# Patient Record
Sex: FEMALE | Race: WHITE | NOT HISPANIC OR LATINO | Employment: OTHER | URBAN - METROPOLITAN AREA
[De-identification: names, ages, dates, MRNs, and addresses within clinical notes are randomized per-mention and may not be internally consistent; named-entity substitution may affect disease eponyms.]

---

## 2017-01-06 ENCOUNTER — TELEPHONE (OUTPATIENT)
Dept: DERMATOLOGY | Facility: CLINIC | Age: 72
End: 2017-01-06

## 2017-01-06 NOTE — TELEPHONE ENCOUNTER
Prior Authorization Retail Medication Request  Medication/Dose: Fluocinolone Acetonide (DERMA-SMOOTHE/FS SCALP) 0.01 % OIL  Diagnosis and ICD code: Lichen planus [L43.9  New/Renewal/Insurance Change PA: Renewal  Previously Tried and Failed Therapies:   The patient was last seen 9/30/16 when we injected IL Kenalog to scalp lesions and continued her Dermasmooth FS oil treatment 1x weekly and Plaquenil 200mg BID.  Insurance ID (if provided): [id number]   Insurance Phone (if provided): [phone]   Any additional info from fax request:     If you received a fax notification from an outside Pharmacy:  [facility]  [facility] #:[phone]  Pharmacy Fax:[phone]

## 2017-01-16 ENCOUNTER — TELEPHONE (OUTPATIENT)
Dept: DERMATOLOGY | Facility: CLINIC | Age: 72
End: 2017-01-16

## 2017-01-16 NOTE — TELEPHONE ENCOUNTER
Patient called stating she has called twice now and has not heard back regarding 2 month follow up appointment. Informed patient that she is on the recall list. Patient requested that message be sent to Dr Soto's nurse Karen. Patient leaves the 2nd week in February for 6 weeks and is requesting to be seen before then.

## 2017-01-18 ENCOUNTER — OFFICE VISIT (OUTPATIENT)
Dept: OBGYN | Facility: CLINIC | Age: 72
End: 2017-01-18
Attending: OBSTETRICS & GYNECOLOGY
Payer: MEDICARE

## 2017-01-18 VITALS
SYSTOLIC BLOOD PRESSURE: 115 MMHG | DIASTOLIC BLOOD PRESSURE: 65 MMHG | WEIGHT: 136.4 LBS | HEART RATE: 68 BPM | BODY MASS INDEX: 21.92 KG/M2 | HEIGHT: 66 IN

## 2017-01-18 DIAGNOSIS — B37.31 CANDIDIASIS OF VULVA AND VAGINA: Primary | ICD-10-CM

## 2017-01-18 LAB
ALBUMIN UR-MCNC: NEGATIVE MG/DL
APPEARANCE UR: CLEAR
BILIRUB UR QL STRIP: NEGATIVE
COLOR UR AUTO: NORMAL
GLUCOSE UR STRIP-MCNC: NEGATIVE MG/DL
HGB UR QL STRIP: NEGATIVE
KETONES UR STRIP-MCNC: NEGATIVE MG/DL
LEUKOCYTE ESTERASE UR QL STRIP: NEGATIVE
NITRATE UR QL: NEGATIVE
PH UR STRIP: 5.5 PH (ref 5–7)
RBC #/AREA URNS AUTO: 0 /HPF (ref 0–2)
SP GR UR STRIP: 1 (ref 1–1.03)
URN SPEC COLLECT METH UR: NORMAL
UROBILINOGEN UR STRIP-MCNC: NORMAL MG/DL (ref 0–2)
WBC #/AREA URNS AUTO: <1 /HPF (ref 0–2)

## 2017-01-18 PROCEDURE — 87102 FUNGUS ISOLATION CULTURE: CPT | Performed by: OBSTETRICS & GYNECOLOGY

## 2017-01-18 PROCEDURE — 81001 URINALYSIS AUTO W/SCOPE: CPT | Performed by: OBSTETRICS & GYNECOLOGY

## 2017-01-18 PROCEDURE — 99212 OFFICE O/P EST SF 10 MIN: CPT | Mod: ZF

## 2017-01-18 NOTE — Clinical Note
"1/18/2017       RE: Ester Barba  [address]  [facility] [address]     Dear Colleague,    Thank you for referring your patient, Ester Barba, to the [facility] at [facility]. Please see a copy of my visit note below.    S: doing well but still bothered by discharge intermittently  /65 mmHg  Pulse 68  Ht 1.676 m (5' 6\")  Wt 61.871 kg (136 lb 6.4 oz)  BMI 22.03 kg/m2  EG atrophy  Vagina atrophic, min discharge  Wet prep: negative, culture sent  A: atrophy  Chronic and persistent yeast  P: yeast culture  Continue current regimen      Janeth Mayfield            "

## 2017-01-23 LAB
MICRO REPORT STATUS: NORMAL
SPECIMEN SOURCE: NORMAL
YEAST SPEC QL CULT: NORMAL

## 2017-01-26 NOTE — PROGRESS NOTES
"S: doing well but still bothered by discharge intermittently  /65 mmHg  Pulse 68  Ht 1.676 m (5' 6\")  Wt 61.871 kg (136 lb 6.4 oz)  BMI 22.03 kg/m2  EG atrophy  Vagina atrophic, min discharge  Wet prep: negative, culture sent  A: atrophy  Chronic and persistent yeast  P: yeast culture  Continue current regimen  Janeth Mayfield    "

## 2017-01-27 ENCOUNTER — OFFICE VISIT (OUTPATIENT)
Dept: DERMATOLOGY | Facility: CLINIC | Age: 72
End: 2017-01-27

## 2017-01-27 ENCOUNTER — TELEPHONE (OUTPATIENT)
Dept: OBGYN | Facility: CLINIC | Age: 72
End: 2017-01-27

## 2017-01-27 VITALS — DIASTOLIC BLOOD PRESSURE: 68 MMHG | SYSTOLIC BLOOD PRESSURE: 121 MMHG | HEART RATE: 67 BPM

## 2017-01-27 DIAGNOSIS — L66.10 LICHEN PLANOPILARIS: Primary | ICD-10-CM

## 2017-01-27 DIAGNOSIS — N90.5 VULVAR ATROPHY: Primary | ICD-10-CM

## 2017-01-27 RX ORDER — FLUOCINOLONE ACETONIDE 0.11 MG/ML
OIL TOPICAL
Qty: 118 ML | Refills: 2 | Status: SHIPPED | OUTPATIENT
Start: 2017-01-27 | End: 2018-05-01

## 2017-01-27 RX ORDER — HYDROXYCHLOROQUINE SULFATE 200 MG/1
TABLET, FILM COATED ORAL
Qty: 60 TABLET | Refills: 2 | Status: SHIPPED | OUTPATIENT
Start: 2017-01-27 | End: 2017-05-22

## 2017-01-27 ASSESSMENT — PAIN SCALES - GENERAL: PAINLEVEL: NO PAIN (0)

## 2017-01-27 NOTE — Clinical Note
1/27/2017       RE: Ester Barba  [address]  [facility] [address]     Dear Colleague,    Thank you for referring your patient, Ester Barba, to the [facility] DERMATOLOGY at [facility]. Please see a copy of my visit note below.    No notes on file    Again, thank you for allowing me to participate in the care of your patient.      Sincerely,    Barbara Soto MD

## 2017-01-27 NOTE — MR AVS SNAPSHOT
After Visit Summary   1/27/2017    Ester Barba    MRN: [id number]           Patient Information     Date Of Birth          1945        Visit Information        Provider Department      1/27/2017 11:15 AM Barbara Soto MD [facility]        Today's Diagnoses     Lichen planopilaris    -  1       Follow-ups after your visit        Your next 10 appointments already scheduled     Aug 21, 2017 11:00 AM CDT   (Arrive by 10:45 AM)   RETURN [facility] with Barbara Soto MD   [facility] ([facility])    [address] [place] [address] [phone]              Who to contact     Please call your clinic at [phone] to:    Ask questions about your health    Make or cancel appointments    Discuss your medicines    Learn about your test results    Speak to your doctor   If you have compliments or concerns about an experience at your clinic, or if you wish to file a complaint, please contact [facility] Patient Relations at [phone] or email us at [email or web address]         Additional Information About Your Visit        MyChart Information     [employer] gives you secure access to your electronic health record. If you see a primary care provider, you can also send messages to your care team and make appointments. If you have questions, please call your primary care clinic.  If you do not have a primary care provider, please call [phone] and they will assist you.      [employer] is an electronic gateway that provides easy, online access to your medical records. With [email or web address], you can request a clinic appointment, read your test results, renew a prescription or communicate with your care team.     To access your existing account, please contact your [facility] Clinic or call [phone] for assistance.        Care EveryWhere ID     This is your  Care EveryWhere ID. This could be used by other organizations to access your [facility] medical records  [id number]        Your Vitals Were     Pulse                   67            Blood Pressure from Last 3 Encounters:   06/28/17 117/69   06/06/17 124/66   03/24/17 133/73    Weight from Last 3 Encounters:   06/28/17 59.1 kg (130 lb 6.4 oz)   01/18/17 61.9 kg (136 lb 6.4 oz)   03/14/16 58.1 kg (128 lb)              Today, you had the following     No orders found for display         Today's Medication Changes          These changes are accurate as of: 1/27/17 11:59 PM.  If you have any questions, ask your nurse or doctor.               These medicines have changed or have updated prescriptions.        Dose/Directions    NEW MED   This may have changed:  additional instructions   Used for:  Vulvar atrophy   Changed by:  NurseJuancarlos        Biest 1.5mg/gram cream(pg free)  Insert 1 gram vaginally twice weekly as directed   Quantity:  40 g   Refills:  6            Where to get your medicines      These medications were sent to [facility] [address] [place] [address] AT [address] [address]     Phone:  [phone]     DERMA-SMOOTHE/FS SCALP 0.01 % Oil    hydroxychloroquine 200 MG tablet                Primary Care Provider Office Phone # Fax #    Fernando Weathers [phone]       [facility] [address]  [facility] [address]        Equal Access to Services     FIDENCIO MCDANIEL AH: Hadii aad ku hadasho Soomaali, waaxda luqadaha, qaybta kaalmada adeegyada, samuel stevens. So [facility] [place] [phone].    ATENCIÓN: Si habla español, tiene a pelayo disposición servicios gratuitos de asistencia lingüística. Llame al [phone].    We comply with applicable federal civil rights laws and [place] laws. We do not discriminate on the basis of race, color, national origin, age, disability sex, sexual orientation or gender  identity.            Thank you!     Thank you for choosing [facility]  for your care. Our goal is always to provide you with excellent care. Hearing back from our patients is one way we can continue to improve our services. Please take a few minutes to complete the written survey that you may receive in the mail after your visit with us. Thank you!             Your Updated Medication List - Protect others around you: Learn how to safely use, store and throw away your medicines at www.disposemymeds.org.          This list is accurate as of: 1/27/17 11:59 PM.  Always use your most recent med list.                   Brand Name Dispense Instructions for use Diagnosis    BENADRYL 25 MG capsule   Generic drug:  diphenhydrAMINE      Take 25 mg by mouth every 6 hours as needed.        calcium 1500 MG Tabs      Take  by mouth.        CLARITIN PO      Take  by mouth.        DERMA-SMOOTHE/FS SCALP 0.01 % Oil     118 mL    Apply once a week to scalp for lichen planopilaris    Lichen planopilaris       desonide 0.05 % cream    DESOWEN    60 g    Mix 50/50 with ketoconazole and apply twice daily to irritated area for 3 weeks    Intertrigo       fluconazole 200 MG tablet    DIFLUCAN    45 tablet    Take 1 tablet (200 mg) by mouth daily    Yeast infection of the vagina       hydroxychloroquine 200 MG tablet    PLAQUENIL    60 tablet    TAKE ONE TABLET BY MOUTH TWO TIMES A DAY        ibuprofen 800 MG tablet    ADVIL/MOTRIN     Take 800 mg by mouth every 8 hours as needed.        NEW MED     40 g    Biest 1.5mg/gram cream(pg free)  Insert 1 gram vaginally twice weekly as directed    Vulvar atrophy       vitamin D 1000 UNITS capsule      Take  by mouth. Total 2200 units daily.

## 2017-01-27 NOTE — LETTER
"1/27/2017       RE: Ester Barba  [address] [address]     Dear Colleague,    Thank you for referring your patient, Ester Barba, to the [facility] DERMATOLOGY at [facility]. Please see a copy of my visit note below.    [facility] Dermatology Note      Dermatology Problem List:  1.***    CC:   Chief Complaint   Patient presents with     Derm Problem     Patient comes to clinic today for LPP. States \"it's worse.\"         Encounter Date: Jan 27, 2017    History of Present Illness:  Ms. Ester Barba is a 71 year old female who {hpi:875172}    Fewer crusts   Had sinus infection - treated with doxy for 10 days   - much better    Past Medical History:   Patient Active Problem List   Diagnosis     Porokeratosis     Squamous cell carcinoma (H)     Neoplasm of uncertain behavior     Lichen planopilaris     Dermatitis     Cicatricial alopecia     Keratosis, inflamed seborrheic     History of skin cancer     Vaginitis and vulvovaginitis     Basal cell carcinoma of vertex scalp s/p mohs 6-5-15     Medication management     Actinic keratosis     Medication monitoring encounter     Past Medical History   Diagnosis Date     Squamous cell carcinoma (H)      Basal cell carcinoma      Past Surgical History   Procedure Laterality Date     Biopsy of skin lesion       No history of surgery  2/17/14     derm     Mohs micrographic procedure       Small bowel resection  11/2015     diverticulitis       Social History:  The patient {works:484914}. The patient {denies/admits to:537955} use of tanning beds.    Family History:  {Familyhxderm:545679}    Medications:  Current Outpatient Prescriptions   Medication Sig Dispense Refill     Fluocinolone Acetonide (DERMA-SMOOTHE/FS SCALP) 0.01 % OIL Apply once a week to scalp for lichen planopilaris 118 mL 2     hydroxychloroquine (PLAQUENIL) 200 MG tablet TAKE ONE TABLET BY MOUTH TWO TIMES A DAY 60 tablet 2     fluconazole " (DIFLUCAN) 200 MG tablet Take 1 tablet (200 mg) by mouth daily 45 tablet 4     ketoconazole (NIZORAL) 2 % cream Mix 50/50 with desonide and apply twice daily to irritated area for 3 weeks 30 g 1     desonide (DESOWEN) 0.05 % cream Mix 50/50 with ketoconazole and apply twice daily to irritated area for 3 weeks 60 g 1     NEW MED Biest 1.5mg/gram cream(pg free)  Insert 1 gram vaginally every week as directed 40 g 6     diphenhydrAMINE (BENADRYL) 25 MG capsule Take 25 mg by mouth every 6 hours as needed.       Calcium 1500 MG TABS Take  by mouth.       Loratadine (CLARITIN PO) Take  by mouth.       ibuprofen (ADVIL,MOTRIN) 800 MG tablet Take 800 mg by mouth every 8 hours as needed.       Cholecalciferol (VITAMIN D) 1000 UNIT capsule Take  by mouth. Total 2200 units daily.       Allergies   Allergen Reactions     Dust Mites Other (See Comments)     Sneezing, coughing. asthma     Levaquin [Levofloxacin Hemihydrate] Other (See Comments)     Muscle weakness     Mold Other (See Comments)     Sneezing, asthma, weezing     Pollen Extract/Tree Extract Other (See Comments)     Sneezing, weezing     Sulfa Drugs Hives     Penicillins Rash         Review of Systems:  -{ROS:884388}  -Constitutional: The patient denies fatigue, fevers, chills, unintended weight loss, and night sweats.  -HEENT: Patient denies nonhealing oral sores.  -Skin: As above in HPI. No additional skin concerns.    Physical exam:  Vitals: /68 mmHg  Pulse 67  GEN: {RFGeneralAppearance:649000}   SKIN: {Skin Exam:437731}  -{Skin Exam Derm:853357}  -{Skin Exam Derm:161471}  -{Skin Exam Derm:131635}  -No other lesions of concern on areas examined.     Impression/Plan:  1. {Diagnosesderm:616519}    {rfplan:479001}    ***    2. {Diagnosesderm:797360}    {rfplan:024556}    ***    3. {Diagnosesderm:555715}    {rfplan:406210}    ***    4. {Diagnosesderm:445268}    {rfplan:659677}    ***    CC  *** on close of this encounter.  Follow-up {[facility]206047}  {follow up in days/weeks/months:342350}.       Staff Involved:  Staff Only        Pictures taken of patient today to be placed in chart for future reference.      Again, thank you for allowing me to participate in the care of your patient.      Sincerely,    Barbara Soto MD

## 2017-01-27 NOTE — LETTER
"1/27/2017      RE: Ester Barba  [address] [place] [address]       [facility] Dermatology Note      Dermatology Problem List:  1.***    CC:   Chief Complaint   Patient presents with     Derm Problem     Patient comes to clinic today for LPP. States \"it's worse.\"         Encounter Date: Jan 27, 2017    History of Present Illness:  Ms. Ester Barba is a 71 year old female who {hpi:812003}    Fewer crusts   Had sinus infection - treated with doxy for 10 days   - much better    Past Medical History:   Patient Active Problem List   Diagnosis     Porokeratosis     Squamous cell carcinoma (H)     Neoplasm of uncertain behavior     Lichen planopilaris     Dermatitis     Cicatricial alopecia     Keratosis, inflamed seborrheic     History of skin cancer     Vaginitis and vulvovaginitis     Basal cell carcinoma of vertex scalp s/p mohs 6-5-15     Medication management     Actinic keratosis     Medication monitoring encounter     Past Medical History   Diagnosis Date     Squamous cell carcinoma (H)      Basal cell carcinoma      Past Surgical History   Procedure Laterality Date     Biopsy of skin lesion       No history of surgery  2/17/14     derm     Mohs micrographic procedure       Small bowel resection  11/2015     diverticulitis       Social History:  The patient {works:112866}. The patient {denies/admits to:254582} use of tanning beds.    Family History:  {Familyhxderm:690927}    Medications:  Current Outpatient Prescriptions   Medication Sig Dispense Refill     Fluocinolone Acetonide (DERMA-SMOOTHE/FS SCALP) 0.01 % OIL Apply once a week to scalp for lichen planopilaris 118 mL 2     hydroxychloroquine (PLAQUENIL) 200 MG tablet TAKE ONE TABLET BY MOUTH TWO TIMES A DAY 60 tablet 2     fluconazole (DIFLUCAN) 200 MG tablet Take 1 tablet (200 mg) by mouth daily 45 tablet 4     ketoconazole (NIZORAL) 2 % cream Mix 50/50 with desonide and apply twice daily to irritated area for 3 weeks 30 g 1     " desonide (DESOWEN) 0.05 % cream Mix 50/50 with ketoconazole and apply twice daily to irritated area for 3 weeks 60 g 1     NEW MED Biest 1.5mg/gram cream(pg free)  Insert 1 gram vaginally every week as directed 40 g 6     diphenhydrAMINE (BENADRYL) 25 MG capsule Take 25 mg by mouth every 6 hours as needed.       Calcium 1500 MG TABS Take  by mouth.       Loratadine (CLARITIN PO) Take  by mouth.       ibuprofen (ADVIL,MOTRIN) 800 MG tablet Take 800 mg by mouth every 8 hours as needed.       Cholecalciferol (VITAMIN D) 1000 UNIT capsule Take  by mouth. Total 2200 units daily.       Allergies   Allergen Reactions     Dust Mites Other (See Comments)     Sneezing, coughing. asthma     Levaquin [Levofloxacin Hemihydrate] Other (See Comments)     Muscle weakness     Mold Other (See Comments)     Sneezing, asthma, weezing     Pollen Extract/Tree Extract Other (See Comments)     Sneezing, weezing     Sulfa Drugs Hives     Penicillins Rash         Review of Systems:  -{ROS:309919}  -Constitutional: The patient denies fatigue, fevers, chills, unintended weight loss, and night sweats.  -HEENT: Patient denies nonhealing oral sores.  -Skin: As above in HPI. No additional skin concerns.    Physical exam:  Vitals: /68 mmHg  Pulse 67  GEN: {RFGeneralAppearance:477248}   SKIN: {Skin Exam:681247}  -{Skin Exam Derm:351179}  -{Skin Exam Derm:847767}  -{Skin Exam Derm:571908}  -No other lesions of concern on areas examined.     Impression/Plan:  1. {Diagnosesderm:641201}    {rfplan:815573}    ***    2. {Diagnosesderm:830322}    {rfplan:172582}    ***    3. {Diagnosesderm:247377}    {rfplan:114244}    ***    4. {Diagnosesderm:676973}    {rfplan:326887}    ***    CC  *** on close of this encounter.  Follow-up {rfmultiple:976196} {follow up in days/weeks/months:812144}.       Staff Involved:  Staff Only        Pictures taken of patient today to be placed in chart for future reference.      Barbara Soto MD

## 2017-01-27 NOTE — LETTER
"1/27/2017      RE: Ester Barba  [address] [place] [place] [address]       [facility] Dermatology Note      Dermatology Problem List:  1.***    CC:   Chief Complaint   Patient presents with     Derm Problem     Patient comes to clinic today for LPP. States \"it's worse.\"         Encounter Date: Jan 27, 2017    History of Present Illness:  Ms. Ester Barba is a 71 year old female who {hpi:494640}    Fewer crusts   Had sinus infection - treated with doxy for 10 days   - much better    Past Medical History:   Patient Active Problem List   Diagnosis     Porokeratosis     Squamous cell carcinoma (H)     Neoplasm of uncertain behavior     Lichen planopilaris     Dermatitis     Cicatricial alopecia     Keratosis, inflamed seborrheic     History of skin cancer     Vaginitis and vulvovaginitis     Basal cell carcinoma of vertex scalp s/p mohs 6-5-15     Medication management     Actinic keratosis     Medication monitoring encounter     Past Medical History   Diagnosis Date     Squamous cell carcinoma (H)      Basal cell carcinoma      Past Surgical History   Procedure Laterality Date     Biopsy of skin lesion       No history of surgery  2/17/14     derm     Mohs micrographic procedure       Small bowel resection  11/2015     diverticulitis       Social History:  The patient {works:489644}. The patient {denies/admits to:313713} use of tanning beds.    Family History:  {Familyhxderm:579583}    Medications:  Current Outpatient Prescriptions   Medication Sig Dispense Refill     Fluocinolone Acetonide (DERMA-SMOOTHE/FS SCALP) 0.01 % OIL Apply once a week to scalp for lichen planopilaris 118 mL 2     hydroxychloroquine (PLAQUENIL) 200 MG tablet TAKE ONE TABLET BY MOUTH TWO TIMES A DAY 60 tablet 2     fluconazole (DIFLUCAN) 200 MG tablet Take 1 tablet (200 mg) by mouth daily 45 tablet 4     ketoconazole (NIZORAL) 2 % cream Mix 50/50 with desonide and apply twice daily to irritated area for 3 weeks 30 g 1     " desonide (DESOWEN) 0.05 % cream Mix 50/50 with ketoconazole and apply twice daily to irritated area for 3 weeks 60 g 1     NEW MED Biest 1.5mg/gram cream(pg free)  Insert 1 gram vaginally every week as directed 40 g 6     diphenhydrAMINE (BENADRYL) 25 MG capsule Take 25 mg by mouth every 6 hours as needed.       Calcium 1500 MG TABS Take  by mouth.       Loratadine (CLARITIN PO) Take  by mouth.       ibuprofen (ADVIL,MOTRIN) 800 MG tablet Take 800 mg by mouth every 8 hours as needed.       Cholecalciferol (VITAMIN D) 1000 UNIT capsule Take  by mouth. Total 2200 units daily.       Allergies   Allergen Reactions     Dust Mites Other (See Comments)     Sneezing, coughing. asthma     Levaquin [Levofloxacin Hemihydrate] Other (See Comments)     Muscle weakness     Mold Other (See Comments)     Sneezing, asthma, weezing     Pollen Extract/Tree Extract Other (See Comments)     Sneezing, weezing     Sulfa Drugs Hives     Penicillins Rash         Review of Systems:  -{ROS:076553}  -Constitutional: The patient denies fatigue, fevers, chills, unintended weight loss, and night sweats.  -HEENT: Patient denies nonhealing oral sores.  -Skin: As above in HPI. No additional skin concerns.    Physical exam:  Vitals: /68 mmHg  Pulse 67  GEN: {RFGeneralAppearance:151959}   SKIN: {Skin Exam:327598}  -{Skin Exam Derm:008200}  -{Skin Exam Derm:397350}  -{Skin Exam Derm:630469}  -No other lesions of concern on areas examined.     Impression/Plan:  1. {Diagnosesderm:198606}    {rfplan:070164}    ***    2. {Diagnosesderm:772840}    {rfplan:758291}    ***    3. {Diagnosesderm:212079}    {rfplan:645346}    ***    4. {Diagnosesderm:004422}    {rfplan:792377}    ***    CC  *** on close of this encounter.  Follow-up {rfmultiple:526188} {follow up in days/weeks/months:631464}.       Staff Involved:  Staff Only        Pictures taken of patient today to be placed in chart for future reference.      Barbara Soto MD

## 2017-01-27 NOTE — LETTER
"1/27/2017       RE: Ester Barba  [address] [place] [facility] [address]     Dear Colleague,    Thank you for referring your patient, Ester Barba, to the [facility] DERMATOLOGY at [facility]. Please see a copy of my visit note below.    [facility] Dermatology Note      Dermatology Problem List:  1.***    CC:   Chief Complaint   Patient presents with     Derm Problem     Patient comes to clinic today for LPP. States \"it's worse.\"         Encounter Date: Jan 27, 2017    History of Present Illness:  Ms. Ester Barba is a 71 year old female who {hpi:074425}    Fewer crusts   Had sinus infection - treated with doxy for 10 days   - much better    Past Medical History:   Patient Active Problem List   Diagnosis     Porokeratosis     Squamous cell carcinoma (H)     Neoplasm of uncertain behavior     Lichen planopilaris     Dermatitis     Cicatricial alopecia     Keratosis, inflamed seborrheic     History of skin cancer     Vaginitis and vulvovaginitis     Basal cell carcinoma of vertex scalp s/p mohs 6-5-15     Medication management     Actinic keratosis     Medication monitoring encounter     Past Medical History   Diagnosis Date     Squamous cell carcinoma (H)      Basal cell carcinoma      Past Surgical History   Procedure Laterality Date     Biopsy of skin lesion       No history of surgery  2/17/14     derm     Mohs micrographic procedure       Small bowel resection  11/2015     diverticulitis       Social History:  The patient {works:591739}. The patient {denies/admits to:317897} use of tanning beds.    Family History:  {Familyhxderm:025191}    Medications:  Current Outpatient Prescriptions   Medication Sig Dispense Refill     Fluocinolone Acetonide (DERMA-SMOOTHE/FS SCALP) 0.01 % OIL Apply once a week to scalp for lichen planopilaris 118 mL 2     hydroxychloroquine (PLAQUENIL) 200 MG tablet TAKE ONE TABLET BY MOUTH TWO TIMES A DAY 60 tablet 2     fluconazole " (DIFLUCAN) 200 MG tablet Take 1 tablet (200 mg) by mouth daily 45 tablet 4     ketoconazole (NIZORAL) 2 % cream Mix 50/50 with desonide and apply twice daily to irritated area for 3 weeks 30 g 1     desonide (DESOWEN) 0.05 % cream Mix 50/50 with ketoconazole and apply twice daily to irritated area for 3 weeks 60 g 1     NEW MED Biest 1.5mg/gram cream(pg free)  Insert 1 gram vaginally every week as directed 40 g 6     diphenhydrAMINE (BENADRYL) 25 MG capsule Take 25 mg by mouth every 6 hours as needed.       Calcium 1500 MG TABS Take  by mouth.       Loratadine (CLARITIN PO) Take  by mouth.       ibuprofen (ADVIL,MOTRIN) 800 MG tablet Take 800 mg by mouth every 8 hours as needed.       Cholecalciferol (VITAMIN D) 1000 UNIT capsule Take  by mouth. Total 2200 units daily.       Allergies   Allergen Reactions     Dust Mites Other (See Comments)     Sneezing, coughing. asthma     Levaquin [Levofloxacin Hemihydrate] Other (See Comments)     Muscle weakness     Mold Other (See Comments)     Sneezing, asthma, weezing     Pollen Extract/Tree Extract Other (See Comments)     Sneezing, weezing     Sulfa Drugs Hives     Penicillins Rash         Review of Systems:  -{ROS:832649}  -Constitutional: The patient denies fatigue, fevers, chills, unintended weight loss, and night sweats.  -HEENT: Patient denies nonhealing oral sores.  -Skin: As above in HPI. No additional skin concerns.    Physical exam:  Vitals: /68 mmHg  Pulse 67  GEN: {RFGeneralAppearance:235880}   SKIN: {Skin Exam:279315}  -{Skin Exam Derm:794642}  -{Skin Exam Derm:835204}  -{Skin Exam Derm:635462}  -No other lesions of concern on areas examined.     Impression/Plan:  1. {Diagnosesderm:892295}    {rfplan:453671}    ***    2. {Diagnosesderm:730803}    {rfplan:758569}    ***    3. {Diagnosesderm:565378}    {rfplan:013663}    ***    4. {Diagnosesderm:917603}    {rfplan:221479}    ***    CC  *** on close of this encounter.  Follow-up {[facility]}  {follow up in days/weeks/months:769623}.       Staff Involved:  Staff Only        Pictures taken of patient today to be placed in chart for future reference.      Again, thank you for allowing me to participate in the care of your patient.      Sincerely,    Barbara Soto MD

## 2017-01-27 NOTE — NURSING NOTE
"Dermatology Rooming Note    Ester Barba's goals for this visit include:   Chief Complaint   Patient presents with     Derm Problem     Patient comes to clinic today for LPP. States \"it's worse.\"     Karen Oglesby, [facility]    "

## 2017-02-10 NOTE — TELEPHONE ENCOUNTER
Prior Authorization Not Needed per Insurance    Medication: FLUOCINOLONE ACETONIDE SCALP OIL  Insurance Company: [employer] - Phone [phone] Fax [phone]  Expected CoPay: 85.57    Pharmacy Filling the Rx: [facility] [address] [place] [address] - [address] AT [address]  Pharmacy Notified: Yes  Patient Notified: NoComment:  tried to call pt - unable to leave  -

## 2017-02-10 NOTE — TELEPHONE ENCOUNTER
[facility] Prior Authorization Team   Phone: [phone]  Fax: [phone]    PA Initiation    Medication: FLUOCINOLONE ACETONIDE SCALP OIL  Insurance Company: [employer] - Phone [phone] Fax [phone]  Pharmacy Filling the Rx: [facility] [address] [place] [address] - [address] [place] [address] AT [address]  Filling Pharmacy Phone: [phone]  Filling Pharmacy Fax:    Start Date: 2/10/2017

## 2017-03-06 ENCOUNTER — TELEPHONE (OUTPATIENT)
Dept: DERMATOLOGY | Facility: CLINIC | Age: 72
End: 2017-03-06

## 2017-03-06 NOTE — TELEPHONE ENCOUNTER
PA for dermasmoothe oil approved until 12/31/17. Plaquenil denied. Approval and denial sent to scanning.

## 2017-03-24 ENCOUNTER — OFFICE VISIT (OUTPATIENT)
Dept: DERMATOLOGY | Facility: CLINIC | Age: 72
End: 2017-03-24

## 2017-03-24 VITALS — SYSTOLIC BLOOD PRESSURE: 133 MMHG | HEART RATE: 68 BPM | DIASTOLIC BLOOD PRESSURE: 73 MMHG

## 2017-03-24 DIAGNOSIS — Z79.899 MEDICATION MANAGEMENT: ICD-10-CM

## 2017-03-24 DIAGNOSIS — L30.9 DERMATITIS: ICD-10-CM

## 2017-03-24 DIAGNOSIS — Z79.899 MEDICATION MANAGEMENT: Primary | ICD-10-CM

## 2017-03-24 DIAGNOSIS — L66.10 LICHEN PLANOPILARIS: ICD-10-CM

## 2017-03-24 DIAGNOSIS — L82.0 KERATOSIS, INFLAMED SEBORRHEIC: ICD-10-CM

## 2017-03-24 LAB
ALBUMIN SERPL-MCNC: 3.9 G/DL (ref 3.4–5)
ALP SERPL-CCNC: 81 U/L (ref 40–150)
ALT SERPL W P-5'-P-CCNC: 22 U/L (ref 0–50)
ANION GAP SERPL CALCULATED.3IONS-SCNC: 10 MMOL/L (ref 3–14)
AST SERPL W P-5'-P-CCNC: 20 U/L (ref 0–45)
BASOPHILS # BLD AUTO: 0 10E9/L (ref 0–0.2)
BASOPHILS NFR BLD AUTO: 0.7 %
BILIRUB SERPL-MCNC: 0.6 MG/DL (ref 0.2–1.3)
BUN SERPL-MCNC: 16 MG/DL (ref 7–30)
CALCIUM SERPL-MCNC: 9.3 MG/DL (ref 8.5–10.1)
CHLORIDE SERPL-SCNC: 106 MMOL/L (ref 94–109)
CO2 SERPL-SCNC: 27 MMOL/L (ref 20–32)
CREAT SERPL-MCNC: 0.61 MG/DL (ref 0.52–1.04)
DIFFERENTIAL METHOD BLD: NORMAL
EOSINOPHIL # BLD AUTO: 0.1 10E9/L (ref 0–0.7)
EOSINOPHIL NFR BLD AUTO: 2.9 %
ERYTHROCYTE [DISTWIDTH] IN BLOOD BY AUTOMATED COUNT: 13.2 % (ref 10–15)
GFR SERPL CREATININE-BSD FRML MDRD: NORMAL ML/MIN/1.7M2
GLUCOSE SERPL-MCNC: 96 MG/DL (ref 70–99)
HCT VFR BLD AUTO: 39.1 % (ref 35–47)
HGB BLD-MCNC: 13.1 G/DL (ref 11.7–15.7)
IMM GRANULOCYTES # BLD: 0 10E9/L (ref 0–0.4)
IMM GRANULOCYTES NFR BLD: 0.2 %
LYMPHOCYTES # BLD AUTO: 1.8 10E9/L (ref 0.8–5.3)
LYMPHOCYTES NFR BLD AUTO: 44.1 %
MCH RBC QN AUTO: 32.4 PG (ref 26.5–33)
MCHC RBC AUTO-ENTMCNC: 33.5 G/DL (ref 31.5–36.5)
MCV RBC AUTO: 97 FL (ref 78–100)
MONOCYTES # BLD AUTO: 0.5 10E9/L (ref 0–1.3)
MONOCYTES NFR BLD AUTO: 11 %
NEUTROPHILS # BLD AUTO: 1.7 10E9/L (ref 1.6–8.3)
NEUTROPHILS NFR BLD AUTO: 41.1 %
NRBC # BLD AUTO: 0 10*3/UL
NRBC BLD AUTO-RTO: 0 /100
PLATELET # BLD AUTO: 154 10E9/L (ref 150–450)
POTASSIUM SERPL-SCNC: 3.9 MMOL/L (ref 3.4–5.3)
PROT SERPL-MCNC: 7 G/DL (ref 6.8–8.8)
RBC # BLD AUTO: 4.04 10E12/L (ref 3.8–5.2)
SODIUM SERPL-SCNC: 143 MMOL/L (ref 133–144)
WBC # BLD AUTO: 4.2 10E9/L (ref 4–11)

## 2017-03-24 ASSESSMENT — PAIN SCALES - GENERAL: PAINLEVEL: NO PAIN (0)

## 2017-03-24 NOTE — MR AVS SNAPSHOT
After Visit Summary   3/24/2017    Ester Barba    MRN: [id number]           Patient Information     Date Of Birth          1945        Visit Information        Provider Department      3/24/2017 10:45 AM Barbara Soto MD [facility] Dermatology        Today's Diagnoses     Medication management    -  1    Keratosis, inflamed seborrheic        Lichen planopilaris        Dermatitis           Follow-ups after your visit        Your next 10 appointments already scheduled     May 03, 2017 10:00 AM CDT   Return Visit with Janeth Mayfield MD   [facility] ([facility])    [facility]  [address]  [address]  [facility] [id number] [phone]              Who to contact     Please call your clinic at [phone] to:    Ask questions about your health    Make or cancel appointments    Discuss your medicines    Learn about your test results    Speak to your doctor   If you have compliments or concerns about an experience at your clinic, or if you wish to file a complaint, please contact [facility] Patient Relations at [phone] or email us at [email or web address]         Additional Information About Your Visit        MyChart Information     [id number] gives you secure access to your electronic health record. If you see a primary care provider, you can also send messages to your care team and make appointments. If you have questions, please call your primary care clinic.  If you do not have a primary care provider, please call [phone] and they will assist you.      [employer] is an electronic gateway that provides easy, online access to your medical records. With [employer], you can request a clinic appointment, read your test results, renew a prescription or communicate with your care team.     To access your existing account, please contact your [facility] or  call [phone] for assistance.        Care EveryWhere ID     This is your Care EveryWhere ID. This could be used by other organizations to access your [facility] medical records  [id number]        Your Vitals Were     Pulse Breastfeeding?                68 No           Blood Pressure from Last 3 Encounters:   03/24/17 133/73   01/27/17 121/68   01/18/17 115/65    Weight from Last 3 Encounters:   01/18/17 61.9 kg (136 lb 6.4 oz)   03/14/16 58.1 kg (128 lb)   02/08/16 59.9 kg (132 lb)              We Performed the Following     INJECTION INTO SKIN LESIONS >7          Today's Medication Changes          These changes are accurate as of: 3/24/17 11:59 PM.  If you have any questions, ask your nurse or doctor.               Start taking these medicines.        Dose/Directions    triamcinolone acetonide 10 MG/ML injection   Commonly known as:  KENALOG   Used for:  Lichen planopilaris   Started by:  Barbara Soto MD        See med note   Quantity:  5 mL   Refills:  0            Where to get your medicines      Some of these will need a paper prescription and others can be bought over the counter.  Ask your nurse if you have questions.     You don't need a prescription for these medications     triamcinolone acetonide 10 MG/ML injection                Primary Care Provider Office Phone # Fax #    Fernando Weathers [phone]       [place] [address]        Thank you!     Thank you for choosing [facility] DERMATOLOGY  for your care. Our goal is always to provide you with excellent care. Hearing back from our patients is one way we can continue to improve our services. Please take a few minutes to complete the written survey that you may receive in the mail after your visit with us. Thank you!             Your Updated Medication List - Protect others around you: Learn how to safely use, store and throw away your medicines at www.disposemymeds.org.          This  list is accurate as of: 3/24/17 11:59 PM.  Always use your most recent med list.                   Brand Name Dispense Instructions for use    BENADRYL 25 MG capsule   Generic drug:  diphenhydrAMINE      Take 25 mg by mouth every 6 hours as needed.       calcium 1500 MG Tabs      Take  by mouth.       CLARITIN PO      Take  by mouth.       DERMA-SMOOTHE/FS SCALP 0.01 % Oil     118 mL    Apply once a week to scalp for lichen planopilaris       desonide 0.05 % cream    DESOWEN    60 g    Mix 50/50 with ketoconazole and apply twice daily to irritated area for 3 weeks       fluconazole 200 MG tablet    DIFLUCAN    45 tablet    Take 1 tablet (200 mg) by mouth daily       hydroxychloroquine 200 MG tablet    PLAQUENIL    60 tablet    TAKE ONE TABLET BY MOUTH TWO TIMES A DAY       ibuprofen 800 MG tablet    ADVIL/MOTRIN     Take 800 mg by mouth every 8 hours as needed.       ketoconazole 2 % cream    NIZORAL    30 g    Mix 50/50 with desonide and apply twice daily to irritated area for 3 weeks       NEW MED     40 g    Biest 1.5mg/gram cream(pg free)  Insert 1 gram vaginally twice weekly as directed       triamcinolone acetonide 10 MG/ML injection    KENALOG    5 mL    See med note       vitamin D 1000 UNITS capsule      Take  by mouth. Total 2200 units daily.

## 2017-03-24 NOTE — LETTER
3/24/2017       RE: Ester Barba  [address] [place] [address]     Dear Colleague,    Thank you for referring your patient, Ester Barba, to the [facility] DERMATOLOGY at [facility]. Please see a copy of my visit note below.            Pictures taken of patient today to be placed in chart for future reference.      [facility] Dermatology Note    Dermatology Problem List:  1. Lichen Planopilaris and Lichen planus     -LPPAI score: 1.0 (1 for pruritus, perifollicular erythema and perifollicular scale)  -current treatment: 1 cc ILK 10 mg/cc injected into scalp today, plaquenil 200 mg BID, DHS zinc shampoo every 2-3 days, Rogaine 5% foam daily, derma-smoothe/FS 0.01% oil once per week  -plaquenil safety labs ordered: CBC with platelet diff, and CMP  -Symptoms thought to start originally after imiquimod application     2. Seborrheic keratosis: R forehead  -s/p cryotherapy (3/24/17)    3. Erythematous papule on midline upper lip along the vermilion border near site of previous BCC  -no concerning features for malignancy present on dermoscopy, will continue to clinically monitor    4. History of Actinic keratosis, anterior superior scalp  -s/p cryotherapy    5.. History of nonmelanoma skin cancer  -SCC of the scalp, s/p Mohs excision 2014  -History of 1-2 other NMSC of the scalp    Encounter Date: Mar 24, 2017    CC:  Chief Complaint   Patient presents with     Hair/Scalp Problem     Ester is here today for hairloss, states she feels things are about the same      History of Present Illness:  Ms. Ester Barba is a 71 year old female who presents as a follow-up for lichen planopilaris. The patient was last seen 1/27/17 when her LPPAI score was 0.67 and she received 1 cc ILK 10 mg/cc injected into her scalp.  She is currently treating her scalp with DHS zinc shampoo every 2-3 days, plaquenil 200 mg bid, Rogaine 5% foam daily, and derma-smoothe/FS 0.01% oil once per  "week.      Since her last visit, Ms. Barba states that her hair loss has been stable. She denies noticing increased hair loss along her vertex scalp since her last visit. She states that her scalp has recently started to become more pruritic over the last few weeks, but denies scalp pain or burning.  Ms. Barba denies hair loss elsewhere on her body such as eyebrows or eyelashes.     She has a few additional lesions she would like to have evaluated today. The first is on her upper lip at a site near a previous BCC.  She has recently started to notice a bump in the affected area, but denies any open sores, pain, or bleeding associated with the area.  She also has developed a lesion on her R upper forehead that she describes as a \"hard bump.\" She denies noticing increasing size or shape since initially noticing the lesion.  However, she does note that the area is intermittently pruritic. Ms. Barba denies changes in her medications or past medical history since her last visit.      Past Medical History:   Patient Active Problem List   Diagnosis     Porokeratosis     Squamous cell carcinoma (H)     Neoplasm of uncertain behavior     Lichen planopilaris     Dermatitis     Cicatricial alopecia     Keratosis, inflamed seborrheic     History of skin cancer     Vaginitis and vulvovaginitis     Basal cell carcinoma of vertex scalp s/p mohs 6-5-15     Medication management     Actinic keratosis     Medication monitoring encounter     Past Medical History:   Diagnosis Date     Basal cell carcinoma      Squamous cell carcinoma (H)      Past Surgical History:   Procedure Laterality Date     BIOPSY OF SKIN LESION       MOHS MICROGRAPHIC PROCEDURE       NO HISTORY OF SURGERY  2/17/14    derm     SMALL BOWEL RESECTION  11/2015    diverticulitis     Social History:  The patient is retired; 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  Patient lives in [place], [place]. [place] [facility] [place]. No history of tanning bed use.     Family History:  There is a family history of eczema in two brothers, but no known history of hair loss.     Medications:  Current Outpatient Prescriptions   Medication Sig Dispense Refill     Fluocinolone Acetonide (DERMA-SMOOTHE/FS SCALP) 0.01 % OIL Apply once a week to scalp for lichen planopilaris 118 mL 2     hydroxychloroquine (PLAQUENIL) 200 MG tablet TAKE ONE TABLET BY MOUTH TWO TIMES A DAY 60 tablet 2     NEW MED Biest 1.5mg/gram cream(pg free)  Insert 1 gram vaginally twice weekly as directed 40 g 6     fluconazole (DIFLUCAN) 200 MG tablet Take 1 tablet (200 mg) by mouth daily 45 tablet 4     ketoconazole (NIZORAL) 2 % cream Mix 50/50 with desonide and apply twice daily to irritated area for 3 weeks 30 g 1     desonide (DESOWEN) 0.05 % cream Mix 50/50 with ketoconazole and apply twice daily to irritated area for 3 weeks 60 g 1     diphenhydrAMINE (BENADRYL) 25 MG capsule Take 25 mg by mouth every 6 hours as needed.       Calcium 1500 MG TABS Take  by mouth.       Loratadine (CLARITIN PO) Take  by mouth.       ibuprofen (ADVIL,MOTRIN) 800 MG tablet Take 800 mg by mouth every 8 hours as needed.       Cholecalciferol (VITAMIN D) 1000 UNIT capsule Take  by mouth. Total 2200 units daily.       Allergies   Allergen Reactions     Dust Mites Other (See Comments)     Sneezing, coughing. asthma     Levaquin [Levofloxacin Hemihydrate] Other (See Comments)     Muscle weakness     Mold Other (See Comments)     Sneezing, asthma, weezing     Pollen Extract/Tree Extract Other (See Comments)     Sneezing, weezing     Sulfa Drugs Hives     Penicillins Rash     Review of Systems:  -Constitutional: The patient denies fatigue, fevers, chills, unintended weight loss, and night sweats.  -HEENT: Patient denies nonhealing oral sores.  -Skin: As above in HPI. No additional skin concerns.    Physical exam:  Vitals: /73  Pulse 68  Breastfeeding? No  GEN:  This is a well developed, well-nourished female in no acute distress, in a pleasant mood.    SKIN: Focused examination of the face, scalp and fingernails was performed.  -LPPAI score 1.0 (1 for pruritus, perifollicular erythema and perifollicular scale)  -central patch of scarred alopecia present on vertex scalp.    -Yellow adherent crusts present within the patch of alopecia along with tufted patches of short terminal hair fiber regrowth  -no hair loss present on eyebrows or eyelashes  -flesh-colored papule present on R forehead with verrucous appearance and keratin pearls on dermoscopy.  -erythematous papule present on philtrum of her upper lip at the vermilion border  -mild longitudinal ridging present on bilateral fingernails  -No other lesions of concern on areas examined.     Impression/Plan:  1. Lichen planopilaris: LPPAI score was 1.0 today (1 for pruritus, perifollicular erythema and perifollicular scale), which is slightly increased from her visit in November when her LPPAI score was 0.67.  She continues to have mild seborrhea and a yellow adherent crust present along scalp.      Kenalog intralesional injection procedure note (performed by faculty with medical student assistance): After verbal consent and discussion of risks including but not limited to atrophy, pain, and bruising, cleansing with isopropyl alcohol, time out was performed, 1 total cc of Kenalog 10 mg/cc was injected into approximately 10 sites on vertex scalp.  The patient tolerated the procedure well and left the Dermatology clinic in good condition.    Continue derma-smoothe/FS 0.01% oil 1-2x per week given yellow crusting present on examination    Continue to wash scalp with DHS zinc shampoo every 2-3 days    Continue Rogaine 5% foam daily    Continue plaquenil 200 mg bid.  Plaquenil safety labs ordered today (CBC with platelet diff, and CMP).    Photographs taken for future reference    2. Seborrheic keratosis: R upper forehead, which  is intermittently pruritic for Ms. Barba.     Cryotherapy procedure note (performed by faculty with medical student assistance): After verbal consent and discussion of risks and benefits including but no limited to dyspigmentation/scar, blister, infection, recurrence,R forehead was treated with 1-2mm freeze border for 2 cycles with liquid nitrogen. Post cryotherapy instructions were provided.     3. Erythematous papule present on philtrum of her upper lip at the vermilion border.  No concerning features present on dermoscopy, will clinically monitor given Ms. Barba's history of a BCC in the area.    Benign nature was discussed. No further intervention required at this time.     Will continue to clinically monitor.    Photographs taken for future reference     CC Dr. Fernando Weathers on close of this encounter.  Follow-up in 6 and in 8 weeks, earlier for new or changing lesions.     Staff Involved:  Scribed by Noemy Becerra, MS4 for Dr. Soto.        I agree with the PFSH and ROS as completed by the Medical Student. The remainder of the encounter was performed by me and scribed by the Medical Student. The scribed note accurately reflects my personal services and the medical decisions made by me. ILK and cryotherapy procedures were done together.       Barbara Soto MD  Professor and Chair  Department of Dermatology  [facility]

## 2017-03-24 NOTE — PROGRESS NOTES
[facility] Dermatology Note    Dermatology Problem List:  1. Lichen Planopilaris and Lichen planus     -LPPAI score: 1.0 (1 for pruritus, perifollicular erythema and perifollicular scale)  -current treatment: 1 cc ILK 10 mg/cc injected into scalp today, plaquenil 200 mg BID, DHS zinc shampoo every 2-3 days, Rogaine 5% foam daily, derma-smoothe/FS 0.01% oil once per week  -plaquenil safety labs ordered: CBC with platelet diff, and CMP  -Symptoms thought to start originally after imiquimod application     2. Seborrheic keratosis: R forehead  -s/p cryotherapy (3/24/17)    3. Erythematous papule on midline upper lip along the vermilion border near site of previous BCC  -no concerning features for malignancy present on dermoscopy, will continue to clinically monitor    4. History of Actinic keratosis, anterior superior scalp  -s/p cryotherapy    5.. History of nonmelanoma skin cancer  -SCC of the scalp, s/p Mohs excision 2014  -History of 1-2 other NMSC of the scalp    Encounter Date: Mar 24, 2017    CC:  Chief Complaint   Patient presents with     Hair/Scalp Problem     Ester is here today for hairloss, states she feels things are about the same      History of Present Illness:  MsAnam Barba is a 71 year old female who presents as a follow-up for lichen planopilaris. The patient was last seen 1/27/17 when her LPPAI score was 0.67 and she received 1 cc ILK 10 mg/cc injected into her scalp.  She is currently treating her scalp with DHS zinc shampoo every 2-3 days, plaquenil 200 mg bid, Rogaine 5% foam daily, and derma-smoothe/FS 0.01% oil once per week.      Since her last visit, Ms. Barba states that her hair loss has been stable. She denies noticing increased hair loss along her vertex scalp since her last visit. She states that her scalp has recently started to become more pruritic over the last few weeks, but denies scalp pain or burning.  Ms. Barba denies hair loss elsewhere on her body such as  "eyebrows or eyelashes.     She has a few additional lesions she would like to have evaluated today. The first is on her upper lip at a site near a previous BCC.  She has recently started to notice a bump in the affected area, but denies any open sores, pain, or bleeding associated with the area.  She also has developed a lesion on her R upper forehead that she describes as a \"hard bump.\" She denies noticing increasing size or shape since initially noticing the lesion.  However, she does note that the area is intermittently pruritic. Ms. Barba denies changes in her medications or past medical history since her last visit.      Past Medical History:   Patient Active Problem List   Diagnosis     Porokeratosis     Squamous cell carcinoma (H)     Neoplasm of uncertain behavior     Lichen planopilaris     Dermatitis     Cicatricial alopecia     Keratosis, inflamed seborrheic     History of skin cancer     Vaginitis and vulvovaginitis     Basal cell carcinoma of vertex scalp s/p mohs 6-5-15     Medication management     Actinic keratosis     Medication monitoring encounter     Past Medical History:   Diagnosis Date     Basal cell carcinoma      Squamous cell carcinoma (H)      Past Surgical History:   Procedure Laterality Date     BIOPSY OF SKIN LESION       MOHS MICROGRAPHIC PROCEDURE       NO HISTORY OF SURGERY  2/17/14    derm     SMALL BOWEL RESECTION  11/2015    diverticulitis     Social History:  The patient is retired; 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  Patient lives in [place], [place]. [place] [facility] [place]. No history of tanning bed use.     Family History:  There is a family history of eczema in two brothers, but no known history of hair loss.     Medications:  Current Outpatient Prescriptions   Medication Sig Dispense Refill     Fluocinolone Acetonide (DERMA-SMOOTHE/FS SCALP) 0.01 % OIL Apply once a week to scalp for " lichen planopilaris 118 mL 2     hydroxychloroquine (PLAQUENIL) 200 MG tablet TAKE ONE TABLET BY MOUTH TWO TIMES A DAY 60 tablet 2     NEW MED Biest 1.5mg/gram cream(pg free)  Insert 1 gram vaginally twice weekly as directed 40 g 6     fluconazole (DIFLUCAN) 200 MG tablet Take 1 tablet (200 mg) by mouth daily 45 tablet 4     ketoconazole (NIZORAL) 2 % cream Mix 50/50 with desonide and apply twice daily to irritated area for 3 weeks 30 g 1     desonide (DESOWEN) 0.05 % cream Mix 50/50 with ketoconazole and apply twice daily to irritated area for 3 weeks 60 g 1     diphenhydrAMINE (BENADRYL) 25 MG capsule Take 25 mg by mouth every 6 hours as needed.       Calcium 1500 MG TABS Take  by mouth.       Loratadine (CLARITIN PO) Take  by mouth.       ibuprofen (ADVIL,MOTRIN) 800 MG tablet Take 800 mg by mouth every 8 hours as needed.       Cholecalciferol (VITAMIN D) 1000 UNIT capsule Take  by mouth. Total 2200 units daily.       Allergies   Allergen Reactions     Dust Mites Other (See Comments)     Sneezing, coughing. asthma     Levaquin [Levofloxacin Hemihydrate] Other (See Comments)     Muscle weakness     Mold Other (See Comments)     Sneezing, asthma, weezing     Pollen Extract/Tree Extract Other (See Comments)     Sneezing, weezing     Sulfa Drugs Hives     Penicillins Rash     Review of Systems:  -Constitutional: The patient denies fatigue, fevers, chills, unintended weight loss, and night sweats.  -HEENT: Patient denies nonhealing oral sores.  -Skin: As above in HPI. No additional skin concerns.    Physical exam:  Vitals: /73  Pulse 68  Breastfeeding? No  GEN: This is a well developed, well-nourished female in no acute distress, in a pleasant mood.    SKIN: Focused examination of the face, scalp and fingernails was performed.  -LPPAI score 1.0 (1 for pruritus, perifollicular erythema and perifollicular scale)  -central patch of scarred alopecia present on vertex scalp.    -Yellow adherent crusts present  within the patch of alopecia along with tufted patches of short terminal hair fiber regrowth  -no hair loss present on eyebrows or eyelashes  -flesh-colored papule present on R forehead with verrucous appearance and keratin pearls on dermoscopy.  -erythematous papule present on philtrum of her upper lip at the vermilion border  -mild longitudinal ridging present on bilateral fingernails  -No other lesions of concern on areas examined.     Impression/Plan:  1. Lichen planopilaris: LPPAI score was 1.0 today (1 for pruritus, perifollicular erythema and perifollicular scale), which is slightly increased from her visit in November when her LPPAI score was 0.67.  She continues to have mild seborrhea and a yellow adherent crust present along scalp.      Kenalog intralesional injection procedure note (performed by faculty with medical student assistance): After verbal consent and discussion of risks including but not limited to atrophy, pain, and bruising, cleansing with isopropyl alcohol, time out was performed, 1 total cc of Kenalog 10 mg/cc was injected into approximately 10 sites on vertex scalp.  The patient tolerated the procedure well and left the Dermatology clinic in good condition.    Continue derma-smoothe/FS 0.01% oil 1-2x per week given yellow crusting present on examination    Continue to wash scalp with DHS zinc shampoo every 2-3 days    Continue Rogaine 5% foam daily    Continue plaquenil 200 mg bid.  Plaquenil safety labs ordered today (CBC with platelet diff, and CMP).    Photographs taken for future reference    2. Seborrheic keratosis: R upper forehead, which is intermittently pruritic for Ms. Barba.     Cryotherapy procedure note (performed by faculty with medical student assistance): After verbal consent and discussion of risks and benefits including but no limited to dyspigmentation/scar, blister, infection, recurrence,R forehead was treated with 1-2mm freeze border for 2 cycles with liquid nitrogen.  Post cryotherapy instructions were provided.     3. Erythematous papule present on philtrum of her upper lip at the vermilion border.  No concerning features present on dermoscopy, will clinically monitor given Ms. Barba's history of a BCC in the area.    Benign nature was discussed. No further intervention required at this time.     Will continue to clinically monitor.    Photographs taken for future reference     CC Dr. Fernando Weathers on close of this encounter.  Follow-up in 6 and in 8 weeks, earlier for new or changing lesions.     Staff Involved:  Scribed by Noemy Becerra, MS4 for Dr. Soto.        I agree with the PFSH and ROS as completed by the Medical Student. The remainder of the encounter was performed by me and scribed by the Medical Student. The scribed note accurately reflects my personal services and the medical decisions made by me. ILK and cryotherapy procedures were done together.       Barbara Soto MD  Professor and Chair  Department of Dermatology  [facility]

## 2017-03-24 NOTE — NURSING NOTE
Dermatology Rooming Note    Ester Barba's goals for this visit include:   Chief Complaint   Patient presents with     Hair/Scalp Problem     Ester is here today for hairloss, states she feels things are about the same            Jessica Lin LPN

## 2017-05-22 DIAGNOSIS — L66.10 LICHEN PLANO-PILARIS: ICD-10-CM

## 2017-05-23 RX ORDER — HYDROXYCHLOROQUINE SULFATE 200 MG/1
TABLET, FILM COATED ORAL
Qty: 60 TABLET | Refills: 0 | Status: SHIPPED | OUTPATIENT
Start: 2017-05-23 | End: 2017-07-11

## 2017-05-23 NOTE — TELEPHONE ENCOUNTER
Last seen:1/27/17  Next appt:6/6/17    1. Lichen planopilaris: LPPAI score was 1.0 today (1 for pruritus, perifollicular erythema and perifollicular scale), which is slightly increased from her visit in November when her LPPAI score was 0.67. She continues to have mild seborrhea and a yellow adherent crust present along scalp.     Kenalog intralesional injection procedure note (performed by faculty with medical student assistance): After verbal consent and discussion of risks including but not limited to atrophy, pain, and bruising, cleansing with isopropyl alcohol, time out was performed, 1 total cc of Kenalog 10 mg/cc was injected into approximately 10 sites on vertex scalp. The patient tolerated the procedure well and left the Dermatology clinic in good condition.    Continue derma-smoothe/FS 0.01% oil 1-2x per week given yellow crusting present on examination    Continue to wash scalp with DHS zinc shampoo every 2-3 days    Continue Rogaine 5% foam daily    Continue plaquenil 200 mg bid. Plaquenil safety labs ordered today (CBC with platelet diff, and CMP).    Photographs taken for future reference

## 2017-06-06 ENCOUNTER — OFFICE VISIT (OUTPATIENT)
Dept: DERMATOLOGY | Facility: CLINIC | Age: 72
End: 2017-06-06

## 2017-06-06 DIAGNOSIS — Z85.828 HISTORY OF SKIN CANCER: ICD-10-CM

## 2017-06-06 DIAGNOSIS — L57.0 ACTINIC KERATOSIS: ICD-10-CM

## 2017-06-06 DIAGNOSIS — Z79.899 MEDICATION MANAGEMENT: ICD-10-CM

## 2017-06-06 DIAGNOSIS — L66.10 LICHEN PLANO-PILARIS: Primary | ICD-10-CM

## 2017-06-06 ASSESSMENT — PAIN SCALES - GENERAL: PAINLEVEL: NO PAIN (0)

## 2017-06-06 NOTE — PROGRESS NOTES
"[facility] Dermatology Note    Dermatology Problem List:  1. Lichen Planopilaris and Lichen planus     -LPPAI score: 1 (1 for pruritus, pain, and perifollicular scale)  -current treatment: 1 cc ILK 10 mg/cc injected into scalp today, plaquenil 200 mg BID, DHS zinc shampoo every 2-3 days, Rogaine 5% foam daily (not using), derma-smoothe/FS 0.01% oil once per week  -plaquenil safety labs ordered: CBC with platelet diff, and CMP  - plan to start taper off of plaquenil at follow up  -Symptoms thought to start originally after imiquimod application     2.  Inflammatory papule on right nasal ala--appears to be irritated follicle vs fibrous papule vs AK  - treated with cryotherapy 6/6/17, monitor at follow up    3. AKs    4.. History of nonmelanoma skin cancer  -SCC of the scalp, s/p Mohs excision 2014  -History of 1-2 other NMSC of the scalp (BCCs)    Encounter Date: Jun 6, 2017    CC:  Chief Complaint   Patient presents with     Derm Problem     Ester comes to clinic today for LPP. States \"I think it's a little worse.\"     History of Present Illness:  MsAnam Barba is a 71 year old female who presents as a follow-up for lichen planopilaris. The patient was last seen 3/24/17 when her LPPAI score was 0.67 and she received 1 cc ILK 10 mg/cc injected into her scalp.  She is currently treating her scalp with DHS zinc shampoo every 2-3 days, plaquenil 200 mg bid and derma-smoothe/FS 0.01% oil once per week.  She is not using the rogaine    Since her last visit, Ms. Barba states that her hair loss has increased. She was visiting the 9[facility] in [place] and fell on the steps striking her head. She notes this seemed to aggravate her symptoms. She notes increased shedding of her hair in her sink and brush. She also notes pain, burning, itching around this time that has improved somewhat. Ms. Barba denies hair loss elsewhere on her body such as eyebrows or eyelashes.     She has an additional lesion she " would like to have evaluated today. She noticed a pimple type lesion on her right nasal ala. She reports if she picks at it it will be sore and will bleed. No pain or bleeding if not picked. Ms. Barba denies changes in her medications or past medical history since her last visit.      Past Medical History:   Patient Active Problem List   Diagnosis     Porokeratosis     Squamous cell carcinoma (H)     Neoplasm of uncertain behavior     Lichen planopilaris     Dermatitis     Cicatricial alopecia     Keratosis, inflamed seborrheic     History of skin cancer     Vaginitis and vulvovaginitis     Basal cell carcinoma of vertex scalp s/p mohs 6-5-15     Medication management     Actinic keratosis     Medication monitoring encounter     Past Medical History:   Diagnosis Date     Basal cell carcinoma      Squamous cell carcinoma (H)      Past Surgical History:   Procedure Laterality Date     BIOPSY OF SKIN LESION       MOHS MICROGRAPHIC PROCEDURE       NO HISTORY OF SURGERY  2/17/14    derm     SMALL BOWEL RESECTION  11/2015    diverticulitis     Social History:  The patient is retired; 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  Patient lives in [place], [place]. [place] [facility] [place]. No history of tanning bed use.     Family History:  There is a family history of eczema in two brothers, but no known history of hair loss.     Medications:  Current Outpatient Prescriptions   Medication Sig Dispense Refill     hydroxychloroquine (PLAQUENIL) 200 MG tablet TAKE 1 TABLET BY MOUTH TWICE DAILY 60 tablet 0     Fluocinolone Acetonide (DERMA-SMOOTHE/FS SCALP) 0.01 % OIL Apply once a week to scalp for lichen planopilaris 118 mL 2     NEW MED Biest 1.5mg/gram cream(pg free)  Insert 1 gram vaginally twice weekly as directed 40 g 6     fluconazole (DIFLUCAN) 200 MG tablet Take 1 tablet (200 mg) by mouth daily 45 tablet 4     ketoconazole (NIZORAL) 2 % cream  Mix 50/50 with desonide and apply twice daily to irritated area for 3 weeks 30 g 1     desonide (DESOWEN) 0.05 % cream Mix 50/50 with ketoconazole and apply twice daily to irritated area for 3 weeks 60 g 1     diphenhydrAMINE (BENADRYL) 25 MG capsule Take 25 mg by mouth every 6 hours as needed.       Calcium 1500 MG TABS Take  by mouth.       Loratadine (CLARITIN PO) Take  by mouth.       ibuprofen (ADVIL,MOTRIN) 800 MG tablet Take 800 mg by mouth every 8 hours as needed.       Cholecalciferol (VITAMIN D) 1000 UNIT capsule Take  by mouth. Total 2200 units daily.       Allergies   Allergen Reactions     Dust Mites Other (See Comments)     Sneezing, coughing. asthma     Levaquin [Levofloxacin Hemihydrate] Other (See Comments)     Muscle weakness     Mold Other (See Comments)     Sneezing, asthma, weezing     Pollen Extract/Tree Extract Other (See Comments)     Sneezing, weezing     Sulfa Drugs Hives     Penicillins Rash     Review of Systems:  -Constitutional: The patient denies fatigue, fevers, chills, unintended weight loss, and night sweats.  -HEENT: Patient denies nonhealing oral sores.  -Skin: As above in HPI. No additional skin concerns.    Physical exam:  Vitals: /66 (BP Location: Right arm, Patient Position: Chair, Cuff Size: Adult Regular)  Pulse 69  GEN: This is a well developed, well-nourished female in no acute distress, in a pleasant mood.    SKIN: Focused examination of the face, scalp and fingernails was performed.  -LPPAI score 1 (1 for pruritus, pain and perifollicular scale)  -central patch of scarred alopecia present on vertex scalp.    -Yellow adherent crusts present within the patch of alopecia along with tufted patches of short terminal hair fiber regrowth  -no hair loss present on eyebrows or eyelashes  -excoriated pink papule on right nasal ala  -mild longitudinal ridging present on bilateral fingernails  -No other lesions of concern on areas examined.     Impression/Plan:  1. Lichen  planopilaris: LPPAI score was 1 today (1 for pruritus, pain,  and perifollicular scale), which is stable from her visit in March.  She continues to have mild seborrhea and a yellow adherent crust present along scalp. It appears that her LPP is stabilizing and may be burning out. Will plan to initiate taper on plaquenil at follow up.     Kenalog intralesional injection procedure note: After verbal consent and discussion of risks including but not limited to atrophy, pain, and bruising, cleansing with isopropyl alcohol, time out was performed, 1 total cc of Kenalog 10 mg/cc was injected into approximately 10 sites on vertex scalp.  The patient tolerated the procedure well and left the Dermatology clinic in good condition.    Continue derma-smoothe/FS 0.01% oil 1-2x per week given yellow crusting present on examination    Continue to wash scalp with DHS zinc shampoo every 2-3 days    Encouraged her to use Rogaine 5% foam daily    Continue plaquenil 200 mg bid.  Plaquenil safety labs ordered today (CBC with platelet diff, and CMP).    Photographs taken for future reference      2. Erythematous papule present on R nasal ala--appears to be irritated/inflamed follicle vs possible  AK.  No concerning features present on dermoscopy or clinically.  Cryotherapy procedure note: After verbal consent and discussion of risks and benefits including but no limited to dyspigmentation/scar, blister, and pain, 1 was(were) treated with 1-2mm freeze border for 2 cycles with liquid nitrogen. Post cryotherapy instructions were provided.    Discussed that if this lesion fails to resolve, she should let us know    CC Dr. Fernando Weathers on close of this encounter.  Follow-up in 6 and in 8 weeks, earlier for new or changing lesions.     Staff Involved:  Resident (Brit Munoz), Staff Dr. Soto.        Patient was seen and examined with the dermatology resident. I agree with the history, review of systems, physical examination, assessments  and plan. I was present for the key portions of the cryotherapy and ILK procedures.    Barbara Soto MD  Professor and  Chair  Department of Dermatology  [facility]

## 2017-06-06 NOTE — LETTER
"6/6/2017       RE: Ester Barba  [address] [place] [address]     Dear Colleague,    Thank you for referring your patient, Ester Barba, to the [facility] DERMATOLOGY at [facility]. Please see a copy of my visit note below.    [facility] Dermatology Note    Dermatology Problem List:  1. Lichen Planopilaris and Lichen planus     -LPPAI score: 1 (1 for pruritus, pain, and perifollicular scale)  -current treatment: 1 cc ILK 10 mg/cc injected into scalp today, plaquenil 200 mg BID, DHS zinc shampoo every 2-3 days, Rogaine 5% foam daily (not using), derma-smoothe/FS 0.01% oil once per week  -plaquenil safety labs ordered: CBC with platelet diff, and CMP  - plan to start taper off of plaquenil at follow up  -Symptoms thought to start originally after imiquimod application     2.  Inflammatory papule on right nasal ala--appears to be irritated follicle vs fibrous papule vs AK  - treated with cryotherapy 6/6/17, monitor at follow up    3. AKs    4.. History of nonmelanoma skin cancer  -SCC of the scalp, s/p Mohs excision 2014  -History of 1-2 other NMSC of the scalp (BCCs)    Encounter Date: Jun 6, 2017    CC:  Chief Complaint   Patient presents with     Derm Problem     Ester comes to clinic today for LPP. States \"I think it's a little worse.\"     History of Present Illness:  Ms. Ester Barba is a 71 year old female who presents as a follow-up for lichen planopilaris. The patient was last seen 3/24/17 when her LPPAI score was 0.67 and she received 1 cc ILK 10 mg/cc injected into her scalp.  She is currently treating her scalp with DHS zinc shampoo every 2-3 days, plaquenil 200 mg bid and derma-smoothe/FS 0.01% oil once per week.  She is not using the rogaine    Since her last visit, Ms. Barba states that her hair loss has increased. She was visiting the 9/[facility] in [place] and fell on the steps striking her head. She notes this seemed to aggravate her " symptoms. She notes increased shedding of her hair in her sink and brush. She also notes pain, burning, itching around this time that has improved somewhat. Ms. Barba denies hair loss elsewhere on her body such as eyebrows or eyelashes.     She has an additional lesion she would like to have evaluated today. She noticed a pimple type lesion on her right nasal ala. She reports if she picks at it it will be sore and will bleed. No pain or bleeding if not picked. Ms. Barba denies changes in her medications or past medical history since her last visit.      Past Medical History:   Patient Active Problem List   Diagnosis     Porokeratosis     Squamous cell carcinoma (H)     Neoplasm of uncertain behavior     Lichen planopilaris     Dermatitis     Cicatricial alopecia     Keratosis, inflamed seborrheic     History of skin cancer     Vaginitis and vulvovaginitis     Basal cell carcinoma of vertex scalp s/p mohs 6-5-15     Medication management     Actinic keratosis     Medication monitoring encounter     Past Medical History:   Diagnosis Date     Basal cell carcinoma      Squamous cell carcinoma (H)      Past Surgical History:   Procedure Laterality Date     BIOPSY OF SKIN LESION       MOHS MICROGRAPHIC PROCEDURE       NO HISTORY OF SURGERY  2/17/14    derm     SMALL BOWEL RESECTION  11/2015    diverticulitis     Social History:  The patient is retired; 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  Patient lives in [place], [place]. [place] [facility] [place]. No history of tanning bed use.     Family History:  There is a family history of eczema in two brothers, but no known history of hair loss.     Medications:  Current Outpatient Prescriptions   Medication Sig Dispense Refill     hydroxychloroquine (PLAQUENIL) 200 MG tablet TAKE 1 TABLET BY MOUTH TWICE DAILY 60 tablet 0     Fluocinolone Acetonide (DERMA-SMOOTHE/FS SCALP) 0.01 % OIL Apply once a  week to scalp for lichen planopilaris 118 mL 2     NEW MED Biest 1.5mg/gram cream(pg free)  Insert 1 gram vaginally twice weekly as directed 40 g 6     fluconazole (DIFLUCAN) 200 MG tablet Take 1 tablet (200 mg) by mouth daily 45 tablet 4     ketoconazole (NIZORAL) 2 % cream Mix 50/50 with desonide and apply twice daily to irritated area for 3 weeks 30 g 1     desonide (DESOWEN) 0.05 % cream Mix 50/50 with ketoconazole and apply twice daily to irritated area for 3 weeks 60 g 1     diphenhydrAMINE (BENADRYL) 25 MG capsule Take 25 mg by mouth every 6 hours as needed.       Calcium 1500 MG TABS Take  by mouth.       Loratadine (CLARITIN PO) Take  by mouth.       ibuprofen (ADVIL,MOTRIN) 800 MG tablet Take 800 mg by mouth every 8 hours as needed.       Cholecalciferol (VITAMIN D) 1000 UNIT capsule Take  by mouth. Total 2200 units daily.       Allergies   Allergen Reactions     Dust Mites Other (See Comments)     Sneezing, coughing. asthma     Levaquin [Levofloxacin Hemihydrate] Other (See Comments)     Muscle weakness     Mold Other (See Comments)     Sneezing, asthma, weezing     Pollen Extract/Tree Extract Other (See Comments)     Sneezing, weezing     Sulfa Drugs Hives     Penicillins Rash     Review of Systems:  -Constitutional: The patient denies fatigue, fevers, chills, unintended weight loss, and night sweats.  -HEENT: Patient denies nonhealing oral sores.  -Skin: As above in HPI. No additional skin concerns.    Physical exam:  Vitals: /66 (BP Location: Right arm, Patient Position: Chair, Cuff Size: Adult Regular)  Pulse 69  GEN: This is a well developed, well-nourished female in no acute distress, in a pleasant mood.    SKIN: Focused examination of the face, scalp and fingernails was performed.  -LPPAI score 1 (1 for pruritus, pain and perifollicular scale)  -central patch of scarred alopecia present on vertex scalp.    -Yellow adherent crusts present within the patch of alopecia along with tufted patches  of short terminal hair fiber regrowth  -no hair loss present on eyebrows or eyelashes  -excoriated pink papule on right nasal ala  -mild longitudinal ridging present on bilateral fingernails  -No other lesions of concern on areas examined.     Impression/Plan:  1. Lichen planopilaris: LPPAI score was 1 today (1 for pruritus, pain,  and perifollicular scale), which is stable from her visit in March.  She continues to have mild seborrhea and a yellow adherent crust present along scalp. It appears that her LPP is stabilizing and may be burning out. Will plan to initiate taper on plaquenil at follow up.     Kenalog intralesional injection procedure note: After verbal consent and discussion of risks including but not limited to atrophy, pain, and bruising, cleansing with isopropyl alcohol, time out was performed, 1 total cc of Kenalog 10 mg/cc was injected into approximately 10 sites on vertex scalp.  The patient tolerated the procedure well and left the Dermatology clinic in good condition.    Continue derma-smoothe/FS 0.01% oil 1-2x per week given yellow crusting present on examination    Continue to wash scalp with DHS zinc shampoo every 2-3 days    Encouraged her to use Rogaine 5% foam daily    Continue plaquenil 200 mg bid.  Plaquenil safety labs ordered today (CBC with platelet diff, and CMP).    Photographs taken for future reference      2. Erythematous papule present on R nasal ala--appears to be irritated/inflamed follicle vs possible  AK.  No concerning features present on dermoscopy or clinically.  Cryotherapy procedure note: After verbal consent and discussion of risks and benefits including but no limited to dyspigmentation/scar, blister, and pain, 1 was(were) treated with 1-2mm freeze border for 2 cycles with liquid nitrogen. Post cryotherapy instructions were provided.    Discussed that if this lesion fails to resolve, she should let us know    CC Dr. Fernando Weathers on close of this encounter.  Follow-up  in 6 and in 8 weeks, earlier for new or changing lesions.     Staff Involved:  Resident (Brit Munoz), Staff Dr. Soto.        Patient was seen and examined with the dermatology resident. I agree with the history, review of systems, physical examination, assessments and plan. I was present for the key portions of the cryotherapy and ILK procedures.    Barbara Soto MD  Professor and  Chair  Department of Dermatology  [facility]            Pictures taken of patient today to be placed in chart for future reference.      Again, thank you for allowing me to participate in the care of your patient.      Sincerely,    Barbara Soto MD

## 2017-06-06 NOTE — MR AVS SNAPSHOT
After Visit Summary   6/6/2017    Ester Barba    MRN: [id number]           Patient Information     Date Of Birth          1945        Visit Information        Provider Department      6/6/2017 4:20 PM Barbara Soto MD [facility] D[facility]        Today's Diagnoses     Lichen plano-pilaris    -  1    Medication management        History of skin cancer        Actinic keratosis           Follow-ups after your visit        Follow-up notes from your care team     Return in about 8 weeks (around 8/1/2017).      Your next 10 appointments already scheduled     Aug 21, 2017 11:00 AM CDT   (Arrive by 10:45 AM)   RETURN HAIRLOSS with Barbara Soto MD   [facility] ([facility])    [address] [place] [address] [phone]              Who to contact     Please call your clinic at [phone] to:    Ask questions about your health    Make or cancel appointments    Discuss your medicines    Learn about your test results    Speak to your doctor   If you have compliments or concerns about an experience at your clinic, or if you wish to file a complaint, please contact [facility] Physicians Patient Relations at [phone] or email us at [email or web address]         Additional Information About Your Visit        MyChart Information     [employer] gives you secure access to your electronic health record. If you see a primary care provider, you can also send messages to your care team and make appointments. If you have questions, please call your primary care clinic.  If you do not have a primary care provider, please call [phone] and they will assist you.      [employer] is an electronic gateway that provides easy, online access to your medical records. With [employer], you can request a clinic appointment, read your test results, renew a prescription or communicate with your care team.     To  access your existing account, please contact your [facility] or call [phone] for assistance.        Care EveryWhere ID     This is your Care EveryWhere ID. This could be used by other organizations to access your [facility] medical records  [id number]        Your Vitals Were     Pulse                   69            Blood Pressure from Last 3 Encounters:   06/28/17 117/69   06/06/17 124/66   03/24/17 133/73    Weight from Last 3 Encounters:   06/28/17 59.1 kg (130 lb 6.4 oz)   01/18/17 61.9 kg (136 lb 6.4 oz)   03/14/16 58.1 kg (128 lb)              We Performed the Following     DESTRUCT PREMALIGNANT LESION, FIRST     INJECTION INTO SKIN LESIONS >7          Today's Medication Changes          These changes are accurate as of: 6/6/17 11:59 PM.  If you have any questions, ask your nurse or doctor.               Start taking these medicines.        Dose/Directions    triamcinolone acetonide 10 MG/ML injection   Commonly known as:  KENALOG   Used for:  Lichen plano-pilaris   Started by:  Barbara Soto MD        Dose:  10 mg   Inject 1 mL (10 mg) into the skin once for 1 dose   Quantity:  5 mL   Refills:  0            Where to get your medicines      Some of these will need a paper prescription and others can be bought over the counter.  Ask your nurse if you have questions.     You don't need a prescription for these medications     triamcinolone acetonide 10 MG/ML injection                Primary Care Provider Office Phone # Fax #    Fernando Weathers -[phone]       [place] [address] [place] [address]        Equal Access to Services     [facility] Center[facility] AH: Hadii alexi elmore hadrupal Someeta, waaxda luqadaha, qaybta kaalmasamuel rodriguez. So [facility] [phone].    ATENCIÓN: Si habla español, tiene a pelayo disposición servicios gratuitos de asistencia lingüística. Llame al [phone].    We comply with  applicable federal civil rights laws and [place] laws. We do not discriminate on the basis of race, color, national origin, age, disability sex, sexual orientation or gender identity.            Thank you!     Thank you for choosing [facility] DERMATOLOGY  for your care. Our goal is always to provide you with excellent care. Hearing back from our patients is one way we can continue to improve our services. Please take a few minutes to complete the written survey that you may receive in the mail after your visit with us. Thank you!             Your Updated Medication List - Protect others around you: Learn how to safely use, store and throw away your medicines at www.disposemymeds.org.          This list is accurate as of: 6/6/17 11:59 PM.  Always use your most recent med list.                   Brand Name Dispense Instructions for use Diagnosis    BENADRYL 25 MG capsule   Generic drug:  diphenhydrAMINE      Take 25 mg by mouth every 6 hours as needed.        calcium 1500 MG Tabs      Take  by mouth.        CLARITIN PO      Take  by mouth.        DERMA-SMOOTHE/FS SCALP 0.01 % Oil     118 mL    Apply once a week to scalp for lichen planopilaris    Lichen planopilaris       desonide 0.05 % cream    DESOWEN    60 g    Mix 50/50 with ketoconazole and apply twice daily to irritated area for 3 weeks    Intertrigo       fluconazole 200 MG tablet    DIFLUCAN    45 tablet    Take 1 tablet (200 mg) by mouth daily    Yeast infection of the vagina       hydroxychloroquine 200 MG tablet    PLAQUENIL    60 tablet    TAKE 1 TABLET BY MOUTH TWICE DAILY    Lichen plano-pilaris       ibuprofen 800 MG tablet    ADVIL/MOTRIN     Take 800 mg by mouth every 8 hours as needed.        ketoconazole 2 % cream    NIZORAL    30 g    Mix 50/50 with desonide and apply twice daily to irritated area for 3 weeks    Intertrigo       NEW MED     40 g    Biest 1.5mg/gram cream(pg free)  Insert 1 gram vaginally twice weekly as directed    Vulvar atrophy        triamcinolone acetonide 10 MG/ML injection    KENALOG    5 mL    Inject 1 mL (10 mg) into the skin once for 1 dose    Lichen plano-pilaris       vitamin D 1000 UNITS capsule      Take  by mouth. Total 2200 units daily.

## 2017-06-06 NOTE — NURSING NOTE
"Dermatology Rooming Note    Ester JOSE Barba's goals for this visit include:   Chief Complaint   Patient presents with     Derm Problem     Ester comes to clinic today for LPP. States \"I think it's a little worse.\"     Karen Oglesby, [facility]    "

## 2017-06-07 VITALS — SYSTOLIC BLOOD PRESSURE: 124 MMHG | HEART RATE: 69 BPM | DIASTOLIC BLOOD PRESSURE: 66 MMHG

## 2017-06-07 ASSESSMENT — PAIN SCALES - GENERAL: PAINLEVEL: NO PAIN (0)

## 2017-06-07 NOTE — NURSING NOTE
Drug Administration Record    Drug Name: triamcinolone acetonide(kenalog)  Dose: 1mL of triamcinolone 10mg/mL, 10mg dose  Route administered: intralesional  NDC #: Kenalog-10 ([address]-13)  Amount of waste(mL):4mL  Reason for waste: Single use vial

## 2017-06-28 ENCOUNTER — OFFICE VISIT (OUTPATIENT)
Dept: OBGYN | Facility: CLINIC | Age: 72
End: 2017-06-28
Attending: OBSTETRICS & GYNECOLOGY
Payer: MEDICARE

## 2017-06-28 VITALS
WEIGHT: 130.4 LBS | SYSTOLIC BLOOD PRESSURE: 117 MMHG | HEART RATE: 61 BPM | BODY MASS INDEX: 20.96 KG/M2 | HEIGHT: 66 IN | DIASTOLIC BLOOD PRESSURE: 69 MMHG

## 2017-06-28 DIAGNOSIS — R30.0 DYSURIA: ICD-10-CM

## 2017-06-28 DIAGNOSIS — N76.0 VAGINITIS AND VULVOVAGINITIS: Primary | ICD-10-CM

## 2017-06-28 LAB
ALBUMIN UR-MCNC: NEGATIVE MG/DL
APPEARANCE UR: CLEAR
BACTERIA #/AREA URNS HPF: ABNORMAL /HPF
BILIRUB UR QL STRIP: NEGATIVE
COLOR UR AUTO: YELLOW
GLUCOSE UR STRIP-MCNC: NEGATIVE MG/DL
HGB UR QL STRIP: NEGATIVE
KETONES UR STRIP-MCNC: NEGATIVE MG/DL
LEUKOCYTE ESTERASE UR QL STRIP: NEGATIVE
MUCOUS THREADS #/AREA URNS LPF: PRESENT /LPF
NITRATE UR QL: NEGATIVE
PH UR STRIP: 5.5 PH (ref 5–7)
RBC #/AREA URNS AUTO: 1 /HPF (ref 0–2)
SP GR UR STRIP: 1.01 (ref 1–1.03)
SQUAMOUS #/AREA URNS AUTO: 1 /HPF (ref 0–1)
URN SPEC COLLECT METH UR: ABNORMAL
UROBILINOGEN UR STRIP-MCNC: NORMAL MG/DL (ref 0–2)
WBC #/AREA URNS AUTO: <1 /HPF (ref 0–2)

## 2017-06-28 PROCEDURE — 99211 OFF/OP EST MAY X REQ PHY/QHP: CPT | Mod: ZF

## 2017-06-28 PROCEDURE — 81001 URINALYSIS AUTO W/SCOPE: CPT | Performed by: OBSTETRICS & GYNECOLOGY

## 2017-06-28 ASSESSMENT — PAIN SCALES - GENERAL: PAINLEVEL: NO PAIN (0)

## 2017-06-28 NOTE — MR AVS SNAPSHOT
After Visit Summary   6/28/2017    Ester Barba    MRN: [id number]           Patient Information     Date Of Birth          1945        Visit Information        Provider Department      6/28/2017 10:15 AM Janeth Mayfield MD [facility]        Today's Diagnoses     Vaginitis and vulvovaginitis    -  1    Dysuria           Follow-ups after your visit        Your next 10 appointments already scheduled     Aug 21, 2017 11:00 AM CDT   (Arrive by 10:45 AM)   RETURN HAIRLOSS with Barbara Soto MD   [facility] Dermatology ([facility])    [address] [place] [address] [phone]              Who to contact     Please call your clinic at [phone] to:    Ask questions about your health    Make or cancel appointments    Discuss your medicines    Learn about your test results    Speak to your doctor   If you have compliments or concerns about an experience at your clinic, or if you wish to file a complaint, please contact [facility] Physicians Patient Relations at [phone] or email us at [email or web address]         Additional Information About Your Visit        MyChart Information     [employer] gives you secure access to your electronic health record. If you see a primary care provider, you can also send messages to your care team and make appointments. If you have questions, please call your primary care clinic.  If you do not have a primary care provider, please call [phone] and they will assist you.      [employer] is an electronic gateway that provides easy, online access to your medical records. With [employer], you can request a clinic appointment, read your test results, renew a prescription or communicate with your care team.     To access your existing account, please contact your [facility] Clinic or call [phone] for assistance.        Care  "EveryWhere ID     This is your Care EveryWhere ID. This could be used by other organizations to access your [facility] medical records  [id number]        Your Vitals Were     Pulse Height BMI (Body Mass Index)             61 1.676 m (5' 6\") 21.05 kg/m2          Blood Pressure from Last 3 Encounters:   06/28/17 117/69   06/06/17 124/66   03/24/17 133/73    Weight from Last 3 Encounters:   06/28/17 59.1 kg (130 lb 6.4 oz)   01/18/17 61.9 kg (136 lb 6.4 oz)   03/14/16 58.1 kg (128 lb)              We Performed the Following     Routine UA with micro reflex to culture (Collected in Clinic)        Primary Care Provider Office Phone # Fax #    Fernando Weathers -[phone]       [facility] [address]  [place] [address]        Equal Access to Services     ALEXIS MCDANIEL : Hadii aad ku hadasho Sohuongali, waaxda luqadaha, qaybta kaalmada adeegyada, waxay ruthin hayedwin mcclain . So [facility] [phone].    ATENCIÓN: Si habla español, tiene a pelayo disposición servicios gratuitos de asistencia lingüística. Peter al [phone].    We comply with applicable federal civil rights laws and [place] laws. We do not discriminate on the basis of race, color, national origin, age, disability sex, sexual orientation or gender identity.            Thank you!     Thank you for choosing [facility]  for your care. Our goal is always to provide you with excellent care. Hearing back from our patients is one way we can continue to improve our services. Please take a few minutes to complete the written survey that you may receive in the mail after your visit with us. Thank you!             Your Updated Medication List - Protect others around you: Learn how to safely use, store and throw away your medicines at www.disposemymeds.org.          This list is accurate as of: 6/28/17 11:59 PM.  Always use your most recent med list.                   Brand Name Dispense Instructions for use " Diagnosis    BENADRYL 25 MG capsule   Generic drug:  diphenhydrAMINE      Take 25 mg by mouth every 6 hours as needed.        calcium 1500 MG Tabs      Take  by mouth.        CLARITIN PO      Take  by mouth.        DERMA-SMOOTHE/FS SCALP 0.01 % Oil     118 mL    Apply once a week to scalp for lichen planopilaris    Lichen planopilaris       desonide 0.05 % cream    DESOWEN    60 g    Mix 50/50 with ketoconazole and apply twice daily to irritated area for 3 weeks    Intertrigo       fluconazole 200 MG tablet    DIFLUCAN    45 tablet    Take 1 tablet (200 mg) by mouth daily    Yeast infection of the vagina       hydroxychloroquine 200 MG tablet    PLAQUENIL    60 tablet    TAKE 1 TABLET BY MOUTH TWICE DAILY    Lichen plano-pilaris       ibuprofen 800 MG tablet    ADVIL/MOTRIN     Take 800 mg by mouth every 8 hours as needed.        ketoconazole 2 % cream    NIZORAL    30 g    Mix 50/50 with desonide and apply twice daily to irritated area for 3 weeks    Intertrigo       NEW MED     40 g    Biest 1.5mg/gram cream(pg free)  Insert 1 gram vaginally twice weekly as directed    Vulvar atrophy       vitamin D 1000 UNITS capsule      Take  by mouth. Total 2200 units daily.

## 2017-06-28 NOTE — LETTER
"6/28/2017       RE: Ester Barba  [address] [place]  [facility] [address]     Dear Colleague,    Thank you for referring your patient, Ester Barba, to the [facility] at [facility]. Please see a copy of my visit note below.    S: 72 yo followed for vulvovaginitis and lichen sclerosus. States symptoms have improved on compounded topical estrogen cream. Small amount of dysuria this week.    Patient Active Problem List   Diagnosis     Porokeratosis     Squamous cell carcinoma     Neoplasm of uncertain behavior     Lichen planopilaris     Dermatitis     Cicatricial alopecia     Keratosis, inflamed seborrheic     History of skin cancer     Vaginitis and vulvovaginitis     Basal cell carcinoma of vertex scalp s/p mohs 6-5-15     Medication management     Actinic keratosis     Medication monitoring encounter     Past Medical History:   Diagnosis Date     Basal cell carcinoma      Squamous cell carcinoma      Past Surgical History:   Procedure Laterality Date     BIOPSY OF SKIN LESION       MOHS MICROGRAPHIC PROCEDURE       NO HISTORY OF SURGERY  2/17/14    derm     SMALL BOWEL RESECTION  11/2015    diverticulitis     /69 (BP Location: Right arm, Patient Position: Chair, Cuff Size: Adult Regular)  Pulse 61  Ht 1.676 m (5' 6\")  Wt 59.1 kg (130 lb 6.4 oz)  BMI 21.05 kg/m2  EG: agglutinated areas of bilateral labia minora to labia majora, no erythema, no thickened white epithelium, pale mucosa throughout    A: lichen sclerosus et atrophicus  Agglutination  P: continue current treatment plan  SHEILA Mayfield      "

## 2017-06-29 NOTE — PROGRESS NOTES
"S: 72 yo followed for vulvovaginitis and lichen sclerosus. States symptoms have improved on compounded topical estrogen cream. Small amount of dysuria this week.    Patient Active Problem List   Diagnosis     Porokeratosis     Squamous cell carcinoma     Neoplasm of uncertain behavior     Lichen planopilaris     Dermatitis     Cicatricial alopecia     Keratosis, inflamed seborrheic     History of skin cancer     Vaginitis and vulvovaginitis     Basal cell carcinoma of vertex scalp s/p mohs 6-5-15     Medication management     Actinic keratosis     Medication monitoring encounter     Past Medical History:   Diagnosis Date     Basal cell carcinoma      Squamous cell carcinoma      Past Surgical History:   Procedure Laterality Date     BIOPSY OF SKIN LESION       MOHS MICROGRAPHIC PROCEDURE       NO HISTORY OF SURGERY  2/17/14    derm     SMALL BOWEL RESECTION  11/2015    diverticulitis     /69 (BP Location: Right arm, Patient Position: Chair, Cuff Size: Adult Regular)  Pulse 61  Ht 1.676 m (5' 6\")  Wt 59.1 kg (130 lb 6.4 oz)  BMI 21.05 kg/m2  EG: agglutinated areas of bilateral labia minora to labia majora, no erythema, no thickened white epithelium, pale mucosa throughout    A: lichen sclerosus et atrophicus  Agglutination  P: continue current treatment plan  SHEILA Mayfield    "

## 2017-07-10 DIAGNOSIS — L66.10 LICHEN PLANO-PILARIS: ICD-10-CM

## 2017-07-10 DIAGNOSIS — L30.4 INTERTRIGO: ICD-10-CM

## 2017-07-10 NOTE — TELEPHONE ENCOUNTER
Last seen 6/6/17: Appt scheduled for 8/21/17  1. Lichen planopilaris: LPPAI score was 1 today (1 for pruritus, pain,  and perifollicular scale), which is stable from her visit in March.  She continues to have mild seborrhea and a yellow adherent crust present along scalp. It appears that her LPP is stabilizing and may be burning out. Will plan to initiate taper on plaquenil at follow up.     Kenalog intralesional injection procedure note: After verbal consent and discussion of risks including but not limited to atrophy, pain, and bruising, cleansing with isopropyl alcohol, time out was performed, 1 total cc of Kenalog 10 mg/cc was injected into approximately 10 sites on vertex scalp.  The patient tolerated the procedure well and left the Dermatology clinic in good condition.    Continue derma-smoothe/FS 0.01% oil 1-2x per week given yellow crusting present on examination    Continue to wash scalp with DHS zinc shampoo every 2-3 days    Encouraged her to use Rogaine 5% foam daily    Continue plaquenil 200 mg bid.  Plaquenil safety labs ordered today (CBC with platelet diff, and CMP).    Photographs taken for future reference

## 2017-07-11 RX ORDER — HYDROXYCHLOROQUINE SULFATE 200 MG/1
TABLET, FILM COATED ORAL
Qty: 60 TABLET | Refills: 3 | OUTPATIENT
Start: 2017-07-11

## 2017-07-11 NOTE — TELEPHONE ENCOUNTER
Refill for plaquenil received. Last note from Dr. Soto reviewed from 6/6/17. Plan at that point was for safety labs (CBC with platelet diff, and CMP). This was never performed. The patient will need to return for labs prior to refilling. Will ask nurse to communicate these concerns. So long as labs reassuring, we would be happy to refill with enough medication to bridge to Appt scheduled for 8/21/17.    Paolo Amaral MD  PGY3 Dermatology  [phone]

## 2017-07-13 DIAGNOSIS — L66.10 LICHEN PLANO-PILARIS: ICD-10-CM

## 2017-07-14 RX ORDER — HYDROXYCHLOROQUINE SULFATE 200 MG/1
200 TABLET, FILM COATED ORAL 2 TIMES DAILY
Qty: 60 TABLET | Refills: 0 | Status: SHIPPED | OUTPATIENT
Start: 2017-07-14 | End: 2017-08-21

## 2017-07-14 RX ORDER — HYDROXYCHLOROQUINE SULFATE 200 MG/1
TABLET, FILM COATED ORAL
Qty: 60 TABLET | Refills: 0 | OUTPATIENT
Start: 2017-07-14

## 2017-07-14 RX ORDER — KETOCONAZOLE 20 MG/G
CREAM TOPICAL
Qty: 30 G | Refills: 1 | Status: SHIPPED | OUTPATIENT
Start: 2017-07-14 | End: 2020-01-16

## 2017-07-14 NOTE — TELEPHONE ENCOUNTER
Refill for plaquenil received for a second time. Last note from Dr. Soto reviewed from 6/6/17. Plan at that point was for safety labs (CBC with platelet diff, and CMP) prior to continuation of Plaquenil 200mg BID. This was performed 6/27/17 at [facility]. [facility]. Labs reassuring and stable from previous. Refill provide with enough medication to bridge to Appt scheduled for 8/21/17.     Paolo Amaral MD  PGY3 Dermatology  [phone]

## 2017-07-14 NOTE — TELEPHONE ENCOUNTER
----- Message from Abida Wooten sent at 7/13/2017  2:13 PM CDT -----  Regarding: Med Refill  Hello!!!!    Ester just called me to ask about the status of her refill for Plaquenil. She is hoping that it will be refilled today. Are you able to call her to address her request?    Thanks!    Abida

## 2017-08-09 DIAGNOSIS — L66.10 LICHEN PLANO-PILARIS: ICD-10-CM

## 2017-08-09 RX ORDER — HYDROXYCHLOROQUINE SULFATE 200 MG/1
TABLET, FILM COATED ORAL
Qty: 60 TABLET | Refills: 0 | OUTPATIENT
Start: 2017-08-09

## 2017-08-19 DIAGNOSIS — L66.10 LICHEN PLANO-PILARIS: ICD-10-CM

## 2017-08-21 ENCOUNTER — OFFICE VISIT (OUTPATIENT)
Dept: DERMATOLOGY | Facility: CLINIC | Age: 72
End: 2017-08-21

## 2017-08-21 VITALS — DIASTOLIC BLOOD PRESSURE: 76 MMHG | HEART RATE: 63 BPM | SYSTOLIC BLOOD PRESSURE: 133 MMHG

## 2017-08-21 DIAGNOSIS — L57.0 ACTINIC KERATOSIS: ICD-10-CM

## 2017-08-21 DIAGNOSIS — L66.10 LICHEN PLANO-PILARIS: ICD-10-CM

## 2017-08-21 DIAGNOSIS — L30.9 DERMATITIS: Primary | ICD-10-CM

## 2017-08-21 RX ORDER — HYDROXYCHLOROQUINE SULFATE 200 MG/1
200 TABLET, FILM COATED ORAL 2 TIMES DAILY
Qty: 180 TABLET | Refills: 0 | Status: SHIPPED | OUTPATIENT
Start: 2017-08-21 | End: 2017-12-05

## 2017-08-21 RX ORDER — HYDROXYCHLOROQUINE SULFATE 200 MG/1
TABLET, FILM COATED ORAL
Qty: 60 TABLET | Refills: 0 | OUTPATIENT
Start: 2017-08-21

## 2017-08-21 ASSESSMENT — PAIN SCALES - GENERAL: PAINLEVEL: NO PAIN (0)

## 2017-08-21 NOTE — MR AVS SNAPSHOT
After Visit Summary   8/21/2017    Ester Barba    MRN: [id number]           Patient Information     Date Of Birth          1945        Visit Information        Provider Department      8/21/2017 11:00 AM Barbara Soto MD [facility] Dermatology        Today's Diagnoses     Dermatitis    -  1    Lichen plano-pilaris        Actinic keratosis           Follow-ups after your visit        Your next 10 appointments already scheduled     Oct 09, 2017 12:45 PM CDT   (Arrive by 12:30 PM)   RETURN HAIRLOSS with Barbara Soto MD   [facility] Dermatology ([facility])    [address] [place] [id number] [phone]              Who to contact     Please call your clinic at [phone] to:    Ask questions about your health    Make or cancel appointments    Discuss your medicines    Learn about your test results    Speak to your doctor   If you have compliments or concerns about an experience at your clinic, or if you wish to file a complaint, please contact [facility] Patient Relations at [phone] or email us at [email or web address]         Additional Information About Your Visit        MyChart Information     [employer] gives you secure access to your electronic health record. If you see a primary care provider, you can also send messages to your care team and make appointments. If you have questions, please call your primary care clinic.  If you do not have a primary care provider, please call [phone] and they will assist you.      [employer] is an electronic gateway that provides easy, online access to your medical records. With [employer], you can request a clinic appointment, read your test results, renew a prescription or communicate with your care team.     To access your existing account, please contact your [facility] or call [phone] for  assistance.        Care EveryWhere ID     This is your Care EveryWhere ID. This could be used by other organizations to access your [facility] medical records  [id number]        Your Vitals Were     Pulse                   63            Blood Pressure from Last 3 Encounters:   08/21/17 133/76   06/28/17 117/69   06/06/17 124/66    Weight from Last 3 Encounters:   06/28/17 59.1 kg (130 lb 6.4 oz)   01/18/17 61.9 kg (136 lb 6.4 oz)   03/14/16 58.1 kg (128 lb)              We Performed the Following     INJECTION INTO SKIN LESIONS >7          Today's Medication Changes          These changes are accurate as of: 8/21/17 11:59 PM.  If you have any questions, ask your nurse or doctor.               Start taking these medicines.        Dose/Directions    triamcinolone acetonide 10 MG/ML injection   Commonly known as:  KENALOG   Used for:  Lichen plano-pilaris   Started by:  Barbara Soto MD        See med note   Quantity:  5 mL   Refills:  0            Where to get your medicines      These medications were sent to [facility] [address] [place] [address] AT [address] [address]     Phone:  [phone]     hydroxychloroquine 200 MG tablet         Some of these will need a paper prescription and others can be bought over the counter.  Ask your nurse if you have questions.     You don't need a prescription for these medications     triamcinolone acetonide 10 MG/ML injection                Primary Care Provider Office Phone # Fax #    Fernando Weathers [phone]       [facility] [address]  [facility] [address]        Equal Access to Services     ALEXIS MCDANIEL AH: Hadii alexi rivas Someeta, waaxda luqadaha, qaybta kaalmada parker, samuel stevens. So [facility] [phone].    ATENCIÓN: Si habla español, tiene a pelayo disposición servicios gratuitos de asistencia lingüística. Llame al  [phone].    We comply with applicable federal civil rights laws and [place] laws. We do not discriminate on the basis of race, color, national origin, age, disability sex, sexual orientation or gender identity.            Thank you!     Thank you for choosing [facility] DERMATOLOGY  for your care. Our goal is always to provide you with excellent care. Hearing back from our patients is one way we can continue to improve our services. Please take a few minutes to complete the written survey that you may receive in the mail after your visit with us. Thank you!             Your Updated Medication List - Protect others around you: Learn how to safely use, store and throw away your medicines at www.disposemymeds.org.          This list is accurate as of: 8/21/17 11:59 PM.  Always use your most recent med list.                   Brand Name Dispense Instructions for use Diagnosis    BENADRYL 25 MG capsule   Generic drug:  diphenhydrAMINE      Take 25 mg by mouth every 6 hours as needed.        calcium 1500 MG Tabs      Take  by mouth.        CLARITIN PO      Take  by mouth.        DERMA-SMOOTHE/FS SCALP 0.01 % Oil     118 mL    Apply once a week to scalp for lichen planopilaris    Lichen planopilaris       desonide 0.05 % cream    DESOWEN    60 g    Mix 50/50 with ketoconazole and apply twice daily to irritated area for 3 weeks    Intertrigo       fluconazole 200 MG tablet    DIFLUCAN    45 tablet    Take 1 tablet (200 mg) by mouth daily    Yeast infection of the vagina       hydroxychloroquine 200 MG tablet    PLAQUENIL    180 tablet    Take 1 tablet (200 mg) by mouth 2 times daily    Lichen plano-pilaris       ibuprofen 800 MG tablet    ADVIL/MOTRIN     Take 800 mg by mouth every 8 hours as needed.        ketoconazole 2 % cream    NIZORAL    30 g    Mix 50/50 with desonide and apply twice daily to irritated area for 3 weeks    Intertrigo       NEW MED     40 g    Biest 1.5mg/gram cream(pg free)  Insert 1 gram  vaginally twice weekly as directed    Vulvar atrophy       triamcinolone acetonide 10 MG/ML injection    KENALOG    5 mL    See med note    Lichen plano-pilaris       vitamin D 1000 UNITS capsule      Take  by mouth. Total 2200 units daily.

## 2017-08-21 NOTE — NURSING NOTE
"Dermatology Rooming Note    Ester JOSE Barba's goals for this visit include:   Chief Complaint   Patient presents with     Hair Loss     Ester comes to clinic today for LPP. States \"it's the same.\"     Karen Oglesby, [facility]    "

## 2017-08-21 NOTE — LETTER
"8/21/2017       RE: Ester Barba  [address] [place] [address]     Dear Colleague,    Thank you for referring your patient, Ester Barba, to the [facility] DERMATOLOGY [facility]. Please see a copy of my visit note below.    [facility] Dermatology Note      Dermatology Problem List:  1. Lichen Planopilaris and Lichen planus     -LPPAI score: 1 (1 for pruritus, pain, and perifollicular scale)  -current treatment: 1 cc ILK 10 mg/cc injected into scalp today, plaquenil 200 mg BID, DHS zinc shampoo every 2-3 days, Rogaine 5% foam daily (not using), derma-smoothe/FS 0.01% oil once per week  -plaquenil safety labs ordered: CBC with platelet diff, and CMP  - plan to start taper off of plaquenil at follow up  -Symptoms thought to start originally after imiquimod application      2.  Inflammatory papule on right nasal ala--appears to be irritated follicle vs fibrous papule vs AK  - treated with cryotherapy 6/6/17, monitor at follow up     3. AKs     4.. History of nonmelanoma skin cancer  -SCC of the scalp, s/p Mohs excision 2014  -History of 1-2 other NMSC of the scalp (BCCs)    Encounter Date: Aug 21, 2017    CC:  Chief Complaint   Patient presents with     Hair Loss     Ester comes to clinic today for LPP. States \"it's the same.\"         History of Present Illness:  Ms. Ester Barba is a 71 year old female who presents as a follow-up for LPP. The patient was last seen 6/617 when she received 1 cc kenalog 10 IL injections and continued on DHS zinc shampoo and 200 mg BID of hydroxychlorquine. She also had a lesion on her nose treated with cryotherapy. She believes that her hair has been  stable with some burning on right side of scalp. Painful to the touch.     Past Medical History:   Patient Active Problem List   Diagnosis     Porokeratosis     Squamous cell carcinoma     Neoplasm of uncertain behavior     Lichen planopilaris     Dermatitis     Cicatricial " alopecia     Keratosis, inflamed seborrheic     History of skin cancer     Vaginitis and vulvovaginitis     Basal cell carcinoma of vertex scalp s/p mohs 6-5-15     Medication management     Actinic keratosis     Medication monitoring encounter     Past Medical History:   Diagnosis Date     Basal cell carcinoma      Squamous cell carcinoma      Past Surgical History:   Procedure Laterality Date     BIOPSY OF SKIN LESION       MOHS MICROGRAPHIC PROCEDURE       NO HISTORY OF SURGERY  2/17/14    derm     SMALL BOWEL RESECTION  11/2015    diverticulitis       Medications:  Current Outpatient Prescriptions   Medication Sig Dispense Refill     ketoconazole (NIZORAL) 2 % cream Mix 50/50 with desonide and apply twice daily to irritated area for 3 weeks 30 g 1     hydroxychloroquine (PLAQUENIL) 200 MG tablet Take 1 tablet (200 mg) by mouth 2 times daily 60 tablet 0     Fluocinolone Acetonide (DERMA-SMOOTHE/FS SCALP) 0.01 % OIL Apply once a week to scalp for lichen planopilaris 118 mL 2     NEW MED Biest 1.5mg/gram cream(pg free)  Insert 1 gram vaginally twice weekly as directed 40 g 6     fluconazole (DIFLUCAN) 200 MG tablet Take 1 tablet (200 mg) by mouth daily 45 tablet 4     desonide (DESOWEN) 0.05 % cream Mix 50/50 with ketoconazole and apply twice daily to irritated area for 3 weeks 60 g 1     diphenhydrAMINE (BENADRYL) 25 MG capsule Take 25 mg by mouth every 6 hours as needed.       Calcium 1500 MG TABS Take  by mouth.       Loratadine (CLARITIN PO) Take  by mouth.       ibuprofen (ADVIL,MOTRIN) 800 MG tablet Take 800 mg by mouth every 8 hours as needed.       Cholecalciferol (VITAMIN D) 1000 UNIT capsule Take  by mouth. Total 2200 units daily.       Allergies   Allergen Reactions     Dust Mites Other (See Comments)     Sneezing, coughing. asthma     Levaquin [Levofloxacin Hemihydrate] Other (See Comments)     Muscle weakness     Mold Other (See Comments)     Sneezing, asthma, weezing     Pollen Extract/Tree Extract  Other (See Comments)     Sneezing, weezing     Sulfa Drugs Hives     Penicillins Rash         Review of Systems:  -HEENT: Patient denies nonhealing oral sores.  -Skin: As above in HPI. No additional skin concerns.    Physical exam:  GEN: This is a well developed, well-nourished female in no acute distress, in a pleasant mood.    SKIN: Focused examination of the scalp, neck  and face was performed.  -right vertex scalp with perifollicular erythema and scaling   -Yellow crusty patches on vertex over scar tissue  -light pink macule with crust on lip  -No other lesions of concern on areas examined.     Impression/Plan:  1. Lichen planopilaris: LPPAI score of 1 today which is stable to what it has been previously. She has been on the plaquenil for 12 years and could consider taper in the future. Still has some seborrheic involvement.     Kenalog intralesional injection procedure note (performed by faculty): After verbal consent and discussion of risks including but not limited to atrophy, pain, and bruising, cleansing with isopropyl alcohol, time out was performed, 1 total cc of Kenalog 10 mg/cc was injected into 10 sites.  The patient tolerated the procedure well and left the Dermatology clinic in good condition.    Continue dermasmooth oil 0.01% once per week    Continue to wash scalp with DHS zinc shampoo 2-3 days per week    Continue plaquenil 200 mg BID    Encouraged Rogaine 5% foam daily    Photographs taken    2. Actinic keratosis    Cryotherapy procedure note (performed by faculty): After verbal consent and discussion of risks and benefits including but no limited to dyspigmentation/scar, blister, infection, recurrence,1 lesion was treated with 1-2mm freeze border for 2 cycles with liquid nitrogen. Post cryotherapy instructions were provided.     Follow up to ensure resolution    CC Dr. Fernando Johnston on close of this encounter.  Follow-up in 5 weeks, earlier for new or changing lesions.     Staff  Involved:  Scribed by Papito Kelly, MS4 for Dr. Soto.        I agree with the PFSH and ROS as completed by the Medical Student. The remainder of the encounter was performed by me and scribed by the Medical Student. The scribed note accurately reflects my personal services and the medical decisions made by me. ILK injections were primarily done by me and the cryotherapy procedure was done together.      Barbara Soto MD  Professor and Chair  Department of Dermatology  [facility]        Pictures taken of patient today to be placed in chart for future reference.      Again, thank you for allowing me to participate in the care of your patient.      Sincerely,    Barbara Soto MD

## 2017-08-21 NOTE — PROGRESS NOTES
"[facility] Dermatology Note      Dermatology Problem List:  1. Lichen Planopilaris and Lichen planus     -LPPAI score: 1 (1 for pruritus, pain, and perifollicular scale)  -current treatment: 1 cc ILK 10 mg/cc injected into scalp today, plaquenil 200 mg BID, DHS zinc shampoo every 2-3 days, Rogaine 5% foam daily (not using), derma-smoothe/FS 0.01% oil once per week  -plaquenil safety labs ordered: CBC with platelet diff, and CMP  - plan to start taper off of plaquenil at follow up  -Symptoms thought to start originally after imiquimod application      2.  Inflammatory papule on right nasal ala--appears to be irritated follicle vs fibrous papule vs AK  - treated with cryotherapy 6/6/17, monitor at follow up     3. AKs     4.. History of nonmelanoma skin cancer  -SCC of the scalp, s/p Mohs excision 2014  -History of 1-2 other NMSC of the scalp (BCCs)    Encounter Date: Aug 21, 2017    CC:  Chief Complaint   Patient presents with     Hair Loss     Ester comes to clinic today for LPP. States \"it's the same.\"         History of Present Illness:  Ms. Ester Barba is a 71 year old female who presents as a follow-up for LPP. The patient was last seen 6/617 when she received 1 cc kenalog 10 IL injections and continued on DHS zinc shampoo and 200 mg BID of hydroxychlorquine. She also had a lesion on her nose treated with cryotherapy. She believes that her hair has been  stable with some burning on right side of scalp. Painful to the touch.     Past Medical History:   Patient Active Problem List   Diagnosis     Porokeratosis     Squamous cell carcinoma     Neoplasm of uncertain behavior     Lichen planopilaris     Dermatitis     Cicatricial alopecia     Keratosis, inflamed seborrheic     History of skin cancer     Vaginitis and vulvovaginitis     Basal cell carcinoma of vertex scalp s/p mohs 6-5-15     Medication management     Actinic keratosis     Medication monitoring encounter     Past Medical History: "   Diagnosis Date     Basal cell carcinoma      Squamous cell carcinoma      Past Surgical History:   Procedure Laterality Date     BIOPSY OF SKIN LESION       MOHS MICROGRAPHIC PROCEDURE       NO HISTORY OF SURGERY  2/17/14    derm     SMALL BOWEL RESECTION  11/2015    diverticulitis       Medications:  Current Outpatient Prescriptions   Medication Sig Dispense Refill     ketoconazole (NIZORAL) 2 % cream Mix 50/50 with desonide and apply twice daily to irritated area for 3 weeks 30 g 1     hydroxychloroquine (PLAQUENIL) 200 MG tablet Take 1 tablet (200 mg) by mouth 2 times daily 60 tablet 0     Fluocinolone Acetonide (DERMA-SMOOTHE/FS SCALP) 0.01 % OIL Apply once a week to scalp for lichen planopilaris 118 mL 2     NEW MED Biest 1.5mg/gram cream(pg free)  Insert 1 gram vaginally twice weekly as directed 40 g 6     fluconazole (DIFLUCAN) 200 MG tablet Take 1 tablet (200 mg) by mouth daily 45 tablet 4     desonide (DESOWEN) 0.05 % cream Mix 50/50 with ketoconazole and apply twice daily to irritated area for 3 weeks 60 g 1     diphenhydrAMINE (BENADRYL) 25 MG capsule Take 25 mg by mouth every 6 hours as needed.       Calcium 1500 MG TABS Take  by mouth.       Loratadine (CLARITIN PO) Take  by mouth.       ibuprofen (ADVIL,MOTRIN) 800 MG tablet Take 800 mg by mouth every 8 hours as needed.       Cholecalciferol (VITAMIN D) 1000 UNIT capsule Take  by mouth. Total 2200 units daily.       Allergies   Allergen Reactions     Dust Mites Other (See Comments)     Sneezing, coughing. asthma     Levaquin [Levofloxacin Hemihydrate] Other (See Comments)     Muscle weakness     Mold Other (See Comments)     Sneezing, asthma, weezing     Pollen Extract/Tree Extract Other (See Comments)     Sneezing, weezing     Sulfa Drugs Hives     Penicillins Rash         Review of Systems:  -HEENT: Patient denies nonhealing oral sores.  -Skin: As above in HPI. No additional skin concerns.    Physical exam:  GEN: This is a well developed,  well-nourished female in no acute distress, in a pleasant mood.    SKIN: Focused examination of the scalp, neck  and face was performed.  -right vertex scalp with perifollicular erythema and scaling   -Yellow crusty patches on vertex over scar tissue  -light pink macule with crust on lip  -No other lesions of concern on areas examined.     Impression/Plan:  1. Lichen planopilaris: LPPAI score of 1 today which is stable to what it has been previously. She has been on the plaquenil for 12 years and could consider taper in the future. Still has some seborrheic involvement.     Kenalog intralesional injection procedure note (performed by faculty): After verbal consent and discussion of risks including but not limited to atrophy, pain, and bruising, cleansing with isopropyl alcohol, time out was performed, 1 total cc of Kenalog 10 mg/cc was injected into 10 sites.  The patient tolerated the procedure well and left the Dermatology clinic in good condition.    Continue dermasmooth oil 0.01% once per week    Continue to wash scalp with DHS zinc shampoo 2-3 days per week    Continue plaquenil 200 mg BID    Encouraged Rogaine 5% foam daily    Photographs taken    2. Actinic keratosis    Cryotherapy procedure note (performed by faculty): After verbal consent and discussion of risks and benefits including but no limited to dyspigmentation/scar, blister, infection, recurrence,1 lesion was treated with 1-2mm freeze border for 2 cycles with liquid nitrogen. Post cryotherapy instructions were provided.     Follow up to ensure resolution    CC Dr. Fernando Johnston on close of this encounter.  Follow-up in 5 weeks, earlier for new or changing lesions.     Staff Involved:  Scribed by Papito Kelly, MS4 for Dr. Soto.        I agree with the PFSH and ROS as completed by the Medical Student. The remainder of the encounter was performed by me and scribed by the Medical Student. The scribed note accurately reflects my personal  services and the medical decisions made by me. ILK injections were primarily done by me and the cryotherapy procedure was done together.      Barbara Soto MD  Professor and Chair  Department of Dermatology  [facility]

## 2017-10-09 ENCOUNTER — OFFICE VISIT (OUTPATIENT)
Dept: DERMATOLOGY | Facility: CLINIC | Age: 72
End: 2017-10-09

## 2017-10-09 DIAGNOSIS — L66.10 LICHEN PLANOPILARIS: ICD-10-CM

## 2017-10-09 DIAGNOSIS — L66.9 CICATRICIAL ALOPECIA: ICD-10-CM

## 2017-10-09 DIAGNOSIS — L21.9 DERMATITIS, SEBORRHEIC: ICD-10-CM

## 2017-10-09 DIAGNOSIS — L71.9 ACNE ROSACEA: Primary | ICD-10-CM

## 2017-10-09 RX ORDER — CLINDAMYCIN PHOSPHATE 10 UG/ML
LOTION TOPICAL 2 TIMES DAILY
Qty: 60 ML | Refills: 1 | Status: SHIPPED | OUTPATIENT
Start: 2017-10-09 | End: 2018-06-05

## 2017-10-09 ASSESSMENT — PAIN SCALES - GENERAL: PAINLEVEL: NO PAIN (0)

## 2017-10-09 NOTE — MR AVS SNAPSHOT
After Visit Summary   10/9/2017    Ester Barba    MRN: [id number]           Patient Information     Date Of Birth          1945        Visit Information        Provider Department      10/9/2017 12:45 PM Barbara Soto MD [facility]        Today's Diagnoses     Acne rosacea    -  1    Lichen planopilaris        Cicatricial alopecia        Dermatitis, seborrheic           Follow-ups after your visit        Who to contact     Please call your clinic at [phone] to:    Ask questions about your health    Make or cancel appointments    Discuss your medicines    Learn about your test results    Speak to your doctor   If you have compliments or concerns about an experience at your clinic, or if you wish to file a complaint, please contact [facility] Patient Relations at [phone] or email us at [email or web address]         Additional Information About Your Visit        MyChart Information     [employer] gives you secure access to your electronic health record. If you see a primary care provider, you can also send messages to your care team and make appointments. If you have questions, please call your primary care clinic.  If you do not have a primary care provider, please call [phone] and they will assist you.      Olo is an electronic gateway that provides easy, online access to your medical records. With Olo, you can request a clinic appointment, read your test results, renew a prescription or communicate with your care team.     To access your existing account, please contact your [facility] Clinic or call [phone] for assistance.        Care EveryWhere ID     This is your Care EveryWhere ID. This could be used by other organizations to access your [facility] medical records  [id number]        Your Vitals Were     Pulse                   78            Blood Pressure from Last 3 Encounters:    10/09/17 117/64   08/21/17 133/76   06/28/17 117/69    Weight from Last 3 Encounters:   06/28/17 59.1 kg (130 lb 6.4 oz)   01/18/17 61.9 kg (136 lb 6.4 oz)   03/14/16 58.1 kg (128 lb)              We Performed the Following     INJECTION INTO SKIN LESIONS >7          Today's Medication Changes          These changes are accurate as of: 10/9/17 11:59 PM.  If you have any questions, ask your nurse or doctor.               Start taking these medicines.        Dose/Directions    clindamycin 1 % lotion   Commonly known as:  CLEOCIN T   Used for:  Acne rosacea   Started by:  Barbara oSto MD        Apply topically 2 times daily   Quantity:  60 mL   Refills:  1       triamcinolone acetonide 10 MG/ML injection   Commonly known as:  KENALOG   Used for:  Lichen planopilaris   Started by:  Barbara Soto MD        See med note   Quantity:  5 mL   Refills:  0            Where to get your medicines      These medications were sent to [facility] [address] [place] [address] AT [address] [address]     Phone:  [phone]     clindamycin 1 % lotion         Some of these will need a paper prescription and others can be bought over the counter.  Ask your nurse if you have questions.     You don't need a prescription for these medications     triamcinolone acetonide 10 MG/ML injection                Primary Care Provider Office Phone # Fax #    Fernando Weathers [phone]       [facility] [address]  [facility] [address]        Equal Access to Services     ALEXIS MCDANIEL AH: Hadenoch Marcano, victoriano montes, samuel dangelo. So [facility] [phone].    ATENCIÓN: Si habla español, tiene a pelayo disposición servicios gratuitos de asistencia lingüística. Llame al [phone].    We comply with applicable federal civil rights laws and [place] laws. We  do not discriminate on the basis of race, color, national origin, age, disability, sex, sexual orientation, or gender identity.            Thank you!     Thank you for choosing [facility]  for your care. Our goal is always to provide you with excellent care. Hearing back from our patients is one way we can continue to improve our services. Please take a few minutes to complete the written survey that you may receive in the mail after your visit with us. Thank you!             Your Updated Medication List - Protect others around you: Learn how to safely use, store and throw away your medicines at www.disposemymeds.org.          This list is accurate as of: 10/9/17 11:59 PM.  Always use your most recent med list.                   Brand Name Dispense Instructions for use Diagnosis    BENADRYL 25 MG capsule   Generic drug:  diphenhydrAMINE      Take 25 mg by mouth every 6 hours as needed.        calcium 1500 MG Tabs      Take  by mouth.        CLARITIN PO      Take  by mouth.        clindamycin 1 % lotion    CLEOCIN T    60 mL    Apply topically 2 times daily    Acne rosacea       DERMA-SMOOTHE/FS SCALP 0.01 % Oil oil   Generic drug:  Fluocinolone Acetonide Scalp     118 mL    Apply once a week to scalp for lichen planopilaris    Lichen planopilaris       desonide 0.05 % cream    DESOWEN    60 g    Mix 50/50 with ketoconazole and apply twice daily to irritated area for 3 weeks    Intertrigo       fluconazole 200 MG tablet    DIFLUCAN    45 tablet    Take 1 tablet (200 mg) by mouth daily    Yeast infection of the vagina       hydroxychloroquine 200 MG tablet    PLAQUENIL    180 tablet    Take 1 tablet (200 mg) by mouth 2 times daily    Lichen plano-pilaris       ibuprofen 800 MG tablet    ADVIL/MOTRIN     Take 800 mg by mouth every 8 hours as needed.        ketoconazole 2 % cream    NIZORAL    30 g    Mix 50/50 with desonide and apply twice daily to irritated area for 3 weeks    Intertrigo       NEW MED     40  g    Biest 1.5mg/gram cream(pg free)  Insert 1 gram vaginally twice weekly as directed    Vulvar atrophy       triamcinolone acetonide 10 MG/ML injection    KENALOG    5 mL    See med note    Lichen planopilaris       vitamin D 1000 UNITS capsule      Take  by mouth. Total 2200 units daily.

## 2017-10-09 NOTE — PROGRESS NOTES
"[facility] Dermatology Note      Dermatology Problem List:  1. Lichen Planopilaris and Lichen planus     -LPPAI score: 1 (1 for pruritus, pain, and perifollicular scale)  -current treatment: 1 cc ILK 10 mg/cc injected into scalp today, plaquenil 200 mg BID, DHS zinc shampoo every 2-3 days, Rogaine 5% foam daily (not using), derma-smoothe/FS 0.01% oil once per week  -plaquenil safety labs ordered: CBC with platelet diff, and CMP  - plan to start taper off of plaquenil at follow up  -Symptoms thought to start originally after imiquimod application      2.  Inflammatory papule on right nasal ala--appears to be irritated follicle vs fibrous papule vs AK  - treated with cryotherapy 6/6/17, started on topical clindamycin on 10/9/17     3. AKs     4.. History of nonmelanoma skin cancer  -SCC of the scalp, s/p Mohs excision 2014  -History of 1-2 other NMSC of the scalp (BCCs)    Encounter Date: Oct 9, 2017    CC:  Chief Complaint   Patient presents with     Hair Loss     Ester comes to clinic today for LPP. States \"it's the same.\"         History of Present Illness:  Ms. Ester Barba is a 71 year old female who presents as a follow-up for LPP. The patient was last seen 8/21/17 when she received 1 cc kenalog 10 IL injections and continued on DHS zinc shampoo and 200 mg BID of hydroxychlorquine. She states that her scalp is unchanged from last visit.  It is painful to the touch but no burning or itching. She is also concerned about a spot on her nose that had been treated with cryotherapy in the past. She states that it never really went away. It will scab over for a week or two then the scab falls off and the process starts over. It is a little painful when the scab falls off.    Past Medical History:   Patient Active Problem List   Diagnosis     Porokeratosis     Squamous cell carcinoma     Neoplasm of uncertain behavior     Lichen planopilaris     Dermatitis     Cicatricial alopecia     Keratosis, " inflamed seborrheic     History of skin cancer     Vaginitis and vulvovaginitis     Basal cell carcinoma of vertex scalp s/p mohs 6-5-15     Medication management     Actinic keratosis     Medication monitoring encounter     Past Medical History:   Diagnosis Date     Basal cell carcinoma      Squamous cell carcinoma      Past Surgical History:   Procedure Laterality Date     BIOPSY OF SKIN LESION       MOHS MICROGRAPHIC PROCEDURE       NO HISTORY OF SURGERY  2/17/14    derm     SMALL BOWEL RESECTION  11/2015    diverticulitis       Medications:  Current Outpatient Prescriptions   Medication Sig Dispense Refill     clindamycin (CLEOCIN T) 1 % lotion Apply topically 2 times daily 60 mL 1     hydroxychloroquine (PLAQUENIL) 200 MG tablet Take 1 tablet (200 mg) by mouth 2 times daily 180 tablet 0     ketoconazole (NIZORAL) 2 % cream Mix 50/50 with desonide and apply twice daily to irritated area for 3 weeks 30 g 1     Fluocinolone Acetonide (DERMA-SMOOTHE/FS SCALP) 0.01 % OIL Apply once a week to scalp for lichen planopilaris 118 mL 2     NEW MED Biest 1.5mg/gram cream(pg free)  Insert 1 gram vaginally twice weekly as directed 40 g 6     fluconazole (DIFLUCAN) 200 MG tablet Take 1 tablet (200 mg) by mouth daily 45 tablet 4     desonide (DESOWEN) 0.05 % cream Mix 50/50 with ketoconazole and apply twice daily to irritated area for 3 weeks 60 g 1     diphenhydrAMINE (BENADRYL) 25 MG capsule Take 25 mg by mouth every 6 hours as needed.       Calcium 1500 MG TABS Take  by mouth.       Loratadine (CLARITIN PO) Take  by mouth.       ibuprofen (ADVIL,MOTRIN) 800 MG tablet Take 800 mg by mouth every 8 hours as needed.       Cholecalciferol (VITAMIN D) 1000 UNIT capsule Take  by mouth. Total 2200 units daily.       Allergies   Allergen Reactions     Dust Mites Other (See Comments)     Sneezing, coughing. asthma     Levaquin [Levofloxacin Hemihydrate] Other (See Comments)     Muscle weakness     Mold Other (See Comments)      Sneezing, asthma, weezing     Pollen Extract/Tree Extract Other (See Comments)     Sneezing, weezing     Sulfa Drugs Hives     Penicillins Rash         Review of Systems:  -HEENT: Patient denies nonhealing oral sores.  -Skin: As above in HPI. No additional skin concerns.    Physical exam:  GEN: This is a well developed, well-nourished female in no acute distress, in a pleasant mood.    SKIN: Focused examination of the scalp, neck  and face was performed.  -right vertex scalp with perifollicular erythema and scaling   -Yellow crusty patches on vertex over scar tissue  -small 1 mm red macule on right nose just above nostril  -No other lesions of concern on areas examined.     Impression/Plan:  1. Lichen planopilaris: LPPAI score of 0.67 today which is stable to what it has been previously. She has been on the plaquenil for 12 years and could consider taper in the future. Still has some seborrheic involvement.     Kenalog intralesional injection procedure note (performed by faculty): After verbal consent and discussion of risks including but not limited to atrophy, pain, and bruising,  time out was performed, 1 total cc of Kenalog 10 mg/cc was injected into 10 sites.  The patient tolerated the procedure well and left the Dermatology clinic in good condition.    Increase dermasmooth oil 0.01% to twice per week    Increase scalp/hair shampoo with DHS zinc shampoo to 4-5 days per week    Continue plaquenil 200 mg BID    Encouraged Rogaine 5% foam daily    Photographs taken    2. Erythematous papule present on R nasal ala--appears to be irritated/inflamed follicle vs possible early  AK.  No concerning features present on dermoscopy.  Has been treated with cryotherapy in the past.    Topical clindamycin 1% lotion twice daily to affected area    Photographs taken     If no resolution by the time of her next visit, will recommend shave biopsy.      CC Dr. Fernando Johnston on close of this encounter.  Follow-up in 4 weeks,  earlier for new or changing lesions.     Staff Involved:  Scribed by Hakan Chatterjee, MS4 for Dr. Soto.      I agree with the PFSH and ROS as completed by the Medical Student. The remainder of the encounter was performed by me and scribed by the Medical Student. The scribed note accurately reflects my personal services and the medical decisions made by me. ILK injections were done by me as noted above.      Barbara Soto MD  Professor and Chair  Department of Dermatology  [facility]

## 2017-10-09 NOTE — LETTER
"10/9/2017       RE: Ester Barba  [address] [place] [address]     Dear Colleague,    Thank you for referring your patient, Ester Barba, to the [facility] DERMATOLOGY at [facility]. Please see a copy of my visit note below.    [facility] Dermatology Note      Dermatology Problem List:  1. Lichen Planopilaris and Lichen planus     -LPPAI score: 1 (1 for pruritus, pain, and perifollicular scale)  -current treatment: 1 cc ILK 10 mg/cc injected into scalp today, plaquenil 200 mg BID, DHS zinc shampoo every 2-3 days, Rogaine 5% foam daily (not using), derma-smoothe/FS 0.01% oil once per week  -plaquenil safety labs ordered: CBC with platelet diff, and CMP  - plan to start taper off of plaquenil at follow up  -Symptoms thought to start originally after imiquimod application      2.  Inflammatory papule on right nasal ala--appears to be irritated follicle vs fibrous papule vs AK  - treated with cryotherapy 6/6/17, started on topical clindamycin on 10/9/17     3. AKs     4.. History of nonmelanoma skin cancer  -SCC of the scalp, s/p Mohs excision 2014  -History of 1-2 other NMSC of the scalp (BCCs)    Encounter Date: Oct 9, 2017    CC:  Chief Complaint   Patient presents with     Hair Loss     Ester comes to clinic today for LPP. States \"it's the same.\"         History of Present Illness:  Ms. Ester Barba is a 71 year old female who presents as a follow-up for LPP. The patient was last seen 8/21/17 when she received 1 cc kenalog 10 IL injections and continued on DHS zinc shampoo and 200 mg BID of hydroxychlorquine. She states that her scalp is unchanged from last visit.  It is painful to the touch but no burning or itching. She is also concerned about a spot on her nose that had been treated with cryotherapy in the past. She states that it never really went away. It will scab over for a week or two then the scab falls off and the process starts over. It is a " little painful when the scab falls off.    Past Medical History:   Patient Active Problem List   Diagnosis     Porokeratosis     Squamous cell carcinoma     Neoplasm of uncertain behavior     Lichen planopilaris     Dermatitis     Cicatricial alopecia     Keratosis, inflamed seborrheic     History of skin cancer     Vaginitis and vulvovaginitis     Basal cell carcinoma of vertex scalp s/p mohs 6-5-15     Medication management     Actinic keratosis     Medication monitoring encounter     Past Medical History:   Diagnosis Date     Basal cell carcinoma      Squamous cell carcinoma      Past Surgical History:   Procedure Laterality Date     BIOPSY OF SKIN LESION       MOHS MICROGRAPHIC PROCEDURE       NO HISTORY OF SURGERY  2/17/14    derm     SMALL BOWEL RESECTION  11/2015    diverticulitis       Medications:  Current Outpatient Prescriptions   Medication Sig Dispense Refill     clindamycin (CLEOCIN T) 1 % lotion Apply topically 2 times daily 60 mL 1     hydroxychloroquine (PLAQUENIL) 200 MG tablet Take 1 tablet (200 mg) by mouth 2 times daily 180 tablet 0     ketoconazole (NIZORAL) 2 % cream Mix 50/50 with desonide and apply twice daily to irritated area for 3 weeks 30 g 1     Fluocinolone Acetonide (DERMA-SMOOTHE/FS SCALP) 0.01 % OIL Apply once a week to scalp for lichen planopilaris 118 mL 2     NEW MED Biest 1.5mg/gram cream(pg free)  Insert 1 gram vaginally twice weekly as directed 40 g 6     fluconazole (DIFLUCAN) 200 MG tablet Take 1 tablet (200 mg) by mouth daily 45 tablet 4     desonide (DESOWEN) 0.05 % cream Mix 50/50 with ketoconazole and apply twice daily to irritated area for 3 weeks 60 g 1     diphenhydrAMINE (BENADRYL) 25 MG capsule Take 25 mg by mouth every 6 hours as needed.       Calcium 1500 MG TABS Take  by mouth.       Loratadine (CLARITIN PO) Take  by mouth.       ibuprofen (ADVIL,MOTRIN) 800 MG tablet Take 800 mg by mouth every 8 hours as needed.       Cholecalciferol (VITAMIN D) 1000 UNIT  capsule Take  by mouth. Total 2200 units daily.       Allergies   Allergen Reactions     Dust Mites Other (See Comments)     Sneezing, coughing. asthma     Levaquin [Levofloxacin Hemihydrate] Other (See Comments)     Muscle weakness     Mold Other (See Comments)     Sneezing, asthma, weezing     Pollen Extract/Tree Extract Other (See Comments)     Sneezing, weezing     Sulfa Drugs Hives     Penicillins Rash         Review of Systems:  -HEENT: Patient denies nonhealing oral sores.  -Skin: As above in HPI. No additional skin concerns.    Physical exam:  GEN: This is a well developed, well-nourished female in no acute distress, in a pleasant mood.    SKIN: Focused examination of the scalp, neck  and face was performed.  -right vertex scalp with perifollicular erythema and scaling   -Yellow crusty patches on vertex over scar tissue  -small 1 mm red macule on right nose just above nostril  -No other lesions of concern on areas examined.     Impression/Plan:  1. Lichen planopilaris: LPPAI score of 0.67 today which is stable to what it has been previously. She has been on the plaquenil for 12 years and could consider taper in the future. Still has some seborrheic involvement.     Kenalog intralesional injection procedure note (performed by faculty): After verbal consent and discussion of risks including but not limited to atrophy, pain, and bruising,  time out was performed, 1 total cc of Kenalog 10 mg/cc was injected into 10 sites.  The patient tolerated the procedure well and left the Dermatology clinic in good condition.    Increase dermasmooth oil 0.01% to twice per week    Increase scalp/hair shampoo with DHS zinc shampoo to 4-5 days per week    Continue plaquenil 200 mg BID    Encouraged Rogaine 5% foam daily    Photographs taken    2. Erythematous papule present on R nasal ala--appears to be irritated/inflamed follicle vs possible early  AK.  No concerning features present on dermoscopy.  Has been treated with  cryotherapy in the past.    Topical clindamycin 1% lotion twice daily to affected area    Photographs taken     If no resolution by the time of her next visit, will recommend shave biopsy.      CC Dr. Fernando Johnston on close of this encounter.  Follow-up in 4 weeks, earlier for new or changing lesions.     Staff Involved:  Scribed by Hakan Chatterjee, MS4 for Dr. Soto.      I agree with the PFSH and ROS as completed by the Medical Student. The remainder of the encounter was performed by me and scribed by the Medical Student. The scribed note accurately reflects my personal services and the medical decisions made by me. ILK injections were done by me as noted above.      Barbara Soto MD  Professor and Chair  Department of Dermatology  [facility]        Pictures taken of patient today to be placed in chart for future reference.          Again, thank you for allowing me to participate in the care of your patient.      Sincerely,    Barbara Soto MD

## 2017-10-09 NOTE — NURSING NOTE
"Dermatology Rooming Note    Ester JOSE Barba's goals for this visit include:   Chief Complaint   Patient presents with     Hair Loss     Ester comes to clinic today for LPP. States \"it's the same.\"     Karen Oglesyb, [facility]    "

## 2017-10-11 VITALS — SYSTOLIC BLOOD PRESSURE: 117 MMHG | HEART RATE: 78 BPM | DIASTOLIC BLOOD PRESSURE: 64 MMHG

## 2017-10-11 ASSESSMENT — PAIN SCALES - GENERAL: PAINLEVEL: NO PAIN (0)

## 2017-10-11 NOTE — NURSING NOTE
Drug Administration Record    Drug Name: triamcinolone acetonide(kenalog)  Dose: 1mL of triamcinolone 10mg/mL, 10mg dose  Route administered: intralesional  NDC #: Kenalog-10 ([address]-73)  Amount of waste(mL):4  Reason for waste: Single use vial

## 2017-10-16 LAB — MAMMOGRAM: NORMAL

## 2017-11-05 PROBLEM — L21.9 DERMATITIS, SEBORRHEIC: Status: ACTIVE | Noted: 2017-11-05

## 2017-11-06 ENCOUNTER — OFFICE VISIT (OUTPATIENT)
Dept: DERMATOLOGY | Facility: CLINIC | Age: 72
End: 2017-11-06

## 2017-11-06 VITALS — DIASTOLIC BLOOD PRESSURE: 80 MMHG | SYSTOLIC BLOOD PRESSURE: 136 MMHG | HEART RATE: 95 BPM

## 2017-11-06 DIAGNOSIS — C44.310 BCC (BASAL CELL CARCINOMA), FACE: ICD-10-CM

## 2017-11-06 DIAGNOSIS — D48.5 NEOPLASM OF UNCERTAIN BEHAVIOR OF SKIN: Primary | ICD-10-CM

## 2017-11-06 DIAGNOSIS — Z51.81 THERAPEUTIC DRUG MONITORING: ICD-10-CM

## 2017-11-06 DIAGNOSIS — L66.10 LICHEN PLANOPILARIS: ICD-10-CM

## 2017-11-06 RX ORDER — LIDOCAINE HYDROCHLORIDE AND EPINEPHRINE 10; 10 MG/ML; UG/ML
1 INJECTION, SOLUTION INFILTRATION; PERINEURAL ONCE
Qty: 1 ML | Refills: 0 | OUTPATIENT
Start: 2017-11-06 | End: 2017-11-06

## 2017-11-06 ASSESSMENT — PAIN SCALES - GENERAL
PAINLEVEL: NO PAIN (0)
PAINLEVEL: NO PAIN (0)

## 2017-11-06 NOTE — PATIENT INSTRUCTIONS

## 2017-11-06 NOTE — PROGRESS NOTES
[facility] Dermatology Note      Dermatology Problem List:  1.***    Encounter Date: Nov 6, 2017    CC:  Chief Complaint   Patient presents with     Hair Loss     LPP. Ester notes that her scalp is the same.         History of Present Illness:  Ms. Ester Barba is a 72 year old female who {hpi:494027}    Past Medical History:   Patient Active Problem List   Diagnosis     Porokeratosis     Squamous cell carcinoma     Neoplasm of uncertain behavior     Lichen planopilaris     Dermatitis     Cicatricial alopecia     Keratosis, inflamed seborrheic     History of skin cancer     Vaginitis and vulvovaginitis     Basal cell carcinoma of vertex scalp s/p mohs 6-5-15     Medication management     Actinic keratosis     Medication monitoring encounter     Dermatitis, seborrheic     Past Medical History:   Diagnosis Date     Basal cell carcinoma      Squamous cell carcinoma      Past Surgical History:   Procedure Laterality Date     BIOPSY OF SKIN LESION       MOHS MICROGRAPHIC PROCEDURE       NO HISTORY OF SURGERY  2/17/14    derm     SMALL BOWEL RESECTION  11/2015    diverticulitis       Social History:  The patient {works:177380}. The patient {denies/admits to:209343} use of tanning beds.    Family History:  {Familyhxderm:595124}    Medications:  Current Outpatient Prescriptions   Medication Sig Dispense Refill     clindamycin (CLEOCIN T) 1 % lotion Apply topically 2 times daily 60 mL 1     hydroxychloroquine (PLAQUENIL) 200 MG tablet Take 1 tablet (200 mg) by mouth 2 times daily 180 tablet 0     ketoconazole (NIZORAL) 2 % cream Mix 50/50 with desonide and apply twice daily to irritated area for 3 weeks 30 g 1     Fluocinolone Acetonide (DERMA-SMOOTHE/FS SCALP) 0.01 % OIL Apply once a week to scalp for lichen planopilaris 118 mL 2     NEW MED Biest 1.5mg/gram cream(pg free)  Insert 1 gram vaginally twice weekly as directed 40 g 6     fluconazole (DIFLUCAN) 200 MG tablet Take 1 tablet (200 mg) by mouth daily  45 tablet 4     desonide (DESOWEN) 0.05 % cream Mix 50/50 with ketoconazole and apply twice daily to irritated area for 3 weeks 60 g 1     diphenhydrAMINE (BENADRYL) 25 MG capsule Take 25 mg by mouth every 6 hours as needed.       Calcium 1500 MG TABS Take  by mouth.       Loratadine (CLARITIN PO) Take  by mouth.       ibuprofen (ADVIL,MOTRIN) 800 MG tablet Take 800 mg by mouth every 8 hours as needed.       Cholecalciferol (VITAMIN D) 1000 UNIT capsule Take  by mouth. Total 2200 units daily.       Allergies   Allergen Reactions     Dust Mites Other (See Comments)     Sneezing, coughing. asthma     Levaquin [Levofloxacin Hemihydrate] Other (See Comments)     Muscle weakness     Mold Other (See Comments)     Sneezing, asthma, weezing     Pollen Extract/Tree Extract Other (See Comments)     Sneezing, weezing     Sulfa Drugs Hives     Penicillins Rash         Review of Systems:  -{ROS:843963}  -Constitutional: The patient denies fatigue, fevers, chills, unintended weight loss, and night sweats.  -HEENT: Patient denies nonhealing oral sores.  -Skin: As above in HPI. No additional skin concerns.    Physical exam:  Vitals: /80  Pulse 95  GEN: {RFGeneralAppearance:360736}   SKIN: {Skin Exam:626087}  -***  -***  -***  -No other lesions of concern on areas examined.     Impression/Plan:  1. {Diagnosesderm:411415}    {dermmedicalstudent:381690}    ***    2. {Diagnosesderm:621628}    {dermmedicalstudent:438674}    ***    3. {Diagnosesderm:330600}    {dermmedicalstudent:279823}    ***    4. {Diagnosesderm:699258}    {dermmedicalstudent:480932}    ***    CC  *** on close of this encounter.  Follow-up {rfmultiple:243350} {follow up in days/weeks/months:455002}.     Staff Involved:  Scribed by Dale Adne, MS4 for Dr. Barbara Soto.

## 2017-11-06 NOTE — NURSING NOTE
Lidocaine   0.2mL once for one use, starting 11/6/2017 ending 11/6/2017,  2mL disp, R-0, injection  Injected by Dr. Shipman

## 2017-11-06 NOTE — PROGRESS NOTES
[facility] Dermatology Note      Dermatology Problem List:  1. Lichen Planopilaris and Lichen planus     -LPPAI score: 1.33 (1 for pruritus, pain, and perifollicular scale/erythema)  -current treatment: 1 cc ILK 10 mg/cc injected into scalp today, plaquenil 200 mg BID, DHS zinc shampoo every 2-3 days, Rogaine 5% foam daily (not using), derma-smoothe/FS 0.01% oil twice per week  -plaquenil safety labs ordered: CBC with platelet diff, and CMP  - plan to start taper off of plaquenil at follow up  -Symptoms thought to start originally after imiquimod application       2.  Inflammatory papule on right nasal ala--appeared initially to be a folliculitis vs AK  - treated with cryotherapy 6/6/17, started on topical clindamycin on 10/9/17, not resolved so will biopsy today      3. AKs      4.. History of nonmelanoma skin cancer  -SCC of the scalp, s/p Mohs excision 2014  -History of 1-2 other NMSC of the scalp (BCCs)  -NUB on R nasal ala shave biopsied 11/6      Encounter Date: Nov 6, 2017    CC:  Chief Complaint   Patient presents with     Hair Loss     LPP. Ester notes that her scalp is the same.         History of Present Illness:  Ms. Ester Barba is a 72 year old female who presents as a follow-up for lichen planopilaris. The patient was last seen 10/9 when 1cc ILK was injected and she was continued on dermasmoothe FS oil BID and plaquenil 200mg BID. She has just returned from  a 2.5 week trip to [facility], forgot to take the dermasmoothe oil with her so has not been using during the trip. She shampoos with DHS zinc shampoo every 2-3 days. She has not used rogaine. She reports her scalp is tender to the touch, which is typical for her, and also feels dry and crusty. She reports mild itch particularly of the parietal scalp, but denies burning. She is scheduled for an eye appointment in November, denies any vision changes today.    For the papule on the R nasal ala noted at the last visit, she has been  applying clindamycin lotion BID to this and feels that it is less crusty, but is still present. Has not changed. Is not itchy but was painful when it would scab and fall off before she started using the clindamycin lotion. No other concerns today.     Past Medical History:   Patient Active Problem List   Diagnosis     Porokeratosis     Squamous cell carcinoma     Neoplasm of uncertain behavior     Lichen planopilaris     Dermatitis     Cicatricial alopecia     Keratosis, inflamed seborrheic     History of skin cancer     Vaginitis and vulvovaginitis     Basal cell carcinoma of vertex scalp s/p mohs 6-5-15     Medication management     Actinic keratosis     Medication monitoring encounter     Dermatitis, seborrheic     Past Medical History:   Diagnosis Date     Basal cell carcinoma      Squamous cell carcinoma      Past Surgical History:   Procedure Laterality Date     BIOPSY OF SKIN LESION       MOHS MICROGRAPHIC PROCEDURE       NO HISTORY OF SURGERY  2/17/14    derm     SMALL BOWEL RESECTION  11/2015    diverticulitis       Family History:  Sister has a history of BCC.    Medications:  Current Outpatient Prescriptions   Medication Sig Dispense Refill     clindamycin (CLEOCIN T) 1 % lotion Apply topically 2 times daily 60 mL 1     hydroxychloroquine (PLAQUENIL) 200 MG tablet Take 1 tablet (200 mg) by mouth 2 times daily 180 tablet 0     ketoconazole (NIZORAL) 2 % cream Mix 50/50 with desonide and apply twice daily to irritated area for 3 weeks 30 g 1     Fluocinolone Acetonide (DERMA-SMOOTHE/FS SCALP) 0.01 % OIL Apply once a week to scalp for lichen planopilaris 118 mL 2     NEW MED Biest 1.5mg/gram cream(pg free)  Insert 1 gram vaginally twice weekly as directed 40 g 6     fluconazole (DIFLUCAN) 200 MG tablet Take 1 tablet (200 mg) by mouth daily 45 tablet 4     desonide (DESOWEN) 0.05 % cream Mix 50/50 with ketoconazole and apply twice daily to irritated area for 3 weeks 60 g 1     diphenhydrAMINE (BENADRYL) 25 MG  capsule Take 25 mg by mouth every 6 hours as needed.       Calcium 1500 MG TABS Take  by mouth.       Loratadine (CLARITIN PO) Take  by mouth.       ibuprofen (ADVIL,MOTRIN) 800 MG tablet Take 800 mg by mouth every 8 hours as needed.       Cholecalciferol (VITAMIN D) 1000 UNIT capsule Take  by mouth. Total 2200 units daily.       Allergies   Allergen Reactions     Dust Mites Other (See Comments)     Sneezing, coughing. asthma     Levaquin [Levofloxacin Hemihydrate] Other (See Comments)     Muscle weakness     Mold Other (See Comments)     Sneezing, asthma, weezing     Pollen Extract/Tree Extract Other (See Comments)     Sneezing, weezing     Sulfa Drugs Hives     Penicillins Rash         Review of Systems:  -As per HPI  -Constitutional: The patient denies fatigue, fevers, chills, unintended weight loss, and night sweats.  -HEENT: Patient denies nonhealing oral sores.  -Skin: As above in HPI. No additional skin concerns.    Physical exam:  Vitals: /80  Pulse 95  GEN: This is a well developed, well-nourished female in no acute distress, in a pleasant mood.    SKIN: Focused examination of the scalp and face was performed.  -LPPAI score 1.33 for reported mild itch/pain, 1 each for perifollicular scale and erythema  -Hair pull test with 3 hairs removed in 1 area, otherwise negative  -Scalp with numerous plaques of adherent yellow crust  -1.5mm red papule on R nasal ala, stable compared to photographs from last visit. Dermatoscope exam reveals central crusting and slightly rolled borders   -No other lesions of concern on areas examined.     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 Mild disease activity visible and scalp appears dry with scaly buildup. Discussed proceeding with ILK injections today and continuing current regimen, including resuming dermasmoothe FS oil twice weekly. Due for  plaquenil safety labs, she would prefer to defer to next visit which is fine.    Kenalog intralesional injection procedure note (performed by faculty): After verbal consent and discussion of risks including but not limited to atrophy, pain, and bruising,  time out was performed, 1 total cc of Kenalog 10 mg/cc was injected into 10 sites on the scalp.  The patient tolerated the procedure well and left the Dermatology clinic in good condition.    Continue dermasmoothe/FS 0.01% oil twice weekly     Continue DHS zinc shampoo 4-5 times weekly     Continue plaquenil 200mg BID     Plaquenil safety labs needed at next visit     Photographs taken for future reference     2. Neoplasm of uncertain behavior on the R nasal ala. The differential diagnosis includes BCC vs AK vs fibrous papule. S/p cryotherapy 6/6/17, improved but failed to resolve with clindamycin lotion.     Shave biopsy: After discussion of benefits and risks including but not limited to bleeding, infection, scar, incomplete removal, recurrence, and non-diagnostic biopsy, written consent and photographs were obtained. Time-out was performed. The area was cleaned with isopropyl alcohol. 0.2mL of 1% lidocaine was injected to obtain adequate anesthesia of the lesion on the R nasal ala. A shave biopsy was performed. Hemostasis was achieved with aluminium chloride. Vaseline and a sterile dressing were applied. The patient tolerated the procedure and no complications were noted. The patient was provided with verbal and written post care instructions.     Follow-up in 6 weeks, earlier for new or changing lesions.     Staff Involved:  Scribed by Thelma Reed, MS4 for Dr. Soto.      I agree with the PFSH and ROS as completed by the Medical Student. The remainder of the encounter was performed by me and scribed by the Medical Student. The scribed note accurately reflects my personal services and the medical decisions made by me. I was present for the key portion of the  biopsy procedure which was completed with the medical student and dermatology resident Dr. Shipman.       Barbara Soto MD  Professor and Chair  Department of Dermatology  [facility]

## 2017-11-06 NOTE — NURSING NOTE
Dermatology Rooming Note    Ester Barba's goals for this visit include:   Chief Complaint   Patient presents with     Hair Loss     LPP. Ester notes that her scalp is the same.     Joelle Campos, CMA

## 2017-11-06 NOTE — MR AVS SNAPSHOT
After Visit Summary   11/6/2017    Ester Barba    MRN: [id number]           Patient Information     Date Of Birth          1945        Visit Information        Provider Department      11/6/2017 2:00 PM Barbara Soto MD [facility] Dermatology        Today's Diagnoses     Neoplasm of uncertain behavior of skin    -  1    Therapeutic drug monitoring          Care Instructions    Wound Care After a Biopsy    What is a skin biopsy?  A skin biopsy allows the doctor to examine a very small piece of tissue under the microscope to determine the diagnosis and the best treatment for the skin condition. A local anesthetic (numbing medicine)  is injected with a very small needle into the skin area to be tested. A small piece of skin is taken from the area. Sometimes a suture (stitch) is used.     What are the risks of a skin biopsy?  I will experience scar, bleeding, swelling, pain, crusting and redness. I may experience incomplete removal or recurrence. Risks of this procedure are excessive bleeding, bruising, infection, nerve damage, numbness, thick (hypertrophic or keloidal) scar and non-diagnostic biopsy.    How should I care for my wound for the first 24 hours?    Keep the wound dry and covered for 24 hours    If it bleeds, hold direct pressure on the area for 15 minutes. If bleeding does not stop then go to the emergency room    Avoid strenuous exercise the first 1-2 days or as your doctor instructs you    How should I care for the wound after 24 hours?    After 24 hours, remove the bandage    You may bathe or shower as normal    If you had a scalp biopsy, you can shampoo as usual and can use shower water to clean the biopsy site daily    Clean the wound twice a day with gentle soap and water    Do not scrub, be gentle    Apply white petroleum/Vaseline after cleaning the wound with a cotton swab or a clean finger, and keep the site covered with a Bandaid /bandage. Bandages are not  necessary with a scalp biopsy    If you are unable to cover the site with a Bandaid /bandage, re-apply ointment 2-3 times a day to keep the site moist. Moisture will help with healing    Avoid strenuous activity for first 1-2 days    Avoid lakes, rivers, pools, and oceans until the stitches are removed or the site is healed    How do I clean my wound?    Wash hands thoroughly with soap or use hand  before all wound care    Clean the wound with gentle soap and water    Apply white petroleum/Vaseline  to wound after it is clean    Replace the Bandaid /bandage to keep the wound covered for the first few days or as instructed by your doctor    If you had a scalp biopsy, warm shower water to the area on a daily basis should suffice    What should I use to clean my wound?     Cotton-tipped applicators (Qtips )    White petroleum jelly (Vaseline ). Use a clean new container and use Q-tips to apply.    Bandaids   as needed    Gentle soap     How should I care for my wound long term?    Do not get your wound dirty    Keep up with wound care for one week or until the area is healed.    A small scab will form and fall off by itself when the area is completely healed. The area will be red and will become pink in color as it heals. Sun protection is very important for how your scar will turn out. Sunscreen with an SPF 30 or greater is recommended once the area is healed.    If you have stitches, stitches need to be removed in 14 days. You may return to our clinic for this or you may have it done locally at your doctor s office.    You should have some soreness but it should be mild and slowly go away over several days. Talk to your doctor about using tylenol for pain,    When should I call my doctor?  If you have increased:     Pain or swelling    Pus or drainage (clear or slightly yellow drainage is ok)    Temperature over 100F    Spreading redness or warmth around wound    When will I hear about my results?  The  biopsy results can take 2-3 weeks to come back. The clinic will call you with the results, send you a [employer] message, or have you schedule a follow-up clinic or phone time to discuss the results. Contact our clinics if you do not hear from us in 3 weeks.     Who should I call with questions?    [facility]: [phone]     [facility]: [phone]    For urgent needs outside of business hours call the [facility] at [phone] and ask for the dermatology resident on call            Follow-ups after your visit        Your next 10 appointments already scheduled     Dec 15, 2017  9:45 AM CST   (Arrive by 9:30 AM)   RETURN HAIRLOSS with Barbara Soto MD   [facility] Dermatology ([facility] [place])    [address] [place] [id number] [phone]              Who to contact     Please call your clinic at [phone] to:    Ask questions about your health    Make or cancel appointments    Discuss your medicines    Learn about your test results    Speak to your doctor   If you have compliments or concerns about an experience at your clinic, or if you wish to file a complaint, please contact [facility] Patient Relations at [phone] or email us at [email or web address]         Additional Information About Your Visit        MyChart Information     uShip gives you secure access to your electronic health record. If you see a primary care provider, you can also send messages to your care team and make appointments. If you have questions, please call your primary care clinic.  If you do not have a primary care provider, please call [phone] and they will assist you.      uShip is an electronic gateway that provides easy, online access to your medical records. With uShip, you can request a clinic appointment, read your test results, renew a  prescription or communicate with your care team.     To access your existing account, please contact your [facility] or call [phone] for assistance.        Care EveryWhere ID     This is your Care EveryWhere ID. This could be used by other organizations to access your [facility] medical records  [id number]        Your Vitals Were     Pulse                   95            Blood Pressure from Last 3 Encounters:   11/06/17 136/80   10/09/17 117/64   08/21/17 133/76    Weight from Last 3 Encounters:   06/28/17 59.1 kg (130 lb 6.4 oz)   01/18/17 61.9 kg (136 lb 6.4 oz)   03/14/16 58.1 kg (128 lb)              Today, you had the following     No orders found for display       Primary Care Provider Office Phone # Fax #    Fernando Weathers [phone]       [facility] [address]  [place] [address]        Equal Access to Services     ALEXIS MCDANIEL : Hadii aad ku hadasho Someeta, waaxda luqadaha, qaybta kaalmada adeegyada, samuel mcclain . So [facility] [phone].    ATENCIÓN: Si habla español, tiene a pelayo disposición servicios gratuitos de asistencia lingüística. Llame al [phone].    We comply with applicable federal civil rights laws and [place] laws. We do not discriminate on the basis of race, color, national origin, age, disability, sex, sexual orientation, or gender identity.            Thank you!     Thank you for choosing [facility] DERMATOLOGY  for your care. Our goal is always to provide you with excellent care. Hearing back from our patients is one way we can continue to improve our services. Please take a few minutes to complete the written survey that you may receive in the mail after your visit with us. Thank you!             Your Updated Medication List - Protect others around you: Learn how to safely use, store and throw away your medicines at www.disposemymeds.org.          This list is accurate as of: 11/6/17  4:02  PM.  Always use your most recent med list.                   Brand Name Dispense Instructions for use Diagnosis    BENADRYL 25 MG capsule   Generic drug:  diphenhydrAMINE      Take 25 mg by mouth every 6 hours as needed.        calcium 1500 MG Tabs      Take  by mouth.        CLARITIN PO      Take  by mouth.        clindamycin 1 % lotion    CLEOCIN T    60 mL    Apply topically 2 times daily    Acne rosacea       DERMA-SMOOTHE/FS SCALP 0.01 % Oil oil   Generic drug:  Fluocinolone Acetonide Scalp     118 mL    Apply once a week to scalp for lichen planopilaris    Lichen planopilaris       desonide 0.05 % cream    DESOWEN    60 g    Mix 50/50 with ketoconazole and apply twice daily to irritated area for 3 weeks    Intertrigo       fluconazole 200 MG tablet    DIFLUCAN    45 tablet    Take 1 tablet (200 mg) by mouth daily    Yeast infection of the vagina       hydroxychloroquine 200 MG tablet    PLAQUENIL    180 tablet    Take 1 tablet (200 mg) by mouth 2 times daily    Lichen plano-pilaris       ibuprofen 800 MG tablet    ADVIL/MOTRIN     Take 800 mg by mouth every 8 hours as needed.        ketoconazole 2 % cream    NIZORAL    30 g    Mix 50/50 with desonide and apply twice daily to irritated area for 3 weeks    Intertrigo       NEW MED     40 g    Biest 1.5mg/gram cream(pg free)  Insert 1 gram vaginally twice weekly as directed    Vulvar atrophy       vitamin D 1000 UNITS capsule      Take  by mouth. Total 2200 units daily.

## 2017-11-06 NOTE — LETTER
11/6/2017       RE: Ester Barba  [address] [place] [address]     Dear Colleague,    Thank you for referring your patient, Ester Barba, to the [facility] DERMATOLOGY at [facility]. Please see a copy of my visit note below.    [facility] Dermatology Note      Dermatology Problem List:  1. Lichen Planopilaris and Lichen planus     -LPPAI score: 1.33 (1 for pruritus, pain, and perifollicular scale/erythema)  -current treatment: 1 cc ILK 10 mg/cc injected into scalp today, plaquenil 200 mg BID, DHS zinc shampoo every 2-3 days, Rogaine 5% foam daily (not using), derma-smoothe/FS 0.01% oil twice per week  -plaquenil safety labs ordered: CBC with platelet diff, and CMP  - plan to start taper off of plaquenil at follow up  -Symptoms thought to start originally after imiquimod application       2.  Inflammatory papule on right nasal ala--appeared initially to be a folliculitis vs AK  - treated with cryotherapy 6/6/17, started on topical clindamycin on 10/9/17, not resolved so will biopsy today      3. AKs      4.. History of nonmelanoma skin cancer  -SCC of the scalp, s/p Mohs excision 2014  -History of 1-2 other NMSC of the scalp (BCCs)  -NUB on R nasal ala shave biopsied 11/6      Encounter Date: Nov 6, 2017    CC:  Chief Complaint   Patient presents with     Hair Loss     LPP. Ester notes that her scalp is the same.         History of Present Illness:  Ms. Ester Barba is a 72 year old female who presents as a follow-up for lichen planopilaris. The patient was last seen 10/9 when 1cc ILK was injected and she was continued on dermasmoothe FS oil BID and plaquenil 200mg BID. She has just returned from  a 2.5 week trip to [facility], forgot to take the dermasmoothe oil with her so has not been using during the trip. She shampoos with DHS zinc shampoo every 2-3 days. She has not used rogaine. She reports her scalp is tender to the touch, which is typical for her, and  also feels dry and crusty. She reports mild itch particularly of the parietal scalp, but denies burning. She is scheduled for an eye appointment in November, denies any vision changes today.    For the papule on the R nasal ala noted at the last visit, she has been applying clindamycin lotion BID to this and feels that it is less crusty, but is still present. Has not changed. Is not itchy but was painful when it would scab and fall off before she started using the clindamycin lotion. No other concerns today.     Past Medical History:   Patient Active Problem List   Diagnosis     Porokeratosis     Squamous cell carcinoma     Neoplasm of uncertain behavior     Lichen planopilaris     Dermatitis     Cicatricial alopecia     Keratosis, inflamed seborrheic     History of skin cancer     Vaginitis and vulvovaginitis     Basal cell carcinoma of vertex scalp s/p mohs 6-5-15     Medication management     Actinic keratosis     Medication monitoring encounter     Dermatitis, seborrheic     Past Medical History:   Diagnosis Date     Basal cell carcinoma      Squamous cell carcinoma      Past Surgical History:   Procedure Laterality Date     BIOPSY OF SKIN LESION       MOHS MICROGRAPHIC PROCEDURE       NO HISTORY OF SURGERY  2/17/14    derm     SMALL BOWEL RESECTION  11/2015    diverticulitis       Family History:  Sister has a history of BCC.    Medications:  Current Outpatient Prescriptions   Medication Sig Dispense Refill     clindamycin (CLEOCIN T) 1 % lotion Apply topically 2 times daily 60 mL 1     hydroxychloroquine (PLAQUENIL) 200 MG tablet Take 1 tablet (200 mg) by mouth 2 times daily 180 tablet 0     ketoconazole (NIZORAL) 2 % cream Mix 50/50 with desonide and apply twice daily to irritated area for 3 weeks 30 g 1     Fluocinolone Acetonide (DERMA-SMOOTHE/FS SCALP) 0.01 % OIL Apply once a week to scalp for lichen planopilaris 118 mL 2     NEW MED Biest 1.5mg/gram cream(pg free)  Insert 1 gram vaginally twice weekly as  directed 40 g 6     fluconazole (DIFLUCAN) 200 MG tablet Take 1 tablet (200 mg) by mouth daily 45 tablet 4     desonide (DESOWEN) 0.05 % cream Mix 50/50 with ketoconazole and apply twice daily to irritated area for 3 weeks 60 g 1     diphenhydrAMINE (BENADRYL) 25 MG capsule Take 25 mg by mouth every 6 hours as needed.       Calcium 1500 MG TABS Take  by mouth.       Loratadine (CLARITIN PO) Take  by mouth.       ibuprofen (ADVIL,MOTRIN) 800 MG tablet Take 800 mg by mouth every 8 hours as needed.       Cholecalciferol (VITAMIN D) 1000 UNIT capsule Take  by mouth. Total 2200 units daily.       Allergies   Allergen Reactions     Dust Mites Other (See Comments)     Sneezing, coughing. asthma     Levaquin [Levofloxacin Hemihydrate] Other (See Comments)     Muscle weakness     Mold Other (See Comments)     Sneezing, asthma, weezing     Pollen Extract/Tree Extract Other (See Comments)     Sneezing, weezing     Sulfa Drugs Hives     Penicillins Rash         Review of Systems:  -As per HPI  -Constitutional: The patient denies fatigue, fevers, chills, unintended weight loss, and night sweats.  -HEENT: Patient denies nonhealing oral sores.  -Skin: As above in HPI. No additional skin concerns.    Physical exam:  Vitals: /80  Pulse 95  GEN: This is a well developed, well-nourished female in no acute distress, in a pleasant mood.    SKIN: Focused examination of the scalp and face was performed.  -LPPAI score 1.33 for reported mild itch/pain, 1 each for perifollicular scale and erythema  -Hair pull test with 3 hairs removed in 1 area, otherwise negative  -Scalp with numerous plaques of adherent yellow crust  -1.5mm red papule on R nasal ala, stable compared to photographs from last visit. Dermatoscope exam reveals central crusting and slightly rolled borders   -No other lesions of concern on areas examined.     Impression/Plan:  1. Lichen planopilaris - LPPAI higher today (1.33) than last visit due to reported itch and  pain, however disease appears overall stable today. Mild disease activity visible and scalp appears dry with scaly buildup. Discussed proceeding with ILK injections today and continuing current regimen, including resuming dermasmoothe FS oil twice weekly. Due for plaquenil safety labs, she would prefer to defer to next visit which is fine.    Kenalog intralesional injection procedure note (performed by faculty): After verbal consent and discussion of risks including but not limited to atrophy, pain, and bruising,  time out was performed, 1 total cc of Kenalog 10 mg/cc was injected into 10 sites on the scalp.  The patient tolerated the procedure well and left the Dermatology clinic in good condition.    Continue dermasmoothe/FS 0.01% oil twice weekly     Continue DHS zinc shampoo 4-5 times weekly     Continue plaquenil 200mg BID     Plaquenil safety labs needed at next visit     Photographs taken for future reference     2. Neoplasm of uncertain behavior on the R nasal ala. The differential diagnosis includes BCC vs AK vs fibrous papule. S/p cryotherapy 6/6/17, improved but failed to resolve with clindamycin lotion.     Shave biopsy: After discussion of benefits and risks including but not limited to bleeding, infection, scar, incomplete removal, recurrence, and non-diagnostic biopsy, written consent and photographs were obtained. Time-out was performed. The area was cleaned with isopropyl alcohol. 0.2mL of 1% lidocaine was injected to obtain adequate anesthesia of the lesion on the R nasal ala. A shave biopsy was performed. Hemostasis was achieved with aluminium chloride. Vaseline and a sterile dressing were applied. The patient tolerated the procedure and no complications were noted. The patient was provided with verbal and written post care instructions.     Follow-up in 6 weeks, earlier for new or changing lesions.     Staff Involved:  Scribed by Thelma Reed, MS4 for Dr. Soto.      I agree with the [facility] and  ROS as completed by the Medical Student. The remainder of the encounter was performed by me and scribed by the Medical Student. The scribed note accurately reflects my personal services and the medical decisions made by me. I was present for the key portion of the biopsy procedure which was completed with the medical student and dermatology resident Dr. Shipman.       Barbara Soto MD  Professor and Chair  Department of Dermatology  [facility]    Pictures were placed in Pt's chart today for future reference.          Again, thank you for allowing me to participate in the care of your patient.      Sincerely,    Barbara Soto MD

## 2017-11-08 LAB — COPATH REPORT: NORMAL

## 2017-11-16 ENCOUNTER — TELEPHONE (OUTPATIENT)
Dept: DERMATOLOGY | Facility: CLINIC | Age: 72
End: 2017-11-16

## 2017-11-16 NOTE — TELEPHONE ENCOUNTER
History of Skin cancer : yes    History of Mohs Surgery: yes    History of a solid organ transplant: no Organ(s): no Year(s): no Creatinine: no Bone Marrow Transplant : no (year, no)    Immunosuppressive Medications: yes (if yes, which ones: plaquenil)    HIV or Hepatitis B or C : no    Chronic lymphocytic leukemia: no     Diabetes: no(Type 1 or 2: )    Any Bleeding disorders: thrombocytopenia    Do you have a Pacemaker or Defibrillator: no (year placed: )    Artificial heart valve: no (mechanical or porcine: )    Joint Replacement in the last 2 years: no Joint(s):  Year(s):     Do you typically take Prophylactic Antibiotics before seeing a dentist or having a procedure?: no    If yes, verify that patient has the antibiotic on hand and instruct to take one hour before their surgery appointment.    If they do not have the antibiotic, verify the patients pharmacy and notify Dr. Vu by printing the pre-mohs call sheet.    Do you wear a C Pap Mask: no    If yes, and procedure is on the face, ask patient to bring in the mask with them (Mask only)    Do you have any mobility issues: no    If yes, is a van service is required to transport you to/from your appointment? no    Smoking History (tobacco of any sort): 50 years ago    Do you have any other health issues that we should know about? Long hx of skin problems    Do you take any of the following Blood Thinners:    Aspirin:no    Plavix/Aggrastat/Brilinta: no    Warfarin: no (Last INR: no Date: )    Pradaxa/Eliquis/Xarelto: no    Ibuprofen (Advil/Motrin): prn    Naproxen (Aleve): prn    Vitamin E: no    Fish Oil: no    Ginkgo biloba: no    Other:no    donePaient was instructed of the following: Ibuprofen, naproxen, Vitamin E, Fish Oil, and Ginkgo biloba should be stopped 1 week prior to and 1 week after the procedure.    Medication Allergies: in chart    Are medications in Epic:  chart    Done Patient was reminded to take all medications as usual, other than those  listed above, on the day of surgery     done Patient was instructed to bring all medications to clinic on day of surgery     done Patient will bring a     (A  is helpful for the following reasons: some patients need medications to relax, but once given, patients should not drive; sometimes bandages obstruct your vision making it difficult to see and unsafe to drive; the day can get long so it s nice to have a luis; sometimes the procedure wears people out/makes them quite tired)    Done Photograph of the surgical site(s) is/are available     done Patient was reminded that this can be an all-day procedure and that no other appointments should be scheduled on the day of Mohs

## 2017-11-24 NOTE — PROGRESS NOTES
[facility] Mohs Micrographic Surgery Summary:  Nov 27, 2017    Dear Dr. Soto,    Thank you for referring your patient, Ester Barba, a 72 year old female. Today a BCC located on the R nasal ala was excised using Mohs micrographic surgery in 1 stage(s); the defect was then repaired by full thickness skin graft.      Please refer to the attached operative report(s). The patient was asked to return in 1 week week(s) for bandage change/wound check.     Thank you for the opportunity to see and treat your patient. Please do not hesitate to contact me with any questions or concerns regarding the findings and/or operation when the patient returns to you for regular skin examinations.                        Sincerely,    Mckenna Vu MD  , Department of Dermatology  Director, Dermatologic Surgery [facility] Center  Phone: [phone]; Fax: [phone]  [email or web address] [email or web address] Center   [address] [place] [address]   Mail Code [id number]   [place] [address]       MOHS MICROGRAPHIC SURGERY REPORT   Nov 27, 2017    Surgeon: Mckenna Vu MD  Fellow:  None  Resident: Khurram Beal MD     INDICATION:    Preoperative Diagnosis: primary BCC  Location: R nasal ala  Postoperative Diagnosis: Same  Preoperative Lesion size: 0.3x0.4 cm     After appropriate discussion and informed consent for Mohs surgery and possible repair of the Mohs surgery defect, the patient underwent Mohs surgery as follows:    STAGE I:  The patient was placed on the operating room table.  The area was cleansed with chlorhexidine and infiltrated with 1% Lidocaine and epinephrine. Tumor was debulked. Using a #15-blade, complete excision was made around the tumor in 1 section.  Hemostasis was obtained by electrodesiccation.  A dressing was placed.  Tissue was divided into 1 tissue block(s) that were subsequently mapped, color coded and processed in the Mohs Laboratory.   Microscopic tumor was not found in any of the tissue blocks.    With the lesion clear of micrographic tumor, surgery was considered complete.  The defect extended to the R nasal ala and measured 0.4 x 0.5 cm.    RECONSTRUCTIVE DERMATOLOGIC SURGERY REPORT  We discussed the options for wound management in full with the patient including risks/benefits/possible outcomes.     Full Thickness Skin Graft from Conchal Bowl  The final defect measured 0.4 x 0.5 cm. In order to avoid distortion and because of the proximity to the nasal tip and ala, a full-thickness skin graft was planned. After Betasept prep and local anesthesia, the patient was draped in a sterile fashion. A template was made of the wound and used to harvest a full thickness graft from the conchal bowl.  The full thickness graft was then defatted and trimmed to fit the defect, sutured into place and a tie over bolster dressing was applied.  Hemostasis was obtained at the donor site which was then bandaged to heal by second intention. Estimated blood loss, minimal; complications, none; wound care, routine. Post-operative size was 0.2 cm2.    Patient was discharged in good condition and will return for bandage change/suture removal in 1 week.      Mckenna Vu MD  , Department of Dermatology  Director, Dermatologic Surgery [facility] Laser Center  Phone: [phone]; Fax: [phone]    [facility] Dermatologic Surgery [facility]   [address] [place] [address]   Mail Code [id number]   [place] [address]    Scribe Disclosure:   I, Maria E Grover, am serving as a scribe to document services personally performed by Dr. Mckenna Vu, based on data collection and the provider's statements to me.     Provider Disclosure:   I have reviewed the content of the documentation above and have edited it as needed. I have personally performed the services documented herein and the documentation accurately represents those services provided and the  decisions that I have made.       Mckenna Vu MD  , Department of Dermatology  Director, Dermatologic Surgery & Laser Center  Phone: [phone]; Fax: [phone]  [email or web address] [email or web address] Surgery [email or web address] Center   [address] [place] [address]   Mail [place] [address]

## 2017-11-27 ENCOUNTER — OFFICE VISIT (OUTPATIENT)
Dept: DERMATOLOGY | Facility: CLINIC | Age: 72
End: 2017-11-27

## 2017-11-27 VITALS — SYSTOLIC BLOOD PRESSURE: 146 MMHG | DIASTOLIC BLOOD PRESSURE: 69 MMHG | HEART RATE: 80 BPM

## 2017-11-27 DIAGNOSIS — C44.311 BASAL CELL CARCINOMA OF RIGHT SIDE OF NOSE: Primary | ICD-10-CM

## 2017-11-27 ASSESSMENT — PAIN SCALES - GENERAL
PAINLEVEL: NO PAIN (0)
PAINLEVEL: NO PAIN (0)

## 2017-11-27 NOTE — PATIENT INSTRUCTIONS
Wound care instructions for Superficial Wounds (Right Ear)      After 24-48 hours you should remove the bandage and begin daily dressing changes as follows:    1. Remove Dressing    2.   Clean and dry the area with tap water using a Q-tip or sterile gauze pad    3.   Apply vaseline ointment over entire wound. Do NOT use neosporin ointment.    4.   Cover the wound with a band-aid or a sterile non-stick gauze pad and micropore paper tape.    Repeat these instructions at least once a day until the wound has completely healed.    No dietary restrictions.    Continue normal activity    The wound will not heal better when exposed to air and allowed to dry out. The wound will heal faster with a better cosmetic result if it is kept moist with ointment and covered with a bandage.  Do not let the wound dry out.    What to expect:    All wounds develop a small ring of redness surrounding the wound that indicate healing. Severe itching with extensive redness usually indicates sensitivity to the ointment or bandage tape used to dress the wound. You should call the nurse triage line if this occurs.    It is normal for there to be bruising or swelling around your surgical site, especially when it is near, or on, the eyelid.    If your wound is draining, this is normal. Larger wounds tend to drain more than smaller wounds. Please call if the draining is extensive or if there is yellow/green discharge. After about a week, the wound size should shrink. The wound is healed when you can see skin has formed over the entire area. A healed wound has a healthy shiny appearance and is red/dark pink in color. Wounds may take four to six weeks to heal. Larger wounds generally take a few weeks longer to heal than smaller wounds. Once the wound is healed, you may stop dressing changes.    There may be a tightness as the wound heals, but this is normal and will gradually decrease and disappear.    Your wound may be sensitive to temperature  changes after it is healed. Avoid extreme temperature changes if you are having discomfort, but this will improve with time.    If the wound seems to itch once it is healed, you may use vaseline to help relieve the itching.          Phone numbers:  During business hours (M-F 8:00-4:30 p.m.)  Dermatologic Surgery [facility] Center-  [phone] Option 1 appt. desk  [phone]  Option 3 nurse triage line  ---------------------------------------------------------  Evenings/Weekends/Holidays  Hospital - [phone]   TTY for hearing acdedxbu-[phone]  *Ask  to page dermatologist on-call  Emergency Lfwg-[phone]  TTY for hearing impaired- [phone]  Skin Graft Wound Care (Right side of nose)       No strenuous activity for 48 hours. Resume moderate activity in 48 hours. No heavy exercising until you are seen for your follow-up in 1 week.    Take Tylenol 1,000 mg 1 hour after surgery. Tylenol 1,000 mg every 4-5 hours Max 4,000mg in 24 hours.     No alcoholic beverages for 48 hours.    Remove pressure dressing in 24 hours.     Leave the bandage in place until you come for your follow-up in 1 week. If the bandage becomes saturated or loose, reinforce the bandage with gauze and tape    Keep the bandage dry and wash around it carefully.    If the tape becomes soiled or starts to come off, reinforce it with more paper tape.    Do not smoke for 3 weeks. Smoking is detrimental to wound healing and may cause the graft to die.    Avoid prolonged exposure to extremely cold temperatures for 3 weeks.    It is normal to have swelling and bruising around the surgical site. The bruising will fade in about 10 to 14 days. Elevate the area to reduce swelling.    Numbness, itchiness and sensitivity to temperature changes can occur after surgery and may take up to 18 months to normalize.    Bleeding    1. Leave the bandage in place  2. Use tightly rolled up gauze or a cloth to apply direct pressure over the  bandage for 20 minutes  3. Reapply pressure for an additional 20 minutes if necessary  4. Call the triage line or go to the nearest emergency room if pressure fails to stop the bleeding  5. Use additional gauze and tape to maintain pressure once the bleeding has stopped.    Pain    1. Post operative pain should slowly get better, beginning the evening after surgery  2. A sudden or severe increase in pain may indicate a problem. Call the triage line if this occurs.    Phone numbers:  During business hours (M-F 8:00-4:30 p.m.)  Dermatologic Surgery and Laser Center-  [phone] Option 1 appt. desk  [phone]  Option 3 nurse triage line  ---------------------------------------------------------  Evenings/Weekends/Holidays  Hospital - [phone]   TTY for hearing pzkzuoya-[phone]  *Ask  to page dermatologist on-call  Emergency Evnl-[phone]  TTY for hearing impaired- [phone]

## 2017-11-27 NOTE — MR AVS SNAPSHOT
After Visit Summary   11/27/2017    Ester Barba    MRN: [id number]           Patient Information     Date Of Birth          1945        Visit Information        Provider Department      11/27/2017 8:30 AM Mckenna Vu MD [facility] Dermatologic Surgery        Care Instructions    Wound care instructions for Superficial Wounds (Right Ear)      After 24-48 hours you should remove the bandage and begin daily dressing changes as follows:    1. Remove Dressing    2.   Clean and dry the area with tap water using a Q-tip or sterile gauze pad    3.   Apply vaseline ointment over entire wound. Do NOT use neosporin ointment.    4.   Cover the wound with a band-aid or a sterile non-stick gauze pad and micropore paper tape.    Repeat these instructions at least once a day until the wound has completely healed.    No dietary restrictions.    Continue normal activity    The wound will not heal better when exposed to air and allowed to dry out. The wound will heal faster with a better cosmetic result if it is kept moist with ointment and covered with a bandage.  Do not let the wound dry out.    What to expect:    All wounds develop a small ring of redness surrounding the wound that indicate healing. Severe itching with extensive redness usually indicates sensitivity to the ointment or bandage tape used to dress the wound. You should call the nurse triage line if this occurs.    It is normal for there to be bruising or swelling around your surgical site, especially when it is near, or on, the eyelid.    If your wound is draining, this is normal. Larger wounds tend to drain more than smaller wounds. Please call if the draining is extensive or if there is yellow/green discharge. After about a week, the wound size should shrink. The wound is healed when you can see skin has formed over the entire area. A healed wound has a healthy shiny appearance and is red/dark pink in color. Wounds may take four to six  weeks to heal. Larger wounds generally take a few weeks longer to heal than smaller wounds. Once the wound is healed, you may stop dressing changes.    There may be a tightness as the wound heals, but this is normal and will gradually decrease and disappear.    Your wound may be sensitive to temperature changes after it is healed. Avoid extreme temperature changes if you are having discomfort, but this will improve with time.    If the wound seems to itch once it is healed, you may use vaseline to help relieve the itching.          Phone numbers:  During business hours (M-F 8:00-4:30 p.m.)  [facility]-  [phone] Option 1 appt. desk  [phone]  Option 3 nurse triage line  ---------------------------------------------------------  Evenings/Weekends/Holidays  Hospital - [phone]   TTY for hearing ouqtpfcy-[phone]  *Ask  to page dermatologist on-call  Emergency Wgfh-[phone]  TTY for hearing impaired- [phone]  Skin Graft Wound Care (Right side of nose)       No strenuous activity for 48 hours. Resume moderate activity in 48 hours. No heavy exercising until you are seen for your follow-up in 1 week.    Take Tylenol 1,000 mg 1 hour after surgery. Tylenol 1,000 mg every 4-5 hours Max 4,000mg in 24 hours.     No alcoholic beverages for 48 hours.    Remove pressure dressing in 24 hours.     Leave the bandage in place until you come for your follow-up in 1 week. If the bandage becomes saturated or loose, reinforce the bandage with gauze and tape    Keep the bandage dry and wash around it carefully.    If the tape becomes soiled or starts to come off, reinforce it with more paper tape.    Do not smoke for 3 weeks. Smoking is detrimental to wound healing and may cause the graft to die.    Avoid prolonged exposure to extremely cold temperatures for 3 weeks.    It is normal to have swelling and bruising around the surgical site. The bruising will fade in about 10  to 14 days. Elevate the area to reduce swelling.    Numbness, itchiness and sensitivity to temperature changes can occur after surgery and may take up to 18 months to normalize.    Bleeding    1. Leave the bandage in place  2. Use tightly rolled up gauze or a cloth to apply direct pressure over the bandage for 20 minutes  3. Reapply pressure for an additional 20 minutes if necessary  4. Call the triage line or go to the nearest emergency room if pressure fails to stop the bleeding  5. Use additional gauze and tape to maintain pressure once the bleeding has stopped.    Pain    1. Post operative pain should slowly get better, beginning the evening after surgery  2. A sudden or severe increase in pain may indicate a problem. Call the triage line if this occurs.    Phone numbers:  During business hours (M-F 8:00-4:30 p.m.)  [facility]-  [phone] Option 1 appt. desk  [phone]  Option 3 nurse triage line  ---------------------------------------------------------  Evenings/Weekends/Holidays  Hospital - [phone]   TTY for hearing mbgbcruf-[phone]  *Ask  to page dermatologist on-call  Emergency Qvof-[phone]  TTY for hearing impaired- [phone]              Follow-ups after your visit        Your next 10 appointments already scheduled     Dec 04, 2017  2:45 PM CST   (Arrive by 2:30 PM)   Post-Op with Mckenna Vu MD   [facility] Dermatologic Surgery ([facility])    [address] [address] [phone]            Dec 15, 2017  9:40 AM CST   (Arrive by 9:25 AM)   RETURN HAIRLOSS with Barbara Soto MD   [facility] Dermatology ([facility])    [address] [address] [phone]              Who to contact     Please call your clinic at [phone] to:    Ask questions about your health    Make or cancel  appointments    Discuss your medicines    Learn about your test results    Speak to your doctor   If you have compliments or concerns about an experience at your clinic, or if you wish to file a complaint, please contact [facility] Patient Relations at [phone] or email us at [email or web address]         Additional Information About Your Visit        MyChart Information     simpleFLOORShart gives you secure access to your electronic health record. If you see a primary care provider, you can also send messages to your care team and make appointments. If you have questions, please call your primary care clinic.  If you do not have a primary care provider, please call [phone] and they will assist you.      SmartCare system is an electronic gateway that provides easy, online access to your medical records. With SmartCare system, you can request a clinic appointment, read your test results, renew a prescription or communicate with your care team.     To access your existing account, please contact your [facility] or call [phone] for assistance.        Care EveryWhere ID     This is your Care EveryWhere ID. This could be used by other organizations to access your [place] medical records  [id number]        Your Vitals Were     Pulse                   80            Blood Pressure from Last 3 Encounters:   11/27/17 146/69   11/06/17 136/80   10/09/17 117/64    Weight from Last 3 Encounters:   06/28/17 59.1 kg (130 lb 6.4 oz)   01/18/17 61.9 kg (136 lb 6.4 oz)   03/14/16 58.1 kg (128 lb)              Today, you had the following     No orders found for display       Primary Care Provider Office Phone # Fax #    Fernando Weathers [phone]       [facility] [address] [place] [address]        Equal Access to Services     ALEXIS MCDANIEL AH: Blanca Marcano, victoriano montes, samuel dangelo  ronald laNydiaaan ah. So [facility] [phone].    ATENCIÓN: Si kaycee capellan, tiene a pelayo disposición servicios gratuitos de asistencia lingüística. Peter al [phone].    We comply with applicable federal civil rights laws and [place] laws. We do not discriminate on the basis of race, color, national origin, age, disability, sex, sexual orientation, or gender identity.            Thank you!     Thank you for choosing [facility] DERMATOLOGIC SURGERY  for your care. Our goal is always to provide you with excellent care. Hearing back from our patients is one way we can continue to improve our services. Please take a few minutes to complete the written survey that you may receive in the mail after your visit with us. Thank you!             Your Updated Medication List - Protect others around you: Learn how to safely use, store and throw away your medicines at www.disposemymeds.org.          This list is accurate as of: 11/27/17 11:46 AM.  Always use your most recent med list.                   Brand Name Dispense Instructions for use Diagnosis    BENADRYL 25 MG capsule   Generic drug:  diphenhydrAMINE      Take 25 mg by mouth every 6 hours as needed.        calcium 1500 MG Tabs      Take  by mouth.        CLARITIN PO      Take  by mouth.        clindamycin 1 % lotion    CLEOCIN T    60 mL    Apply topically 2 times daily    Acne rosacea       DERMA-SMOOTHE/FS SCALP 0.01 % Oil oil   Generic drug:  Fluocinolone Acetonide Scalp     118 mL    Apply once a week to scalp for lichen planopilaris    Lichen planopilaris       desonide 0.05 % cream    DESOWEN    60 g    Mix 50/50 with ketoconazole and apply twice daily to irritated area for 3 weeks    Intertrigo       fluconazole 200 MG tablet    DIFLUCAN    45 tablet    Take 1 tablet (200 mg) by mouth daily    Yeast infection of the vagina       hydroxychloroquine 200 MG tablet    PLAQUENIL    180 tablet    Take 1 tablet (200 mg) by mouth 2 times daily    Lichen plano-pilaris        ibuprofen 800 MG tablet    ADVIL/MOTRIN     Take 800 mg by mouth every 8 hours as needed.        ketoconazole 2 % cream    NIZORAL    30 g    Mix 50/50 with desonide and apply twice daily to irritated area for 3 weeks    Intertrigo       NEW MED     40 g    Biest 1.5mg/gram cream(pg free)  Insert 1 gram vaginally twice weekly as directed    Vulvar atrophy       triamcinolone acetonide 10 MG/ML injection    KENALOG    5 mL    See med note    Lichen planopilaris       vitamin D 1000 UNITS capsule      Take  by mouth. Total 2200 units daily.

## 2017-11-27 NOTE — LETTER
11/27/2017       RE: Ester Barba  [address]  [facility] [address]     Dear Colleague,    Thank you for referring your patient, Ester Barba, to the [facility] DERMATOLOGIC SURGERY at [facility]. Please see a copy of my visit note below.    [facility] Mohs Micrographic Surgery Summary:  Nov 27, 2017    Dear Dr. Soto,    Thank you for referring your patient, Ester Barba, a 72 year old female. Today a BCC located on the R nasal ala was excised using Mohs micrographic surgery in 1 stage(s); the defect was then repaired by full thickness skin graft.      Please refer to the attached operative report(s). The patient was asked to return in 1 week week(s) for bandage change/wound check.     Thank you for the opportunity to see and treat your patient. Please do not hesitate to contact me with any questions or concerns regarding the findings and/or operation when the patient returns to you for regular skin examinations.                        Sincerely,    Mckenna Vu MD  , Department of Dermatology  Director, [facility]  Phone: [phone]; Fax: [phone]  [email or web address] [email or web address]   [address] [place] [address]   Mail Code [id number]   [place] [address]       MOHS MICROGRAPHIC SURGERY REPORT   Nov 27, 2017    Surgeon: Mckenna Vu MD  Fellow:  None  Resident: Khurram Beal MD     INDICATION:    Preoperative Diagnosis: primary BCC  Location: R nasal ala  Postoperative Diagnosis: Same  Preoperative Lesion size: 0.3x0.4 cm     After appropriate discussion and informed consent for Mohs surgery and possible repair of the Mohs surgery defect, the patient underwent Mohs surgery as follows:    STAGE I:  The patient was placed on the operating room table.  The area was cleansed with chlorhexidine and infiltrated with 1% Lidocaine and epinephrine. Tumor was debulked.  Using a #15-blade, complete excision was made around the tumor in 1 section.  Hemostasis was obtained by electrodesiccation.  A dressing was placed.  Tissue was divided into 1 tissue block(s) that were subsequently mapped, color coded and processed in the Mohs Laboratory.  Microscopic tumor was not found in any of the tissue blocks.    With the lesion clear of micrographic tumor, surgery was considered complete.  The defect extended to the R nasal ala and measured 0.4 x 0.5 cm.    RECONSTRUCTIVE DERMATOLOGIC SURGERY REPORT  We discussed the options for wound management in full with the patient including risks/benefits/possible outcomes.     Full Thickness Skin Graft from Conchal Bowl  The final defect measured 0.4 x 0.5 cm. In order to avoid distortion and because of the proximity to the nasal tip and ala, a full-thickness skin graft was planned. After Betasept prep and local anesthesia, the patient was draped in a sterile fashion. A template was made of the wound and used to harvest a full thickness graft from the conchal bowl.  The full thickness graft was then defatted and trimmed to fit the defect, sutured into place and a tie over bolster dressing was applied.  Hemostasis was obtained at the donor site which was then bandaged to heal by second intention. Estimated blood loss, minimal; complications, none; wound care, routine. Post-operative size was 0.2 cm2.    Patient was discharged in good condition and will return for bandage change/suture removal in 1 week.      Mckenna Vu MD  , Department of Dermatology  Director, [facility]  Phone: [phone]; Fax: [phone]    [facility] Dermatologic Surgery [facility]   [address] [place] [address]   Mail Code [id number]   [place] [address]    Scribe Disclosure:   Maria E SALDIVAR, am serving as a scribe to document services personally performed by Dr. Mckenna Vu, based on data collection and the  provider's statements to me.     Provider Disclosure:   I have reviewed the content of the documentation above and have edited it as needed. I have personally performed the services documented herein and the documentation accurately represents those services provided and the decisions that I have made.       Mckenna Vu MD  , Department of Dermatology  Director, [facility]  Phone: [phone]; Fax: [phone]  [email or web address] Dermatologic Surgery [facility] Laser Center   [address] [place] [address]   Mail Code [place], [place] [address]

## 2017-11-27 NOTE — NURSING NOTE
1st layer performed on the right nasal ala. Injected 1.5ml of Xylocaine  (Lidocaine 1% and Epinephrine  (1:100,000))      Present for procedure: Ade Vital RN, RN

## 2017-11-27 NOTE — NURSING NOTE
Closure performed on the right nasal ala. Injected 2.0 ml of Xylocaine  (Lidocaine 1% and Epinephrine  (1:100,000))  Graft donor site right ear.  Injected 1.0 ml of Xylocaine  (Lidocaine 1% and Epinephrine  (1:100,000))    Defect Photo: LT  Closure Photo: LT    Present for procedure: MD Vu, Ade Lang RN       Vaseline, telfa and pressure dressing applied to right nasal ala.     Vaseline, telfa and pressure dressing applied to right inner ear.  Aftercare reviewed. All questions answered.    Ade Lang, MARILYNN

## 2017-11-27 NOTE — NURSING NOTE
Chief Complaint   Patient presents with     Skin Cancer     Mohs on the right nasal ala BCC     Kourtney [place] CMA

## 2017-11-28 ENCOUNTER — TELEPHONE (OUTPATIENT)
Dept: DERMATOLOGY | Facility: CLINIC | Age: 72
End: 2017-11-28

## 2017-11-28 NOTE — TELEPHONE ENCOUNTER
Ester is wondering if she is to be changing dressing on nose. States she was told to but AVS says to keep dressing on for one week. She has already changed it once. She would like to speak to Ade SUBRAMANIAN for clarification.

## 2017-11-29 NOTE — TELEPHONE ENCOUNTER
Spoke with patient and clarified aftercare instructions. Pt is keeping graft site lubricated with vaseline.     All questions answered.

## 2017-12-04 ENCOUNTER — OFFICE VISIT (OUTPATIENT)
Dept: DERMATOLOGY | Facility: CLINIC | Age: 72
End: 2017-12-04

## 2017-12-04 DIAGNOSIS — Z94.5 STATUS POST SKIN GRAFT: ICD-10-CM

## 2017-12-04 DIAGNOSIS — Z98.890 STATUS POST MOHS SURGERY: Primary | ICD-10-CM

## 2017-12-04 ASSESSMENT — PAIN SCALES - GENERAL: PAINLEVEL: MODERATE PAIN (4)

## 2017-12-04 NOTE — PROGRESS NOTES
[facility] Dermatology Post-operative Visit Note:  Dec 4, 2017  Subjective: The patient follows up for a wound check. Has been applying Vaseline generously over to the surgical scar on the nose around the bolster. Does have some tenderness to this area. The patient reports doing well without any bleeding, purulence at the site(s).     ROS: No fevers, chills or malaise    Objective:  Appropriately healing surgical site(s) on the right nasal ala and right conchal bowl without purulence or tenderness and an appropriate amount of expected surrounding erythema. Evidence of good graft take on nose and early granulation tissue at donor site    Assessment and Plan:   Appropriately healing surgical sites without any signs of infection status post Mohs 11/27/17 with full thickness skin graft from conchal bowl that is now healing by 2nd intention.    --Bolster removed, sites cleansed; Standard bandaging replaced; full instructions as per AVS.  --Advised to continue to leave bandage in place for 1 week. May then change dressing and apply Vaseline daily for an additional week.    --Continue to cleanse once daily and apply Vaseline over the right conchal bowl site.   --Patient may exercise, though advised the bandage may fall off upon sweating.   --Be especially careful with the graft for one month after removing bandage as it is fragile.   --Recommend sunscreens SPF #50 or greater. Risk of scar dyspigmentation discussed.     Return as needed.     I, Soheila Santos, am serving as a scribe to document services personally performed by Dr. Mckenna Vu M.D., based on data collection and the provider's statements to me.     Provider Disclosure:   I have reviewed the content of the documentation above and have edited it as needed. I have personally performed the services documented herein and the documentation accurately represents those services provided and the decisions that I have made.       Mckenna  JENNIFER Vu MD  , Department of Dermatology  Director, [facility]  Phone: [phone]; Fax: [phone]  [email or web address] Dermatologic Surgery [facility]   [address] [place] [address]   Mail Code [id number]   [place] [address]               Picture placed in chart for future reference.

## 2017-12-04 NOTE — LETTER
12/4/2017       RE: Ester Barba  [address] [facility] [address]     Dear Colleague,    Thank you for referring your patient, Ester Barba, to the [facility] DERMATOLOGIC SURGERY at [facility]. Please see a copy of my visit note below.    [facility] Dermatology Post-operative Visit Note:  Dec 4, 2017  Subjective: The patient follows up for a wound check. Has been applying Vaseline generously over to the surgical scar on the nose around the bolster. Does have some tenderness to this area. The patient reports doing well without any bleeding, purulence at the site(s).     ROS: No fevers, chills or malaise    Objective:  Appropriately healing surgical site(s) on the right nasal ala and right conchal bowl without purulence or tenderness and an appropriate amount of expected surrounding erythema. Evidence of good graft take on nose and early granulation tissue at donor site    Assessment and Plan:   Appropriately healing surgical sites without any signs of infection status post Mohs 11/27/17 with full thickness skin graft from conchal bowl that is now healing by 2nd intention.    --Bolster removed, sites cleansed; Standard bandaging replaced; full instructions as per AVS.  --Advised to continue to leave bandage in place for 1 week. May then change dressing and apply Vaseline daily for an additional week.    --Continue to cleanse once daily and apply Vaseline over the right conchal bowl site.   --Patient may exercise, though advised the bandage may fall off upon sweating.   --Be especially careful with the graft for one month after removing bandage as it is fragile.   --Recommend sunscreens SPF #50 or greater. Risk of scar dyspigmentation discussed.     Return as needed.     I, Soheila Santos, am serving as a scribe to document services personally performed by Dr. Mckenna Vu M.D., based on data collection and the provider's statements to me.      Provider Disclosure:   I have reviewed the content of the documentation above and have edited it as needed. I have personally performed the services documented herein and the documentation accurately represents those services provided and the decisions that I have made.       Mckenna Vu MD  , Department of Dermatology  Director, D[facility] Surgery [facility]  Phone: [phone]; Fax: [phone]  [email or web address] Dermatologic Surgery & Laser Center   [address] [place] [address]   Mail Code [place], [place] [address]               Picture placed in chart for future reference.

## 2017-12-04 NOTE — NURSING NOTE
Chief Complaint   Patient presents with     RECHECK     Ester is here today for a one week post MOHS check with a graft.      Yasmine Mead CMA

## 2017-12-04 NOTE — MR AVS SNAPSHOT
After Visit Summary   12/4/2017    Ester Barba    MRN: [id number]           Patient Information     Date Of Birth          1945        Visit Information        Provider Department      12/4/2017 2:45 PM Mckenna Vu MD [facility] Dermatologic Surgery        Care Instructions    Wound care instructions for SKIN GRAFT  ONE WEEK AFTER SURGERY    1. Leave the bandage on for 1 week after today's bandage change.  2. In 1 week you may resume your regular skin care when you remove the dressing. This includes washing with mild soap and water, applying moisturizer, make-up and sunscreen.  3. Be very careful not to dislodge the skin graft - it is very fragile for the one month following surgery (cover with bandage for 1 month at night if you are afraid it will get bumped, don't let water pressure from shower directly hit the site for one month, do not pick at the edges if there is crusting, etc)  4. If the wound is open or bleeding in any part of the incision/graft site, you should cover the area with a bandage daily as directed below:    1. Clean and dry area with plain water using a Q-tip or sterile gauze pad.  2. Apply vaseline, aquaphor, polysporin, or bacitracin ointment to the wound  3. Cover the wound with a band-aid or a sterile non-stick gauze pad and micropore paper tape.      Repeat the instructions above until wound is completely healed    If you notice that the scar is red and firm (after you remove the dressing) this is normal and will fade over time. It may take up to 6-12 months for this to occur.    Massaging the area helps the scar fade and soften quicker. Begin to massage the area one month after the dressings have been removed. Apply pressure directly and firmly over the scar with the fingertips and move in circular motions. You may massage up to 10 times daily, each time for 30 seconds.    It is normal for there to be a tender, pimple-like bump along the scar about 6-8 weeks  after surgery. This sometimes happens as the scar matures and the stitches beneath begin to dissolve. Do not pick or squeeze. It will resolve in time. If an area of the wound does open and there appears to be drainage, you may apply one of the ointments listed in the above directions a few times every day until the wound is completely healed.    Numbness around the surgical site is normal. It can take up to 12-18 months for the feeling to return to normal. Itchiness, tingling, and occasional sharp pains might be present during this portion of the healing. All of these feelings will subside once the nerves have completely healed.      Phone numbers:  During business hours (M-F 8:00-4:30 p.m.)  [facility]-  [phone] Option 1 appt. desk  [phone]  Option 3 nurse triage line  ---------------------------------------------------------  Evenings/Weekends/Holidays  Hospital - [phone]   TTY for hearing ezefcbla-[phone]  *Ask  to page dermatologist on-call  Emergency Qdsh-[phone]  TTY for hearing impaired- [phone]    Wound care instructions for Superficial Wounds      After 24 hours you should remove the bandage and begin daily dressing changes as follows:    5. Remove Dressing    2.   Clean and dry the area with tap water using a Q-tip or sterile gauze pad    3.   Apply polysporin ointment, bacitracin ointment, aquaphor ointment, or vaseline ointment over entire wound. Do NOT use neosporin ointment.    4.   Cover the wound with a band-aid or a sterile non-stick gauze pad and micropore paper tape.    Repeat these instructions at least once a day until the wound has completely healed.    No dietary restrictions.    Continue normal activity    The wound will not heal better when exposed to air and allowed to dry out. The wound will heal faster with a better cosmetic result if it is kept moist with ointment and covered with a bandage.  Do not let the wound  dry out.    What to expect:    All wounds develop a small ring of redness surrounding the wound that indicate healing. Severe itching with extensive redness usually indicates sensitivity to the ointment or bandage tape used to dress the wound. You should call the nurse triage line if this occurs.    It is normal for there to be bruising or swelling around your surgical site, especially when it is near, or on, the eyelid.    If your wound is draining, this is normal. Larger wounds tend to drain more than smaller wounds. Please call if the draining is extensive or if there is yellow/green discharge. After about a week, the wound size should shrink. The wound is healed when you can see skin has formed over the entire area. A healed wound has a healthy shiny appearance and is red/dark pink in color. Wounds may take four to six weeks to heal. Larger wounds generally take a few weeks longer to heal than smaller wounds. Once the wound is healed, you may stop dressing changes.    There may be a tightness as the wound heals, but this is normal and will gradually decrease and disappear.    Your wound may be sensitive to temperature changes after it is healed. Avoid extreme temperature changes if you are having discomfort, but this will improve with time.    If the wound seems to itch once it is healed, you may use vaseline to help relieve the itching.          Phone numbers:  During business hours (M-F 8:00-4:30 p.m.)  [facility]-  [phone] Option 1 appt. desk  [phone]  Option 3 nurse triage line  ---------------------------------------------------------  Evenings/Weekends/Holidays  Hospital - [phone]   TTY for hearing oyxvrjbk-[phone]  *Ask  to page dermatologist on-call  Emergency Pzdq-[phone]  TTY for hearing impaired- [phone]            Follow-ups after your visit        Your next 10 appointments already scheduled     Dec 15, 2017  9:40 AM CST    (Arrive by 9:25 AM)   RETURN [facility] with Barbara Soto MD   [facility] Dermatology ([facility] [place])    [address]  [address] [place] [address] [phone]              Who to contact     Please call your clinic at [phone] to:    Ask questions about your health    Make or cancel appointments    Discuss your medicines    Learn about your test results    Speak to your doctor   If you have compliments or concerns about an experience at your clinic, or if you wish to file a complaint, please contact [facility] Patient Relations at [phone] or email us at [email or web address]         Additional Information About Your Visit        [employer] Information     [employer] gives you secure access to your electronic health record. If you see a primary care provider, you can also send messages to your care team and make appointments. If you have questions, please call your primary care clinic.  If you do not have a primary care provider, please call [phone] and they will assist you.      [employer] is an electronic gateway that provides easy, online access to your medical records. With [employer], you can request a clinic appointment, read your test results, renew a prescription or communicate with your care team.     To access your existing account, please contact your [facility] Clinic or call [phone] for assistance.        Care EveryWhere ID     This is your Care EveryWhere ID. This could be used by other organizations to access your [place] medical records  [id number]         Blood Pressure from Last 3 Encounters:   11/27/17 146/69   11/06/17 136/80   10/09/17 117/64    Weight from Last 3 Encounters:   06/28/17 59.1 kg (130 lb 6.4 oz)   01/18/17 61.9 kg (136 lb 6.4 oz)   03/14/16 58.1 kg (128 lb)              Today, you had the following     No orders found for display       Primary Care Provider  Office Phone # Fax #    Fernando Weathers [phone]       [facility] [address]  [place] [address]        Equal Access to Services     ALEXIS VIOLETA : Blanca alexi elmore evelyn Marcano, wakamronda luqadaha, qaybta kasharathda parker, samuel richardjack hilda. [facility] [phone].    ATENCIÓN: Si habla español, tiene a pelayo disposición servicios gratuitos de asistencia lingüística. Llame al [phone].    We comply with applicable federal civil rights laws and [place] laws. We do not discriminate on the basis of race, color, national origin, age, disability, sex, sexual orientation, or gender identity.            Thank you!     Thank you for choosing [facility] DERMATOLOGIC SURGERY  for your care. Our goal is always to provide you with excellent care. Hearing back from our patients is one way we can continue to improve our services. Please take a few minutes to complete the written survey that you may receive in the mail after your visit with us. Thank you!             Your Updated Medication List - Protect others around you: Learn how to safely use, store and throw away your medicines at www.disposemymeds.org.          This list is accurate as of: 12/4/17  2:59 PM.  Always use your most recent med list.                   Brand Name Dispense Instructions for use Diagnosis    BENADRYL 25 MG capsule   Generic drug:  diphenhydrAMINE      Take 25 mg by mouth every 6 hours as needed.        calcium 1500 MG Tabs      Take  by mouth.        CLARITIN PO      Take  by mouth.        clindamycin 1 % lotion    CLEOCIN T    60 mL    Apply topically 2 times daily    Acne rosacea       DERMA-SMOOTHE/FS SCALP 0.01 % Oil oil   Generic drug:  Fluocinolone Acetonide Scalp     118 mL    Apply once a week to scalp for lichen planopilaris    Lichen planopilaris       desonide 0.05 % cream    DESOWEN    60 g    Mix 50/50 with ketoconazole and apply twice daily to irritated area for 3 weeks     Intertrigo       fluconazole 200 MG tablet    DIFLUCAN    45 tablet    Take 1 tablet (200 mg) by mouth daily    Yeast infection of the vagina       hydroxychloroquine 200 MG tablet    PLAQUENIL    180 tablet    Take 1 tablet (200 mg) by mouth 2 times daily    Lichen plano-pilaris       ibuprofen 800 MG tablet    ADVIL/MOTRIN     Take 800 mg by mouth every 8 hours as needed.        ketoconazole 2 % cream    NIZORAL    30 g    Mix 50/50 with desonide and apply twice daily to irritated area for 3 weeks    Intertrigo       NEW MED     40 g    Biest 1.5mg/gram cream(pg free)  Insert 1 gram vaginally twice weekly as directed    Vulvar atrophy       triamcinolone acetonide 10 MG/ML injection    KENALOG    5 mL    See med note    Lichen planopilaris       vitamin D 1000 UNITS capsule      Take  by mouth. Total 2200 units daily.

## 2017-12-04 NOTE — PATIENT INSTRUCTIONS
Wound care instructions for SKIN GRAFT  ONE WEEK AFTER SURGERY    1. Leave the bandage on for 1 week after today's bandage change.  2. In 1 week you may resume your regular skin care when you remove the dressing. This includes washing with mild soap and water, applying moisturizer, make-up and sunscreen.  3. Be very careful not to dislodge the skin graft - it is very fragile for the one month following surgery (cover with bandage for 1 month at night if you are afraid it will get bumped, don't let water pressure from shower directly hit the site for one month, do not pick at the edges if there is crusting, etc)  4. If the wound is open or bleeding in any part of the incision/graft site, you should cover the area with a bandage daily as directed below:    1. Clean and dry area with plain water using a Q-tip or sterile gauze pad.  2. Apply vaseline, aquaphor, polysporin, or bacitracin ointment to the wound  3. Cover the wound with a band-aid or a sterile non-stick gauze pad and micropore paper tape.      Repeat the instructions above until wound is completely healed    If you notice that the scar is red and firm (after you remove the dressing) this is normal and will fade over time. It may take up to 6-12 months for this to occur.    Massaging the area helps the scar fade and soften quicker. Begin to massage the area one month after the dressings have been removed. Apply pressure directly and firmly over the scar with the fingertips and move in circular motions. You may massage up to 10 times daily, each time for 30 seconds.    It is normal for there to be a tender, pimple-like bump along the scar about 6-8 weeks after surgery. This sometimes happens as the scar matures and the stitches beneath begin to dissolve. Do not pick or squeeze. It will resolve in time. If an area of the wound does open and there appears to be drainage, you may apply one of the ointments listed in the above directions a few times every day  until the wound is completely healed.    Numbness around the surgical site is normal. It can take up to 12-18 months for the feeling to return to normal. Itchiness, tingling, and occasional sharp pains might be present during this portion of the healing. All of these feelings will subside once the nerves have completely healed.      Phone numbers:  During business hours (M-F 8:00-4:30 p.m.)  [facility]-  [phone] Option 1 appt. desk  [phone]  Option 3 nurse triage line  ---------------------------------------------------------  Evenings/Weekends/Holidays  Hospital - [phone]   TTY for hearing pseyruqn-[phone]  *Ask  to page dermatologist on-call  Emergency Mlmd-[phone]  TTY for hearing impaired- [phone]    Wound care instructions for Superficial Wounds      After 24 hours you should remove the bandage and begin daily dressing changes as follows:    5. Remove Dressing    2.   Clean and dry the area with tap water using a Q-tip or sterile gauze pad    3.   Apply polysporin ointment, bacitracin ointment, aquaphor ointment, or vaseline ointment over entire wound. Do NOT use neosporin ointment.    4.   Cover the wound with a band-aid or a sterile non-stick gauze pad and micropore paper tape.    Repeat these instructions at least once a day until the wound has completely healed.    No dietary restrictions.    Continue normal activity    The wound will not heal better when exposed to air and allowed to dry out. The wound will heal faster with a better cosmetic result if it is kept moist with ointment and covered with a bandage.  Do not let the wound dry out.    What to expect:    All wounds develop a small ring of redness surrounding the wound that indicate healing. Severe itching with extensive redness usually indicates sensitivity to the ointment or bandage tape used to dress the wound. You should call the nurse triage line if this occurs.    It is  normal for there to be bruising or swelling around your surgical site, especially when it is near, or on, the eyelid.    If your wound is draining, this is normal. Larger wounds tend to drain more than smaller wounds. Please call if the draining is extensive or if there is yellow/green discharge. After about a week, the wound size should shrink. The wound is healed when you can see skin has formed over the entire area. A healed wound has a healthy shiny appearance and is red/dark pink in color. Wounds may take four to six weeks to heal. Larger wounds generally take a few weeks longer to heal than smaller wounds. Once the wound is healed, you may stop dressing changes.    There may be a tightness as the wound heals, but this is normal and will gradually decrease and disappear.    Your wound may be sensitive to temperature changes after it is healed. Avoid extreme temperature changes if you are having discomfort, but this will improve with time.    If the wound seems to itch once it is healed, you may use vaseline to help relieve the itching.          Phone numbers:  During business hours (M-F 8:00-4:30 p.m.)  [facility]-  [phone] Option 1 appt. desk  [phone]  Option 3 nurse triage line  ---------------------------------------------------------  Evenings/Weekends/Holidays  Hospital - [phone]   TTY for hearing qfdbnlhs-[phone]  *Ask  to page dermatologist on-call  Emergency Ozcs-[phone]  TTY for hearing impaired- [phone]

## 2017-12-05 DIAGNOSIS — L66.10 LICHEN PLANO-PILARIS: ICD-10-CM

## 2017-12-05 NOTE — TELEPHONE ENCOUNTER
Last seen 11/6/17.  Next appt 12/15/17.            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 Mild disease activity visible and scalp appears dry with scaly buildup. Discussed proceeding with ILK injections today and continuing current regimen, including resuming dermasmoothe FS oil twice weekly. Due for plaquenil safety labs, she would prefer to defer to next visit which is fine.    Kenalog intralesional injection procedure note (performed by faculty): After verbal consent and discussion of risks including but not limited to atrophy, pain, and bruising,  time out was performed, 1 total cc of Kenalog 10 mg/cc was injected into 10 sites on the scalp.  The patient tolerated the procedure well and left the Dermatology clinic in good condition.    Continue dermasmoothe/FS 0.01% oil twice weekly     Continue DHS zinc shampoo 4-5 times weekly     Continue plaquenil 200mg BID     Plaquenil safety labs needed at next visit     Photographs taken for future reference      2. Neoplasm of uncertain behavior on the R nasal ala. The differential diagnosis includes BCC vs AK vs fibrous papule. S/p cryotherapy 6/6/17, improved but failed to resolve with clindamycin lotion.     Shave biopsy: After discussion of benefits and risks including but not limited to bleeding, infection, scar, incomplete removal, recurrence, and non-diagnostic biopsy, written consent and photographs were obtained. Time-out was performed. The area was cleaned with isopropyl alcohol. 0.2mL of 1% lidocaine was injected to obtain adequate anesthesia of the lesion on the R nasal ala. A shave biopsy was performed. Hemostasis was achieved with aluminium chloride. Vaseline and a sterile dressing were applied. The patient tolerated the procedure and no complications were noted. The patient was provided with verbal and written post care instructions.       Follow-up in 6 weeks, earlier for new or changing lesions.

## 2017-12-06 NOTE — TELEPHONE ENCOUNTER
Please call the patient and let her know we would be happy to order refills once she has her labs done as she is due for them (CBC and CMP already ordered at last visit). Once these are found to be normal we will refill.    Thanks!

## 2017-12-07 ENCOUNTER — TELEPHONE (OUTPATIENT)
Dept: DERMATOLOGY | Facility: CLINIC | Age: 72
End: 2017-12-07

## 2017-12-07 RX ORDER — HYDROXYCHLOROQUINE SULFATE 200 MG/1
TABLET, FILM COATED ORAL
Qty: 180 TABLET | Refills: 0 | Status: SHIPPED | OUTPATIENT
Start: 2017-12-07 | End: 2018-06-25

## 2017-12-07 NOTE — TELEPHONE ENCOUNTER
Labs from 11/16 reviewed in Care everywhere and refill appropriate. She will follow-up in clinic 12/15 and per note they may start to taper the dose at that time. Currently taking 200 BID. 3 month refill provided per pt request.

## 2017-12-07 NOTE — TELEPHONE ENCOUNTER
Patient states she just got her labs done on 11/18 at her PCP. She is asking that these results be used rather than having new ones. Her results can be accessed through care everywehre. Routing to [facility] for consideration.

## 2017-12-07 NOTE — TELEPHONE ENCOUNTER
She is also requesting that her Rx be for 30 days rather than 90 days. She prefers to pay for 1 month at a time.

## 2017-12-07 NOTE — TELEPHONE ENCOUNTER
I called the patient back and wound care was verbally gone over again with the patient. She stated that the bandage on her nose fell off. I asked that she reapply (using the supplies that she was given) Steri Strips and paper tape only. No Vaseline to the Nose at this time.   Yasmine Mead, [facility]

## 2017-12-14 ENCOUNTER — TRANSFERRED RECORDS (OUTPATIENT)
Dept: HEALTH INFORMATION MANAGEMENT | Facility: CLINIC | Age: 72
End: 2017-12-14

## 2017-12-15 ENCOUNTER — OFFICE VISIT (OUTPATIENT)
Dept: DERMATOLOGY | Facility: CLINIC | Age: 72
End: 2017-12-15
Payer: COMMERCIAL

## 2017-12-15 VITALS — HEART RATE: 81 BPM | RESPIRATION RATE: 17 BRPM | SYSTOLIC BLOOD PRESSURE: 143 MMHG | DIASTOLIC BLOOD PRESSURE: 78 MMHG

## 2017-12-15 DIAGNOSIS — Z85.828 HISTORY OF SKIN CANCER: ICD-10-CM

## 2017-12-15 DIAGNOSIS — L66.10 LICHEN PLANOPILARIS: Primary | ICD-10-CM

## 2017-12-15 DIAGNOSIS — L30.9 DERMATITIS: ICD-10-CM

## 2017-12-15 ASSESSMENT — PAIN SCALES - GENERAL: PAINLEVEL: NO PAIN (0)

## 2017-12-15 NOTE — LETTER
"12/15/2017       RE: Ester Barba  [address] [place] [address]     Dear Colleague,    Thank you for referring your patient, Ester Barba, to the [facility] DERMATOLOGY at [facility]. Please see a copy of my visit note below.    [facility] Dermatology Note      Dermatology Problem List:  1. Lichen Planopilaris and Lichen planus     -LPPAI score: 1.0 (1 for pruritus, pain, and perifollicular scale)  -current treatment: 0.9 cc ILK 10 mg/cc injected into scalp today, plaquenil 200 mg BID, DHS zinc shampoo every 2-3 days, derma-smoothe/FS 0.01% oil twice per week  - plaquenil safety labs done outside in 11/2017  - plan to start taper off of plaquenil at follow up  -Symptoms thought to start originally after imiquimod application       2. AKs      3. History of nonmelanoma skin cancer  -BCC on right nasal ala s/p Mohs 11/27/2017  -SCC of the scalp, s/p Mohs excision 2014  -History of 1-2 other NMSC of the scalp (BCCs)      Encounter Date: Dec 15, 2017    CC:  Chief Complaint   Patient presents with     Hair Loss     Ester is here for a follow up on hairloss, states hair loss as been the same but her scalp have been more tecder since last appt          History of Present Illness:  Ms. Ester Barba is a 72 year old female who presents as a follow-up for lichen planopilaris. The patient was last seen 11/06/2017 when 1cc ILK was injected and she was continued on dermasmoothe FS oil BID and plaquenil 200mg BID. At that visit she also had a shave biopsy on her right nasal ala, showing BCC. Se underwent Mohs on 11/27/2017 with an ear skin graft.     Today she has some concern about the wound healing on her nose, she thinks it looks \"dark\". Regarding her scalp she reports using the dermasmooth oil twice/week with some effect but that it is , dry and a bit crusty. She has also had some new itchiness recently, but denies burning.     She is otherwise feeling " well and does not have any other concerns for today.     Past Medical History:   Patient Active Problem List   Diagnosis     Porokeratosis     Squamous cell carcinoma     Neoplasm of uncertain behavior     Lichen planopilaris     Dermatitis     Cicatricial alopecia     Keratosis, inflamed seborrheic     History of skin cancer     Vaginitis and vulvovaginitis     Basal cell carcinoma of vertex scalp s/p mohs 6-5-15     Medication management     Actinic keratosis     Medication monitoring encounter     Dermatitis, seborrheic     Past Medical History:   Diagnosis Date     Basal cell carcinoma      Squamous cell carcinoma      Past Surgical History:   Procedure Laterality Date     BIOPSY OF SKIN LESION       MOHS MICROGRAPHIC PROCEDURE       NO HISTORY OF SURGERY  2/17/14    derm     SMALL BOWEL RESECTION  11/2015    diverticulitis       Family History:  Sister has a history of BCC.    Medications:  Current Outpatient Prescriptions   Medication Sig Dispense Refill     hydroxychloroquine (PLAQUENIL) 200 MG tablet TAKE 1 TABLET(200 MG) BY MOUTH TWICE DAILY 180 tablet 0     triamcinolone acetonide (KENALOG) 10 MG/ML injection See med note 5 mL 0     clindamycin (CLEOCIN T) 1 % lotion Apply topically 2 times daily (Patient not taking: Reported on 11/27/2017) 60 mL 1     ketoconazole (NIZORAL) 2 % cream Mix 50/50 with desonide and apply twice daily to irritated area for 3 weeks (Patient not taking: Reported on 12/4/2017) 30 g 1     Fluocinolone Acetonide (DERMA-SMOOTHE/FS SCALP) 0.01 % OIL Apply once a week to scalp for lichen planopilaris 118 mL 2     NEW MED Biest 1.5mg/gram cream(pg free)  Insert 1 gram vaginally twice weekly as directed 40 g 6     fluconazole (DIFLUCAN) 200 MG tablet Take 1 tablet (200 mg) by mouth daily (Patient not taking: Reported on 12/4/2017) 45 tablet 4     desonide (DESOWEN) 0.05 % cream Mix 50/50 with ketoconazole and apply twice daily to irritated area for 3 weeks (Patient not taking: Reported  on 11/27/2017) 60 g 1     diphenhydrAMINE (BENADRYL) 25 MG capsule Take 25 mg by mouth every 6 hours as needed.       Calcium 1500 MG TABS Take  by mouth.       Loratadine (CLARITIN PO) Take  by mouth.       ibuprofen (ADVIL,MOTRIN) 800 MG tablet Take 800 mg by mouth every 8 hours as needed.       Cholecalciferol (VITAMIN D) 1000 UNIT capsule Take  by mouth. Total 2200 units daily.       Allergies   Allergen Reactions     Dust Mites Other (See Comments)     Sneezing, coughing. asthma     Levaquin [Levofloxacin Hemihydrate] Other (See Comments)     Muscle weakness     Mold Other (See Comments)     Sneezing, asthma, weezing     Pollen Extract/Tree Extract Other (See Comments)     Sneezing, weezing     Sulfa Drugs Hives     Penicillins Rash         Review of Systems:  -As per HPI  -Constitutional: The patient denies fatigue, fevers, chills, unintended weight loss, and night sweats.  -HEENT: Patient denies nonhealing oral sores.  -Skin: As above in HPI. No additional skin concerns.    Physical exam:  Vitals: /78 (BP Location: Left arm)  Pulse 81  Resp 17  Breastfeeding? No  GEN: This is a well developed, well-nourished female in no acute distress, in a pleasant mood.    SKIN: Focused examination of the scalp and face was performed.  -LPPAI score 1.0 for reported mild itch/pain, 1 for perifollicular scale  -Hair pull test negative  -Scalp with numerous plaques of adherent yellow crust  -On R nasal ala, there is a well healed grafted wound that has improved significantly compared to photographs from last visit.   -No other lesions of concern on areas examined.     Impression/Plan:  1. Lichen planopilaris - LPPAI lower today (1.0) than last visit due to reported itch and pain but less perifollicular erythema, however disease appears overall stable. Mild disease activity visible and scalp appears dry with scaly buildup. Discussed proceeding with ILK injections today and continuing current regimen, including  resuming dermasmoothe FS oil twice weekly. Plaquenil safety labs were tested outside clinic 11/2017 with good results.    Kenalog intralesional injection procedure note (primarily performed by faculty): After verbal consent and discussion of risks including but not limited to atrophy, pain, and bruising,  time out was performed, 0.9 total cc of Kenalog 10 mg/cc was injected into 10 sites on the scalp.  The patient tolerated the procedure well and left the Dermatology clinic in good condition.    Continue dermasmoothe/FS 0.01% oil twice weekly     Continue DHS zinc shampoo 4-5 times weekly     Continue plaquenil 200mg BID     Photographs taken for future reference     2. BCC s/p Mohs 11/27/2017 on the R nasal ala.      Wound with good healing, significantly improved compared to previous pictures.    New pictures taken today for further reference.    Follow-up in 7 weeks, earlier for new or changing lesions.     Staff Involved:  Scribed by Leena Mosqueda, MS6 for Dr. Soto.      I agree with the PFSH and ROS as completed by the Medical Student. The remainder of the encounter was performed by me and scribed by the Medical Student. The scribed note accurately reflects my personal services and the medical decisions made by me. ILK injections were primarily done by me.      Barbara Soto MD  Professor and Chair  Department of Dermatology  [facility]

## 2017-12-15 NOTE — PROGRESS NOTES
"[facility] Dermatology Note      Dermatology Problem List:  1. Lichen Planopilaris and Lichen planus     -LPPAI score: 1.0 (1 for pruritus, pain, and perifollicular scale)  -current treatment: 0.9 cc ILK 10 mg/cc injected into scalp today, plaquenil 200 mg BID, DHS zinc shampoo every 2-3 days, derma-smoothe/FS 0.01% oil twice per week  - plaquenil safety labs done outside in 11/2017  - plan to start taper off of plaquenil at follow up  -Symptoms thought to start originally after imiquimod application       2. AKs      3. History of nonmelanoma skin cancer  -BCC on right nasal ala s/p Mohs 11/27/2017  -SCC of the scalp, s/p Mohs excision 2014  -History of 1-2 other NMSC of the scalp (BCCs)      Encounter Date: Dec 15, 2017    CC:  Chief Complaint   Patient presents with     Hair Loss     Ester is here for a follow up on hairloss, states hair loss as been the same but her scalp have been more tecder since last appt          History of Present Illness:  Ms. Ester Barba is a 72 year old female who presents as a follow-up for lichen planopilaris. The patient was last seen 11/06/2017 when 1cc ILK was injected and she was continued on dermasmoothe FS oil BID and plaquenil 200mg BID. At that visit she also had a shave biopsy on her right nasal ala, showing BCC. Se underwent Mohs on 11/27/2017 with an ear skin graft.     Today she has some concern about the wound healing on her nose, she thinks it looks \"dark\". Regarding her scalp she reports using the dermasmooth oil twice/week with some effect but that it is , dry and a bit crusty. She has also had some new itchiness recently, but denies burning.     She is otherwise feeling well and does not have any other concerns for today.     Past Medical History:   Patient Active Problem List   Diagnosis     Porokeratosis     Squamous cell carcinoma     Neoplasm of uncertain behavior     Lichen planopilaris     Dermatitis     Cicatricial alopecia     " Keratosis, inflamed seborrheic     History of skin cancer     Vaginitis and vulvovaginitis     Basal cell carcinoma of vertex scalp s/p mohs 6-5-15     Medication management     Actinic keratosis     Medication monitoring encounter     Dermatitis, seborrheic     Past Medical History:   Diagnosis Date     Basal cell carcinoma      Squamous cell carcinoma      Past Surgical History:   Procedure Laterality Date     BIOPSY OF SKIN LESION       MOHS MICROGRAPHIC PROCEDURE       NO HISTORY OF SURGERY  2/17/14    derm     SMALL BOWEL RESECTION  11/2015    diverticulitis       Family History:  Sister has a history of BCC.    Medications:  Current Outpatient Prescriptions   Medication Sig Dispense Refill     hydroxychloroquine (PLAQUENIL) 200 MG tablet TAKE 1 TABLET(200 MG) BY MOUTH TWICE DAILY 180 tablet 0     triamcinolone acetonide (KENALOG) 10 MG/ML injection See med note 5 mL 0     clindamycin (CLEOCIN T) 1 % lotion Apply topically 2 times daily (Patient not taking: Reported on 11/27/2017) 60 mL 1     ketoconazole (NIZORAL) 2 % cream Mix 50/50 with desonide and apply twice daily to irritated area for 3 weeks (Patient not taking: Reported on 12/4/2017) 30 g 1     Fluocinolone Acetonide (DERMA-SMOOTHE/FS SCALP) 0.01 % OIL Apply once a week to scalp for lichen planopilaris 118 mL 2     NEW MED Biest 1.5mg/gram cream(pg free)  Insert 1 gram vaginally twice weekly as directed 40 g 6     fluconazole (DIFLUCAN) 200 MG tablet Take 1 tablet (200 mg) by mouth daily (Patient not taking: Reported on 12/4/2017) 45 tablet 4     desonide (DESOWEN) 0.05 % cream Mix 50/50 with ketoconazole and apply twice daily to irritated area for 3 weeks (Patient not taking: Reported on 11/27/2017) 60 g 1     diphenhydrAMINE (BENADRYL) 25 MG capsule Take 25 mg by mouth every 6 hours as needed.       Calcium 1500 MG TABS Take  by mouth.       Loratadine (CLARITIN PO) Take  by mouth.       ibuprofen (ADVIL,MOTRIN) 800 MG tablet Take 800 mg by mouth  every 8 hours as needed.       Cholecalciferol (VITAMIN D) 1000 UNIT capsule Take  by mouth. Total 2200 units daily.       Allergies   Allergen Reactions     Dust Mites Other (See Comments)     Sneezing, coughing. asthma     Levaquin [Levofloxacin Hemihydrate] Other (See Comments)     Muscle weakness     Mold Other (See Comments)     Sneezing, asthma, weezing     Pollen Extract/Tree Extract Other (See Comments)     Sneezing, weezing     Sulfa Drugs Hives     Penicillins Rash         Review of Systems:  -As per HPI  -Constitutional: The patient denies fatigue, fevers, chills, unintended weight loss, and night sweats.  -HEENT: Patient denies nonhealing oral sores.  -Skin: As above in HPI. No additional skin concerns.    Physical exam:  Vitals: /78 (BP Location: Left arm)  Pulse 81  Resp 17  Breastfeeding? No  GEN: This is a well developed, well-nourished female in no acute distress, in a pleasant mood.    SKIN: Focused examination of the scalp and face was performed.  -LPPAI score 1.0 for reported mild itch/pain, 1 for perifollicular scale  -Hair pull test negative  -Scalp with numerous plaques of adherent yellow crust  -On R nasal ala, there is a well healed grafted wound that has improved significantly compared to photographs from last visit.   -No other lesions of concern on areas examined.     Impression/Plan:  1. Lichen planopilaris - LPPAI lower today (1.0) than last visit due to reported itch and pain but less perifollicular erythema, however disease appears overall stable. Mild disease activity visible and scalp appears dry with scaly buildup. Discussed proceeding with ILK injections today and continuing current regimen, including resuming dermasmoothe FS oil twice weekly. Plaquenil safety labs were tested outside clinic 11/2017 with good results.    Kenalog intralesional injection procedure note (primarily performed by faculty): After verbal consent and discussion of risks including but not limited  to atrophy, pain, and bruising,  time out was performed, 0.9 total cc of Kenalog 10 mg/cc was injected into 10 sites on the scalp.  The patient tolerated the procedure well and left the Dermatology clinic in good condition.    Continue dermasmoothe/FS 0.01% oil twice weekly     Continue DHS zinc shampoo 4-5 times weekly     Continue plaquenil 200mg BID     Photographs taken for future reference     2. BCC s/p Mohs 11/27/2017 on the R nasal ala.      Wound with good healing, significantly improved compared to previous pictures.    New pictures taken today for further reference.    Follow-up in 7 weeks, earlier for new or changing lesions.     Staff Involved:  Scribed by Leena Mosqueda, MS6 for Dr. Soto.      I agree with the PFSH and ROS as completed by the Medical Student. The remainder of the encounter was performed by me and scribed by the Medical Student. The scribed note accurately reflects my personal services and the medical decisions made by me. ILK injections were primarily done by me.      Barbara Soto MD  Professor and Chair  Department of Dermatology  [facility]

## 2017-12-15 NOTE — NURSING NOTE
Dermatology Rooming Note    Ester Barba's goals for this visit include:   Chief Complaint   Patient presents with     Hair Loss     Ester is here for a follow up on hairloss, states hair loss as been the same but her scalp have been more tecder since last appt        Jessica Lin LPN

## 2017-12-15 NOTE — MR AVS SNAPSHOT
After Visit Summary   12/15/2017    Ester Barba    MRN: [id number]           Patient Information     Date Of Birth          1945        Visit Information        Provider Department      12/15/2017 9:40 AM Barbara Soto MD [facility] Dermatology        Today's Diagnoses     Lichen planopilaris    -  1    Dermatitis        History of skin cancer           Follow-ups after your visit        Your next 10 appointments already scheduled     Jan 16, 2018  3:45 PM CST   (Arrive by 3:30 PM)   RETURN HAIRLOSS with Barbara Soto MD   [facility] Dermatology ([facility])    [address] [place] [id number] [phone]              Who to contact     Please call your clinic at [phone] to:    Ask questions about your health    Make or cancel appointments    Discuss your medicines    Learn about your test results    Speak to your doctor   If you have compliments or concerns about an experience at your clinic, or if you wish to file a complaint, please contact [facility] Patient Relations at [phone] or email us at [email or web address]         Additional Information About Your Visit        MyChart Information     Whiskt gives you secure access to your electronic health record. If you see a primary care provider, you can also send messages to your care team and make appointments. If you have questions, please call your primary care clinic.  If you do not have a primary care provider, please call [phone] and they will assist you.      Whiskt is an electronic gateway that provides easy, online access to your medical records. With [employer], you can request a clinic appointment, read your test results, renew a prescription or communicate with your care team.     To access your existing account, please contact your [facility] Clinic or call [phone] for  assistance.        Care EveryWhere ID     This is your Care EveryWhere ID. This could be used by other organizations to access your [place] medical records  [id number]        Your Vitals Were     Pulse Respirations Breastfeeding?             81 17 No          Blood Pressure from Last 3 Encounters:   12/15/17 143/78   11/27/17 146/69   11/06/17 136/80    Weight from Last 3 Encounters:   06/28/17 59.1 kg (130 lb 6.4 oz)   01/18/17 61.9 kg (136 lb 6.4 oz)   03/14/16 58.1 kg (128 lb)              We Performed the Following     INJECTION INTO SKIN LESIONS >7          Today's Medication Changes          These changes are accurate as of: 12/15/17 11:59 PM.  If you have any questions, ask your nurse or doctor.               These medicines have changed or have updated prescriptions.        Dose/Directions    * triamcinolone acetonide 10 MG/ML injection   Commonly known as:  KENALOG   This may have changed:  Another medication with the same name was added. Make sure you understand how and when to take each.   Used for:  Lichen planopilaris   Changed by:  Barbara Soto MD        See med note   Quantity:  5 mL   Refills:  0       * triamcinolone acetonide 10 MG/ML injection   Commonly known as:  KENALOG   This may have changed:  You were already taking a medication with the same name, and this prescription was added. Make sure you understand how and when to take each.   Used for:  Lichen planopilaris   Changed by:  Barbara Soto MD        See med note   Quantity:  5 mL   Refills:  0       * Notice:  This list has 2 medication(s) that are the same as other medications prescribed for you. Read the directions carefully, and ask your doctor or other care provider to review them with you.         Where to get your medicines      Some of these will need a paper prescription and others can be bought over the counter.  Ask your nurse if you have questions.     You don't need a prescription for these  medications     triamcinolone acetonide 10 MG/ML injection                Primary Care Provider Office Phone # Fax #    Fernando Weathers [phone]       [facility] [address] [place] [address]        Equal Access to Services     ALEXIS VIOLETA AH: Hadii alexi ku hernandezo Sohuongali, waaxda luqadaha, qaybta kaalmada ademirayada, samuel richardjack hilda. So [facility] [phone].    ATENCIÓN: Si habla español, tiene a pelayo disposición servicios gratuitos de asistencia lingüística. Llame al [phone].    We comply with applicable federal civil rights laws and [place] laws. We do not discriminate on the basis of race, color, national origin, age, disability, sex, sexual orientation, or gender identity.            Thank you!     Thank you for choosing [facility] DERMATOLOGY  for your care. Our goal is always to provide you with excellent care. Hearing back from our patients is one way we can continue to improve our services. Please take a few minutes to complete the written survey that you may receive in the mail after your visit with us. Thank you!             Your Updated Medication List - Protect others around you: Learn how to safely use, store and throw away your medicines at www.disposemymeds.org.          This list is accurate as of: 12/15/17 11:59 PM.  Always use your most recent med list.                   Brand Name Dispense Instructions for use Diagnosis    BENADRYL 25 MG capsule   Generic drug:  diphenhydrAMINE      Take 25 mg by mouth every 6 hours as needed.        calcium 1500 MG Tabs      Take  by mouth.        CLARITIN PO      Take  by mouth.        clindamycin 1 % lotion    CLEOCIN T    60 mL    Apply topically 2 times daily    Acne rosacea       DERMA-SMOOTHE/FS SCALP 0.01 % Oil oil   Generic drug:  Fluocinolone Acetonide Scalp     118 mL    Apply once a week to scalp for lichen planopilaris    Lichen planopilaris       desonide 0.05 % cream    DESOWEN    60 g    Mix  50/50 with ketoconazole and apply twice daily to irritated area for 3 weeks    Intertrigo       fluconazole 200 MG tablet    DIFLUCAN    45 tablet    Take 1 tablet (200 mg) by mouth daily    Yeast infection of the vagina       hydroxychloroquine 200 MG tablet    PLAQUENIL    180 tablet    TAKE 1 TABLET(200 MG) BY MOUTH TWICE DAILY    Lichen plano-pilaris       ibuprofen 800 MG tablet    ADVIL/MOTRIN     Take 800 mg by mouth every 8 hours as needed.        ketoconazole 2 % cream    NIZORAL    30 g    Mix 50/50 with desonide and apply twice daily to irritated area for 3 weeks    Intertrigo       NEW MED     40 g    Biest 1.5mg/gram cream(pg free)  Insert 1 gram vaginally twice weekly as directed    Vulvar atrophy       * triamcinolone acetonide 10 MG/ML injection    KENALOG    5 mL    See med note    Lichen planopilaris       * triamcinolone acetonide 10 MG/ML injection    KENALOG    5 mL    See med note    Lichen planopilaris       vitamin D 1000 UNITS capsule      Take  by mouth. Total 2200 units daily.        * Notice:  This list has 2 medication(s) that are the same as other medications prescribed for you. Read the directions carefully, and ask your doctor or other care provider to review them with you.

## 2018-01-16 ENCOUNTER — OFFICE VISIT (OUTPATIENT)
Dept: DERMATOLOGY | Facility: CLINIC | Age: 73
End: 2018-01-16
Payer: COMMERCIAL

## 2018-01-16 VITALS — HEART RATE: 85 BPM | SYSTOLIC BLOOD PRESSURE: 130 MMHG | DIASTOLIC BLOOD PRESSURE: 79 MMHG

## 2018-01-16 DIAGNOSIS — L66.10 LICHEN PLANOPILARIS: Primary | ICD-10-CM

## 2018-01-16 DIAGNOSIS — L30.9 DERMATITIS: ICD-10-CM

## 2018-01-16 DIAGNOSIS — L57.0 ACTINIC KERATOSIS: ICD-10-CM

## 2018-01-16 DIAGNOSIS — L66.9 CICATRICIAL ALOPECIA: ICD-10-CM

## 2018-01-16 ASSESSMENT — PAIN SCALES - GENERAL: PAINLEVEL: MILD PAIN (3)

## 2018-01-16 NOTE — MR AVS SNAPSHOT
After Visit Summary   1/16/2018    Ester Barba    MRN: [id number]           Patient Information     Date Of Birth          1945        Visit Information        Provider Department      1/16/2018 3:45 PM Barbara Soto MD [facility] Dermatology        Today's Diagnoses     Lichen planopilaris    -  1    Cicatricial alopecia        Dermatitis        Actinic keratosis           Follow-ups after your visit        Your next 10 appointments already scheduled     Feb 27, 2018  2:30 PM CST   (Arrive by 2:15 PM)   RETURN HAIRLOSS with Barbara Soto MD   [facility] Dermatology ([facility])    [address] [address] [phone]              Who to contact     Please call your clinic at [phone] to:    Ask questions about your health    Make or cancel appointments    Discuss your medicines    Learn about your test results    Speak to your doctor   If you have compliments or concerns about an experience at your clinic, or if you wish to file a complaint, please contact [facility] Patient Relations at [phone] or email us at [email or web address]         Additional Information About Your Visit        MyChart Information     [employer] gives you secure access to your electronic health record. If you see a primary care provider, you can also send messages to your care team and make appointments. If you have questions, please call your primary care clinic.  If you do not have a primary care provider, please call [phone] and they will assist you.      [employer] is an electronic gateway that provides easy, online access to your medical records. With [employer], you can request a clinic appointment, read your test results, renew a prescription or communicate with your care team.     To access your existing account, please contact your [facility] Clinic or call  [phone] for assistance.        Care EveryWhere ID     This is your Care EveryWhere ID. This could be used by other organizations to access your [facility] medical records  [id number]        Your Vitals Were     Pulse                   85            Blood Pressure from Last 3 Encounters:   01/16/18 130/79   12/15/17 143/78   11/27/17 146/69    Weight from Last 3 Encounters:   06/28/17 59.1 kg (130 lb 6.4 oz)   01/18/17 61.9 kg (136 lb 6.4 oz)   03/14/16 58.1 kg (128 lb)              We Performed the Following     DESTRUCT PREMALIGNANT LESION, FIRST     INJECTION INTO SKIN LESIONS >7          Today's Medication Changes          These changes are accurate as of 1/16/18 11:59 PM.  If you have any questions, ask your nurse or doctor.               These medicines have changed or have updated prescriptions.        Dose/Directions    * triamcinolone acetonide 10 MG/ML injection   Commonly known as:  KENALOG   This may have changed:  Another medication with the same name was added. Make sure you understand how and when to take each.   Used for:  Lichen planopilaris        See med note   Quantity:  5 mL   Refills:  0       * triamcinolone acetonide 10 MG/ML injection   Commonly known as:  KENALOG   This may have changed:  You were already taking a medication with the same name, and this prescription was added. Make sure you understand how and when to take each.   Used for:  Lichen planopilaris        See med note   Quantity:  5 mL   Refills:  0       * Notice:  This list has 2 medication(s) that are the same as other medications prescribed for you. Read the directions carefully, and ask your doctor or other care provider to review them with you.         Where to get your medicines      Some of these will need a paper prescription and others can be bought over the counter.  Ask your nurse if you have questions.     You don't need a prescription for these medications     triamcinolone acetonide 10 MG/ML injection                 Primary Care Provider Office Phone # Fax #    Fernando Weathers -[phone]       [facility] [address]  [place] [address]        Equal Access to Services     ALEXIS DIEGOCITLALY : Hadii alexi ku hernandezo Jeet, waaxda luqadaha, qaybta kaalmada parker, samuel richardjack hilda. So [facility] [phone].    ATENCIÓN: Si habla español, tiene a pelayo disposición servicios gratuitos de asistencia lingüística. Llame al [phone].    We comply with applicable federal civil rights laws and [place] laws. We do not discriminate on the basis of race, color, national origin, age, disability, sex, sexual orientation, or gender identity.            Thank you!     Thank you for choosing [facility] DERMATOLOGY  for your care. Our goal is always to provide you with excellent care. Hearing back from our patients is one way we can continue to improve our services. Please take a few minutes to complete the written survey that you may receive in the mail after your visit with us. Thank you!             Your Updated Medication List - Protect others around you: Learn how to safely use, store and throw away your medicines at www.disposemymeds.org.          This list is accurate as of 1/16/18 11:59 PM.  Always use your most recent med list.                   Brand Name Dispense Instructions for use Diagnosis    BENADRYL 25 MG capsule   Generic drug:  diphenhydrAMINE      Take 25 mg by mouth every 6 hours as needed.        calcium 1500 MG Tabs      Take  by mouth.        CLARITIN PO      Take  by mouth.        clindamycin 1 % lotion    CLEOCIN T    60 mL    Apply topically 2 times daily    Acne rosacea       DERMA-SMOOTHE/FS SCALP 0.01 % Oil oil   Generic drug:  Fluocinolone Acetonide Scalp     118 mL    Apply once a week to scalp for lichen planopilaris    Lichen planopilaris       desonide 0.05 % cream    DESOWEN    60 g    Mix 50/50 with ketoconazole and apply twice daily to irritated area for 3  weeks    Intertrigo       fluconazole 200 MG tablet    DIFLUCAN    45 tablet    Take 1 tablet (200 mg) by mouth daily    Yeast infection of the vagina       hydroxychloroquine 200 MG tablet    PLAQUENIL    180 tablet    TAKE 1 TABLET(200 MG) BY MOUTH TWICE DAILY    Lichen plano-pilaris       ibuprofen 800 MG tablet    ADVIL/MOTRIN     Take 800 mg by mouth every 8 hours as needed.        ketoconazole 2 % cream    NIZORAL    30 g    Mix 50/50 with desonide and apply twice daily to irritated area for 3 weeks    Intertrigo       NEW MED     40 g    Biest 1.5mg/gram cream(pg free)  Insert 1 gram vaginally twice weekly as directed    Vulvar atrophy       * triamcinolone acetonide 10 MG/ML injection    KENALOG    5 mL    See med note    Lichen planopilaris       * triamcinolone acetonide 10 MG/ML injection    KENALOG    5 mL    See med note    Lichen planopilaris       vitamin D 1000 UNITS capsule      Take  by mouth. Total 2200 units daily.        * Notice:  This list has 2 medication(s) that are the same as other medications prescribed for you. Read the directions carefully, and ask your doctor or other care provider to review them with you.

## 2018-01-16 NOTE — NURSING NOTE
"Dermatology Rooming Note    Ester Barba's goals for this visit include:   Chief Complaint   Patient presents with     Derm Problem     Hairloss follow up, Ester states \" it is the same.\"     Marbella Vazquez LPN  "

## 2018-01-16 NOTE — LETTER
"1/16/2018       RE: Ester Barba  [address] [place] [address]     Dear Colleague,    Thank you for referring your patient, Ester Barba, to the [facility] DERMATOLOGY at [facility]. Please see a copy of my visit note below.    [facility] Dermatology Note      Dermatology Problem List:  1. Lichen Planopilaris and Lichen planus     -LPPAI score: 1.0 (1 for pruritus, pain, and perifollicular scale)  -current treatment: 0.9 cc ILK 10 mg/cc injected into scalp today, plaquenil 200 mg BID, DHS zinc shampoo every 2-3 days, derma-smoothe/FS 0.01% oil twice per week  - plaquenil safety labs done outside in 11/2017  - plan to start taper off of plaquenil at follow up  -Symptoms thought to start originally after imiquimod application      2. AKs     3. History of nonmelanoma skin cancer  -BCC on right nasal ala s/p Mohs 11/27/2017  -SCC of the scalp, s/p Mohs excision 2014  -History of 1-2 other NMSC of the scalp (BCCs)    Encounter Date: Jan 16, 2018    CC:  Chief Complaint   Patient presents with     Derm Problem     Hairloss follow up, Ester states \" it is the same.\"       History of Present Illness:  Ms. Esetr Barba is a 72 year old female who presents as a follow-up for lichen planopilaris. The patient was last seen 12/15/17 when 0.9 cc ILK was injected and she was continued on Dermasmoothe FS oil, DHS zinc shampoo, and Plaquenil 200 mg BID. Pictures of her Mohs excision site w/ear skin graft on R nasal ala was photographed. Today, she reports that nothing has changed. Her scalp continues to be very itchy, and is now painful to the touch, particularly at a \"scab\" she can feel on the top of her head. She has also noticed increased dryness/flaking. She notes that she is diligent about her 2x yearly eye checks while on Plaquenil, with the most recent exam being unremarkable.     In terms of her Mohs excision site with ear skin graft on her R nasal ala, she feels " "that it looks \"red,\" but it has not bled nor been painful.    Her final concern is that she has a \"pink rough spot\" on her R cheek. It has been present since Sept. She was told in the past that it was just a sun damage spot. However, she feels that it is changing and becoming thicker and grittier.     Past Medical History:   Patient Active Problem List   Diagnosis     Porokeratosis     Squamous cell carcinoma     Neoplasm of uncertain behavior     Lichen planopilaris     Dermatitis     Cicatricial alopecia     Keratosis, inflamed seborrheic     History of skin cancer     Vaginitis and vulvovaginitis     Basal cell carcinoma of vertex scalp s/p mohs 6-5-15     Medication management     Actinic keratosis     Medication monitoring encounter     Dermatitis, seborrheic     Past Medical History:   Diagnosis Date     Basal cell carcinoma      Squamous cell carcinoma      Past Surgical History:   Procedure Laterality Date     BIOPSY OF SKIN LESION       MOHS MICROGRAPHIC PROCEDURE       NO HISTORY OF SURGERY  2/17/14    derm     SMALL BOWEL RESECTION  11/2015    diverticulitis       Social History:  Not addressed at this visit.    Family History:  Sister has hx of BCC.    Medications:  Current Outpatient Prescriptions   Medication Sig Dispense Refill     hydroxychloroquine (PLAQUENIL) 200 MG tablet TAKE 1 TABLET(200 MG) BY MOUTH TWICE DAILY 180 tablet 0     triamcinolone acetonide (KENALOG) 10 MG/ML injection See med note 5 mL 0     clindamycin (CLEOCIN T) 1 % lotion Apply topically 2 times daily (Patient not taking: Reported on 11/27/2017) 60 mL 1     ketoconazole (NIZORAL) 2 % cream Mix 50/50 with desonide and apply twice daily to irritated area for 3 weeks (Patient not taking: Reported on 12/4/2017) 30 g 1     Fluocinolone Acetonide (DERMA-SMOOTHE/FS SCALP) 0.01 % OIL Apply once a week to scalp for lichen planopilaris 118 mL 2     NEW MED Biest 1.5mg/gram cream(pg free)  Insert 1 gram vaginally twice weekly as directed " 40 g 6     fluconazole (DIFLUCAN) 200 MG tablet Take 1 tablet (200 mg) by mouth daily (Patient not taking: Reported on 12/4/2017) 45 tablet 4     desonide (DESOWEN) 0.05 % cream Mix 50/50 with ketoconazole and apply twice daily to irritated area for 3 weeks (Patient not taking: Reported on 11/27/2017) 60 g 1     diphenhydrAMINE (BENADRYL) 25 MG capsule Take 25 mg by mouth every 6 hours as needed.       Calcium 1500 MG TABS Take  by mouth.       Loratadine (CLARITIN PO) Take  by mouth.       ibuprofen (ADVIL,MOTRIN) 800 MG tablet Take 800 mg by mouth every 8 hours as needed.       Cholecalciferol (VITAMIN D) 1000 UNIT capsule Take  by mouth. Total 2200 units daily.       Allergies   Allergen Reactions     Dust Mites Other (See Comments)     Sneezing, coughing. asthma     Levaquin [Levofloxacin Hemihydrate] Other (See Comments)     Muscle weakness     Mold Other (See Comments)     Sneezing, asthma, weezing     Pollen Extract/Tree Extract Other (See Comments)     Sneezing, weezing     Sulfa Drugs Hives     Penicillins Rash         Review of Systems:  -As per HPI  -Constitutional: The patient feels well at her baseline level of health.  -Skin: As above in HPI. No additional skin concerns.    Physical exam:  Vitals: /79  Pulse 85  GEN: This is a well developed, well-nourished female in no acute distress, in a pleasant mood.    SKIN: Focused examination of the scalp and face was performed.  -LPPAI score was 1 for pruritis, pain, perifollicular scale  -Scalp with large area of hair loss at the apex of the scalp. Scalp skin appears to be atrophic in this area with scattered telangectasias and several yellow-colored plaques of adherent yellow crust. Within this area, there are scattered, fine fibers. Hair pull test negative  -On R cheek there is a gritty, pink-colored papule  -On R nasal ala, mild area of erythema at site of graft. Much less red compared to prior photo.  -No other lesions of concern on areas examined.      Impression/Plan:  1. Lichen planopilaris - LPPAI 1.0 today for pruritus, pain, perfollicular scale, just as it was at last visit. Disease seems overall stable, however yellow plaques on the apex of scalp appear more pronounced than previously. Mild disease activity visible and scalp appears dry with scaly buildup. Discussed proceeding with ILK injections today and continuing current regimen, including resuming dermasmoothe FS oil twice weekly as opposed to once weekly. Plaquenil safety labs were tested outside clinic 11/2017 with good results. [facility] exam on 12/14/17 at [facility] unremarkable    Kenalog intralesional injection procedure note (performed by faculty): After verbal consent and discussion of risks including but not limited to atrophy, pain, and bruising,  time out was performed, 0.55 total cc of Kenalog 10 mg/cc was injected into 10 sites on the scalp at the site of the yellow plaques and the posterior scalp.  The patient tolerated the procedure well and left the Dermatology clinic in good condition.    Continue dermasmoothe/FS 0.01% oil twice weekly. Encouraged pt that this is key to control the disease.    Continue DHS zinc shampoo 4-5 times weekly     Continue plaquenil 200mg BID     Photographs taken for future reference      2. BCC s/p Mohs 11/27/2017 on the R nasal ala.     Wound with good healing, significantly improved compared to previous pictures.    New pictures taken today for further reference.    3.  AK on R cheek: gritty papule on R cheek that had been changing per pt. Tx with cryotherapy today.    Cryotherapy procedure note: After verbal consent and discussion of risks and benefits including but no limited to dyspigmentation/scar, blister, infection, recurrence,one lesion was  treated with 1-2mm freeze border for 2 cycles with liquid nitrogen. Post cryotherapy instructions were provided.     Reassess at next appt    Follow-up in 5 weeks, earlier for new or changing lesions.     Staff  Involved:  Scribed by Katerine Singh, MS3 for Dr. Soto.      I agree with the PFSH and ROS as completed by the Medical Student. The remainder of the encounter was performed by me and scribed by the Medical Student. The scribed note accurately reflects my personal services and the medical decisions made by me. ILK injections were primarily done by me; cryotherapy procedure was done together.       Barbara Soto MD  Professor and Chair  Department of Dermatology  [facility]                        Again, thank you for allowing me to participate in the care of your patient.      Sincerely,    Barbara Soto MD

## 2018-01-16 NOTE — PROGRESS NOTES
"[facility] Dermatology Note      Dermatology Problem List:  1. Lichen Planopilaris and Lichen planus     -LPPAI score: 1.0 (1 for pruritus, pain, and perifollicular scale)  -current treatment: 0.9 cc ILK 10 mg/cc injected into scalp today, plaquenil 200 mg BID, DHS zinc shampoo every 2-3 days, derma-smoothe/FS 0.01% oil twice per week  - plaquenil safety labs done outside in 11/2017  - plan to start taper off of plaquenil at follow up  -Symptoms thought to start originally after imiquimod application      2. AKs     3. History of nonmelanoma skin cancer  -BCC on right nasal ala s/p Mohs 11/27/2017  -SCC of the scalp, s/p Mohs excision 2014  -History of 1-2 other NMSC of the scalp (BCCs)    Encounter Date: Jan 16, 2018    CC:  Chief Complaint   Patient presents with     Derm Problem     Hairloss follow up, Ester states \" it is the same.\"       History of Present Illness:  Ms. Ester Barba is a 72 year old female who presents as a follow-up for lichen planopilaris. The patient was last seen 12/15/17 when 0.9 cc ILK was injected and she was continued on Dermasmoothe FS oil, DHS zinc shampoo, and Plaquenil 200 mg BID. Pictures of her Mohs excision site w/ear skin graft on R nasal ala was photographed. Today, she reports that nothing has changed. Her scalp continues to be very itchy, and is now painful to the touch, particularly at a \"scab\" she can feel on the top of her head. She has also noticed increased dryness/flaking. She notes that she is diligent about her 2x yearly eye checks while on Plaquenil, with the most recent exam being unremarkable.     In terms of her Mohs excision site with ear skin graft on her R nasal ala, she feels that it looks \"red,\" but it has not bled nor been painful.    Her final concern is that she has a \"pink rough spot\" on her R cheek. It has been present since Sept. She was told in the past that it was just a sun damage spot. However, she feels that it is changing and " becoming thicker and grittier.     Past Medical History:   Patient Active Problem List   Diagnosis     Porokeratosis     Squamous cell carcinoma     Neoplasm of uncertain behavior     Lichen planopilaris     Dermatitis     Cicatricial alopecia     Keratosis, inflamed seborrheic     History of skin cancer     Vaginitis and vulvovaginitis     Basal cell carcinoma of vertex scalp s/p mohs 6-5-15     Medication management     Actinic keratosis     Medication monitoring encounter     Dermatitis, seborrheic     Past Medical History:   Diagnosis Date     Basal cell carcinoma      Squamous cell carcinoma      Past Surgical History:   Procedure Laterality Date     BIOPSY OF SKIN LESION       MOHS MICROGRAPHIC PROCEDURE       NO HISTORY OF SURGERY  2/17/14    derm     SMALL BOWEL RESECTION  11/2015    diverticulitis       Social History:  Not addressed at this visit.    Family History:  Sister has hx of BCC.    Medications:  Current Outpatient Prescriptions   Medication Sig Dispense Refill     hydroxychloroquine (PLAQUENIL) 200 MG tablet TAKE 1 TABLET(200 MG) BY MOUTH TWICE DAILY 180 tablet 0     triamcinolone acetonide (KENALOG) 10 MG/ML injection See med note 5 mL 0     clindamycin (CLEOCIN T) 1 % lotion Apply topically 2 times daily (Patient not taking: Reported on 11/27/2017) 60 mL 1     ketoconazole (NIZORAL) 2 % cream Mix 50/50 with desonide and apply twice daily to irritated area for 3 weeks (Patient not taking: Reported on 12/4/2017) 30 g 1     Fluocinolone Acetonide (DERMA-SMOOTHE/FS SCALP) 0.01 % OIL Apply once a week to scalp for lichen planopilaris 118 mL 2     NEW MED Biest 1.5mg/gram cream(pg free)  Insert 1 gram vaginally twice weekly as directed 40 g 6     fluconazole (DIFLUCAN) 200 MG tablet Take 1 tablet (200 mg) by mouth daily (Patient not taking: Reported on 12/4/2017) 45 tablet 4     desonide (DESOWEN) 0.05 % cream Mix 50/50 with ketoconazole and apply twice daily to irritated area for 3 weeks (Patient  not taking: Reported on 11/27/2017) 60 g 1     diphenhydrAMINE (BENADRYL) 25 MG capsule Take 25 mg by mouth every 6 hours as needed.       Calcium 1500 MG TABS Take  by mouth.       Loratadine (CLARITIN PO) Take  by mouth.       ibuprofen (ADVIL,MOTRIN) 800 MG tablet Take 800 mg by mouth every 8 hours as needed.       Cholecalciferol (VITAMIN D) 1000 UNIT capsule Take  by mouth. Total 2200 units daily.       Allergies   Allergen Reactions     Dust Mites Other (See Comments)     Sneezing, coughing. asthma     Levaquin [Levofloxacin Hemihydrate] Other (See Comments)     Muscle weakness     Mold Other (See Comments)     Sneezing, asthma, weezing     Pollen Extract/Tree Extract Other (See Comments)     Sneezing, weezing     Sulfa Drugs Hives     Penicillins Rash         Review of Systems:  -As per HPI  -Constitutional: The patient feels well at her baseline level of health.  -Skin: As above in HPI. No additional skin concerns.    Physical exam:  Vitals: /79  Pulse 85  GEN: This is a well developed, well-nourished female in no acute distress, in a pleasant mood.    SKIN: Focused examination of the scalp and face was performed.  -LPPAI score was 1 for pruritis, pain, perifollicular scale  -Scalp with large area of hair loss at the apex of the scalp. Scalp skin appears to be atrophic in this area with scattered telangectasias and several yellow-colored plaques of adherent yellow crust. Within this area, there are scattered, fine fibers. Hair pull test negative  -On R cheek there is a gritty, pink-colored papule  -On R nasal ala, mild area of erythema at site of graft. Much less red compared to prior photo.  -No other lesions of concern on areas examined.     Impression/Plan:  1. Lichen planopilaris - LPPAI 1.0 today for pruritus, pain, perfollicular scale, just as it was at last visit. Disease seems overall stable, however yellow plaques on the apex of scalp appear more pronounced than previously. Mild disease  activity visible and scalp appears dry with scaly buildup. Discussed proceeding with ILK injections today and continuing current regimen, including resuming dermasmoothe FS oil twice weekly as opposed to once weekly. Plaquenil safety labs were tested outside clinic 11/2017 with good results. Eye exam on 12/14/17 at [facility] unremarkable    Kenalog intralesional injection procedure note (performed by faculty): After verbal consent and discussion of risks including but not limited to atrophy, pain, and bruising,  time out was performed, 0.55 total cc of Kenalog 10 mg/cc was injected into 10 sites on the scalp at the site of the yellow plaques and the posterior scalp.  The patient tolerated the procedure well and left the Dermatology clinic in good condition.    Continue dermasmoothe/FS 0.01% oil twice weekly. Encouraged pt that this is key to control the disease.    Continue DHS zinc shampoo 4-5 times weekly     Continue plaquenil 200mg BID     Photographs taken for future reference      2. BCC s/p Mohs 11/27/2017 on the R nasal ala.     Wound with good healing, significantly improved compared to previous pictures.    New pictures taken today for further reference.    3.  AK on R cheek: gritty papule on R cheek that had been changing per pt. Tx with cryotherapy today.    Cryotherapy procedure note: After verbal consent and discussion of risks and benefits including but no limited to dyspigmentation/scar, blister, infection, recurrence,one lesion was  treated with 1-2mm freeze border for 2 cycles with liquid nitrogen. Post cryotherapy instructions were provided.     Reassess at next appt    Follow-up in 5 weeks, earlier for new or changing lesions.     Staff Involved:  Scribed by Katerine Singh, MS3 for Dr. Soto.      I agree with the PFSH and ROS as completed by the Medical Student. The remainder of the encounter was performed by me and scribed by the Medical Student. The scribed note accurately reflects my  personal services and the medical decisions made by me. ILK injections were primarily done by me; cryotherapy procedure was done together.       Barbara Soto MD  Professor and Chair  Department of Dermatology  [facility]

## 2018-02-27 ENCOUNTER — OFFICE VISIT (OUTPATIENT)
Dept: DERMATOLOGY | Facility: CLINIC | Age: 73
End: 2018-02-27
Payer: COMMERCIAL

## 2018-02-27 DIAGNOSIS — L30.9 DERMATITIS: ICD-10-CM

## 2018-02-27 DIAGNOSIS — C44.41 BASAL CELL CARCINOMA OF SCALP: ICD-10-CM

## 2018-02-27 DIAGNOSIS — L66.10 LICHEN PLANOPILARIS: Primary | ICD-10-CM

## 2018-02-27 DIAGNOSIS — L57.0 ACTINIC KERATOSIS: ICD-10-CM

## 2018-02-27 ASSESSMENT — PAIN SCALES - GENERAL: PAINLEVEL: MILD PAIN (3)

## 2018-02-27 NOTE — LETTER
2/27/2018       RE: Ester Barba  [address] [place] [address]     Dear Colleague,    Thank you for referring your patient, Ester Barba, to the [facility] DERMATOLOGY at [facility]. Please see a copy of my visit note below.    [facility] Dermatology Note      Dermatology Problem List:   Lichen Planopilaris      -current treatment: ILK 10 mg/cc injected into scalp today, plaquenil 200 mg BID, DHS zinc shampoo every 2-3 days, derma-smoothe/FS 0.01% oil twice per week  - plaquenil safety labs done outside in 11/2017  - plan to start taper off of plaquenil at follow up  -Symptoms thought to start originally after imiquimod application       2. AKs - lesion on Right cheek treated at last visit, will monitor for resolution, biopsy may be indicated.      3. History of nonmelanoma skin cancer  -BCC on right nasal ala s/p Mohs 11/27/2017  -SCC of the scalp, s/p Mohs excision 2014  -History of 1-2 other NMSC of the scalp (BCCs)    CC:   Chief Complaint   Patient presents with     Hair Loss     Ester is here today for a hair loss follow up. Ester notes: No change.          Encounter Date: Feb 27, 2018    History of Present Illness:  Ms. Ester Barba is a 72 year old female who primarily comes today to follow-up on her LPP. She reports only mild  scalp pain and no burning or itching. She notes her recent skin cancer site is also healing well. Ester continues on Dermasmoothe FS oil, DHS zinc shampoo, and Plaquenil 200 mg BID.    Past Medical History:   Patient Active Problem List   Diagnosis     Porokeratosis     Squamous cell carcinoma     Neoplasm of uncertain behavior     Lichen planopilaris     Dermatitis     Cicatricial alopecia     Keratosis, inflamed seborrheic     History of skin cancer     Vaginitis and vulvovaginitis     Basal cell carcinoma of vertex scalp s/p mohs 6-5-15     Medication management     Actinic keratosis     Medication monitoring encounter      Dermatitis, seborrheic     Past Medical History:   Diagnosis Date     Basal cell carcinoma      Squamous cell carcinoma      Past Surgical History:   Procedure Laterality Date     BIOPSY OF SKIN LESION       MOHS MICROGRAPHIC PROCEDURE       NO HISTORY OF SURGERY  2/17/14    derm     SMALL BOWEL RESECTION  11/2015    diverticulitis       Social History:  The patient  Is retured.    Family History:  Sister has a history of BCC    Medications:  Current Outpatient Prescriptions   Medication Sig Dispense Refill     hydroxychloroquine (PLAQUENIL) 200 MG tablet TAKE 1 TABLET(200 MG) BY MOUTH TWICE DAILY 180 tablet 0     triamcinolone acetonide (KENALOG) 10 MG/ML injection See med note 5 mL 0     clindamycin (CLEOCIN T) 1 % lotion Apply topically 2 times daily 60 mL 1     ketoconazole (NIZORAL) 2 % cream Mix 50/50 with desonide and apply twice daily to irritated area for 3 weeks 30 g 1     Fluocinolone Acetonide (DERMA-SMOOTHE/FS SCALP) 0.01 % OIL Apply once a week to scalp for lichen planopilaris 118 mL 2     NEW MED Biest 1.5mg/gram cream(pg free)  Insert 1 gram vaginally twice weekly as directed 40 g 6     fluconazole (DIFLUCAN) 200 MG tablet Take 1 tablet (200 mg) by mouth daily 45 tablet 4     desonide (DESOWEN) 0.05 % cream Mix 50/50 with ketoconazole and apply twice daily to irritated area for 3 weeks 60 g 1     diphenhydrAMINE (BENADRYL) 25 MG capsule Take 25 mg by mouth every 6 hours as needed.       Calcium 1500 MG TABS Take  by mouth.       Loratadine (CLARITIN PO) Take  by mouth.       ibuprofen (ADVIL,MOTRIN) 800 MG tablet Take 800 mg by mouth every 8 hours as needed.       Cholecalciferol (VITAMIN D) 1000 UNIT capsule Take  by mouth. Total 2200 units daily.       Allergies   Allergen Reactions     Dust Mites Other (See Comments)     Sneezing, coughing. asthma     Levaquin [Levofloxacin Hemihydrate] Other (See Comments)     Muscle weakness     Mold Other (See Comments)     Sneezing, asthma, weezing     Pollen  Extract/Tree Extract Other (See Comments)     Sneezing, weezing     Sulfa Drugs Hives     Penicillins Rash         Review of Systems:  -Constitutional: The patient denies fatigue, fevers, chills, unintended weight loss, and night sweats.  -HEENT: Patient denies nonhealing oral sores.  -Skin: As above in HPI. No additional skin concerns.    Physical exam:  Vitals: There were no vitals taken for this visit.  GEN: This is a well developed, well-nourished female in no acute distress, in a pleasant mood.    SKIN: Examination was focused primarily to the scalp and face.   Mild perifollicular scale noted around the most involved area affecting the mid scalp.   Disease extent appears to be less than at previous visits.   LPPAI score is ..67 which is improved  Only focal areas of crust present  Hair pull tests are negative. There are no pustules.   Right cheek now there is an area of pinkness but not discrete gritty papule is noted  On the right nasal ala, the site of skin graft/Mohs surgery site continues to show improvement  -No other lesions of concern on areas examined.     Impression/Plan:  1. Lichen planopilaris - LPPAI .67 today for mild pain, perfollicular scale. . Disease seems to be improving , however a few yellow plaques on the apex of scalp persist. . Discussed proceeding with ILK injections today and continuing current regimen, including resuming dermasmoothe FS oil twice weekly as opposed to once weekly. Plaquenil safety labs were tested outside clinic 11/2017 with good results. Eye exam on 12/14/17 at [facility] unremarkable    Kenalog intralesional injection procedure note (performed by faculty): After verbal consent and discussion of risks including but not limited to atrophy, pain, and bruising,  time out was performed, 0.6  total cc of Kenalog 10 mg/cc was injected into 10 sites on the scalp including sites with yellow plaques.  The patient tolerated the procedure well and left the Dermatology clinic in  good condition.    Continue dermasmoothe/FS 0.01% oil twice weekly.    Continue DHS zinc shampoo 4-5 times weekly     Continue plaquenil 200mg BID     Photographs taken for future reference      2. Actinic keratosis, right cheek, treated at last visit, faye    3. HIstory of non melanoma skin cancer - will need regularly scheduled skin checks.     Barbara Soto MD  Professor and Chair  Department of Dermatology  [facility]    The following medication was given:     MEDICATION:  Kenalog 10 mg  ROUTE: ID  SITE: scalp  DOSE: 10mg/1cc  LOT #: [id number]  : [employer]  EXPIRATION DATE: 4/19  NDC#: [id number]   Was there drug waste? Yes  Amount of drug waste (mL): 4mL.  Reason for waste:  Single use vial      Daphnie Vazquez CMA  February 27, 2018    Again, thank you for allowing me to participate in the care of your patient.      Sincerely,    Barbara Soto MD

## 2018-02-27 NOTE — MR AVS SNAPSHOT
After Visit Summary   2/27/2018    Ester Barba    MRN: [id number]           Patient Information     Date Of Birth          1945        Visit Information        Provider Department      2/27/2018 2:30 PM Barbara Soto MD [facility] Dermatology        Care Instructions    3/27/18 @1:20          Follow-ups after your visit        Your next 10 appointments already scheduled     Mar 14, 2018 10:45 AM CDT   Return Visit with Janeth Mayfield MD   [facility] ([facility])    [facility] [address]  [address]  [facility] [address] [phone]              Who to contact     Please call your clinic at [phone] to:    Ask questions about your health    Make or cancel appointments    Discuss your medicines    Learn about your test results    Speak to your doctor            Additional Information About Your Visit        MyChart Information     [employer] gives you secure access to your electronic health record. If you see a primary care provider, you can also send messages to your care team and make appointments. If you have questions, please call your primary care clinic.  If you do not have a primary care provider, please call [phone] and they will assist you.      [employer] is an electronic gateway that provides easy, online access to your medical records. With [employer], you can request a clinic appointment, read your test results, renew a prescription or communicate with your care team.     To access your existing account, please contact your [facility] or call [phone] for assistance.        Care EveryWhere ID     This is your Care EveryWhere ID. This could be used by other organizations to access your [facility] medical records  [id number]         Blood Pressure from Last 3 Encounters:   01/16/18 130/79   12/15/17 143/78   11/27/17 146/69    Weight from Last 3 Encounters:   06/28/17  59.1 kg (130 lb 6.4 oz)   01/18/17 61.9 kg (136 lb 6.4 oz)   03/14/16 58.1 kg (128 lb)              Today, you had the following     No orders found for display       Primary Care Provider Office Phone # Fax #    Fernando Weathers [phone]       [facility] [address] [place] [address]        Equal Access to Services     FIDENCIO MCDANIEL : Hadii aad ku hadasho Soomaali, waaxda luqadaha, qaybta kaalmada adeegyada, waxay idiin hayaan adeeg ronald layoli . So [facility] [phone].    ATENCIÓN: Si kaycee capellan, tiene a pelayo disposición servicios gratuitos de asistencia lingüística. Daeame al [phone].    We comply with applicable federal civil rights laws and [place] laws. We do not discriminate on the basis of race, color, national origin, age, disability, sex, sexual orientation, or gender identity.            Thank you!     Thank you for choosing [facility] DERMATOLOGY  for your care. Our goal is always to provide you with excellent care. Hearing back from our patients is one way we can continue to improve our services. Please take a few minutes to complete the written survey that you may receive in the mail after your visit with us. Thank you!             Your Updated Medication List - Protect others around you: Learn how to safely use, store and throw away your medicines at www.disposemymeds.org.          This list is accurate as of 2/27/18  3:24 PM.  Always use your most recent med list.                   Brand Name Dispense Instructions for use Diagnosis    BENADRYL 25 MG capsule   Generic drug:  diphenhydrAMINE      Take 25 mg by mouth every 6 hours as needed.        calcium 1500 MG Tabs      Take  by mouth.        CLARITIN PO      Take  by mouth.        clindamycin 1 % lotion    CLEOCIN T    60 mL    Apply topically 2 times daily    Acne rosacea       DERMA-SMOOTHE/FS SCALP 0.01 % Oil oil   Generic drug:  Fluocinolone Acetonide Scalp     118 mL    Apply once a week to scalp for  lichen planopilaris    Lichen planopilaris       desonide 0.05 % cream    DESOWEN    60 g    Mix 50/50 with ketoconazole and apply twice daily to irritated area for 3 weeks    Intertrigo       fluconazole 200 MG tablet    DIFLUCAN    45 tablet    Take 1 tablet (200 mg) by mouth daily    Yeast infection of the vagina       hydroxychloroquine 200 MG tablet    PLAQUENIL    180 tablet    TAKE 1 TABLET(200 MG) BY MOUTH TWICE DAILY    Lichen plano-pilaris       ibuprofen 800 MG tablet    ADVIL/MOTRIN     Take 800 mg by mouth every 8 hours as needed.        ketoconazole 2 % cream    NIZORAL    30 g    Mix 50/50 with desonide and apply twice daily to irritated area for 3 weeks    Intertrigo       NEW MED     40 g    Biest 1.5mg/gram cream(pg free)  Insert 1 gram vaginally twice weekly as directed    Vulvar atrophy       triamcinolone acetonide 10 MG/ML injection    KENALOG    5 mL    See med note    Lichen planopilaris       vitamin D 1000 UNITS capsule      Take  by mouth. Total 2200 units daily.

## 2018-02-27 NOTE — PROGRESS NOTES
The following medication was given:     MEDICATION:  Kenalog 10 mg  ROUTE: ID  SITE: scalp  DOSE: 10mg/1cc  LOT #: [id number]  : [employer]  EXPIRATION DATE: 4/19  NDC#: [id number]   Was there drug waste? Yes  Amount of drug waste (mL): 4mL.  Reason for waste:  Single use vial      Daphnie Vazquez CMA  February 27, 2018

## 2018-02-27 NOTE — PROGRESS NOTES
[facility] Dermatology Note      Dermatology Problem List:   Lichen Planopilaris      -current treatment: ILK 10 mg/cc injected into scalp today, plaquenil 200 mg BID, DHS zinc shampoo every 2-3 days, derma-smoothe/FS 0.01% oil twice per week  - plaquenil safety labs done outside in 11/2017  - plan to start taper off of plaquenil at follow up  -Symptoms thought to start originally after imiquimod application       2. AKs - lesion on Right cheek treated at last visit, will monitor for resolution, biopsy may be indicated.      3. History of nonmelanoma skin cancer  -BCC on right nasal ala s/p Mohs 11/27/2017  -SCC of the scalp, s/p Mohs excision 2014  -History of 1-2 other NMSC of the scalp (BCCs)    CC:   Chief Complaint   Patient presents with     Hair Loss     Ester is here today for a hair loss follow up. Ester notes: No change.          Encounter Date: Feb 27, 2018    History of Present Illness:  Ms. Ester Barba is a 72 year old female who primarily comes today to follow-up on her LPP. She reports only mild  scalp pain and no burning or itching. She notes her recent skin cancer site is also healing well. Ester continues on Dermasmoothe FS oil, DHS zinc shampoo, and Plaquenil 200 mg BID.    Past Medical History:   Patient Active Problem List   Diagnosis     Porokeratosis     Squamous cell carcinoma     Neoplasm of uncertain behavior     Lichen planopilaris     Dermatitis     Cicatricial alopecia     Keratosis, inflamed seborrheic     History of skin cancer     Vaginitis and vulvovaginitis     Basal cell carcinoma of vertex scalp s/p mohs 6-5-15     Medication management     Actinic keratosis     Medication monitoring encounter     Dermatitis, seborrheic     Past Medical History:   Diagnosis Date     Basal cell carcinoma      Squamous cell carcinoma      Past Surgical History:   Procedure Laterality Date     BIOPSY OF SKIN LESION       MOHS MICROGRAPHIC PROCEDURE       NO HISTORY OF SURGERY   2/17/14    derm     SMALL BOWEL RESECTION  11/2015    diverticulitis       Social History:  The patient  Is retured.    Family History:  Sister has a history of BCC    Medications:  Current Outpatient Prescriptions   Medication Sig Dispense Refill     hydroxychloroquine (PLAQUENIL) 200 MG tablet TAKE 1 TABLET(200 MG) BY MOUTH TWICE DAILY 180 tablet 0     triamcinolone acetonide (KENALOG) 10 MG/ML injection See med note 5 mL 0     clindamycin (CLEOCIN T) 1 % lotion Apply topically 2 times daily 60 mL 1     ketoconazole (NIZORAL) 2 % cream Mix 50/50 with desonide and apply twice daily to irritated area for 3 weeks 30 g 1     Fluocinolone Acetonide (DERMA-SMOOTHE/FS SCALP) 0.01 % OIL Apply once a week to scalp for lichen planopilaris 118 mL 2     NEW MED Biest 1.5mg/gram cream(pg free)  Insert 1 gram vaginally twice weekly as directed 40 g 6     fluconazole (DIFLUCAN) 200 MG tablet Take 1 tablet (200 mg) by mouth daily 45 tablet 4     desonide (DESOWEN) 0.05 % cream Mix 50/50 with ketoconazole and apply twice daily to irritated area for 3 weeks 60 g 1     diphenhydrAMINE (BENADRYL) 25 MG capsule Take 25 mg by mouth every 6 hours as needed.       Calcium 1500 MG TABS Take  by mouth.       Loratadine (CLARITIN PO) Take  by mouth.       ibuprofen (ADVIL,MOTRIN) 800 MG tablet Take 800 mg by mouth every 8 hours as needed.       Cholecalciferol (VITAMIN D) 1000 UNIT capsule Take  by mouth. Total 2200 units daily.       Allergies   Allergen Reactions     Dust Mites Other (See Comments)     Sneezing, coughing. asthma     Levaquin [Levofloxacin Hemihydrate] Other (See Comments)     Muscle weakness     Mold Other (See Comments)     Sneezing, asthma, weezing     Pollen Extract/Tree Extract Other (See Comments)     Sneezing, weezing     Sulfa Drugs Hives     Penicillins Rash         Review of Systems:  -Constitutional: The patient denies fatigue, fevers, chills, unintended weight loss, and night sweats.  -HEENT: Patient denies  nonhealing oral sores.  -Skin: As above in HPI. No additional skin concerns.    Physical exam:  Vitals: There were no vitals taken for this visit.  GEN: This is a well developed, well-nourished female in no acute distress, in a pleasant mood.    SKIN: Examination was focused primarily to the scalp and face.   Mild perifollicular scale noted around the most involved area affecting the mid scalp.   Disease extent appears to be less than at previous visits.   LPPAI score is ..67 which is improved  Only focal areas of crust present  Hair pull tests are negative. There are no pustules.   Right cheek now there is an area of pinkness but not discrete gritty papule is noted  On the right nasal ala, the site of skin graft/Mohs surgery site continues to show improvement  -No other lesions of concern on areas examined.     Impression/Plan:  1. Lichen planopilaris - LPPAI .67 today for mild pain, perfollicular scale. . Disease seems to be improving , however a few yellow plaques on the apex of scalp persist. . Discussed proceeding with ILK injections today and continuing current regimen, including resuming dermasmoothe FS oil twice weekly as opposed to once weekly. Plaquenil safety labs were tested outside clinic 11/2017 with good results. [facility] exam on 12/14/17 at [facility] unremarkable    Kenalog intralesional injection procedure note (performed by faculty): After verbal consent and discussion of risks including but not limited to atrophy, pain, and bruising,  time out was performed, 0.6  total cc of Kenalog 10 mg/cc was injected into 10 sites on the scalp including sites with yellow plaques.  The patient tolerated the procedure well and left the Dermatology clinic in good condition.    Continue dermasmoothe/FS 0.01% oil twice weekly.    Continue DHS zinc shampoo 4-5 times weekly     Continue plaquenil 200mg BID     Photographs taken for future reference      2. Actinic keratosis, right cheek, treated at last visit,  faye    3. HIstory of non melanoma skin cancer - will need regularly scheduled skin checks.     Barbara Soto MD  Professor and Chair  Department of Dermatology  [facility]

## 2018-02-27 NOTE — NURSING NOTE
Dermatology Rooming Note    Ester Barba's goals for this visit include:   Chief Complaint   Patient presents with     Hair Loss     Ester is here today for a hair loss follow up. Ester notes: No change.      PIERCE Glaser

## 2018-03-14 ENCOUNTER — OFFICE VISIT (OUTPATIENT)
Dept: OBGYN | Facility: CLINIC | Age: 73
End: 2018-03-14
Attending: OBSTETRICS & GYNECOLOGY
Payer: MEDICARE

## 2018-03-14 VITALS
BODY MASS INDEX: 21.38 KG/M2 | SYSTOLIC BLOOD PRESSURE: 124 MMHG | HEART RATE: 69 BPM | HEIGHT: 66 IN | WEIGHT: 133 LBS | DIASTOLIC BLOOD PRESSURE: 76 MMHG

## 2018-03-14 DIAGNOSIS — L66.10 LICHEN PLANOPILARIS: Primary | ICD-10-CM

## 2018-03-14 PROCEDURE — G0463 HOSPITAL OUTPT CLINIC VISIT: HCPCS | Mod: ZF

## 2018-03-14 NOTE — LETTER
"3/14/2018       RE: Ester Barba  [address]  [place] [address]     Dear Colleague,    Thank you for referring your patient, Ester Barba, to the [facility] at [facility]. Please see a copy of my visit note below.    S: 73 yo Post Menopausal woman with lichen sclerosus. Is using triamcinolone, clobetasol intermittently and estradiol topical weekly.   States felt a \"skin tag\" on left labium majorum recently in shower.     Patient Active Problem List   Diagnosis     Porokeratosis     Squamous cell carcinoma     Neoplasm of uncertain behavior     Lichen planopilaris     Dermatitis     Cicatricial alopecia     Keratosis, inflamed seborrheic     History of skin cancer     Vaginitis and vulvovaginitis     Basal cell carcinoma of vertex scalp s/p mohs 6-5-15     Medication management     Actinic keratosis     Medication monitoring encounter     Dermatitis, seborrheic     Past Medical History:   Diagnosis Date     Basal cell carcinoma      Squamous cell carcinoma      /76  Pulse 69  Ht 1.676 m (5' 6\")  Wt 60.3 kg (133 lb)  BMI 21.47 kg/m2      Appears well    Pelvic Exam:  Vulva: severe atrophy, no visible lesions, no thickened epithelium, 1x1mm palpable firm area lower left labium majorum which is slightly tender.   Agglutination and simplification is stable and unchanged.  Vagina: Dry, pale, no abnormal discharge, nonrugated, no lesions  Cervix: atrophic, small  Uterus: Normal size, anteverted, non-tender, mobile  Ovaries: No mass, non-tender, mobile  Rectal exam: No mass, non-tender, normal sphincter tone, hemoccult negative    A: Lichen planopilaris: stable and well managed  Small sebaceous cyst left labium majorum: call if enlarged or infected    P: see Imelda as scheduled, see me Q 6 months to annually      Janeth Mayfield    "

## 2018-03-14 NOTE — PROGRESS NOTES
"S: 73 yo Post Menopausal woman with lichen sclerosus. Is using triamcinolone, clobetasol intermittently and estradiol topical weekly.   States felt a \"skin tag\" on left labium majorum recently in shower.     Patient Active Problem List   Diagnosis     Porokeratosis     Squamous cell carcinoma     Neoplasm of uncertain behavior     Lichen planopilaris     Dermatitis     Cicatricial alopecia     Keratosis, inflamed seborrheic     History of skin cancer     Vaginitis and vulvovaginitis     Basal cell carcinoma of vertex scalp s/p mohs 6-5-15     Medication management     Actinic keratosis     Medication monitoring encounter     Dermatitis, seborrheic     Past Medical History:   Diagnosis Date     Basal cell carcinoma      Squamous cell carcinoma      /76  Pulse 69  Ht 1.676 m (5' 6\")  Wt 60.3 kg (133 lb)  BMI 21.47 kg/m2      Appears well    Pelvic Exam:  Vulva: severe atrophy, no visible lesions, no thickened epithelium, 1x1mm palpable firm area lower left labium majorum which is slightly tender.   Agglutination and simplification is stable and unchanged.  Vagina: Dry, pale, no abnormal discharge, nonrugated, no lesions  Cervix: atrophic, small  Uterus: Normal size, anteverted, non-tender, mobile  Ovaries: No mass, non-tender, mobile  Rectal exam: No mass, non-tender, normal sphincter tone, hemoccult negative    A: Lichen planopilaris: stable and well managed  Small sebaceous cyst left labium majorum: call if enlarged or infected    P: see Imelda as scheduled, see me Q 6 months to annually  Janeth Mayfield            "

## 2018-03-14 NOTE — MR AVS SNAPSHOT
"              After Visit Summary   3/14/2018    Ester Barba    MRN: [id number]           Patient Information     Date Of Birth          1945        Visit Information        Provider Department      3/14/2018 10:45 AM Janeth Mayfield MD [facility]        Today's Diagnoses     Lichen planopilaris    -  1       Follow-ups after your visit        Your next 10 appointments already scheduled     Mar 27, 2018  1:20 PM CDT   (Arrive by 1:05 PM)   RETURN HAIRLOSS with Barbara Soto MD   [facility] ([facility])    [address] [place] [id number] [phone]              Who to contact     Please call your clinic at [phone] to:    Ask questions about your health    Make or cancel appointments    Discuss your medicines    Learn about your test results    Speak to your doctor            Additional Information About Your Visit        MyChart Information     [employer] gives you secure access to your electronic health record. If you see a primary care provider, you can also send messages to your care team and make appointments. If you have questions, please call your primary care clinic.  If you do not have a primary care provider, please call [phone] and they will assist you.      [employer] is an electronic gateway that provides easy, online access to your medical records. With [employer], you can request a clinic appointment, read your test results, renew a prescription or communicate with your care team.     To access your existing account, please contact your [facility] or call [phone] for assistance.        Care EveryWhere ID     This is your Care EveryWhere ID. This could be used by other organizations to access your [facility] medical records  [id number]        Your Vitals Were     Pulse Height BMI (Body Mass Index)             69 1.676 m (5' 6\") 21.47 kg/m2          Blood " Pressure from Last 3 Encounters:   03/14/18 124/76   01/16/18 130/79   12/15/17 143/78    Weight from Last 3 Encounters:   03/14/18 60.3 kg (133 lb)   06/28/17 59.1 kg (130 lb 6.4 oz)   01/18/17 61.9 kg (136 lb 6.4 oz)              Today, you had the following     No orders found for display       Primary Care Provider Office Phone # Fax #    Fernando Weathers [phone]       [facility] [address]  [facility] [address]        Equal Access to Services     [facility]: Hadii alexi rivas Someeta, wachiara montes, qaphilippe kaalmarosangela canales, samuel mcclain . So [facility] [phone].    ATENCIÓN: Si habla español, tiene a pelayo disposición servicios gratuitos de asistencia lingüística. [facility] [phone].    We comply with applicable federal civil rights laws and [place] laws. We do not discriminate on the basis of race, color, national origin, age, disability, sex, sexual orientation, or gender identity.            Thank you!     Thank you for choosing [facility]  for your care. Our goal is always to provide you with excellent care. Hearing back from our patients is one way we can continue to improve our services. Please take a few minutes to complete the written survey that you may receive in the mail after your visit with us. Thank you!             Your Updated Medication List - Protect others around you: Learn how to safely use, store and throw away your medicines at www.disposemymeds.org.          This list is accurate as of 3/14/18  3:16 PM.  Always use your most recent med list.                   Brand Name Dispense Instructions for use Diagnosis    BENADRYL 25 MG capsule   Generic drug:  diphenhydrAMINE      Take 25 mg by mouth every 6 hours as needed.        calcium 1500 MG Tabs      Take  by mouth.        CLARITIN PO      Take  by mouth.        clindamycin 1 % lotion    CLEOCIN T    60 mL    Apply topically 2 times daily    Acne  rosacea       DERMA-SMOOTHE/FS SCALP 0.01 % Oil oil   Generic drug:  Fluocinolone Acetonide Scalp     118 mL    Apply once a week to scalp for lichen planopilaris    Lichen planopilaris       desonide 0.05 % cream    DESOWEN    60 g    Mix 50/50 with ketoconazole and apply twice daily to irritated area for 3 weeks    Intertrigo       fluconazole 200 MG tablet    DIFLUCAN    45 tablet    Take 1 tablet (200 mg) by mouth daily    Yeast infection of the vagina       hydroxychloroquine 200 MG tablet    PLAQUENIL    180 tablet    TAKE 1 TABLET(200 MG) BY MOUTH TWICE DAILY    Lichen plano-pilaris       ibuprofen 800 MG tablet    ADVIL/MOTRIN     Take 800 mg by mouth every 8 hours as needed.        ketoconazole 2 % cream    NIZORAL    30 g    Mix 50/50 with desonide and apply twice daily to irritated area for 3 weeks    Intertrigo       NEW MED     40 g    Biest 1.5mg/gram cream(pg free)  Insert 1 gram vaginally twice weekly as directed    Vulvar atrophy       triamcinolone acetonide 10 MG/ML injection    KENALOG    5 mL    See med note    Lichen planopilaris       vitamin D 1000 UNITS capsule      Take  by mouth. Total 2200 units daily.

## 2018-03-20 DIAGNOSIS — L66.10 LICHEN PLANO-PILARIS: ICD-10-CM

## 2018-03-21 RX ORDER — HYDROXYCHLOROQUINE SULFATE 200 MG/1
TABLET, FILM COATED ORAL
Qty: 180 TABLET | Refills: 0 | Status: SHIPPED | OUTPATIENT
Start: 2018-03-21 | End: 2019-08-02

## 2018-03-21 NOTE — TELEPHONE ENCOUNTER
Last seen 2/27/18: f/u scheduled for 3/27/18  1. Lichen planopilaris - LPPAI .67 today for mild pain, perfollicular scale. . Disease seems to be improving , however a few yellow plaques on the apex of scalp persist. . Discussed proceeding with ILK injections today and continuing current regimen, including resuming dermasmoothe FS oil twice weekly as opposed to once weekly. Plaquenil safety labs were tested outside clinic 11/2017 with good results. Eye exam on 12/14/17 at [facility] unremarkable    Kenalog intralesional injection procedure note (performed by faculty): After verbal consent and discussion of risks including but not limited to atrophy, pain, and bruising,  time out was performed, 0.6  total cc of Kenalog 10 mg/cc was injected into 10 sites on the scalp including sites with yellow plaques.  The patient tolerated the procedure well and left the Dermatology clinic in good condition.    Continue dermasmoothe/FS 0.01% oil twice weekly.    Continue DHS zinc shampoo 4-5 times weekly     Continue plaquenil 200mg BID     Photographs taken for future reference

## 2018-03-27 ENCOUNTER — OFFICE VISIT (OUTPATIENT)
Dept: DERMATOLOGY | Facility: CLINIC | Age: 73
End: 2018-03-27
Payer: COMMERCIAL

## 2018-03-27 VITALS — SYSTOLIC BLOOD PRESSURE: 118 MMHG | DIASTOLIC BLOOD PRESSURE: 75 MMHG

## 2018-03-27 DIAGNOSIS — L57.0 ACTINIC KERATOSIS: ICD-10-CM

## 2018-03-27 DIAGNOSIS — Z85.828 HISTORY OF NONMELANOMA SKIN CANCER: Primary | ICD-10-CM

## 2018-03-27 DIAGNOSIS — L66.10 LICHEN PLANOPILARIS: ICD-10-CM

## 2018-03-27 ASSESSMENT — PAIN SCALES - GENERAL: PAINLEVEL: NO PAIN (0)

## 2018-03-27 NOTE — LETTER
"3/27/2018       RE: Ester Barba  [address] [place] [address]     Dear Colleague,    Thank you for referring your patient, Ester Barba, to the [facility] DERMATOLOGY at [facility]. Please see a copy of my visit note below.    [facility] Dermatology Note      Dermatology Problem List:  1.  LPP. Last LPPAI 0.67 3/27/18 stable from prior.       -current treatment: ILK 10 mg/cc injected into scalp today, plaquenil 200 mg BID, DHS zinc shampoo every 2-3 days,  derma-smoothe/FS 0.01% oil twice per week  - plaquenil safety labs done outside in 11/2017  - plan to start taper off of plaquenil at follow up  -Symptoms thought to start originally after imiquimod application   2. Hx NMSC.   -BCC on right nasal ala s/p Mohs 11/27/2017  -SCC of the scalp, s/p Mohs excision 2014  -History of 1-2 other NMSC of the scalp (BCCs)    CC:   Chief Complaint   Patient presents with     Hair Loss     Ester is here today for a 1 month hair loss follow up. Ester notes\" Everything is the same\"        Encounter Date: Mar 27, 2018    History of Present Illness:  Ms. Ester Barba is a 72 year old female who primarily comes today to follow-up on her LPP. Last seen in 2/23/2018 she had a total of 0.6 cc of ILK injections to few active areas of LPP in the scalp.     Today, she reports her hair loss is stable from prior. She reports mild scalp pain particularly with manipulation of hair in the periphery of the alopecia patch on her vertex scalp. She continues to take plaquenil 200 mg PO BID and is using DHS zinc shampoo TIW and dermasmoothe oil 2-3 times weekly.     Of note, has had an AK on the R cheek treated with cryotherapy 2 visits ago. Still notices a small \"bump\" in that area. 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 Chopra many years ago. Has noticed a small bump in that area that has always been present. No associated pain, pruritus or " "burning.     Finally, patient has had recent MMS on the right nasal ala in 11/2017. She reports some scaling and possible \"bump\" in the corner over the graft that was placed there. No associated pain, tenderness or bleeding.     Health otherwise stable. No other skin concerns.     Past Medical History:   Patient Active Problem List   Diagnosis     Porokeratosis     Squamous cell carcinoma     Neoplasm of uncertain behavior     Lichen planopilaris     Dermatitis     Cicatricial alopecia     Keratosis, inflamed seborrheic     History of skin cancer     Vaginitis and vulvovaginitis     Basal cell carcinoma of vertex scalp s/p mohs 6-5-15     Medication management     Actinic keratosis     Medication monitoring encounter     Dermatitis, seborrheic     Past Medical History:   Diagnosis Date     Basal cell carcinoma      Squamous cell carcinoma      Past Surgical History:   Procedure Laterality Date     BIOPSY OF SKIN LESION       MOHS MICROGRAPHIC PROCEDURE       NO HISTORY OF SURGERY  2/17/14    derm     SMALL BOWEL RESECTION  11/2015    diverticulitis       Social History:  The patient  Is retured.    Family History:  Sister has a history of BCC    Medications:  Current Outpatient Prescriptions   Medication Sig Dispense Refill     hydroxychloroquine (PLAQUENIL) 200 MG tablet TAKE 1 TABLET(200 MG) BY MOUTH TWICE DAILY 180 tablet 0     hydroxychloroquine (PLAQUENIL) 200 MG tablet TAKE 1 TABLET(200 MG) BY MOUTH TWICE DAILY 180 tablet 0     triamcinolone acetonide (KENALOG) 10 MG/ML injection See med note 5 mL 0     clindamycin (CLEOCIN T) 1 % lotion Apply topically 2 times daily 60 mL 1     ketoconazole (NIZORAL) 2 % cream Mix 50/50 with desonide and apply twice daily to irritated area for 3 weeks 30 g 1     Fluocinolone Acetonide (DERMA-SMOOTHE/FS SCALP) 0.01 % OIL Apply once a week to scalp for lichen planopilaris 118 mL 2     NEW MED Biest 1.5mg/gram cream(pg free)  Insert 1 gram vaginally twice weekly as directed 40 g " 6     fluconazole (DIFLUCAN) 200 MG tablet Take 1 tablet (200 mg) by mouth daily 45 tablet 4     desonide (DESOWEN) 0.05 % cream Mix 50/50 with ketoconazole and apply twice daily to irritated area for 3 weeks 60 g 1     diphenhydrAMINE (BENADRYL) 25 MG capsule Take 25 mg by mouth every 6 hours as needed.       Calcium 1500 MG TABS Take  by mouth.       Loratadine (CLARITIN PO) Take  by mouth.       ibuprofen (ADVIL,MOTRIN) 800 MG tablet Take 800 mg by mouth every 8 hours as needed.       Cholecalciferol (VITAMIN D) 1000 UNIT capsule Take  by mouth. Total 2200 units daily.       Allergies   Allergen Reactions     Dust Mites Other (See Comments)     Sneezing, coughing. asthma     Levaquin [Levofloxacin Hemihydrate] Other (See Comments)     Muscle weakness     Mold Other (See Comments)     Sneezing, asthma, weezing     Pollen Extract/Tree Extract Other (See Comments)     Sneezing, weezing     Sulfa Drugs Hives     Penicillins Rash         Review of Systems:  -Constitutional: The patient denies fatigue, fevers, chills, unintended weight loss, and night sweats.  -HEENT: Patient denies nonhealing oral sores.  -Skin: As above in HPI. No additional skin concerns.    Physical exam:  Vitals: /75  GEN: This is a well developed, well-nourished female in no acute distress, in a pleasant mood.    SKIN: Examination was focused primarily to the scalp and face.   - Fizpatrick Type II  - Areas of scarring alopecia on the vertex scalp with surrounding mild scalp erythema with perifollicular accentuation but no scale on peripheral edge of alopecia patch. Stable as compared to last visit.  - LPPAI score is 0.67 (1 point for pain, and 1 point for scalp erythema)  - Hair pull test negative.   - Pink minimally scaly papule on the R upper lip at vermillion border.   - On the right cheek, there is a 2 mm pink scaly papule.   - No other lesions of concern on areas examined.                 Impression/Plan:    1. Lichen planopilaris -  LPPAI .67 today for mild pain, scalp erythema. Stable from prior visit. Still with few active areas with noticeable pain, particularly on manipulation, and scalp erythema. Recommended repeat injections today with ILK while continuing home therapies. Recommended  Last labs 11/17; due in 2 months for [id number] lab monitoring. Last eye exam 12/14/17.       Continue dermasmoothe/FS 0.01% oil; can increase frequency to 3-4 times weekly as needed for pain    Continue DHS zinc shampoo 4-5 times weekly     Continue plaquenil 200mg BID     Photographs taken for future reference      PROCEDURE NOTE    Kenalog intralesional injection procedure note : After verbal consent and discussion of risks including but not limited to atrophy, pain, and bruising,  time out was performed, 0.6  total cc of Kenalog 10 mg/cc was injected into 10 sites on the scalp including sites with yellow plaques.  The patient tolerated the procedure well and left the Dermatology clinic in good condition    2. AK, R cheek x 1, R upper Vermillion border x 1. Clinical photos taken today for reference. Follow-up in 5-6 weeks at return for resolution.     PROCEDURE NOTE  Cryotherapy procedure note: After verbal consent and discussion of risks and benefits including but no limited to dyspigmentation/scar, blister, and pain, 2 lesions were treated with 1-2mm freeze border for 2 cycles with liquid nitrogen. Post cryotherapy instructions were provided.     3. Hx NMSC. Recent MMS on the R nasal ala with mild associated scale/seborrheic dermatitis. Recommended start using DHS zinc shampoo 4-5 times weekly to nasal area when showering.     Staffed with Dr. Soto.   Follow-up in 6 weeks; earlier PRN.     Staff involved:  Montrell Mills MD   PGY-3 Dermatology Resident      Patient was seen and examined with the dermatology resident. I agree with the history, review of systems, physical examination, assessments and plan. The ILK procedures were done together and I was  present for the entirety of the cryotherapy procedure.    Barbara Soto MD  Professor and  Elizabeth  Department of Dermatology  [facility]      Pictures were placed in Pt's chart today for future reference.      Again, thank you for allowing me to participate in the care of your patient.      Sincerely,    Barbara Soto MD

## 2018-03-27 NOTE — NURSING NOTE
"Dermatology Rooming Note    Ester Barba's goals for this visit include:   Chief Complaint   Patient presents with     Hair Loss     Ester is here today for a 1 month hair loss follow up. Ester notes\" Everything is the same\"      PIERCE Glaser    "

## 2018-03-27 NOTE — MR AVS SNAPSHOT
After Visit Summary   3/27/2018    Ester Barba    MRN: [id number]           Patient Information     Date Of Birth          1945        Visit Information        Provider Department      3/27/2018 1:20 PM Barbara Soto MD [facility] Dermatology        Today's Diagnoses     History of nonmelanoma skin cancer    -  1    Lichen planopilaris        Actinic keratosis           Follow-ups after your visit        Your next 10 appointments already scheduled     May 01, 2018  3:15 PM CDT   (Arrive by 3:00 PM)   RETURN [facility]IR[facility]OSS with MD KAYDEN Cook [facility] Dermatology ([facility] [place])    [address]  [address] [facility] [address] [phone]              Who to contact     Please call your clinic at [phone] to:    Ask questions about your health    Make or cancel appointments    Discuss your medicines    Learn about your test results    Speak to your doctor            Additional Information About Your Visit        MyChart Information     [employer] gives you secure access to your electronic health record. If you see a primary care provider, you can also send messages to your care team and make appointments. If you have questions, please call your primary care clinic.  If you do not have a primary care provider, please call [phone] and they will assist you.      [employer] is an electronic gateway that provides easy, online access to your medical records. With [employer], you can request a clinic appointment, read your test results, renew a prescription or communicate with your care team.     To access your existing account, please contact your [facility] or call [phone] for assistance.        Care EveryWhere ID     This is your Care EveryWhere ID. This could be used by other organizations to access your [facility] medical records  [id number]         Blood Pressure from Last 3 Encounters:   03/27/18  118/75   03/14/18 124/76   01/16/18 130/79    Weight from Last 3 Encounters:   03/14/18 60.3 kg (133 lb)   06/28/17 59.1 kg (130 lb 6.4 oz)   01/18/17 61.9 kg (136 lb 6.4 oz)              We Performed the Following     DESTRUCT PREMALIGNANT LESION, 2-14     DESTRUCT PREMALIGNANT LESION, FIRST     INJECTION INTO SKIN LESIONS >7          Today's Medication Changes          These changes are accurate as of 3/27/18 11:59 PM.  If you have any questions, ask your nurse or doctor.               These medicines have changed or have updated prescriptions.        Dose/Directions    * triamcinolone acetonide 10 MG/ML injection   Commonly known as:  KENALOG   This may have changed:  Another medication with the same name was added. Make sure you understand how and when to take each.   Used for:  Lichen planopilaris   Changed by:  Barbara Soto MD        See med note   Quantity:  5 mL   Refills:  0       * triamcinolone acetonide 10 MG/ML injection   Commonly known as:  KENALOG   This may have changed:  You were already taking a medication with the same name, and this prescription was added. Make sure you understand how and when to take each.   Used for:  Lichen planopilaris   Changed by:  Barbara Soto MD        Dose:  10 mg   Inject 1 mL (10 mg) into the skin once for 1 dose   Quantity:  1 mL   Refills:  0       * Notice:  This list has 2 medication(s) that are the same as other medications prescribed for you. Read the directions carefully, and ask your doctor or other care provider to review them with you.         Where to get your medicines      Some of these will need a paper prescription and others can be bought over the counter.  Ask your nurse if you have questions.     You don't need a prescription for these medications     triamcinolone acetonide 10 MG/ML injection                Primary Care Provider Office Phone # Fax #    Fernando Weathers -[phone]       [facility]  CLINIC [address]  [facility] [address]        Equal Access to Services     CORRINAFIDENCIO VIOLETA : Hadii alexi elmore evelyn Marcano, wakamronda jacobenricoha, qaphilippe tiffanienilson fredyyoon, waxisabel ruthin hayaajack danielmira colungajony stevens. So [facility] [phone].    ATENCIÓN: Si habla español, tiene a pelayo disposición servicios gratuitos de asistencia lingüística. Llame al [phone].    We comply with applicable federal civil rights laws and [place] laws. We do not discriminate on the basis of race, color, national origin, age, disability, sex, sexual orientation, or gender identity.            Thank you!     Thank you for choosing [facility] DERMATOLOGY  for your care. Our goal is always to provide you with excellent care. Hearing back from our patients is one way we can continue to improve our services. Please take a few minutes to complete the written survey that you may receive in the mail after your visit with us. Thank you!             Your Updated Medication List - Protect others around you: Learn how to safely use, store and throw away your medicines at www.disposemymeds.org.          This list is accurate as of 3/27/18 11:59 PM.  Always use your most recent med list.                   Brand Name Dispense Instructions for use Diagnosis    BENADRYL 25 MG capsule   Generic drug:  diphenhydrAMINE      Take 25 mg by mouth every 6 hours as needed.        calcium 1500 MG Tabs      Take  by mouth.        CLARITIN PO      Take  by mouth.        clindamycin 1 % lotion    CLEOCIN T    60 mL    Apply topically 2 times daily    Acne rosacea       DERMA-SMOOTHE/FS SCALP 0.01 % Oil oil   Generic drug:  Fluocinolone Acetonide Scalp     118 mL    Apply once a week to scalp for lichen planopilaris    Lichen planopilaris       desonide 0.05 % cream    DESOWEN    60 g    Mix 50/50 with ketoconazole and apply twice daily to irritated area for 3 weeks    Intertrigo       fluconazole 200 MG tablet    DIFLUCAN    45 tablet    Take 1 tablet (200 mg) by mouth  daily    Yeast infection of the vagina       * hydroxychloroquine 200 MG tablet    PLAQUENIL    180 tablet    TAKE 1 TABLET(200 MG) BY MOUTH TWICE DAILY    Lichen plano-pilaris       * hydroxychloroquine 200 MG tablet    PLAQUENIL    180 tablet    TAKE 1 TABLET(200 MG) BY MOUTH TWICE DAILY    Lichen plano-pilaris       ibuprofen 800 MG tablet    ADVIL/MOTRIN     Take 800 mg by mouth every 8 hours as needed.        ketoconazole 2 % cream    NIZORAL    30 g    Mix 50/50 with desonide and apply twice daily to irritated area for 3 weeks    Intertrigo       NEW MED     40 g    Biest 1.5mg/gram cream(pg free)  Insert 1 gram vaginally twice weekly as directed    Vulvar atrophy       * triamcinolone acetonide 10 MG/ML injection    KENALOG    5 mL    See med note    Lichen planopilaris       * triamcinolone acetonide 10 MG/ML injection    KENALOG    1 mL    Inject 1 mL (10 mg) into the skin once for 1 dose    Lichen planopilaris       vitamin D 1000 UNITS capsule      Take  by mouth. Total 2200 units daily.        * Notice:  This list has 4 medication(s) that are the same as other medications prescribed for you. Read the directions carefully, and ask your doctor or other care provider to review them with you.

## 2018-03-27 NOTE — PROGRESS NOTES
"[facility] Dermatology Note      Dermatology Problem List:  1. LPP. Last LPPAI 0.67 3/27/18 stable from prior.       -current treatment: ILK 10 mg/cc injected into scalp today, plaquenil 200 mg BID, DHS zinc shampoo every 2-3 days,  derma-smoothe/FS 0.01% oil twice per week  - plaquenil safety labs done outside in 11/2017  - plan to start taper off of plaquenil at follow up  -Symptoms thought to start originally after imiquimod application   2. Hx NMSC.   -BCC on right nasal ala s/p Mohs 11/27/2017  -SCC of the scalp, s/p Mohs excision 2014  -History of 1-2 other NMSC of the scalp (BCCs)    CC:   Chief Complaint   Patient presents with     Hair Loss     Ester is here today for a 1 month hair loss follow up. Ester notes\" Everything is the same\"        Encounter Date: Mar 27, 2018    History of Present Illness:  Ms. Ester Barba is a 72 year old female who primarily comes today to follow-up on her LPP. Last seen in 2/23/2018 she had a total of 0.6 cc of ILK injections to few active areas of LPP in the scalp.     Today, she reports her hair loss is stable from prior. She reports mild scalp pain particularly with manipulation of hair in the periphery of the alopecia patch on her vertex scalp. She continues to take plaquenil 200 mg PO BID and is using DHS zinc shampoo TIW and dermasmoothe oil 2-3 times weekly.     Of note, has had an AK on the R cheek treated with cryotherapy 2 visits ago. Still notices a small \"bump\" in that area. 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 Chopra many years ago. Has noticed a small bump in that area that has always been present. No associated pain, pruritus or burning.     Finally, patient has had recent MMS on the right nasal ala in 11/2017. She reports some scaling and possible \"bump\" in the corner over the graft that was placed there. No associated pain, tenderness or bleeding.     Health otherwise stable. No other skin " concerns.     Past Medical History:   Patient Active Problem List   Diagnosis     Porokeratosis     Squamous cell carcinoma     Neoplasm of uncertain behavior     Lichen planopilaris     Dermatitis     Cicatricial alopecia     Keratosis, inflamed seborrheic     History of skin cancer     Vaginitis and vulvovaginitis     Basal cell carcinoma of vertex scalp s/p mohs 6-5-15     Medication management     Actinic keratosis     Medication monitoring encounter     Dermatitis, seborrheic     Past Medical History:   Diagnosis Date     Basal cell carcinoma      Squamous cell carcinoma      Past Surgical History:   Procedure Laterality Date     BIOPSY OF SKIN LESION       MOHS MICROGRAPHIC PROCEDURE       NO HISTORY OF SURGERY  2/17/14    derm     SMALL BOWEL RESECTION  11/2015    diverticulitis       Social History:  The patient  Is retured.    Family History:  Sister has a history of BCC    Medications:  Current Outpatient Prescriptions   Medication Sig Dispense Refill     hydroxychloroquine (PLAQUENIL) 200 MG tablet TAKE 1 TABLET(200 MG) BY MOUTH TWICE DAILY 180 tablet 0     hydroxychloroquine (PLAQUENIL) 200 MG tablet TAKE 1 TABLET(200 MG) BY MOUTH TWICE DAILY 180 tablet 0     triamcinolone acetonide (KENALOG) 10 MG/ML injection See med note 5 mL 0     clindamycin (CLEOCIN T) 1 % lotion Apply topically 2 times daily 60 mL 1     ketoconazole (NIZORAL) 2 % cream Mix 50/50 with desonide and apply twice daily to irritated area for 3 weeks 30 g 1     Fluocinolone Acetonide (DERMA-SMOOTHE/FS SCALP) 0.01 % OIL Apply once a week to scalp for lichen planopilaris 118 mL 2     NEW MED Biest 1.5mg/gram cream(pg free)  Insert 1 gram vaginally twice weekly as directed 40 g 6     fluconazole (DIFLUCAN) 200 MG tablet Take 1 tablet (200 mg) by mouth daily 45 tablet 4     desonide (DESOWEN) 0.05 % cream Mix 50/50 with ketoconazole and apply twice daily to irritated area for 3 weeks 60 g 1     diphenhydrAMINE (BENADRYL) 25 MG capsule Take  25 mg by mouth every 6 hours as needed.       Calcium 1500 MG TABS Take  by mouth.       Loratadine (CLARITIN PO) Take  by mouth.       ibuprofen (ADVIL,MOTRIN) 800 MG tablet Take 800 mg by mouth every 8 hours as needed.       Cholecalciferol (VITAMIN D) 1000 UNIT capsule Take  by mouth. Total 2200 units daily.       Allergies   Allergen Reactions     Dust Mites Other (See Comments)     Sneezing, coughing. asthma     Levaquin [Levofloxacin Hemihydrate] Other (See Comments)     Muscle weakness     Mold Other (See Comments)     Sneezing, asthma, weezing     Pollen Extract/Tree Extract Other (See Comments)     Sneezing, weezing     Sulfa Drugs Hives     Penicillins Rash         Review of Systems:  -Constitutional: The patient denies fatigue, fevers, chills, unintended weight loss, and night sweats.  -HEENT: Patient denies nonhealing oral sores.  -Skin: As above in HPI. No additional skin concerns.    Physical exam:  Vitals: /75  GEN: This is a well developed, well-nourished female in no acute distress, in a pleasant mood.    SKIN: Examination was focused primarily to the scalp and face.   - Fizpatrick Type II  - Areas of scarring alopecia on the vertex scalp with surrounding mild scalp erythema with perifollicular accentuation but no scale on peripheral edge of alopecia patch. Stable as compared to last visit.  - LPPAI score is 0.67 (1 point for pain, and 1 point for scalp erythema)  - Hair pull test negative.   - Pink minimally scaly papule on the R upper lip at vermillion border.   - On the right cheek, there is a 2 mm pink scaly papule.   - No other lesions of concern on areas examined.                 Impression/Plan:    1. Lichen planopilaris - LPPAI .67 today for mild pain, scalp erythema. Stable from prior visit. Still with few active areas with noticeable pain, particularly on manipulation, and scalp erythema. Recommended repeat injections today with ILK while continuing home therapies. Recommended  Last  labs 11/17; due in 2 months for [id number] lab monitoring. Last eye exam 12/14/17.       Continue dermasmoothe/FS 0.01% oil; can increase frequency to 3-4 times weekly as needed for pain    Continue DHS zinc shampoo 4-5 times weekly     Continue plaquenil 200mg BID     Photographs taken for future reference      PROCEDURE NOTE    Kenalog intralesional injection procedure note : After verbal consent and discussion of risks including but not limited to atrophy, pain, and bruising,  time out was performed, 0.6  total cc of Kenalog 10 mg/cc was injected into 10 sites on the scalp including sites with yellow plaques.  The patient tolerated the procedure well and left the Dermatology clinic in good condition    2. AK, R cheek x 1, R upper [place] border x 1. Clinical photos taken today for reference. Follow-up in 5-6 weeks at return for resolution.     PROCEDURE NOTE  Cryotherapy procedure note: After verbal consent and discussion of risks and benefits including but no limited to dyspigmentation/scar, blister, and pain, 2 lesions were treated with 1-2mm freeze border for 2 cycles with liquid nitrogen. Post cryotherapy instructions were provided.     3. Hx NMSC. Recent MMS on the R nasal ala with mild associated scale/seborrheic dermatitis. Recommended start using DHS zinc shampoo 4-5 times weekly to nasal area when showering.     Staffed with Dr. Soto.   Follow-up in 6 weeks; earlier PRN.     Staff involved:  Montrell Mills MD   PGY-3 Dermatology Resident      Patient was seen and examined with the dermatology resident. I agree with the history, review of systems, physical examination, assessments and plan. The ILK procedures were done together and I was present for the entirety of the cryotherapy procedure.    Barbara Soto MD  Professor and  Elizabeth  Department of Dermatology  [facility]

## 2018-05-01 ENCOUNTER — OFFICE VISIT (OUTPATIENT)
Dept: DERMATOLOGY | Facility: CLINIC | Age: 73
End: 2018-05-01
Payer: COMMERCIAL

## 2018-05-01 VITALS — SYSTOLIC BLOOD PRESSURE: 120 MMHG | HEART RATE: 99 BPM | DIASTOLIC BLOOD PRESSURE: 67 MMHG

## 2018-05-01 DIAGNOSIS — L66.10 LICHEN PLANOPILARIS: ICD-10-CM

## 2018-05-01 DIAGNOSIS — Z51.81 MEDICATION MONITORING ENCOUNTER: Primary | ICD-10-CM

## 2018-05-01 DIAGNOSIS — L57.0 ACTINIC KERATOSIS: ICD-10-CM

## 2018-05-01 ASSESSMENT — PAIN SCALES - GENERAL: PAINLEVEL: NO PAIN (0)

## 2018-05-01 NOTE — PATIENT INSTRUCTIONS
Cryotherapy    What is it?    Use of a very cold liquid, such as liquid nitrogen, to freeze and destroy abnormal skin cells that need to be removed    What should I expect?    Tenderness and redness    A small blister that might grow and fill with dark purple blood. There may be crusting.    More than one treatment may be needed if the lesions do not go away.    How do I care for the treated area?    Gently wash the area with your hands when bathing.    Use a thin layer of Vaseline to help with healing. You may use a Band-Aid.     The area should heal within 7-10 days and may leave behind a pink or lighter color.     Do not use an antibiotic or Neosporin ointment.     You may take acetaminophen (Tylenol) for pain.     Call your Doctor if you have:    Severe pain    Signs of infection (warmth, redness, cloudy yellow drainage, and or a bad smell)    Questions or concerns    Who should I call with questions?       [facility]: [phone]       [facility]: [phone]       For urgent needs outside of business hours call the [facility] at [phone]        and ask for the dermatology resident on call

## 2018-05-01 NOTE — LETTER
5/1/2018       RE: Ester Barba  [address] [place] [address]     Dear Colleague,    Thank you for referring your patient, Ester Barba, to the [facility] DERMATOLOGY at [facility]. Please see a copy of my visit note below.    [facility] Dermatology Note      Dermatology Problem List:  1. LPP  -current treatment: 1cc ILK 10 mg/cc injected today, plaquenil 200 mg BID, DHS zinc shampoo QOD,  derma-smoothe/FS 0.01% oil 2-3x per week  - plaquenil safety labs 5/1/18  - plan to start taper off of plaquenil at follow up  -Symptoms thought to start originally after imiquimod application   2. Hx NMSC.   -BCC on right nasal ala s/p Mohs 11/27/2017  -SCC of the scalp, s/p Mohs excision 2014  -History of 1-2 other NMSC of the scalp (BCCs)  3. Actinic keratosis   -S/p cryotherapy      Encounter Date: May 1, 2018    CC:  No chief complaint on file.        History of Present Illness:  Ms. Ester Barba is a 72 year old female who presents as a follow-up for LPP. The patient was last seen 3/27 when 0.6cc ILK and she was continued on plaquenil, with the discussion that plaquenil would start to be tapered at next visit. Today Ester reports that the hair loss is overall stable, but continues to worry that it is progressing. She has been using dermasmoothe oil 2-3 times per week for 4 hours at a time, finds this very helpful for pruritus and scalp pain. Reports that the scalp is tender to touch, particularly on the upper parietal scalp bilaterally, and also notes tingling and burning sensation with touch. She continues on plaquenil 200mg BID. Uses DHS zinc shampoo 3x/w on the scalp, as well as the R lateral nose where dermatitis was noted at last visit, thinks zinc shampoo has been helpful for this.     Two AKs were treated with cryotherapy at the last visit on R vermillion border and R cheek; Ester reports that both of these seem to be gone, although there is another spot on  the vermillion border that was there at the last visit that was not treated, and the R cheek has two spots that she would like to have evaluated.     Past Medical History:   Patient Active Problem List   Diagnosis     Porokeratosis     Squamous cell carcinoma     Neoplasm of uncertain behavior     Lichen planopilaris     Dermatitis     Cicatricial alopecia     Keratosis, inflamed seborrheic     History of skin cancer     Vaginitis and vulvovaginitis     Basal cell carcinoma of vertex scalp s/p mohs 6-5-15     Medication management     Actinic keratosis     Medication monitoring encounter     Dermatitis, seborrheic     Past Medical History:   Diagnosis Date     Basal cell carcinoma      Squamous cell carcinoma      Past Surgical History:   Procedure Laterality Date     BIOPSY OF SKIN LESION       MOHS MICROGRAPHIC PROCEDURE       NO HISTORY OF SURGERY  2/17/14    derm     SMALL BOWEL RESECTION  11/2015    diverticulitis         Medications:  Current Outpatient Prescriptions   Medication Sig Dispense Refill     Calcium 1500 MG TABS Take  by mouth.       Cholecalciferol (VITAMIN D) 1000 UNIT capsule Take  by mouth. Total 2200 units daily.       clindamycin (CLEOCIN T) 1 % lotion Apply topically 2 times daily 60 mL 1     desonide (DESOWEN) 0.05 % cream Mix 50/50 with ketoconazole and apply twice daily to irritated area for 3 weeks 60 g 1     diphenhydrAMINE (BENADRYL) 25 MG capsule Take 25 mg by mouth every 6 hours as needed.       fluconazole (DIFLUCAN) 200 MG tablet Take 1 tablet (200 mg) by mouth daily 45 tablet 4     Fluocinolone Acetonide (DERMA-SMOOTHE/FS SCALP) 0.01 % OIL Apply once a week to scalp for lichen planopilaris 118 mL 2     hydroxychloroquine (PLAQUENIL) 200 MG tablet TAKE 1 TABLET(200 MG) BY MOUTH TWICE DAILY 180 tablet 0     hydroxychloroquine (PLAQUENIL) 200 MG tablet TAKE 1 TABLET(200 MG) BY MOUTH TWICE DAILY 180 tablet 0     ibuprofen (ADVIL,MOTRIN) 800 MG tablet Take 800 mg by mouth every 8 hours  as needed.       ketoconazole (NIZORAL) 2 % cream Mix 50/50 with desonide and apply twice daily to irritated area for 3 weeks 30 g 1     Loratadine (CLARITIN PO) Take  by mouth.       NEW MED Biest 1.5mg/gram cream(pg free)  Insert 1 gram vaginally twice weekly as directed 40 g 6     triamcinolone acetonide (KENALOG) 10 MG/ML injection See med note 5 mL 0     Allergies   Allergen Reactions     Dust Mites Other (See Comments)     Sneezing, coughing. asthma     Levaquin [Levofloxacin Hemihydrate] Other (See Comments)     Muscle weakness     Mold Other (See Comments)     Sneezing, asthma, weezing     Pollen Extract/Tree Extract Other (See Comments)     Sneezing, weezing     Sulfa Drugs Hives     Penicillins Rash         Review of Systems:  -As per HPI  -Skin: As above in HPI. No additional skin concerns.    Physical exam:  Vitals: There were no vitals taken for this visit.  GEN: This is a well developed, well-nourished female in no acute distress, in a pleasant mood.    SKIN: Focused examination of the scalp and face was performed.  -LPPAI 0.33 for perifollicular scale  -Three crusted erythematous papules on scalp, c/w erosive pustular dermatitis  -Alopecia appears stable compared to photographs from last visit   -2mm pink papule above R upper lip; dermatoscope exam reveals yellowicolored  crusting  -0.5mm pink scaly papule on R cheek   -No other lesions of concern on areas examined.     Impression/Plan:  1. Lichen planopilaris - Overall very stable. Ester is very reticent to taper plaquenil so will continue 200mg BID for now, and revisit taper at next visit; discussed starting 200mg BID alternating with 200mg QD at next visit. Recommend continue dermasmoothe oil as this is helping control scale. Will proceed with injections as well as she finds them very helpful for symptoms.     Kenalog intralesional injection procedure note (performed by faculty): After verbal consent and discussion of risks including but not limited  to atrophy, pain, and bruising,  time out was performed, 1 total cc of Kenalog 10 mg/cc was injected into 10 sites on the scalp.  The patient tolerated the procedure well and left the Dermatology clinic in good condition.    Continue dermasmoothe/FS 0.01% oil 3-4x/w    Continue DHS zinc shampoo QOD    Continue plaquenil 200mg BID     Safety labs today: CBC with diff, CMP    Photographs taken for future reference     2. Actinic keratosis - Field therapy discussed as a future option, she worries about this given past history of reaction to imiquimod with subsequent development of LPP and erosive pustular dermatitis. However discussed that this may be a good option that we may revisit in the fall. Will proceed with cryotherapy for lesion above upper lip.     Cryotherapy procedure note: After verbal consent and discussion of risks and benefits including but no limited to dyspigmentation/scar, blister, infection, recurrence,1 lesion was treated with 1-2mm freeze border for 2 cycles with liquid nitrogen. Post cryotherapy instructions were provided.       Follow-up in 3 months, earlier for new or changing lesions.     Staff Involved:  Scribed by Thelma Reed, MS4 for Dr. Soto.      I agree with the PFSH and ROS as completed by the Medical Student. The remainder of the encounter was performed by me and scribed by the Medical Student. The scribed note accurately reflects my personal services and the medical decisions made by me. Both the cryotherapy and ILK injections were done together.       Barbara Soto MD  Professor and Chair  Department of Dermatology  [facility]                          Pictures were placed in Pt's chart today for future reference.      Again, thank you for allowing me to participate in the care of your patient.      Sincerely,    Barbara Soto MD

## 2018-05-01 NOTE — NURSING NOTE
Drug Administration Record    Drug Name: triamcinolone acetonide(kenalog)  Dose: 1mL of triamcinolone 10mg/mL, 10mg dose  Route administered: ID  NDC #: Kenalog-10 (4156-7711-94)  Amount of waste(mL):4  Reason for waste: Single use vial    LOT #: [id number]  SITE: scalp  : Qwenty  EXPIRATION DATE: October 2019

## 2018-05-01 NOTE — NURSING NOTE
Dermatology Rooming Note    Ester Barba's goals for this visit include:   Chief Complaint   Patient presents with     Derm Problem     Ester is here for a hairloss follow up, states it's the same since her last visit.        Amber Flowers LPN

## 2018-05-01 NOTE — PROGRESS NOTES
[facility] Dermatology Note      Dermatology Problem List:  1. LPP  -current treatment: 1cc ILK 10 mg/cc injected today, plaquenil 200 mg BID, DHS zinc shampoo QOD,  derma-smoothe/FS 0.01% oil 2-3x per week  - plaquenil safety labs 5/1/18  - plan to start taper off of plaquenil at follow up  -Symptoms thought to start originally after imiquimod application   2. Hx NMSC.   -BCC on right nasal ala s/p Mohs 11/27/2017  -SCC of the scalp, s/p Mohs excision 2014  -History of 1-2 other NMSC of the scalp (BCCs)  3. Actinic keratosis   -S/p cryotherapy      Encounter Date: May 1, 2018    CC:  No chief complaint on file.        History of Present Illness:  Ms. Ester Barba is a 72 year old female who presents as a follow-up for LPP. The patient was last seen 3/27 when 0.6cc ILK and she was continued on plaquenil, with the discussion that plaquenil would start to be tapered at next visit. Today Ester reports that the hair loss is overall stable, but continues to worry that it is progressing. She has been using dermasmoothe oil 2-3 times per week for 4 hours at a time, finds this very helpful for pruritus and scalp pain. Reports that the scalp is tender to touch, particularly on the upper parietal scalp bilaterally, and also notes tingling and burning sensation with touch. She continues on plaquenil 200mg BID. Uses DHS zinc shampoo 3x/w on the scalp, as well as the R lateral nose where dermatitis was noted at last visit, thinks zinc shampoo has been helpful for this.     Two AKs were treated with cryotherapy at the last visit on R vermillion border and R cheek; Ester reports that both of these seem to be gone, although there is another spot on the vermillion border that was there at the last visit that was not treated, and the R cheek has two spots that she would like to have evaluated.     Past Medical History:   Patient Active Problem List   Diagnosis     Porokeratosis     Squamous cell carcinoma      Neoplasm of uncertain behavior     Lichen planopilaris     Dermatitis     Cicatricial alopecia     Keratosis, inflamed seborrheic     History of skin cancer     Vaginitis and vulvovaginitis     Basal cell carcinoma of vertex scalp s/p mohs 6-5-15     Medication management     Actinic keratosis     Medication monitoring encounter     Dermatitis, seborrheic     Past Medical History:   Diagnosis Date     Basal cell carcinoma      Squamous cell carcinoma      Past Surgical History:   Procedure Laterality Date     BIOPSY OF SKIN LESION       MOHS MICROGRAPHIC PROCEDURE       NO HISTORY OF SURGERY  2/17/14    derm     SMALL BOWEL RESECTION  11/2015    diverticulitis         Medications:  Current Outpatient Prescriptions   Medication Sig Dispense Refill     Calcium 1500 MG TABS Take  by mouth.       Cholecalciferol (VITAMIN D) 1000 UNIT capsule Take  by mouth. Total 2200 units daily.       clindamycin (CLEOCIN T) 1 % lotion Apply topically 2 times daily 60 mL 1     desonide (DESOWEN) 0.05 % cream Mix 50/50 with ketoconazole and apply twice daily to irritated area for 3 weeks 60 g 1     diphenhydrAMINE (BENADRYL) 25 MG capsule Take 25 mg by mouth every 6 hours as needed.       fluconazole (DIFLUCAN) 200 MG tablet Take 1 tablet (200 mg) by mouth daily 45 tablet 4     Fluocinolone Acetonide (DERMA-SMOOTHE/FS SCALP) 0.01 % OIL Apply once a week to scalp for lichen planopilaris 118 mL 2     hydroxychloroquine (PLAQUENIL) 200 MG tablet TAKE 1 TABLET(200 MG) BY MOUTH TWICE DAILY 180 tablet 0     hydroxychloroquine (PLAQUENIL) 200 MG tablet TAKE 1 TABLET(200 MG) BY MOUTH TWICE DAILY 180 tablet 0     ibuprofen (ADVIL,MOTRIN) 800 MG tablet Take 800 mg by mouth every 8 hours as needed.       ketoconazole (NIZORAL) 2 % cream Mix 50/50 with desonide and apply twice daily to irritated area for 3 weeks 30 g 1     Loratadine (CLARITIN PO) Take  by mouth.       NEW MED Biest 1.5mg/gram cream(pg free)  Insert 1 gram vaginally twice weekly  as directed 40 g 6     triamcinolone acetonide (KENALOG) 10 MG/ML injection See med note 5 mL 0     Allergies   Allergen Reactions     Dust Mites Other (See Comments)     Sneezing, coughing. asthma     Levaquin [Levofloxacin Hemihydrate] Other (See Comments)     Muscle weakness     Mold Other (See Comments)     Sneezing, asthma, weezing     Pollen Extract/Tree Extract Other (See Comments)     Sneezing, weezing     Sulfa Drugs Hives     Penicillins Rash         Review of Systems:  -As per HPI  -Skin: As above in HPI. No additional skin concerns.    Physical exam:  Vitals: There were no vitals taken for this visit.  GEN: This is a well developed, well-nourished female in no acute distress, in a pleasant mood.    SKIN: Focused examination of the scalp and face was performed.  -LPPAI 0.33 for perifollicular scale  -Three crusted erythematous papules on scalp, c/w erosive pustular dermatitis  -Alopecia appears stable compared to photographs from last visit   -2mm pink papule above R upper lip; dermatoscope exam reveals yellowicolored  crusting  -0.5mm pink scaly papule on R cheek   -No other lesions of concern on areas examined.     Impression/Plan:  1. Lichen planopilaris - Overall very stable. Ester is very reticent to taper plaquenil so will continue 200mg BID for now, and revisit taper at next visit; discussed starting 200mg BID alternating with 200mg QD at next visit. Recommend continue dermasmoothe oil as this is helping control scale. Will proceed with injections as well as she finds them very helpful for symptoms.     Kenalog intralesional injection procedure note (performed by faculty): After verbal consent and discussion of risks including but not limited to atrophy, pain, and bruising,  time out was performed, 1 total cc of Kenalog 10 mg/cc was injected into 10 sites on the scalp.  The patient tolerated the procedure well and left the Dermatology clinic in good condition.    Continue dermasmoothe/FS 0.01% oil  3-4x/w    Continue DHS zinc shampoo QOD    Continue plaquenil 200mg BID     Safety labs today: CBC with diff, CMP    Photographs taken for future reference     2. Actinic keratosis - Field therapy discussed as a future option, she worries about this given past history of reaction to imiquimod with subsequent development of LPP and erosive pustular dermatitis. However discussed that this may be a good option that we may revisit in the fall. Will proceed with cryotherapy for lesion above upper lip.     Cryotherapy procedure note: After verbal consent and discussion of risks and benefits including but no limited to dyspigmentation/scar, blister, infection, recurrence,1 lesion was treated with 1-2mm freeze border for 2 cycles with liquid nitrogen. Post cryotherapy instructions were provided.       Follow-up in 3 months, earlier for new or changing lesions.     Staff Involved:  Scribed by Thelma Reed, MS4 for Dr. Soto.      I agree with the PFSH and ROS as completed by the Medical Student. The remainder of the encounter was performed by me and scribed by the Medical Student. The scribed note accurately reflects my personal services and the medical decisions made by me. Both the cryotherapy and ILK injections were done together.       Barbara Soto MD  Professor and Chair  Department of Dermatology  [facility]

## 2018-05-01 NOTE — MR AVS SNAPSHOT
After Visit Summary   5/1/2018    Ester Barba    MRN: [id number]           Patient Information     Date Of Birth          1945        Visit Information        Provider Department      5/1/2018 3:15 PM Barbara Soto MD [facility] Dermatology        Today's Diagnoses     Medication monitoring encounter    -  1    Lichen planopilaris          Care Instructions    Cryotherapy    What is it?    Use of a very cold liquid, such as liquid nitrogen, to freeze and destroy abnormal skin cells that need to be removed    What should I expect?    Tenderness and redness    A small blister that might grow and fill with dark purple blood. There may be crusting.    More than one treatment may be needed if the lesions do not go away.    How do I care for the treated area?    Gently wash the area with your hands when bathing.    Use a thin layer of Vaseline to help with healing. You may use a Band-Aid.     The area should heal within 7-10 days and may leave behind a pink or lighter color.     Do not use an antibiotic or Neosporin ointment.     You may take acetaminophen (Tylenol) for pain.     Call your Doctor if you have:    Severe pain    Signs of infection (warmth, redness, cloudy yellow drainage, and or a bad smell)    Questions or concerns    Who should I call with questions?       [facility]: [phone]       [facility]: [phone]       For urgent needs outside of business hours call the [facility] at [phone]        and ask for the dermatology resident on call          Follow-ups after your visit        Your next 10 appointments already scheduled     Jun 05, 2018  3:15 PM CDT   (Arrive by 3:00 PM)   RETURN HAIRLOSS with Barbara Soto MD   [facility] Dermatology ([facility])    [address] [address] [phone]              Who to contact      Please call your clinic at [phone] to:    Ask questions about your health    Make or cancel appointments    Discuss your medicines    Learn about your test results    Speak to your doctor            Additional Information About Your Visit        ethologyhart Information     [employer] gives you secure access to your electronic health record. If you see a primary care provider, you can also send messages to your care team and make appointments. If you have questions, please call your primary care clinic.  If you do not have a primary care provider, please call [phone] and they will assist you.      [employer] is an electronic gateway that provides easy, online access to your medical records. With [employer], you can request a clinic appointment, read your test results, renew a prescription or communicate with your care team.     To access your existing account, please contact your [facility] or call [phone] for assistance.        Care EveryWhere ID     This is your Care EveryWhere ID. This could be used by other organizations to access your [facility] medical records  [id number]        Your Vitals Were     Pulse                   99            Blood Pressure from Last 3 Encounters:   05/01/18 120/67   03/27/18 118/75   03/14/18 124/76    Weight from Last 3 Encounters:   03/14/18 60.3 kg (133 lb)   06/28/17 59.1 kg (130 lb 6.4 oz)   01/18/17 61.9 kg (136 lb 6.4 oz)              Today, you had the following     No orders found for display         Today's Medication Changes          These changes are accurate as of 5/1/18  4:22 PM.  If you have any questions, ask your nurse or doctor.               Stop taking these medicines if you haven't already. Please contact your care team if you have questions.     BENADRYL 25 MG capsule   Generic drug:  diphenhydrAMINE   Stopped by:  Barbara Soto MD                    Primary Care Provider Office Phone # Fax #    Fernando Weathers  [phone]       [facility] [address]  [facility] [address]        Equal Access to Services     ALEXIS MCDANIEL : Hadii aad ku haddarairene Gennaali, victoriano edibrenda, gregory canales, samuel ruthin hayaan fredymira cardenas laNydiaedwin stevensAnam [facility] [phone].    ATENCIÓN: Si habla español, tiene a pelayo disposición servicios gratuitos de asistencia lingüística. Llame al [phone].    We comply with applicable federal civil rights laws and [place] laws. We do not discriminate on the basis of race, color, national origin, age, disability, sex, sexual orientation, or gender identity.            Thank you!     Thank you for choosing [facility] DERMATOLOGY  for your care. Our goal is always to provide you with excellent care. Hearing back from our patients is one way we can continue to improve our services. Please take a few minutes to complete the written survey that you may receive in the mail after your visit with us. Thank you!             Your Updated Medication List - Protect others around you: Learn how to safely use, store and throw away your medicines at www.disposemymeds.org.          This list is accurate as of 5/1/18  4:22 PM.  Always use your most recent med list.                   Brand Name Dispense Instructions for use Diagnosis    calcium 1500 MG Tabs      Take  by mouth.        CLARITIN PO      Take  by mouth.        clindamycin 1 % lotion    CLEOCIN T    60 mL    Apply topically 2 times daily    Acne rosacea       DERMA-SMOOTHE/FS SCALP 0.01 % Oil oil   Generic drug:  Fluocinolone Acetonide Scalp     118 mL    Apply once a week to scalp for lichen planopilaris    Lichen planopilaris       desonide 0.05 % cream    DESOWEN    60 g    Mix 50/50 with ketoconazole and apply twice daily to irritated area for 3 weeks    Intertrigo       fluconazole 200 MG tablet    DIFLUCAN    45 tablet    Take 1 tablet (200 mg) by mouth daily    Yeast infection of the vagina       * hydroxychloroquine 200  MG tablet    PLAQUENIL    180 tablet    TAKE 1 TABLET(200 MG) BY MOUTH TWICE DAILY    Lichen plano-pilaris       * hydroxychloroquine 200 MG tablet    PLAQUENIL    180 tablet    TAKE 1 TABLET(200 MG) BY MOUTH TWICE DAILY    Lichen plano-pilaris       ibuprofen 800 MG tablet    ADVIL/MOTRIN     Take 800 mg by mouth every 8 hours as needed.        ketoconazole 2 % cream    NIZORAL    30 g    Mix 50/50 with desonide and apply twice daily to irritated area for 3 weeks    Intertrigo       NEW MED     40 g    Biest 1.5mg/gram cream(pg free)  Insert 1 gram vaginally twice weekly as directed    Vulvar atrophy       triamcinolone acetonide 10 MG/ML injection    KENALOG    5 mL    See med note    Lichen planopilaris       vitamin D 1000 units capsule      Take  by mouth. Total 2200 units daily.        * Notice:  This list has 2 medication(s) that are the same as other medications prescribed for you. Read the directions carefully, and ask your doctor or other care provider to review them with you.

## 2018-05-14 RX ORDER — FLUOCINOLONE ACETONIDE 0.11 MG/ML
OIL TOPICAL
Qty: 118 ML | Refills: 2 | Status: SHIPPED | OUTPATIENT
Start: 2018-05-14 | End: 2018-08-21

## 2018-06-05 ENCOUNTER — OFFICE VISIT (OUTPATIENT)
Dept: DERMATOLOGY | Facility: CLINIC | Age: 73
End: 2018-06-05
Payer: COMMERCIAL

## 2018-06-05 VITALS — DIASTOLIC BLOOD PRESSURE: 77 MMHG | HEART RATE: 67 BPM | SYSTOLIC BLOOD PRESSURE: 141 MMHG

## 2018-06-05 DIAGNOSIS — L98.8 EROSIVE PUSTULAR DERMATOSIS: ICD-10-CM

## 2018-06-05 DIAGNOSIS — L66.9 CICATRICIAL ALOPECIA: ICD-10-CM

## 2018-06-05 DIAGNOSIS — L66.10 LICHEN PLANOPILARIS: Primary | ICD-10-CM
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
## 2018-06-05 RX ORDER — DOXYCYCLINE 100 MG/1
100 CAPSULE ORAL
COMMUNITY
Start: 2018-05-30 | End: 2018-06-06

## 2018-06-05 ASSESSMENT — PAIN SCALES - GENERAL: PAINLEVEL: NO PAIN (0)

## 2018-06-05 NOTE — NURSING NOTE
Drug Administration Record    Drug Name: triamcinolone acetonide(kenalog)  Dose: 1mL of triamcinolone 10mg/mL, 10mg dose  Route administered: ID  NDC #: Kenalog-10 (4185-8927-05)  Amount of waste(mL):4  Reason for waste: Single use vial    LOT #: [id number]  SITE: [facility]  : [facility]  EXPIRATION DATE: DEC2019

## 2018-06-05 NOTE — LETTER
6/5/2018       RE: Ester Barba  [address] [place] [address]     Dear Colleague,    Thank you for referring your patient, Ester Barba, to the [facility] DERMATOLOG[facility] at [facility]. Please see a copy of my visit note below.    [facility] Dermatology Note      Dermatology Problem List:  1. LPP  -current treatment: 0.75cc ILK 10 mg/cc injected today, plaquenil 200 mg BID, DHS zinc shampoo QOD,  derma-smoothe/FS 0.01% oil 2-3x per week  - plaquenil safety labs 5/1/18  - plan to start taper off of plaquenil at follow up  -Symptoms thought to start originally after imiquimod application     2. Hx NMSC.   -BCC on right nasal ala s/p Mohs 11/27/2017  -SCC of the scalp, s/p Mohs excision 2014  -History of 1-2 other NMSC of the scalp (BCCs)    3. Actinic keratosis   -S/p cryotherapy    4. Erosive pustular dermatosis, scalp: Improved following doxycyline prescribed for tick bite, remove crust with Cetaphil or Vanicream cleanser soaks      Encounter Date: Jun 5, 2018    CC:  Chief Complaint   Patient presents with     Derm Problem     Ester is here for a hairloss follow up, states she damaged the top of her scalp by hitting it on a boat last week.         History of Present Illness:  Ms. Ester Barba is a 72 year old female who presents as a follow-up for LPP. The patient was last seen 5/1/18. Since then, she feels things have been going fairly well with her hair loss. The patient has continued her prior regimen including hydroxychloroquine, fluocinolone oil 2-3 times per week, and DHS Zinc shampoo every other day. She has not noticed any new areas of hair loss, though has had some pain along the right side of her scalp. Additionally, the patient accidentally traumatized her mid scalp, and developed a sore a couple weeks ago. The patient has been applying Vaseline daily, which she thinks is helping. In addition, she was prescribed a 10 day course of doxycyline 100 mg  BID for a tick bite, and thinks this has helped. The patient is otherwise feeling well with no additional skin concerns at this time. She notes the previously treated AKs on her right cheek and right upper vermillion border are well healed.        Past Medical History:   Patient Active Problem List   Diagnosis     Porokeratosis     Squamous cell carcinoma     Neoplasm of uncertain behavior     Lichen planopilaris     Dermatitis     Cicatricial alopecia     Keratosis, inflamed seborrheic     History of skin cancer     Vaginitis and vulvovaginitis     Basal cell carcinoma of vertex scalp s/p mohs 6-5-15     Medication management     Actinic keratosis     Medication monitoring encounter     Dermatitis, seborrheic     Past Medical History:   Diagnosis Date     Basal cell carcinoma      Squamous cell carcinoma      Past Surgical History:   Procedure Laterality Date     BIOPSY OF SKIN LESION       MOHS MICROGRAPHIC PROCEDURE       NO HISTORY OF SURGERY  2/17/14    derm     SMALL BOWEL RESECTION  11/2015    diverticulitis         Medications:  Current Outpatient Prescriptions   Medication Sig Dispense Refill     Calcium 1500 MG TABS Take  by mouth.       Cholecalciferol (VITAMIN D) 1000 UNIT capsule Take  by mouth. Total 2200 units daily.       desonide (DESOWEN) 0.05 % cream Mix 50/50 with ketoconazole and apply twice daily to irritated area for 3 weeks 60 g 1     fluconazole (DIFLUCAN) 200 MG tablet Take 1 tablet (200 mg) by mouth daily 45 tablet 4     Fluocinolone Acetonide Scalp (DERMA-SMOOTHE/FS SCALP) 0.01 % OIL oil Apply once a week to scalp for lichen planopilaris 118 mL 2     hydroxychloroquine (PLAQUENIL) 200 MG tablet TAKE 1 TABLET(200 MG) BY MOUTH TWICE DAILY 180 tablet 0     hydroxychloroquine (PLAQUENIL) 200 MG tablet TAKE 1 TABLET(200 MG) BY MOUTH TWICE DAILY 180 tablet 0     ibuprofen (ADVIL,MOTRIN) 800 MG tablet Take 800 mg by mouth every 8 hours as needed.       ketoconazole (NIZORAL) 2 % cream Mix 50/50  with desonide and apply twice daily to irritated area for 3 weeks 30 g 1     Loratadine (CLARITIN PO) Take  by mouth.       NEW MED Biest 1.5mg/gram cream(pg free)  Insert 1 gram vaginally twice weekly as directed 40 g 6     doxycycline (VIBRAMYCIN) 100 MG capsule Take 100 mg by mouth       triamcinolone acetonide (KENALOG) 10 MG/ML injection See med note (Patient not taking: Reported on 6/5/2018) 5 mL 0     Allergies   Allergen Reactions     Dust Mites Other (See Comments)     Sneezing, coughing. asthma     Levaquin [Levofloxacin Hemihydrate] Other (See Comments)     Muscle weakness     Mold Other (See Comments)     Sneezing, asthma, weezing     Pollen Extract/Tree Extract Other (See Comments)     Sneezing, weezing     Sulfa Drugs Hives     Penicillins Rash         Review of Systems:  -As per HPI  -Skin: As above in HPI. No additional skin concerns.    Physical exam:  Vitals: /77 (BP Location: Right arm, Patient Position: Chair, Cuff Size: Adult Regular)  Pulse 67  GEN: This is a well developed, well-nourished female in no acute distress, in a pleasant mood.    SKIN: Focused examination of the scalp and face was performed.  -LPPAI 0.67 for perifollicular scale and pain  -Large alopecic patch within the central scalp. Within the center there is an overlying crust with components that are hemorrhagic and consistent with terra firme dermatosis  -At the periphery of this alopecic patch, there is mild perifollicular scale, but no erythema  -Alopecia appears stable compared to photographs from last visit   -Right cheek and right Vermillion lip with pale pink macules and no overlying scale  -Right breast with pale pink macule and central ~1mm crust at site of tick bite   -No other lesions of concern on areas examined.     Impression/Plan:  1. Lichen planopilaris - Overall very stable. As such, will continue with her current regimen. The few active areas were treated with ILK today as below.      Kenalog  intralesional injection procedure note (performed by faculty): After verbal consent and discussion of risks including but not limited to atrophy, pain, and bruising,  time out was performed, 0.75 total cc of Kenalog 10 mg/cc was injected into 10 sites on the scalp.  The patient tolerated the procedure well and left the Dermatology clinic in good condition.    Continue dermasmoothe/FS 0.01% oil 3-4x/w    Continue DHS zinc shampoo QOD    Continue plaquenil 200mg BID     Photographs taken for future reference     2. Erosive Pustular Dermatosis: The patient recently had a flare of her EPD likely triggered by accidental trauma to the scalp. Fortunately, it appears to be healing well with frequent Vaseline and a course of doxycyline prescribed for her recent tick bite. She has some mild persistent scale present. We discussed gentle debridement with Cetaphil or Vanicream gentle cleanser soaks, and samples of the latter were provided.      Perform 1-2 times daily Cetaphil or Vanicream gentle cleanser soaks, the gently wipe of scale    Continue Vaseline liberally to scalp        Follow-up in 2-3 months, earlier for new or changing lesions.     Staff Involved:  Patient seen and discussed with Dr. Barbara Araiza MD  Dermatology resident, PGY-4  [phone]     Patient was seen and examined with the dermatology resident. I agree with the history, review of systems, physical examination, assessments and plan. ILK injections were done together.     Barbara Soto MD  Professor and  Chair  Department of Dermatology  [facility]        Pictures were placed in Pt's chart today for future reference.      Again, thank you for allowing me to participate in the care of your patient.      Sincerely,    Barbara Soto MD

## 2018-06-05 NOTE — MR AVS SNAPSHOT
After Visit Summary   6/5/2018    Ester Barba    MRN: [id number]           Patient Information     Date Of Birth          1945        Visit Information        Provider Department      6/5/2018 3:15 PM Barbara Soto MD [facility] Dermatology        Today's Diagnoses     Lichen planopilaris    -  1    Cicatricial alopecia        Erosive pustular dermatosis        Tick bite, initial encounter           Follow-ups after your visit        Follow-up notes from your care team     Return in about 3 months (around 9/5/2018).      Your next 10 appointments already scheduled     Aug 21, 2018  3:15 PM CDT   (Arrive by 3:00 PM)   RETURN HAIRLOSS with MD KAYDEN Cook [facility] Dermatology ([facility])    [address] [place] [id number] [phone]              Who to contact     Please call your clinic at [phone] to:    Ask questions about your health    Make or cancel appointments    Discuss your medicines    Learn about your test results    Speak to your doctor            Additional Information About Your Visit        [employer] Information     [facility] gives you secure access to your electronic health record. If you see a primary care provider, you can also send messages to your care team and make appointments. If you have questions, please call your primary care clinic.  If you do not have a primary care provider, please call [phone] and they will assist you.      [facility] is an electronic gateway that provides easy, online access to your medical records. With [facility], you can request a clinic appointment, read your test results, renew a prescription or communicate with your care team.     To access your existing account, please contact your [facility] or call [phone] for assistance.        Care EveryWhere ID     This is your Care EveryWhere ID. This could be used by other  organizations to access your [place] medical records  [id number]        Your Vitals Were     Pulse                   67            Blood Pressure from Last 3 Encounters:   06/05/18 141/77   05/01/18 120/67   03/27/18 118/75    Weight from Last 3 Encounters:   03/14/18 60.3 kg (133 lb)   06/28/17 59.1 kg (130 lb 6.4 oz)   01/18/17 61.9 kg (136 lb 6.4 oz)              We Performed the Following     INJECTION INTO SKIN LESIONS >7          Today's Medication Changes          These changes are accurate as of 6/5/18 11:59 PM.  If you have any questions, ask your nurse or doctor.               These medicines have changed or have updated prescriptions.        Dose/Directions    * triamcinolone acetonide 10 MG/ML injection   Commonly known as:  KENALOG   This may have changed:  Another medication with the same name was added. Make sure you understand how and when to take each.   Used for:  Lichen planopilaris   Changed by:  Barbara Soto MD        See med note   Quantity:  5 mL   Refills:  0       * triamcinolone acetonide 10 MG/ML injection   Commonly known as:  KENALOG   This may have changed:  You were already taking a medication with the same name, and this prescription was added. Make sure you understand how and when to take each.   Used for:  Lichen planopilaris   Changed by:  Barbara Soto MD        See med note   Quantity:  5 mL   Refills:  0       * Notice:  This list has 2 medication(s) that are the same as other medications prescribed for you. Read the directions carefully, and ask your doctor or other care provider to review them with you.      Stop taking these medicines if you haven't already. Please contact your care team if you have questions.     clindamycin 1 % lotion   Commonly known as:  CLEOCIN T   Stopped by:  Barbara Soto MD                Where to get your medicines      Some of these will need a paper prescription and others can be bought over the  counter.  Ask your nurse if you have questions.     You don't need a prescription for these medications     triamcinolone acetonide 10 MG/ML injection                Primary Care Provider Office Phone # Fax #    Fernando Weathers [phone]       [facility] [address] [facility] [address]        Equal Access to Services     ALEXIS MCDANIEL : Hadii aad ku hadasho Soomaali, waaxda luqadaha, qaybta kaalmada adeegyada, waxay ruthin hayantionen ademira cristinejony layoli . So [facility] [phone].    ATENCIÓN: Si habla español, tiene a pelayo disposición servicios gratuitos de asistencia lingüística. Peter al [phone].    We comply with applicable federal civil rights laws and [place] laws. We do not discriminate on the basis of race, color, national origin, age, disability, sex, sexual orientation, or gender identity.            Thank you!     Thank you for choosing [facility] DERMATOLOGY  for your care. Our goal is always to provide you with excellent care. Hearing back from our patients is one way we can continue to improve our services. Please take a few minutes to complete the written survey that you may receive in the mail after your visit with us. Thank you!             Your Updated Medication List - Protect others around you: Learn how to safely use, store and throw away your medicines at www.disposemymeds.org.          This list is accurate as of 6/5/18 11:59 PM.  Always use your most recent med list.                   Brand Name Dispense Instructions for use Diagnosis    calcium 1500 MG Tabs      Take  by mouth.        CLARITIN PO      Take  by mouth.        desonide 0.05 % cream    DESOWEN    60 g    Mix 50/50 with ketoconazole and apply twice daily to irritated area for 3 weeks    Intertrigo       doxycycline 100 MG capsule    VIBRAMYCIN     Take 100 mg by mouth        fluconazole 200 MG tablet    DIFLUCAN    45 tablet    Take 1 tablet (200 mg) by mouth daily    Yeast infection of the vagina        Fluocinolone Acetonide Scalp 0.01 % Oil oil    DERMA-SMOOTHE/FS SCALP    118 mL    Apply once a week to scalp for lichen planopilaris    Lichen planopilaris       * hydroxychloroquine 200 MG tablet    PLAQUENIL    180 tablet    TAKE 1 TABLET(200 MG) BY MOUTH TWICE DAILY    Lichen plano-pilaris       * hydroxychloroquine 200 MG tablet    PLAQUENIL    180 tablet    TAKE 1 TABLET(200 MG) BY MOUTH TWICE DAILY    Lichen plano-pilaris       ibuprofen 800 MG tablet    ADVIL/MOTRIN     Take 800 mg by mouth every 8 hours as needed.        ketoconazole 2 % cream    NIZORAL    30 g    Mix 50/50 with desonide and apply twice daily to irritated area for 3 weeks    Intertrigo       NEW MED     40 g    Biest 1.5mg/gram cream(pg free)  Insert 1 gram vaginally twice weekly as directed    Vulvar atrophy       * triamcinolone acetonide 10 MG/ML injection    KENALOG    5 mL    See med note    Lichen planopilaris       * triamcinolone acetonide 10 MG/ML injection    KENALOG    5 mL    See med note    Lichen planopilaris       vitamin D 1000 units capsule      Take  by mouth. Total 2200 units daily.        * Notice:  This list has 4 medication(s) that are the same as other medications prescribed for you. Read the directions carefully, and ask your doctor or other care provider to review them with you.

## 2018-06-05 NOTE — NURSING NOTE
Dermatology Rooming Note    Ester JOSE Barba's goals for this visit include:   Chief Complaint   Patient presents with     Derm Problem     Ester is here for a hairloss follow up, states she damaged the top of her scalp by hitting it on a boat last week.       Amber Flowers LPN

## 2018-06-06 NOTE — PROGRESS NOTES
[facility] Dermatology Note      Dermatology Problem List:  1. LPP  -current treatment: 0.75cc ILK 10 mg/cc injected today, plaquenil 200 mg BID, DHS zinc shampoo QOD,  derma-smoothe/FS 0.01% oil 2-3x per week  - plaquenil safety labs 5/1/18  - plan to start taper off of plaquenil at follow up  -Symptoms thought to start originally after imiquimod application     2. Hx NMSC.   -BCC on right nasal ala s/p Mohs 11/27/2017  -SCC of the scalp, s/p Mohs excision 2014  -History of 1-2 other NMSC of the scalp (BCCs)    3. Actinic keratosis   -S/p cryotherapy    4. Erosive pustular dermatosis, scalp: Improved following doxycyline prescribed for tick bite, remove crust with Cetaphil or Vanicream cleanser soaks      Encounter Date: Jun 5, 2018    CC:  Chief Complaint   Patient presents with     Derm Problem     Ester is here for a hairloss follow up, states she damaged the top of her scalp by hitting it on a boat last week.         History of Present Illness:  Ms. Ester Barba is a 72 year old female who presents as a follow-up for LPP. The patient was last seen 5/1/18. Since then, she feels things have been going fairly well with her hair loss. The patient has continued her prior regimen including hydroxychloroquine, fluocinolone oil 2-3 times per week, and DHS Zinc shampoo every other day. She has not noticed any new areas of hair loss, though has had some pain along the right side of her scalp. Additionally, the patient accidentally traumatized her mid scalp, and developed a sore a couple weeks ago. The patient has been applying Vaseline daily, which she thinks is helping. In addition, she was prescribed a 10 day course of doxycyline 100 mg BID for a tick bite, and thinks this has helped. The patient is otherwise feeling well with no additional skin concerns at this time. She notes the previously treated AKs on her right cheek and right upper vermillion border are well healed.        Past Medical History:    Patient Active Problem List   Diagnosis     Porokeratosis     Squamous cell carcinoma     Neoplasm of uncertain behavior     Lichen planopilaris     Dermatitis     Cicatricial alopecia     Keratosis, inflamed seborrheic     History of skin cancer     Vaginitis and vulvovaginitis     Basal cell carcinoma of vertex scalp s/p mohs 6-5-15     Medication management     Actinic keratosis     Medication monitoring encounter     Dermatitis, seborrheic     Past Medical History:   Diagnosis Date     Basal cell carcinoma      Squamous cell carcinoma      Past Surgical History:   Procedure Laterality Date     BIOPSY OF SKIN LESION       MOHS MICROGRAPHIC PROCEDURE       NO HISTORY OF SURGERY  2/17/14    derm     SMALL BOWEL RESECTION  11/2015    diverticulitis         Medications:  Current Outpatient Prescriptions   Medication Sig Dispense Refill     Calcium 1500 MG TABS Take  by mouth.       Cholecalciferol (VITAMIN D) 1000 UNIT capsule Take  by mouth. Total 2200 units daily.       desonide (DESOWEN) 0.05 % cream Mix 50/50 with ketoconazole and apply twice daily to irritated area for 3 weeks 60 g 1     fluconazole (DIFLUCAN) 200 MG tablet Take 1 tablet (200 mg) by mouth daily 45 tablet 4     Fluocinolone Acetonide Scalp (DERMA-SMOOTHE/FS SCALP) 0.01 % OIL oil Apply once a week to scalp for lichen planopilaris 118 mL 2     hydroxychloroquine (PLAQUENIL) 200 MG tablet TAKE 1 TABLET(200 MG) BY MOUTH TWICE DAILY 180 tablet 0     hydroxychloroquine (PLAQUENIL) 200 MG tablet TAKE 1 TABLET(200 MG) BY MOUTH TWICE DAILY 180 tablet 0     ibuprofen (ADVIL,MOTRIN) 800 MG tablet Take 800 mg by mouth every 8 hours as needed.       ketoconazole (NIZORAL) 2 % cream Mix 50/50 with desonide and apply twice daily to irritated area for 3 weeks 30 g 1     Loratadine (CLARITIN PO) Take  by mouth.       NEW MED Biest 1.5mg/gram cream(pg free)  Insert 1 gram vaginally twice weekly as directed 40 g 6     doxycycline (VIBRAMYCIN) 100 MG capsule Take  100 mg by mouth       triamcinolone acetonide (KENALOG) 10 MG/ML injection See med note (Patient not taking: Reported on 6/5/2018) 5 mL 0     Allergies   Allergen Reactions     Dust Mites Other (See Comments)     Sneezing, coughing. asthma     Levaquin [Levofloxacin Hemihydrate] Other (See Comments)     Muscle weakness     Mold Other (See Comments)     Sneezing, asthma, weezing     Pollen Extract/Tree Extract Other (See Comments)     Sneezing, weezing     Sulfa Drugs Hives     Penicillins Rash         Review of Systems:  -As per HPI  -Skin: As above in HPI. No additional skin concerns.    Physical exam:  Vitals: /77 (BP Location: Right arm, Patient Position: Chair, Cuff Size: Adult Regular)  Pulse 67  GEN: This is a well developed, well-nourished female in no acute distress, in a pleasant mood.    SKIN: Focused examination of the scalp and face was performed.  -LPPAI 0.67 for perifollicular scale and pain  -Large alopecic patch within the central scalp. Within the center there is an overlying crust with components that are hemorrhagic and consistent with terra firme dermatosis  -At the periphery of this alopecic patch, there is mild perifollicular scale, but no erythema  -Alopecia appears stable compared to photographs from last visit   -Right cheek and right Vermillion lip with pale pink macules and no overlying scale  -Right breast with pale pink macule and central ~1mm crust at site of tick bite   -No other lesions of concern on areas examined.     Impression/Plan:  1. Lichen planopilaris - Overall very stable. As such, will continue with her current regimen. The few active areas were treated with ILK today as below.      Kenalog intralesional injection procedure note (performed by faculty): After verbal consent and discussion of risks including but not limited to atrophy, pain, and bruising,  time out was performed, 0.75 total cc of Kenalog 10 mg/cc was injected into 10 sites on the scalp.  The patient  tolerated the procedure well and left the Dermatology clinic in good condition.    Continue dermasmoothe/FS 0.01% oil 3-4x/w    Continue DHS zinc shampoo QOD    Continue plaquenil 200mg BID     Photographs taken for future reference     2. Erosive Pustular Dermatosis: The patient recently had a flare of her EPD likely triggered by accidental trauma to the scalp. Fortunately, it appears to be healing well with frequent Vaseline and a course of doxycyline prescribed for her recent tick bite. She has some mild persistent scale present. We discussed gentle debridement with Cetaphil or Vanicream gentle cleanser soaks, and samples of the latter were provided.      Perform 1-2 times daily Cetaphil or Vanicream gentle cleanser soaks, the gently wipe of scale    Continue Vaseline liberally to scalp        Follow-up in 2-3 months, earlier for new or changing lesions.     Staff Involved:  Patient seen and discussed with Dr. Barbara Araiza MD  Dermatology resident, PGY-4  [phone]     Patient was seen and examined with the dermatology resident. I agree with the history, review of systems, physical examination, assessments and plan. ILK injections were done together.     Barbara Soto MD  Professor and  Chair  Department of Dermatology  [facility]

## 2018-06-17 PROBLEM — L98.8 EROSIVE PUSTULAR DERMATOSIS: Status: ACTIVE | Noted: 2018-06-17
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
## 2018-06-25 DIAGNOSIS — L66.10 LICHEN PLANO-PILARIS: ICD-10-CM

## 2018-06-25 RX ORDER — HYDROXYCHLOROQUINE SULFATE 200 MG/1
200 TABLET, FILM COATED ORAL 2 TIMES DAILY
Qty: 60 TABLET | Refills: 1 | Status: SHIPPED | OUTPATIENT
Start: 2018-06-25 | End: 2018-09-26

## 2018-06-25 NOTE — TELEPHONE ENCOUNTER
Received refill request for HCQ as the resident on call. Reviewed patient's chart and attached communication. Patient last seen 6/5/18 for LPP. After reviewing the assessment and plan from last visit, the refill request was accepted.      Xander Abernathy MD  PGY-3 -Internal Medicine - Dermatology Resident

## 2018-06-25 NOTE — TELEPHONE ENCOUNTER
"hydroxychloroquine (PLAQUENIL) 200 MG tablet      Last Written Prescription Date:  3/21/18  Last Fill Quantity: 180,   # refills: 0  Last Office Visit : 6/5/18  Future Office visit:  8/21/18    Routing refill request to provider for review/approval because:  1.  Drug not on the Derm refill protocol.      *plan at last visit- cont BID but mentions \"plan to start taper off of plaquenil at follow up\".  Future visit: 8/21/18- pended Rx qty thru that date.    "

## 2018-07-13 DIAGNOSIS — N90.5 VULVAR ATROPHY: ICD-10-CM

## 2018-07-13 NOTE — TELEPHONE ENCOUNTER
Received refill request for BiEst. Patient recently seen by Dr. Mayfield 3/2018. She approved refill in clinic for one year. Rx faxed to the [facility] pharmacy. Placed in Rn outbasket for scanning.

## 2018-08-21 ENCOUNTER — OFFICE VISIT (OUTPATIENT)
Dept: DERMATOLOGY | Facility: CLINIC | Age: 73
End: 2018-08-21
Payer: COMMERCIAL

## 2018-08-21 ENCOUNTER — PATIENT OUTREACH (OUTPATIENT)
Dept: CARE COORDINATION | Facility: CLINIC | Age: 73
End: 2018-08-21

## 2018-08-21 VITALS — SYSTOLIC BLOOD PRESSURE: 134 MMHG | DIASTOLIC BLOOD PRESSURE: 80 MMHG | HEART RATE: 63 BPM

## 2018-08-21 DIAGNOSIS — L66.10 LICHEN PLANOPILARIS: ICD-10-CM

## 2018-08-21 DIAGNOSIS — L57.0 AK (ACTINIC KERATOSIS): Primary | ICD-10-CM

## 2018-08-21 RX ORDER — FLUOCINOLONE ACETONIDE 0.11 MG/ML
OIL TOPICAL
Qty: 118 ML | Refills: 11 | Status: SHIPPED | OUTPATIENT
Start: 2018-08-21 | End: 2021-05-11

## 2018-08-21 ASSESSMENT — PAIN SCALES - GENERAL
PAINLEVEL: NO PAIN (1)
PAINLEVEL: NO PAIN (0)

## 2018-08-21 NOTE — PROGRESS NOTES
[facility] Dermatology Note      Dermatology Problem List:  1. LPP  -current treatment: ILK 10 mg/cc injections, plaquenil 200 mg BID, DHS zinc shampoo QOD,  derma-smoothe/FS 0.01% oil 2-3x per week  - plaquenil safety labs 5/1/18  -Symptoms thought to start originally after imiquimod application     2. Hx NMSC.   -BCC on right nasal ala s/p Mohs 11/27/2017  -SCC of the scalp, s/p Mohs excision 2014  -History of 1-2 other NMSC of the scalp (BCCs)    3. Actinic keratosis   -S/p cryotherapy  - efudex and calcipotriene to right upper lip BID x 4 days planned for fall 2018    4. Erosive pustular dermatosis, scalp: Improved following doxycyline prescribed for tick bite, remove crust with Cetaphil or Vanicream cleanser soaks      Encounter Date: Aug 21, 2018    CC:  Chief Complaint   Patient presents with     Derm Problem     Ester is here for a hairloss follow up, states it's gotten worse.           History of Present Illness:  Ms. Ester Barba is a 72 year old female who presents as a follow-up for LPP. The patient was last seen 6/5/18 at which time LPP was fairly stable and a few active areas were treated with intralesional Kenalog.  She was also having a slight flare of her erosive pustular dermatosis that was responded well to Vaseline.  She was continued on Cetaphil or Aveeno cream cleanser soaks and Vaseline.    Since then, she reports her scalp really started to act up feeling swollen and tender on the right side and spreading to her entire scalp. She is using the fluocinolone oil about 3 times a week which helps a little. She notes spreading of the hair loss since her last visit. She continues to take plaquenil 200 mg BID and use DHS zinc shampoo every other day. No other skin concerns today and she is otherwise feeling well.       Past Medical History:   Patient Active Problem List   Diagnosis     Porokeratosis     Squamous cell carcinoma     Neoplasm of uncertain behavior     Lichen  planopilaris     Dermatitis     Cicatricial alopecia     Keratosis, inflamed seborrheic     History of skin cancer     Vaginitis and vulvovaginitis     Basal cell carcinoma of vertex scalp s/p mohs 6-5-15     Medication management     Actinic keratosis     Medication monitoring encounter     Dermatitis, seborrheic     Erosive pustular dermatosis     Tick bite, initial encounter     Past Medical History:   Diagnosis Date     Basal cell carcinoma      Squamous cell carcinoma      Past Surgical History:   Procedure Laterality Date     BIOPSY OF SKIN LESION       MOHS MICROGRAPHIC PROCEDURE       NO HISTORY OF SURGERY  2/17/14    derm     SMALL BOWEL RESECTION  11/2015    diverticulitis         Medications:  Current Outpatient Prescriptions   Medication Sig Dispense Refill     Calcium 1500 MG TABS Take  by mouth.       Cholecalciferol (VITAMIN D) 1000 UNIT capsule Take  by mouth. Total 2200 units daily.       desonide (DESOWEN) 0.05 % cream Mix 50/50 with ketoconazole and apply twice daily to irritated area for 3 weeks 60 g 1     fluconazole (DIFLUCAN) 200 MG tablet Take 1 tablet (200 mg) by mouth daily 45 tablet 4     Fluocinolone Acetonide Scalp (DERMA-SMOOTHE/FS SCALP) 0.01 % OIL oil Apply once a week to scalp for lichen planopilaris 118 mL 2     hydroxychloroquine (PLAQUENIL) 200 MG tablet Take 1 tablet (200 mg) by mouth 2 times daily 60 tablet 1     hydroxychloroquine (PLAQUENIL) 200 MG tablet TAKE 1 TABLET(200 MG) BY MOUTH TWICE DAILY 180 tablet 0     ibuprofen (ADVIL,MOTRIN) 800 MG tablet Take 800 mg by mouth every 8 hours as needed.       ketoconazole (NIZORAL) 2 % cream Mix 50/50 with desonide and apply twice daily to irritated area for 3 weeks 30 g 1     Loratadine (CLARITIN PO) Take  by mouth.       NEW MED Biest 1.5mg/gram cream(pg free)  Insert 1 gram vaginally twice weekly as directed 40 g 6     triamcinolone acetonide (KENALOG) 10 MG/ML injection See med note 5 mL 0     Allergies   Allergen Reactions      Dust Mites Other (See Comments)     Sneezing, coughing. asthma     Levaquin [Levofloxacin Hemihydrate] Other (See Comments)     Muscle weakness     Mold Other (See Comments)     Sneezing, asthma, weezing     Pollen Extract/Tree Extract Other (See Comments)     Sneezing, weezing     Sulfa Drugs Hives     Penicillins Rash         Review of Systems:  -As per HPI  -Skin: As above in HPI. No additional skin concerns.    Physical exam:  Vitals: /80 (BP Location: Left arm, Patient Position: Chair, Cuff Size: Adult Regular)  Pulse 63  GEN: This is a well developed, well-nourished female in no acute distress, in a pleasant mood.    SKIN: Focused examination of the scalp and face was performed.  -LPPAI 1.33 for perifollicular scale, pain and burning  -Large alopecic patch within the central scalp. Within the center there are a few thin crusty areas  -At the periphery of this alopecic patch, there is mild perifollicular scale, but no erythema  -Alopecia appears stable to somewhat improved compared to photographs from last visit   -Right cheek with interval resolution of keratotic papule, subtle pink macule suggestive of early SK  - right Vermillion lip with pale pink macule with tightly adherent scale  -No other lesions of concern on areas examined.     Impression/Plan:  1. Lichen planopilaris - Overall clinically stable. Patient perceived spreading but exam actually appears improved from prior visit. As such, will continue with her current regimen. The few active areas were treated with ILK today as below.  LPPAI 1.33    Kenalog intralesional injection procedure note: After verbal consent and discussion of risks including but not limited to atrophy, pain, and bruising,  time out was performed, 2 total cc of Kenalog 10 mg/cc was injected into 10 sites on the scalp.  The patient tolerated the procedure well and left the Dermatology clinic in good condition.    Continue dermasmoothe/FS 0.01% oil 3-4x/w    Continue DHS  zinc shampoo every other day    Continue plaquenil 200mg BID     Photographs taken for future reference     2. Erosive Pustular Dermatosis: Improved and stable.      Continue Cetaphil or Vanicream gentle cleanser soaks as needed,     Continue Vaseline liberally to scalp as needed      Follow-up in 5 weeks, earlier for new or changing lesions.     Staff Involved:  Patient seen and discussed with Dr. Barbara Munoz MD  PGY-4, Dermatology  [facility]      Patient was seen and examined with the dermatology resident. I agree with the history, review of systems, physical examination, assessments and plan. I was present for the key portion of the ILK procedure.      Barbara Soto MD  Professor and  Chair  Department of Dermatology  [facility]

## 2018-08-21 NOTE — LETTER
8/21/2018       RE: Ester Barba  [address] [place] [address]     Dear Colleague,    Thank you for referring your patient, Ester Barba, to the [facility] DERMATOLOGY at [facility]. Please see a copy of my visit note below.    [facility] Dermatology Note      Dermatology Problem List:  1. LPP  -current treatment: ILK 10 mg/cc injections, plaquenil 200 mg BID, DHS zinc shampoo QOD,  derma-smoothe/FS 0.01% oil 2-3x per week  - plaquenil safety labs 5/1/18  -Symptoms thought to start originally after imiquimod application     2. Hx NMSC.   -BCC on right nasal ala s/p Mohs 11/27/2017  -SCC of the scalp, s/p Mohs excision 2014  -History of 1-2 other NMSC of the scalp (BCCs)    3. Actinic keratosis   -S/p cryotherapy  - efudex and calcipotriene to right upper lip BID x 4 days planned for fall 2018    4. Erosive pustular dermatosis, scalp: Improved following doxycyline prescribed for tick bite, remove crust with Cetaphil or Vanicream cleanser soaks      Encounter Date: Aug 21, 2018    CC:  Chief Complaint   Patient presents with     Derm Problem     Ester is here for a hairloss follow up, states it's gotten worse.           History of Present Illness:  Ms. Ester Barba is a 72 year old female who presents as a follow-up for LPP. The patient was last seen 6/5/18 at which time LPP was fairly stable and a few active areas were treated with intralesional Kenalog.  She was also having a slight flare of her erosive pustular dermatosis that was responded well to Vaseline.  She was continued on Cetaphil or Aveeno cream cleanser soaks and Vaseline.    Since then, she reports her scalp really started to act up feeling swollen and tender on the right side and spreading to her entire scalp. She is using the fluocinolone oil about 3 times a week which helps a little. She notes spreading of the hair loss since her last visit. She continues to take plaquenil 200 mg BID and use  DHS zinc shampoo every other day. No other skin concerns today and she is otherwise feeling well.       Past Medical History:   Patient Active Problem List   Diagnosis     Porokeratosis     Squamous cell carcinoma     Neoplasm of uncertain behavior     Lichen planopilaris     Dermatitis     Cicatricial alopecia     Keratosis, inflamed seborrheic     History of skin cancer     Vaginitis and vulvovaginitis     Basal cell carcinoma of vertex scalp s/p mohs 6-5-15     Medication management     Actinic keratosis     Medication monitoring encounter     Dermatitis, seborrheic     Erosive pustular dermatosis     Tick bite, initial encounter     Past Medical History:   Diagnosis Date     Basal cell carcinoma      Squamous cell carcinoma      Past Surgical History:   Procedure Laterality Date     BIOPSY OF SKIN LESION       MOHS MICROGRAPHIC PROCEDURE       NO HISTORY OF SURGERY  2/17/14    derm     SMALL BOWEL RESECTION  11/2015    diverticulitis         Medications:  Current Outpatient Prescriptions   Medication Sig Dispense Refill     Calcium 1500 MG TABS Take  by mouth.       Cholecalciferol (VITAMIN D) 1000 UNIT capsule Take  by mouth. Total 2200 units daily.       desonide (DESOWEN) 0.05 % cream Mix 50/50 with ketoconazole and apply twice daily to irritated area for 3 weeks 60 g 1     fluconazole (DIFLUCAN) 200 MG tablet Take 1 tablet (200 mg) by mouth daily 45 tablet 4     Fluocinolone Acetonide Scalp (DERMA-SMOOTHE/FS SCALP) 0.01 % OIL oil Apply once a week to scalp for lichen planopilaris 118 mL 2     hydroxychloroquine (PLAQUENIL) 200 MG tablet Take 1 tablet (200 mg) by mouth 2 times daily 60 tablet 1     hydroxychloroquine (PLAQUENIL) 200 MG tablet TAKE 1 TABLET(200 MG) BY MOUTH TWICE DAILY 180 tablet 0     ibuprofen (ADVIL,MOTRIN) 800 MG tablet Take 800 mg by mouth every 8 hours as needed.       ketoconazole (NIZORAL) 2 % cream Mix 50/50 with desonide and apply twice daily to irritated area for 3 weeks 30 g 1      Loratadine (CLARITIN PO) Take  by mouth.       NEW MED Biest 1.5mg/gram cream(pg free)  Insert 1 gram vaginally twice weekly as directed 40 g 6     triamcinolone acetonide (KENALOG) 10 MG/ML injection See med note 5 mL 0     Allergies   Allergen Reactions     Dust Mites Other (See Comments)     Sneezing, coughing. asthma     Levaquin [Levofloxacin Hemihydrate] Other (See Comments)     Muscle weakness     Mold Other (See Comments)     Sneezing, asthma, weezing     Pollen Extract/Tree Extract Other (See Comments)     Sneezing, weezing     Sulfa Drugs Hives     Penicillins Rash         Review of Systems:  -As per HPI  -Skin: As above in HPI. No additional skin concerns.    Physical exam:  Vitals: /80 (BP Location: Left arm, Patient Position: Chair, Cuff Size: Adult Regular)  Pulse 63  GEN: This is a well developed, well-nourished female in no acute distress, in a pleasant mood.    SKIN: Focused examination of the scalp and face was performed.  -LPPAI 1.33 for perifollicular scale, pain and burning  -Large alopecic patch within the central scalp. Within the center there are a few thin crusty areas  -At the periphery of this alopecic patch, there is mild perifollicular scale, but no erythema  -Alopecia appears stable to somewhat improved compared to photographs from last visit   -Right cheek with interval resolution of keratotic papule, subtle pink macule suggestive of early SK  - right Vermillion lip with pale pink macule with tightly adherent scale  -No other lesions of concern on areas examined.     Impression/Plan:  1. Lichen planopilaris - Overall clinically stable. Patient perceived spreading but exam actually appears improved from prior visit. As such, will continue with her current regimen. The few active areas were treated with ILK today as below.  LPPAI 1.33    Kenalog intralesional injection procedure note: After verbal consent and discussion of risks including but not limited to atrophy, pain, and  bruising,  time out was performed, 2 total cc of Kenalog 10 mg/cc was injected into 10 sites on the scalp.  The patient tolerated the procedure well and left the Dermatology clinic in good condition.    Continue dermasmoothe/FS 0.01% oil 3-4x/w    Continue DHS zinc shampoo every other day    Continue plaquenil 200mg BID     Photographs taken for future reference     2. Erosive Pustular Dermatosis: Improved and stable.      Continue Cetaphil or Vanicream gentle cleanser soaks as needed,     Continue Vaseline liberally to scalp as needed      Follow-up in 5 weeks, earlier for new or changing lesions.     Staff Involved:  Patient seen and discussed with Dr. Barbara Munoz MD  PGY-4, Dermatology  [facility]      Patient was seen and examined with the dermatology resident. I agree with the history, review of systems, physical examination, assessments and plan. I was present for the key portion of the ILK procedure.      Barbara Soto MD  Professor and  Chair  Department of Dermatology  [facility]          Pictures were placed in Pt's chart today for future reference.      Again, thank you for allowing me to participate in the care of your patient.      Sincerely,    Barbara Soto MD

## 2018-08-21 NOTE — NURSING NOTE
Drug Administration Record    Drug Name: triamcinolone acetonide(kenalog)  Dose: 2mL of triamcinolone 10mg/mL, 20mg dose  Route administered: ID  NDC #: Kenalog-10 (2920-1042-43)  Amount of waste(mL):3  Reason for waste: Single use vial    LOT #: [id number]  SITE: BODY  : [employer]  EXPIRATION DATE: FEB2020

## 2018-08-21 NOTE — MR AVS SNAPSHOT
After Visit Summary   8/21/2018    Ester Barba    MRN: [id number]           Patient Information     Date Of Birth          1945        Visit Information        Provider Department      8/21/2018 3:15 PM Barbara Soto MD [facility] Dermatology        Today's Diagnoses     AK (actinic keratosis)    -  1    Lichen planopilaris           Follow-ups after your visit        Your next 10 appointments already scheduled     Oct 02, 2018  4:30 PM CDT   (Arrive by 4:15 PM)   RETURN HAIRLOSS with Barbara Soto MD   [facility] Dermatology ([facility])    [address] [place] [id number] [phone]              Who to contact     Please call your clinic at [phone] to:    Ask questions about your health    Make or cancel appointments    Discuss your medicines    Learn about your test results    Speak to your doctor            Additional Information About Your Visit        [employer] Information     [employer] gives you secure access to your electronic health record. If you see a primary care provider, you can also send messages to your care team and make appointments. If you have questions, please call your primary care clinic.  If you do not have a primary care provider, please call [phone] and they will assist you.      [employer] is an electronic gateway that provides easy, online access to your medical records. With [employer], you can request a clinic appointment, read your test results, renew a prescription or communicate with your care team.     To access your existing account, please contact your [facility] or call [phone] for assistance.        Care EveryWhere ID     This is your Care EveryWhere ID. This could be used by other organizations to access your [place] medical records  [id number]        Your Vitals Were     Pulse                   63            Blood Pressure from Last 3  Encounters:   08/21/18 134/80   06/05/18 141/77   05/01/18 120/67    Weight from Last 3 Encounters:   03/14/18 60.3 kg (133 lb)   06/28/17 59.1 kg (130 lb 6.4 oz)   01/18/17 61.9 kg (136 lb 6.4 oz)              We Performed the Following     INJECTION INTO SKIN LESIONS >7          Today's Medication Changes          These changes are accurate as of 8/21/18 11:59 PM.  If you have any questions, ask your nurse or doctor.               These medicines have changed or have updated prescriptions.        Dose/Directions    * triamcinolone acetonide 10 MG/ML injection   Commonly known as:  KENALOG   This may have changed:  Another medication with the same name was added. Make sure you understand how and when to take each.   Used for:  Lichen planopilaris   Changed by:  Barbara Soto MD        See med note   Quantity:  5 mL   Refills:  0       * triamcinolone acetonide 10 MG/ML injection   Commonly known as:  KENALOG   This may have changed:  You were already taking a medication with the same name, and this prescription was added. Make sure you understand how and when to take each.   Used for:  Lichen planopilaris   Changed by:  Barbara Soto MD        See med note   Quantity:  5 mL   Refills:  0       * Notice:  This list has 2 medication(s) that are the same as other medications prescribed for you. Read the directions carefully, and ask your doctor or other care provider to review them with you.         Where to get your medicines      These medications were sent to [facility] [address] [place] [address] AT [address] [address]     Phone:  [phone]     Fluocinolone Acetonide Scalp 0.01 % Oil oil         Some of these will need a paper prescription and others can be bought over the counter.  Ask your nurse if you have questions.     You don't need a prescription for these medications     triamcinolone acetonide 10 MG/ML  injection                Primary Care Provider Office Phone # Fax #    Fernando Weathers [phone]       [facility] [address]  [place] [address]        Equal Access to Services     ALEXIS DIEGOCITLALY : Hadii alexi ku hernandezo Jeet, waaxda luqadaha, qaybta kaalmada parker, samuel richardjack hilda. So [facility] [phone].    ATENCIÓN: Si habla español, tiene a pelayo disposición servicios gratuitos de asistencia lingüística. Llame al [phone].    We comply with applicable federal civil rights laws and [place] laws. We do not discriminate on the basis of race, color, national origin, age, disability, sex, sexual orientation, or gender identity.            Thank you!     Thank you for choosing [facility] DERMATOLOGY  for your care. Our goal is always to provide you with excellent care. Hearing back from our patients is one way we can continue to improve our services. Please take a few minutes to complete the written survey that you may receive in the mail after your visit with us. Thank you!             Your Updated Medication List - Protect others around you: Learn how to safely use, store and throw away your medicines at www.disposemymeds.org.          This list is accurate as of 8/21/18 11:59 PM.  Always use your most recent med list.                   Brand Name Dispense Instructions for use Diagnosis    calcium 1500 MG Tabs      Take  by mouth.        CLARITIN PO      Take  by mouth.        desonide 0.05 % cream    DESOWEN    60 g    Mix 50/50 with ketoconazole and apply twice daily to irritated area for 3 weeks    Intertrigo       fluconazole 200 MG tablet    DIFLUCAN    45 tablet    Take 1 tablet (200 mg) by mouth daily    Yeast infection of the vagina       Fluocinolone Acetonide Scalp 0.01 % Oil oil    DERMA-SMOOTHE/FS SCALP    118 mL    Apply once a week to scalp for lichen planopilaris    Lichen planopilaris       * hydroxychloroquine 200 MG tablet    PLAQUENIL     180 tablet    TAKE 1 TABLET(200 MG) BY MOUTH TWICE DAILY    Lichen plano-pilaris       * hydroxychloroquine 200 MG tablet    PLAQUENIL    60 tablet    Take 1 tablet (200 mg) by mouth 2 times daily    Lichen plano-pilaris       ibuprofen 800 MG tablet    ADVIL/MOTRIN     Take 800 mg by mouth every 8 hours as needed.        ketoconazole 2 % cream    NIZORAL    30 g    Mix 50/50 with desonide and apply twice daily to irritated area for 3 weeks    Intertrigo       NEW MED     40 g    Biest 1.5mg/gram cream(pg free)  Insert 1 gram vaginally twice weekly as directed    Vulvar atrophy       * triamcinolone acetonide 10 MG/ML injection    KENALOG    5 mL    See med note    Lichen planopilaris       * triamcinolone acetonide 10 MG/ML injection    KENALOG    5 mL    See med note    Lichen planopilaris       vitamin D 1000 units capsule      Take  by mouth. Total 2200 units daily.        * Notice:  This list has 4 medication(s) that are the same as other medications prescribed for you. Read the directions carefully, and ask your doctor or other care provider to review them with you.

## 2018-08-21 NOTE — NURSING NOTE
Dermatology Rooming Note    Ester Barba's goals for this visit include:   Chief Complaint   Patient presents with     Derm Problem     Ester is here for a hairloss follow up, states it's gotten worse.         Amber Flowers LPN

## 2018-09-21 DIAGNOSIS — L66.10 LICHEN PLANO-PILARIS: ICD-10-CM

## 2018-09-24 NOTE — TELEPHONE ENCOUNTER
Medication requested:  hydroxychloroquine (PLAQUENIL) 200 MG   Last refilled: 6/25/18  Qty: 60:1    Last seen: 8/21/18  RTC: 5 WEEKS  Next appt: 10/2/18  MED REC: Continue plaquenil 200mg BID    *Routing refill request to provider for review/approval because:  Drug not on the refill protocol    30 DAY RF PENDING

## 2018-09-25 DIAGNOSIS — L66.10 LICHEN PLANO-PILARIS: ICD-10-CM

## 2018-09-25 RX ORDER — HYDROXYCHLOROQUINE SULFATE 200 MG/1
200 TABLET, FILM COATED ORAL 2 TIMES DAILY
Qty: 60 TABLET | Refills: 0 | OUTPATIENT
Start: 2018-09-25

## 2018-09-26 RX ORDER — HYDROXYCHLOROQUINE SULFATE 200 MG/1
200 TABLET, FILM COATED ORAL 2 TIMES DAILY
Qty: 60 TABLET | Refills: 0 | Status: SHIPPED | OUTPATIENT
Start: 2018-09-26 | End: 2018-10-26

## 2018-09-26 NOTE — TELEPHONE ENCOUNTER
Received refill request for hydroxychloroquine as the resident on call. Reviewed patient's chart and attached communication. Patient last seen 8/2018 for lichen planopilaris. RTC scheduled for next month, 10/2018. After reviewing the medication list and assessment and plan from last visit, the refill request was accepted for a one month supply. Patient may be due for labs at her upcoming visit, as she has not had them done in over a year (last done here in 6/2017, although perhaps they have been done elsewhere in the meantime).     Jessica Godwin MD  Dermatology PGY-3  [phone]

## 2018-09-27 RX ORDER — HYDROXYCHLOROQUINE SULFATE 200 MG/1
TABLET, FILM COATED ORAL
Qty: 180 TABLET | Refills: 0 | OUTPATIENT
Start: 2018-09-27

## 2018-10-02 ENCOUNTER — OFFICE VISIT (OUTPATIENT)
Dept: DERMATOLOGY | Facility: CLINIC | Age: 73
End: 2018-10-02
Payer: COMMERCIAL

## 2018-10-02 VITALS — SYSTOLIC BLOOD PRESSURE: 133 MMHG | HEART RATE: 79 BPM | DIASTOLIC BLOOD PRESSURE: 74 MMHG

## 2018-10-02 DIAGNOSIS — L57.0 ACTINIC KERATOSIS: ICD-10-CM

## 2018-10-02 DIAGNOSIS — L66.9 CICATRICIAL ALOPECIA: ICD-10-CM

## 2018-10-02 DIAGNOSIS — L66.10 LICHEN PLANOPILARIS: Primary | ICD-10-CM

## 2018-10-02 DIAGNOSIS — Z51.81 MEDICATION MONITORING ENCOUNTER: ICD-10-CM

## 2018-10-02 DIAGNOSIS — L30.9 DERMATITIS: ICD-10-CM

## 2018-10-02 LAB
ALBUMIN SERPL-MCNC: 3.7 G/DL (ref 3.4–5)
ALP SERPL-CCNC: 87 U/L (ref 40–150)
ALT SERPL W P-5'-P-CCNC: 25 U/L (ref 0–50)
ANION GAP SERPL CALCULATED.3IONS-SCNC: 6 MMOL/L (ref 3–14)
AST SERPL W P-5'-P-CCNC: 23 U/L (ref 0–45)
BASOPHILS # BLD AUTO: 0 10E9/L (ref 0–0.2)
BASOPHILS NFR BLD AUTO: 0.5 %
BILIRUB SERPL-MCNC: 0.4 MG/DL (ref 0.2–1.3)
BUN SERPL-MCNC: 17 MG/DL (ref 7–30)
CALCIUM SERPL-MCNC: 8.8 MG/DL (ref 8.5–10.1)
CHLORIDE SERPL-SCNC: 107 MMOL/L (ref 94–109)
CO2 SERPL-SCNC: 28 MMOL/L (ref 20–32)
CREAT SERPL-MCNC: 0.68 MG/DL (ref 0.52–1.04)
DIFFERENTIAL METHOD BLD: ABNORMAL
EOSINOPHIL # BLD AUTO: 0.2 10E9/L (ref 0–0.7)
EOSINOPHIL NFR BLD AUTO: 3.2 %
ERYTHROCYTE [DISTWIDTH] IN BLOOD BY AUTOMATED COUNT: 13.3 % (ref 10–15)
GFR SERPL CREATININE-BSD FRML MDRD: 86 ML/MIN/1.7M2
GLUCOSE SERPL-MCNC: 103 MG/DL (ref 70–99)
HCT VFR BLD AUTO: 38.8 % (ref 35–47)
HGB BLD-MCNC: 12.9 G/DL (ref 11.7–15.7)
IMM GRANULOCYTES # BLD: 0 10E9/L (ref 0–0.4)
IMM GRANULOCYTES NFR BLD: 0.5 %
LYMPHOCYTES # BLD AUTO: 2.2 10E9/L (ref 0.8–5.3)
LYMPHOCYTES NFR BLD AUTO: 39.5 %
MCH RBC QN AUTO: 32.7 PG (ref 26.5–33)
MCHC RBC AUTO-ENTMCNC: 33.2 G/DL (ref 31.5–36.5)
MCV RBC AUTO: 99 FL (ref 78–100)
MONOCYTES # BLD AUTO: 0.7 10E9/L (ref 0–1.3)
MONOCYTES NFR BLD AUTO: 12.1 %
NEUTROPHILS # BLD AUTO: 2.5 10E9/L (ref 1.6–8.3)
NEUTROPHILS NFR BLD AUTO: 44.2 %
NRBC # BLD AUTO: 0 10*3/UL
NRBC BLD AUTO-RTO: 0 /100
PLATELET # BLD AUTO: 134 10E9/L (ref 150–450)
POTASSIUM SERPL-SCNC: 3.9 MMOL/L (ref 3.4–5.3)
PROT SERPL-MCNC: 7.3 G/DL (ref 6.8–8.8)
RBC # BLD AUTO: 3.94 10E12/L (ref 3.8–5.2)
SODIUM SERPL-SCNC: 141 MMOL/L (ref 133–144)
WBC # BLD AUTO: 5.6 10E9/L (ref 4–11)

## 2018-10-02 ASSESSMENT — PAIN SCALES - GENERAL
PAINLEVEL: MILD PAIN (3)
PAINLEVEL: MODERATE PAIN (5)

## 2018-10-02 NOTE — NURSING NOTE
Drug Administration Record    Drug Name: triamcinolone acetonide(kenalog)  Dose: 2mL of triamcinolone 10mg/mL, 20mg dose  Route administered: ID  NDC #: Kenalog-10 (6347-6779-79)  Amount of waste(mL):3  Reason for waste: Single use vial    LOT #: [id number]   SITE: [facility]  : [employer]  EXPIRATION DATE: APRIL2020

## 2018-10-02 NOTE — MR AVS SNAPSHOT
After Visit Summary   10/2/2018    Ester Barba    MRN: [id number]           Patient Information     Date Of Birth          1945        Visit Information        Provider Department      10/2/2018 4:30 PM Barbara Soto MD [facility] Dermatology        Today's Diagnoses     Lichen planopilaris    -  1    Cicatricial alopecia        Actinic keratosis        Dermatitis        Medication monitoring encounter          Care Instructions    Cryotherapy    What is it?    Use of a very cold liquid, such as liquid nitrogen, to freeze and destroy abnormal skin cells that need to be removed    What should I expect?    Tenderness and redness    A small blister that might grow and fill with dark purple blood. There may be crusting.    More than one treatment may be needed if the lesions do not go away.    How do I care for the treated area?    Gently wash the area with your hands when bathing.    Use a thin layer of Vaseline to help with healing. You may use a Band-Aid.     The area should heal within 7-10 days and may leave behind a pink or lighter color.     Do not use an antibiotic or Neosporin ointment.     You may take acetaminophen (Tylenol) for pain.     Call your Doctor if you have:    Severe pain    Signs of infection (warmth, redness, cloudy yellow drainage, and or a bad smell)    Questions or concerns    Who should I call with questions?       [facility]: [phone]       [facility]: [phone]       For urgent needs outside of business hours call the [facility] at [phone]        and ask for the dermatology resident on call          Follow-ups after your visit        Your next 10 appointments already scheduled     Nov 08, 2018 12:10 PM CST   (Arrive by 11:55 AM)   RETURN HAIRLOSS with Barbara Soto MD   [facility] Dermatology ([facility])    [address] [place] [address]  Floor  [facility] [id number] [phone]            Dec 20, 2018 11:35 AM CST   (Arrive by 11:20 AM)   RETURN HAIRLOSS with Barbara Soto MD   [facility] ([facility] [place])    [address]  [address]rd [facility] [id number] [phone]              Who to contact     Please call your clinic at [phone] to:    Ask questions about your health    Make or cancel appointments    Discuss your medicines    Learn about your test results    Speak to your doctor            Additional Information About Your Visit        [employer] Information     [employer] gives you secure access to your electronic health record. If you see a primary care provider, you can also send messages to your care team and make appointments. If you have questions, please call your primary care clinic.  If you do not have a primary care provider, please call [phone] and they will assist you.      [employer] is an electronic gateway that provides easy, online access to your medical records. With [employer], you can request a clinic appointment, read your test results, renew a prescription or communicate with your care team.     To access your existing account, please contact your [facility] or call [phone] for assistance.        Care EveryWhere ID     This is your Care EveryWhere ID. This could be used by other organizations to access your [place] medical records  [id number]        Your Vitals Were     Pulse                   79            Blood Pressure from Last 3 Encounters:   10/19/18 127/66   10/02/18 133/74   08/21/18 134/80    Weight from Last 3 Encounters:   10/19/18 61.2 kg (135 lb)   03/14/18 60.3 kg (133 lb)   06/28/17 59.1 kg (130 lb 6.4 oz)              We Performed the Following     DESTRUCT PREMALIGNANT LESION, FIRST     INJECTION INTO SKIN LESIONS >7        Primary Care Provider Office Phone # Fax #    Fernando Weathers [phone]  [phone]       [facility] [address]  [facility] [address]        Equal Access to Services     CORRINAFIDENCIO VIOLETA : Hadii alexi elmore evelyn Marcano, wakamronda luqmackenzie, qaybta kasharathda aprker, samuel ruthin hayaajack danielmria cardenas johny stevens. So [facility] [phone].    ATENCIÓN: Si habla español, tiene a pelayo disposición servicios gratuitos de asistencia lingüística. Peter al [phone].    We comply with applicable federal civil rights laws and [place] laws. We do not discriminate on the basis of race, color, national origin, age, disability, sex, sexual orientation, or gender identity.            Thank you!     Thank you for choosing [facility] DERMATOLOG[facility]  for your care. Our goal is always to provide you with excellent care. Hearing back from our patients is one way we can continue to improve our services. Please take a few minutes to complete the written survey that you may receive in the mail after your visit with us. Thank you!             Your Updated Medication List - Protect others around you: Learn how to safely use, store and throw away your medicines at www.disposemymeds.org.          This list is accurate as of 10/2/18 11:59 PM.  Always use your most recent med list.                   Brand Name Dispense Instructions for use Diagnosis    calcium 1500 MG Tabs      Take  by mouth.        CLARITIN PO      Take  by mouth.        desonide 0.05 % cream    DESOWEN    60 g    Mix 50/50 with ketoconazole and apply twice daily to irritated area for 3 weeks    Intertrigo       fluconazole 200 MG tablet    DIFLUCAN    45 tablet    Take 1 tablet (200 mg) by mouth daily    Yeast infection of the vagina       Fluocinolone Acetonide Scalp 0.01 % Oil oil    DERMA-SMOOTHE/FS SCALP    118 mL    Apply once a week to scalp for lichen planopilaris    Lichen planopilaris       * hydroxychloroquine 200 MG tablet    PLAQUENIL    180 tablet    TAKE 1 TABLET(200 MG) BY MOUTH TWICE DAILY    Lichen plano-pilaris       *  hydroxychloroquine 200 MG tablet    PLAQUENIL    60 tablet    Take 1 tablet (200 mg) by mouth 2 times daily    Lichen plano-pilaris       ibuprofen 800 MG tablet    ADVIL/MOTRIN     Take 800 mg by mouth every 8 hours as needed.        ketoconazole 2 % cream    NIZORAL    30 g    Mix 50/50 with desonide and apply twice daily to irritated area for 3 weeks    Intertrigo       NEW MED     40 g    Biest 1.5mg/gram cream(pg free)  Insert 1 gram vaginally twice weekly as directed    Vulvar atrophy       * triamcinolone acetonide 10 MG/ML injection    KENALOG    5 mL    See med note    Lichen planopilaris       * triamcinolone acetonide 10 MG/ML injection    KENALOG    5 mL    See med note    Lichen planopilaris       vitamin D 1000 units capsule      Take  by mouth. Total 2200 units daily.        * Notice:  This list has 4 medication(s) that are the same as other medications prescribed for you. Read the directions carefully, and ask your doctor or other care provider to review them with you.

## 2018-10-02 NOTE — PATIENT INSTRUCTIONS
Cryotherapy    What is it?    Use of a very cold liquid, such as liquid nitrogen, to freeze and destroy abnormal skin cells that need to be removed    What should I expect?    Tenderness and redness    A small blister that might grow and fill with dark purple blood. There may be crusting.    More than one treatment may be needed if the lesions do not go away.    How do I care for the treated area?    Gently wash the area with your hands when bathing.    Use a thin layer of Vaseline to help with healing. You may use a Band-Aid.     The area should heal within 7-10 days and may leave behind a pink or lighter color.     Do not use an antibiotic or Neosporin ointment.     You may take acetaminophen (Tylenol) for pain.     Call your Doctor if you have:    Severe pain    Signs of infection (warmth, redness, cloudy yellow drainage, and or a bad smell)    Questions or concerns    Who should I call with questions?       [facility]: [phone]       [facility]: [phone]       For urgent needs outside of business hours call the [facility] at [phone]        and ask for the dermatology resident on call

## 2018-10-02 NOTE — NURSING NOTE
Dermatology Rooming Note    Ester Barba's goals for this visit include:   Chief Complaint   Patient presents with     Derm Problem     Ester is here for a hairloss follow up and a lip follow up, states they both are the same since her last visit.        Amber Flowers LPN

## 2018-10-02 NOTE — PROGRESS NOTES
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  Hx NMSC.   -BCC on right nasal ala s/p Mohs 11/27/2017  -SCC of the scalp, s/p Mohs excision 2014  -History of 1-2 other NMSC of the scalp (BCCs)      3. Actinic keratosis    Cryotherapy procedure note for 2 lesions treated.     Biopsy next visit if still present         Encounter Date: Oct 2, 2018    CC:  Chief Complaint   Patient presents with     Derm Problem     Ester is here for a hairloss follow up and a lip follow up, states they both are the same since her last visit.          History of Present Illness:  Ms. Ester Barba is a 72 year old female who presents as a follow-up for LPP. She was last seen 8/21/2018 when she received 2cc ILK 10 mg/mL. Since that time she has experienced pruritus lately with some pain and tenderness to touch on the right scalp. She has experienced some crusting.  She has noticed more hair in her comb recently and feels the LPP is spreading. She stated that her scalp is worse than it has been in a long time. Currently she shampoos every other day with DHS zinc shampoo, takes plaquenil 200 mg BID, and applies dermasmooth 1x weekly. She feels the off brand of plaquenil is making her lips swell and that the dermasmooth is too greasy and cold to sleep with, although she can try to use it more often. The patient notes the scaly spots on her arms never went away after cryotherapy.  The lip lesion is not a major concern today.       Past Medical History:   Patient Active Problem List   Diagnosis     Porokeratosis     Squamous cell carcinoma     Neoplasm of uncertain behavior     Lichen planopilaris     Dermatitis     Cicatricial alopecia     Keratosis, inflamed seborrheic     History of skin cancer      Vaginitis and vulvovaginitis     Basal cell carcinoma of vertex scalp s/p mohs 6-5-15     Medication management     Actinic keratosis     Medication monitoring encounter     Dermatitis, seborrheic     Erosive pustular dermatosis     Tick bite, initial encounter     Past Medical History:   Diagnosis Date     Basal cell carcinoma      Squamous cell carcinoma      Past Surgical History:   Procedure Laterality Date     BIOPSY OF SKIN LESION       MOHS MICROGRAPHIC PROCEDURE       NO HISTORY OF SURGERY  2/17/14    derm     SMALL BOWEL RESECTION  11/2015    diverticulitis       Social History:   reports that she quit smoking about 53 years ago. She has never used smokeless tobacco. She reports that she drinks alcohol. She reports that she does not use illicit drugs.    Family History:  Family History   Problem Relation Age of Onset     Skin Cancer Sister      basal cell carcinoma     Melanoma No family hx of        Medications:  Current Outpatient Prescriptions   Medication Sig Dispense Refill     Calcium 1500 MG TABS Take  by mouth.       Cholecalciferol (VITAMIN D) 1000 UNIT capsule Take  by mouth. Total 2200 units daily.       desonide (DESOWEN) 0.05 % cream Mix 50/50 with ketoconazole and apply twice daily to irritated area for 3 weeks 60 g 1     fluconazole (DIFLUCAN) 200 MG tablet Take 1 tablet (200 mg) by mouth daily 45 tablet 4     Fluocinolone Acetonide Scalp (DERMA-SMOOTHE/FS SCALP) 0.01 % OIL oil Apply once a week to scalp for lichen planopilaris 118 mL 11     hydroxychloroquine (PLAQUENIL) 200 MG tablet Take 1 tablet (200 mg) by mouth 2 times daily 60 tablet 0     hydroxychloroquine (PLAQUENIL) 200 MG tablet TAKE 1 TABLET(200 MG) BY MOUTH TWICE DAILY 180 tablet 0     ibuprofen (ADVIL,MOTRIN) 800 MG tablet Take 800 mg by mouth every 8 hours as needed.       ketoconazole (NIZORAL) 2 % cream Mix 50/50 with desonide and apply twice daily to irritated area for 3 weeks 30 g 1     Loratadine (CLARITIN PO) Take   by mouth.       NEW MED Biest 1.5mg/gram cream(pg free)  Insert 1 gram vaginally twice weekly as directed 40 g 6     triamcinolone acetonide (KENALOG) 10 MG/ML injection See med note 5 mL 0     triamcinolone acetonide (KENALOG) 10 MG/ML injection See med note 5 mL 0     Allergies   Allergen Reactions     Dust Mites Other (See Comments)     Sneezing, coughing. asthma     Levaquin [Levofloxacin Hemihydrate] Other (See Comments)     Muscle weakness     Mold Other (See Comments)     Sneezing, asthma, weezing     Pollen Extract/Tree Extract Other (See Comments)     Sneezing, weezing     Sulfa Drugs Hives     Penicillins Rash       Review of Systems:  -Constitutional: The patient denies fatigue, fevers, chills, unintended weight loss, and night sweats.  -HEENT: Patient denies nonhealing oral sores.  -Skin: As above in HPI. No additional skin concerns.    Physical exam:  Vitals: /74 (BP Location: Right arm, Patient Position: Chair, Cuff Size: Adult Regular)  Pulse 79  GEN: This is a well developed, well-nourished female in no acute distress, in a pleasant mood.    SKIN: Focused examination of the scalp and face and arms was performed.  -LPPAI 1.33 for mild perifollicular scale and erythema, pain, and burning  -Large alopecia patch within the central scalp. Within the center there are a few terminal hairs, several cm in length, improved compared to photos   -At the periphery of this alopecic patch, there is mild perifollicular scale and erythema    -dry scale throughout scalp  -scaly papule <0.5cm in diameter on the bilateral upper forearms, previous treated with cryotherapy  -No other lesions of concern on areas examined.      Impression/Plan:  1. Lichen planopilaris - Overall clinically stable. As such, we will continue with her current regimen. The few active areas were treated with ILK today as below.  LPPAI 1.33    Safety labs for plaquenil: CBC platelets differential, CMP    Kenalog intralesional injection  procedure note: After verbal consent and discussion of risks including but not limited to atrophy, pain, and bruising,  time out was performed, 2 total cc of Kenalog 10 mg/cc was injected into 20 sites on the scalp.  The patient tolerated the procedure well and left the Dermatology clinic in good condition.    Continue dermasmooth/FS 0.01% oil at least weekly, OK to apply more often    Continue DHS zinc shampoo every other day    Continue plaquenil 200mg BID     Photographs taken for future reference    2. Actinic keratosis    Cryotherapy procedure note for 2 lesions treated.     After care instructions per nursing     Biopsy next visit if still present at next visit     Follow-up in 5 weeks, earlier for new or changing lesions.     Staff Involved:  Luc Villalpando MD  Dermatology Research Fellow        Patient was seen and examined with the clinical research fellow. I agree with the history, review of systems, physical examination, assessments and plan. The ILK procedure was done together as was the cryotherapy procedure.    Barbara Soto MD  Professor and  Chair  Department of Dermatology  [facility]

## 2018-10-02 NOTE — PROGRESS NOTES
[facility] Dermatology Note      Dermatology Problem List:  1. LPP  -current treatment: ILK 10 mg/cc injections, plaquenil 200 mg BID, DHS zinc shampoo QOD,  derma-smoothe/FS 0.01% oil 2-3x per week  - plaquenil safety labs 5/1/18  -Symptoms thought to start originally after imiquimod application     2. Hx NMSC.   -BCC on right nasal ala s/p Mohs 11/27/2017  -SCC of the scalp, s/p Mohs excision 2014  -History of 1-2 other NMSC of the scalp (BCCs)    3. Actinic keratosis   -S/p cryotherapy  - efudex and calcipotriene to right upper lip BID x 4 days planned for fall 2018    4. Erosive pustular dermatosis, scalp: Improved following doxycyline prescribed for tick bite, remove crust with Cetaphil or Vanicream cleanser soaks      Encounter Date: Oct 2, 2018    CC:  Chief Complaint   Patient presents with     Derm Problem     Ester is here for a hairloss follow up and a lip follow up, states they both are the same since her last visit.          History of Present Illness:  Ms. Ester Barba is a 72 year old female who presents as a follow-up for LPP.     Has not been using the cetaphil or vanicream because     Lately has some itchy, some time has pain on right scalp which is tender to touch  More hair in comb and scalp worse than it sbeen in a long time    dermasmooth 1x weekly, can try to do more   DHS every other day shampoo  qlaquenil 200 BID    Spreading      The patient was last seen 6/5/18 at which time LPP was fairly stable and a few active areas were treated with intralesional Kenalog.  She was also having a slight flare of her erosive pustular dermatosis that was responded well to Vaseline.  She was continued on Cetaphil or Aveeno cream cleanser soaks and Vaseline.    Since then, she reports her scalp really started to act up feeling swollen and tender on the right side and spreading to her entire scalp. She is using the fluocinolone oil about 3 times a week which helps a little. She notes  spreading of the hair loss since her last visit. She continues to take plaquenil 200 mg BID and use DHS zinc shampoo every other day. No other skin concerns today and she is otherwise feeling well.     LPPAI (0-10) = (pruritus + pain +  burning)/3 1+(scalp erythema + perifollicular erythema + perifollicular scale)/3 + 2.5  (pull test) + 1.5 (spreading/2)    Past Medical History:   Patient Active Problem List   Diagnosis     Porokeratosis     Squamous cell carcinoma     Neoplasm of uncertain behavior     Lichen planopilaris     Dermatitis     Cicatricial alopecia     Keratosis, inflamed seborrheic     History of skin cancer     Vaginitis and vulvovaginitis     Basal cell carcinoma of vertex scalp s/p mohs 6-5-15     Medication management     Actinic keratosis     Medication monitoring encounter     Dermatitis, seborrheic     Erosive pustular dermatosis     Tick bite, initial encounter     Past Medical History:   Diagnosis Date     Basal cell carcinoma      Squamous cell carcinoma      Past Surgical History:   Procedure Laterality Date     BIOPSY OF SKIN LESION       MOHS MICROGRAPHIC PROCEDURE       NO HISTORY OF SURGERY  2/17/14    derm     SMALL BOWEL RESECTION  11/2015    diverticulitis         Medications:  Current Outpatient Prescriptions   Medication Sig Dispense Refill     Calcium 1500 MG TABS Take  by mouth.       Cholecalciferol (VITAMIN D) 1000 UNIT capsule Take  by mouth. Total 2200 units daily.       desonide (DESOWEN) 0.05 % cream Mix 50/50 with ketoconazole and apply twice daily to irritated area for 3 weeks 60 g 1     fluconazole (DIFLUCAN) 200 MG tablet Take 1 tablet (200 mg) by mouth daily 45 tablet 4     Fluocinolone Acetonide Scalp (DERMA-SMOOTHE/FS SCALP) 0.01 % OIL oil Apply once a week to scalp for lichen planopilaris 118 mL 11     hydroxychloroquine (PLAQUENIL) 200 MG tablet Take 1 tablet (200 mg) by mouth 2 times daily 60 tablet 0     hydroxychloroquine (PLAQUENIL) 200 MG tablet TAKE 1  TABLET(200 MG) BY MOUTH TWICE DAILY 180 tablet 0     ibuprofen (ADVIL,MOTRIN) 800 MG tablet Take 800 mg by mouth every 8 hours as needed.       ketoconazole (NIZORAL) 2 % cream Mix 50/50 with desonide and apply twice daily to irritated area for 3 weeks 30 g 1     Loratadine (CLARITIN PO) Take  by mouth.       NEW MED Biest 1.5mg/gram cream(pg free)  Insert 1 gram vaginally twice weekly as directed 40 g 6     triamcinolone acetonide (KENALOG) 10 MG/ML injection See med note 5 mL 0     triamcinolone acetonide (KENALOG) 10 MG/ML injection See med note 5 mL 0     Allergies   Allergen Reactions     Dust Mites Other (See Comments)     Sneezing, coughing. asthma     Levaquin [Levofloxacin Hemihydrate] Other (See Comments)     Muscle weakness     Mold Other (See Comments)     Sneezing, asthma, weezing     Pollen Extract/Tree Extract Other (See Comments)     Sneezing, weezing     Sulfa Drugs Hives     Penicillins Rash         Review of Systems:  -As per HPI  -Skin: As above in HPI. No additional skin concerns.    Physical exam:  Vitals: /74 (BP Location: Right arm, Patient Position: Chair, Cuff Size: Adult Regular)  Pulse 79  GEN: This is a well developed, well-nourished female in no acute distress, in a pleasant mood.    SKIN: Focused examination of the scalp and face was performed.  -LPPAI 1.33 for perifollicular scale, pain and burning  -Large alopecic patch within the central scalp. Within the center there are a few thin crusty areas  -At the periphery of this alopecic patch, there is mild perifollicular scale, but no erythema  -Alopecia appears stable to somewhat improved compared to photographs from last visit   -Right cheek with interval resolution of keratotic papule, subtle pink macule suggestive of early SK  - right [place] lip with pale pink macule with tightly adherent scale  -No other lesions of concern on areas examined.     Impression/Plan:  1. Lichen planopilaris - Overall clinically stable. Patient  perceived spreading but exam actually appears improved from prior visit. As such, will continue with her current regimen. The few active areas were treated with ILK today as below.  LPPAI 1.33    Kenalog intralesional injection procedure note: After verbal consent and discussion of risks including but not limited to atrophy, pain, and bruising,  time out was performed, 2 total cc of Kenalog 10 mg/cc was injected into 10 sites on the scalp.  The patient tolerated the procedure well and left the Dermatology clinic in good condition.    Continue dermasmoothe/FS 0.01% oil 3-4x/w    Continue DHS zinc shampoo every other day    Continue plaquenil 200mg BID     Photographs taken for future reference     2. Erosive Pustular Dermatosis: Improved and stable.      Continue Cetaphil or Vanicream gentle cleanser soaks as needed,     Continue Vaseline liberally to scalp as needed      Follow-up in 5 weeks, earlier for new or changing lesions.     Staff Involved:  Luc Villalpando MD  Dermatology Research Fellow

## 2018-10-02 NOTE — LETTER
10/2/2018       RE: Ester Barba  [address] [place] [address]     Dear Colleague,    Thank you for referring your patient, Ester Barba, to the [facility] DERMATOLOGY at [facility]. Please see a copy of my visit note below.    [facility] Dermatology Note      Dermatology Problem List:       1.Lichen planopilaris - stable, LPPAI 1.33    Safety labs CBC with platelet differential, CMP    ILK 10 2cc    dermasmooth/FS 0.01% oil 3-4x/w    DHS zinc shampoo every other day    plaquenil 200 mg BID       2.  Hx NMSC.   -BCC on right nasal ala s/p Mohs 11/27/2017  -SCC of the scalp, s/p Mohs excision 2014  -History of 1-2 other NMSC of the scalp (BCCs)      3. Actinic keratosis    Cryotherapy procedure note for 2 lesions treated.     Biopsy next visit if still present         Encounter Date: Oct 2, 2018    CC:  Chief Complaint   Patient presents with     Derm Problem     Ester is here for a hairloss follow up and a lip follow up, states they both are the same since her last visit.          History of Present Illness:  Ms. Ester Barba is a 72 year old female who presents as a follow-up for LPP. She was last seen 8/21/2018 when she received 2cc ILK 10 mg/mL. Since that time she has experienced pruritus lately with some pain and tenderness to touch on the right scalp. She has experienced some crusting.  She has noticed more hair in her comb recently and feels the LPP is spreading. She stated that her scalp is worse than it has been in a long time. Currently she shampoos every other day with DHS zinc shampoo, takes plaquenil 200 mg BID, and applies dermasmooth 1x weekly. She feels the off brand of plaquenil is making her lips swell and that the dermasmooth is too greasy and cold to sleep with, although she can try to use it more often. The patient notes the scaly spots on her arms never went away after cryotherapy.  The lip lesion is not a major concern  today.       Past Medical History:   Patient Active Problem List   Diagnosis     Porokeratosis     Squamous cell carcinoma     Neoplasm of uncertain behavior     Lichen planopilaris     Dermatitis     Cicatricial alopecia     Keratosis, inflamed seborrheic     History of skin cancer     Vaginitis and vulvovaginitis     Basal cell carcinoma of vertex scalp s/p mohs 6-5-15     Medication management     Actinic keratosis     Medication monitoring encounter     Dermatitis, seborrheic     Erosive pustular dermatosis     Tick bite, initial encounter     Past Medical History:   Diagnosis Date     Basal cell carcinoma      Squamous cell carcinoma      Past Surgical History:   Procedure Laterality Date     BIOPSY OF SKIN LESION       MOHS MICROGRAPHIC PROCEDURE       NO HISTORY OF SURGERY  2/17/14    derm     SMALL BOWEL RESECTION  11/2015    diverticulitis       Social History:   reports that she quit smoking about 53 years ago. She has never used smokeless tobacco. She reports that she drinks alcohol. She reports that she does not use illicit drugs.    Family History:  Family History   Problem Relation Age of Onset     Skin Cancer Sister      basal cell carcinoma     Melanoma No family hx of        Medications:  Current Outpatient Prescriptions   Medication Sig Dispense Refill     Calcium 1500 MG TABS Take  by mouth.       Cholecalciferol (VITAMIN D) 1000 UNIT capsule Take  by mouth. Total 2200 units daily.       desonide (DESOWEN) 0.05 % cream Mix 50/50 with ketoconazole and apply twice daily to irritated area for 3 weeks 60 g 1     fluconazole (DIFLUCAN) 200 MG tablet Take 1 tablet (200 mg) by mouth daily 45 tablet 4     Fluocinolone Acetonide Scalp (DERMA-SMOOTHE/FS SCALP) 0.01 % OIL oil Apply once a week to scalp for lichen planopilaris 118 mL 11     hydroxychloroquine (PLAQUENIL) 200 MG tablet Take 1 tablet (200 mg) by mouth 2 times daily 60 tablet 0     hydroxychloroquine (PLAQUENIL) 200 MG tablet TAKE 1 TABLET(200  MG) BY MOUTH TWICE DAILY 180 tablet 0     ibuprofen (ADVIL,MOTRIN) 800 MG tablet Take 800 mg by mouth every 8 hours as needed.       ketoconazole (NIZORAL) 2 % cream Mix 50/50 with desonide and apply twice daily to irritated area for 3 weeks 30 g 1     Loratadine (CLARITIN PO) Take  by mouth.       NEW MED Biest 1.5mg/gram cream(pg free)  Insert 1 gram vaginally twice weekly as directed 40 g 6     triamcinolone acetonide (KENALOG) 10 MG/ML injection See med note 5 mL 0     triamcinolone acetonide (KENALOG) 10 MG/ML injection See med note 5 mL 0     Allergies   Allergen Reactions     Dust Mites Other (See Comments)     Sneezing, coughing. asthma     Levaquin [Levofloxacin Hemihydrate] Other (See Comments)     Muscle weakness     Mold Other (See Comments)     Sneezing, asthma, weezing     Pollen Extract/Tree Extract Other (See Comments)     Sneezing, weezing     Sulfa Drugs Hives     Penicillins Rash       Review of Systems:  -Constitutional: The patient denies fatigue, fevers, chills, unintended weight loss, and night sweats.  -HEENT: Patient denies nonhealing oral sores.  -Skin: As above in HPI. No additional skin concerns.    Physical exam:  Vitals: /74 (BP Location: Right arm, Patient Position: Chair, Cuff Size: Adult Regular)  Pulse 79  GEN: This is a well developed, well-nourished female in no acute distress, in a pleasant mood.    SKIN: Focused examination of the scalp and face and arms was performed.  -LPPAI 1.33 for  mild perifollicular scale and erythema, pain, and burning  -Large alopecia patch within the central scalp. Within the center there are a few terminal hairs, several cm in length, improved compared to photos   -At the periphery of this alopecic patch, there is mild perifollicular scale and erythema    -dry scale throughout scalp  -scaly papule <0.5cm in diameter on the bilateral upper forearms, previous treated with cryotherapy  -No other lesions of concern on areas  examined.      Impression/Plan:  1. Lichen planopilaris - Overall clinically stable. As such, we will continue with her current regimen. The few active areas were treated with ILK today as below.  LPPAI 1.33    Safety labs for plaquenil: CBC platelets differential, CMP    Kenalog intralesional injection procedure note: After verbal consent and discussion of risks including but not limited to atrophy, pain, and bruising,  time out was performed, 2 total cc of Kenalog 10 mg/cc was injected into 20 sites on the scalp.  The patient tolerated the procedure well and left the Dermatology clinic in good condition.    Continue dermasmooth/FS 0.01% oil at least weekly, OK to apply more often    Continue DHS zinc shampoo every other day    Continue plaquenil 200mg BID     Photographs taken for future reference    2. Actinic keratosis    Cryotherapy procedure note for 2 lesions treated.     After care instructions per nursing     Biopsy next visit if still present at next visit     Follow-up in 5 weeks, earlier for new or changing lesions.     Staff Involved:  Luc Villalpando MD  Dermatology Research Fellow        Patient was seen and examined with the clinical research fellow. I agree with the history, review of systems, physical examination, assessments and plan. The ILK procedure was done together as was the cryotherapy procedure.    Barbara Soto MD  Professor and  Chair  Department of Dermatology  [facility]

## 2018-10-19 ENCOUNTER — OFFICE VISIT (OUTPATIENT)
Dept: OBGYN | Facility: CLINIC | Age: 73
End: 2018-10-19
Attending: OBSTETRICS & GYNECOLOGY
Payer: MEDICARE

## 2018-10-19 VITALS
HEART RATE: 75 BPM | WEIGHT: 135 LBS | SYSTOLIC BLOOD PRESSURE: 127 MMHG | BODY MASS INDEX: 21.79 KG/M2 | DIASTOLIC BLOOD PRESSURE: 66 MMHG

## 2018-10-19 DIAGNOSIS — N90.5 VULVAR ATROPHY: Primary | ICD-10-CM

## 2018-10-19 PROBLEM — D68.51 FACTOR V LEIDEN MUTATION (H): Status: ACTIVE | Noted: 2018-10-19

## 2018-10-19 ASSESSMENT — PAIN SCALES - GENERAL: PAINLEVEL: NO PAIN (0)

## 2018-10-19 NOTE — LETTER
10/19/2018       RE: Ester Barba  [address] [place] [place] [address]     Dear Colleague,    Thank you for referring your patient, Ester Barba, to the [facility] at [facility]. Please see a copy of my visit note below.    S: 71 yo PM woman followed for lichen planus presents with new onset spotting from vagina last week for 3 days. No cramping and it seemed to start after her usual twice weekly administration of vaginal estrogen with applicator.   Not sexually active.     Patient Active Problem List   Diagnosis     Porokeratosis     Squamous cell carcinoma     Neoplasm of uncertain behavior     Lichen planopilaris     Dermatitis     Cicatricial alopecia     Keratosis, inflamed seborrheic     History of skin cancer     Vaginitis and vulvovaginitis     Basal cell carcinoma of vertex scalp s/p mohs 6-5-15     Medication management     Actinic keratosis     Medication monitoring encounter     Dermatitis, seborrheic     Erosive pustular dermatosis     Tick bite, initial encounter     Past Medical History:   Diagnosis Date     Basal cell carcinoma      Squamous cell carcinoma      /66  Pulse 75  Wt 61.2 kg (135 lb)  Breastfeeding? No  BMI 21.79 kg/m2  EG atrophic without lesions or evidence of bleeding  Vagina: atrophic and mildly stenotic. No lesions seen on speculum exam. No bleeding with exam. No lesions palpated on bimanual exam.     A: vulvar or vaginal spotting in setting of lichen planus and atrophy  P: continue twice weekly estrogen and call if issure presents itself again.    Otherwise RTC 6 months.   Janeth Mayfield

## 2018-10-19 NOTE — MR AVS SNAPSHOT
After Visit Summary   10/19/2018    Ester Barba    MRN: [id number]           Patient Information     Date Of Birth          1945        Visit Information        Provider Department      10/19/2018 2:00 PM Janeth Mayfield MD [facility]        Today's Diagnoses     Vulvar atrophy    -  1       Follow-ups after your visit        Your next 10 appointments already scheduled     Nov 08, 2018 12:10 PM CST   (Arrive by 11:55 AM)   RETURN HAIRLOSS with Barbara Soto MD   [facility] Dermat[facility])    [address] [place] [id number] [phone]            Dec 20, 2018 11:35 AM CST   (Arrive by 11:20 AM)   RETURN HAIRLOSS with Barbara Soto MD   [facility] D[facility]at[facility])    [address] [place] [id number] [phone]              Who to contact     Please call your clinic at [phone] to:    Ask questions about your health    Make or cancel appointments    Discuss your medicines    Learn about your test results    Speak to your doctor            Additional Information About Your Visit        [facility] Information     [facility] gives you secure access to your electronic health record. If you see a primary care provider, you can also send messages to your care team and make appointments. If you have questions, please call your primary care clinic.  If you do not have a primary care provider, please call [phone] and they will assist you.      [facility] is an electronic gateway that provides easy, online access to your medical records. With [facility], you can request a clinic appointment, read your test results, renew a prescription or communicate with your care team.     To access your existing account, please contact your [facility] or call [phone] for assistance.        Care EveryWhere ID      This is your Care EveryWhere ID. This could be used by other organizations to access your [facility] medical records  [id number]        Your Vitals Were     Pulse Breastfeeding? BMI (Body Mass Index)             75 No 21.79 kg/m2          Blood Pressure from Last 3 Encounters:   10/19/18 127/66   10/02/18 133/74   08/21/18 134/80    Weight from Last 3 Encounters:   10/19/18 61.2 kg (135 lb)   03/14/18 60.3 kg (133 lb)   06/28/17 59.1 kg (130 lb 6.4 oz)              Today, you had the following     No orders found for display       Primary Care Provider Office Phone # Fax #    Fernando Weathers [phone]       [facility] [address]  [place] [id number]        Equal Access to Services     ALEXIS MCDANIEL : Hadii alexi ngo Someeta, waaxda luqadaha, qaybta kaalmada parker, samuel mcclain . So [facility] [phone].    ATENCIÓN: Si habla español, tiene a pelayo disposición servicios gratuitos de asistencia lingüística. Peter al [phone].    We comply with applicable federal civil rights laws and [place] laws. We do not discriminate on the basis of race, color, national origin, age, disability, sex, sexual orientation, or gender identity.            Thank you!     Thank you for choosing [facility]  for your care. Our goal is always to provide you with excellent care. Hearing back from our patients is one way we can continue to improve our services. Please take a few minutes to complete the written survey that you may receive in the mail after your visit with us. Thank you!             Your Updated Medication List - Protect others around you: Learn how to safely use, store and throw away your medicines at www.disposemymeds.org.          This list is accurate as of 10/19/18  9:07 PM.  Always use your most recent med list.                   Brand Name Dispense Instructions for use Diagnosis    calcium 1500 MG Tabs      Take  by mouth.         CLARITIN PO      Take  by mouth.        desonide 0.05 % cream    DESOWEN    60 g    Mix 50/50 with ketoconazole and apply twice daily to irritated area for 3 weeks    Intertrigo       fluconazole 200 MG tablet    DIFLUCAN    45 tablet    Take 1 tablet (200 mg) by mouth daily    Yeast infection of the vagina       Fluocinolone Acetonide Scalp 0.01 % Oil oil    DERMA-SMOOTHE/FS SCALP    118 mL    Apply once a week to scalp for lichen planopilaris    Lichen planopilaris       * hydroxychloroquine 200 MG tablet    PLAQUENIL    180 tablet    TAKE 1 TABLET(200 MG) BY MOUTH TWICE DAILY    Lichen plano-pilaris       * hydroxychloroquine 200 MG tablet    PLAQUENIL    60 tablet    Take 1 tablet (200 mg) by mouth 2 times daily    Lichen plano-pilaris       ibuprofen 800 MG tablet    ADVIL/MOTRIN     Take 800 mg by mouth every 8 hours as needed.        ketoconazole 2 % cream    NIZORAL    30 g    Mix 50/50 with desonide and apply twice daily to irritated area for 3 weeks    Intertrigo       NEW MED     40 g    Biest 1.5mg/gram cream(pg free)  Insert 1 gram vaginally twice weekly as directed    Vulvar atrophy       * triamcinolone acetonide 10 MG/ML injection    KENALOG    5 mL    See med note    Lichen planopilaris       * triamcinolone acetonide 10 MG/ML injection    KENALOG    5 mL    See med note    Lichen planopilaris       vitamin D 1000 units capsule      Take  by mouth. Total 2200 units daily.        * Notice:  This list has 4 medication(s) that are the same as other medications prescribed for you. Read the directions carefully, and ask your doctor or other care provider to review them with you.

## 2018-10-20 NOTE — PROGRESS NOTES
S: 73 yo PM woman followed for lichen planus presents with new onset spotting from vagina last week for 3 days. No cramping and it seemed to start after her usual twice weekly administration of vaginal estrogen with applicator.   Not sexually active.     Patient Active Problem List   Diagnosis     Porokeratosis     Squamous cell carcinoma     Neoplasm of uncertain behavior     Lichen planopilaris     Dermatitis     Cicatricial alopecia     Keratosis, inflamed seborrheic     History of skin cancer     Vaginitis and vulvovaginitis     Basal cell carcinoma of vertex scalp s/p mohs 6-5-15     Medication management     Actinic keratosis     Medication monitoring encounter     Dermatitis, seborrheic     Erosive pustular dermatosis     Tick bite, initial encounter     Past Medical History:   Diagnosis Date     Basal cell carcinoma      Squamous cell carcinoma      /66  Pulse 75  Wt 61.2 kg (135 lb)  Breastfeeding? No  BMI 21.79 kg/m2  EG atrophic without lesions or evidence of bleeding  Vagina: atrophic and mildly stenotic. No lesions seen on speculum exam. No bleeding with exam. No lesions palpated on bimanual exam.     A: vulvar or vaginal spotting in setting of lichen planus and atrophy  P: continue twice weekly estrogen and call if issure presents itself again.    Otherwise RTC 6 months.   Janeth Mayfield

## 2018-10-26 DIAGNOSIS — L66.10 LICHEN PLANO-PILARIS: ICD-10-CM

## 2018-10-29 RX ORDER — HYDROXYCHLOROQUINE SULFATE 200 MG/1
200 TABLET, FILM COATED ORAL 2 TIMES DAILY
Qty: 60 TABLET | Refills: 0 | Status: SHIPPED | OUTPATIENT
Start: 2018-10-29 | End: 2019-08-02

## 2018-10-29 NOTE — TELEPHONE ENCOUNTER
Appropriate to continue hydroxychloroquine with upcoming appointment scheduled for routine monitoring.

## 2018-11-08 ENCOUNTER — OFFICE VISIT (OUTPATIENT)
Dept: DERMATOLOGY | Facility: CLINIC | Age: 73
End: 2018-11-08
Payer: COMMERCIAL

## 2018-11-08 VITALS — HEART RATE: 77 BPM | SYSTOLIC BLOOD PRESSURE: 130 MMHG | DIASTOLIC BLOOD PRESSURE: 74 MMHG

## 2018-11-08 DIAGNOSIS — L21.9 DERMATITIS, SEBORRHEIC: ICD-10-CM

## 2018-11-08 DIAGNOSIS — L66.10 LICHEN PLANOPILARIS: Primary | ICD-10-CM

## 2018-11-08 DIAGNOSIS — L57.0 ACTINIC KERATOSIS: ICD-10-CM

## 2018-11-08 ASSESSMENT — PAIN SCALES - GENERAL: PAINLEVEL: NO PAIN (0)

## 2018-11-08 NOTE — LETTER
"11/8/2018       RE: Ester Barba  [address] [place] [address]     Dear Colleague,    Thank you for referring your patient, Ester Barba, to the [facility] DERMATOLOGY at [facility]. Please see a copy of my visit note below.    [facility] Dermatology Note      Dermatology Problem List:  1. Lichen Planopilaris     Last Safety labs 10/2/2018, low platelet count of 134, 000 (excellent though for Ms. Barba)  and slightly elevated glucose of 103    2 cc ILK 10 mg/mL, last 11/8/2018    Continue dermasmooth/FS 0.01% oil at least weekly, OK to apply more often    Continue DHS zinc shampoo every other day    Continue plaquenil 200 mg BID     2. Hx NMSC.   -BCC on right nasal ala s/p Mohs 11/27/2017  -SCC of the scalp, s/p Mohs excision 2014  -History of 1-2 other NMSC of the scalp (BCCs)       3. Actinic keratosis - persistent lesion on right upper forearm    Cryotherapy 11/8/2018    Biopsy if still present at next visit    Encounter Date: Nov 8, 2018    CC:  Chief Complaint   Patient presents with     Derm Problem       Lichen planopilaris follow up, Ester states \" It is about the same. \"          History of Present Illness:  Ms. Ester Barba is a 73 year old female who presents as a follow-up for LPP. The patient was last seen 10/2/2018 when she received 2cc ILK 10/ mg/mL. She feels the condition (less pain, tenderness, and shedding) is better possible due to the Derma-Smoothe/FS oil. Her scalp still itches occasionally particularly on the  upper occipital scalp. Currently she uses DHS zinc shampoo 3-4 times per week, takes plaquenil 200 mg twice daily, and applies Derma-Smoothe/FS 2-3 times weekly,   At the last visit two actinic keratoses were treated with cryotherapy. One on the right forearm has been treated twice but is still there while the left one has resolved.      Component      Latest Ref Rng & Units 10/2/2018   WBC      4.0 - 11.0 10e9/L 5.6   RBC " Count      3.8 - 5.2 10e12/L 3.94   Hemoglobin      11.7 - 15.7 g/dL 12.9   Hematocrit      35.0 - 47.0 % 38.8   MCV      78 - 100 fl 99   MCH      26.5 - 33.0 pg 32.7   MCHC      31.5 - 36.5 g/dL 33.2   RDW      10.0 - 15.0 % 13.3   Platelet Count      150 - 450 10e9/L 134 (L)   Diff Method       Automated Method   % Neutrophils      % 44.2   % Lymphocytes      % 39.5   % Monocytes      % 12.1   % Eosinophils      % 3.2   % Basophils      % 0.5   % Immature Granulocytes      % 0.5   Nucleated RBCs      0 /100 0   Absolute Neutrophil      1.6 - 8.3 10e9/L 2.5   Absolute Lymphocytes      0.8 - 5.3 10e9/L 2.2   Absolute Monocytes      0.0 - 1.3 10e9/L 0.7   Absolute Eosinophils      0.0 - 0.7 10e9/L 0.2   Absolute Basophils      0.0 - 0.2 10e9/L 0.0   Abs Immature Granulocytes      0 - 0.4 10e9/L 0.0   Absolute Nucleated RBC       0.0   Sodium      133 - 144 mmol/L 141   Potassium      3.4 - 5.3 mmol/L 3.9   Chloride      94 - 109 mmol/L 107   Carbon Dioxide      20 - 32 mmol/L 28   Anion Gap      3 - 14 mmol/L 6   Glucose      70 - 99 mg/dL 103 (H)   Urea Nitrogen      7 - 30 mg/dL 17   Creatinine      0.52 - 1.04 mg/dL 0.68   GFR Estimate      >60 mL/min/1.7m2 86   GFR Estimate If Black      >60 mL/min/1.7m2 >90   Calcium      8.5 - 10.1 mg/dL 8.8   Bilirubin Total      0.2 - 1.3 mg/dL 0.4   Albumin      3.4 - 5.0 g/dL 3.7   Protein Total      6.8 - 8.8 g/dL 7.3   Alkaline Phosphatase      40 - 150 U/L 87   ALT      0 - 50 U/L 25   AST      0 - 45 U/L 23     Past Medical History:   Patient Active Problem List   Diagnosis     Porokeratosis     Squamous cell carcinoma     Neoplasm of uncertain behavior     Lichen planopilaris     Dermatitis     Cicatricial alopecia     Keratosis, inflamed seborrheic     History of skin cancer     Basal cell carcinoma of vertex scalp s/p mohs 6-5-15     Medication management     Actinic keratosis     Medication monitoring encounter     Dermatitis, seborrheic     Erosive pustular  dermatosis     Tick bite, initial encounter     Vulvar atrophy     Factor V Leiden mutation (H)     Splenic artery aneurysm (H)     Past Medical History:   Diagnosis Date     Basal cell carcinoma      Squamous cell carcinoma      Past Surgical History:   Procedure Laterality Date     BIOPSY OF SKIN LESION       MOHS MICROGRAPHIC PROCEDURE       NO HISTORY OF SURGERY  2/17/14    derm     SMALL BOWEL RESECTION  11/2015    diverticulitis       Social History:  Patient  reports that she quit smoking about 53 years ago. She has never used smokeless tobacco. She reports that she drinks alcohol. She reports that she does not use illicit drugs.    Family History:  Family History   Problem Relation Age of Onset     Skin Cancer Sister      basal cell carcinoma     Melanoma No family hx of        Medications:  Current Outpatient Prescriptions   Medication Sig Dispense Refill     Calcium 1500 MG TABS Take  by mouth.       Cholecalciferol (VITAMIN D) 1000 UNIT capsule Take  by mouth. Total 2200 units daily.       desonide (DESOWEN) 0.05 % cream Mix 50/50 with ketoconazole and apply twice daily to irritated area for 3 weeks 60 g 1     fluconazole (DIFLUCAN) 200 MG tablet Take 1 tablet (200 mg) by mouth daily 45 tablet 4     Fluocinolone Acetonide Scalp (DERMA-SMOOTHE/FS SCALP) 0.01 % OIL oil Apply once a week to scalp for lichen planopilaris 118 mL 11     hydroxychloroquine (PLAQUENIL) 200 MG tablet Take 1 tablet (200 mg) by mouth 2 times daily 60 tablet 0     hydroxychloroquine (PLAQUENIL) 200 MG tablet TAKE 1 TABLET(200 MG) BY MOUTH TWICE DAILY 180 tablet 0     ibuprofen (ADVIL,MOTRIN) 800 MG tablet Take 800 mg by mouth every 8 hours as needed.       ketoconazole (NIZORAL) 2 % cream Mix 50/50 with desonide and apply twice daily to irritated area for 3 weeks 30 g 1     Loratadine (CLARITIN PO) Take  by mouth.       NEW MED Biest 1.5mg/gram cream(pg free)  Insert 1 gram vaginally twice weekly as directed 40 g 6     triamcinolone  acetonide (KENALOG) 10 MG/ML injection See med note 5 mL 0     triamcinolone acetonide (KENALOG) 10 MG/ML injection See med note 5 mL 0     Allergies   Allergen Reactions     Dust Mites Other (See Comments)     Sneezing, coughing. asthma     Levaquin [Levofloxacin Hemihydrate] Other (See Comments)     Muscle weakness     Mold Other (See Comments)     Sneezing, asthma, weezing     Pollen Extract/Tree Extract Other (See Comments)     Sneezing, weezing     Sulfa Drugs Hives     Penicillins Rash         Review of Systems:  -As per HPI  -Constitutional: The patient denies fatigue, fevers, chills, unintended weight loss, and night sweats.  -HEENT: Patient denies nonhealing oral sores.  -Skin: As above in HPI. No additional skin concerns.    Physical exam:  Vitals: /74  Pulse 77  GEN: This is a well developed, well-nourished female in no acute distress, in a pleasant mood.    SKIN: Focused examination of the scalp, face  and arms was performed.  -LPPAI score of 1 (mild pruritus, mild erythema, and mild perifollicular scale)  -negative hair pull test  -right parietal scalp more erythematous than other areas  -Very tender to touch at the frontal and vertex scalp  -adherent scale on right parietal scalp  --large area of alopecia on the central scalp; frontal and vertex scalp with telagectasia, may have hair regrowth around the perimeter of the area of scarring/previous skin cancer treatment  -0.5 cm papule, hyperkeratotic with ring of erythema on right upper forearm, lesion appears to be falling off  -No other lesions of concern on areas examined.     Impression/Plan:  1. Lichen Planopilaris - Improved LPPAI score compared to last visit. Safety labs show slightly low platelets for Ms. Barba and slightly elevated glucose.     Kenalog intralesional injection procedure note (performed by faculty and fellow): After verbal consent and discussion of risks including but not limited to atrophy, pain, and bruising,  time out was  performed, 2 total cc of Kenalog 10 mg/cc was injected into 20 sites on the scalp.  The patient tolerated the procedure well and left the Dermatology clinic in good condition.    Continue dermasmooth/FS 0.01% oil at least weekly, OK to apply more often    Continue DHS zinc shampoo every other day    Continue plaquenil 200 mg BID     2. Actinic keratosis - persistent lesion on right upper forearm    Cryotherapy procedure note (performed with faculty): After verbal consent and discussion of risks and benefits including but no limited to dyspigmentation/scar, blister, infection, recurrence,1 lesion was treated with 1-2mm freeze border for 2 cycles with liquid nitrogen. Post cryotherapy instructions were provided.     After care instructions per nursing     Biopsy next visit if still present at next visit        Staff Involved:  Luc Villalpando MD  Dermatology Research Fellow        Patient was seen and examined with the clinical research fellow. I agree with the history, review of systems, physical examination, assessments and plan. ILK and cryotherapy procedures were done together.    Barbara Soto MD  Professor and  Chair  Department of Dermatology  [facility]        Again, thank you for allowing me to participate in the care of your patient.      Sincerely,    Barbara Soto MD

## 2018-11-08 NOTE — MR AVS SNAPSHOT
After Visit Summary   11/8/2018    sEter Barba    MRN: [id number]           Patient Information     Date Of Birth          1945        Visit Information        Provider Department      11/8/2018 12:10 PM Barbara Soto MD [facility] Dermatology        Today's Diagnoses     Lichen planopilaris    -  1    Actinic keratosis        Dermatitis, seborrheic           Follow-ups after your visit        Your next 10 appointments already scheduled     Dec 20, 2018 11:35 AM CST   (Arrive by 11:20 AM)   RETURN HAIRLOSS with Barbara Soto MD   [facility] Dermatology ([facility])    [address] [address] [phone]              Who to contact     Please call your clinic at [phone] to:    Ask questions about your health    Make or cancel appointments    Discuss your medicines    Learn about your test results    Speak to your doctor            Additional Information About Your Visit        [employer] Information     [employer] gives you secure access to your electronic health record. If you see a primary care provider, you can also send messages to your care team and make appointments. If you have questions, please call your primary care clinic.  If you do not have a primary care provider, please call [phone] and they will assist you.      [employer] is an electronic gateway that provides easy, online access to your medical records. With [employer], you can request a clinic appointment, read your test results, renew a prescription or communicate with your care team.     To access your existing account, please contact your [facility] or call [phone] for assistance.        Care EveryWhere ID     This is your Care EveryWhere ID. This could be used by other organizations to access your [place] medical records  [id number]        Your Vitals Were     Pulse                   77            Blood  Pressure from Last 3 Encounters:   11/08/18 130/74   10/19/18 127/66   10/02/18 133/74    Weight from Last 3 Encounters:   10/19/18 61.2 kg (135 lb)   03/14/18 60.3 kg (133 lb)   06/28/17 59.1 kg (130 lb 6.4 oz)              We Performed the Following     DESTRUCT PREMALIGNANT LESION, FIRST     INJECTION INTO SKIN LESIONS >7        Primary Care Provider Office Phone # Fax #    Fernando Weathers [phone]       [facility] [address]  [facility] [address]        Equal Access to Services     [facility]: Hadii aad ku hadasho Someeta, waaxda luqadaha, qaybta kaalmada ademirayarosangela, samuel mcclain . So [facility] [phone].    ATENCIÓN: Si habla español, tiene a pelayo disposición servicios gratuitos de asistencia lingüística. Llame al [phone].    We comply with applicable federal civil rights laws and [place] laws. We do not discriminate on the basis of race, color, national origin, age, disability, sex, sexual orientation, or gender identity.            Thank you!     Thank you for choosing [facility] DERMATOLOGY  for your care. Our goal is always to provide you with excellent care. Hearing back from our patients is one way we can continue to improve our services. Please take a few minutes to complete the written survey that you may receive in the mail after your visit with us. Thank you!             Your Updated Medication List - Protect others around you: Learn how to safely use, store and throw away your medicines at www.disposemymeds.org.          This list is accurate as of 11/8/18 11:59 PM.  Always use your most recent med list.                   Brand Name Dispense Instructions for use Diagnosis    calcium 1500 MG Tabs      Take  by mouth.        CLARITIN PO      Take  by mouth.        desonide 0.05 % external cream    DESOWEN    60 g    Mix 50/50 with ketoconazole and apply twice daily to irritated area for 3 weeks    Intertrigo       fluconazole 200 MG tablet     DIFLUCAN    45 tablet    Take 1 tablet (200 mg) by mouth daily    Yeast infection of the vagina       Fluocinolone Acetonide Scalp 0.01 % Oil oil    DERMA-SMOOTHE/FS SCALP    118 mL    Apply once a week to scalp for lichen planopilaris    Lichen planopilaris       * hydroxychloroquine 200 MG tablet    PLAQUENIL    180 tablet    TAKE 1 TABLET(200 MG) BY MOUTH TWICE DAILY    Lichen plano-pilaris       * hydroxychloroquine 200 MG tablet    PLAQUENIL    60 tablet    Take 1 tablet (200 mg) by mouth 2 times daily    Lichen plano-pilaris       ibuprofen 800 MG tablet    ADVIL/MOTRIN     Take 800 mg by mouth every 8 hours as needed.        ketoconazole 2 % external cream    NIZORAL    30 g    Mix 50/50 with desonide and apply twice daily to irritated area for 3 weeks    Intertrigo       NEW MED     40 g    Biest 1.5mg/gram cream(pg free)  Insert 1 gram vaginally twice weekly as directed    Vulvar atrophy       * triamcinolone acetonide 10 MG/ML injection    KENALOG    5 mL    See med note    Lichen planopilaris       * triamcinolone acetonide 10 MG/ML injection    KENALOG    5 mL    See med note    Lichen planopilaris       vitamin D 1000 units capsule      Take  by mouth. Total 2200 units daily.        * Notice:  This list has 4 medication(s) that are the same as other medications prescribed for you. Read the directions carefully, and ask your doctor or other care provider to review them with you.

## 2018-11-08 NOTE — NURSING NOTE
"Dermatology Rooming Note    Ester Barba's goals for this visit include:   Chief Complaint   Patient presents with     Derm Problem       Lichen planopilaris follow up, Ester states \" It is about the same. \"      Marbella Vazquez LPN   "

## 2018-11-08 NOTE — NURSING NOTE
Drug Administration Record    Drug Name: triamcinolone acetonide(kenalog)  Dose: 2mL of triamcinolone 10mg/mL, 20mg dose  Route administered: ID  NDC #: Kenalog-10 (5092-4600-23)  Amount of waste(mL):3  Reason for waste: Single use vial    LOT #: [id number]  SITE: see note  : [employer]  EXPIRATION DATE: 2/2020

## 2018-11-08 NOTE — PROGRESS NOTES
"[facility] Dermatology Note      Dermatology Problem List:  1. Lichen Planopilaris     Last Safety labs 10/2/2018, low platelet count of 134, 000 (excellent though for Ms. Barba)  and slightly elevated glucose of 103    2 cc ILK 10 mg/mL, last 11/8/2018    Continue dermasmooth/FS 0.01% oil at least weekly, OK to apply more often    Continue DHS zinc shampoo every other day    Continue plaquenil 200 mg BID     2. Hx NMSC.   -BCC on right nasal ala s/p Mohs 11/27/2017  -SCC of the scalp, s/p Mohs excision 2014  -History of 1-2 other NMSC of the scalp (BCCs)       3. Actinic keratosis - persistent lesion on right upper forearm    Cryotherapy 11/8/2018    Biopsy if still present at next visit    Encounter Date: Nov 8, 2018    CC:  Chief Complaint   Patient presents with     Derm Problem       Lichen planopilaris follow up, Ester states \" It is about the same. \"          History of Present Illness:  Ms. Ester Barba is a 73 year old female who presents as a follow-up for LPP. The patient was last seen 10/2/2018 when she received 2cc ILK 10/ mg/mL. She feels the condition (less pain, tenderness, and shedding) is better possible due to the Derma-Smoothe/FS oil. Her scalp still itches occasionally particularly on the  upper occipital scalp. Currently she uses DHS zinc shampoo 3-4 times per week, takes plaquenil 200 mg twice daily, and applies Derma-Smoothe/FS 2-3 times weekly,   At the last visit two actinic keratoses were treated with cryotherapy. One on the right forearm has been treated twice but is still there while the left one has resolved.      Component      Latest Ref Rng & Units 10/2/2018   WBC      4.0 - 11.0 10e9/L 5.6   RBC Count      3.8 - 5.2 10e12/L 3.94   Hemoglobin      11.7 - 15.7 g/dL 12.9   Hematocrit      35.0 - 47.0 % 38.8   MCV      78 - 100 fl 99   MCH      26.5 - 33.0 pg 32.7   MCHC      31.5 - 36.5 g/dL 33.2   RDW      10.0 - 15.0 % 13.3   Platelet Count      150 - 450 10e9/L 134 " (L)   Diff Method       Automated Method   % Neutrophils      % 44.2   % Lymphocytes      % 39.5   % Monocytes      % 12.1   % Eosinophils      % 3.2   % Basophils      % 0.5   % Immature Granulocytes      % 0.5   Nucleated RBCs      0 /100 0   Absolute Neutrophil      1.6 - 8.3 10e9/L 2.5   Absolute Lymphocytes      0.8 - 5.3 10e9/L 2.2   Absolute Monocytes      0.0 - 1.3 10e9/L 0.7   Absolute Eosinophils      0.0 - 0.7 10e9/L 0.2   Absolute Basophils      0.0 - 0.2 10e9/L 0.0   Abs Immature Granulocytes      0 - 0.4 10e9/L 0.0   Absolute Nucleated RBC       0.0   Sodium      133 - 144 mmol/L 141   Potassium      3.4 - 5.3 mmol/L 3.9   Chloride      94 - 109 mmol/L 107   Carbon Dioxide      20 - 32 mmol/L 28   Anion Gap      3 - 14 mmol/L 6   Glucose      70 - 99 mg/dL 103 (H)   Urea Nitrogen      7 - 30 mg/dL 17   Creatinine      0.52 - 1.04 mg/dL 0.68   GFR Estimate      >60 mL/min/1.7m2 86   GFR Estimate If Black      >60 mL/min/1.7m2 >90   Calcium      8.5 - 10.1 mg/dL 8.8   Bilirubin Total      0.2 - 1.3 mg/dL 0.4   Albumin      3.4 - 5.0 g/dL 3.7   Protein Total      6.8 - 8.8 g/dL 7.3   Alkaline Phosphatase      40 - 150 U/L 87   ALT      0 - 50 U/L 25   AST      0 - 45 U/L 23     Past Medical History:   Patient Active Problem List   Diagnosis     Porokeratosis     Squamous cell carcinoma     Neoplasm of uncertain behavior     Lichen planopilaris     Dermatitis     Cicatricial alopecia     Keratosis, inflamed seborrheic     History of skin cancer     Basal cell carcinoma of vertex scalp s/p mohs 6-5-15     Medication management     Actinic keratosis     Medication monitoring encounter     Dermatitis, seborrheic     Erosive pustular dermatosis     Tick bite, initial encounter     Vulvar atrophy     Factor V Leiden mutation (H)     Splenic artery aneurysm (H)     Past Medical History:   Diagnosis Date     Basal cell carcinoma      Squamous cell carcinoma      Past Surgical History:   Procedure Laterality Date      BIOPSY OF SKIN LESION       MOHS MICROGRAPHIC PROCEDURE       NO HISTORY OF SURGERY  2/17/14    derm     SMALL BOWEL RESECTION  11/2015    diverticulitis       Social History:  Patient  reports that she quit smoking about 53 years ago. She has never used smokeless tobacco. She reports that she drinks alcohol. She reports that she does not use illicit drugs.    Family History:  Family History   Problem Relation Age of Onset     Skin Cancer Sister      basal cell carcinoma     Melanoma No family hx of        Medications:  Current Outpatient Prescriptions   Medication Sig Dispense Refill     Calcium 1500 MG TABS Take  by mouth.       Cholecalciferol (VITAMIN D) 1000 UNIT capsule Take  by mouth. Total 2200 units daily.       desonide (DESOWEN) 0.05 % cream Mix 50/50 with ketoconazole and apply twice daily to irritated area for 3 weeks 60 g 1     fluconazole (DIFLUCAN) 200 MG tablet Take 1 tablet (200 mg) by mouth daily 45 tablet 4     Fluocinolone Acetonide Scalp (DERMA-SMOOTHE/FS SCALP) 0.01 % OIL oil Apply once a week to scalp for lichen planopilaris 118 mL 11     hydroxychloroquine (PLAQUENIL) 200 MG tablet Take 1 tablet (200 mg) by mouth 2 times daily 60 tablet 0     hydroxychloroquine (PLAQUENIL) 200 MG tablet TAKE 1 TABLET(200 MG) BY MOUTH TWICE DAILY 180 tablet 0     ibuprofen (ADVIL,MOTRIN) 800 MG tablet Take 800 mg by mouth every 8 hours as needed.       ketoconazole (NIZORAL) 2 % cream Mix 50/50 with desonide and apply twice daily to irritated area for 3 weeks 30 g 1     Loratadine (CLARITIN PO) Take  by mouth.       NEW MED Biest 1.5mg/gram cream(pg free)  Insert 1 gram vaginally twice weekly as directed 40 g 6     triamcinolone acetonide (KENALOG) 10 MG/ML injection See med note 5 mL 0     triamcinolone acetonide (KENALOG) 10 MG/ML injection See med note 5 mL 0     Allergies   Allergen Reactions     Dust Mites Other (See Comments)     Sneezing, coughing. asthma     Levaquin [Levofloxacin Hemihydrate]  Other (See Comments)     Muscle weakness     Mold Other (See Comments)     Sneezing, asthma, weezing     Pollen Extract/Tree Extract Other (See Comments)     Sneezing, weezing     Sulfa Drugs Hives     Penicillins Rash         Review of Systems:  -As per HPI  -Constitutional: The patient denies fatigue, fevers, chills, unintended weight loss, and night sweats.  -HEENT: Patient denies nonhealing oral sores.  -Skin: As above in HPI. No additional skin concerns.    Physical exam:  Vitals: /74  Pulse 77  GEN: This is a well developed, well-nourished female in no acute distress, in a pleasant mood.    SKIN: Focused examination of the scalp, face  and arms was performed.  -LPPAI score of 1 (mild pruritus, mild erythema, and mild perifollicular scale)  -negative hair pull test  -right parietal scalp more erythematous than other areas  -Very tender to touch at the frontal and vertex scalp  -adherent scale on right parietal scalp  --large area of alopecia on the central scalp; frontal and vertex scalp with telagectasia, may have hair regrowth around the perimeter of the area of scarring/previous skin cancer treatment  -0.5 cm papule, hyperkeratotic with ring of erythema on right upper forearm, lesion appears to be falling off  -No other lesions of concern on areas examined.     Impression/Plan:  1. Lichen Planopilaris - Improved LPPAI score compared to last visit. Safety labs show slightly low platelets for Ms. Barba and slightly elevated glucose.     Kenalog intralesional injection procedure note (performed by faculty and fellow): After verbal consent and discussion of risks including but not limited to atrophy, pain, and bruising,  time out was performed, 2 total cc of Kenalog 10 mg/cc was injected into 20 sites on the scalp.  The patient tolerated the procedure well and left the Dermatology clinic in good condition.    Continue dermasmooth/FS 0.01% oil at least weekly, OK to apply more often    Continue DHS zinc  shampoo every other day    Continue plaquenil 200 mg BID     2. Actinic keratosis - persistent lesion on right upper forearm    Cryotherapy procedure note (performed with faculty): After verbal consent and discussion of risks and benefits including but no limited to dyspigmentation/scar, blister, infection, recurrence,1 lesion was treated with 1-2mm freeze border for 2 cycles with liquid nitrogen. Post cryotherapy instructions were provided.     After care instructions per nursing     Biopsy next visit if still present at next visit        Staff Involved:  Luc Villalpando MD  Dermatology Research Fellow        Patient was seen and examined with the clinical research fellow. I agree with the history, review of systems, physical examination, assessments and plan. ILK and cryotherapy procedures were done together.    Barbara Soto MD  Professor and  Chair  Department of Dermatology  [facility]

## 2018-11-28 ENCOUNTER — TELEPHONE (OUTPATIENT)
Dept: DERMATOLOGY | Facility: CLINIC | Age: 73
End: 2018-11-28

## 2018-11-28 DIAGNOSIS — L66.10 LICHEN PLANO-PILARIS: ICD-10-CM

## 2018-11-29 NOTE — TELEPHONE ENCOUNTER
hydroxychloroquine (PLAQUENIL) 200 MG tablet  Last Written Prescription Date:  10/29/18  Last Fill Quantity: 60,   # refills: 0  Last Office Visit : 11/8/18  Future Office visit: 120/20/18    Eye exam:  9/23/16.  Past due.    11/8/18   Barbara Soto MD   Continue plaquenil 200 mg BID     Routing  Because:  Eye exam past due   Not on protocol  Requests 90 day supply.   #qty, # rfs?  rf or refuse ?

## 2018-11-30 RX ORDER — HYDROXYCHLOROQUINE SULFATE 200 MG/1
TABLET, FILM COATED ORAL
Qty: 180 TABLET | Refills: 0 | Status: SHIPPED | OUTPATIENT
Start: 2018-11-30 | End: 2019-08-02

## 2018-11-30 NOTE — TELEPHONE ENCOUNTER
Received refill request for hydroxychloroquine as the resident on call. Reviewed patient's chart and attached communication. Patient last seen 11/18/18 for LPP. RTC 12/20/18. After reviewing the medication list and assessment and plan from last visit, the refill request was accepted. Last eye exam on 6/2018, no concerns noted. No safety labs required.    Routed as FYI.    Zofia Smith MD  PGY-2 Medicine-Dermatology  Pager [phone]

## 2018-12-20 ENCOUNTER — OFFICE VISIT (OUTPATIENT)
Dept: DERMATOLOGY | Facility: CLINIC | Age: 73
End: 2018-12-20
Payer: COMMERCIAL

## 2018-12-20 VITALS — HEART RATE: 68 BPM | SYSTOLIC BLOOD PRESSURE: 124 MMHG | DIASTOLIC BLOOD PRESSURE: 74 MMHG

## 2018-12-20 DIAGNOSIS — Z85.828 HISTORY OF NONMELANOMA SKIN CANCER: ICD-10-CM

## 2018-12-20 DIAGNOSIS — R23.8 PAPULE: ICD-10-CM

## 2018-12-20 DIAGNOSIS — L66.10 LICHEN PLANO-PILARIS: Primary | ICD-10-CM

## 2018-12-20 ASSESSMENT — PAIN SCALES - GENERAL: PAINLEVEL: MILD PAIN (3)

## 2018-12-20 NOTE — PATIENT INSTRUCTIONS

## 2018-12-20 NOTE — PROGRESS NOTES
"[facility] Dermatology Note      Dermatology Problem List:  1. Lichen Planopilaris   ? Last Safety labs 10/2/2018, low platelet count of 134, 000 (excellent though for Ms. Barba)  and slightly elevated glucose of 103  ? 2 cc ILK 10 mg/mL, last 11/8/2018    Continue dermasmooth/FS 0.01% oil at least weekly, OK to apply more often especially through winter months    Continue DHS zinc shampoo every other day  Continue plaquenil 200 mg BID, eye exam done since last visit was normal; safety labs done in October were in large part normal; low platelet count but within range for where 's counts normally are.     2. Hx NMSC.   -BCC on right nasal ala s/p Mohs 11/27/2017  -SCC of the scalp, s/p Mohs excision 2014  -History of 1-2 other NMSC of the scalp (BCCs)    CC:   Chief Complaint   Patient presents with     Hair Loss     Ester is here today for a hair loss follow up. Ester notes' No change\"          Encounter Date: Dec 20, 2018    History of Present Illness:  Ms. Ester Barba is a 73 year old female who presents to clinic to follow-up primarily on her LPP. She notes when she touches her scalp skin, she can experience pain which on a scale of 1 to 3 is a 2. When \"left alone\" there is no pain sensation.  Otherwise, she reports the affected area is stable. She returns today for a re-evaluation and possible ILK injections to active areas. See     In regards to the lesion on her right forearm. This persists despite 3 cryotherapy treatments We had discussed if this was not gone by the time of this visit, a shave biopsy would be needed.  agrees to this today.  As she is prone to NMSC, the threshold to biopsy is low.    A recent eye appointment did not reveal any significant abnormalities and Ms. Barba was given the OK to continue with plaquenil therapy for her LPP. Safety labs done in October were overall OK; low platelet count but in keeping with previous values.    Past Medical History:   Patient " Active Problem List   Diagnosis     Porokeratosis     Squamous cell carcinoma     Neoplasm of uncertain behavior     Lichen planopilaris     Dermatitis     Cicatricial alopecia     Keratosis, inflamed seborrheic     History of skin cancer     Basal cell carcinoma of vertex scalp s/p mohs 6-5-15     Medication management     Actinic keratosis     Medication monitoring encounter     Dermatitis, seborrheic     Erosive pustular dermatosis     Tick bite, initial encounter     Vulvar atrophy     Factor V Leiden mutation (H)     Splenic artery aneurysm (H)     Past Medical History:   Diagnosis Date     Basal cell carcinoma      Squamous cell carcinoma      Past Surgical History:   Procedure Laterality Date     BIOPSY OF SKIN LESION       MOHS MICROGRAPHIC PROCEDURE       NO HISTORY OF SURGERY  2/17/14    derm     SMALL BOWEL RESECTION  11/2015    diverticulitis       Social History:  Patient reports that she quit smoking about 53 years ago. she has never used smokeless tobacco. She reports that she drinks alcohol. She reports that she does not use drugs.    Family History:  Family History   Problem Relation Age of Onset     Skin Cancer Sister         basal cell carcinoma     Melanoma No family hx of        Medications:  Current Outpatient Medications   Medication Sig Dispense Refill     Calcium 1500 MG TABS Take  by mouth.       Cholecalciferol (VITAMIN D) 1000 UNIT capsule Take  by mouth. Total 2200 units daily.       desonide (DESOWEN) 0.05 % cream Mix 50/50 with ketoconazole and apply twice daily to irritated area for 3 weeks 60 g 1     fluconazole (DIFLUCAN) 200 MG tablet Take 1 tablet (200 mg) by mouth daily 45 tablet 4     Fluocinolone Acetonide Scalp (DERMA-SMOOTHE/FS SCALP) 0.01 % OIL oil Apply once a week to scalp for lichen planopilaris 118 mL 11     hydroxychloroquine (PLAQUENIL) 200 MG tablet TAKE 1 TABLET(200 MG) BY MOUTH TWICE DAILY 180 tablet 0     hydroxychloroquine (PLAQUENIL) 200 MG tablet Take 1 tablet  (200 mg) by mouth 2 times daily 60 tablet 0     hydroxychloroquine (PLAQUENIL) 200 MG tablet TAKE 1 TABLET(200 MG) BY MOUTH TWICE DAILY 180 tablet 0     ibuprofen (ADVIL,MOTRIN) 800 MG tablet Take 800 mg by mouth every 8 hours as needed.       ketoconazole (NIZORAL) 2 % cream Mix 50/50 with desonide and apply twice daily to irritated area for 3 weeks 30 g 1     Loratadine (CLARITIN PO) Take  by mouth.       NEW MED Biest 1.5mg/gram cream(pg free)  Insert 1 gram vaginally twice weekly as directed 40 g 6     triamcinolone acetonide (KENALOG) 10 MG/ML injection See med note 5 mL 0     triamcinolone acetonide (KENALOG) 10 MG/ML injection See med note 5 mL 0     Allergies   Allergen Reactions     Dust Mites Other (See Comments)     Sneezing, coughing. asthma     Levaquin [Levofloxacin Hemihydrate] Other (See Comments)     Muscle weakness     Mold Other (See Comments)     Sneezing, asthma, weezing     Pollen Extract/Tree Extract Other (See Comments)     Sneezing, weezing     Sulfa Drugs Hives     Penicillins Rash         Review of Systems:  -Constitutional: Otherwise feeling well today, in usual state of health.  -HEENT: Patient denies nonhealing oral sores.  -Skin: As above in HPI. No additional skin concerns.    Physical exam:  GEN: This is a well developed, well-nourished female in no acute distress, in a pleasant mood.    SKIN: Sun-exposed skin, which includes the head/face, neck, both arms, digits, and/or nails was examined.   score of 1 (mild pruritus, mild erythema, and mild perifollicular scale)  -negative hair pull test  -focal patchy areas of erythema and perifollicular scale, pruritic areas are posterior to the main lesoin  --large area of alopecia on the central scalp; frontal and vertex scalp with telagectasia, may have hair regrowth around the perimeter of the area of scarring/previous skin cancer treatment  - hyperkeratotic erythematous lesoin on right upper forearm  -No other lesions of concern on areas  examined.     Impression/Plan:  1. Lichenplanopilaris - stable overall  Continue Dermasmooth FS oil at least weekly, twice weekly if needed in the winter months  Continue DHS zinc shampoo every other day  Continue plaquenil 200 mg bid   ILK today  Kenalog intralesional injection procedure note: After verbal consent and discussion of risks including but not limited to atrophy, pain, and bruising, time out was performed, the patient underwent positioning, 1 total cc of Kenalog 10 mg/cc was injected into 12 sites on the scalp.  The patient tolerated the procedure well and left the Dermatology clinic in good condition.        2. Persistent lesion on right forearm, shave biopsy today  Shave biopsy:  After discussion of benefits and risks including but not limited to bleeding/bruising, pain/swelling, infection, scar, incomplete removal, nerve damage/numbness, recurrence, and non-diagnostic biopsy, written consent, verbal consent and photographs were obtained. Time-out was performed. The area was cleaned with isopropyl alcohol. 0.5mL of 1% lidocaine with epinephrine was injected to obtain adequate anesthesia of the lesion on the right forearm. A shave biopsy was performed. Hemostasis was achieved with aluminium chloride. Vaseline and a sterile dressing were applied. The patient tolerated the procedure and no complications were noted. The patient was provided with verbal and written post care instructions.     3.History of nonmelanoma skin cancer - review skin check schedule at next visit.    Barbara Soto MD  Professor and  Chair  Department of Dermatology  [facility]

## 2018-12-20 NOTE — NURSING NOTE
"Dermatology Rooming Note    Ester Barba's goals for this visit include:   Chief Complaint   Patient presents with     Hair Loss     Ester is here today for a hair loss follow up. Ester notes' No change\"      PIERCE Glaser    "

## 2018-12-20 NOTE — LETTER
"12/20/2018       RE: Ester Barba  [address] [place] [address]     Dear Colleague,    Thank you for referring your patient, Ester Barba, to the [facility] DERMATOLOGY at [facility]. Please see a copy of my visit note below.    [facility] Dermatology Note      Dermatology Problem List:  1. Lichen Planopilaris   ? Last Safety labs 10/2/2018, low platelet count of 134, 000 (excellent though for Ms. Barba)  and slightly elevated glucose of 103  ? 2 cc ILK 10 mg/mL, last 11/8/2018    Continue dermasmooth/FS 0.01% oil at least weekly, OK to apply more often especially through winter months    Continue DHS zinc shampoo every other day  Continue plaquenil 200 mg BID , eye exam done since last visit was normal; safety labs done in October were in large part normal; low platelet count but within range for where 's counts normally are.     2. Hx NMSC.   -BCC on right nasal ala s/p Mohs 11/27/2017  -SCC of the scalp, s/p Mohs excision 2014  -History of 1-2 other NMSC of the scalp (BCCs)    CC:   Chief Complaint   Patient presents with     Hair Loss     Ester is here today for a hair loss follow up. Ester notes' No change\"          Encounter Date: Dec 20, 2018    History of Present Illness:  Ms. Ester Barba is a 73 year old female who presents to clinic to follow-up primarily on her LPP. She notes when she touches her scalp skin, she can experience pain which on a scale of 1 to 3 is a 2. When \"left alone\" there is no pain sensation.  Otherwise, she reports the affected area is stable. She returns today for a re-evaluation and possible ILK injections to active areas. See     In regards to the lesion on her right forearm. This persists despite 3 cryotherapy treatments We had discussed if this was not gone by the time of this visit, a shave biopsy would be needed.  agrees to this today.  As she is prone to NMSC, the threshold to biopsy is low.    A recent " eye appointment did not reveal any significant abnormalities and Ms. Barba was given the OK to continue with plaquenil therapy for her LPP. Safety labs done in October were overall OK; low platelet count but in keeping with previous values.    Past Medical History:   Patient Active Problem List   Diagnosis     Porokeratosis     Squamous cell carcinoma     Neoplasm of uncertain behavior     Lichen planopilaris     Dermatitis     Cicatricial alopecia     Keratosis, inflamed seborrheic     History of skin cancer     Basal cell carcinoma of vertex scalp s/p mohs 6-5-15     Medication management     Actinic keratosis     Medication monitoring encounter     Dermatitis, seborrheic     Erosive pustular dermatosis     Tick bite, initial encounter     Vulvar atrophy     Factor V Leiden mutation (H)     Splenic artery aneurysm (H)     Past Medical History:   Diagnosis Date     Basal cell carcinoma      Squamous cell carcinoma      Past Surgical History:   Procedure Laterality Date     BIOPSY OF SKIN LESION       MOHS MICROGRAPHIC PROCEDURE       NO HISTORY OF SURGERY  2/17/14    derm     SMALL BOWEL RESECTION  11/2015    diverticulitis       Social History:  Patient reports that she quit smoking about 53 years ago. she has never used smokeless tobacco. She reports that she drinks alcohol. She reports that she does not use drugs.    Family History:  Family History   Problem Relation Age of Onset     Skin Cancer Sister         basal cell carcinoma     Melanoma No family hx of        Medications:  Current Outpatient Medications   Medication Sig Dispense Refill     Calcium 1500 MG TABS Take  by mouth.       Cholecalciferol (VITAMIN D) 1000 UNIT capsule Take  by mouth. Total 2200 units daily.       desonide (DESOWEN) 0.05 % cream Mix 50/50 with ketoconazole and apply twice daily to irritated area for 3 weeks 60 g 1     fluconazole (DIFLUCAN) 200 MG tablet Take 1 tablet (200 mg) by mouth daily 45 tablet 4     Fluocinolone Acetonide  Scalp (DERMA-SMOOTHE/FS SCALP) 0.01 % OIL oil Apply once a week to scalp for lichen planopilaris 118 mL 11     hydroxychloroquine (PLAQUENIL) 200 MG tablet TAKE 1 TABLET(200 MG) BY MOUTH TWICE DAILY 180 tablet 0     hydroxychloroquine (PLAQUENIL) 200 MG tablet Take 1 tablet (200 mg) by mouth 2 times daily 60 tablet 0     hydroxychloroquine (PLAQUENIL) 200 MG tablet TAKE 1 TABLET(200 MG) BY MOUTH TWICE DAILY 180 tablet 0     ibuprofen (ADVIL,MOTRIN) 800 MG tablet Take 800 mg by mouth every 8 hours as needed.       ketoconazole (NIZORAL) 2 % cream Mix 50/50 with desonide and apply twice daily to irritated area for 3 weeks 30 g 1     Loratadine (CLARITIN PO) Take  by mouth.       NEW MED Biest 1.5mg/gram cream(pg free)  Insert 1 gram vaginally twice weekly as directed 40 g 6     triamcinolone acetonide (KENALOG) 10 MG/ML injection See med note 5 mL 0     triamcinolone acetonide (KENALOG) 10 MG/ML injection See med note 5 mL 0     Allergies   Allergen Reactions     Dust Mites Other (See Comments)     Sneezing, coughing. asthma     Levaquin [Levofloxacin Hemihydrate] Other (See Comments)     Muscle weakness     Mold Other (See Comments)     Sneezing, asthma, weezing     Pollen Extract/Tree Extract Other (See Comments)     Sneezing, weezing     Sulfa Drugs Hives     Penicillins Rash         Review of Systems:  -Constitutional: Otherwise feeling well today, in usual state of health.  -HEENT: Patient denies nonhealing oral sores.  -Skin: As above in HPI. No additional skin concerns.    Physical exam:  GEN: This is a well developed, well-nourished female in no acute distress, in a pleasant mood.    SKIN: Sun-exposed skin, which includes the head/face, neck, both arms, digits, and/or nails was examined.   score of 1 (mild pruritus, mild erythema, and mild perifollicular scale)  -negative hair pull test  -focal patchy areas of erythema and perifollicular scale, pruritic areas are posterior to the main lesoin  --large area of  alopecia on the central scalp; frontal and vertex scalp with telagectasia, may have hair regrowth around the perimeter of the area of scarring/previous skin cancer treatment  - hyperkeratotic erythematous lesoin on right upper forearm  -No other lesions of concern on areas examined.     Impression/Plan:  1. Lichenplanopilaris - stable overall  Continue Dermasmooth FS oil at least weekly, twice weekly if needed in the winter months  Continue DHS zinc shampoo every other day  Continue plaquenil 200 mg bid   ILK today  Kenalog intralesional injection procedure note: After verbal consent and discussion of risks including but not limited to atrophy, pain, and bruising, time out was performed, the patient underwent positioning, 1 total cc of Kenalog 10 mg/cc was injected into 12 sites on the scalp.  The patient tolerated the procedure well and left the Dermatology clinic in good condition.        2. Persistent lesion on right forearm, shave biopsy today  Shave biopsy:  After discussion of benefits and risks including but not limited to bleeding/bruising, pain/swelling, infection, scar, incomplete removal, nerve damage/numbness, recurrence, and non-diagnostic biopsy, written consent, verbal consent and photographs were obtained. Time-out was performed. The area was cleaned with isopropyl alcohol. 0.5mL of 1% lidocaine with epinephrine was injected to obtain adequate anesthesia of the lesion on the right forearm. A shave biopsy was performed. Hemostasis was achieved with aluminium chloride. Vaseline and a sterile dressing were applied. The patient tolerated the procedure and no complications were noted. The patient was provided with verbal and written post care instructions.     3.History of nonmelanoma skin cancer - review skin check schedule at next visit.    Barbara Soto MD  Professor and  Chair  Department of Dermatology  [facility]                Prior to injection, verified patient identity using  patient's name and date of birth.  Due to injection administration, patient instructed to remain in clinic for 15 minutes  afterwards, and to report any adverse reaction to me immediately.    Triamcinolone     Drug Amount Wasted:  Yes: 4 mg/ml   Vial/Syringe: Single dose vial      Again, thank you for allowing me to participate in the care of your patient.      Sincerely,    Barbara Soto MD

## 2018-12-20 NOTE — PROGRESS NOTES
Prior to injection, verified patient identity using patient's name and date of birth.  Due to injection administration, patient instructed to remain in clinic for 15 minutes  afterwards, and to report any adverse reaction to me immediately.    Triamcinolone     Drug Amount Wasted:  Yes: 4 mg/ml   Vial/Syringe: Single dose vial

## 2018-12-28 LAB — COPATH REPORT: NORMAL

## 2019-01-16 ENCOUNTER — TELEPHONE (OUTPATIENT)
Dept: DERMATOLOGY | Facility: CLINIC | Age: 74
End: 2019-01-16

## 2019-01-16 NOTE — TELEPHONE ENCOUNTER
Prior Authorization Retail Medication Request    Medication/Dose: fluocinolone acetonide scalp oil 0.01%   ICD code (if different than what is on RX):  L66.1  Previously Tried and Failed:    Rationale:      Insurance Name:  Medicare part D  Insurance ID:        Pharmacy Information (if different than what is on RX)  Name:  [facility]   Phone: [phone]

## 2019-01-21 NOTE — TELEPHONE ENCOUNTER
Central Prior Authorization Team   Phone: [phone]    PA Initiation    Medication: Fluocinolone Acetonide Scalp (DERMA-SMOOTHE/FS SCALP) 0.01 % OIL oil  Insurance Company: [employer] Part D - Phone [phone] Fax [phone]  Pharmacy Filling the Rx: [facility] [address] [place] [address] AT [address]  Filling Pharmacy Phone: [phone]  Filling Pharmacy Fax:    Start Date: 1/21/2019    Manually faxed Pa

## 2019-01-22 NOTE — TELEPHONE ENCOUNTER
Prior Authorization Approval    Authorization Effective Date: 1/1/2019  Authorization Expiration Date: 1/21/2020  Medication: Fluocinolone Acetonide Scalp (DERMA-SMOOTHE/FS SCALP) 0.01 % OIL oil - approved  Approved Dose/Quantity:   Reference #:     Insurance Company: [employer] Part D - Phone [phone] Fax [phone]  Expected CoPay: n/a     CoPay Card Available:      Foundation Assistance Needed:    Which Pharmacy is filling the prescription (Not needed for infusion/clinic administered): [email or web address] [address] [place] [address] [address] AT [address]  Pharmacy Notified: Yes  Patient Notified: Yes

## 2019-02-08 ENCOUNTER — TELEPHONE (OUTPATIENT)
Dept: DERMATOLOGY | Facility: CLINIC | Age: 74
End: 2019-02-08

## 2019-02-08 NOTE — TELEPHONE ENCOUNTER
[facility] Call Center    Phone Message    May a detailed message be left on voicemail: yes    Reason for Call: Other: per pt- stated she is returning a call about a medication- did not see anything in the chart- please call pt back if you had called her about a medication, thanks     Action Taken: Message routed to:  [facility] ([facility] derm

## 2019-02-08 NOTE — TELEPHONE ENCOUNTER
Writer following up on plaquenil denial. A letter of appeal was drafted and routed to provider. Left msg for pt to contact clinic.    Pt called back stating they dropped the cost of this medication for her so she will purchase it even if insurance does not cover for her. She would still like a letter submitted and will follow up with Dr Soto at her upcoming appt.

## 2019-02-11 ENCOUNTER — OFFICE VISIT (OUTPATIENT)
Dept: DERMATOLOGY | Facility: CLINIC | Age: 74
End: 2019-02-11
Payer: MEDICARE

## 2019-02-11 VITALS — SYSTOLIC BLOOD PRESSURE: 122 MMHG | HEART RATE: 93 BPM | DIASTOLIC BLOOD PRESSURE: 70 MMHG

## 2019-02-11 DIAGNOSIS — L66.10 LICHEN PLANO-PILARIS: Primary | ICD-10-CM

## 2019-02-11 DIAGNOSIS — L82.1 VERRUCOUS KERATOSIS: ICD-10-CM

## 2019-02-11 DIAGNOSIS — Z85.828 HISTORY OF NONMELANOMA SKIN CANCER: ICD-10-CM

## 2019-02-11 ASSESSMENT — PAIN SCALES - GENERAL: PAINLEVEL: MILD PAIN (2)

## 2019-02-11 NOTE — NURSING NOTE
Dermatology Rooming Note    Ester Barba's goals for this visit include:   Chief Complaint   Patient presents with     Hair Loss     Lichen Planopilaris - Ester notes that her scalp has been more active since her concussion     Joelle Campos, CMA

## 2019-02-11 NOTE — PROGRESS NOTES
[facility] Dermatology Note      Dermatology Problem List:  1. Lichen planopilaris.   -Currently using dermasmoothe/FS 0.01% oil 1-2x/week, DHS zinc shampoo 3x/week, and plaquenil 200mg BID. Receives ILK  About every 5 weeks, last injection 12/20/18.  2. Hx of NMSC   -BCC on right nasal ala s/p Mohs 11/27/17   -SCC of scalp, s/p Mohs 2014   -Hx of 1-2 other NMSC on scalp (BCCs)  3. Verrucous keratosis on right forearm, s/p shave biopsy on 12/20/18    Encounter Date: Feb 11, 2019    CC:  Chief Complaint   Patient presents with     Hair Loss     Lichen Planopilaris - Ester notes that her scalp has been more active since her concussion       History of Present Illness:  Ms. Ester Barba is a 73 year old female who presents as a follow-up for lichen planopilaris. The patient was last seen on 12/20/18 when she was continued on plaquenil 200mg BID, dermasmoothe/FS 0.01% oil 1-2x/week, DHS zinc shampoo every other day, and ILK 10 every 5 weeks. Since then pt reports her hair loss has not changed. She notes some mild tenderness of her scalp but denies any itching or burning. She does feel her scalp is dry overall with some flaking. Her health is overall worse since last visit as she had an accident 3 weeks ago where her coat got stuck in a car and caused her to fall and hit her head. She had bleeding and a concussion after the fall and reports her  witnessed her having a seizure when she was down. Her scalp is overall very tender after this fall and she has had a few episodes of shooting pain along her left scalp, diagnosed as scalp neuralgia at an ER.    Pt had lesion on right forearm biopsied at last visit, path came back as verrucous keratosis. She has not noticed any recurrence of the lesion or any other new or concerning lesions.    Past Medical History:   Patient Active Problem List   Diagnosis     Porokeratosis     Squamous cell carcinoma     Neoplasm of uncertain behavior     Lichen  planopilaris     Dermatitis     Cicatricial alopecia     Keratosis, inflamed seborrheic     History of skin cancer     Basal cell carcinoma of vertex scalp s/p mohs 6-5-15     Medication management     Actinic keratosis     Medication monitoring encounter     Dermatitis, seborrheic     Erosive pustular dermatosis     Tick bite, initial encounter     Vulvar atrophy     Factor V Leiden mutation (H)     Splenic artery aneurysm (H)     Past Medical History:   Diagnosis Date     Basal cell carcinoma      Squamous cell carcinoma      Past Surgical History:   Procedure Laterality Date     BIOPSY OF SKIN LESION       MOHS MICROGRAPHIC PROCEDURE       NO HISTORY OF SURGERY  2/17/14    derm     SMALL BOWEL RESECTION  11/2015    diverticulitis       Social History:  Patient reports that she quit smoking about 54 years ago. she has never used smokeless tobacco. She reports that she drinks alcohol. She reports that she does not use drugs.    Family History:  Family History   Problem Relation Age of Onset     Skin Cancer Sister         basal cell carcinoma     Melanoma No family hx of        Medications:  Current Outpatient Medications   Medication Sig Dispense Refill     Calcium 1500 MG TABS Take  by mouth.       Cholecalciferol (VITAMIN D) 1000 UNIT capsule Take  by mouth. Total 2200 units daily.       desonide (DESOWEN) 0.05 % cream Mix 50/50 with ketoconazole and apply twice daily to irritated area for 3 weeks 60 g 1     fluconazole (DIFLUCAN) 200 MG tablet Take 1 tablet (200 mg) by mouth daily 45 tablet 4     Fluocinolone Acetonide Scalp (DERMA-SMOOTHE/FS SCALP) 0.01 % OIL oil Apply once a week to scalp for lichen planopilaris 118 mL 11     hydroxychloroquine (PLAQUENIL) 200 MG tablet TAKE 1 TABLET(200 MG) BY MOUTH TWICE DAILY 180 tablet 0     hydroxychloroquine (PLAQUENIL) 200 MG tablet Take 1 tablet (200 mg) by mouth 2 times daily 60 tablet 0     hydroxychloroquine (PLAQUENIL) 200 MG tablet TAKE 1 TABLET(200 MG) BY MOUTH  TWICE DAILY 180 tablet 0     ibuprofen (ADVIL,MOTRIN) 800 MG tablet Take 800 mg by mouth every 8 hours as needed.       ketoconazole (NIZORAL) 2 % cream Mix 50/50 with desonide and apply twice daily to irritated area for 3 weeks 30 g 1     Loratadine (CLARITIN PO) Take  by mouth.       NEW MED Biest 1.5mg/gram cream(pg free)  Insert 1 gram vaginally twice weekly as directed 40 g 6     triamcinolone acetonide (KENALOG) 10 MG/ML injection See med note 5 mL 0     triamcinolone acetonide (KENALOG) 10 MG/ML injection See med note 5 mL 0     Allergies   Allergen Reactions     Dust Mites Other (See Comments)     Sneezing, coughing. asthma     Levaquin [Levofloxacin Hemihydrate] Other (See Comments)     Muscle weakness     Mold Other (See Comments)     Sneezing, asthma, weezing     Pollen Extract/Tree Extract Other (See Comments)     Sneezing, weezing     Sulfa Drugs Hives     Penicillins Rash         Review of Systems:  -As per HPI  -Constitutional: Otherwise feeling well today, in usual state of health.  -Recent fall 3 weeks ago, suffered concussion.  -Skin: As above in HPI. No additional skin concerns.    Physical exam:  Vitals: /70   Pulse 93   GEN: This is a well developed, well-nourished female in no acute distress, in a pleasant mood.    SKIN: Focused examination of the hair, scalp, face, and right forearm was performed.  -Mild perifollicular erythema and scaling.  -Negative hair pull test.  -LPPAI of 1 during this visit.  -Large area of alopecia on central scalp, some hair regrowth around the perimeter of the area.  -Bruising and erythema surrounding 5-7mm scar on left occipital scalp, area where pt fell and hit head.  -Erythematous macule on right forearm where shave biopsy was done on 12/20/18.   -Diffuse xerosis of right forearm.  -No other lesions of concern on areas examined.     Impression/Plan:  1. Lichen planopilaris, stable overall, mild improvement. Has diffuse tenderness over scalp but likely due  to recent head trauma. Has some hair regrowth, not significant active sites of lichen planopilaris. Discussed that we can defer ILK today as pt is overall stable and may exacerbate her scalp tenderness. She agree to wait until next appt for ILK.     Continue dermasmooth FS, can increase frequency of use during dry weather. Can also use vaseline on scalp.    Continue plaquenil 200mg BID    Continue DHS zinc shampoo every other day    2. Verrucous keratosis of right forearm, s/p shave biopsy on 12/20/18. Healing, no recurrence.    Apply vaseline to forearm daily    Follow-up in 3 weeks, earlier for new or changing lesions.     CC Referred Self, MD  No address on file on close of this encounter.      Staff Involved:  I, Suzie Chavez, MS4, saw and examined the patient in the presence of Dr. Soto.    Staff Physician:  I was present with the medical student who participated in the service and in the documentation of the note. I have verified the history and personally performed the physical exam and medical decision making. I agree with the assessment and plan of care as documented in the note.     Barbara Soto MD  Professor and Chair  Department of Dermatology  [facility]: Phone: [phone], Fax:[phone]  [facility]: Phone: [phone], Fax: [phone]

## 2019-02-11 NOTE — PATIENT INSTRUCTIONS
1. Continue dermasmoothe oil, can use more of it. Can also put vaseline on scalp.     2. Continue DHS zinc shampoo every other day.    3. Continue plaquenil 200mg BID.    4. Use vaseline on right forearm on lesion that was biopsied.    5. Return on 3/1/19 for follow-up appointment. Can do kenalog injections at that time.

## 2019-02-11 NOTE — LETTER
2/11/2019       RE: Ester Barba  [address] [place] [address]     Dear Colleague,    Thank you for referring your patient, Ester Barba, to the [facility] DERMATOLOGY at [facility]. Please see a copy of my visit note below.    [facility] Dermatology Note      Dermatology Problem List:  1. Lichen planopilaris.   -Currently using dermasmoothe/FS 0.01% oil 1-2x/week, DHS zinc shampoo 3x/week, and plaquenil 200mg BID. Receives ILK  About every 5 weeks, last injection 12/20/18.  2. Hx of NMSC   -BCC on right nasal ala s/p Mohs 11/27/17   -SCC of scalp, s/p Mohs 2014   -Hx of 1-2 other NMSC on scalp (BCCs)  3. Verrucous keratosis on right forearm, s/p shave biopsy on 12/20/18    Encounter Date: Feb 11, 2019    CC:  Chief Complaint   Patient presents with     Hair Loss     Lichen Planopilaris - Ester notes that her scalp has been more active since her concussion       History of Present Illness:  Ms. Ester Barba is a 73 year old female who presents as a follow-up for lichen planopilaris. The patient was last seen on 12/20/18 when she was continued on plaquenil 200mg BID, dermasmoothe/FS 0.01% oil 1-2x/week, DHS zinc shampoo every other day, and ILK 10 every 5 weeks. Since then pt reports her hair loss has not changed. She notes some mild tenderness of her scalp but denies any itching or burning. She does feel her scalp is dry overall with some flaking. Her health is overall worse since last visit as she had an accident 3 weeks ago where her coat got stuck in a car and caused her to fall and hit her head. She had bleeding and a concussion after the fall and reports her  witnessed her having a seizure when she was down. Her scalp is overall very tender after this fall and she has had a few episodes of shooting pain along her left scalp, diagnosed as scalp neuralgia at an ER.    Pt had lesion on right forearm biopsied at last visit, path came back as  verrucous keratosis. She has not noticed any recurrence of the lesion or any other new or concerning lesions.    Past Medical History:   Patient Active Problem List   Diagnosis     Porokeratosis     Squamous cell carcinoma     Neoplasm of uncertain behavior     Lichen planopilaris     Dermatitis     Cicatricial alopecia     Keratosis, inflamed seborrheic     History of skin cancer     Basal cell carcinoma of vertex scalp s/p mohs 6-5-15     Medication management     Actinic keratosis     Medication monitoring encounter     Dermatitis, seborrheic     Erosive pustular dermatosis     Tick bite, initial encounter     Vulvar atrophy     Factor V Leiden mutation (H)     Splenic artery aneurysm (H)     Past Medical History:   Diagnosis Date     Basal cell carcinoma      Squamous cell carcinoma      Past Surgical History:   Procedure Laterality Date     BIOPSY OF SKIN LESION       MOHS MICROGRAPHIC PROCEDURE       NO HISTORY OF SURGERY  2/17/14    derm     SMALL BOWEL RESECTION  11/2015    diverticulitis       Social History:  Patient reports that she quit smoking about 54 years ago. she has never used smokeless tobacco. She reports that she drinks alcohol. She reports that she does not use drugs.    Family History:  Family History   Problem Relation Age of Onset     Skin Cancer Sister         basal cell carcinoma     Melanoma No family hx of        Medications:  Current Outpatient Medications   Medication Sig Dispense Refill     Calcium 1500 MG TABS Take  by mouth.       Cholecalciferol (VITAMIN D) 1000 UNIT capsule Take  by mouth. Total 2200 units daily.       desonide (DESOWEN) 0.05 % cream Mix 50/50 with ketoconazole and apply twice daily to irritated area for 3 weeks 60 g 1     fluconazole (DIFLUCAN) 200 MG tablet Take 1 tablet (200 mg) by mouth daily 45 tablet 4     Fluocinolone Acetonide Scalp (DERMA-SMOOTHE/FS SCALP) 0.01 % OIL oil Apply once a week to scalp for lichen planopilaris 118 mL 11     hydroxychloroquine  (PLAQUENIL) 200 MG tablet TAKE 1 TABLET(200 MG) BY MOUTH TWICE DAILY 180 tablet 0     hydroxychloroquine (PLAQUENIL) 200 MG tablet Take 1 tablet (200 mg) by mouth 2 times daily 60 tablet 0     hydroxychloroquine (PLAQUENIL) 200 MG tablet TAKE 1 TABLET(200 MG) BY MOUTH TWICE DAILY 180 tablet 0     ibuprofen (ADVIL,MOTRIN) 800 MG tablet Take 800 mg by mouth every 8 hours as needed.       ketoconazole (NIZORAL) 2 % cream Mix 50/50 with desonide and apply twice daily to irritated area for 3 weeks 30 g 1     Loratadine (CLARITIN PO) Take  by mouth.       NEW MED Biest 1.5mg/gram cream(pg free)  Insert 1 gram vaginally twice weekly as directed 40 g 6     triamcinolone acetonide (KENALOG) 10 MG/ML injection See med note 5 mL 0     triamcinolone acetonide (KENALOG) 10 MG/ML injection See med note 5 mL 0     Allergies   Allergen Reactions     Dust Mites Other (See Comments)     Sneezing, coughing. asthma     Levaquin [Levofloxacin Hemihydrate] Other (See Comments)     Muscle weakness     Mold Other (See Comments)     Sneezing, asthma, weezing     Pollen Extract/Tree Extract Other (See Comments)     Sneezing, weezing     Sulfa Drugs Hives     Penicillins Rash         Review of Systems:  -As per HPI  -Constitutional: Otherwise feeling well today, in usual state of health.  -Recent fall 3 weeks ago, suffered concussion.  -Skin: As above in HPI. No additional skin concerns.    Physical exam:  Vitals: /70   Pulse 93   GEN: This is a well developed, well-nourished female in no acute distress, in a pleasant mood.    SKIN: Focused examination of the hair, scalp, face, and right forearm was performed.  -Mild perifollicular erythema and scaling.  -Negative hair pull test.  -LPPAI of 1 during this visit.  -Large area of alopecia on central scalp, some hair regrowth around the perimeter of the area.  -Bruising and erythema surrounding 5-7mm scar on left occipital scalp, area where pt fell and hit head.  -Erythematous macule on  right forearm where shave biopsy was done on 12/20/18.   -Diffuse xerosis of right forearm.  -No other lesions of concern on areas examined.     Impression/Plan:  1. Lichen planopilaris, stable overall, mild improvement. Has diffuse tenderness over scalp but likely due to recent head trauma. Has some hair regrowth, not significant active sites of lichen planopilaris. Discussed that we can defer ILK today as pt is overall stable and may exacerbate her scalp tenderness. She agree to wait until next appt for ILK.     Continue dermasmooth FS, can increase frequency of use during dry weather. Can also use vaseline on scalp.    Continue plaquenil 200mg BID    Continue DHS zinc shampoo every other day    2. Verrucous keratosis of right forearm, s/p shave biopsy on 12/20/18. Healing, no recurrence.    Apply vaseline to forearm daily    Follow-up in 3 weeks, earlier for new or changing lesions.     CC Referred Self, MD  No address on file on close of this encounter.      Staff Involved:  I, Suzie Chavez, MS4, saw and examined the patient in the presence of Dr. Soto.    Staff Physician:  I was present with the medical student who participated in the service and in the documentation of the note. I have verified the history and personally performed the physical exam and medical decision making. I agree with the assessment and plan of care as documented in the note.     Barbara Soto MD

## 2019-03-08 ENCOUNTER — TELEPHONE (OUTPATIENT)
Dept: DERMATOLOGY | Facility: CLINIC | Age: 74
End: 2019-03-08

## 2019-03-08 DIAGNOSIS — L66.10 LICHEN PLANOPILARIS: Primary | ICD-10-CM

## 2019-03-08 RX ORDER — HYDROXYCHLOROQUINE SULFATE 200 MG/1
200 TABLET, FILM COATED ORAL 2 TIMES DAILY
Qty: 60 TABLET | Refills: 1 | Status: SHIPPED | OUTPATIENT
Start: 2019-03-08 | End: 2019-08-02

## 2019-03-08 NOTE — TELEPHONE ENCOUNTER
Patient calling to inquire about her refill request that was discussed at her last visit on 2/11/19. Her pharmacy did not receive it.     hydroxychloroquine (PLAQUENIL) 200 MG tablet  30 days     Changing pharmacy to:    [facility] in [place]

## 2019-03-08 NOTE — TELEPHONE ENCOUNTER
Called patient and LVM. Let her know message below, clinic number provided in case of any questions or concerns.  PIERCE Phelan

## 2019-03-11 ENCOUNTER — DOCUMENTATION ONLY (OUTPATIENT)
Dept: CARE COORDINATION | Facility: CLINIC | Age: 74
End: 2019-03-11

## 2019-04-09 ENCOUNTER — OFFICE VISIT (OUTPATIENT)
Dept: DERMATOLOGY | Facility: CLINIC | Age: 74
End: 2019-04-09
Payer: MEDICARE

## 2019-04-09 VITALS — HEART RATE: 74 BPM | SYSTOLIC BLOOD PRESSURE: 132 MMHG | DIASTOLIC BLOOD PRESSURE: 71 MMHG

## 2019-04-09 DIAGNOSIS — L66.10 LICHEN PLANO-PILARIS: ICD-10-CM

## 2019-04-09 DIAGNOSIS — L70.0 ACNE VULGARIS: Primary | ICD-10-CM

## 2019-04-09 RX ORDER — TRETINOIN 0.1 MG/G
GEL TOPICAL
Qty: 15 G | Refills: 0 | Status: SHIPPED | OUTPATIENT
Start: 2019-04-09 | End: 2020-01-16

## 2019-04-09 ASSESSMENT — PAIN SCALES - GENERAL: PAINLEVEL: NO PAIN (0)

## 2019-04-09 NOTE — PROGRESS NOTES
Drug Administration Record    Prior to injection, verified patient identity using patient's name and date of birth.  Due to injection administration, patient instructed to remain in clinic for 15 minutes  afterwards, and to report any adverse reaction to me immediately.    Drug Name: triamcinolone acetonide(kenalog)  Dose: 2mL of triamcinolone 10mg/mL, 20mg dose  Route administered: ID  NDC #: [id number]: Kenalog-10 (0129-3155-50)  Amount of waste(mL):3mL  Reason for waste: Multi dose vial    LOT #: [id number]  SITE: scalp  : [employer]  EXPIRATION DATE: 09/2020

## 2019-04-09 NOTE — NURSING NOTE
Dermatology Rooming Note    Ester Barba's goals for this visit include:   Chief Complaint   Patient presents with     Derm Problem     Ester is here for a hairloss follow up, states that she is losing more hair.         Amber Flowers LPN

## 2019-04-09 NOTE — LETTER
4/9/2019       RE: Ester Barba  [address] [place] [address]     Dear Colleague,    Thank you for referring your patient, Ester Barba, to the [facility] DERMATOLOGY at [facility]. Please see a copy of my visit note below.    [facility] Dermatology Note      Dermatology Problem List:  1. Lichen planopilaris.              -Currently using dermasmoothe/FS 0.01% oil 1-2x/week, DHS zinc shampoo 3x/week, and plaquenil 200mg BID. Receives ILK   About every 5 weeks, last injection 12/20/18.  2. Hx of NMSC              -BCC on right nasal ala s/p Mohs 11/27/17              -SCC of scalp, s/p Mohs 2014              -Hx of 1-2 other NMSC on scalp (BCCs)  3. Verrucous keratosis on right forearm, s/p shave biopsy on 12/20/18      Encounter Date: Apr 9, 2019    CC:  Chief Complaint   Patient presents with     Derm Problem     Ester is here for a hairloss follow up, states that she is losing more hair.           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 She was last seen 2/11/18 when she was continued on plaquenil 200 mg BID, dermasmoothe/FS 0.01% oil 1-2x/week, DHS zinc shampoo every other day. ILK was held at that time due to scalp tenderness/swelling related to a fall and concussion she had had three weeks prior.    She reports more hair loss since then with thinning in the parietal regions and significant shedding with brushing. She continues to have very little itching.  Scalp swelling has improved, but she is still having mild tenderness to palpation around the crown of her head, nevertheless, she would like to proceed with ILK kenalog injections today.     She has had continual headaches and neck pain since her fall and she feels stress related to pain, in conjunction with holding her intralesional kenalog injections during last visit, are contributing to  hair loss. She is in a concussion rehab program and mentions that some of the exercises she is doing in there therapies require hand placement on her head to put traction on her neck.    She continues to use dermasmooth twice per week, zinc shampoo every other day, and takes plaquenil twice daily. She has had no side effects and mentions her visual fields have been normal on testing.    Other concerns include a small flesh covered bump on her right cheek, which feels like a scab to touch. This has been present for about four weeks and she denies any bleeding, itching, or changes in the lesion.      Past Medical History:   Patient Active Problem List   Diagnosis     Porokeratosis     Squamous cell carcinoma     Neoplasm of uncertain behavior     Lichen planopilaris     Dermatitis     Cicatricial alopecia     Keratosis, inflamed seborrheic     History of skin cancer     Basal cell carcinoma of vertex scalp s/p mohs 6-5-15     Medication management     Actinic keratosis     Medication monitoring encounter     Dermatitis, seborrheic     Erosive pustular dermatosis     Tick bite, initial encounter     Vulvar atrophy     Factor V Leiden mutation (H)     Splenic artery aneurysm (H)     Past Medical History:   Diagnosis Date     Basal cell carcinoma      Squamous cell carcinoma      Past Surgical History:   Procedure Laterality Date     BIOPSY OF SKIN LESION       MOHS MICROGRAPHIC PROCEDURE       NO HISTORY OF SURGERY  2/17/14    derm     SMALL BOWEL RESECTION  11/2015    diverticulitis       Social History:  Patient reports that she quit smoking about 54 years ago. She has never used smokeless tobacco. She reports that she drinks alcohol. She reports that she does not use drugs.    Family History:  Family History   Problem Relation Age of Onset     Skin Cancer Sister         basal cell carcinoma     Melanoma No family hx of        Medications:  Current Outpatient Medications   Medication Sig Dispense Refill     Calcium  1500 MG TABS Take  by mouth.       Cholecalciferol (VITAMIN D) 1000 UNIT capsule Take  by mouth. Total 2200 units daily.       desonide (DESOWEN) 0.05 % cream Mix 50/50 with ketoconazole and apply twice daily to irritated area for 3 weeks 60 g 1     fluconazole (DIFLUCAN) 200 MG tablet Take 1 tablet (200 mg) by mouth daily 45 tablet 4     Fluocinolone Acetonide Scalp (DERMA-SMOOTHE/FS SCALP) 0.01 % OIL oil Apply once a week to scalp for lichen planopilaris 118 mL 11     hydroxychloroquine (PLAQUENIL) 200 MG tablet Take 1 tablet (200 mg) by mouth 2 times daily 60 tablet 1     hydroxychloroquine (PLAQUENIL) 200 MG tablet TAKE 1 TABLET(200 MG) BY MOUTH TWICE DAILY 180 tablet 0     hydroxychloroquine (PLAQUENIL) 200 MG tablet Take 1 tablet (200 mg) by mouth 2 times daily 60 tablet 0     hydroxychloroquine (PLAQUENIL) 200 MG tablet TAKE 1 TABLET(200 MG) BY MOUTH TWICE DAILY 180 tablet 0     ibuprofen (ADVIL,MOTRIN) 800 MG tablet Take 800 mg by mouth every 8 hours as needed.       ketoconazole (NIZORAL) 2 % cream Mix 50/50 with desonide and apply twice daily to irritated area for 3 weeks 30 g 1     Loratadine (CLARITIN PO) Take  by mouth.       NEW MED Biest 1.5mg/gram cream(pg free)  Insert 1 gram vaginally twice weekly as directed 40 g 6     triamcinolone acetonide (KENALOG) 10 MG/ML injection See med note 5 mL 0     triamcinolone acetonide (KENALOG) 10 MG/ML injection See med note 5 mL 0     Allergies   Allergen Reactions     Dust Mites Other (See Comments)     Sneezing, coughing. asthma     Levaquin [Levofloxacin Hemihydrate] Other (See Comments)     Muscle weakness     Mold Other (See Comments)     Sneezing, asthma, weezing     Pollen Extract/Tree Extract Other (See Comments)     Sneezing, weezing     Sulfa Drugs Hives     Penicillins Rash         Review of Systems:  -Constitutional: Otherwise feeling well today, in usual state of health.  -HEENT: Patient denies nonhealing oral sores.  -Skin: As above in HPI. No  additional skin concerns.    Physical exam:  Vitals: /71 (BP Location: Right arm, Patient Position: Chair, Cuff Size: Adult Regular)   Pulse 74   GEN: This is a well developed, well-nourished female in no acute distress, in a pleasant mood.    SKIN: Focused examination of the hair, scalp, and face was performed.  -Moderate perifollicular erythema and scaling.  -LPPAI of 0.67  -Large area of alopecia on central scalp, some hair regrowth around the perimeter.  -R cheek there is a small flesh colored papule, with associated palpable cyst, with no overlying scale or telangiectasia.  -No other lesions of concern on areas examined.     Impression/Plan:  1. Lichen planopilaris.  Worsened hair loss over the past few weeks in the context of ongoing headaches following a head trauma in January and missing her last round of ILK.    Continue dermasmooth FS at least weekly    Continue plaquenil 200mg BID    Continue DHS zinc shampoo every other day    Can also used Vaseline on scalp.    Kenalog intralesional injection procedure note (performed by faculty): After verbal consent and discussion of risks including but not limited to atrophy, pain, and bruising,  time out was performed, 1.6 total cc of Kenalog 10 mg/cc was injected into 16 sites on the scalp.  The patient tolerated the procedure well and left the Dermatology clinic in good condition.    2. Acne  Right cheek area. Skin otherwise clear.    Tretinoin 0.01% gel every other day at bedtime     Follow-up in 5 weeks, earlier for new or changing lesions.     Staff Involved:  Valeria SALDIVAR, MS-4, saw and examined the patient in the presence of Dr. Barbara Soto.    Staff Physician:  I was present with the medical student who participated in the service and in the documentation of the note. I have verified the history and personally performed the physical exam and medical decision making. I agree with the assessment and plan of care as documented in the note.  ILK  injections were primarily done by me.    Barbara Soto MD  Professor and Chair  Department of Dermatology  [facility]: Phone: [phone], Fax:[phone]  [facility]: Phone: [phone], Fax: [phone]                                Pictures were placed in Pt's chart today for future reference.      Drug Administration Record    Prior to injection, verified patient identity using patient's name and date of birth.  Due to injection administration, patient instructed to remain in clinic for 15 minutes  afterwards, and to report any adverse reaction to me immediately.    Drug Name: triamcinolone acetonide(kenalog)  Dose: 2mL of triamcinolone 10mg/mL, 20mg dose  Route administered: ID  NDC #: [id number]: Kenalog-10 (5784-6790-36)  Amount of waste(mL):3mL  Reason for waste: Multi dose vial    LOT #: [id number]  SITE: scalp  : [employer]  EXPIRATION DATE: 09/2020

## 2019-04-09 NOTE — PROGRESS NOTES
[facility] Dermatology Note      Dermatology Problem List:  1. Lichen planopilaris.              -Currently using dermasmoothe/FS 0.01% oil 1-2x/week, DHS zinc shampoo 3x/week, and plaquenil 200mg BID. Receives ILK   About every 5 weeks, last injection 12/20/18.  2. Hx of NMSC              -BCC on right nasal ala s/p Mohs 11/27/17              -SCC of scalp, s/p Mohs 2014              -Hx of 1-2 other NMSC on scalp (BCCs)  3. Verrucous keratosis on right forearm, s/p shave biopsy on 12/20/18      Encounter Date: Apr 9, 2019    CC:  Chief Complaint   Patient presents with     Derm Problem     Ester is here for a hairloss follow up, states that she is losing more hair.           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 She was last seen 2/11/18 when she was continued on plaquenil 200 mg BID, dermasmoothe/FS 0.01% oil 1-2x/week, DHS zinc shampoo every other day. ILK was held at that time due to scalp tenderness/swelling related to a fall and concussion she had had three weeks prior.    She reports more hair loss since then with thinning in the parietal regions and significant shedding with brushing. She continues to have very little itching.  Scalp swelling has improved, but she is still having mild tenderness to palpation around the crown of her head, nevertheless, she would like to proceed with ILK kenalog injections today.     She has had continual headaches and neck pain since her fall and she feels stress related to pain, in conjunction with holding her intralesional kenalog injections during last visit, are contributing to hair loss. She is in a concussion rehab program and mentions that some of the exercises she is doing in there therapies require hand placement on her head to put traction on her neck.    She continues to use dermasmooth twice per week, zinc shampoo every other day, and  takes plaquenil twice daily. She has had no side effects and mentions her visual fields have been normal on testing.    Other concerns include a small flesh covered bump on her right cheek, which feels like a scab to touch. This has been present for about four weeks and she denies any bleeding, itching, or changes in the lesion.      Past Medical History:   Patient Active Problem List   Diagnosis     Porokeratosis     Squamous cell carcinoma     Neoplasm of uncertain behavior     Lichen planopilaris     Dermatitis     Cicatricial alopecia     Keratosis, inflamed seborrheic     History of skin cancer     Basal cell carcinoma of vertex scalp s/p mohs 6-5-15     Medication management     Actinic keratosis     Medication monitoring encounter     Dermatitis, seborrheic     Erosive pustular dermatosis     Tick bite, initial encounter     Vulvar atrophy     Factor V Leiden mutation (H)     Splenic artery aneurysm (H)     Past Medical History:   Diagnosis Date     Basal cell carcinoma      Squamous cell carcinoma      Past Surgical History:   Procedure Laterality Date     BIOPSY OF SKIN LESION       MOHS MICROGRAPHIC PROCEDURE       NO HISTORY OF SURGERY  2/17/14    derm     SMALL BOWEL RESECTION  11/2015    diverticulitis       Social History:  Patient reports that she quit smoking about 54 years ago. She has never used smokeless tobacco. She reports that she drinks alcohol. She reports that she does not use drugs.    Family History:  Family History   Problem Relation Age of Onset     Skin Cancer Sister         basal cell carcinoma     Melanoma No family hx of        Medications:  Current Outpatient Medications   Medication Sig Dispense Refill     Calcium 1500 MG TABS Take  by mouth.       Cholecalciferol (VITAMIN D) 1000 UNIT capsule Take  by mouth. Total 2200 units daily.       desonide (DESOWEN) 0.05 % cream Mix 50/50 with ketoconazole and apply twice daily to irritated area for 3 weeks 60 g 1     fluconazole (DIFLUCAN)  200 MG tablet Take 1 tablet (200 mg) by mouth daily 45 tablet 4     Fluocinolone Acetonide Scalp (DERMA-SMOOTHE/FS SCALP) 0.01 % OIL oil Apply once a week to scalp for lichen planopilaris 118 mL 11     hydroxychloroquine (PLAQUENIL) 200 MG tablet Take 1 tablet (200 mg) by mouth 2 times daily 60 tablet 1     hydroxychloroquine (PLAQUENIL) 200 MG tablet TAKE 1 TABLET(200 MG) BY MOUTH TWICE DAILY 180 tablet 0     hydroxychloroquine (PLAQUENIL) 200 MG tablet Take 1 tablet (200 mg) by mouth 2 times daily 60 tablet 0     hydroxychloroquine (PLAQUENIL) 200 MG tablet TAKE 1 TABLET(200 MG) BY MOUTH TWICE DAILY 180 tablet 0     ibuprofen (ADVIL,MOTRIN) 800 MG tablet Take 800 mg by mouth every 8 hours as needed.       ketoconazole (NIZORAL) 2 % cream Mix 50/50 with desonide and apply twice daily to irritated area for 3 weeks 30 g 1     Loratadine (CLARITIN PO) Take  by mouth.       NEW MED Biest 1.5mg/gram cream(pg free)  Insert 1 gram vaginally twice weekly as directed 40 g 6     triamcinolone acetonide (KENALOG) 10 MG/ML injection See med note 5 mL 0     triamcinolone acetonide (KENALOG) 10 MG/ML injection See med note 5 mL 0     Allergies   Allergen Reactions     Dust Mites Other (See Comments)     Sneezing, coughing. asthma     Levaquin [Levofloxacin Hemihydrate] Other (See Comments)     Muscle weakness     Mold Other (See Comments)     Sneezing, asthma, weezing     Pollen Extract/Tree Extract Other (See Comments)     Sneezing, weezing     Sulfa Drugs Hives     Penicillins Rash         Review of Systems:  -Constitutional: Otherwise feeling well today, in usual state of health.  -HEENT: Patient denies nonhealing oral sores.  -Skin: As above in HPI. No additional skin concerns.    Physical exam:  Vitals: /71 (BP Location: Right arm, Patient Position: Chair, Cuff Size: Adult Regular)   Pulse 74   GEN: This is a well developed, well-nourished female in no acute distress, in a pleasant mood.    SKIN: Focused examination  of the hair, scalp, and face was performed.  -Moderate perifollicular erythema and scaling.  -LPPAI of 0.67  -Large area of alopecia on central scalp, some hair regrowth around the perimeter.  -R cheek there is a small flesh colored papule, with associated palpable cyst, with no overlying scale or telangiectasia.  -No other lesions of concern on areas examined.     Impression/Plan:  1. Lichen planopilaris.  Worsened hair loss over the past few weeks in the context of ongoing headaches following a head trauma in January and missing her last round of ILK.    Continue dermasmooth FS at least weekly    Continue plaquenil 200mg BID    Continue DHS zinc shampoo every other day    Can also used Vaseline on scalp.    Kenalog intralesional injection procedure note (performed by faculty): After verbal consent and discussion of risks including but not limited to atrophy, pain, and bruising,  time out was performed, 1.6 total cc of Kenalog 10 mg/cc was injected into 16 sites on the scalp.  The patient tolerated the procedure well and left the Dermatology clinic in good condition.    2. Acne  Right cheek area. Skin otherwise clear.    Tretinoin 0.01% gel every other day at bedtime     Follow-up in 5 weeks, earlier for new or changing lesions.     Staff Involved:  I, Valeria Harris, MS-4, saw and examined the patient in the presence of Dr. Barbara Soto.    Staff Physician:  I was present with the medical student who participated in the service and in the documentation of the note. I have verified the history and personally performed the physical exam and medical decision making. I agree with the assessment and plan of care as documented in the note.  ILK injections were primarily done by me.    Barbara Soto MD  Professor and Chair  Department of Dermatology  [facility]: Phone: [phone], Fax:[phone]  [facility]: Phone: [phone], Fax: [phone]

## 2019-05-07 ENCOUNTER — OFFICE VISIT (OUTPATIENT)
Dept: DERMATOLOGY | Facility: CLINIC | Age: 74
End: 2019-05-07
Payer: MEDICARE

## 2019-05-07 VITALS — DIASTOLIC BLOOD PRESSURE: 73 MMHG | SYSTOLIC BLOOD PRESSURE: 120 MMHG | HEART RATE: 83 BPM

## 2019-05-07 DIAGNOSIS — L66.9 CICATRICIAL ALOPECIA: ICD-10-CM

## 2019-05-07 DIAGNOSIS — Z85.828 HISTORY OF SKIN CANCER: ICD-10-CM

## 2019-05-07 DIAGNOSIS — L66.10 LICHEN PLANOPILARIS: Primary | ICD-10-CM

## 2019-05-07 ASSESSMENT — PAIN SCALES - GENERAL: PAINLEVEL: NO PAIN (0)

## 2019-05-07 NOTE — NURSING NOTE
"Dermatology Rooming Note    Ester Barba's goals for this visit include:   Chief Complaint   Patient presents with     Hair Loss     Ester is here today for a 1 monh follow up . Ester notes\" things are good\"      PIERCE Glaser    "

## 2019-05-07 NOTE — LETTER
"5/7/2019      RE: Ester Barba  [address] [place] [address]       [facility] Dermatology Note      Dermatology Problem List:  1. Lichen planopilaris.              -Currently using Derma-Smoothe/FS 0.01% oil 1x/week, DHS zinc shampoo 3x/week, and plaquenil 200 mg BID. Receives ILK   About every 5 weeks, last injection 5/7/2019.  2. Hx of NMSC              -BCC on right nasal ala s/p Mohs 11/27/17              -SCC of scalp, s/p Mohs 2014              -Hx of 1-2 other NMSC on scalp (BCCs)  3. Verrucous keratosis on right forearm, s/p shave biopsy on 12/20/18  4. Acne   - tretinoin 0.01% every other day     Encounter Date: May 7, 2019    CC:  Chief Complaint   Patient presents with     Hair Loss     Ester is here today for a 1 monh follow up . Ester notes\" things are good\"          History of Present Illness:  Ms. Ester Barba is a 73 year old female who presents as a follow-up for LPP. The patient was last seen 4/9/2019 when her scalp was treated with 2 cc ILK 10 mg/mL. She feels that her hair has improved and she has not noticed more hair loss.     Current treatment includes dermasmooth FS at least weekly, plaquenil 200 mg BID, DHS zinc shampoo every other day, tylenol for headaches, and Claritin daily.     No new medications or medical conditions. She has continued with rehabilitation therapy for a fall that resulted in chronic headaches and neck pain      Past Medical History:   Patient Active Problem List   Diagnosis     Porokeratosis     Squamous cell carcinoma     Neoplasm of uncertain behavior     Lichen planopilaris     Dermatitis     Cicatricial alopecia     Keratosis, inflamed seborrheic     History of skin cancer     Basal cell carcinoma of vertex scalp s/p mohs 6-5-15     Medication management     Actinic keratosis     Medication monitoring encounter     Dermatitis, seborrheic     Erosive pustular dermatosis     Tick bite, initial encounter     Vulvar atrophy     Factor V Leiden " mutation (H)     Splenic artery aneurysm (H)     Past Medical History:   Diagnosis Date     Basal cell carcinoma      Squamous cell carcinoma      Past Surgical History:   Procedure Laterality Date     BIOPSY OF SKIN LESION       MOHS MICROGRAPHIC PROCEDURE       NO HISTORY OF SURGERY  2/17/14    derm     SMALL BOWEL RESECTION  11/2015    diverticulitis       Social History:  Patient reports that she quit smoking about 54 years ago. She has never used smokeless tobacco. She reports that she drinks alcohol. She reports that she does not use drugs.    Family History:  Family History   Problem Relation Age of Onset     Skin Cancer Sister         basal cell carcinoma     Melanoma No family hx of        Medications:  Current Outpatient Medications   Medication Sig Dispense Refill     Calcium 1500 MG TABS Take  by mouth.       Cholecalciferol (VITAMIN D) 1000 UNIT capsule Take  by mouth. Total 2200 units daily.       desonide (DESOWEN) 0.05 % cream Mix 50/50 with ketoconazole and apply twice daily to irritated area for 3 weeks 60 g 1     fluconazole (DIFLUCAN) 200 MG tablet Take 1 tablet (200 mg) by mouth daily 45 tablet 4     Fluocinolone Acetonide Scalp (DERMA-SMOOTHE/FS SCALP) 0.01 % OIL oil Apply once a week to scalp for lichen planopilaris 118 mL 11     hydroxychloroquine (PLAQUENIL) 200 MG tablet Take 1 tablet (200 mg) by mouth 2 times daily 60 tablet 1     hydroxychloroquine (PLAQUENIL) 200 MG tablet TAKE 1 TABLET(200 MG) BY MOUTH TWICE DAILY 180 tablet 0     hydroxychloroquine (PLAQUENIL) 200 MG tablet Take 1 tablet (200 mg) by mouth 2 times daily 60 tablet 0     hydroxychloroquine (PLAQUENIL) 200 MG tablet TAKE 1 TABLET(200 MG) BY MOUTH TWICE DAILY 180 tablet 0     ibuprofen (ADVIL,MOTRIN) 800 MG tablet Take 800 mg by mouth every 8 hours as needed.       ketoconazole (NIZORAL) 2 % cream Mix 50/50 with desonide and apply twice daily to irritated area for 3 weeks 30 g 1     Loratadine (CLARITIN PO) Take  by mouth.        NEW MED Biest 1.5mg/gram cream(pg free)  Insert 1 gram vaginally twice weekly as directed 40 g 6     tretinoin (RETIN-A) 0.01 % external gel Use a pea size amount over entire face, concentrating on problem area, every other day at bedtime. 15 g 0     triamcinolone acetonide (KENALOG) 10 MG/ML injection See med note 5 mL 0     triamcinolone acetonide (KENALOG) 10 MG/ML injection See med note 5 mL 0     Allergies   Allergen Reactions     Dust Mites Other (See Comments)     Sneezing, coughing. asthma     Levaquin [Levofloxacin Hemihydrate] Other (See Comments)     Muscle weakness     Mold Other (See Comments)     Sneezing, asthma, weezing     Pollen Extract/Tree Extract Other (See Comments)     Sneezing, weezing     Sulfa Drugs Hives     Penicillins Rash         Review of Systems:  -As per HPI  -Constitutional: Otherwise feeling well today, in usual state of health.  -HEENT: Patient denies nonhealing oral sores.  -Skin: As above in HPI. No additional skin concerns.    Physical exam:  Vitals: /73   Pulse 83   GEN: This is a well developed, well-nourished female in no acute distress, in a pleasant mood.    SKIN: Focused examination of the scalp, face, nails was performed.  - LPPAI score of 0.67 for mild perifollicular scale and pain  - follicular accentuation on frontal scalp   -Large area of alopecia on the central scalp, some hair regrowth around the perimeter.  - crusting and scale at the mid frontal scalp along perimeter of the alopecia patch  - thinning in the posterior parietal scalp    -scalp is tender to touch and pain was illicited by moving hair on exam  - eyebrows and lashes are full  - nails have normal morphology   -No other lesions of concern on areas examined.                             Impression/Plan:  1. Lichen Planopilaris -  Stable    Kenalog intralesional injection procedure note (performed with faculty): After verbal consent and discussion of risks including but not limited to atrophy,  pain, and bruising,  time out was performed, 1.6 total cc of Kenalog 10 mg/cc was injected into 16 sites on the scalp.  The patient tolerated the procedure well and left the Dermatology clinic in good condition.    Continue dermasmooth FS at least weekly    Continue plaquenil 200 mg BID    Continue DHS zinc shampoo every other day    Can also used Vaseline on scalp.      Follow-up in 6 weeks, earlier for new or changing lesions.     Staff Involved:  Luc Villalpando MD  Dermatology Research Fellow     Patient was seen and examined with the clinical research fellow. I agree with the history, review of systems, physical examination, assessments and plan. ILK injections were done together.    Barbara Soto MD  Professor and  Chair  Department of Dermatology  [facility]         Drug Administration Record    Prior to injection, verified patient identity using patient's name and date of birth.  Due to injection administration, patient instructed to remain in clinic for 15 minutes  afterwards, and to report any adverse reaction to me immediately.    Drug Name: triamcinolone acetonide(kenalog)  Dose: 2mL of triamcinolone 10mg/mL, 20mg dose  Route administered: ID  NDC #: [id number]: Kenalog-10 (1152-3012-32)  Amount of waste(mL):3mL  Reason for waste: Single use vial    LOT #: [id number]  SITE: scalp  : [employer]  EXPIRATION DATE: 09/2020    Barbara Soto MD

## 2019-05-07 NOTE — PROGRESS NOTES
"[facility] Dermatology Note      Dermatology Problem List:  1. Lichen planopilaris.              -Currently using Derma-Smoothe/FS 0.01% oil 1x/week, DHS zinc shampoo 3x/week, and plaquenil 200 mg BID. Receives ILK   About every 5 weeks, last injection 5/7/2019.  2. Hx of NMSC              -BCC on right nasal ala s/p Mohs 11/27/17              -SCC of scalp, s/p Mohs 2014              -Hx of 1-2 other NMSC on scalp (BCCs)  3. Verrucous keratosis on right forearm, s/p shave biopsy on 12/20/18  4. Acne   - tretinoin 0.01% every other day     Encounter Date: May 7, 2019    CC:  Chief Complaint   Patient presents with     Hair Loss     Ester is here today for a 1 monh follow up . Ester notes\" things are good\"          History of Present Illness:  Ms. Ester Barba is a 73 year old female who presents as a follow-up for LPP. The patient was last seen 4/9/2019 when her scalp was treated with 2 cc ILK 10 mg/mL. She feels that her hair has improved and she has not noticed more hair loss.     Current treatment includes dermasmooth FS at least weekly, plaquenil 200 mg BID, DHS zinc shampoo every other day, tylenol for headaches, and Claritin daily.     No new medications or medical conditions. She has continued with rehabilitation therapy for a fall that resulted in chronic headaches and neck pain      Past Medical History:   Patient Active Problem List   Diagnosis     Porokeratosis     Squamous cell carcinoma     Neoplasm of uncertain behavior     Lichen planopilaris     Dermatitis     Cicatricial alopecia     Keratosis, inflamed seborrheic     History of skin cancer     Basal cell carcinoma of vertex scalp s/p mohs 6-5-15     Medication management     Actinic keratosis     Medication monitoring encounter     Dermatitis, seborrheic     Erosive pustular dermatosis     Tick bite, initial encounter     Vulvar atrophy     Factor V Leiden mutation (H)     Splenic artery aneurysm (H)     Past Medical History: "   Diagnosis Date     Basal cell carcinoma      Squamous cell carcinoma      Past Surgical History:   Procedure Laterality Date     BIOPSY OF SKIN LESION       MOHS MICROGRAPHIC PROCEDURE       NO HISTORY OF SURGERY  2/17/14    derm     SMALL BOWEL RESECTION  11/2015    diverticulitis       Social History:  Patient reports that she quit smoking about 54 years ago. She has never used smokeless tobacco. She reports that she drinks alcohol. She reports that she does not use drugs.    Family History:  Family History   Problem Relation Age of Onset     Skin Cancer Sister         basal cell carcinoma     Melanoma No family hx of        Medications:  Current Outpatient Medications   Medication Sig Dispense Refill     Calcium 1500 MG TABS Take  by mouth.       Cholecalciferol (VITAMIN D) 1000 UNIT capsule Take  by mouth. Total 2200 units daily.       desonide (DESOWEN) 0.05 % cream Mix 50/50 with ketoconazole and apply twice daily to irritated area for 3 weeks 60 g 1     fluconazole (DIFLUCAN) 200 MG tablet Take 1 tablet (200 mg) by mouth daily 45 tablet 4     Fluocinolone Acetonide Scalp (DERMA-SMOOTHE/FS SCALP) 0.01 % OIL oil Apply once a week to scalp for lichen planopilaris 118 mL 11     hydroxychloroquine (PLAQUENIL) 200 MG tablet Take 1 tablet (200 mg) by mouth 2 times daily 60 tablet 1     hydroxychloroquine (PLAQUENIL) 200 MG tablet TAKE 1 TABLET(200 MG) BY MOUTH TWICE DAILY 180 tablet 0     hydroxychloroquine (PLAQUENIL) 200 MG tablet Take 1 tablet (200 mg) by mouth 2 times daily 60 tablet 0     hydroxychloroquine (PLAQUENIL) 200 MG tablet TAKE 1 TABLET(200 MG) BY MOUTH TWICE DAILY 180 tablet 0     ibuprofen (ADVIL,MOTRIN) 800 MG tablet Take 800 mg by mouth every 8 hours as needed.       ketoconazole (NIZORAL) 2 % cream Mix 50/50 with desonide and apply twice daily to irritated area for 3 weeks 30 g 1     Loratadine (CLARITIN PO) Take  by mouth.       NEW MED Biest 1.5mg/gram cream(pg free)  Insert 1 gram vaginally  twice weekly as directed 40 g 6     tretinoin (RETIN-A) 0.01 % external gel Use a pea size amount over entire face, concentrating on problem area, every other day at bedtime. 15 g 0     triamcinolone acetonide (KENALOG) 10 MG/ML injection See med note 5 mL 0     triamcinolone acetonide (KENALOG) 10 MG/ML injection See med note 5 mL 0     Allergies   Allergen Reactions     Dust Mites Other (See Comments)     Sneezing, coughing. asthma     Levaquin [Levofloxacin Hemihydrate] Other (See Comments)     Muscle weakness     Mold Other (See Comments)     Sneezing, asthma, weezing     Pollen Extract/Tree Extract Other (See Comments)     Sneezing, weezing     Sulfa Drugs Hives     Penicillins Rash         Review of Systems:  -As per HPI  -Constitutional: Otherwise feeling well today, in usual state of health.  -HEENT: Patient denies nonhealing oral sores.  -Skin: As above in HPI. No additional skin concerns.    Physical exam:  Vitals: /73   Pulse 83   GEN: This is a well developed, well-nourished female in no acute distress, in a pleasant mood.    SKIN: Focused examination of the scalp, face, nails was performed.  - LPPAI score of 0.67 for mild perifollicular scale and pain  - follicular accentuation on frontal scalp   -Large area of alopecia on the central scalp, some hair regrowth around the perimeter.  - crusting and scale at the mid frontal scalp along perimeter of the alopecia patch  - thinning in the posterior parietal scalp    -scalp is tender to touch and pain was illicited by moving hair on exam  - eyebrows and lashes are full  - nails have normal morphology   -No other lesions of concern on areas examined.                             Impression/Plan:  1. Lichen Planopilaris -  Stable    Kenalog intralesional injection procedure note (performed with faculty): After verbal consent and discussion of risks including but not limited to atrophy, pain, and bruising,  time out was performed, 1.6 total cc of Kenalog  10 mg/cc was injected into 16 sites on the scalp.  The patient tolerated the procedure well and left the Dermatology clinic in good condition.    Continue dermasmooth FS at least weekly    Continue plaquenil 200 mg BID    Continue DHS zinc shampoo every other day    Can also used Vaseline on scalp.      Follow-up in 6 weeks, earlier for new or changing lesions.     Staff Involved:  Luc Villalpando MD  Dermatology Research Fellow     Patient was seen and examined with the clinical research fellow. I agree with the history, review of systems, physical examination, assessments and plan. ILK injections were done together.    Barbara Soto MD  Professor and  Chair  Department of Dermatology  [facility]

## 2019-05-07 NOTE — PROGRESS NOTES
Drug Administration Record    Prior to injection, verified patient identity using patient's name and date of birth.  Due to injection administration, patient instructed to remain in clinic for 15 minutes  afterwards, and to report any adverse reaction to me immediately.    Drug Name: triamcinolone acetonide(kenalog)  Dose: 2mL of triamcinolone 10mg/mL, 20mg dose  Route administered: ID  NDC #: [id number]: Kenalog-10 (6300-8480-31)  Amount of waste(mL):3mL  Reason for waste: Single use vial    LOT #: [id number]  SITE: scalp  : [employer]  EXPIRATION DATE: 09/2020

## 2019-05-07 NOTE — LETTER
"5/7/2019       RE: Ester Barba  [address] [place] [address]     Dear Colleague,    Thank you for referring your patient, Ester Barba, to the [facility] DERMATOLOGY at [facility]. Please see a copy of my visit note below.    [facility] Dermatology Note      Dermatology Problem List:  1. Lichen planopilaris.              -Currently using Derma-Smoothe/FS 0.01% oil 1x/week, DHS zinc shampoo 3x/week, and plaquenil 200 mg BID. Receives ILK   About every 5 weeks, last injection 5/7/2019.  2. Hx of NMSC              -BCC on right nasal ala s/p Mohs 11/27/17              -SCC of scalp, s/p Mohs 2014              -Hx of 1-2 other NMSC on scalp (BCCs)  3. Verrucous keratosis on right forearm, s/p shave biopsy on 12/20/18  4. Acne   - tretinoin 0.01% every other day     Encounter Date: May 7, 2019    CC:  Chief Complaint   Patient presents with     Hair Loss     Ester is here today for a 1 monh follow up . Ester notes\" things are good\"          History of Present Illness:  Ms. Ester Barba is a 73 year old female who presents as a follow-up for LPP. The patient was last seen 4/9/2019 when her scalp was treated with 2 cc ILK 10 mg/mL. She feels that her hair has improved and she has not noticed more hair loss.     Current treatment includes dermasmooth FS at least weekly, plaquenil 200 mg BID, DHS zinc shampoo every other day, tylenol for headaches, and Claritin daily.     No new medications or medical conditions. She has continued with rehabilitation therapy for a fall that resulted in chronic headaches and neck pain      Past Medical History:   Patient Active Problem List   Diagnosis     Porokeratosis     Squamous cell carcinoma     Neoplasm of uncertain behavior     Lichen planopilaris     Dermatitis     Cicatricial alopecia     Keratosis, inflamed seborrheic     History of skin cancer     Basal cell carcinoma of vertex scalp s/p mohs 6-5-15     Medication " management     Actinic keratosis     Medication monitoring encounter     Dermatitis, seborrheic     Erosive pustular dermatosis     Tick bite, initial encounter     Vulvar atrophy     Factor V Leiden mutation (H)     Splenic artery aneurysm (H)     Past Medical History:   Diagnosis Date     Basal cell carcinoma      Squamous cell carcinoma      Past Surgical History:   Procedure Laterality Date     BIOPSY OF SKIN LESION       MOHS MICROGRAPHIC PROCEDURE       NO HISTORY OF SURGERY  2/17/14    derm     SMALL BOWEL RESECTION  11/2015    diverticulitis       Social History:  Patient reports that she quit smoking about 54 years ago. She has never used smokeless tobacco. She reports that she drinks alcohol. She reports that she does not use drugs.    Family History:  Family History   Problem Relation Age of Onset     Skin Cancer Sister         basal cell carcinoma     Melanoma No family hx of        Medications:  Current Outpatient Medications   Medication Sig Dispense Refill     Calcium 1500 MG TABS Take  by mouth.       Cholecalciferol (VITAMIN D) 1000 UNIT capsule Take  by mouth. Total 2200 units daily.       desonide (DESOWEN) 0.05 % cream Mix 50/50 with ketoconazole and apply twice daily to irritated area for 3 weeks 60 g 1     fluconazole (DIFLUCAN) 200 MG tablet Take 1 tablet (200 mg) by mouth daily 45 tablet 4     Fluocinolone Acetonide Scalp (DERMA-SMOOTHE/FS SCALP) 0.01 % OIL oil Apply once a week to scalp for lichen planopilaris 118 mL 11     hydroxychloroquine (PLAQUENIL) 200 MG tablet Take 1 tablet (200 mg) by mouth 2 times daily 60 tablet 1     hydroxychloroquine (PLAQUENIL) 200 MG tablet TAKE 1 TABLET(200 MG) BY MOUTH TWICE DAILY 180 tablet 0     hydroxychloroquine (PLAQUENIL) 200 MG tablet Take 1 tablet (200 mg) by mouth 2 times daily 60 tablet 0     hydroxychloroquine (PLAQUENIL) 200 MG tablet TAKE 1 TABLET(200 MG) BY MOUTH TWICE DAILY 180 tablet 0     ibuprofen (ADVIL,MOTRIN) 800 MG tablet Take 800 mg  by mouth every 8 hours as needed.       ketoconazole (NIZORAL) 2 % cream Mix 50/50 with desonide and apply twice daily to irritated area for 3 weeks 30 g 1     Loratadine (CLARITIN PO) Take  by mouth.       NEW MED Biest 1.5mg/gram cream(pg free)  Insert 1 gram vaginally twice weekly as directed 40 g 6     tretinoin (RETIN-A) 0.01 % external gel Use a pea size amount over entire face, concentrating on problem area, every other day at bedtime. 15 g 0     triamcinolone acetonide (KENALOG) 10 MG/ML injection See med note 5 mL 0     triamcinolone acetonide (KENALOG) 10 MG/ML injection See med note 5 mL 0     Allergies   Allergen Reactions     Dust Mites Other (See Comments)     Sneezing, coughing. asthma     Levaquin [Levofloxacin Hemihydrate] Other (See Comments)     Muscle weakness     Mold Other (See Comments)     Sneezing, asthma, weezing     Pollen Extract/Tree Extract Other (See Comments)     Sneezing, weezing     Sulfa Drugs Hives     Penicillins Rash         Review of Systems:  -As per HPI  -Constitutional: Otherwise feeling well today, in usual state of health.  -HEENT: Patient denies nonhealing oral sores.  -Skin: As above in HPI. No additional skin concerns.    Physical exam:  Vitals: /73   Pulse 83   GEN: This is a well developed, well-nourished female in no acute distress, in a pleasant mood.    SKIN: Focused examination of the scalp, face, nails was performed.  - LPPAI score of 0.67 for mild perifollicular scale and pain  - follicular accentuation on frontal scalp   -Large area of alopecia on the central scalp, some hair regrowth around the perimeter.  - crusting and scale at the mid frontal scalp along perimeter of the alopecia patch  - thinning in the posterior parietal scalp    -scalp is tender to touch and pain was illicited by moving hair on exam  - eyebrows and lashes are full  - nails have normal morphology   -No other lesions of concern on areas examined.                              Impression/Plan:  1. Lichen Planopilaris -  Stable    Kenalog intralesional injection procedure note (performed with faculty): After verbal consent and discussion of risks including but not limited to atrophy, pain, and bruising,  time out was performed, 1.6 total cc of Kenalog 10 mg/cc was injected into 16 sites on the scalp.  The patient tolerated the procedure well and left the Dermatology clinic in good condition.    Continue dermasmooth FS at least weekly    Continue plaquenil 200 mg BID    Continue DHS zinc shampoo every other day    Can also used Vaseline on scalp.      Follow-up in 6 weeks, earlier for new or changing lesions.     Staff Involved:  Luc Villalpando MD  Dermatology Research Fellow     Patient was seen and examined with the clinical research fellow. I agree with the history, review of systems, physical examination, assessments and plan. ILK injections were done together.    Barbara Soto MD  Professor and  Chair  Department of Dermatology  [facility]         Drug Administration Record    Prior to injection, verified patient identity using patient's name and date of birth.  Due to injection administration, patient instructed to remain in clinic for 15 minutes  afterwards, and to report any adverse reaction to me immediately.    Drug Name: triamcinolone acetonide(kenalog)  Dose: 2mL of triamcinolone 10mg/mL, 20mg dose  Route administered: ID  NDC #: [id number]: Kenalog-10 (0962-7022-08)  Amount of waste(mL):3mL  Reason for waste: Single use vial    LOT #: [id number]  SITE: scalp  : [id number]  EXPIRATION DATE: 09/2020      Again, thank you for allowing me to participate in the care of your patient.      Sincerely,    Barbara Soto MD

## 2019-05-15 DIAGNOSIS — L66.10 LICHEN PLANOPILARIS: Primary | ICD-10-CM

## 2019-05-19 NOTE — TELEPHONE ENCOUNTER
hydroxychloroquine (PLAQUENIL) 200 MG tablet  Last Written Prescription Date:  3/8/19  Last Fill Quantity: 60,   # refills: 1  Last Office Visit : 5/7/19  Future Office visit:  6/18/19    Eye exam: not found    Routing refill request to provider for review/approval because: not found.

## 2019-05-20 RX ORDER — HYDROXYCHLOROQUINE SULFATE 200 MG/1
200 TABLET, FILM COATED ORAL 2 TIMES DAILY
Qty: 60 TABLET | Refills: 1 | Status: SHIPPED | OUTPATIENT
Start: 2019-05-20 | End: 2019-07-31

## 2019-05-20 NOTE — TELEPHONE ENCOUNTER
Received refill request for hydroxychloroquine as the resident on call. Reviewed patient's chart and attached communication. Patient last seen 5/7/19 for LPP. RTC scheduled for 6/18/19. After reviewing the medication list and assessment and plan from last visit, the refill request was accepted. Will route to Dr. Soto to remind pt at next minute about eye exam for Plaquenil.     Cullen Parnell MD  Dermatology Resident, PGY4  Pager: [phone]

## 2019-05-30 ENCOUNTER — TELEPHONE (OUTPATIENT)
Dept: DERMATOLOGY | Facility: CLINIC | Age: 74
End: 2019-05-30

## 2019-05-30 NOTE — TELEPHONE ENCOUNTER
Prior Authorization Retail Medication Request    Medication/Dose: Tretinoin 0.01% gel  ICD code (if different than what is on RX): Acne vulgaris  Previously Tried and Failed:    Rationale:      Insurance Name:  Medicare  Insurance ID:        Pharmacy Information (if different than what is on RX)  Name:  [facility]   Phone:  [phone]

## 2019-05-31 NOTE — TELEPHONE ENCOUNTER
Prior Authorization Approval    Authorization Effective Date: 3/2/2019  Authorization Expiration Date: 5/30/2020  Medication: Tretinoin 0.01% gel - approved  Approved Dose/Quantity:   Reference #:     Insurance Company: [employer] - Phone [phone] Fax [phone]  Expected CoPay: n/a     CoPay Card Available:      Foundation Assistance Needed:    Which Pharmacy is filling the prescription (Not needed for infusion/clinic administered): [facility] [address] [place] [address]  Pharmacy Notified: Yes  Patient Notified: Yes    **Instructed pharmacy to notify patient when script is ready to /ship.**

## 2019-05-31 NOTE — TELEPHONE ENCOUNTER
Central Prior Authorization Team   Phone: [phone]    PA Initiation    Medication: Tretinoin 0.01% gel  Insurance Company: [employer] - Phone [phone] Fax [phone]  Pharmacy Filling the Rx: [facility] [address] [place] [address] [address]  Filling Pharmacy Phone: [phone]  Filling Pharmacy Fax:    Start Date: 5/31/2019

## 2019-06-18 ENCOUNTER — OFFICE VISIT (OUTPATIENT)
Dept: DERMATOLOGY | Facility: CLINIC | Age: 74
End: 2019-06-18
Payer: MEDICARE

## 2019-06-18 VITALS — DIASTOLIC BLOOD PRESSURE: 56 MMHG | HEART RATE: 93 BPM | SYSTOLIC BLOOD PRESSURE: 125 MMHG

## 2019-06-18 DIAGNOSIS — L66.10 LICHEN PLANOPILARIS: ICD-10-CM

## 2019-06-18 DIAGNOSIS — Z51.81 MEDICATION MONITORING ENCOUNTER: Primary | ICD-10-CM

## 2019-06-18 DIAGNOSIS — Z51.81 MEDICATION MONITORING ENCOUNTER: ICD-10-CM

## 2019-06-18 LAB
ALBUMIN SERPL-MCNC: 3.8 G/DL (ref 3.4–5)
ALP SERPL-CCNC: 99 U/L (ref 40–150)
ALT SERPL W P-5'-P-CCNC: 23 U/L (ref 0–50)
ANION GAP SERPL CALCULATED.3IONS-SCNC: 5 MMOL/L (ref 3–14)
AST SERPL W P-5'-P-CCNC: 25 U/L (ref 0–45)
BASOPHILS # BLD AUTO: 0 10E9/L (ref 0–0.2)
BASOPHILS NFR BLD AUTO: 0.9 %
BILIRUB SERPL-MCNC: 0.5 MG/DL (ref 0.2–1.3)
BUN SERPL-MCNC: 16 MG/DL (ref 7–30)
CALCIUM SERPL-MCNC: 8.9 MG/DL (ref 8.5–10.1)
CHLORIDE SERPL-SCNC: 105 MMOL/L (ref 94–109)
CO2 SERPL-SCNC: 30 MMOL/L (ref 20–32)
CREAT SERPL-MCNC: 0.67 MG/DL (ref 0.52–1.04)
DIFFERENTIAL METHOD BLD: ABNORMAL
EOSINOPHIL # BLD AUTO: 0.3 10E9/L (ref 0–0.7)
EOSINOPHIL NFR BLD AUTO: 7.9 %
ERYTHROCYTE [DISTWIDTH] IN BLOOD BY AUTOMATED COUNT: 13.4 % (ref 10–15)
GFR SERPL CREATININE-BSD FRML MDRD: 88 ML/MIN/{1.73_M2}
GLUCOSE SERPL-MCNC: 114 MG/DL (ref 70–99)
HCT VFR BLD AUTO: 37.9 % (ref 35–47)
HGB BLD-MCNC: 12.2 G/DL (ref 11.7–15.7)
IMM GRANULOCYTES # BLD: 0 10E9/L (ref 0–0.4)
IMM GRANULOCYTES NFR BLD: 0 %
LYMPHOCYTES # BLD AUTO: 2 10E9/L (ref 0.8–5.3)
LYMPHOCYTES NFR BLD AUTO: 46 %
MCH RBC QN AUTO: 32.3 PG (ref 26.5–33)
MCHC RBC AUTO-ENTMCNC: 32.2 G/DL (ref 31.5–36.5)
MCV RBC AUTO: 100 FL (ref 78–100)
MONOCYTES # BLD AUTO: 0.5 10E9/L (ref 0–1.3)
MONOCYTES NFR BLD AUTO: 11.3 %
NEUTROPHILS # BLD AUTO: 1.5 10E9/L (ref 1.6–8.3)
NEUTROPHILS NFR BLD AUTO: 33.9 %
NRBC # BLD AUTO: 0 10*3/UL
NRBC BLD AUTO-RTO: 0 /100
PLATELET # BLD AUTO: 131 10E9/L (ref 150–450)
POTASSIUM SERPL-SCNC: 3.7 MMOL/L (ref 3.4–5.3)
PROT SERPL-MCNC: 6.9 G/DL (ref 6.8–8.8)
RBC # BLD AUTO: 3.78 10E12/L (ref 3.8–5.2)
SODIUM SERPL-SCNC: 140 MMOL/L (ref 133–144)
WBC # BLD AUTO: 4.3 10E9/L (ref 4–11)

## 2019-06-18 ASSESSMENT — PAIN SCALES - GENERAL: PAINLEVEL: MODERATE PAIN (4)

## 2019-06-18 NOTE — LETTER
6/18/2019       RE: Ester Barba  [address] [place] [address]     Dear Colleague,    Thank you for referring your patient, Ester Barba, to the [facility] DERMATOLOGY at [facility] Please see a copy of my visit note below.    [facility] Dermatology Note      Dermatology Problem List:  1. Lichen planopilaris.              -Currently using Derma-Smoothe/FS 0.01% oil 1x/week, DHS zinc shampoo 3x/week, and plaquenil 200 mg BID  (safety labs last completed 6/18/2019).    -Receives ILK about every 5 weeks, last injection 6/18/2019.  2. Hx of NMSC              -BCC on right nasal ala s/p Mohs 11/27/17   -Nodular BCC on the vertex scalp s/p Mohs 6/5/15              -SCC of scalp, s/p Mohs 4/18/2014              -Hx of 1-2 other NMSC on scalp (BCCs)  3. Verrucous keratosis on right forearm, s/p shave biopsy on 12/20/18  4. Acne vulgaris              -  Irregularly using OTC Differin gel.      Encounter Date: Jun 18, 2019    CC:  Chief Complaint   Patient presents with     Derm Problem     Ester is here for a hairloss follow upm states it's the same.        History of Present Illness:  Ms. Ester Barba is a 73 year old female who presents as a follow-up for lichen planopilaris. The patient was last seen 5/7/19 when she was treated with ILK injections on the scalp. Today she feels that things are the same. The sides of the scalp are tender and itchy and she notes some crusting in the anterior scalp region.  She has intermittent pain of the scalp.    She continues to use Derma-Smoothe/FS 0.01% oil 1x/week, DHS zinc shampoo 3x/week, and plaquenil 200 mg BID. She saw her ophthalmologist who saw no signs of plaquenil toxicity. Her last safety labs for plaquenil were completed 10/2/18. Additionally she uses Advil and Tylenol for headaches as needed and Claritin daily for allergies. She notes that her post-concussion physical therapy has dramatically improved her  headaches.    She was unable to  the tretinoin 1% from the pharmacy as it wasn't covered by insurance so she bought OTC Differin gel. However, she has not used it regularly due to concerns about use with sun exposure.      Past Medical History:   Patient Active Problem List   Diagnosis     Porokeratosis     Squamous cell carcinoma     Neoplasm of uncertain behavior     Lichen planopilaris     Dermatitis     Cicatricial alopecia     Keratosis, inflamed seborrheic     History of skin cancer     Basal cell carcinoma of vertex scalp s/p mohs 6-5-15     Medication management     Actinic keratosis     Medication monitoring encounter     Dermatitis, seborrheic     Erosive pustular dermatosis     Tick bite, initial encounter     Vulvar atrophy     Factor V Leiden mutation (H)     Splenic artery aneurysm (H)     Past Medical History:   Diagnosis Date     Basal cell carcinoma      Squamous cell carcinoma      Past Surgical History:   Procedure Laterality Date     BIOPSY OF SKIN LESION       MOHS MICROGRAPHIC PROCEDURE       NO HISTORY OF SURGERY  2/17/14    derm     SMALL BOWEL RESECTION  11/2015    diverticulitis       Social History:  Patient reports that she quit smoking about 54 years ago. She has never used smokeless tobacco. She reports that she drinks alcohol. She reports that she does not use drugs.    Family History:  Family History   Problem Relation Age of Onset     Skin Cancer Sister         basal cell carcinoma     Melanoma No family hx of        Medications:  Current Outpatient Medications   Medication Sig Dispense Refill     Calcium 1500 MG TABS Take  by mouth.       Cholecalciferol (VITAMIN D) 1000 UNIT capsule Take  by mouth. Total 2200 units daily.       desonide (DESOWEN) 0.05 % cream Mix 50/50 with ketoconazole and apply twice daily to irritated area for 3 weeks 60 g 1     fluconazole (DIFLUCAN) 200 MG tablet Take 1 tablet (200 mg) by mouth daily 45 tablet 4     Fluocinolone Acetonide Scalp  (DERMA-SMOOTHE/FS SCALP) 0.01 % OIL oil Apply once a week to scalp for lichen planopilaris 118 mL 11     hydroxychloroquine (PLAQUENIL) 200 MG tablet Take 1 tablet (200 mg) by mouth 2 times daily 60 tablet 1     hydroxychloroquine (PLAQUENIL) 200 MG tablet Take 1 tablet (200 mg) by mouth 2 times daily 60 tablet 1     hydroxychloroquine (PLAQUENIL) 200 MG tablet TAKE 1 TABLET(200 MG) BY MOUTH TWICE DAILY 180 tablet 0     hydroxychloroquine (PLAQUENIL) 200 MG tablet Take 1 tablet (200 mg) by mouth 2 times daily 60 tablet 0     hydroxychloroquine (PLAQUENIL) 200 MG tablet TAKE 1 TABLET(200 MG) BY MOUTH TWICE DAILY 180 tablet 0     ibuprofen (ADVIL,MOTRIN) 800 MG tablet Take 800 mg by mouth every 8 hours as needed.       ketoconazole (NIZORAL) 2 % cream Mix 50/50 with desonide and apply twice daily to irritated area for 3 weeks 30 g 1     Loratadine (CLARITIN PO) Take  by mouth.       NEW MED Biest 1.5mg/gram cream(pg free)  Insert 1 gram vaginally twice weekly as directed 40 g 6     tretinoin (RETIN-A) 0.01 % external gel Use a pea size amount over entire face, concentrating on problem area, every other day at bedtime. 15 g 0     triamcinolone acetonide (KENALOG) 10 MG/ML injection See med note 5 mL 0     triamcinolone acetonide (KENALOG) 10 MG/ML injection See med note 5 mL 0     Allergies   Allergen Reactions     Dust Mites Other (See Comments)     Sneezing, coughing. asthma     Levaquin [Levofloxacin Hemihydrate] Other (See Comments)     Muscle weakness     Mold Other (See Comments)     Sneezing, asthma, weezing     Pollen Extract/Tree Extract Other (See Comments)     Sneezing, weezing     Sulfa Drugs Hives     Penicillins Rash         Review of Systems:  -Skin Establ Pt: The patient denies any new rash, pruritus, or lesions that are symptomatic, changing or bleeding, except as per HPI.  -Constitutional: Otherwise feeling well today, in usual state of health.  -HEENT: Patient denies nonhealing oral sores.  -Skin: As  above in HPI. No additional skin concerns.    Physical exam:  Vitals: /56 (BP Location: Right arm, Patient Position: Chair, Cuff Size: Adult Regular)   Pulse 93   GEN: This is a well developed, well-nourished female in no acute distress, in a pleasant mood.    SKIN: Focused examination of the scalp and face was performed.  -LPPAI score of 0.67 for pain and perifollicular scale  -Follicular accentuation on the frontal scalp.  -Large area of alopecia on the central scalp.  -Perifollicular scale in the vertex scalp.  -Less perifollicular erythema compared to previous visit photography.  -Eyebrows and lashes are full.  -Scalp is tender to touch, particularly areas in the hair anterior to the alopecia patch.  - Minimal crusting noted today  -No other lesions of concern on areas examined.     Impression/Plan:  1. Lichen Planopilaris -  Stable    Kenalog intralesional injection procedure note (performed with faculty): After verbal consent and discussion of risks including but not limited to atrophy, pain, and bruising, time out was performed, 1.0 total cc of Kenalog 10 mg/cc was injected into 10  sites on the scalp.  The patient tolerated the procedure well and left the Dermatology clinic in good condition. ILK injections were primarily done by me.    Continue dermasmooth FS at least weekly.    Continue plaquenil 200 mg BID .    Continue DHS zinc shampoo every other day.    Can also used Vaseline on scalp.    Safety labs (CMP and CBC with platelets) to be completed today (last completed 10/2/18).    Return in 5 weeks.    2.    History of BCC and SCC    Discussed considering a visit for a full skin exam due to history of multiple BCC and SCC.    CC Fernando Weathers MD  [facility]  [address] [place] [address] on close of this encounter.  Follow-up in 5 weeks, earlier for new or changing lesions.     Staff Involved:  I, Nat Smiley, MS3, saw and examined the patient in the presence of Dr. Jimenez  Charles.      Staff Physician:  I was present with the medical student who participated in the service and in the documentation of the note. I have verified the history and personally performed the physical exam and medical decision making. I agree with the assessment and plan of care as documented in the note.       Barbara Soto MD  Professor and Chair  Department of Dermatology  [facility]: Phone: [phone], Fax:[phone]  [facility]: Phone: [phone], Fax: [phone]                                     Pictures were placed in Pt's chart today for future reference.      Again, thank you for allowing me to participate in the care of your patient.      Sincerely,    Barbara Soto MD

## 2019-06-18 NOTE — PROGRESS NOTES
[facility] Dermatology Note      Dermatology Problem List:  1. Lichen planopilaris.              -Currently using Derma-Smoothe/FS 0.01% oil 1x/week, DHS zinc shampoo 3x/week, and plaquenil 200 mg BID (safety labs last completed 6/18/2019).    -Receives ILK about every 5 weeks, last injection 6/18/2019.  2. Hx of NMSC              -BCC on right nasal ala s/p Mohs 11/27/17   -Nodular BCC on the vertex scalp s/p Mohs 6/5/15              -SCC of scalp, s/p Mohs 4/18/2014              -Hx of 1-2 other NMSC on scalp (BCCs)  3. Verrucous keratosis on right forearm, s/p shave biopsy on 12/20/18  4. Acne vulgaris              - Irregularly using OTC Differin gel.      Encounter Date: Jun 18, 2019    CC:  Chief Complaint   Patient presents with     Derm Problem     Ester is here for a hairloss follow upm states it's the same.        History of Present Illness:  Ms. Ester Barba is a 73 year old female who presents as a follow-up for lichen planopilaris. The patient was last seen 5/7/19 when she was treated with ILK injections on the scalp. Today she feels that things are the same. The sides of the scalp are tender and itchy and she notes some crusting in the anterior scalp region.  She has intermittent pain of the scalp.    She continues to use Derma-Smoothe/FS 0.01% oil 1x/week, DHS zinc shampoo 3x/week, and plaquenil 200 mg BID. She saw her ophthalmologist who saw no signs of plaquenil toxicity. Her last safety labs for plaquenil were completed 10/2/18. Additionally she uses Advil and Tylenol for headaches as needed and Claritin daily for allergies. She notes that her post-concussion physical therapy has dramatically improved her headaches.    She was unable to  the tretinoin 1% from the pharmacy as it wasn't covered by insurance so she bought OTC Differin gel. However, she has not used it regularly due to concerns about use with sun exposure.      Past Medical History:   Patient Active Problem  List   Diagnosis     Porokeratosis     Squamous cell carcinoma     Neoplasm of uncertain behavior     Lichen planopilaris     Dermatitis     Cicatricial alopecia     Keratosis, inflamed seborrheic     History of skin cancer     Basal cell carcinoma of vertex scalp s/p mohs 6-5-15     Medication management     Actinic keratosis     Medication monitoring encounter     Dermatitis, seborrheic     Erosive pustular dermatosis     Tick bite, initial encounter     Vulvar atrophy     Factor V Leiden mutation (H)     Splenic artery aneurysm (H)     Past Medical History:   Diagnosis Date     Basal cell carcinoma      Squamous cell carcinoma      Past Surgical History:   Procedure Laterality Date     BIOPSY OF SKIN LESION       MOHS MICROGRAPHIC PROCEDURE       NO HISTORY OF SURGERY  2/17/14    derm     SMALL BOWEL RESECTION  11/2015    diverticulitis       Social History:  Patient reports that she quit smoking about 54 years ago. She has never used smokeless tobacco. She reports that she drinks alcohol. She reports that she does not use drugs.    Family History:  Family History   Problem Relation Age of Onset     Skin Cancer Sister         basal cell carcinoma     Melanoma No family hx of        Medications:  Current Outpatient Medications   Medication Sig Dispense Refill     Calcium 1500 MG TABS Take  by mouth.       Cholecalciferol (VITAMIN D) 1000 UNIT capsule Take  by mouth. Total 2200 units daily.       desonide (DESOWEN) 0.05 % cream Mix 50/50 with ketoconazole and apply twice daily to irritated area for 3 weeks 60 g 1     fluconazole (DIFLUCAN) 200 MG tablet Take 1 tablet (200 mg) by mouth daily 45 tablet 4     Fluocinolone Acetonide Scalp (DERMA-SMOOTHE/FS SCALP) 0.01 % OIL oil Apply once a week to scalp for lichen planopilaris 118 mL 11     hydroxychloroquine (PLAQUENIL) 200 MG tablet Take 1 tablet (200 mg) by mouth 2 times daily 60 tablet 1     hydroxychloroquine (PLAQUENIL) 200 MG tablet Take 1 tablet (200 mg) by  mouth 2 times daily 60 tablet 1     hydroxychloroquine (PLAQUENIL) 200 MG tablet TAKE 1 TABLET(200 MG) BY MOUTH TWICE DAILY 180 tablet 0     hydroxychloroquine (PLAQUENIL) 200 MG tablet Take 1 tablet (200 mg) by mouth 2 times daily 60 tablet 0     hydroxychloroquine (PLAQUENIL) 200 MG tablet TAKE 1 TABLET(200 MG) BY MOUTH TWICE DAILY 180 tablet 0     ibuprofen (ADVIL,MOTRIN) 800 MG tablet Take 800 mg by mouth every 8 hours as needed.       ketoconazole (NIZORAL) 2 % cream Mix 50/50 with desonide and apply twice daily to irritated area for 3 weeks 30 g 1     Loratadine (CLARITIN PO) Take  by mouth.       NEW MED Biest 1.5mg/gram cream(pg free)  Insert 1 gram vaginally twice weekly as directed 40 g 6     tretinoin (RETIN-A) 0.01 % external gel Use a pea size amount over entire face, concentrating on problem area, every other day at bedtime. 15 g 0     triamcinolone acetonide (KENALOG) 10 MG/ML injection See med note 5 mL 0     triamcinolone acetonide (KENALOG) 10 MG/ML injection See med note 5 mL 0     Allergies   Allergen Reactions     Dust Mites Other (See Comments)     Sneezing, coughing. asthma     Levaquin [Levofloxacin Hemihydrate] Other (See Comments)     Muscle weakness     Mold Other (See Comments)     Sneezing, asthma, weezing     Pollen Extract/Tree Extract Other (See Comments)     Sneezing, weezing     Sulfa Drugs Hives     Penicillins Rash         Review of Systems:  -Skin Establ Pt: The patient denies any new rash, pruritus, or lesions that are symptomatic, changing or bleeding, except as per HPI.  -Constitutional: Otherwise feeling well today, in usual state of health.  -HEENT: Patient denies nonhealing oral sores.  -Skin: As above in HPI. No additional skin concerns.    Physical exam:  Vitals: /56 (BP Location: Right arm, Patient Position: Chair, Cuff Size: Adult Regular)   Pulse 93   GEN: This is a well developed, well-nourished female in no acute distress, in a pleasant mood.    SKIN: Focused  examination of the scalp and face was performed.  -LPPAI score of 0.67 for pain and perifollicular scale  -Follicular accentuation on the frontal scalp.  -Large area of alopecia on the central scalp.  -Perifollicular scale in the vertex scalp.  -Less perifollicular erythema compared to previous visit photography.  -Eyebrows and lashes are full.  -Scalp is tender to touch, particularly areas in the hair anterior to the alopecia patch.  - Minimal crusting noted today  -No other lesions of concern on areas examined.     Impression/Plan:  1. Lichen Planopilaris - Stable    Kenalog intralesional injection procedure note (performed with faculty): After verbal consent and discussion of risks including but not limited to atrophy, pain, and bruising, time out was performed, 1.0 total cc of Kenalog 10 mg/cc was injected into 10  sites on the scalp.  The patient tolerated the procedure well and left the Dermatology clinic in good condition. ILK injections were primarily done by me.    Continue dermasmooth FS at least weekly.    Continue plaquenil 200 mg BID.    Continue DHS zinc shampoo every other day.    Can also used Vaseline on scalp.    Safety labs (CMP and CBC with platelets) to be completed today (last completed 10/2/18).    Return in 5 weeks.    2.    History of BCC and SCC    Discussed considering a visit for a full skin exam due to history of multiple BCC and SCC.    CC Fernando Weathers MD  [facility]  [address] [place] [id number] on close of this encounter.  Follow-up in 5 weeks, earlier for new or changing lesions.     Staff Involved:  I, Nat Smiley, MS3, saw and examined the patient in the presence of Dr. Barbara Soto.      Staff Physician:  I was present with the medical student who participated in the service and in the documentation of the note. I have verified the history and personally performed the physical exam and medical decision making. I agree with the assessment and plan of care  as documented in the note.       Barbara Soto MD  Professor and Chair  Department of Dermatology  [facility]: Phone: [phone], Fax:[phone]  [facility]: Phone: [phone], Fax: [phone]

## 2019-06-18 NOTE — NURSING NOTE
Dermatology Rooming Note    Ester Barba's goals for this visit include:   Chief Complaint   Patient presents with     Derm Problem     Ester is here for a hairloss follow upm states it's the same.        Amber Flowers LPN

## 2019-07-06 NOTE — LETTER
"1/27/2017      RE: sEter Barba  [address] [place] [address]       [facility] Dermatology Note      Dermatology Problem List:  1.***    CC:   Chief Complaint   Patient presents with     Derm Problem     Patient comes to clinic today for LPP. States \"it's worse.\"         Encounter Date: Jan 27, 2017    History of Present Illness:  Ms. Ester Barba is a 71 year old female who {hpi:387374}    Fewer crusts   Had sinus infection - treated with doxy for 10 days   - much better    Past Medical History:   Patient Active Problem List   Diagnosis     Porokeratosis     Squamous cell carcinoma (H)     Neoplasm of uncertain behavior     Lichen planopilaris     Dermatitis     Cicatricial alopecia     Keratosis, inflamed seborrheic     History of skin cancer     Vaginitis and vulvovaginitis     Basal cell carcinoma of vertex scalp s/p mohs 6-5-15     Medication management     Actinic keratosis     Medication monitoring encounter     Past Medical History   Diagnosis Date     Squamous cell carcinoma (H)      Basal cell carcinoma      Past Surgical History   Procedure Laterality Date     Biopsy of skin lesion       No history of surgery  2/17/14     derm     Mohs micrographic procedure       Small bowel resection  11/2015     diverticulitis       Social History:  The patient {works:809543}. The patient {denies/admits to:051990} use of tanning beds.    Family History:  {Familyhxderm:392308}    Medications:  Current Outpatient Prescriptions   Medication Sig Dispense Refill     Fluocinolone Acetonide (DERMA-SMOOTHE/FS SCALP) 0.01 % OIL Apply once a week to scalp for lichen planopilaris 118 mL 2     hydroxychloroquine (PLAQUENIL) 200 MG tablet TAKE ONE TABLET BY MOUTH TWO TIMES A DAY 60 tablet 2     fluconazole (DIFLUCAN) 200 MG tablet Take 1 tablet (200 mg) by mouth daily 45 tablet 4     ketoconazole (NIZORAL) 2 % cream Mix 50/50 with desonide and apply twice daily to irritated area for 3 weeks 30 g 1     " desonide (DESOWEN) 0.05 % cream Mix 50/50 with ketoconazole and apply twice daily to irritated area for 3 weeks 60 g 1     NEW MED Biest 1.5mg/gram cream(pg free)  Insert 1 gram vaginally every week as directed 40 g 6     diphenhydrAMINE (BENADRYL) 25 MG capsule Take 25 mg by mouth every 6 hours as needed.       Calcium 1500 MG TABS Take  by mouth.       Loratadine (CLARITIN PO) Take  by mouth.       ibuprofen (ADVIL,MOTRIN) 800 MG tablet Take 800 mg by mouth every 8 hours as needed.       Cholecalciferol (VITAMIN D) 1000 UNIT capsule Take  by mouth. Total 2200 units daily.       Allergies   Allergen Reactions     Dust Mites Other (See Comments)     Sneezing, coughing. asthma     Levaquin [Levofloxacin Hemihydrate] Other (See Comments)     Muscle weakness     Mold Other (See Comments)     Sneezing, asthma, weezing     Pollen Extract/Tree Extract Other (See Comments)     Sneezing, weezing     Sulfa Drugs Hives     Penicillins Rash         Review of Systems:  -{ROS:184784}  -Constitutional: The patient denies fatigue, fevers, chills, unintended weight loss, and night sweats.  -HEENT: Patient denies nonhealing oral sores.  -Skin: As above in HPI. No additional skin concerns.    Physical exam:  Vitals: /68 mmHg  Pulse 67  GEN: {RFGeneralAppearance:993944}   SKIN: {Skin Exam:326187}  -{Skin Exam Derm:196047}  -{Skin Exam Derm:592262}  -{Skin Exam Derm:100731}  -No other lesions of concern on areas examined.     Impression/Plan:  1. {Diagnosesderm:174905}    {rfplan:045741}    ***    2. {Diagnosesderm:008067}    {rfplan:396839}    ***    3. {Diagnosesderm:688645}    {rfplan:729445}    ***    4. {Diagnosesderm:370783}    {rfplan:246643}    ***    CC  *** on close of this encounter.  Follow-up {rfmultiple:456999} {follow up in days/weeks/months:269233}.       Staff Involved:  Staff Only        Pictures taken of patient today to be placed in chart for future reference.      Barbara Soto MD     Left arm;

## 2019-07-19 ENCOUNTER — OFFICE VISIT (OUTPATIENT)
Dept: DERMATOLOGY | Facility: CLINIC | Age: 74
End: 2019-07-19
Payer: MEDICARE

## 2019-07-19 VITALS — SYSTOLIC BLOOD PRESSURE: 110 MMHG | HEART RATE: 71 BPM | DIASTOLIC BLOOD PRESSURE: 69 MMHG

## 2019-07-19 DIAGNOSIS — L30.9 DERMATITIS: ICD-10-CM

## 2019-07-19 DIAGNOSIS — Z85.828 HISTORY OF NONMELANOMA SKIN CANCER: ICD-10-CM

## 2019-07-19 DIAGNOSIS — L66.10 LICHEN PLANOPILARIS: Primary | ICD-10-CM

## 2019-07-19 NOTE — LETTER
7/19/2019       RE: Ester Barba  [address] [place] [address]     Dear Colleague,    Thank you for referring your patient, Ester Barba, to the [facility] DERMATOLOGY at [facility]. Please see a copy of my visit note below.    [facility] Dermatology Note      Dermatology Problem List:  1.. Lichen planopilaris.              -Currently using Derma-Smoothe/FS 0.01% oil 1 to 2x/week, DHS zinc shampoo 3x/week, and plaquenil 200 mg BID (safety labs last completed 6/18/2019).               -Receives ILK about every 5 weeks, last injection today, Jyuly 19, 2019  2. Hx of NMSC              -BCC on right nasal ala s/p Mohs 11/27/17              -Nodular BCC on the vertex scalp s/p Mohs 6/5/15              -SCC of scalp, s/p Mohs 4/18/2014              -Hx of 1-2 other NMSC on scalp (BCCs)  3. Verrucous keratosis on right forearm, s/p shave biopsy on 12/20/18  4. Acne vulgaris              - Irregularly using OTC Differin gel.          CC:   Chief Complaint   Patient presents with     Hair Loss     Patient is here today for a hairloss follow up.          Encounter Date: Jul 19, 2019    History of Present Illness:  Ms. Ester Barba is a 73 year old female who presents as a follow-up for LPP. Today she reports mild tenderness in both the left and right parietal scalp. Otherwise, she notes no burning, itching or pain.   Ms Barba continues to apply Derma-Smoothe/FS 0.01% oil 2x/week, use DHS zinc shampoo 3x/week, and take  plaquenil 200 mg BID (safety labs last completed 6/18/2019).      Today she also reports her post-concussion physical therapy has been very helpful and she is doing much better with headaches related to an injury sustained this past winter.        Past Medical History:   Patient Active Problem List   Diagnosis     Porokeratosis     Squamous cell carcinoma     Neoplasm of uncertain behavior     Lichen planopilaris     Dermatitis     Cicatricial  alopecia     Keratosis, inflamed seborrheic     History of skin cancer     Basal cell carcinoma of vertex scalp s/p mohs 6-5-15     Medication management     Actinic keratosis     Medication monitoring encounter     Dermatitis, seborrheic     Erosive pustular dermatosis     Tick bite, initial encounter     Vulvar atrophy     Factor V Leiden mutation (H)     Splenic artery aneurysm (H)     Past Medical History:   Diagnosis Date     Basal cell carcinoma      Squamous cell carcinoma      Past Surgical History:   Procedure Laterality Date     BIOPSY OF SKIN LESION       MOHS MICROGRAPHIC PROCEDURE       NO HISTORY OF SURGERY  2/17/14    derm     SMALL BOWEL RESECTION  11/2015    diverticulitis       Social History:  Patient reports that she quit smoking about 54 years ago. She has never used smokeless tobacco. She reports that she drinks alcohol. She reports that she does not use drugs.    Family History:  Family History   Problem Relation Age of Onset     Skin Cancer Sister         basal cell carcinoma     Melanoma No family hx of        Medications:  Current Outpatient Medications   Medication Sig Dispense Refill     Calcium 1500 MG TABS Take  by mouth.       Cholecalciferol (VITAMIN D) 1000 UNIT capsule Take  by mouth. Total 2200 units daily.       desonide (DESOWEN) 0.05 % cream Mix 50/50 with ketoconazole and apply twice daily to irritated area for 3 weeks 60 g 1     fluconazole (DIFLUCAN) 200 MG tablet Take 1 tablet (200 mg) by mouth daily 45 tablet 4     Fluocinolone Acetonide Scalp (DERMA-SMOOTHE/FS SCALP) 0.01 % OIL oil Apply once a week to scalp for lichen planopilaris 118 mL 11     hydroxychloroquine (PLAQUENIL) 200 MG tablet Take 1 tablet (200 mg) by mouth 2 times daily 60 tablet 1     hydroxychloroquine (PLAQUENIL) 200 MG tablet Take 1 tablet (200 mg) by mouth 2 times daily 60 tablet 1     hydroxychloroquine (PLAQUENIL) 200 MG tablet TAKE 1 TABLET(200 MG) BY MOUTH TWICE DAILY 180 tablet 0      hydroxychloroquine (PLAQUENIL) 200 MG tablet Take 1 tablet (200 mg) by mouth 2 times daily 60 tablet 0     hydroxychloroquine (PLAQUENIL) 200 MG tablet TAKE 1 TABLET(200 MG) BY MOUTH TWICE DAILY 180 tablet 0     ibuprofen (ADVIL,MOTRIN) 800 MG tablet Take 800 mg by mouth every 8 hours as needed.       ketoconazole (NIZORAL) 2 % cream Mix 50/50 with desonide and apply twice daily to irritated area for 3 weeks 30 g 1     Loratadine (CLARITIN PO) Take  by mouth.       NEW MED Biest 1.5mg/gram cream(pg free)  Insert 1 gram vaginally twice weekly as directed 40 g 6     tretinoin (RETIN-A) 0.01 % external gel Use a pea size amount over entire face, concentrating on problem area, every other day at bedtime. 15 g 0     triamcinolone acetonide (KENALOG) 10 MG/ML injection See med note 5 mL 0     triamcinolone acetonide (KENALOG) 10 MG/ML injection See med note 5 mL 0     Allergies   Allergen Reactions     Dust Mites Other (See Comments)     Sneezing, coughing. asthma     Levaquin [Levofloxacin Hemihydrate] Other (See Comments)     Muscle weakness     Mold Other (See Comments)     Sneezing, asthma, weezing     Pollen Extract/Tree Extract Other (See Comments)     Sneezing, weezing     Sulfa Drugs Hives     Penicillins Rash         Review of Systems:  -Constitutional: Otherwise feeling well today, in usual state of health.  -HEENT: Patient denies nonhealing oral sores.  -Skin: As above in HPI. No additional skin concerns.    Physical exam:  Vitals: /69 (BP Location: Right arm, Patient Position: Chair, Cuff Size: Adult Regular)   Pulse 71   GEN: This is a well developed, well-nourished female in no acute distress, in a pleasant mood.    SKIN: Sun-exposed skin, which includes the head/face, neck, both arms, digits, and/or nails was examined.   - Scalp hair pull tests are negative  - Central scalp area of scarring alopecia present  - Mild perifollicular erythema and scale noted at the periphery of this area and in scattered  areas centrally  - Minimal crusting noted  - Eyebrows and eyelashes are full  -No other lesions of concern on areas examined.     Impression/Plan:  1. LIchen plano-pilaris - stable    Continue dermasmooth FS at least weekly.    Continue plaquenil 200 mg BID.    Continue DHS zinc shampoo every other day.    Can also used Vaseline on scalp    ILK today   Kenalog intralesional injection procedure note: After verbal consent and discussion of risks including but not limited to atrophy, pain, and bruising, time out was performed, the patient underwent positioning, 1 total cc of Kenalog 10 mg/cc was injected into 10 sites on the scalp.  The patient tolerated the procedure well and left the Dermatology clinic in good condition.      Photographic documentation      2. HIstory of BCC and SCC    Need to review yearly schedule for full skin examinations    Follow-up in early November, 2019    CC Fernando Weathers MD  [facility]  [address] [place] [address] on close of this encounter.        Drug Administration Record    Prior to injection, verified patient identity using patient's name and date of birth.  Due to injection administration, patient instructed to remain in clinic for 15 minutes  afterwards, and to report any adverse reaction to me immediately.    Drug Name: triamcinolone acetonide(kenalog)  Dose: 1mL  Route administered: ID  NDC #: [id number]: Kenalog-10 (2644-8851-12)  Amount of waste(mL):4ML  Reason for waste: Single use vial or Multi dose vial    LOT #: [id number]  SITE: SCALP  : [employer]  EXPIRATION DATE: 1/2021      Again, thank you for allowing me to participate in the care of your patient.      Sincerely,    Barbara Soto MD

## 2019-07-19 NOTE — PROGRESS NOTES
[facility] Dermatology Note      Dermatology Problem List:  1.. Lichen planopilaris.              -Currently using Derma-Smoothe/FS 0.01% oil 1 to 2x/week, DHS zinc shampoo 3x/week, and plaquenil 200 mg BID (safety labs last completed 6/18/2019).               -Receives ILK about every 5 weeks, last injection today, Jyuly 19, 2019  2. Hx of NMSC              -BCC on right nasal ala s/p Mohs 11/27/17              -Nodular BCC on the vertex scalp s/p Mohs 6/5/15              -SCC of scalp, s/p Mohs 4/18/2014              -Hx of 1-2 other NMSC on scalp (BCCs)  3. Verrucous keratosis on right forearm, s/p shave biopsy on 12/20/18  4. Acne vulgaris              - Irregularly using OTC Differin gel.          CC:   Chief Complaint   Patient presents with     Hair Loss     Patient is here today for a hairloss follow up.          Encounter Date: Jul 19, 2019    History of Present Illness:  Ms. Ester Barba is a 73 year old female who presents as a follow-up for LPP. Today she reports mild tenderness in both the left and right parietal scalp. Otherwise, she notes no burning, itching or pain.   Ms Barba continues to apply Derma-Smoothe/FS 0.01% oil 2x/week, use DHS zinc shampoo 3x/week, and take  plaquenil 200 mg BID (safety labs last completed 6/18/2019).      Today she also reports her post-concussion physical therapy has been very helpful and she is doing much better with headaches related to an injury sustained this past winter.        Past Medical History:   Patient Active Problem List   Diagnosis     Porokeratosis     Squamous cell carcinoma     Neoplasm of uncertain behavior     Lichen planopilaris     Dermatitis     Cicatricial alopecia     Keratosis, inflamed seborrheic     History of skin cancer     Basal cell carcinoma of vertex scalp s/p mohs 6-5-15     Medication management     Actinic keratosis     Medication monitoring encounter     Dermatitis, seborrheic     Erosive pustular dermatosis      Tick bite, initial encounter     Vulvar atrophy     Factor V Leiden mutation (H)     Splenic artery aneurysm (H)     Past Medical History:   Diagnosis Date     Basal cell carcinoma      Squamous cell carcinoma      Past Surgical History:   Procedure Laterality Date     BIOPSY OF SKIN LESION       MOHS MICROGRAPHIC PROCEDURE       NO HISTORY OF SURGERY  2/17/14    derm     SMALL BOWEL RESECTION  11/2015    diverticulitis       Social History:  Patient reports that she quit smoking about 54 years ago. She has never used smokeless tobacco. She reports that she drinks alcohol. She reports that she does not use drugs.    Family History:  Family History   Problem Relation Age of Onset     Skin Cancer Sister         basal cell carcinoma     Melanoma No family hx of        Medications:  Current Outpatient Medications   Medication Sig Dispense Refill     Calcium 1500 MG TABS Take  by mouth.       Cholecalciferol (VITAMIN D) 1000 UNIT capsule Take  by mouth. Total 2200 units daily.       desonide (DESOWEN) 0.05 % cream Mix 50/50 with ketoconazole and apply twice daily to irritated area for 3 weeks 60 g 1     fluconazole (DIFLUCAN) 200 MG tablet Take 1 tablet (200 mg) by mouth daily 45 tablet 4     Fluocinolone Acetonide Scalp (DERMA-SMOOTHE/FS SCALP) 0.01 % OIL oil Apply once a week to scalp for lichen planopilaris 118 mL 11     hydroxychloroquine (PLAQUENIL) 200 MG tablet Take 1 tablet (200 mg) by mouth 2 times daily 60 tablet 1     hydroxychloroquine (PLAQUENIL) 200 MG tablet Take 1 tablet (200 mg) by mouth 2 times daily 60 tablet 1     hydroxychloroquine (PLAQUENIL) 200 MG tablet TAKE 1 TABLET(200 MG) BY MOUTH TWICE DAILY 180 tablet 0     hydroxychloroquine (PLAQUENIL) 200 MG tablet Take 1 tablet (200 mg) by mouth 2 times daily 60 tablet 0     hydroxychloroquine (PLAQUENIL) 200 MG tablet TAKE 1 TABLET(200 MG) BY MOUTH TWICE DAILY 180 tablet 0     ibuprofen (ADVIL,MOTRIN) 800 MG tablet Take 800 mg by mouth every 8 hours as  needed.       ketoconazole (NIZORAL) 2 % cream Mix 50/50 with desonide and apply twice daily to irritated area for 3 weeks 30 g 1     Loratadine (CLARITIN PO) Take  by mouth.       NEW MED Biest 1.5mg/gram cream(pg free)  Insert 1 gram vaginally twice weekly as directed 40 g 6     tretinoin (RETIN-A) 0.01 % external gel Use a pea size amount over entire face, concentrating on problem area, every other day at bedtime. 15 g 0     triamcinolone acetonide (KENALOG) 10 MG/ML injection See med note 5 mL 0     triamcinolone acetonide (KENALOG) 10 MG/ML injection See med note 5 mL 0     Allergies   Allergen Reactions     Dust Mites Other (See Comments)     Sneezing, coughing. asthma     Levaquin [Levofloxacin Hemihydrate] Other (See Comments)     Muscle weakness     Mold Other (See Comments)     Sneezing, asthma, weezing     Pollen Extract/Tree Extract Other (See Comments)     Sneezing, weezing     Sulfa Drugs Hives     Penicillins Rash         Review of Systems:  -Constitutional: Otherwise feeling well today, in usual state of health.  -HEENT: Patient denies nonhealing oral sores.  -Skin: As above in HPI. No additional skin concerns.    Physical exam:  Vitals: /69 (BP Location: Right arm, Patient Position: Chair, Cuff Size: Adult Regular)   Pulse 71   GEN: This is a well developed, well-nourished female in no acute distress, in a pleasant mood.    SKIN: Sun-exposed skin, which includes the head/face, neck, both arms, digits, and/or nails was examined.   - Scalp hair pull tests are negative  - Central scalp area of scarring alopecia present  - Mild perifollicular erythema and scale noted at the periphery of this area and in scattered areas centrally  - Minimal crusting noted  - Eyebrows and eyelashes are full  -No other lesions of concern on areas examined.     Impression/Plan:  1. LIchen plano-pilaris - stable    Continue dermasmooth FS at least weekly.    Continue plaquenil 200 mg BID.    Continue DHS zinc shampoo  every other day.    Can also used Vaseline on scalp    ILK today   Kenalog intralesional injection procedure note: After verbal consent and discussion of risks including but not limited to atrophy, pain, and bruising, time out was performed, the patient underwent positioning, 1 total cc of Kenalog 10 mg/cc was injected into 10 sites on the scalp.  The patient tolerated the procedure well and left the Dermatology clinic in good condition.      Photographic documentation      2. HIstory of BCC and SCC    Need to review yearly schedule for full skin examinations    Follow-up in early November, 2019    CC Fernando Weathers MD  [facility]  [address] [place] [address] on close of this encounter.

## 2019-07-19 NOTE — NURSING NOTE
Chief Complaint   Patient presents with     Hair Loss     Patient is here today for a hairloss follow up.      Joan Mensah CMA

## 2019-07-19 NOTE — PROGRESS NOTES
Drug Administration Record    Prior to injection, verified patient identity using patient's name and date of birth.  Due to injection administration, patient instructed to remain in clinic for 15 minutes  afterwards, and to report any adverse reaction to me immediately.    Drug Name: triamcinolone acetonide(kenalog)  Dose: 1mL  Route administered: ID  NDC #: [id number]: Kenalog-10 ([id number])  Amount of waste(mL):4ML  Reason for waste: Single use vial or Multi dose vial    LOT #: [id number]  SITE: SCALP  : [employer]  EXPIRATION DATE: 1/2021

## 2019-07-31 DIAGNOSIS — L66.10 LICHEN PLANOPILARIS: ICD-10-CM

## 2019-08-02 RX ORDER — HYDROXYCHLOROQUINE SULFATE 200 MG/1
TABLET, FILM COATED ORAL
Qty: 60 TABLET | Refills: 1 | Status: SHIPPED | OUTPATIENT
Start: 2019-08-02 | End: 2019-10-01

## 2019-08-02 NOTE — TELEPHONE ENCOUNTER
Medication requested:  PLAQUENIL  200 MG    Last refilled: 5/20/19  Qty: 60 : 1      Last seen:7/19/19  RTC: SUSHILK  Next appt: 11/4/19  Routing refill request to provider for review/approval because: UNSIGNED NOTE

## 2019-08-03 NOTE — TELEPHONE ENCOUNTER
Received refill request for Plaquenil as the resident on call. Reviewed patient's chart and attached communication. Patient last seen 7/2019 for LPP. RTC 11/2019. After reviewing the medication list and assessment and plan from last visit, the refill request was accepted.    Jessica Godwin MD  Dermatology PGY4  [phone]

## 2019-10-01 ENCOUNTER — HEALTH MAINTENANCE LETTER (OUTPATIENT)
Age: 74
End: 2019-10-01

## 2019-10-01 DIAGNOSIS — L66.10 LICHEN PLANOPILARIS: ICD-10-CM

## 2019-10-06 NOTE — TELEPHONE ENCOUNTER
"   hydroxychloroquine (PLAQUENIL) 200 MG tablet   Last Written Prescription Date:  8/2/19  Last Fill Quantity: 60,   # refills: 1  Last Office Visit : 7/19/19  Future Office visit:  11/4/19    Eye exam not found    7/19/19  \" plaquenil 200 mg BID (safety labs last completed 6/18/2019).\"   \"Continue plaquenil 200 mg BID.\"    Routing refill request to provider for review/approval because: not on protocol. eye exam not found  "

## 2019-10-07 RX ORDER — HYDROXYCHLOROQUINE SULFATE 200 MG/1
200 TABLET, FILM COATED ORAL 2 TIMES DAILY
Qty: 60 TABLET | Refills: 0 | Status: SHIPPED | OUTPATIENT
Start: 2019-10-07 | End: 2019-11-08

## 2019-10-07 NOTE — TELEPHONE ENCOUNTER
Reviewed chart. Patient last seen 07/2019 by Dr. Soto and is on plaquenil for hair loss. Safety labs at that time acceptable. Next apt is scheduled for 11/14/19. Will refill one additional month of plaquenil for patient.

## 2019-10-21 ENCOUNTER — TELEPHONE (OUTPATIENT)
Dept: OBGYN | Facility: CLINIC | Age: 74
End: 2019-10-21

## 2019-10-21 DIAGNOSIS — N90.5 VULVAR ATROPHY: ICD-10-CM

## 2019-10-21 DIAGNOSIS — Z53.9 ERRONEOUS ENCOUNTER--DISREGARD: Primary | ICD-10-CM

## 2019-10-21 NOTE — TELEPHONE ENCOUNTER
----- Message from Nat Douglas sent at 10/21/2019  1:15 PM CDT -----  Regarding: Compound Pharm auth asap  Gloria from [facility] needs approval asap for 'bygesterone cream? 1.5 mg asap.  Ester needs it and this is 2nd or 3rd request.  Pharmacy # [phone]

## 2019-10-21 NOTE — TELEPHONE ENCOUNTER
----- Message from Nat Douglas sent at 10/21/2019  1:15 PM CDT -----  Regarding: Compound Pharm auth asap  Gloria from [facility] needs approval asap for 'bygesterone cream? 1.5 mg asap.  Ester needs it and this is 2nd or 3rd request.  Pharmacy # [phone]

## 2019-10-21 NOTE — TELEPHONE ENCOUNTER
Received refill request for biest compound.  Last in clinic 10/2018.    Tried to reach Ester but received voicemail.  Left message that refill request was received and a short-term month supply can be sent to pharmacy but office visit is due for further refills. Please call [phone] to schedule.

## 2019-10-23 ENCOUNTER — OFFICE VISIT (OUTPATIENT)
Dept: OBGYN | Facility: CLINIC | Age: 74
End: 2019-10-23
Attending: OBSTETRICS & GYNECOLOGY
Payer: MEDICARE

## 2019-10-23 ENCOUNTER — TELEPHONE (OUTPATIENT)
Dept: OBGYN | Facility: CLINIC | Age: 74
End: 2019-10-23

## 2019-10-23 VITALS
HEART RATE: 70 BPM | WEIGHT: 140.1 LBS | HEIGHT: 66 IN | SYSTOLIC BLOOD PRESSURE: 115 MMHG | DIASTOLIC BLOOD PRESSURE: 70 MMHG | BODY MASS INDEX: 22.52 KG/M2

## 2019-10-23 DIAGNOSIS — N90.5 VULVAR ATROPHY: Primary | ICD-10-CM

## 2019-10-23 ASSESSMENT — MIFFLIN-ST. JEOR: SCORE: 1162.24

## 2019-10-23 NOTE — TELEPHONE ENCOUNTER
Biest prescription refill faxed to the [facility] fax#[phone]

## 2019-10-23 NOTE — LETTER
10/23/2019       RE: Ester Barba  [address] [place] [address]     Dear Colleague,    Thank you for referring your patient, Ester Barba, [facility] Please see a copy of my visit note below.    Progress Note    SUBJECTIVE:  Ester Barba is an 73 year old  , who requests a pelvic exam.    Patient is followed by Jasiel for primary care.    Concerns today include: needs refills of vaginal topical compounded estrogen cream. We discussed risks on ongoing PM HRT including DVT and breast cancer but that her regimen is specifically weekly or twice weekly topical E1-E2 cream for vulvar atrophy and not technically systemic ERT although some may be absorbed systemically. States lichen sclerosis is stable and minimally symptomatic.     ALSO 20 fell while getting out of 's truck and suffered concussion.   Is still on protocol, PT, OT, visual therapy.     Menstrual History:  Post menopausal     HISTORY:  Prescription Medications as of 10/23/2019       Rx Number Disp Refills Start End Last Dispensed Date Next Fill Date Owning Pharmacy    Calcium 1500 MG TABS            Sig: Take  by mouth.    Class: Historical    Route: Oral    Cholecalciferol (VITAMIN D) 1000 UNIT capsule            Sig: Take  by mouth. Total 2200 units daily.    Class: Historical    Route: Oral    Fluocinolone Acetonide Scalp (DERMA-SMOOTHE/FS SCALP) 0.01 % OIL oil  118 mL 11 2018    [id number] [place] - [address] AT [facility] & [address] [place] [address]    Sig: Apply once a week to scalp for lichen planopilaris    Class: E-Prescribe    hydroxychloroquine (PLAQUENIL) 200 MG tablet  60 tablet 0 10/7/2019    [facility]  [place] - [address]    Sig: Take 1 tablet (200 mg) by mouth 2 times daily    Class: E-Prescribe    Notes to Pharmacy: Please keep appt 19    Route: Oral    ibuprofen (ADVIL,MOTRIN) 800  MG tablet            Sig: Take 800 mg by mouth every 8 hours as needed.    Class: Historical    Route: Oral    Loratadine (CLARITIN PO)            Sig: Take  by mouth.    Class: Historical    Route: Oral    NEW MED  40 g 0 10/21/2019    [facility]  [place], [place] - [address]    Sig: Biest 1.5mg/gram cream(pg free)  Insert 1 gram vaginally twice weekly as directed    Class: Local Print    triamcinolone acetonide (KENALOG) 10 MG/ML injection  5 mL 0 9/8/2018    [id number] [place], [place] - [address] AT [address]    Sig: See med note    Class: Injection    triamcinolone acetonide (KENALOG) 10 MG/ML injection  5 mL 0 11/19/2017    [facility] [address] [place] [address], [place] - [address] AT [address]    Sig: See med note    Class: Injection    COMPOUNDED NON-CONTROLLED SUBSTANCE (CMPD RX) - PHARMACY TO MIX COMPOUNDED MEDICATION            Sig: Place 1 Application vaginally    Class: Historical    Route: Vaginal    desonide (DESOWEN) 0.05 % cream  60 g 1 8/10/2015    [facility] [address] [place] [address] [address]    Sig: Mix 50/50 with ketoconazole and apply twice daily to irritated area for 3 weeks    Class: E-Prescribe    fluconazole (DIFLUCAN) 200 MG tablet  45 tablet 4 10/9/2015    [facility] [address]    Sig: Take 1 tablet (200 mg) by mouth daily    Class: E-Prescribe    Route: Oral    ketoconazole (NIZORAL) 2 % cream  30 g 1 7/14/2017    [facility] [address] [place] [address] - [address] AT [address]    Sig: Mix 50/50 with desonide and apply twice daily to irritated area for 3 weeks    Class: E-Prescribe    tretinoin (RETIN-A) 0.01 % external gel  15 g 0 4/9/2019    [facility] [address] [place] [address] [address]    Sig: Use a pea size amount over entire face, concentrating on problem area, every other day at bedtime.    Class: E-Prescribe    Prior  authorization:  Closed      Clinic-Administered Medications as of 10/23/2019       Dose Frequency Start End    triamcinolone acetonide (KENALOG-10) injection 10 mg 10 mg ONCE 2019     Route: Intra-Lesional    triamcinolone acetonide (KENALOG-10) injection 10 mg 10 mg ONCE 2019     Route: Intra-Lesional    triamcinolone acetonide (KENALOG-10) injection 20 mg 20 mg ONCE 2019     Route: Intra-Lesional        Allergies   Allergen Reactions     Dust Mites Other (See Comments)     Sneezing, coughing. asthma  Itchy watery eyes, sneezing     Estrogens      Other reaction(s): Hypersensitivity  Topical caused burning sensation of skin     Imiquimod Other (See Comments)     Hair loss     Levaquin [Levofloxacin Hemihydrate] Other (See Comments)     Muscle weakness     Levofloxacin Other (See Comments)     MUSCLE WEAKNESS, ARTHRITIS LIKE SYMPTOMS.       Mold Other (See Comments)     Sneezing, asthma, weezing     Pollen Extract/Tree Extract Other (See Comments)     Sneezing, weezing     Sulfa Drugs Hives     Sulfamethoxazole-Trimethoprim Hives     Latex Rash     LATEX BANDAGES; tapes adhesive     Penicillin G Rash     Penicillins Rash     Immunization History   Administered Date(s) Administered     Influenza (High Dose) 3 valent vaccine 10/01/2017, 10/08/2018     Influenza (IIV3) PF 10/08/2012, 2013, 10/20/2014, 10/21/2015     Pneumo Conj 13-V (2010&after) 10/30/2015     Pneumococcal 23 valent 2011     TDAP Vaccine (Adacel) 2010     Twinrix A/B 2005, 2005, 2006     Zoster vaccine, live 10/30/2015     OB History    Para Term  AB Living   2 2 2 0 0 2   SAB TAB Ectopic Multiple Live Births   0 0 0 0 0     Past Medical History:   Diagnosis Date     Basal cell carcinoma      Bilateral occipital neuralgia 2019     Concussion 2019     Squamous cell carcinoma      Past Surgical History:   Procedure Laterality Date     BIOPSY OF SKIN LESION       MOHS MICROGRAPHIC  PROCEDURE       NO HISTORY OF SURGERY  15    derm     SMALL BOWEL RESECTION  2015    diverticulitis     Family History   Problem Relation Age of Onset     Skin Cancer Sister         basal cell carcinoma     Melanoma No family hx of      Social History     Socioeconomic History     Marital status:      Spouse name: None     Number of children: None     Years of education: None     Highest education level: None   Occupational History     None   Social Needs     Financial resource strain: None     Food insecurity:     Worry: None     Inability: None     Transportation needs:     Medical: None     Non-medical: None   Tobacco Use     Smoking status: Former Smoker     Last attempt to quit: 1965     Years since quittin.7     Smokeless tobacco: Never Used   Substance and Sexual Activity     Alcohol use: Yes     Alcohol/week: 0.0 standard drinks     Comment: glass of wine     Drug use: No     Sexual activity: Not Currently     Birth control/protection: Post-menopausal   Lifestyle     Physical activity:     Days per week: None     Minutes per session: None     Stress: None   Relationships     Social connections:     Talks on phone: None     Gets together: None     Attends [employer] service: None     Active member of club or organization: None     Attends meetings of clubs or organizations: None     Relationship status: None     Intimate partner violence:     Fear of current or ex partner: None     Emotionally abused: None     Physically abused: None     Forced sexual activity: None   Other Topics Concern     Parent/sibling w/ CABG, MI or angioplasty before 65F 55M? Not Asked   Social History Narrative    How much exercise per week? 2 days a week    How much calcium per day? Everyday supplements and milk and cheese       How much caffeine per day? 2-3 cups    How much vitamin D per day? supplment    Do you/your family wear seatbelts?  Yes    Do you/your family use safety helmets? Yes    Do you/your  "family use sunscreen? Yes    Do you/your family keep firearms in the home? Yes    Do you/your family have a smoke detector(s)? Yes        Do you feel safe in your home? Yes    Has anyone ever touched you in an unwanted manner? No     Explain        ROS    EXAM:  Blood pressure 115/70, pulse 70, height 1.676 m (5' 6\"), weight 63.5 kg (140 lb 1.6 oz), not currently breastfeeding. Body mass index is 22.61 kg/m .  General appearance: Pleasant female in no acute distress.     BREAST EXAM:  Breast: Without visible skin changes. No dimpling or lesions seen.   Breasts supple, non-tender with palpation, no dominant mass, nodularity, or nipple discharge noted bilaterally. Axillary nodes negative.      PELVIC EXAM:  EG/BUS: Estrogen loss atrophy present. Normal genital architecture without lesions, erythema or abnormal secretions Bartholin's, Urethra, Harbor's normal   Urethral meatus: normal   Urethra: no masses, tenderness, or scarring   Bladder: no masses or tenderness   Vagina: atrophic, thin, dry with white and odorless  secretions  Cervix: no lesions and pale, dry, stenotic  Uterus: anteverted,  and small, smooth, firm, mobile w/o pain  Adnexa: Within normal limits and No masses, nodularity, tenderness  Rectum:anus normal       ASSESSMENT:  73 year old with atrophy of vulva on compounded topical estrogen  S/p concussion Jan 2019  DEXA 11/2018  Mammo was 10/2017  PLAN:   Refills sent  RTC 2 years or prn    Janeth Mayfield MD        "

## 2019-10-23 NOTE — PROGRESS NOTES
Progress Note    SUBJECTIVE:  Ester Barba is an 73 year old  , who requests a pelvic exam.    Patient is followed by [facility] for primary care.    Concerns today include: needs refills of vaginal topical compounded estrogen cream. We discussed risks on ongoing PM HRT including DVT and breast cancer but that her regimen is specifically weekly or twice weekly topical E1-E2 cream for vulvar atrophy and not technically systemic ERT although some may be absorbed systemically. States lichen sclerosis is stable and minimally symptomatic.     ALSO 20 fell while getting out of 's truck and suffered concussion.   Is still on protocol, PT, OT, visual therapy.     Menstrual History:  Post menopausal     HISTORY:  Prescription Medications as of 10/23/2019       Rx Number Disp Refills Start End Last Dispensed Date Next Fill Date Owning Pharmacy    Calcium 1500 MG TABS            Sig: Take  by mouth.    Class: Historical    Route: Oral    Cholecalciferol (VITAMIN D) 1000 UNIT capsule            Sig: Take  by mouth. Total 2200 units daily.    Class: Historical    Route: Oral    Fluocinolone Acetonide Scalp (DERMA-SMOOTHE/FS SCALP) 0.01 % OIL oil  118 mL 11 2018    [id number] - [place], [place] - [address] AT [facility] & [address] [place] [address]    Sig: Apply once a week to scalp for lichen planopilaris    Class: E-Prescribe    hydroxychloroquine (PLAQUENIL) 200 MG tablet  60 tablet 0 10/7/2019    [facility] [address] [place] [address] [address] [facility]    Sig: Take 1 tablet (200 mg) by mouth 2 times daily    Class: E-Prescribe    Notes to Pharmacy: Please keep appt 19    Route: Oral    ibuprofen (ADVIL,MOTRIN) 800 MG tablet            Sig: Take 800 mg by mouth every 8 hours as needed.    Class: Historical    Route: Oral    Loratadine (CLARITIN PO)            Sig: Take  by mouth.    Class: Historical    Route: Oral    NEW MED  40 g 0 10/21/2019    [facility] [place] - [address] [place] [address]  S    Sig: Biest 1.5mg/gram cream(pg free)  Insert 1 gram vaginally twice weekly as directed    Class: Local Print    triamcinolone acetonide (KENALOG) 10 MG/ML injection  5 mL 0 9/8/2018    [id number] [address] AT [address]    Sig: See med note    Class: Injection    triamcinolone acetonide (KENALOG) 10 MG/ML injection  5 mL 0 11/19/2017    [id number] [address] AT [address]    Sig: See med note    Class: Injection    COMPOUNDED NON-CONTROLLED SUBSTANCE (CMPD RX) - PHARMACY TO MIX COMPOUNDED MEDICATION            Sig: Place 1 Application vaginally    Class: Historical    Route: Vaginal    desonide (DESOWEN) 0.05 % cream  60 g 1 8/10/2015    [facility] [address] [place] [address] [address] [facility] [address]    Sig: Mix 50/50 with ketoconazole and apply twice daily to irritated area for 3 weeks    Class: E-Prescribe    fluconazole (DIFLUCAN) 200 MG tablet  45 tablet 4 10/9/2015    [facility] [address] [place] [address] [address]    Sig: Take 1 tablet (200 mg) by mouth daily    Class: E-Prescribe    Route: Oral    ketoconazole (NIZORAL) 2 % cream  30 g 1 7/14/2017    [facility] [address] [place] [address] [address] AT [address]    Sig: Mix 50/50 with desonide and apply twice daily to irritated area for 3 weeks    Class: E-Prescribe    tretinoin (RETIN-A) 0.01 % external gel  15 g 0 4/9/2019    [facility] [address] [place] [address] [address]    Sig: Use a pea size amount over entire face, concentrating on problem area, every other day at bedtime.    Class: E-Prescribe    Prior authorization:  Closed      Clinic-Administered Medications as of 10/23/2019       Dose Frequency Start End    triamcinolone acetonide (KENALOG-10) injection 10 mg 10 mg ONCE 8/27/2019     Route: Intra-Lesional    triamcinolone acetonide (KENALOG-10) injection 10 mg 10 mg ONCE 6/18/2019      Route: Intra-Lesional    triamcinolone acetonide (KENALOG-10) injection 20 mg 20 mg ONCE 2019     Route: Intra-Lesional        Allergies   Allergen Reactions     Dust Mites Other (See Comments)     Sneezing, coughing. asthma  Itchy watery eyes, sneezing     Estrogens      Other reaction(s): Hypersensitivity  Topical caused burning sensation of skin     Imiquimod Other (See Comments)     Hair loss     Levaquin [Levofloxacin Hemihydrate] Other (See Comments)     Muscle weakness     Levofloxacin Other (See Comments)     MUSCLE WEAKNESS, ARTHRITIS LIKE SYMPTOMS.       Mold Other (See Comments)     Sneezing, asthma, weezing     Pollen Extract/Tree Extract Other (See Comments)     Sneezing, weezing     Sulfa Drugs Hives     Sulfamethoxazole-Trimethoprim Hives     Latex Rash     LATEX BANDAGES; tapes adhesive     Penicillin G Rash     Penicillins Rash     Immunization History   Administered Date(s) Administered     Influenza (High Dose) 3 valent vaccine 10/01/2017, 10/08/2018     Influenza (IIV3) PF 10/08/2012, 2013, 10/20/2014, 10/21/2015     Pneumo Conj 13-V (2010&after) 10/30/2015     Pneumococcal 23 valent 2011     TDAP Vaccine (Adacel) 2010     Twinrix A/B 2005, 2005, 2006     Zoster vaccine, live 10/30/2015       OB History    Para Term  AB Living   2 2 2 0 0 2   SAB TAB Ectopic Multiple Live Births   0 0 0 0 0     Past Medical History:   Diagnosis Date     Basal cell carcinoma      Bilateral occipital neuralgia 2019     Concussion 2019     Squamous cell carcinoma      Past Surgical History:   Procedure Laterality Date     BIOPSY OF SKIN LESION       MOHS MICROGRAPHIC PROCEDURE       NO HISTORY OF SURGERY  15    derm     SMALL BOWEL RESECTION  2015    diverticulitis     Family History   Problem Relation Age of Onset     Skin Cancer Sister         basal cell carcinoma     Melanoma No family hx of      Social History     Socioeconomic History      Marital status:      Spouse name: None     Number of children: None     Years of education: None     Highest education level: None   Occupational History     None   Social Needs     Financial resource strain: None     Food insecurity:     Worry: None     Inability: None     Transportation needs:     Medical: None     Non-medical: None   Tobacco Use     Smoking status: Former Smoker     Last attempt to quit: 1965     Years since quittin.7     Smokeless tobacco: Never Used   Substance and Sexual Activity     Alcohol use: Yes     Alcohol/week: 0.0 standard drinks     Comment: glass of wine     Drug use: No     Sexual activity: Not Currently     Birth control/protection: Post-menopausal   Lifestyle     Physical activity:     Days per week: None     Minutes per session: None     Stress: None   Relationships     Social connections:     Talks on phone: None     Gets together: None     Attends Gnosticism service: None     Active member of club or organization: None     Attends meetings of clubs or organizations: None     Relationship status: None     Intimate partner violence:     Fear of current or ex partner: None     Emotionally abused: None     Physically abused: None     Forced sexual activity: None   Other Topics Concern     Parent/sibling w/ CABG, MI or angioplasty before 65F 55M? Not Asked   Social History Narrative    How much exercise per week? 2 days a week    How much calcium per day? Everyday supplements and milk and cheese       How much caffeine per day? 2-3 cups    How much vitamin D per day? supplment    Do you/your family wear seatbelts?  Yes    Do you/your family use safety helmets? Yes    Do you/your family use sunscreen? Yes    Do you/your family keep firearms in the home? Yes    Do you/your family have a smoke detector(s)? Yes        Do you feel safe in your home? Yes    Has anyone ever touched you in an unwanted manner? No     Explain        ROS    EXAM:  Blood pressure 115/70,  "pulse 70, height 1.676 m (5' 6\"), weight 63.5 kg (140 lb 1.6 oz), not currently breastfeeding. Body mass index is 22.61 kg/m .  General appearance: Pleasant female in no acute distress.     BREAST EXAM:  Breast: Without visible skin changes. No dimpling or lesions seen.   Breasts supple, non-tender with palpation, no dominant mass, nodularity, or nipple discharge noted bilaterally. Axillary nodes negative.      PELVIC EXAM:  EG/BUS: Estrogen loss atrophy present. 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

## 2019-11-04 ENCOUNTER — OFFICE VISIT (OUTPATIENT)
Dept: DERMATOLOGY | Facility: CLINIC | Age: 74
End: 2019-11-04
Payer: MEDICARE

## 2019-11-04 VITALS — DIASTOLIC BLOOD PRESSURE: 74 MMHG | HEART RATE: 80 BPM | SYSTOLIC BLOOD PRESSURE: 122 MMHG

## 2019-11-04 DIAGNOSIS — Z51.81 THERAPEUTIC DRUG MONITORING: Primary | ICD-10-CM

## 2019-11-04 DIAGNOSIS — Z51.81 THERAPEUTIC DRUG MONITORING: ICD-10-CM

## 2019-11-04 DIAGNOSIS — L66.10 LICHEN PLANOPILARIS: ICD-10-CM

## 2019-11-04 LAB
ALBUMIN SERPL-MCNC: 4.1 G/DL (ref 3.4–5)
ALP SERPL-CCNC: 97 U/L (ref 40–150)
ALT SERPL W P-5'-P-CCNC: 27 U/L (ref 0–50)
ANION GAP SERPL CALCULATED.3IONS-SCNC: 3 MMOL/L (ref 3–14)
AST SERPL W P-5'-P-CCNC: 21 U/L (ref 0–45)
BASOPHILS # BLD AUTO: 0 10E9/L (ref 0–0.2)
BASOPHILS NFR BLD AUTO: 0.7 %
BILIRUB SERPL-MCNC: 0.4 MG/DL (ref 0.2–1.3)
BUN SERPL-MCNC: 16 MG/DL (ref 7–30)
CALCIUM SERPL-MCNC: 9.4 MG/DL (ref 8.5–10.1)
CHLORIDE SERPL-SCNC: 107 MMOL/L (ref 94–109)
CO2 SERPL-SCNC: 31 MMOL/L (ref 20–32)
CREAT SERPL-MCNC: 0.72 MG/DL (ref 0.52–1.04)
DIFFERENTIAL METHOD BLD: NORMAL
EOSINOPHIL # BLD AUTO: 0.2 10E9/L (ref 0–0.7)
EOSINOPHIL NFR BLD AUTO: 3.9 %
ERYTHROCYTE [DISTWIDTH] IN BLOOD BY AUTOMATED COUNT: 13.3 % (ref 10–15)
GFR SERPL CREATININE-BSD FRML MDRD: 83 ML/MIN/{1.73_M2}
GLUCOSE SERPL-MCNC: 101 MG/DL (ref 70–99)
HCT VFR BLD AUTO: 39.5 % (ref 35–47)
HGB BLD-MCNC: 13.1 G/DL (ref 11.7–15.7)
IMM GRANULOCYTES # BLD: 0 10E9/L (ref 0–0.4)
IMM GRANULOCYTES NFR BLD: 0.2 %
LYMPHOCYTES # BLD AUTO: 2.5 10E9/L (ref 0.8–5.3)
LYMPHOCYTES NFR BLD AUTO: 43.7 %
MCH RBC QN AUTO: 32.5 PG (ref 26.5–33)
MCHC RBC AUTO-ENTMCNC: 33.2 G/DL (ref 31.5–36.5)
MCV RBC AUTO: 98 FL (ref 78–100)
MONOCYTES # BLD AUTO: 0.8 10E9/L (ref 0–1.3)
MONOCYTES NFR BLD AUTO: 13.3 %
NEUTROPHILS # BLD AUTO: 2.2 10E9/L (ref 1.6–8.3)
NEUTROPHILS NFR BLD AUTO: 38.2 %
NRBC # BLD AUTO: 0 10*3/UL
NRBC BLD AUTO-RTO: 0 /100
PLATELET # BLD AUTO: 156 10E9/L (ref 150–450)
POTASSIUM SERPL-SCNC: 3.8 MMOL/L (ref 3.4–5.3)
PROT SERPL-MCNC: 7.3 G/DL (ref 6.8–8.8)
RBC # BLD AUTO: 4.03 10E12/L (ref 3.8–5.2)
SODIUM SERPL-SCNC: 142 MMOL/L (ref 133–144)
WBC # BLD AUTO: 5.6 10E9/L (ref 4–11)

## 2019-11-04 ASSESSMENT — PAIN SCALES - GENERAL
PAINLEVEL: NO PAIN (0)
PAINLEVEL: MILD PAIN (2)

## 2019-11-04 NOTE — LETTER
"11/4/2019     RE: Ester Barba  [address] [place] [address]     Dear Colleague,    Thank you for referring your patient, Ester Barba, to the [facility] DERMATOLOGY at [facility]. Please see a copy of my visit note below.                    Pictures were placed in Pt's chart today for future reference.      [facility] Dermatology Note      Dermatology Problem List:  1.. Lichen planopilaris.              -Currently using Derma-Smoothe/FS 0.01% oil 1 to 2x/week, DHS zinc shampoo 3x/week, and plaquenil 200 mg BID (safety labs last completed 6/18/2019).               -Receives ILK about every 5 weeks, last injection 11/04/2019 (previous injection was 07/2019)  2. Hx of NMSC              -BCC on right nasal ala s/p Mohs 11/27/17              -Nodular BCC on the vertex scalp s/p Mohs 6/5/15              -SCC of scalp, s/p Mohs 4/18/2014              -Hx of 1-2 other NMSC on scalp (BCCs)  3. Verrucous keratosis on right forearm, s/p shave biopsy on 12/20/18  4. Acne vulgaris              - Irregularly using OTC Differin gel.    CC:   Chief Complaint   Patient presents with     Hair Loss     Ester is here today for a hair loss follow up- Ester states \"I think it's worse\".      Encounter Date: Nov 4, 2019    History of Present Illness:  Ms. Ester Barba is a 74 year old female who presents as a follow-up for LPP. Today she reports that it feels bumpy and her hair feels thinner on the crown of her scalp. She was last seen July 2019. The scalp also feels tight and irritated.  She still continues Plaquenil 400 mg daily in addition to Derma-Smoothe FS oil about once a week.  She also uses DHS Zinc shampoo every 3 days.  Her last Plaquenil safety labs were performed in June 2019. Her last eye exam was also the summer.  She has started new visual field therapy after her concussion.  Her eye doctor told her this was not related to her Plaquenil though.    She was " previously receiving IL K injections about every 5 weeks and this is the longest she is gone without injections.  Her last injections were this summer in July so it has been a few months since then.    She still suffers with headaches related to an injury sustained this past winter.  However overall she is recovering quite well after extensive physical therapy and occupational Therapy.    Past Medical History:   Patient Active Problem List   Diagnosis     Porokeratosis     Squamous cell carcinoma     Neoplasm of uncertain behavior     Lichen planopilaris     Dermatitis     Cicatricial alopecia     Keratosis, inflamed seborrheic     History of skin cancer     Basal cell carcinoma of vertex scalp s/p mohs 6-5-15     Medication management     Actinic keratosis     Medication monitoring encounter     Dermatitis, seborrheic     Erosive pustular dermatosis     Tick bite, initial encounter     Vulvar atrophy     Factor V Leiden mutation (H)     Splenic artery aneurysm (H)     Past Medical History:   Diagnosis Date     Basal cell carcinoma      Bilateral occipital neuralgia 01/21/2019     Concussion 01/21/2019     Squamous cell carcinoma      Past Surgical History:   Procedure Laterality Date     BIOPSY OF SKIN LESION       MOHS MICROGRAPHIC PROCEDURE       NO HISTORY OF SURGERY  2/17/14    derm     SMALL BOWEL RESECTION  11/2015    diverticulitis       Social History:  Patient reports that she quit smoking about 54 years ago. She has never used smokeless tobacco. She reports current alcohol use. She reports that she does not use drugs.    Family History:  Family History   Problem Relation Age of Onset     Skin Cancer Sister         basal cell carcinoma     Melanoma No family hx of        Medications:  Current Outpatient Medications   Medication Sig Dispense Refill     Calcium 1500 MG TABS Take  by mouth.       Cholecalciferol (VITAMIN D) 1000 UNIT capsule Take  by mouth. Total 2200 units daily.       COMPOUNDED  NON-CONTROLLED SUBSTANCE (CMPD RX) - PHARMACY TO MIX COMPOUNDED MEDICATION Place 1 Application vaginally       Fluocinolone Acetonide Scalp (DERMA-SMOOTHE/FS SCALP) 0.01 % OIL oil Apply once a week to scalp for lichen planopilaris 118 mL 11     hydroxychloroquine (PLAQUENIL) 200 MG tablet Take 1 tablet (200 mg) by mouth 2 times daily 60 tablet 0     ibuprofen (ADVIL,MOTRIN) 800 MG tablet Take 800 mg by mouth every 8 hours as needed.       Loratadine (CLARITIN PO) Take  by mouth.       NEW MED Biest 1.5mg/gram cream(pg free)  Insert 1 gram vaginally twice weekly as directed 40 g 0     triamcinolone acetonide (KENALOG) 10 MG/ML injection See med note 5 mL 0     triamcinolone acetonide (KENALOG) 10 MG/ML injection See med note 5 mL 0     desonide (DESOWEN) 0.05 % cream Mix 50/50 with ketoconazole and apply twice daily to irritated area for 3 weeks (Patient not taking: Reported on 10/23/2019) 60 g 1     fluconazole (DIFLUCAN) 200 MG tablet Take 1 tablet (200 mg) by mouth daily (Patient not taking: Reported on 10/23/2019) 45 tablet 4     ketoconazole (NIZORAL) 2 % cream Mix 50/50 with desonide and apply twice daily to irritated area for 3 weeks (Patient not taking: Reported on 10/23/2019) 30 g 1     tretinoin (RETIN-A) 0.01 % external gel Use a pea size amount over entire face, concentrating on problem area, every other day at bedtime. (Patient not taking: Reported on 10/23/2019) 15 g 0     Allergies   Allergen Reactions     Dust Mites Other (See Comments)     Sneezing, coughing. asthma  Itchy watery eyes, sneezing     Estrogens      Other reaction(s): Hypersensitivity  Topical caused burning sensation of skin     Imiquimod Other (See Comments)     Hair loss     Levaquin [Levofloxacin Hemihydrate] Other (See Comments)     Muscle weakness     Levofloxacin Other (See Comments)     MUSCLE WEAKNESS, ARTHRITIS LIKE SYMPTOMS.       Mold Other (See Comments)     Sneezing, asthma, weezing     Pollen Extract/Tree Extract Other  (See Comments)     Sneezing, weezing     Sulfa Drugs Hives     Sulfamethoxazole-Trimethoprim Hives     Latex Rash     LATEX BANDAGES; tapes adhesive     Penicillin G Rash     Penicillins Rash     Review of Systems:  -Constitutional: Otherwise feeling well today, in usual state of health.  -HEENT: Patient denies nonhealing oral sores.  -Skin: As above in HPI. No additional skin concerns.    Physical exam:  Vitals: /74 (BP Location: Right arm, Patient Position: Sitting, Cuff Size: Adult Regular)   Pulse 80   GEN: This is a well developed, well-nourished female in no acute distress, in a pleasant mood.    SKIN: Sun-exposed skin, which includes the head/face, neck, both arms, digits, and/or nails was examined.   - Scalp hair pull tests are negative  - Central scalp area of scarring alopecia present  - Mild perifollicular erythema and scale noted at the frontal scalp.  However the temporal and occipital portions of the scalp have minimal erythema and scale.  - Minimal crusting and fibrinous exudate on the crown of her scalp.  The crown of her scalp is also moderately tender to palpation.  - Eyebrows and eyelashes are full  -No other lesions of concern on areas examined.     Impression/Plan:  1. Lichen plano-pilaris -slightly increased activity at the frontal portion of the scalp today but otherwise stable.    Continue dermasmooth FS at least weekly.    Continue plaquenil 200 mg BID.  Will obtain safety labs today with CBC with differential and CMP.    Continue DHS zinc shampoo every other day.    ILK today   Kenalog intralesional injection procedure note: After verbal consent and discussion of risks including but not limited to atrophy, pain, and bruising, time out was performed, the patient underwent positioning, 1.1 total cc of Kenalog 10 mg/cc was injected into 11 sites on the scalp.  The patient tolerated the procedure well and left the Dermatology clinic in good condition.      Photographic documentation  obtained    2. History of BCC and SCC    No evidence of recurrence on the scalp today.     Return to clinic in 8 weeks for likely repeat injections.    CC Fernando Weathers MD  [facility]  [address] [place] [address] on close of this encounter.    Patient staffed with attending physician Dr. Soto     Staff involved:   Resident (Saturnino)/ Staff     Joelle Matamoros MD/MPH  Internal Medicine/Dermatology  PGY-3   [phone]    Patient was seen and examined with the medicine/dermatology resident. I agree with the history, review of systems, physical examination, assessments and plan. ILK injections were done together.    Barbara Soto MD  Professor and  Chair  Department of Dermatology  [facility]      Drug Administration Record    Prior to injection, verified patient identity using patient's name and date of birth.  Due to injection administration, patient instructed to remain in clinic for 15 minutes  afterwards, and to report any adverse reaction to me immediately.    Drug Name: triamcinolone acetonide(kenalog)  Dose: 2mL of triamcinolone 10mg/mL, 20mg dose  Route administered: ID  NDC #: [id number]: Kenalog-10 ([id number])  Amount of waste(mL): 3mL  Reason for waste: Multi dose vial    LOT #: [id number]  SITE: See provider note   : [employer]  EXPIRATION DATE: 04/21    Again, thank you for allowing me to participate in the care of your patient.      Sincerely,    Barbara Soto MD

## 2019-11-04 NOTE — NURSING NOTE
"Dermatology Rooming Note    Ester Barba's goals for this visit include:   Chief Complaint   Patient presents with     Hair Loss     Ester is here today for a hair loss follow up- Ester states \"I think it's worse\".      Antonia Angel, PIERCE     "

## 2019-11-04 NOTE — PROGRESS NOTES
"[facility] Dermatology Note      Dermatology Problem List:  1.. Lichen planopilaris.              -Currently using Derma-Smoothe/FS 0.01% oil 1 to 2x/week, DHS zinc shampoo 3x/week, and plaquenil 200 mg BID (safety labs last completed 6/18/2019).               -Receives ILK about every 5 weeks, last injection 11/04/2019 (previous injection was 07/2019)  2. Hx of NMSC              -BCC on right nasal ala s/p Mohs 11/27/17              -Nodular BCC on the vertex scalp s/p Mohs 6/5/15              -SCC of scalp, s/p Mohs 4/18/2014              -Hx of 1-2 other NMSC on scalp (BCCs)  3. Verrucous keratosis on right forearm, s/p shave biopsy on 12/20/18  4. Acne vulgaris              - Irregularly using OTC Differin gel.    CC:   Chief Complaint   Patient presents with     Hair Loss     Ester is here today for a hair loss follow up- Ester states \"I think it's worse\".      Encounter Date: Nov 4, 2019    History of Present Illness:  Ms. Ester Barba is a 74 year old female who presents as a follow-up for LPP. Today she reports that it feels bumpy and her hair feels thinner on the crown of her scalp. She was last seen July 2019. The scalp also feels tight and irritated.  She still continues Plaquenil 400 mg daily in addition to Derma-Smoothe FS oil about once a week.  She also uses DHS Zinc shampoo every 3 days.  Her last Plaquenil safety labs were performed in June 2019. Her last eye exam was also the summer.  She has started new visual field therapy after her concussion.  Her eye doctor told her this was not related to her Plaquenil though.    She was previously receiving IL K injections about every 5 weeks and this is the longest she is gone without injections.  Her last injections were this summer in July so it has been a few months since then.    She still suffers with headaches related to an injury sustained this past winter.  However overall she is recovering quite well after extensive physical " therapy and occupational Therapy.    Past Medical History:   Patient Active Problem List   Diagnosis     Porokeratosis     Squamous cell carcinoma     Neoplasm of uncertain behavior     Lichen planopilaris     Dermatitis     Cicatricial alopecia     Keratosis, inflamed seborrheic     History of skin cancer     Basal cell carcinoma of vertex scalp s/p mohs 6-5-15     Medication management     Actinic keratosis     Medication monitoring encounter     Dermatitis, seborrheic     Erosive pustular dermatosis     Tick bite, initial encounter     Vulvar atrophy     Factor V Leiden mutation (H)     Splenic artery aneurysm (H)     Past Medical History:   Diagnosis Date     Basal cell carcinoma      Bilateral occipital neuralgia 01/21/2019     Concussion 01/21/2019     Squamous cell carcinoma      Past Surgical History:   Procedure Laterality Date     BIOPSY OF SKIN LESION       MOHS MICROGRAPHIC PROCEDURE       NO HISTORY OF SURGERY  2/17/14    derm     SMALL BOWEL RESECTION  11/2015    diverticulitis       Social History:  Patient reports that she quit smoking about 54 years ago. She has never used smokeless tobacco. She reports current alcohol use. She reports that she does not use drugs.    Family History:  Family History   Problem Relation Age of Onset     Skin Cancer Sister         basal cell carcinoma     Melanoma No family hx of        Medications:  Current Outpatient Medications   Medication Sig Dispense Refill     Calcium 1500 MG TABS Take  by mouth.       Cholecalciferol (VITAMIN D) 1000 UNIT capsule Take  by mouth. Total 2200 units daily.       COMPOUNDED NON-CONTROLLED SUBSTANCE (CMPD RX) - PHARMACY TO MIX COMPOUNDED MEDICATION Place 1 Application vaginally       Fluocinolone Acetonide Scalp (DERMA-SMOOTHE/FS SCALP) 0.01 % OIL oil Apply once a week to scalp for lichen planopilaris 118 mL 11     hydroxychloroquine (PLAQUENIL) 200 MG tablet Take 1 tablet (200 mg) by mouth 2 times daily 60 tablet 0     ibuprofen  (ADVIL,MOTRIN) 800 MG tablet Take 800 mg by mouth every 8 hours as needed.       Loratadine (CLARITIN PO) Take  by mouth.       NEW MED Biest 1.5mg/gram cream(pg free)  Insert 1 gram vaginally twice weekly as directed 40 g 0     triamcinolone acetonide (KENALOG) 10 MG/ML injection See med note 5 mL 0     triamcinolone acetonide (KENALOG) 10 MG/ML injection See med note 5 mL 0     desonide (DESOWEN) 0.05 % cream Mix 50/50 with ketoconazole and apply twice daily to irritated area for 3 weeks (Patient not taking: Reported on 10/23/2019) 60 g 1     fluconazole (DIFLUCAN) 200 MG tablet Take 1 tablet (200 mg) by mouth daily (Patient not taking: Reported on 10/23/2019) 45 tablet 4     ketoconazole (NIZORAL) 2 % cream Mix 50/50 with desonide and apply twice daily to irritated area for 3 weeks (Patient not taking: Reported on 10/23/2019) 30 g 1     tretinoin (RETIN-A) 0.01 % external gel Use a pea size amount over entire face, concentrating on problem area, every other day at bedtime. (Patient not taking: Reported on 10/23/2019) 15 g 0     Allergies   Allergen Reactions     Dust Mites Other (See Comments)     Sneezing, coughing. asthma  Itchy watery eyes, sneezing     Estrogens      Other reaction(s): Hypersensitivity  Topical caused burning sensation of skin     Imiquimod Other (See Comments)     Hair loss     Levaquin [Levofloxacin Hemihydrate] Other (See Comments)     Muscle weakness     Levofloxacin Other (See Comments)     MUSCLE WEAKNESS, ARTHRITIS LIKE SYMPTOMS.       Mold Other (See Comments)     Sneezing, asthma, weezing     Pollen Extract/Tree Extract Other (See Comments)     Sneezing, weezing     Sulfa Drugs Hives     Sulfamethoxazole-Trimethoprim Hives     Latex Rash     LATEX BANDAGES; tapes adhesive     Penicillin G Rash     Penicillins Rash     Review of Systems:  -Constitutional: Otherwise feeling well today, in usual state of health.  -HEENT: Patient denies nonhealing oral sores.  -Skin: As above in HPI. No  additional skin concerns.    Physical exam:  Vitals: /74 (BP Location: Right arm, Patient Position: Sitting, Cuff Size: Adult Regular)   Pulse 80   GEN: This is a well developed, well-nourished female in no acute distress, in a pleasant mood.    SKIN: Sun-exposed skin, which includes the head/face, neck, both arms, digits, and/or nails was examined.   - Scalp hair pull tests are negative  - Central scalp area of scarring alopecia present  - Mild perifollicular erythema and scale noted at the frontal scalp.  However the temporal and occipital portions of the scalp have minimal erythema and scale.  - Minimal crusting and fibrinous exudate on the crown of her scalp.  The crown of her scalp is also moderately tender to palpation.  - Eyebrows and eyelashes are full  -No other lesions of concern on areas examined.     Impression/Plan:  1. Lichen plano-pilaris -slightly increased activity at the frontal portion of the scalp today but otherwise stable.    Continue dermasmooth FS at least weekly.    Continue plaquenil 200 mg BID.  Will obtain safety labs today with CBC with differential and CMP.    Continue DHS zinc shampoo every other day.    ILK today   Kenalog intralesional injection procedure note: After verbal consent and discussion of risks including but not limited to atrophy, pain, and bruising, time out was performed, the patient underwent positioning, 1.1 total cc of Kenalog 10 mg/cc was injected into 11 sites on the scalp.  The patient tolerated the procedure well and left the Dermatology clinic in good condition.      Photographic documentation obtained    2. History of BCC and SCC    No evidence of recurrence on the scalp today.     Return to clinic in 8 weeks for likely repeat injections.    CC Fernando Weathers MD  [facility]  [address] [place] [address] on close of this encounter.    Patient staffed with attending physician Dr. Soto     Staff involved:   Resident (Saturnino)/ Staff      Joelle Matamoros MD/MPH  Internal Medicine/Dermatology  PGY-3   [phone]    Patient was seen and examined with the medicine/dermatology resident. I agree with the history, review of systems, physical examination, assessments and plan. ILK injections were done together.    Barbara Soto MD  Professor and  Chair  Department of Dermatology  [facility]

## 2019-11-04 NOTE — PROGRESS NOTES
Drug Administration Record    Prior to injection, verified patient identity using patient's name and date of birth.  Due to injection administration, patient instructed to remain in clinic for 15 minutes  afterwards, and to report any adverse reaction to me immediately.    Drug Name: triamcinolone acetonide(kenalog)  Dose: 2mL of triamcinolone 10mg/mL, 20mg dose  Route administered: ID  NDC #: [id number]: Kenalog-10 (1866-1737-61)  Amount of waste(mL): 3mL  Reason for waste: Multi dose vial    LOT #: [id number]  SITE: See provider note   : [employer]  EXPIRATION DATE: 04/21

## 2019-11-08 DIAGNOSIS — L66.10 LICHEN PLANOPILARIS: ICD-10-CM

## 2019-11-10 RX ORDER — HYDROXYCHLOROQUINE SULFATE 200 MG/1
200 TABLET, FILM COATED ORAL 2 TIMES DAILY
Qty: 60 TABLET | Refills: 1 | Status: SHIPPED | OUTPATIENT
Start: 2019-11-10 | End: 2020-01-16

## 2019-11-10 NOTE — TELEPHONE ENCOUNTER
hydroxychloroquine (PLAQUENIL) 200 MG tablet    Last Written Prescription Date:  10/7/19  Last Fill Quantity: 60,   # refills: 0  Last Office Visit : 11/4/19  Future Office visit: 1/10/20    Eye exam: not found    Routing refill request to provider for review/approval because: not found on protocol. Eye exam not found.

## 2019-12-03 ENCOUNTER — TRANSFERRED RECORDS (OUTPATIENT)
Dept: HEALTH INFORMATION MANAGEMENT | Facility: CLINIC | Age: 74
End: 2019-12-03

## 2019-12-04 ENCOUNTER — TELEPHONE (OUTPATIENT)
Dept: DERMATOLOGY | Facility: CLINIC | Age: 74
End: 2019-12-04

## 2019-12-04 NOTE — TELEPHONE ENCOUNTER
As resident on call, I received a call from Ms. Barba this morning with a question about her plaquenil. Patient stated that she has been on plaquenil for 14 years now for LPP and recently had an eye exam revealing retinopathy. She was wondering if she should stop the plaquenil or decrease the dose. After speaking with on call attending Dr. Clarke, I instructed patient to stop the plaquenil today. I informed patient I will message Dr. Soto to inform her of the above update and for further management plan.    Cyndie Hair PGY2  Dermatology Resident  pager

## 2019-12-11 NOTE — TELEPHONE ENCOUNTER
Writer attempted to reach pt. Dr Soto would like a copy of her eye exam faxed to clinic. If pt calls back please let her know. She can have this faxed to [phone].

## 2019-12-12 NOTE — TELEPHONE ENCOUNTER
[facility] Call Center    Phone Message    May a detailed message be left on voicemail: yes    Reason for Call: Other: Pt is calling back to speak with Kizzy, please call pt back     Action Taken: Message routed to:  [facility] Derm

## 2019-12-12 NOTE — TELEPHONE ENCOUNTER
Pt called back and I gave her the message below. Pt understood and said that she is calling [facility] for the records.   PIERCE Phelan

## 2019-12-15 ENCOUNTER — HEALTH MAINTENANCE LETTER (OUTPATIENT)
Age: 74
End: 2019-12-15

## 2020-01-07 ENCOUNTER — TRANSFERRED RECORDS (OUTPATIENT)
Dept: HEALTH INFORMATION MANAGEMENT | Facility: CLINIC | Age: 75
End: 2020-01-07

## 2020-01-10 ENCOUNTER — OFFICE VISIT (OUTPATIENT)
Dept: DERMATOLOGY | Facility: CLINIC | Age: 75
End: 2020-01-10
Payer: MEDICARE

## 2020-01-10 VITALS — SYSTOLIC BLOOD PRESSURE: 114 MMHG | HEART RATE: 92 BPM | DIASTOLIC BLOOD PRESSURE: 66 MMHG

## 2020-01-10 DIAGNOSIS — L66.10 LICHEN PLANOPILARIS: ICD-10-CM

## 2020-01-10 DIAGNOSIS — Z79.899 LONG-TERM USE OF PLAQUENIL: ICD-10-CM

## 2020-01-10 DIAGNOSIS — J01.90 SUBACUTE SINUSITIS, UNSPECIFIED LOCATION: Primary | ICD-10-CM

## 2020-01-10 RX ORDER — CEFPROZIL 250 MG/1
250 TABLET, FILM COATED ORAL 2 TIMES DAILY
Qty: 1 TABLET | Refills: 0 | Status: SHIPPED | OUTPATIENT
Start: 2020-01-10 | End: 2020-08-27

## 2020-01-10 ASSESSMENT — PAIN SCALES - GENERAL
PAINLEVEL: MILD PAIN (2)
PAINLEVEL: MILD PAIN (3)

## 2020-01-10 NOTE — PROGRESS NOTES
"[facility] Dermatology Note      Dermatology Problem List:  1.. Lichen planopilaris.              -Currently using Derma-Smoothe/FS 0.01% oil 1 to 2x/week, DHS zinc shampoo 3x/week,    -Stopped plaquenil 12/2019  per her eye doctor as concern for retinopathy                -Has been receiving  ILK 10 about every 5 weeks, 1/10/2020,11/04/2019, 07/2019    2. Hx of NMSC              -BCC on right nasal ala s/p Mohs 11/27/17              -Nodular BCC on the vertex scalp s/p Mohs 6/5/15              -SCC of scalp, s/p Mohs 4/18/2014              -Hx of 1-2 other NMSC on scalp (BCCs)    3. Verrucous keratosis on right forearm, s/p shave biopsy on 12/20/18    4. Acne vulgaris              - Irregularly using OTC Differin gel.    CC:   Chief Complaint   Patient presents with     Hair Loss     yoan is here today for a hair loss follow up. Yoan notes\" My hair is about the same\"      Encounter Date: Clay 10, 2020    History of Present Illness:  Ms. Yoan Barba is a 74 year old female who presents as a follow-up for LPP.     Last seen 11/4/2019. Patient reports that she is unsure about her hair loss, but feels that it is overall the same. Ms. Barba has been on multiple antibiotics and oral steroids over the past 2 months due to subacute sinusitis. Patient is currently on Cefzil (previously treated with doxycycline, cefdinir and prednisone) and requested a 1 time tablet as she forget to bring her medication along today while away from home.     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 Patient was seen 06/2019 by ophthalmology with no acute concerns. She is seen by [facility]. Visit of 12/3/2019 showed concern for potential retinopathy 2/2 to plaquenil. She has since stopped the plaquenil all together. Currently being followed by a retina specialist. Per chart review, no letters have been sent to dermatology clinic.       Opthamology " "note:   6/05/2019    \"High risk medication use. 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 Scruffy RPE on OCT appears worse compared to 6 mo ago. OCT shows disruption, or complete loss, of the outer nuclear layer, external limiting membrane, inner/outer segment junction, and RPE in the parafoveal region highly suspicious for Plaquenil toxicity.  Discussed with Retina colleague Dr Garsia who agreed. Recommend decrease to 200mg QD or Discontinue Plaquenil. Pt will also discuss with Dr Soto.\"    Patient reports that her scalp is overall stable. States prior injections have aided with her inflammation. Continues to use Derma-Smooth FS oil once a week. Does use DHS Zinc shampoo twice a week and not three times due to being drying.        Past Medical History:   Patient Active Problem List   Diagnosis     Porokeratosis     Squamous cell carcinoma     Neoplasm of uncertain behavior     Lichen planopilaris     Dermatitis     Cicatricial alopecia     Keratosis, inflamed seborrheic     History of skin cancer     Basal cell carcinoma of vertex scalp s/p mohs 6-5-15     Medication management     Actinic keratosis     Medication monitoring encounter     Dermatitis, seborrheic     Erosive pustular dermatosis     Tick bite, initial encounter     Vulvar atrophy     Factor V Leiden mutation (H)     Splenic artery aneurysm (H)     Past Medical History:   Diagnosis Date     Basal cell carcinoma      Bilateral occipital neuralgia 01/21/2019     Concussion 01/21/2019     Squamous cell carcinoma      Past Surgical History:   Procedure Laterality Date     BIOPSY OF SKIN LESION       MOHS MICROGRAPHIC PROCEDURE       NO HISTORY OF SURGERY  2/17/14    derm     SMALL BOWEL RESECTION  11/2015    diverticulitis       Social History:  Patient reports that she quit smoking about 55 years ago. She has never used smokeless tobacco. She reports " current alcohol use. She reports that she does not use drugs.    Family History:  Family History   Problem Relation Age of Onset     Skin Cancer Sister         basal cell carcinoma     Melanoma No family hx of        Medications:  Current Outpatient Medications   Medication Sig Dispense Refill     Calcium 1500 MG TABS Take  by mouth.       Cholecalciferol (VITAMIN D) 1000 UNIT capsule Take  by mouth. Total 2200 units daily.       COMPOUNDED NON-CONTROLLED SUBSTANCE (CMPD RX) - PHARMACY TO MIX COMPOUNDED MEDICATION Place 1 Application vaginally       Fluocinolone Acetonide Scalp (DERMA-SMOOTHE/FS SCALP) 0.01 % OIL oil Apply once a week to scalp for lichen planopilaris 118 mL 11     ibuprofen (ADVIL,MOTRIN) 800 MG tablet Take 800 mg by mouth every 8 hours as needed.       Loratadine (CLARITIN PO) Take  by mouth.       NEW MED Biest 1.5mg/gram cream(pg free)  Insert 1 gram vaginally twice weekly as directed 40 g 0     triamcinolone acetonide (KENALOG) 10 MG/ML injection See med note 5 mL 0     triamcinolone acetonide (KENALOG) 10 MG/ML injection See med note 5 mL 0     desonide (DESOWEN) 0.05 % cream Mix 50/50 with ketoconazole and apply twice daily to irritated area for 3 weeks (Patient not taking: Reported on 10/23/2019) 60 g 1     fluconazole (DIFLUCAN) 200 MG tablet Take 1 tablet (200 mg) by mouth daily (Patient not taking: Reported on 10/23/2019) 45 tablet 4     hydroxychloroquine (PLAQUENIL) 200 MG tablet Take 1 tablet (200 mg) by mouth 2 times daily (Patient not taking: Reported on 1/10/2020) 60 tablet 1     ketoconazole (NIZORAL) 2 % cream Mix 50/50 with desonide and apply twice daily to irritated area for 3 weeks (Patient not taking: Reported on 10/23/2019) 30 g 1     tretinoin (RETIN-A) 0.01 % external gel Use a pea size amount over entire face, concentrating on problem area, every other day at bedtime. (Patient not taking: Reported on 10/23/2019) 15 g 0     Allergies   Allergen Reactions     Dust Mites Other  (See Comments)     Sneezing, coughing. asthma  Itchy watery eyes, sneezing     Estrogens      Other reaction(s): Hypersensitivity  Topical caused burning sensation of skin     Imiquimod Other (See Comments)     Hair loss     Levaquin [Levofloxacin Hemihydrate] Other (See Comments)     Muscle weakness     Levofloxacin Other (See Comments)     MUSCLE WEAKNESS, ARTHRITIS LIKE SYMPTOMS.       Mold Other (See Comments)     Sneezing, asthma, weezing     Pollen Extract/Tree Extract Other (See Comments)     Sneezing, weezing     Sulfa Drugs Hives     Sulfamethoxazole-Trimethoprim Hives     Latex Rash     LATEX BANDAGES; tapes adhesive     Penicillin G Rash     Penicillins Rash     Review of Systems:  -Constitutional: Otherwise feeling well today, in usual state of health.  -HEENT: Patient denies nonhealing oral sores.  -Skin: As above in HPI. No additional skin concerns.    Physical exam:  Vitals: /66   Pulse 92   GEN: This is a well developed, well-nourished female in no acute distress, in a pleasant mood.    SKIN: Sun-exposed skin, which includes the head/face, neck, both arms, digits, and/or nails was examined. Unchanged from prior.   - Scalp hair pull tests are negative  - Central scalp area of scarring alopecia present  - Mild perifollicular erythema and scale noted at the frontal scalp.  However the temporal and occipital portions of the scalp have minimal erythema and scale.  - Minimal crusting and fibrinous exudate on the crown of her scalp.  The crown of her scalp is also moderately tender to palpation.  - Eyebrows and eyelashes are full  -No other lesions of concern on areas examined.     Impression/Plan:  1. Lichen plano-pilaris -slightly increased activity at the frontal portion of the scalp today but otherwise stable.    Continue dermasmooth FS at least weekly.    Discontinue plaquenil, referral placed for Ophthalmology as patient desire second opinion for potential plaquenil retinal  toxicity.    Continue DHS zinc shampoo every other day.    ILK today   Kenalog intralesional injection procedure note: After verbal consent and discussion of risks including but not limited to atrophy, pain, and bruising, time out was performed, the patient underwent positioning, 0.2 total cc of Kenalog 10 mg/cc was injected into 11 sites on the scalp.  The patient tolerated the procedure well and left the Dermatology clinic in good condition.      Photographic documentation obtained    2. History of BCC and SCC    No evidence of recurrence on the scalp today.     Return to clinic in 5-8 weeks for likely repeat injections.    CC Fernando Weathers MD  [facility]  [address] [place] [address] on close of this encounter.    Patient staffed with attending physician Dr. Soto     Staff involved:   Kelly Acuña MD  [facility] Resident PGY-3    Patient was seen and examined with the family medicine  resident. I agree with the history, review of systems, physical examination, assessments and plan. ILK injections were done by me.    Barbara Soto MD  Professor and  Chair  Department of Dermatology  [facility]

## 2020-01-10 NOTE — PROGRESS NOTES
Drug Administration Record    Prior to injection, verified patient identity using patient's name and date of birth.  Due to injection administration, patient instructed to remain in clinic for 15 minutes  afterwards, and to report any adverse reaction to me immediately.    Drug Name: triamcinolone acetonide(kenalog)  Dose: 0.2mL of triamcinolone 10mg/mL, 2mg dose  Route administered: ID  NDC #: [id number]: Kenalog-10 ([id number])  Amount of waste(mL):4.8  Reason for waste: Multi dose vial    LOT #: [id number]  SITE: [facility]  : [employer]  EXPIRATION DATE: 05/2021

## 2020-01-10 NOTE — LETTER
"1/10/2020       RE: Yoan Barba  [address] [place] [address]     Dear Colleague,    Thank you for referring your patient, Yoan Barba, to the [facility] DERMATOLOGY at [facility]. Please see a copy of my visit note below.    Dermatology Rooming Note    Yoan Barba's goals for this visit include:   Chief Complaint   Patient presents with     Hair Loss     yoan is here today for a hair loss follow up. Yoan notes\" My hair is about the same\"      PIERCE Glaser      [facility] Dermatology Note      Dermatology Problem List:  1.. Lichen planopilaris.              -Currently using Derma-Smoothe/FS 0.01% oil 1 to 2x/week, DHS zinc shampoo 3x/week,    -Stopped plaquenil 12/2019  per her eye doctor as concern for retinopathy                - Has been receiving  ILK  10 about every 5 weeks, 1/10/2020,11/04/2019, 07/2019    2. Hx of NMSC              -BCC on right nasal ala s/p Mohs 11/27/17              -Nodular BCC on the vertex scalp s/p Mohs 6/5/15              -SCC of scalp, s/p Mohs 4/18/2014              -Hx of 1-2 other NMSC on scalp (BCCs)    3. Verrucous keratosis on right forearm, s/p shave biopsy on 12/20/18    4. Acne vulgaris              - Irregularly using OTC Differin gel.    CC:   Chief Complaint   Patient presents with     Hair Loss     yoan is here today for a hair loss follow up. Yoan notes\" My hair is about the same\"      Encounter Date: Clay 10, 2020    History of Present Illness:  Ms. Yoan Barba is a 74 year old female who presents as a follow-up for LPP.     Last seen 11/4/2019. Patient reports that she is unsure about her hair loss, but feels that it is overall the same. Ms. Barba has been on multiple antibiotics and oral steroids over the past 2 months due to subacute sinusitis. Patient is currently on Cefzil (previously treated with doxycycline, cefdinir and prednisone) and requested a 1 time tablet as she forget to bring her " "medication along today while away from home.     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 Patient was seen 06/2019 by ophthalmology with no acute concerns. She is seen by [facility]. Visit of 12/3/2019 showed concern for potential retinopathy 2/2 to plaquenil. She has since stopped the plaquenil all together. Currently being followed by a retina specialist. Per chart review, no letters have been sent to dermatology clinic.       Opthamology note:   6/05/2019    \"High risk medication use. 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 Scruffy RPE on OCT appears worse compared to 6 mo ago. OCT shows disruption, or complete loss, of the outer nuclear layer, external limiting membrane, inner/outer segment junction, and RPE in the parafoveal region highly suspicious for Plaquenil toxicity.  Discussed with Retina colleague Dr Garsia who agreed. Recommend decrease to 200mg QD or Discontinue Plaquenil. Pt will also discuss with Dr Soto.\"    Patient reports that her scalp is overall stable. States prior injections have aided with her inflammation. Continues to use Derma-Smooth FS oil once a week. Does use DHS Zinc shampoo twice a week and not three times due to being drying.        Past Medical History:   Patient Active Problem List   Diagnosis     Porokeratosis     Squamous cell carcinoma     Neoplasm of uncertain behavior     Lichen planopilaris     Dermatitis     Cicatricial alopecia     Keratosis, inflamed seborrheic     History of skin cancer     Basal cell carcinoma of vertex scalp s/p mohs 6-5-15     Medication management     Actinic keratosis     Medication monitoring encounter     Dermatitis, seborrheic     Erosive pustular dermatosis     Tick bite, initial encounter     Vulvar atrophy     Factor V Leiden mutation (H)     Splenic artery " aneurysm (H)     Past Medical History:   Diagnosis Date     Basal cell carcinoma      Bilateral occipital neuralgia 01/21/2019     Concussion 01/21/2019     Squamous cell carcinoma      Past Surgical History:   Procedure Laterality Date     BIOPSY OF SKIN LESION       MOHS MICROGRAPHIC PROCEDURE       NO HISTORY OF SURGERY  2/17/14    derm     SMALL BOWEL RESECTION  11/2015    diverticulitis       Social History:  Patient reports that she quit smoking about 55 years ago. She has never used smokeless tobacco. She reports current alcohol use. She reports that she does not use drugs.    Family History:  Family History   Problem Relation Age of Onset     Skin Cancer Sister         basal cell carcinoma     Melanoma No family hx of        Medications:  Current Outpatient Medications   Medication Sig Dispense Refill     Calcium 1500 MG TABS Take  by mouth.       Cholecalciferol (VITAMIN D) 1000 UNIT capsule Take  by mouth. Total 2200 units daily.       COMPOUNDED NON-CONTROLLED SUBSTANCE (CMPD RX) - PHARMACY TO MIX COMPOUNDED MEDICATION Place 1 Application vaginally       Fluocinolone Acetonide Scalp (DERMA-SMOOTHE/FS SCALP) 0.01 % OIL oil Apply once a week to scalp for lichen planopilaris 118 mL 11     ibuprofen (ADVIL,MOTRIN) 800 MG tablet Take 800 mg by mouth every 8 hours as needed.       Loratadine (CLARITIN PO) Take  by mouth.       NEW MED Biest 1.5mg/gram cream(pg free)  Insert 1 gram vaginally twice weekly as directed 40 g 0     triamcinolone acetonide (KENALOG) 10 MG/ML injection See med note 5 mL 0     triamcinolone acetonide (KENALOG) 10 MG/ML injection See med note 5 mL 0     desonide (DESOWEN) 0.05 % cream Mix 50/50 with ketoconazole and apply twice daily to irritated area for 3 weeks (Patient not taking: Reported on 10/23/2019) 60 g 1     fluconazole (DIFLUCAN) 200 MG tablet Take 1 tablet (200 mg) by mouth daily (Patient not taking: Reported on 10/23/2019) 45 tablet 4     hydroxychloroquine (PLAQUENIL) 200  MG tablet Take 1 tablet (200 mg) by mouth 2 times daily (Patient not taking: Reported on 1/10/2020) 60 tablet 1     ketoconazole (NIZORAL) 2 % cream Mix 50/50 with desonide and apply twice daily to irritated area for 3 weeks (Patient not taking: Reported on 10/23/2019) 30 g 1     tretinoin (RETIN-A) 0.01 % external gel Use a pea size amount over entire face, concentrating on problem area, every other day at bedtime. (Patient not taking: Reported on 10/23/2019) 15 g 0     Allergies   Allergen Reactions     Dust Mites Other (See Comments)     Sneezing, coughing. asthma  Itchy watery eyes, sneezing     Estrogens      Other reaction(s): Hypersensitivity  Topical caused burning sensation of skin     Imiquimod Other (See Comments)     Hair loss     Levaquin [Levofloxacin Hemihydrate] Other (See Comments)     Muscle weakness     Levofloxacin Other (See Comments)     MUSCLE WEAKNESS, ARTHRITIS LIKE SYMPTOMS.       Mold Other (See Comments)     Sneezing, asthma, weezing     Pollen Extract/Tree Extract Other (See Comments)     Sneezing, weezing     Sulfa Drugs Hives     Sulfamethoxazole-Trimethoprim Hives     Latex Rash     LATEX BANDAGES; tapes adhesive     Penicillin G Rash     Penicillins Rash     Review of Systems:  -Constitutional: Otherwise feeling well today, in usual state of health.  -HEENT: Patient denies nonhealing oral sores.  -Skin: As above in HPI. No additional skin concerns.    Physical exam:  Vitals: /66   Pulse 92   GEN: This is a well developed, well-nourished female in no acute distress, in a pleasant mood.    SKIN: Sun-exposed skin, which includes the head/face, neck, both arms, digits, and/or nails was examined. Unchanged from prior.   - Scalp hair pull tests are negative  - Central scalp area of scarring alopecia present  - Mild perifollicular erythema and scale noted at the frontal scalp.  However the temporal and occipital portions of the scalp have minimal erythema and scale.  - Minimal  crusting and fibrinous exudate on the crown of her scalp.  The crown of her scalp is also moderately tender to palpation.  - Eyebrows and eyelashes are full  -No other lesions of concern on areas examined.     Impression/Plan:  1. Lichen plano-pilaris -slightly increased activity at the frontal portion of the scalp today but otherwise stable.    Continue dermasmooth FS at least weekly.    Discontinue plaquenil, referral placed for Ophthalmology as patient desire second opinion for potential plaquenil retinal toxicity.    Continue DHS zinc shampoo every other day.    ILK today   Kenalog intralesional injection procedure note: After verbal consent and discussion of risks including but not limited to atrophy, pain, and bruising, time out was performed, the patient underwent positioning, 0.2 total cc of Kenalog 10 mg/cc was injected into 11 sites on the scalp.  The patient tolerated the procedure well and left the Dermatology clinic in good condition.      Photographic documentation obtained    2. History of BCC and SCC    No evidence of recurrence on the scalp today.     Return to clinic in 5-8 weeks for likely repeat injections.    CC Fernando Weathers MD  [facility]  [address] [place] [id number] on close of this encounter.    Patient staffed with attending physician Dr. Soto     Staff involved:   Kelly Acuña MD  [facility] Resident PGY-3    Patient was seen and examined with the family medicine  resident. I agree with the history, review of systems, physical examination, assessments and plan. ILK injections were done by me.    Barbara Soto MD  Professor and  Chair  Department of Dermatology  [facility]    Drug Administration Record    Prior to injection, verified patient identity using patient's name and date of birth.  Due to injection administration, patient instructed to remain in clinic for 15 minutes  afterwards, and to report any adverse reaction to me immediately.    Drug Name:  triamcinolone acetonide(kenalog)  Dose: 0.2mL of triamcinolone 10mg/mL, 2mg dose  Route administered: ID  NDC #: [id number]: Kenalog-10 (6137-8097-12)  Amount of waste(mL):4.8  Reason for waste: Multi dose vial    LOT #: [id number]  SITE: scalp  : [employer]  EXPIRATION DATE: 05/2021    Again, thank you for allowing me to participate in the care of your patient.      Sincerely,    Barbara Soto MD

## 2020-01-10 NOTE — PROGRESS NOTES
"Dermatology Rooming Note    Yoan Barba's goals for this visit include:   Chief Complaint   Patient presents with     Hair Loss     yoan is here today for a hair loss follow up. Yoan notes\" My hair is about the same\"      PIERCE Glaser    "

## 2020-01-13 DIAGNOSIS — Z79.899 LONG-TERM USE OF PLAQUENIL: Primary | ICD-10-CM

## 2020-01-16 ENCOUNTER — OFFICE VISIT (OUTPATIENT)
Dept: OPHTHALMOLOGY | Facility: CLINIC | Age: 75
End: 2020-01-16
Attending: DERMATOLOGY
Payer: MEDICARE

## 2020-01-16 DIAGNOSIS — Z79.899 LONG-TERM USE OF PLAQUENIL: ICD-10-CM

## 2020-01-16 PROBLEM — F07.81 POST CONCUSSION SYNDROME: Status: ACTIVE | Noted: 2019-06-20

## 2020-01-16 PROBLEM — T37.2X5A TOXIC MACULOPATHY FROM PLAQUENIL IN THERAPEUTIC USE: Status: ACTIVE | Noted: 2019-12-16

## 2020-01-16 PROBLEM — H35.389 TOXIC MACULOPATHY FROM PLAQUENIL IN THERAPEUTIC USE: Status: ACTIVE | Noted: 2019-12-16

## 2020-01-16 PROCEDURE — 92250 FUNDUS PHOTOGRAPHY W/I&R: CPT | Mod: ZF | Performed by: OPHTHALMOLOGY

## 2020-01-16 PROCEDURE — 92134 CPTRZ OPH DX IMG PST SGM RTA: CPT | Mod: ZF | Performed by: OPHTHALMOLOGY

## 2020-01-16 PROCEDURE — G0463 HOSPITAL OUTPT CLINIC VISIT: HCPCS | Mod: ZF

## 2020-01-16 RX ORDER — CEFUROXIME AXETIL 250 MG/1
250 TABLET ORAL 2 TIMES DAILY
COMMUNITY
Start: 2020-01-14 | End: 2020-08-27

## 2020-01-16 RX ORDER — HYDROXYCHLOROQUINE SULFATE 200 MG/1
200 TABLET, FILM COATED ORAL 2 TIMES DAILY
COMMUNITY
End: 2020-01-16

## 2020-01-16 ASSESSMENT — CONF VISUAL FIELD
METHOD: COUNTING FINGERS
OS_NORMAL: 1
OD_NORMAL: 1

## 2020-01-16 ASSESSMENT — VISUAL ACUITY
METHOD: SNELLEN - LINEAR
OS_CC: 20/25-
OS_CC+: +2
OD_CC+: -1
OD_CC: 20/20

## 2020-01-16 ASSESSMENT — REFRACTION_WEARINGRX
OS_AXIS: 137
OD_AXIS: 025
OD_CYLINDER: +0.75
OS_CYLINDER: +1.50
OD_ADD: +2.75
SPECS_TYPE: PAL
OS_ADD: +2.75
OS_SPHERE: -1.25
OD_SPHERE: -0.25

## 2020-01-16 ASSESSMENT — TONOMETRY
OD_IOP_MMHG: 12
IOP_METHOD: ICARE
OS_IOP_MMHG: 13

## 2020-01-16 ASSESSMENT — CUP TO DISC RATIO
OD_RATIO: 0.3
OS_RATIO: 0.3

## 2020-01-16 ASSESSMENT — SLIT LAMP EXAM - LIDS
COMMENTS: NORMAL
COMMENTS: NORMAL

## 2020-01-16 ASSESSMENT — EXTERNAL EXAM - RIGHT EYE: OD_EXAM: NORMAL

## 2020-01-16 ASSESSMENT — EXTERNAL EXAM - LEFT EYE: OS_EXAM: NORMAL

## 2020-01-16 NOTE — PROGRESS NOTES
I have confirmed the patient's and reviewed Past Medical History, Past Surgical History, Social History, Family History, Problem List, Medication List and agree with Tech note.    CC - Plaquenil toxicity     INTERVAL HISTORY - Initial visit    HPI -   Ester Barba is a  74 year old year-old patient presenting for evaluation for plaquenil toxicity. She was taking Plaquenil 400 mg daily for ~15 years but stopped 12/3/2019 after RPE changes were noted in OCT by her local ophthalmologist and retinologist in [facility].       PAST OCULAR SURGERY  None      RETINAL IMAGING:  OCT 1/16/2020  OD - minor RPE and IS/OS junctions changes   OS - minor RPE and IS/OS junctions changes       FAF 1/16/2020  each eye dot like hypoAF spots at macula    Optos 1/16/2020  each eye dot like hypoAF spots at macula but midperiphery and periphery appear wnl    ASSESSMENT & PLAN    1. Long-term use of Plaquenil    plaquenil use 400 mg daily x ~15 years  Stopped 12/3/2019 after noting some RPE changes by local ophthalmologist  OCT subtle focal RPE changes; hypoAF spots in macula  Not clear if FAF changes are due to plaquenil toxicity  Risk factors for plaquenil toxicity: high dose of plaquenil for long time and macular pathology; No other risk factors for plaquenil toxicity (no liver/kidney disease, lean person)  Will perform mfERG  Asked patient to bring her OCT and VF records from her local doctor    return to clinic: for mfERG    Boby Olmos MD  Vitreoretinal surgery fellow  [facility]    Attestation:  I have seen and examined the patient with Dr. Gela Olmos MD and agree with the findings in this note, as well as the interpretations of the diagnostic tests.      Malathi Agustin MD PhD.  Professor & Chair

## 2020-01-22 ENCOUNTER — ALLIED HEALTH/NURSE VISIT (OUTPATIENT)
Dept: OPHTHALMOLOGY | Facility: CLINIC | Age: 75
End: 2020-01-22
Attending: OPHTHALMOLOGY
Payer: MEDICARE

## 2020-01-22 DIAGNOSIS — Z79.899 LONG-TERM USE OF PLAQUENIL: ICD-10-CM

## 2020-01-22 PROCEDURE — 92274 MULTIFOCAL ERG W/I&R: CPT | Mod: ZF

## 2020-01-22 PROCEDURE — 40000269 ZZH STATISTIC NO CHARGE FACILITY FEE: Mod: ZF

## 2020-01-22 ASSESSMENT — VISUAL ACUITY
CORRECTION_TYPE: GLASSES
OD_CC: 20/25
METHOD: SNELLEN - LINEAR
OS_CC: 20/25

## 2020-01-22 ASSESSMENT — REFRACTION_WEARINGRX
SPECS_TYPE: PAL
OD_ADD: +2.75
OS_CYLINDER: +1.50
OS_SPHERE: -1.25
OD_SPHERE: -0.25
OS_AXIS: 137
OS_ADD: +2.75
OD_AXIS: 025
OD_CYLINDER: +0.75

## 2020-01-22 NOTE — NURSING NOTE
"Chief Complaints and History of Present Illnesses   Patient presents with     Procedure     Chief Complaint(s) and History of Present Illness(es)     Procedure               Comments     mfERG for plaquenil monitoring  Plaquenil X 15 years 400mg/daily. Stopped 12/3/19.  Height 5'6\", weight 134lbs.   Kerry Awad COA 4:17 PM January 22, 2020                       "
(1) body pink, extremities blue

## 2020-01-24 NOTE — PROGRESS NOTES
Assessment & Plan     Ester Barba is a 74 year old female with the following diagnoses:   1. Long-term use of Plaquenil       This is a well-performed and reliable multifocal electroretinogram both eyes.  The amplitudes and latencies are normal throughout all 103 hexagons both eyes.  There is no evidence of plaquenil toxicity.             Attending Physician Attestation:  Complete documentation of historical and exam elements from today's encounter can be found in the full encounter summary report (not reduplicated in this progress note).  I personally obtained the chief complaint(s) and history of present illness.  I confirmed and edited as necessary the review of systems, past medical/surgical history, family history, social history, and examination findings as documented by others; and I examined the patient myself.  I personally reviewed the relevant tests, images, and reports as documented above.  I formulated and edited as necessary the assessment and plan and discussed the findings and management plan with the patient and family. - Scotty Chopra MD

## 2020-02-06 ENCOUNTER — TELEPHONE (OUTPATIENT)
Dept: OPHTHALMOLOGY | Facility: CLINIC | Age: 75
End: 2020-02-06

## 2020-02-06 NOTE — TELEPHONE ENCOUNTER
[facility] Call Center    Phone Message    May a detailed message be left on voicemail: yes     Reason for Call: Other: The pt wants to get the ERG test scheduled. She is leaving town for 2.8 / 2.23.20 so she wants to schedule before that. Please call the pt to discuss. Thanks.     Action Taken: Message routed to:  [facility]    Travel Screening: Not Applicable

## 2020-02-07 NOTE — TELEPHONE ENCOUNTER
ERG has been done and was told needs to come in to see Dr. Radford after ERG to get results.  Patient leaving out of town 2/8 and wants to get appointment scheduled now.  Assisted with scheduling follow up appointment.      Will forward to facilitator to amend if necessary.       Rose Mary Torres on 2/7/2020 at 1:09 PM

## 2020-02-27 ENCOUNTER — OFFICE VISIT (OUTPATIENT)
Dept: OPHTHALMOLOGY | Facility: CLINIC | Age: 75
End: 2020-02-27
Attending: OPHTHALMOLOGY
Payer: MEDICARE

## 2020-02-27 DIAGNOSIS — Z79.899 LONG-TERM USE OF PLAQUENIL: Primary | ICD-10-CM

## 2020-02-27 PROCEDURE — 92015 DETERMINE REFRACTIVE STATE: CPT | Mod: GY,ZF

## 2020-02-27 PROCEDURE — G0463 HOSPITAL OUTPT CLINIC VISIT: HCPCS | Mod: ZF

## 2020-02-27 RX ORDER — PREDNISONE 20 MG/1
20 TABLET ORAL 2 TIMES DAILY
COMMUNITY
End: 2020-08-27

## 2020-02-27 ASSESSMENT — REFRACTION_WEARINGRX
OD_ADD: +2.75
OD_SPHERE: -0.25
OS_AXIS: 137
OD_CYLINDER: +0.75
OD_AXIS: 025
SPECS_TYPE: PAL
OS_SPHERE: -1.25
OS_ADD: +2.75
OS_CYLINDER: +1.50

## 2020-02-27 ASSESSMENT — VISUAL ACUITY
OS_CC: 20/20
OS_CC+: -1
CORRECTION_TYPE: GLASSES
METHOD: SNELLEN - LINEAR
OD_CC: 20/25

## 2020-02-27 ASSESSMENT — CONF VISUAL FIELD
METHOD: COUNTING FINGERS
OS_INFERIOR_NASAL_RESTRICTION: 3
OD_NORMAL: 1

## 2020-02-27 ASSESSMENT — REFRACTION_MANIFEST
OS_SPHERE: -1.25
OD_SPHERE: -0.50
OD_ADD: +2.75
OS_CYLINDER: +1.50
OD_CYLINDER: +0.75
OS_ADD: +2.75
OS_AXIS: 130
OD_AXIS: 001

## 2020-02-27 ASSESSMENT — TONOMETRY
OS_IOP_MMHG: 17
OD_IOP_MMHG: 20
IOP_METHOD: TONOPEN

## 2020-02-27 NOTE — PROGRESS NOTES
Here for mfERG results:    This is a well-performed and reliable multifocal electroretinogram both   eyes.  The amplitudes and latencies are normal throughout all 103 hexagons   both eyes.  There is no evidence of plaquenil toxicity.    Titi Chopra MD    Reviewed results with patient, see opted to not go back on plaquenil at this time since she has been on it for 15 years     Return to clinic 6 months for dilated fundus exam and Optical Coherence Tomography Scan     Malathi Agustin MD PhD.  Professor & Chair

## 2020-02-27 NOTE — NURSING NOTE
Chief Complaints and History of Present Illnesses   Patient presents with     Follow Up     5 week follow-up from ERG for Long-term use of Plaquenil      Chief Complaint(s) and History of Present Illness(es)     Follow Up     Laterality: both eyes    Course: stable    Associated symptoms: floaters.  Negative for eye pain, dryness, flashes, tearing and discharge    Pain scale: 0/10    Comments: 5 week follow-up from ERG for Long-term use of Plaquenil               Comments     Pt denies any significant vision changes in either eye since last visit.  Pt complains of slight irritation BE.  Hx of floaters- unchanged.  Denies any flashes, pain, pressure, discharge, and tearing.  Currently using AT's PRN BE.    Diane Min OT 12:37 PM February 27, 2020

## 2020-03-16 ENCOUNTER — TELEPHONE (OUTPATIENT)
Dept: DERMATOLOGY | Facility: CLINIC | Age: 75
End: 2020-03-16

## 2020-03-16 NOTE — TELEPHONE ENCOUNTER
[facility] Call Center    Phone Message    May a detailed message be left on voicemail: yes     Reason for Call: Other: Pt would like you to change your decision and allow her to see Dr Soto today.  While she does have a cough it is a chronic cough and she does not believe it is related to illness.  Pt says she has spoken to [facility] nurses and they told her she was fine to come in.  Please call Pt - she is very anxious to see Dr Soto     Action Taken: Message routed to:  [facility] dermatology    Travel Screening: Not Applicable

## 2020-03-16 NOTE — TELEPHONE ENCOUNTER
I spoke with Dr. Maldonado and she recommends Ester  To come in on April 28th. I spoke with Ester and let her know I have to verify if Dr. Maldonado has a meeting on that day but we will potentially schedule her for April 28. Time TBD

## 2020-04-27 NOTE — TELEPHONE ENCOUNTER
[facility] Call Center    Phone Message    May a detailed message be left on voicemail: yes     Reason for Call: Other: Pt calling to verify Appt tomorrow, I adv no Appt on schedule but notes talk about coming in on 4/28 , Please call to discuss with Pt  Thank you,    Action Taken: Message routed to:  [facility] derm    Travel Screening: Not Applicable

## 2020-04-27 NOTE — TELEPHONE ENCOUNTER
LVM letting Ester know provider would like to see her 5/4 instead, we will call Ester back with a time.

## 2020-05-04 ENCOUNTER — OFFICE VISIT (OUTPATIENT)
Dept: DERMATOLOGY | Facility: CLINIC | Age: 75
End: 2020-05-04
Payer: MEDICARE

## 2020-05-04 DIAGNOSIS — R23.8 PAPULE: ICD-10-CM

## 2020-05-04 DIAGNOSIS — L66.10 LICHEN PLANOPILARIS: Primary | ICD-10-CM

## 2020-05-04 DIAGNOSIS — L30.9 DERMATITIS: ICD-10-CM

## 2020-05-04 DIAGNOSIS — L82.0 KERATOSIS, INFLAMED SEBORRHEIC: ICD-10-CM

## 2020-05-04 ASSESSMENT — PAIN SCALES - GENERAL: PAINLEVEL: MILD PAIN (3)

## 2020-05-04 NOTE — NURSING NOTE
Chief Complaint   Patient presents with     Hair/Scalp Problem     Ester is here today for Hair Loss, she notes itching on her scalp and tenderness.      [place] LPN

## 2020-05-04 NOTE — NURSING NOTE
Drug Administration Record    Prior to injection, verified patient identity using patient's name and date of birth.  Due to injection administration, patient instructed to remain in clinic for 15 minutes  afterwards, and to report any adverse reaction to me immediately.    Drug Name: triamcinolone acetonide(kenalog)  Dose: 2mL of triamcinolone 10mg/mL, 20mg dose  Route administered: ID  NDC #: [id number]: Kenalog-10 ([id number])  Amount of waste(mL):3  Reason for waste: Single use vial    LOT #: [id number]  SITE: scalp  : [employer]  EXPIRATION DATE: 9/2020

## 2020-05-04 NOTE — LETTER
"5/4/2020       RE: Ester Barba  [address] [place] [address]     Dear Colleague,    Thank you for referring your patient, Ester Barba, to the [facility] DERMATOLOGY at [facility]. Please see a copy of my visit note below.    [facility] Dermatology Note      Dermatology Problem List:  1.. Lichen planopilaris  - Current tx: fluocinolone acetonide 0.01% oil 1-2x/wk, DHS-Zinc shampoo every other day, clobetasol propionate 0.05% shampoo every other day, tretinoin 0.025% cream to the face every other day   - Stopped plaquenil 12/2019  per her eye doctor as concern for retinopathy  but later this was disproven  - s/p ILK 10 ~q5wk: 5/4/20, 1/10/20,11/04/19, 07/2019  - Photodocumentation     2. Hx of NMSC  -BCC on right nasal ala s/p Mohs 11/27/17  -Nodular BCC on the vertex scalp s/p Mohs 6/5/15  -SCC of scalp, s/p Mohs 4/18/2014  -Hx of 1-2 other NMSC on scalp (BCCs)     3. Verrucous keratosis on right forearm, s/p shave biopsy on 12/20/18     4. Acne vulgaris  - Irregularly using OTC Differin gel.    CC:   Chief Complaint   Patient presents with     Hair/Scalp Problem     Ester is here today for Hair Loss, she notes itching on her scalp and tenderness.      Encounter Date: May 4, 2020    History of Present Illness:  Ms. Ester Barba is a 74 year old female who presents as a follow-up for LPP. The patient was last seen 1/10/20 when 0.2 ml ILK 10 mg/mL were injected to the scalp and the patient discontinued Plaquenil.     Today the patient reports her hair loss is stable. She notes however that  her scalp is tender and she is experiencing mild itch in clinic today. She has continued using fluocinolone acetonide 0.01% oil 1-2x/wk and DHS-Zinc shampoo 3x/wk. She receives ILK 10 injections q5wk to the scalp.     The patient is also concerned about the \"mottled\" appearance on her cheeks. She uses moisturizer and does not use any medicated treatment.     The patient " reports she recently had chronic sinusitis. She was on three antibiotic medications for three months and last took antibiotics in February. She has since recovered and is no longer coughing. She also reports her neck injury is better and she does not experience any pain. The patient reports she has been in good health otherwise and denies any new medications or supplements.     Otherwise the patient is feeling well without additional skin concerns at this time.     Past Medical History:   Patient Active Problem List   Diagnosis     Porokeratosis     Squamous cell carcinoma     Neoplasm of uncertain behavior     Lichen planopilaris     Dermatitis     Cicatricial alopecia     Keratosis, inflamed seborrheic     History of skin cancer     Basal cell carcinoma of vertex scalp s/p mohs 6-5-15     Medication management     Actinic keratosis     Medication monitoring encounter     Dermatitis, seborrheic     Erosive pustular dermatosis     Tick bite, initial encounter     Vulvar atrophy     Factor V Leiden mutation (H)     Splenic artery aneurysm (H)     Post concussion syndrome     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 She has never used smokeless tobacco. She reports current alcohol use. She reports that she does not use drugs.    Family History:  Family History   Problem Relation Age of Onset     Skin Cancer Sister         basal cell carcinoma     Melanoma No family hx of       Glaucoma No family hx of      Macular Degeneration No family hx of        Medications:  Current Outpatient Medications   Medication Sig Dispense Refill     Calcium 1500 MG TABS Take  by mouth.       cefPROZIL (CEFZIL) 250 MG tablet Take 1 tablet (250 mg) by mouth 2 times daily 1 tablet 0     cefuroxime (CEFTIN) 250 MG tablet Take 250 mg by mouth 2 times daily       Cholecalciferol (VITAMIN D) 1000 UNIT capsule Take  by mouth. Total 2200 units daily.       COMPOUNDED NON-CONTROLLED SUBSTANCE (CMPD RX) - PHARMACY TO MIX COMPOUNDED MEDICATION Place 1 Application vaginally       fluconazole (DIFLUCAN) 200 MG tablet Take 1 tablet (200 mg) by mouth daily 45 tablet 4     Fluocinolone Acetonide Scalp (DERMA-SMOOTHE/FS SCALP) 0.01 % OIL oil Apply once a week to scalp for lichen planopilaris 118 mL 11     ibuprofen (ADVIL,MOTRIN) 800 MG tablet Take 800 mg by mouth every 8 hours as needed.       Loratadine (CLARITIN PO) Take  by mouth.       NEW MED Biest 1.5mg/gram cream(pg free)  Insert 1 gram vaginally twice weekly as directed 40 g 0     predniSONE (DELTASONE) 20 MG tablet Take 20 mg by mouth 2 times daily       triamcinolone acetonide (KENALOG) 10 MG/ML injection See med note 5 mL 0     triamcinolone acetonide (KENALOG) 10 MG/ML injection See med note 5 mL 0     Allergies   Allergen Reactions     Dust Mites Other (See Comments)     Sneezing, coughing. asthma  Itchy watery eyes, sneezing     Estrogens      Other reaction(s): Hypersensitivity  Topical caused burning sensation of skin     Imiquimod Other (See Comments)     Hair loss     Levaquin [Levofloxacin Hemihydrate] Other (See Comments)     Muscle weakness     Levofloxacin Other (See Comments)     MUSCLE WEAKNESS, ARTHRITIS LIKE SYMPTOMS.       Mold Other (See Comments)     Sneezing, asthma, weezing     Pollen Extract/Tree Extract Other (See Comments)     Sneezing, weezing     Sulfa Drugs Hives     Sulfamethoxazole-Trimethoprim Hives     Latex Rash     LATEX BANDAGES;  tapes adhesive     Penicillin G Rash     Penicillins Rash     Review of Systems:  -Constitutional: Otherwise feeling well today, in usual state of health.  -HEENT: Patient denies nonhealing oral sores.  -Skin: As above in HPI. No additional skin concerns.    Physical exam:  Vitals: There were no vitals taken for this visit.  GEN: This is a well developed, well-nourished female in no acute distress, in a pleasant mood.    SKIN: Sun-exposed skin, which includes the head/face, neck, both arms, digits, and/or nails was examined. Unchanged from prior.   - Mild erythema on bilateral cheeks   - Increased crusting on right frontal scalp  - Stable hair density on bilateral temples  - Stable to slightly improved on the frontal scalp though hair is noted to be thinner on this region  - There are tan to brown waxy stuck on papules with surrounding erythema on the right upper chest and anterior to the right ear.   -No other lesions of concern on areas examined.     Impression/Plan:  1. Lichen planopilaris -slightly increased activity at the frontal portion of the scalp today but otherwise stable.  - Continue DermaSmoothe/FS oil 1-2x/wk  - Continue DHS-Zinc shampoo every other day.  - Start clobetasol propionate 0.05% shampoo every other day   - Start tretinoin 0.025% cream pea-size amount to the face every other night.   - Kenalog intralesional injection procedure note: After verbal consent and discussion of risks including but not limited to atrophy, pain, and bruising, time out was performed, the patient underwent positioning, 1.5 total cc of Kenalog 10 mg/cc was injected into 15 site(s) on the scalp. The patient tolerated the procedure well and left the Dermatology clinic in good condition.  - Photodocumentation    2. Seborrheic keratoses, inflamed - right upper chest and anterior to right ear  - Cryotherapy procedure note: After verbal consent and discussion of risks and benefits including but no limited to  dyspigmentation/scar, blister, and pain, 2 was(were) treated with 1-2mm freeze border for 2 cycles with liquid nitrogen. Post cryotherapy instructions were provided.    Follow-up in 5-8 weeks, earlier for new or changing lesions.     CC Fernando Weathers MD  [facility]  [address] [place] [address] on close of this encounter.        Staff Involved:  Scribe/Staff    Scribe Disclosure:  I, Joelel Antonio, am serving as a scribe to document services personally performed by Dr. Barbara Soto MD, based on data collection and the provider's statements to me.     Provider Disclosure:   The documentation recorded by the scribe accurately reflects the services I personally performed and the decisions made by me. I performed the ILK injections and the cryotherapy procedure.    Barbara Soto MD  Professor and Chair  Department of Dermatology  [facility]: Phone: [phone], Fax:[phone]  [facility]: Phone: [phone], Fax: [phone]

## 2020-05-04 NOTE — PROGRESS NOTES
"[facility] Dermatology Note      Dermatology Problem List:  1.. Lichen planopilaris  - Current tx: fluocinolone acetonide 0.01% oil 1-2x/wk, DHS-Zinc shampoo every other day, clobetasol propionate 0.05% shampoo every other day, tretinoin 0.025% cream to the face every other day   - Stopped plaquenil 12/2019  per her eye doctor as concern for retinopathy  but later this was disproven  - s/p ILK 10 ~q5wk: 5/4/20, 1/10/20,11/04/19, 07/2019  - Photodocumentation     2. Hx of NMSC  -BCC on right nasal ala s/p Mohs 11/27/17  -Nodular BCC on the vertex scalp s/p Mohs 6/5/15  -SCC of scalp, s/p Mohs 4/18/2014  -Hx of 1-2 other NMSC on scalp (BCCs)     3. Verrucous keratosis on right forearm, s/p shave biopsy on 12/20/18     4. Acne vulgaris  - Irregularly using OTC Differin gel.    CC:   Chief Complaint   Patient presents with     Hair/Scalp Problem     Ester is here today for Hair Loss, she notes itching on her scalp and tenderness.      Encounter Date: May 4, 2020    History of Present Illness:  Ms. Ester Barba is a 74 year old female who presents as a follow-up for LPP. The patient was last seen 1/10/20 when 0.2 ml ILK 10 mg/mL were injected to the scalp and the patient discontinued Plaquenil.     Today the patient reports her hair loss is stable. She notes however that  her scalp is tender and she is experiencing mild itch in clinic today. She has continued using fluocinolone acetonide 0.01% oil 1-2x/wk and DHS-Zinc shampoo 3x/wk. She receives ILK 10 injections q5wk to the scalp.     The patient is also concerned about the \"mottled\" appearance on her cheeks. She uses moisturizer and does not use any medicated treatment.     The patient reports she recently had chronic sinusitis. She was on three antibiotic medications for three months and last took antibiotics in February. She has since recovered and is no longer coughing. She also reports her neck injury is better and she does not experience any " pain. The patient reports she has been in good health otherwise and denies any new medications or supplements.     Otherwise the patient is feeling well without additional skin concerns at this time.     Past Medical History:   Patient Active Problem List   Diagnosis     Porokeratosis     Squamous cell carcinoma     Neoplasm of uncertain behavior     Lichen planopilaris     Dermatitis     Cicatricial alopecia     Keratosis, inflamed seborrheic     History of skin cancer     Basal cell carcinoma of vertex scalp s/p mohs 6-5-15     Medication management     Actinic keratosis     Medication monitoring encounter     Dermatitis, seborrheic     Erosive pustular dermatosis     Tick bite, initial encounter     Vulvar atrophy     Factor V Leiden mutation (H)     Splenic artery aneurysm (H)     Post concussion syndrome     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 She has never used smokeless tobacco. She reports current alcohol use. She reports that she does not use drugs.    Family History:  Family History   Problem Relation Age of Onset     Skin Cancer Sister         basal cell carcinoma     Melanoma No family hx of      Glaucoma No family hx of      Macular Degeneration No family hx of        Medications:  Current Outpatient Medications   Medication Sig Dispense Refill     Calcium 1500 MG TABS Take  by mouth.       cefPROZIL (CEFZIL) 250 MG tablet Take 1 tablet (250 mg) by mouth 2 times  daily 1 tablet 0     cefuroxime (CEFTIN) 250 MG tablet Take 250 mg by mouth 2 times daily       Cholecalciferol (VITAMIN D) 1000 UNIT capsule Take  by mouth. Total 2200 units daily.       COMPOUNDED NON-CONTROLLED SUBSTANCE (CMPD RX) - PHARMACY TO MIX COMPOUNDED MEDICATION Place 1 Application vaginally       fluconazole (DIFLUCAN) 200 MG tablet Take 1 tablet (200 mg) by mouth daily 45 tablet 4     Fluocinolone Acetonide Scalp (DERMA-SMOOTHE/FS SCALP) 0.01 % OIL oil Apply once a week to scalp for lichen planopilaris 118 mL 11     ibuprofen (ADVIL,MOTRIN) 800 MG tablet Take 800 mg by mouth every 8 hours as needed.       Loratadine (CLARITIN PO) Take  by mouth.       NEW MED Biest 1.5mg/gram cream(pg free)  Insert 1 gram vaginally twice weekly as directed 40 g 0     predniSONE (DELTASONE) 20 MG tablet Take 20 mg by mouth 2 times daily       triamcinolone acetonide (KENALOG) 10 MG/ML injection See med note 5 mL 0     triamcinolone acetonide (KENALOG) 10 MG/ML injection See med note 5 mL 0     Allergies   Allergen Reactions     Dust Mites Other (See Comments)     Sneezing, coughing. asthma  Itchy watery eyes, sneezing     Estrogens      Other reaction(s): Hypersensitivity  Topical caused burning sensation of skin     Imiquimod Other (See Comments)     Hair loss     Levaquin [Levofloxacin Hemihydrate] Other (See Comments)     Muscle weakness     Levofloxacin Other (See Comments)     MUSCLE WEAKNESS, ARTHRITIS LIKE SYMPTOMS.       Mold Other (See Comments)     Sneezing, asthma, weezing     Pollen Extract/Tree Extract Other (See Comments)     Sneezing, weezing     Sulfa Drugs Hives     Sulfamethoxazole-Trimethoprim Hives     Latex Rash     LATEX BANDAGES; tapes adhesive     Penicillin G Rash     Penicillins Rash     Review of Systems:  -Constitutional: Otherwise feeling well today, in usual state of health.  -HEENT: Patient denies nonhealing oral sores.  -Skin: As above in HPI. No additional skin concerns.    Physical  exam:  Vitals: There were no vitals taken for this visit.  GEN: This is a well developed, well-nourished female in no acute distress, in a pleasant mood.    SKIN: Sun-exposed skin, which includes the head/face, neck, both arms, digits, and/or nails was examined. Unchanged from prior.   - Mild erythema on bilateral cheeks   - Increased crusting on right frontal scalp  - Stable hair density on bilateral temples  - Stable to slightly improved on the frontal scalp though hair is noted to be thinner on this region  - There are tan to brown waxy stuck on papules with surrounding erythema on the right upper chest and anterior to the right ear.   -No other lesions of concern on areas examined.     Impression/Plan:  1. Lichen planopilaris -slightly increased activity at the frontal portion of the scalp today but otherwise stable.  - Continue DermaSmoothe/FS oil 1-2x/wk  - Continue DHS-Zinc shampoo every other day.  - Start clobetasol propionate 0.05% shampoo every other day   - Start tretinoin 0.025% cream pea-size amount to the face every other night.   - Kenalog intralesional injection procedure note: After verbal consent and discussion of risks including but not limited to atrophy, pain, and bruising, time out was performed, the patient underwent positioning, 1.5 total cc of Kenalog 10 mg/cc was injected into 15 site(s) on the scalp. The patient tolerated the procedure well and left the Dermatology clinic in good condition.  - Photodocumentation    2. Seborrheic keratoses, inflamed - right upper chest and anterior to right ear  - Cryotherapy procedure note: After verbal consent and discussion of risks and benefits including but no limited to dyspigmentation/scar, blister, and pain, 2 was(were) treated with 1-2mm freeze border for 2 cycles with liquid nitrogen. Post cryotherapy instructions were provided.    Follow-up in 5-8 weeks, earlier for new or changing lesions.     CC Fernando Weathers MD  [facility]  [address] [place] [address] on close of this encounter.        Staff Involved:  Scribe/Staff    Scribe Disclosure:  I, Joelle Antonio, am serving as a scribe to document services personally performed by Dr. Barbara Soto MD, based on data collection and the provider's statements to me.     Provider Disclosure:   The documentation recorded by the scribe accurately reflects the services I personally performed and the decisions made by me. I performed the ILK injections and the cryotherapy procedure.    Barbara Soto MD  Professor and Chair  Department of Dermatology  [facility]: Phone: [phone], Fax:[phone]  [facility]: Phone: [phone], Fax: [phone]

## 2020-05-07 ENCOUNTER — TELEPHONE (OUTPATIENT)
Dept: DERMATOLOGY | Facility: CLINIC | Age: 75
End: 2020-05-07

## 2020-05-07 RX ORDER — TRETINOIN 0.25 MG/G
CREAM TOPICAL
Qty: 45 G | Refills: 1 | Status: SHIPPED | OUTPATIENT
Start: 2020-05-07 | End: 2021-07-01

## 2020-05-07 RX ORDER — CLOBETASOL PROPIONATE 0.05 G/100ML
SHAMPOO TOPICAL
Qty: 60 ML | Refills: 2 | Status: SHIPPED | OUTPATIENT
Start: 2020-05-07 | End: 2020-11-23

## 2020-05-07 NOTE — TELEPHONE ENCOUNTER
Central Prior Authorization Team   Phone: [phone]        PA Initiation    Medication: clobetasol propionate (CLOBEX) 0.05 % external shampoo  Insurance Company: [employer] Part D - Phone [phone] Fax [phone]  Pharmacy Filling the Rx: [facility] [address] [place] [address] [place]  Filling Pharmacy Phone: [phone]  Filling Pharmacy Fax: [phone]  Start Date: 5/7/2020

## 2020-05-07 NOTE — TELEPHONE ENCOUNTER
Pharmacy has started a Prior Authorization request via Cover My Meds for Clobetasol Propionate 0.05% shampoo, Key: [id number]

## 2020-05-08 NOTE — TELEPHONE ENCOUNTER
PRIOR AUTHORIZATION DENIED    Medication: clobetasol propionate (CLOBEX) 0.05 % external shampoo    Denial Date: 5/7/2020    Denial Rational:         Appeal Information:

## 2020-05-17 NOTE — PATIENT INSTRUCTIONS
1. Lichen planopilaris -slightly increased activity at the frontal portion of the scalp today but otherwise stable.  - Continue DermaSmoothe/FS oil 1-2x/wk  - Continue DHS-Zinc shampoo every other day.  - Start clobetasol propionate 0.05% shampoo every other day   - Start tretinoin 0.025% cream pea-size amount to the face every other night.     Cryotherapy    What is it?    Use of a very cold liquid, such as liquid nitrogen, to freeze and destroy abnormal skin cells that need to be removed    What should I expect?    Tenderness and redness    A small blister that might grow and fill with dark purple blood. There may be crusting.    More than one treatment may be needed if the lesions do not go away.    How do I care for the treated area?    Gently wash the area with your hands when bathing.    Use a thin layer of Vaseline to help with healing. You may use a Band-Aid.     The area should heal within 7-10 days and may leave behind a pink or lighter color.     Do not use an antibiotic or Neosporin ointment.     You may take acetaminophen (Tylenol) for pain.     Call your Doctor if you have:    Severe pain    Signs of infection (warmth, redness, cloudy yellow drainage, and or a bad smell)    Questions or concerns    Who should I call with questions?       [facility]: [phone]       [facility]: [phone]       For urgent needs outside of business hours call the [facility] at [phone]        and ask for the dermatology resident on call

## 2020-06-22 ENCOUNTER — OFFICE VISIT (OUTPATIENT)
Dept: DERMATOLOGY | Facility: CLINIC | Age: 75
End: 2020-06-22
Payer: MEDICARE

## 2020-06-22 ENCOUNTER — TELEPHONE (OUTPATIENT)
Dept: DERMATOLOGY | Facility: CLINIC | Age: 75
End: 2020-06-22

## 2020-06-22 DIAGNOSIS — L66.9 CICATRICIAL ALOPECIA: ICD-10-CM

## 2020-06-22 DIAGNOSIS — L66.10 LICHEN PLANOPILARIS: Primary | ICD-10-CM

## 2020-06-22 DIAGNOSIS — L30.9 DERMATITIS: ICD-10-CM

## 2020-06-22 ASSESSMENT — PAIN SCALES - GENERAL: PAINLEVEL: NO PAIN (0)

## 2020-06-22 NOTE — PATIENT INSTRUCTIONS
You can talk with your primary care physician about restarting plaquenil if you would like to help with your joints and lichen planopilaris again.      Keep using the clobetasol shampoo every other day like you are!

## 2020-06-22 NOTE — PROGRESS NOTES
"[facility] Dermatology Note      Dermatology Problem List:  1. Lichen planopilaris  - Current tx: clobetasol propionate 0.05% shampoo every other day, tretinoin 0.025% cream to the face every other day   - Stopped plaquenil 12/2019 per her eye doctor as concern for retinopathy  but later this was disproven  - Previous tx: fluocinolone acetonide 0.01% oil, DHS-Zinc shampoo  - s/p ILK 10 ~q5wk: 5/4/20, 1/10/20,11/04/19, 07/2019  - Photodocumentation    2. Hx of NMSC  -BCC on right nasal ala s/p Mohs 11/27/17  -Nodular BCC on the vertex scalp s/p Mohs 6/5/15  -SCC of scalp, s/p Mohs 4/18/2014  -Hx of 1-2 other NMSC on scalp (BCCs)    3. Verrucous keratosis on right forearm, s/p shave biopsy on 12/20/18    4. 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       History of Present Illness:  Ms. Ester Barba is a 74 year old female who presents as a follow-up for lichen planopilaris. The patient was last seen 5/4/20 when she had intralesional kenalog performed. She did have one open crusted area on the scalp that was tender at that time, and plan was to biopsy this lesion if it continued. Today, the patient notes she is doing \"okay\". She does have continued tenderness all over the crown of her scalp. Thinks the hair shedding is stable however. Would like intralesional kenalog today. Thinks the one spot from the previous visit has healed up well. Using clobetasol shampoo every other day to the scalp and tretinoin 0.025% cream every other day to the face for facial papules. Not currently using any other medications at this time.    Past Medical History:   Patient Active Problem List   Diagnosis     Porokeratosis     Squamous cell carcinoma     Neoplasm of uncertain behavior     Lichen planopilaris     Dermatitis     Cicatricial alopecia     " Keratosis, inflamed seborrheic     History of skin cancer     Basal cell carcinoma of vertex scalp s/p mohs 6-5-15     Medication management     Actinic keratosis     Medication monitoring encounter     Dermatitis, seborrheic     Erosive pustular dermatosis     Tick bite, initial encounter     Vulvar atrophy     Factor V Leiden mutation (H)     Splenic artery aneurysm (H)     Post concussion syndrome     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 She has never used smokeless tobacco. She reports current alcohol use. She reports that she does not use drugs.    Family History:  Family History   Problem Relation Age of Onset     Skin Cancer Sister         basal cell carcinoma     Melanoma No family hx of      Glaucoma No family hx of      Macular Degeneration No family hx of        Medications:  Current Outpatient Medications   Medication Sig Dispense Refill     Calcium 1500 MG TABS Take  by mouth.       Cholecalciferol (VITAMIN D) 1000 UNIT capsule Take  by mouth. Total 2200 units daily.       clobetasol propionate (CLOBEX) 0.05 % external shampoo Apply to a dry scalp, leave on for 15 minutes, then lather and rinse, do every other day 60 mL 2     COMPOUNDED NON-CONTROLLED SUBSTANCE (CMPD RX) - PHARMACY TO MIX COMPOUNDED MEDICATION Place 1 Application vaginally       Fluocinolone Acetonide Scalp (DERMA-SMOOTHE/FS SCALP) 0.01 % OIL oil Apply once a week to scalp for lichen planopilaris  118 mL 11     ibuprofen (ADVIL,MOTRIN) 800 MG tablet Take 800 mg by mouth every 8 hours as needed.       Loratadine (CLARITIN PO) Take  by mouth.       NEW MED Biest 1.5mg/gram cream(pg free)  Insert 1 gram vaginally twice weekly as directed 40 g 0     tretinoin (RETIN-A) 0.025 % external cream Apply a pea size amount to face,  Do every other night as tolerated 45 g 1     triamcinolone acetonide (KENALOG) 10 MG/ML injection See med note 5 mL 0     triamcinolone acetonide (KENALOG) 10 MG/ML injection See med note 5 mL 0     cefPROZIL (CEFZIL) 250 MG tablet Take 1 tablet (250 mg) by mouth 2 times daily 1 tablet 0     cefuroxime (CEFTIN) 250 MG tablet Take 250 mg by mouth 2 times daily       fluconazole (DIFLUCAN) 200 MG tablet Take 1 tablet (200 mg) by mouth daily 45 tablet 4     predniSONE (DELTASONE) 20 MG tablet Take 20 mg by mouth 2 times daily          Allergies   Allergen Reactions     Dust Mites Other (See Comments)     Sneezing, coughing. asthma  Itchy watery eyes, sneezing     Estrogens      Other reaction(s): Hypersensitivity  Topical caused burning sensation of skin     Imiquimod Other (See Comments)     Hair loss     Levaquin [Levofloxacin Hemihydrate] Other (See Comments)     Muscle weakness     Levofloxacin Other (See Comments)     MUSCLE WEAKNESS, ARTHRITIS LIKE SYMPTOMS.       Mold Other (See Comments)     Sneezing, asthma, weezing     Pollen Extract/Tree Extract Other (See Comments)     Sneezing, weezing     Sulfa Drugs Hives     Sulfamethoxazole-Trimethoprim Hives     Latex Rash     LATEX BANDAGES; tapes adhesive     Penicillin G Rash     Penicillins Rash       Review of Systems:  -As per HPI  -Constitutional: Otherwise feeling well today, in usual state of health.  -HEENT: Patient denies nonhealing oral sores.  -Skin: As above in HPI. No additional skin concerns.    Physical exam:  GEN: This is a well developed, well-nourished female in no acute distress, in a pleasant mood.    SKIN: Focused  examination of the face and scalp was performed.  -Khalil skin type: II  -Waxy stuck on tan papules on the forehead  -Scarring with telangiectasias scattered on the crown with some areas of yellow crusting and mild tenderness to palpation  -No other lesions of concern on areas examined.           Impression/Plan:  1. Lichen planopilaris, stable. Clinically, no perifollicular erythema and very mild perifollicular  scale but patient with continued tenderness to palpation  - Kenalog intralesional injection procedure note: After verbal consent and discussion of risks including but not limited to atrophy, pain, and bruising, time out was performed, the patient underwent positioning, 1.0 total cc of Kenalog 10 mg/cc was injected into 10 site(s) on the crown. The patient tolerated the procedure well and left the Dermatology clinic in good condition.  - Continue clobetasol shampoo every other day     2. Seborrheic keratosis, non irritated on the forehead  - Reviewed benign etiology and natural course  - using tretinoin 0.025% cream every other day to the face      CC Referred Self, MD  No address on file on close of this encounter.  Follow-up in 6-8 weeks, earlier for new or changing lesions.       Dr. Soto staffed the patient.    Staff Involved:  Resident(Loly Comer MD)/Staff    Patient was seen and examined with the dermatology resident. I agree with the history, review of systems, physical examination, assessments and plan. ILK injections were done together.     Barbara Soto MD  Professor and  Chair  Department of Dermatology  [facility]

## 2020-06-22 NOTE — TELEPHONE ENCOUNTER
I called and left a VM asking for Ester to give us a call back to get scheduled for a follow up in August. If patient calls back ask if Aug 24th @ 8:15 will work per Dr. Soto

## 2020-06-22 NOTE — NURSING NOTE
Chief Complaint   Patient presents with     Hair Loss     Ester is ere today for a follow up on hair loss.     KRISTINA FERNANDES on 6/22/2020 at 11:28 AM

## 2020-06-22 NOTE — LETTER
"6/22/2020       RE: Ester Barba  [address] [place] [address]     Dear Colleague,    Thank you for referring your patient, Ester Barba, to the [facility] DERMATOLOGY at [facility]. Please see a copy of my visit note below.    [facility] Dermatology Note      Dermatology Problem List:  1. Lichen planopilaris  - Current tx: clobetasol propionate 0.05% shampoo every other day, tretinoin 0.025% cream to the face every other day   - Stopped plaquenil 12/2019 per her eye doctor as concern for retinopathy  but later this was disproven  - Previous tx: fluocinolone acetonide 0.01% oil, DHS-Zinc shampoo  - s/p ILK 10 ~q5wk: 5/4/20, 1/10/20,11/04/19, 07/2019  - Photodocumentation    2. Hx of NMSC  -BCC on right nasal ala s/p Mohs 11/27/17  -Nodular BCC on the vertex scalp s/p Mohs 6/5/15  -SCC of scalp, s/p Mohs 4/18/2014  -Hx of 1-2 other NMSC on scalp (BCCs)    3. Verrucous keratosis on right forearm, s/p shave biopsy on 12/20/18    4. 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       History of Present Illness:  Ms. Ester Barba is a 74 year old female who presents as a follow-up for lichen planopilaris. The patient was last seen 5/4/20 when she had intralesional kenalog performed. She did have one open crusted area on the scalp that was tender at that time, and plan was to biopsy this lesion if it continued. Today, the patient notes she is doing \"okay\". She does have continued tenderness all over the crown of her scalp. Thinks the hair shedding is stable however. Would like intralesional kenalog today. Thinks the one spot from the previous visit has healed up well. Using clobetasol shampoo every other day to the scalp and tretinoin 0.025% cream every other day to the face for facial papules. Not " currently using any other medications at this time.    Past Medical History:   Patient Active Problem List   Diagnosis     Porokeratosis     Squamous cell carcinoma     Neoplasm of uncertain behavior     Lichen planopilaris     Dermatitis     Cicatricial alopecia     Keratosis, inflamed seborrheic     History of skin cancer     Basal cell carcinoma of vertex scalp s/p mohs 6-5-15     Medication management     Actinic keratosis     Medication monitoring encounter     Dermatitis, seborrheic     Erosive pustular dermatosis     Tick bite, initial encounter     Vulvar atrophy     Factor V Leiden mutation (H)     Splenic artery aneurysm (H)     Post concussion syndrome     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 She has never used smokeless tobacco. She reports current alcohol use. She reports that she does not use drugs.    Family History:  Family History   Problem Relation Age of Onset     Skin Cancer Sister         basal cell carcinoma     Melanoma No family hx of      Glaucoma No family hx of      Macular Degeneration No family hx of        Medications:  Current Outpatient Medications   Medication Sig Dispense Refill     Calcium 1500 MG TABS Take  by mouth.       Cholecalciferol (VITAMIN D) 1000 UNIT capsule Take  by mouth. Total 2200 units daily.       clobetasol propionate (CLOBEX) 0.05 % external shampoo Apply to a dry scalp, leave on for 15 minutes, then lather and rinse, do  every other day 60 mL 2     COMPOUNDED NON-CONTROLLED SUBSTANCE (CMPD RX) - PHARMACY TO MIX COMPOUNDED MEDICATION Place 1 Application vaginally       Fluocinolone Acetonide Scalp (DERMA-SMOOTHE/FS SCALP) 0.01 % OIL oil Apply once a week to scalp for lichen planopilaris 118 mL 11     ibuprofen (ADVIL,MOTRIN) 800 MG tablet Take 800 mg by mouth every 8 hours as needed.       Loratadine (CLARITIN PO) Take  by mouth.       NEW MED Biest 1.5mg/gram cream(pg free)  Insert 1 gram vaginally twice weekly as directed 40 g 0     tretinoin (RETIN-A) 0.025 % external cream Apply a pea size amount to face,  Do every other night as tolerated 45 g 1     triamcinolone acetonide (KENALOG) 10 MG/ML injection See med note 5 mL 0     triamcinolone acetonide (KENALOG) 10 MG/ML injection See med note 5 mL 0     cefPROZIL (CEFZIL) 250 MG tablet Take 1 tablet (250 mg) by mouth 2 times daily 1 tablet 0     cefuroxime (CEFTIN) 250 MG tablet Take 250 mg by mouth 2 times daily       fluconazole (DIFLUCAN) 200 MG tablet Take 1 tablet (200 mg) by mouth daily 45 tablet 4     predniSONE (DELTASONE) 20 MG tablet Take 20 mg by mouth 2 times daily          Allergies   Allergen Reactions     Dust Mites Other (See Comments)     Sneezing, coughing. asthma  Itchy watery eyes, sneezing     Estrogens      Other reaction(s): Hypersensitivity  Topical caused burning sensation of skin     Imiquimod Other (See Comments)     Hair loss     Levaquin [Levofloxacin Hemihydrate] Other (See Comments)     Muscle weakness     Levofloxacin Other (See Comments)     MUSCLE WEAKNESS, ARTHRITIS LIKE SYMPTOMS.       Mold Other (See Comments)     Sneezing, asthma, weezing     Pollen Extract/Tree Extract Other (See Comments)     Sneezing, weezing     Sulfa Drugs Hives     Sulfamethoxazole-Trimethoprim Hives     Latex Rash     LATEX BANDAGES; tapes adhesive     Penicillin G Rash     Penicillins Rash       Review of Systems:  -As per HPI  -Constitutional: Otherwise feeling well  today, in usual state of health.  -HEENT: Patient denies nonhealing oral sores.  -Skin: As above in HPI. No additional skin concerns.    Physical exam:  GEN: This is a well developed, well-nourished female in no acute distress, in a pleasant mood.    SKIN: Focused examination of the face and scalp was performed.  -Khalil skin type: II  -Waxy stuck on tan papules on the forehead  -Scarring with telangiectasias scattered on the crown with some areas of yellow crusting and mild tenderness to palpation  -No other lesions of concern on areas examined.           Impression/Plan:  1. Lichen planopilaris, stable. Clinically, no perifollicular erythema and very mild perifollicular  scale but patient with continued tenderness to palpation  - Kenalog intralesional injection procedure note: After verbal consent and discussion of risks including but not limited to atrophy, pain, and bruising, time out was performed, the patient underwent positioning, 1.0 total cc of Kenalog 10 mg/cc was injected into 10 site(s) on the crown. The patient tolerated the procedure well and left the Dermatology clinic in good condition.  - Continue clobetasol shampoo every other day     2. Seborrheic keratosis, non irritated on the forehead  - Reviewed benign etiology and natural course  - using tretinoin 0.025% cream every other day to the face      CC Referred Self, MD  No address on file on close of this encounter.  Follow-up in 6-8 weeks, earlier for new or changing lesions.       Dr. Soto staffed the patient.    Staff Involved:  Resident(Loly Comer MD)/Staff    Patient was seen and examined with the dermatology resident. I agree with the history, review of systems, physical examination, assessments and plan. ILK injections were done together.     Barbara Soto MD  Professor and  Chair  Department of Dermatology  [facility]

## 2020-06-23 ENCOUNTER — TELEPHONE (OUTPATIENT)
Dept: DERMATOLOGY | Facility: CLINIC | Age: 75
End: 2020-06-23

## 2020-06-23 NOTE — TELEPHONE ENCOUNTER
[facility] Call Center    Phone Message    May a detailed message be left on voicemail: yes     Reason for Call: Other: Pt called to schedule.  Pt got mad when I mentioned Dr Estrada had said an 8:15 appt would be ok.  I told her I could help her find a later appt. and asked if she had considered a video appt.  She got mad and said she couldn't do a video appt.  I offered to continue trying to find her an in office appt later than 8:15 and she became combative and insisted on talking to Dr Post nurse.  Please call the Pt and try to help her meet her needs.     Action Taken: Message routed to:  [facility] ([facility] dermatology    Travel Screening: Not Applicable

## 2020-08-25 DIAGNOSIS — Z79.899 LONG-TERM USE OF PLAQUENIL: Primary | ICD-10-CM

## 2020-08-27 ENCOUNTER — OFFICE VISIT (OUTPATIENT)
Dept: OPHTHALMOLOGY | Facility: CLINIC | Age: 75
End: 2020-08-27
Attending: OPHTHALMOLOGY
Payer: MEDICARE

## 2020-08-27 DIAGNOSIS — Z79.899 LONG-TERM USE OF PLAQUENIL: Primary | ICD-10-CM

## 2020-08-27 PROCEDURE — G0463 HOSPITAL OUTPT CLINIC VISIT: HCPCS | Mod: 25,ZF

## 2020-08-27 PROCEDURE — 92015 DETERMINE REFRACTIVE STATE: CPT | Mod: GY,ZF

## 2020-08-27 ASSESSMENT — REFRACTION_MANIFEST
OS_AXIS: 135
OS_ADD: +2.50
OD_ADD: +2.50
OS_CYLINDER: +1.50
OD_CYLINDER: +1.00
OD_AXIS: 002
OS_SPHERE: -1.75
OD_SPHERE: -0.50

## 2020-08-27 ASSESSMENT — TONOMETRY
OS_IOP_MMHG: 12
IOP_METHOD: TONOPEN
OD_IOP_MMHG: 14

## 2020-08-27 ASSESSMENT — VISUAL ACUITY
OS_CC+: -1
OS_CC: 20/25
OD_CC: 20/30
OS_PH_CC+: -1
OD_PH_CC: 20/20
OS_PH_CC: 20/20
METHOD: SNELLEN - LINEAR

## 2020-08-27 ASSESSMENT — SLIT LAMP EXAM - LIDS
COMMENTS: NORMAL
COMMENTS: NORMAL

## 2020-08-27 ASSESSMENT — REFRACTION_WEARINGRX
OD_AXIS: 025
SPECS_TYPE: PAL
OS_AXIS: 137
OD_SPHERE: -0.25
OD_CYLINDER: +0.75
OS_CYLINDER: +1.50
OD_ADD: +2.75
OS_ADD: +2.75
OS_SPHERE: -1.25

## 2020-08-27 ASSESSMENT — CONF VISUAL FIELD
OD_NORMAL: 1
OS_NORMAL: 1
METHOD: COUNTING FINGERS

## 2020-08-27 ASSESSMENT — CUP TO DISC RATIO
OD_RATIO: 0.3
OS_RATIO: 0.3

## 2020-08-27 ASSESSMENT — EXTERNAL EXAM - LEFT EYE: OS_EXAM: NORMAL

## 2020-08-27 ASSESSMENT — EXTERNAL EXAM - RIGHT EYE: OD_EXAM: NORMAL

## 2020-08-27 NOTE — PROGRESS NOTES
I have confirmed the patient's and reviewed Past Medical History, Past Surgical History, Social History, Family History, Problem List, Medication List and agree with Tech note.     CC - Plaquenil toxicity      INTERVAL HISTORY - Initial visit     HPI -   Ester Barba is a  74 year old year-old patient presenting for evaluation for plaquenil toxicity. She was taking Plaquenil 400 mg daily for ~15 years but stopped 12/3/2019 after RPE changes were noted in OCT by her local ophthalmologist and retinologist in [facility].         PAST OCULAR SURGERY  None        RETINAL IMAGING:  OCT 1/16/2020  OD - minor RPE and IS/OS junctions changes   OS - minor RPE and IS/OS junctions changes         FAF 1/16/2020  each eye dot like hypoAF spots at macula     Optos 1/16/2020  each eye dot like hypoAF spots at macula but midperiphery and periphery appear within normal limits    mferg:  No signs of plaquenil toxicity      ASSESSMENT & PLAN     1. Long-term use of Plaquenil    plaquenil use 400 mg daily x ~15 years  Stopped 12/3/2019 after noting some RPE changes by local ophthalmologist  OCT subtle focal RPE changes; hypoAF spots in macula  Not clear if FAF changes are due to plaquenil toxicity  Risk factors for plaquenil toxicity: high dose of plaquenil for long time and macular pathology; No other risk factors for plaquenil toxicity (no liver/kidney disease, lean person)    Asked patient to bring her OCT and VF records from her local doctor     return to clinic one year : for DFE and Exam/OCT / fundus autofluorescence both eyes              Malathi Agustin MD PhD.  Professor & Chair

## 2020-08-27 NOTE — NURSING NOTE
Chief Complaints and History of Present Illnesses   Patient presents with     Plaquenil     Chief Complaint(s) and History of Present Illness(es)     Plaquenil               Comments     Ester is here to continue care for Long-term use of Plaquenil. She stopped using Plaquenil last December due to toxicity. She wants to be measured for new glasses today as her reading is not as goo as it has been in the past. She says her eyes are itchy today.     Silvino Stout COT 10:37 AM August 27, 2020

## 2020-08-31 ENCOUNTER — OFFICE VISIT (OUTPATIENT)
Dept: DERMATOLOGY | Facility: CLINIC | Age: 75
End: 2020-08-31
Payer: MEDICARE

## 2020-08-31 DIAGNOSIS — L82.0 KERATOSIS, INFLAMED SEBORRHEIC: ICD-10-CM

## 2020-08-31 DIAGNOSIS — L66.10 LICHEN PLANOPILARIS: ICD-10-CM

## 2020-08-31 DIAGNOSIS — D48.5 NEOPLASM OF UNCERTAIN BEHAVIOR OF SKIN: Primary | ICD-10-CM

## 2020-08-31 DIAGNOSIS — L65.9 LOSS OF HAIR: ICD-10-CM

## 2020-08-31 DIAGNOSIS — L66.9 CICATRICIAL ALOPECIA: ICD-10-CM

## 2020-08-31 PROCEDURE — 88305 TISSUE EXAM BY PATHOLOGIST: CPT | Mod: TC | Performed by: DERMATOLOGY

## 2020-08-31 ASSESSMENT — PAIN SCALES - GENERAL
PAINLEVEL: NO PAIN (0)
PAINLEVEL: NO PAIN (0)

## 2020-08-31 NOTE — PROGRESS NOTES
[facility] Dermatology Note    Dermatology Problem List:  1. Lichen planopilaris  - Current tx: clobetasol propionate 0.05% shampoo every other day, tretinoin 0.025% cream to the face every other day   - Stopped plaquenil 12/2019 per her eye doctor as concern for retinopathy but later this was disproven  - Previous tx: fluocinolone acetonide 0.01% oil, DHS-Zinc shampoo  - s/p ILK 10 ~q5wk: 5/4/20, 1/10/20,11/04/19, 07/2019     2. Hx of NMSC  -BCC on right nasal ala s/p Mohs 11/27/17  -Nodular BCC on the vertex scalp s/p Mohs 6/5/15  -SCC of scalp, s/p Mohs 4/18/2014  -Hx of 1-2 other NMSC on scalp (BCCs)     3. Verrucous keratosis on right forearm, s/p shave biopsy on 12/20/18     4. Facial papules related to FFA  -   tretinoin 0.025% cream     Encounter Date: Aug 31, 2020    CC:  Chief Complaint   Patient presents with     Derm Problem     Ester is here for a hairloss follow up, state it's getting worse, but her scalp is the same.      History of Present Illness:  Ms. Ester Barba is a 74 year old female who presents as a follow-up for lichen planopilaris. The patient was last seen on 6/22/20 when she had intralesional kenalog performed. Since she was last seen in clinic, the patient notes that she has been experiences intermittent tenderness and sensitivity along the frontal hairline, and she has been experiencing significant diffuse hair loss along the crown of her hair (which is new to her), reporting that her comb is full of hair. She has been continuing to use the clobetasol every other day as prescribed, and using tretinoin on the face. Has been monitoring a rough patch on her right cheek, which has previously undergone cryotherapy. No itching of the scalp, fevers, chills, joint pains, or further complaints or concerns noted.    Past Medical History:   Patient Active Problem List   Diagnosis     Porokeratosis     Squamous cell carcinoma     Neoplasm of uncertain behavior     Lichen  planopilaris     Dermatitis     Cicatricial alopecia     Keratosis, inflamed seborrheic     History of skin cancer     Basal cell carcinoma of vertex scalp s/p mohs 6-5-15     Medication management     Actinic keratosis     Medication monitoring encounter     Dermatitis, seborrheic     Erosive pustular dermatosis     Tick bite, initial encounter     Vulvar atrophy     Factor V Leiden mutation (H)     Splenic artery aneurysm (H)     Post concussion syndrome     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 She has never used smokeless tobacco. She reports current alcohol use. She reports that she does not use drugs.    Family History:  Family History   Problem Relation Age of Onset     Skin Cancer Sister         basal cell carcinoma     Melanoma No family hx of      Glaucoma No family hx of      Macular Degeneration No family hx of    Two siblings and grandson with atopic dermatitis.     Medications:  Current Outpatient Medications   Medication Sig Dispense Refill     Calcium 1500 MG TABS Take  by mouth.       Cholecalciferol (VITAMIN D) 1000 UNIT capsule Take  by mouth. Total 2200 units daily.       clobetasol propionate (CLOBEX) 0.05 % external shampoo Apply to a dry scalp, leave on for 15 minutes, then lather and rinse, do every other day 60 mL 2     COMPOUNDED NON-CONTROLLED SUBSTANCE (CMPD RX) - PHARMACY TO MIX COMPOUNDED MEDICATION Place 1 Application vaginally       Fluocinolone  Acetonide Scalp (DERMA-SMOOTHE/FS SCALP) 0.01 % OIL oil Apply once a week to scalp for lichen planopilaris 118 mL 11     ibuprofen (ADVIL,MOTRIN) 800 MG tablet Take 800 mg by mouth every 8 hours as needed.       Loratadine (CLARITIN PO) Take  by mouth.       NEW MED Biest 1.5mg/gram cream(pg free)  Insert 1 gram vaginally twice weekly as directed 40 g 0     tretinoin (RETIN-A) 0.025 % external cream Apply a pea size amount to face,  Do every other night as tolerated 45 g 1     triamcinolone acetonide (KENALOG) 10 MG/ML injection See med note 5 mL 0     triamcinolone acetonide (KENALOG) 10 MG/ML injection See med note 5 mL 0     fluconazole (DIFLUCAN) 200 MG tablet Take 1 tablet (200 mg) by mouth daily 45 tablet 4     Allergies   Allergen Reactions     Dust Mites Other (See Comments)     Sneezing, coughing. asthma  Itchy watery eyes, sneezing     Estrogens      Other reaction(s): Hypersensitivity  Topical caused burning sensation of skin     Imiquimod Other (See Comments)     Hair loss     Levaquin [Levofloxacin Hemihydrate] Other (See Comments)     Muscle weakness     Levofloxacin Other (See Comments)     MUSCLE WEAKNESS, ARTHRITIS LIKE SYMPTOMS.       Mold Other (See Comments)     Sneezing, asthma, weezing     Pollen Extract/Tree Extract Other (See Comments)     Sneezing, weezing     Sulfa Drugs Hives     Sulfamethoxazole-Trimethoprim Hives     Latex Rash     LATEX BANDAGES; tapes adhesive     Penicillin G Rash     Penicillins Rash     Review of Systems:  -As per HPI  -Constitutional: Otherwise feeling well today, in usual state of health.  -Skin: As above in HPI. No additional skin concerns.    Physical exam:  Vitals: There were no vitals taken for this visit.  GEN: This is a well developed, well-nourished female in no acute distress, in a pleasant mood.    SKIN: Focused examination of the face and scalp was performed.  -Khalil skin type: II  - 5 mm Gritty pink plaque on the vertex scalp, tender to  palpation.   - Surrounding rosettes seen on dermoscopy  - 1 cm pink nummular papule on the right cheek   - Waxy stuck on papules on the forehead  - LPPAI score = 2.17. Scarring with telangiectasias scattered on the crown, extending slightly  further posterior than the previous photograph in June.   -No other lesions of concern on areas examined.     Impression/Plan:  1. Lichen planopilaris, with increased loss on the crown of the scalp. Clinically, mild to moderate perifollicular scale with continued tenderness to palpation.  - Continue clobetasol shampoo every other day  - Kenalog intralesional injection procedure note: After verbal consent and discussion of risks including but not limited to atrophy, pain, and bruising, time out was performed, the patient underwent positioning, 1.55 total cc of Kenalog 10 mg/cc was injected into 15 site(s) on the scalp. The patient tolerated the procedure well and left the Dermatology clinic in good condition.    2. Seborrheic keratosis, non irritated on the forehead and irritated on the right cheek  - Reviewed benign etiology and natural course  - using tretinoin 0.025% cream every other day to the face  - Cryotherapy performed to the inflammed seborrheic keratosis on the right cheek - recommended avoiding use of tretinoin cream to this area throughout the healing process    3. Neoplasm of uncertain behavior on the scalp. The differential diagnosis includes actinic keratosis versus squamous cell carcinoma.   - Shave biopsy(performed by resident): After discussion of benefits and risks including but not limited to bleeding, infection, scar, incomplete removal, recurrence, and non-diagnostic biopsy, written consent and photographs were obtained. Time-out was performed. The area was cleaned with isopropyl alcohol. 1.0 mL of 1% lidocaine with 1:100,000 epinephrine was injected to obtain adequate anesthesia of the lesion on the vertex scalp. A shave biopsy was performed. Hemostasis  was achieved with aluminium chloride. Vaseline and a sterile dressing were applied. The patient tolerated the procedure and no complications were noted. The patient was provided with verbal and written post care instructions.     CC Referred Self, MD  No address on file on close of this encounter.  Follow-up in 2 months, earlier for new or changing lesions.     Staff Involved:  I, Vinita Fan, saw and examined the patient in the presence of Dr. Soto.    Staff Physician:  I was present with the medical student who participated in the service and in the documentation of the note. I have verified the history and personally performed the physical exam and medical decision making. I agree with the assessment and plan of care as documented in the note.  Ms. Barba was also seen with the dermatology resident, Dr. Choi. I was present for the entire cryotherapy procedure and for the key portion of the shave biopsy procedure.      Barbara Soto MD  Professor and Chair  Department of Dermatology  [facility]: Phone: [phone], Fax:[phone]  [facility]: Phone: [phone], Fax: [phone]

## 2020-08-31 NOTE — LETTER
8/31/2020       RE: Ester Barba  [address] [place] [address]     Dear Colleague,    Thank you for referring your patient, Ester Barba, to the [facility] DERMATOLOGY at [facility]. Please see a copy of my visit note below.    [facility] Dermatology Note    Dermatology Problem List:  1. Lichen planopilaris  - Current tx: clobetasol propionate 0.05% shampoo every other day, tretinoin 0.025% cream to the face every other day   - Stopped plaquenil 12/2019 per her eye doctor as concern for retinopathy but later this was disproven  - Previous tx: fluocinolone acetonide 0.01% oil, DHS-Zinc shampoo  - s/p ILK 10 ~q5wk: 5/4/20, 1/10/20,11/04/19, 07/2019     2. Hx of NMSC  -BCC on right nasal ala s/p Mohs 11/27/17  -Nodular BCC on the vertex scalp s/p Mohs 6/5/15  -SCC of scalp, s/p Mohs 4/18/2014  -Hx of 1-2 other NMSC on scalp (BCCs)     3. Verrucous keratosis on right forearm, s/p shave biopsy on 12/20/18     4. Facial papules related to FFA  -   tretinoin 0.025% cream     Encounter Date: Aug 31, 2020    CC:  Chief Complaint   Patient presents with     Derm Problem     Ester is here for a hairloss follow up, state it's getting worse, but her scalp is the same.      History of Present Illness:  Ms. Ester Barba is a 74 year old female who presents as a follow-up for lichen planopilaris. The patient was last seen on 6/22/20 when she had intralesional kenalog performed. Since she was last seen in clinic, the patient notes that she has been experiences intermittent tenderness and sensitivity along the frontal hairline, and she has been experiencing significant diffuse hair loss along the crown of her hair (which is new to her), reporting that her comb is full of hair. She has been continuing to use the clobetasol every other day as prescribed, and using tretinoin on the face. Has been monitoring a rough patch on her right cheek, which has previously undergone  cryotherapy. No itching of the scalp, fevers, chills, joint pains, or further complaints or concerns noted.    Past Medical History:   Patient Active Problem List   Diagnosis     Porokeratosis     Squamous cell carcinoma     Neoplasm of uncertain behavior     Lichen planopilaris     Dermatitis     Cicatricial alopecia     Keratosis, inflamed seborrheic     History of skin cancer     Basal cell carcinoma of vertex scalp s/p mohs 6-5-15     Medication management     Actinic keratosis     Medication monitoring encounter     Dermatitis, seborrheic     Erosive pustular dermatosis     Tick bite, initial encounter     Vulvar atrophy     Factor V Leiden mutation (H)     Splenic artery aneurysm (H)     Post concussion syndrome     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 She has never used smokeless tobacco. She reports current alcohol use. She reports that she does not use drugs.    Family History:  Family History   Problem Relation Age of Onset     Skin Cancer Sister         basal cell carcinoma     Melanoma No family hx of      Glaucoma No family hx of      Macular Degeneration No family hx of    Two siblings and grandson with atopic dermatitis.     Medications:  Current Outpatient Medications   Medication Sig Dispense Refill     Calcium 1500 MG TABS Take  by mouth.       Cholecalciferol (VITAMIN D) 1000 UNIT capsule Take  by mouth. Total 2200 units daily.       clobetasol propionate  (CLOBEX) 0.05 % external shampoo Apply to a dry scalp, leave on for 15 minutes, then lather and rinse, do every other day 60 mL 2     COMPOUNDED NON-CONTROLLED SUBSTANCE (CMPD RX) - PHARMACY TO MIX COMPOUNDED MEDICATION Place 1 Application vaginally       Fluocinolone Acetonide Scalp (DERMA-SMOOTHE/FS SCALP) 0.01 % OIL oil Apply once a week to scalp for lichen planopilaris 118 mL 11     ibuprofen (ADVIL,MOTRIN) 800 MG tablet Take 800 mg by mouth every 8 hours as needed.       Loratadine (CLARITIN PO) Take  by mouth.       NEW MED Biest 1.5mg/gram cream(pg free)  Insert 1 gram vaginally twice weekly as directed 40 g 0     tretinoin (RETIN-A) 0.025 % external cream Apply a pea size amount to face,  Do every other night as tolerated 45 g 1     triamcinolone acetonide (KENALOG) 10 MG/ML injection See med note 5 mL 0     triamcinolone acetonide (KENALOG) 10 MG/ML injection See med note 5 mL 0     fluconazole (DIFLUCAN) 200 MG tablet Take 1 tablet (200 mg) by mouth daily 45 tablet 4     Allergies   Allergen Reactions     Dust Mites Other (See Comments)     Sneezing, coughing. asthma  Itchy watery eyes, sneezing     Estrogens      Other reaction(s): Hypersensitivity  Topical caused burning sensation of skin     Imiquimod Other (See Comments)     Hair loss     Levaquin [Levofloxacin Hemihydrate] Other (See Comments)     Muscle weakness     Levofloxacin Other (See Comments)     MUSCLE WEAKNESS, ARTHRITIS LIKE SYMPTOMS.       Mold Other (See Comments)     Sneezing, asthma, weezing     Pollen Extract/Tree Extract Other (See Comments)     Sneezing, weezing     Sulfa Drugs Hives     Sulfamethoxazole-Trimethoprim Hives     Latex Rash     LATEX BANDAGES; tapes adhesive     Penicillin G Rash     Penicillins Rash     Review of Systems:  -As per HPI  -Constitutional: Otherwise feeling well today, in usual state of health.  -Skin: As above in HPI. No additional skin concerns.    Physical exam:  Vitals: There were no vitals taken for  this visit.  GEN: This is a well developed, well-nourished female in no acute distress, in a pleasant mood.    SKIN: Focused examination of the face and scalp was performed.  -Khalil skin type: II  - 5 mm Gritty pink plaque on the vertex scalp, tender to palpation.   - Surrounding rosettes seen on dermoscopy  - 1 cm pink nummular papule on the right cheek   - Waxy stuck on papules on the forehead  - LPPAI score = 2.17. Scarring with telangiectasias scattered on the crown, extending slightly  further posterior than the previous photograph in June.   -No other lesions of concern on areas examined.     Impression/Plan:  1. Lichen planopilaris, with increased loss on the crown of the scalp. Clinically, mild to moderate perifollicular scale with continued tenderness to palpation.  - Continue clobetasol shampoo every other day  - Kenalog intralesional injection procedure note: After verbal consent and discussion of risks including but not limited to atrophy, pain, and bruising, time out was performed, the patient underwent positioning, 1.55 total cc of Kenalog 10 mg/cc was injected into 15 site(s) on the scalp. The patient tolerated the procedure well and left the Dermatology clinic in good condition.    2. Seborrheic keratosis, non irritated on the forehead and irritated on the right cheek  - Reviewed benign etiology and natural course  - using tretinoin 0.025% cream every other day to the face  - Cryotherapy performed to the inflammed seborrheic keratosis on the right cheek - recommended avoiding use of tretinoin cream to this area throughout the healing process    3. Neoplasm of uncertain behavior on the scalp. The differential diagnosis includes actinic keratosis versus squamous cell carcinoma.   - Shave biopsy(performed by resident): After discussion of benefits and risks including but not limited to bleeding, infection, scar, incomplete removal, recurrence, and non-diagnostic biopsy, written consent and  photographs were obtained. Time-out was performed. The area was cleaned with isopropyl alcohol. 1.0 mL of 1% lidocaine with 1:100,000 epinephrine was injected to obtain adequate anesthesia of the lesion on the vertex scalp. A shave biopsy was performed. Hemostasis was achieved with aluminium chloride. Vaseline and a sterile dressing were applied. The patient tolerated the procedure and no complications were noted. The patient was provided with verbal and written post care instructions.     CC Referred Self, MD  No address on file on close of this encounter.  Follow-up in 2 months, earlier for new or changing lesions.     Staff Involved:  I, Vinita Fan, saw and examined the patient in the presence of Dr. Soto.    Staff Physician:  I was present with the medical student who participated in the service and in the documentation of the note. I have verified the history and personally performed the physical exam and medical decision making. I agree with the assessment and plan of care as documented in the note.  Ms. Barba was also seen with the dermatology resident, Dr. Choi. I was present for the entire cryotherapy procedure and for the key portion of the shave biopsy procedure.      Barbara Soto MD  Professor and Chair  Department of Dermatology  [facility]: Phone: [phone], Fax:[phone]  [facility] [place]: Phone: [phone], Fax: [phone]                                                      Pictures were placed in Pt's chart today for future reference.      Again, thank you for allowing me to participate in the care of your patient.      Sincerely,    Barbara Soto MD

## 2020-08-31 NOTE — PATIENT INSTRUCTIONS
Continue using clobetasol shampoo and tretinoin cream every other day as previously prescribed for the scalp and face, respectively. Be sure to avoid the tretinoin on the spot on your cheek that we froze today in order to prevent further irritation. We will follow up with biopsy results as soon as they are made available. Schedule a follow up visit in our clinic in 2 months.    Wound Care After a Biopsy    What is a skin biopsy?  A skin biopsy allows the doctor to examine a very small piece of tissue under the microscope to determine the diagnosis and the best treatment for the skin condition. A local anesthetic (numbing medicine)  is injected with a very small needle into the skin area to be tested. A small piece of skin is taken from the area. Sometimes a suture (stitch) is used.     What are the risks of a skin biopsy?  I will experience scar, bleeding, swelling, pain, crusting and redness. I may experience incomplete removal or recurrence. Risks of this procedure are excessive bleeding, bruising, infection, nerve damage, numbness, thick (hypertrophic or keloidal) scar and non-diagnostic biopsy.    How should I care for my wound for the first 24 hours?    Keep the wound dry and covered for 24 hours    If it bleeds, hold direct pressure on the area for 15 minutes. If bleeding does not stop then go to the emergency room    Avoid strenuous exercise the first 1-2 days or as your doctor instructs you    How should I care for the wound after 24 hours?    After 24 hours, remove the bandage    You may bathe or shower as normal    If you had a scalp biopsy, you can shampoo as usual and can use shower water to clean the biopsy site daily    Clean the wound twice a day with gentle soap and water    Do not scrub, be gentle    Apply white petroleum/Vaseline after cleaning the wound with a cotton swab or a clean finger, and keep the site covered with a Bandaid /bandage. Bandages are not necessary with a scalp biopsy    If you  are unable to cover the site with a Bandaid /bandage, re-apply ointment 2-3 times a day to keep the site moist. Moisture will help with healing    Avoid strenuous activity for first 1-2 days    Avoid lakes, rivers, pools, and oceans until the stitches are removed or the site is healed    How do I clean my wound?    Wash hands thoroughly with soap or use hand  before all wound care    Clean the wound with gentle soap and water    Apply white petroleum/Vaseline  to wound after it is clean    Replace the Bandaid /bandage to keep the wound covered for the first few days or as instructed by your doctor    If you had a scalp biopsy, warm shower water to the area on a daily basis should suffice    What should I use to clean my wound?     Cotton-tipped applicators (Qtips )    White petroleum jelly (Vaseline ). Use a clean new container and use Q-tips to apply.    Bandaids   as needed    Gentle soap     How should I care for my wound long term?    Do not get your wound dirty    Keep up with wound care for one week or until the area is healed.    A small scab will form and fall off by itself when the area is completely healed. The area will be red and will become pink in color as it heals. Sun protection is very important for how your scar will turn out. Sunscreen with an SPF 30 or greater is recommended once the area is healed.    If you have stitches, stitches need to be removed in 14 days. You may return to our clinic for this or you may have it done locally at your doctor s office.    You should have some soreness but it should be mild and slowly go away over several days. Talk to your doctor about using tylenol for pain,    When should I call my doctor?  If you have increased:     Pain or swelling    Pus or drainage (clear or slightly yellow drainage is ok)    Temperature over 100F    Spreading redness or warmth around wound    When will I hear about my results?  The biopsy results can take 2-3 weeks to come  back. The clinic will call you with the results, send you a PayToucht message, or have you schedule a follow-up clinic or phone time to discuss the results. Contact our clinics if you do not hear from us in 3 weeks.     Who should I call with questions?    [facility]: [phone]     [facility]: [phone]    For urgent needs outside of business hours call the [facility] at [phone] and ask for the dermatology resident on call    Cryotherapy    What is it?    Use of a very cold liquid, such as liquid nitrogen, to freeze and destroy abnormal skin cells that need to be removed    What should I expect?    Tenderness and redness    A small blister that might grow and fill with dark purple blood. There may be crusting.    More than one treatment may be needed if the lesions do not go away.    How do I care for the treated area?    Gently wash the area with your hands when bathing.    Use a thin layer of Vaseline to help with healing. You may use a Band-Aid.     The area should heal within 7-10 days and may leave behind a pink or lighter color.     Do not use an antibiotic or Neosporin ointment.     You may take acetaminophen (Tylenol) for pain.     Call your Doctor if you have:    Severe pain    Signs of infection (warmth, redness, cloudy yellow drainage, and or a bad smell)    Questions or concerns    Who should I call with questions?       [facility]: [phone]       [facility]: [phone]       For urgent needs outside of business hours call the [facility] at [phone]        and ask for the dermatology resident on call

## 2020-08-31 NOTE — NURSING NOTE
Drug Administration Record    Prior to injection, verified patient identity using patient's name and date of birth.  Due to injection administration, patient instructed to remain in clinic for 15 minutes  afterwards, and to report any adverse reaction to me immediately.    Drug Name: triamcinolone acetonide(kenalog)  Dose: 2mL of triamcinolone 10mg/mL, 20mg dose  Route administered: ID  NDC #: Kenalog-10 (0711-2092-93)  Amount of waste(mL):3  Reason for waste: Multi dose vial    LOT #: [id number]  SITE: body  : [employer]  EXPIRATION DATE: AUG2021

## 2020-08-31 NOTE — NURSING NOTE
Dermatology Rooming Note    Ester Barba's goals for this visit include:   Chief Complaint   Patient presents with     Derm Problem     Ester is here for a hairloss follow up, state it's getting worse, but her scalp is the same.        Amber Flowers LPN

## 2020-09-07 LAB — COPATH REPORT: NORMAL

## 2020-10-26 ENCOUNTER — OFFICE VISIT (OUTPATIENT)
Dept: DERMATOLOGY | Facility: CLINIC | Age: 75
End: 2020-10-26
Payer: MEDICARE

## 2020-10-26 DIAGNOSIS — L82.0 KERATOSIS, INFLAMED SEBORRHEIC: ICD-10-CM

## 2020-10-26 DIAGNOSIS — Z85.828 HISTORY OF SKIN CANCER: ICD-10-CM

## 2020-10-26 DIAGNOSIS — L66.9 CICATRICIAL ALOPECIA: ICD-10-CM

## 2020-10-26 DIAGNOSIS — L66.10 LICHEN PLANOPILARIS: Primary | ICD-10-CM

## 2020-10-26 PROCEDURE — 99213 OFFICE O/P EST LOW 20 MIN: CPT | Mod: 25 | Performed by: DERMATOLOGY

## 2020-10-26 PROCEDURE — 17110 DESTRUCTION B9 LES UP TO 14: CPT | Performed by: DERMATOLOGY

## 2020-10-26 PROCEDURE — 11901 INJECT SKIN LESIONS >7: CPT | Mod: 59 | Performed by: DERMATOLOGY

## 2020-10-26 NOTE — LETTER
10/26/2020       RE: Ester Barba  [address] [place] [address]     Dear Colleague,    Thank you for referring your patient, Ester Barba, to the [facility] DERMATOLOGY [facility] [place] [facility]. Please see a copy of my visit note below.        [facility] Dermatology Note  Dermatology Problem List:  1. Lichen planopilaris  - Lichenoid keratosis, vertex scalp, s/p biopsy 8/31/2020  - Current tx: clobetasol propionate 0.05% shampoo every other day, tretinoin 0.025% cream to the face every other day   - Stopped plaquenil 12/2019 per her eye doctor as concern for retinopathy but later this was disproven  - Previous tx: fluocinolone acetonide 0.01% oil, DHS-Zinc shampoo  - s/p ILK 10 ~q5wk: 5/4/20, 1/10/20,11/04/19, 07/2019     2. Hx of NMSC  -BCC on right nasal ala s/p Mohs 11/27/17  -Nodular BCC on the vertex scalp s/p Mohs 6/5/15  -SCC of scalp, s/p Mohs 4/18/2014  -Hx of 1-2 other NMSC on scalp (BCCs)     3. Verrucous keratosis on right forearm, s/p shave biopsy on 12/20/18     4. Facial papules related to FFA   - Current tx: tretinoin 0.025% cream     Encounter Date: Oct 26, 2020    CC:  Chief Complaint   Patient presents with     Derm Problem     LPP follow up     History of Present Illness:  Ms. Ester Barba is a 75 year old female who presents as a follow-up for lichen planopilaris. The patient was last seen on 8/31/2020 when she was treated with IL kenalog 10 injections for her LPP, cryotherapy for inflammed seborrheic keratoses on the forehead and right cheek, and a biopsy was taken on the vertex scalp. The biopsy resulted as a lichenoid keratosis.    Today, the patient reports that she has been healthy since her last visit and has not started any new medications. The patient notes that her osteoarthritis has worsened since coming off of plaquenil, but her eyes have been stable. She experiences stiffness in mostly her hands and the  only therapy she is doing is trying to stretch out her hands. She has an upcoming appointment with her PCP in December. As for her previous neck pain due to trauma, she has been doing well and avoids straining it when doing physical work around the house.     Regarding her LPP, she notes that her hair is still falling out, especially around the rim and crown area. She continues to use the clobetasol every other day. She has noticed improvement in the crusting on her crown since the last visit. She also notes improvement after injections and this lasts for about one month following. She uses Vaseline daily on the crusting areas. Today in clinic, she reports mild pruritus that is always intermittent, no burning or pain.        Past Medical History:   Patient Active Problem List   Diagnosis     Porokeratosis     Squamous cell carcinoma     Neoplasm of uncertain behavior     Lichen planopilaris     Dermatitis     Cicatricial alopecia     Keratosis, inflamed seborrheic     History of skin cancer     Basal cell carcinoma of vertex scalp s/p mohs 6-5-15     Medication management     Actinic keratosis     Medication monitoring encounter     Dermatitis, seborrheic     Erosive pustular dermatosis     Tick bite, initial encounter     Vulvar atrophy     Factor V Leiden mutation (H)     Splenic artery aneurysm (H)     Post concussion syndrome     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     Family History:  Family  History   Problem Relation Age of Onset     Skin Cancer Sister         basal cell carcinoma     Melanoma No family hx of      Glaucoma No family hx of      Macular Degeneration No family hx of    Two siblings and grandson with atopic dermatitis.     Medications:  Current Outpatient Medications   Medication Sig Dispense Refill     Calcium 1500 MG TABS Take  by mouth.       Cholecalciferol (VITAMIN D) 1000 UNIT capsule Take  by mouth. Total 2200 units daily.       clobetasol propionate (CLOBEX) 0.05 % external shampoo Apply to a dry scalp, leave on for 15 minutes, then lather and rinse, do every other day 60 mL 2     COMPOUNDED NON-CONTROLLED SUBSTANCE (CMPD RX) - PHARMACY TO MIX COMPOUNDED MEDICATION Place 1 Application vaginally       Fluocinolone Acetonide Scalp (DERMA-SMOOTHE/FS SCALP) 0.01 % OIL oil Apply once a week to scalp for lichen planopilaris 118 mL 11     ibuprofen (ADVIL,MOTRIN) 800 MG tablet Take 800 mg by mouth every 8 hours as needed.       Loratadine (CLARITIN PO) Take  by mouth.       NEW MED Biest 1.5mg/gram cream(pg free)  Insert 1 gram vaginally twice weekly as directed 40 g 0     tretinoin (RETIN-A) 0.025 % external cream Apply a pea size amount to face,  Do every other night as tolerated 45 g 1     triamcinolone acetonide (KENALOG) 10 MG/ML injection See med note 5 mL 0     triamcinolone acetonide (KENALOG) 10 MG/ML injection See med note 5 mL 0     fluconazole (DIFLUCAN) 200 MG tablet Take 1 tablet (200 mg) by mouth daily 45 tablet 4     Allergies   Allergen Reactions     Dust Mites Other (See Comments)     Sneezing, coughing. asthma  Itchy watery eyes, sneezing     Estrogens      Other reaction(s): Hypersensitivity  Topical caused burning sensation of skin     Imiquimod Other (See Comments)     Hair loss     Levaquin [Levofloxacin Hemihydrate] Other (See Comments)     Muscle weakness     Levofloxacin Other (See Comments)     MUSCLE WEAKNESS, ARTHRITIS LIKE SYMPTOMS.       Mold Other (See  Comments)     Sneezing, asthma, weezing     Pollen Extract/Tree Extract Other (See Comments)     Sneezing, weezing     Sulfa Drugs Hives     Sulfamethoxazole-Trimethoprim Hives     Latex Rash     LATEX BANDAGES; tapes adhesive     Penicillin G Rash     Penicillins Rash     Review of Systems:  -Constitutional: Otherwise feeling well today, in usual state of health.  -Skin: As above in HPI. No additional skin concerns.    Physical exam:  Vitals: There were no vitals taken for this visit.  GEN: This is a well developed, well-nourished female in no acute distress, in a pleasant mood.    SKIN: Focused examination of the face, scalp, and hands was performed.  -Khalil skin type: II  - There are tan to brown waxy, stuck on papules located on the forehead, behind the right ear x3, chest x1.   - Scalp with no diffuse erythema    - Perifollicular scale and crusting   - LPPAI score = 0.67  - No other lesions of concern on areas examined.     Impression/Plan:  1. Lichen planopilaris, clinically stable to improved hair regrowth. Improvement of crusting compared to visit on 8/31/2020. Reviewed and compared photos from last visit.     Continue clobetasol shampoo every other day    Lichenoid keratosis, s/p biopsy 8/31/2020. Reviewed benign nature.     Kenalog intralesional injection procedure note: After verbal consent and discussion of risks including but not limited to atrophy, pain, and bruising, time out was performed, the patient underwent positioning, 2 total cc of Kenalog 10 mg/cc was injected into 20 site(s) on the scalp. The patient tolerated the procedure well and left the Dermatology clinic in good condition.    2. Seborrheic keratosis, inflamed - forehead, behind right ear x3, chest x1. History of pruritus.     Reviewed benign etiology and natural course    Continue tretinoin 0.025% cream every other day to the face    Cryotherapy procedure note: After verbal consent and discussion of risks and benefits including  but no limited to dyspigmentation/scar, blister, and pain, 5 were treated with 1-2mm freeze border for 2 cycles with liquid nitrogen. Post cryotherapy instructions were provided.         Follow-up on December 15, 2020, earlier for new or changing lesions.     Staff Involved:  Scribe/Staff    Scribe Disclosure  I, Edna Gustafson, am serving as a scribe to document services personally performed by Dr. Barbara Soto MD, based on data collection and the provider's statements to me.      Provider Disclosure:   The documentation recorded by the scribe accurately reflects the services I personally performed and the decisions made by me. The cryotherapy and ILK injections were done by me.    Barbara Soto MD  Professor and Chair  Department of Dermatology  [facility]: Phone: [phone], Fax:[phone]  [facility]: Phone: [phone], Fax: [phone]

## 2020-10-26 NOTE — PROGRESS NOTES
[facility] Dermatology Note  Dermatology Problem List:  1. Lichen planopilaris  - Lichenoid keratosis, vertex scalp, s/p biopsy 8/31/2020  - Current tx: clobetasol propionate 0.05% shampoo every other day, tretinoin 0.025% cream to the face every other day   - Stopped plaquenil 12/2019 per her eye doctor as concern for retinopathy but later this was disproven  - Previous tx: fluocinolone acetonide 0.01% oil, DHS-Zinc shampoo  - s/p ILK 10 ~q5wk: 5/4/20, 1/10/20,11/04/19, 07/2019     2. Hx of NMSC  -BCC on right nasal ala s/p Mohs 11/27/17  -Nodular BCC on the vertex scalp s/p Mohs 6/5/15  -SCC of scalp, s/p Mohs 4/18/2014  -Hx of 1-2 other NMSC on scalp (BCCs)     3. Verrucous keratosis on right forearm, s/p shave biopsy on 12/20/18     4. Facial papules related to FFA   - Current tx: tretinoin 0.025% cream     Encounter Date: Oct 26, 2020    CC:  Chief Complaint   Patient presents with     Derm Problem     LPP follow up     History of Present Illness:  Ms. Ester Barba is a 75 year old female who presents as a follow-up for lichen planopilaris. The patient was last seen on 8/31/2020 when she was treated with IL kenalog 10 injections for her LPP, cryotherapy for inflammed seborrheic keratoses on the forehead and right cheek, and a biopsy was taken on the vertex scalp. The biopsy resulted as a lichenoid keratosis.    Today, the patient reports that she has been healthy since her last visit and has not started any new medications. The patient notes that her osteoarthritis has worsened since coming off of plaquenil, but her eyes have been stable. She experiences stiffness in mostly her hands and the only therapy she is doing is trying to stretch out her hands. She has an upcoming appointment with her PCP in December. As for her previous neck pain due to trauma, she has been doing well and avoids straining it when doing physical work around the house.     Regarding her LPP, she notes that her hair  is still falling out, especially around the rim and crown area. She continues to use the clobetasol every other day. She has noticed improvement in the crusting on her crown since the last visit. She also notes improvement after injections and this lasts for about one month following. She uses Vaseline daily on the crusting areas. Today in clinic, she reports mild pruritus that is always intermittent, no burning or pain.        Past Medical History:   Patient Active Problem List   Diagnosis     Porokeratosis     Squamous cell carcinoma     Neoplasm of uncertain behavior     Lichen planopilaris     Dermatitis     Cicatricial alopecia     Keratosis, inflamed seborrheic     History of skin cancer     Basal cell carcinoma of vertex scalp s/p mohs 6-5-15     Medication management     Actinic keratosis     Medication monitoring encounter     Dermatitis, seborrheic     Erosive pustular dermatosis     Tick bite, initial encounter     Vulvar atrophy     Factor V Leiden mutation (H)     Splenic artery aneurysm (H)     Post concussion syndrome     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     Family History:  Family History   Problem Relation Age of Onset     Skin Cancer Sister         basal cell carcinoma     Melanoma No family hx of      Glaucoma No family hx of      Macular Degeneration No family hx of    Two siblings and grandson with atopic dermatitis.     Medications:  Current Outpatient Medications   Medication  Sig Dispense Refill     Calcium 1500 MG TABS Take  by mouth.       Cholecalciferol (VITAMIN D) 1000 UNIT capsule Take  by mouth. Total 2200 units daily.       clobetasol propionate (CLOBEX) 0.05 % external shampoo Apply to a dry scalp, leave on for 15 minutes, then lather and rinse, do every other day 60 mL 2     COMPOUNDED NON-CONTROLLED SUBSTANCE (CMPD RX) - PHARMACY TO MIX COMPOUNDED MEDICATION Place 1 Application vaginally       Fluocinolone Acetonide Scalp (DERMA-SMOOTHE/FS SCALP) 0.01 % OIL oil Apply once a week to scalp for lichen planopilaris 118 mL 11     ibuprofen (ADVIL,MOTRIN) 800 MG tablet Take 800 mg by mouth every 8 hours as needed.       Loratadine (CLARITIN PO) Take  by mouth.       NEW MED Biest 1.5mg/gram cream(pg free)  Insert 1 gram vaginally twice weekly as directed 40 g 0     tretinoin (RETIN-A) 0.025 % external cream Apply a pea size amount to face,  Do every other night as tolerated 45 g 1     triamcinolone acetonide (KENALOG) 10 MG/ML injection See med note 5 mL 0     triamcinolone acetonide (KENALOG) 10 MG/ML injection See med note 5 mL 0     fluconazole (DIFLUCAN) 200 MG tablet Take 1 tablet (200 mg) by mouth daily 45 tablet 4     Allergies   Allergen Reactions     Dust Mites Other (See Comments)     Sneezing, coughing. asthma  Itchy watery eyes, sneezing     Estrogens      Other reaction(s): Hypersensitivity  Topical caused burning sensation of skin     Imiquimod Other (See Comments)     Hair loss     Levaquin [Levofloxacin Hemihydrate] Other (See Comments)     Muscle weakness     Levofloxacin Other (See Comments)     MUSCLE WEAKNESS, ARTHRITIS LIKE SYMPTOMS.       Mold Other (See Comments)     Sneezing, asthma, weezing     Pollen Extract/Tree Extract Other (See Comments)     Sneezing, weezing     Sulfa Drugs Hives     Sulfamethoxazole-Trimethoprim Hives     Latex Rash     LATEX BANDAGES; tapes adhesive     Penicillin G Rash     Penicillins Rash     Review of Systems:  -Constitutional:  Otherwise feeling well today, in usual state of health.  -Skin: As above in HPI. No additional skin concerns.    Physical exam:  Vitals: There were no vitals taken for this visit.  GEN: This is a well developed, well-nourished female in no acute distress, in a pleasant mood.    SKIN: Focused examination of the face, scalp, and hands was performed.  -Khalil skin type: II  - There are tan to brown waxy, stuck on papules located on the forehead, behind the right ear x3, chest x1.   - Scalp with no diffuse erythema    - Perifollicular scale and crusting   - LPPAI score = 0.67  - No other lesions of concern on areas examined.     Impression/Plan:  1. Lichen planopilaris, clinically stable to improved hair regrowth. Improvement of crusting compared to visit on 8/31/2020. Reviewed and compared photos from last visit.     Continue clobetasol shampoo every other day    Lichenoid keratosis, s/p biopsy 8/31/2020. Reviewed benign nature.     Kenalog intralesional injection procedure note: After verbal consent and discussion of risks including but not limited to atrophy, pain, and bruising, time out was performed, the patient underwent positioning, 2 total cc of Kenalog 10 mg/cc was injected into 20 site(s) on the scalp. The patient tolerated the procedure well and left the Dermatology clinic in good condition.    2. Seborrheic keratosis, inflamed - forehead, behind right ear x3, chest x1. History of pruritus.     Reviewed benign etiology and natural course    Continue tretinoin 0.025% cream every other day to the face    Cryotherapy procedure note: After verbal consent and discussion of risks and benefits including but no limited to dyspigmentation/scar, blister, and pain, 5 were treated with 1-2mm freeze border for 2 cycles with liquid nitrogen. Post cryotherapy instructions were provided.         Follow-up on December 15, 2020, earlier for new or changing lesions.     Staff Involved:  Scribe/Staff    Scribe  Disclosure  I, Edna Sj, am serving as a scribe to document services personally performed by Dr. Barbara Soto MD, based on data collection and the provider's statements to me.      Provider Disclosure:   The documentation recorded by the scribe accurately reflects the services I personally performed and the decisions made by me. The cryotherapy and ILK injections were done by me.    Barbara Soto MD  Professor and Chair  Department of Dermatology  [facility]: Phone: [phone], Fax:[phone]  [facility]: Phone: [phone], Fax: [phone]

## 2020-10-26 NOTE — NURSING NOTE
Drug Administration Record    Prior to injection, verified patient identity using patient's name and date of birth.  Due to injection administration, patient instructed to remain in clinic for 15 minutes  afterwards, and to report any adverse reaction to me immediately.    Drug Name: triamcinolone acetonide(kenalog)  Dose: 2mL of triamcinolone 10mg/mL, 20mg dose  Route administered: ID  NDC #: Kenalog-10 (9786-3664-02)  Amount of waste(mL):3  Reason for waste: Single use vial    LOT #: [id number]  [facility]  : [employer]  EXPIRATION DATE: March 2022

## 2020-11-18 DIAGNOSIS — L66.10 LICHEN PLANOPILARIS: ICD-10-CM

## 2020-11-18 DIAGNOSIS — L30.9 DERMATITIS: ICD-10-CM

## 2020-11-23 RX ORDER — CLOBETASOL PROPIONATE 0.05 G/100ML
SHAMPOO TOPICAL
Qty: 118 ML | Refills: 1 | Status: SHIPPED | OUTPATIENT
Start: 2020-11-23 | End: 2021-03-23

## 2020-12-15 ENCOUNTER — OFFICE VISIT (OUTPATIENT)
Dept: DERMATOLOGY | Facility: CLINIC | Age: 75
End: 2020-12-15
Payer: MEDICARE

## 2020-12-15 DIAGNOSIS — L66.9 CICATRICIAL ALOPECIA: ICD-10-CM

## 2020-12-15 DIAGNOSIS — L30.9 DERMATITIS: ICD-10-CM

## 2020-12-15 DIAGNOSIS — L66.10 LICHEN PLANOPILARIS: Primary | ICD-10-CM

## 2020-12-15 DIAGNOSIS — L82.0 KERATOSIS, INFLAMED SEBORRHEIC: ICD-10-CM

## 2020-12-15 DIAGNOSIS — Z85.828 HISTORY OF SKIN CANCER: ICD-10-CM

## 2020-12-15 PROCEDURE — 11901 INJECT SKIN LESIONS >7: CPT | Mod: XS | Performed by: DERMATOLOGY

## 2020-12-15 PROCEDURE — 17110 DESTRUCTION B9 LES UP TO 14: CPT | Mod: GC | Performed by: DERMATOLOGY

## 2020-12-15 RX ORDER — BIOTIN 10 MG
TABLET ORAL
COMMUNITY

## 2020-12-15 RX ORDER — ESOMEPRAZOLE MAGNESIUM 20 MG/1
20 GRANULE, DELAYED RELEASE ORAL
COMMUNITY

## 2020-12-15 RX ORDER — SODIUM PHOSPHATE,MONO-DIBASIC 19G-7G/118
1 ENEMA (ML) RECTAL
COMMUNITY

## 2020-12-15 NOTE — PATIENT INSTRUCTIONS
Advise that you follow up with your PCP or gastroenterologist to evaluate you for an upper GI endoscopy for your persistent abdominal discomfort and increased acidity.     Continue using clobetasol shampoo and tretinoin cream every other day as previously prescribed for the scalp and face, respectively.    Schedule a follow up visit in our clinic in 2 months.

## 2020-12-15 NOTE — PROGRESS NOTES
"[facility] Dermatology Note    Dermatology Problem List:  1. Lichen planopilaris  - Lichenoid keratosis, vertex scalp, s/p biopsy 8/31/2020  - Current tx: clobetasol propionate 0.05% shampoo every other day, tretinoin 0.025% cream to the face every other day   - Stopped plaquenil 12/2019 per her eye doctor as concern for retinopathy but later this was disproven  - Previous tx: fluocinolone acetonide 0.01% oil, DHS-Zinc shampoo  - s/p ILK 10 ~q5wk: including today     2. Hx of NMSC  -BCC on right nasal ala s/p Mohs 11/27/17  -Nodular BCC on the vertex scalp s/p Mohs 6/5/15  -SCC of scalp, s/p Mohs 4/18/2014  -Hx of 1-2 other NMSC on scalp (BCCs)     3. Verrucous keratosis on right forearm, s/p shave biopsy on 12/20/18     4. Facial papules related to FFA   - Current tx: tretinoin 0.025% cream        Encounter Date: Dec 15, 2020    CC:   Chief Complaint   Patient presents with     Derm Problem     Ester is following up regarding hair loss and feels condition is the \"same\"     History of Present Illness:  Ms. Ester Barba is a 75 year old female who presents as a follow-up for lichen planopilaris. The patient was last seen on 10/26/20 when she was treated with IL kenalog 10 injections for her LPP, cryotherapy for inflammed seborrheic keratoses on the forehead and right cheek.  Since then , she reports that she has had worsening arthritis in her hands and knees since coming off her plaquenil. She tried a few days of Turmeric 1000 mg for anti inflammation but this led to diarrhea for 2 weeks and has since had epigastric and abdominal burning pain, for which she is taking Nexium. She would like to know if her epigastric pain is related to her underlying Lichen planus.      Regarding her LPP, she notes that her hair shedding is stable but has noticed that in the last two weeks the scarred area around the rim and crown has been constantly painful, and worse with palpitation but no redness or discharge. " Endorses mild pruritus but no burning. Would like intralesional kenalog today. Continues to use the clobetasol every other day.    No other concerns today and in general state of health.     Past Medical History:   Patient Active Problem List   Diagnosis     Porokeratosis     Squamous cell carcinoma     Neoplasm of uncertain behavior     Lichen planopilaris     Dermatitis     Cicatricial alopecia     Keratosis, inflamed seborrheic     History of skin cancer     Basal cell carcinoma of vertex scalp s/p mohs 6-5-15     Medication management     Actinic keratosis     Medication monitoring encounter     Dermatitis, seborrheic     Erosive pustular dermatosis     Tick bite, initial encounter     Vulvar atrophy     Factor V Leiden mutation (H)     Splenic artery aneurysm (H)     Post concussion syndrome     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 She has never used smokeless tobacco. She reports current alcohol use. She reports that she does not use drugs.    Family History:  Family History   Problem Relation Age of Onset     Skin Cancer Sister         basal cell carcinoma     Melanoma No family hx of      Glaucoma No family hx of      Macular Degeneration No family hx of        Medications:  Current Outpatient Medications   Medication Sig Dispense Refill     Biotin 10 MG TABS tablet        Calcium 1500 MG TABS Take  by mouth.       Cholecalciferol (VITAMIN D) 1000 UNIT capsule Take  by mouth.  Total 2200 units daily.       clobetasol propionate (CLOBEX) 0.05 % external shampoo APPLY TO DRY SCALP EVERY OTHER DAY, LEAVE ON FOR 15 MINUTES, THEN LATHER AND RINSE 118 mL 1     COMPOUNDED NON-CONTROLLED SUBSTANCE (CMPD RX) - PHARMACY TO MIX COMPOUNDED MEDICATION Place 1 Application vaginally       esomeprazole (NEXIUM) 40 MG DR capsule Take 40 mg by mouth every morning (before breakfast) Take 30-60 minutes before eating.       Fluocinolone Acetonide Scalp (DERMA-SMOOTHE/FS SCALP) 0.01 % OIL oil Apply once a week to scalp for lichen planopilaris 118 mL 11     glucosamine-chondroitin 500-400 MG CAPS per capsule Take 1 capsule by mouth       ibuprofen (ADVIL,MOTRIN) 800 MG tablet Take 800 mg by mouth every 8 hours as needed.       Loratadine (CLARITIN PO) Take  by mouth.       NEW MED Biest 1.5mg/gram cream(pg free)  Insert 1 gram vaginally twice weekly as directed 40 g 0     tretinoin (RETIN-A) 0.025 % external cream Apply a pea size amount to face,  Do every other night as tolerated 45 g 1     triamcinolone acetonide (KENALOG) 10 MG/ML injection See med note 5 mL 0     triamcinolone acetonide (KENALOG) 10 MG/ML injection See med note 5 mL 0     fluconazole (DIFLUCAN) 200 MG tablet Take 1 tablet (200 mg) by mouth daily 45 tablet 4      Allergies   Allergen Reactions     Dust Mites Other (See Comments)     Sneezing, coughing. asthma  Itchy watery eyes, sneezing     Estrogens      Other reaction(s): Hypersensitivity  Topical caused burning sensation of skin     Imiquimod Other (See Comments)     Hair loss     Levaquin [Levofloxacin Hemihydrate] Other (See Comments)     Muscle weakness     Levofloxacin Other (See Comments)     MUSCLE WEAKNESS, ARTHRITIS LIKE SYMPTOMS.       Mold Other (See Comments)     Sneezing, asthma, weezing     Pollen Extract/Tree Extract Other (See Comments)     Sneezing, weezing     Sulfa Drugs Hives     Sulfamethoxazole-Trimethoprim Hives     Latex Rash     LATEX BANDAGES; tapes adhesive      Penicillin G Rash     Penicillins Rash       Review of Systems:  -Constitutional: Otherwise doing well.   -HEENT: Patient denies nonhealing oral sores.  -Skin: As above in HPI. No additional skin concerns.    Physical exam:  Vitals: There were no vitals taken for this visit.  GEN: Well developed, well-nourished, in no acute distress, in a pleasant mood.    SKIN: Focused examination of the face and scalp was performed.  - Waxy stuck on tan papules on the forehead  - Scarring with telangiectasias scattered on the crown with some areas of yellow crusting and tenderness to palpation  -No other lesions of concern on areas examined    Impression/Plan:  1. Lichen planopilaris, clinically stable but with mild perifollicular scale but with continued tenderness to palpation.    Continue clobetasol shampoo every other day    Kenalog intralesional injection procedure note: After verbal consent and discussion of risks including but not limited to atrophy, pain, and bruising, time out was performed, the patient underwent positioning, 1 total cc of Kenalog 10 mg/cc was injected into 10 site(s) on the scalp. The patient tolerated the procedure well and left the Dermatology clinic in good condition.     2. Seborrheic keratosis, inflamed - left aspect forehead x1.     Continue tretinoin 0.025% cream every other day to the face    Cryotherapy procedure note: After verbal consent and discussion of risks and benefits including but no limited to dyspigmentation/scar, blister, and pain, 1 lesion treated with 1-2mm freeze border for 2 cycles with liquid nitrogen. Post cryotherapy instructions were provided.     3) Possible GERD/reflux:   Recommend follow up with PCP/GI for endoscopic evaluation to exclude lichen planus involvement of esophagus vs other causes of reflux    Follow-up in 2 months, earlier for new or changing lesions.     Dr. Soto staffed the patient.    Staff Involved:  Resident(María Nelson MD)/Staff(as  above)    Patient was seen and examined with the medicine resident. I agree with the history, review of systems, physical examination, assessments and plan. Cryotherapy procedure was done together. ILK injections were primarily done by me.    Barbara Soto MD  Professor and  Chair  Department of Dermatology  [facility]

## 2020-12-15 NOTE — LETTER
"12/15/2020       RE: Ester Barba  [address] [place] [address]     Dear Colleague,    Thank you for referring your patient, Ester Barba, to the [facility] DERMATOLOGY [facility] [place] at [facility]. Please see a copy of my visit note below.    [facility] Dermatology Note    Dermatology Problem List:  1. Lichen planopilaris  - Lichenoid keratosis, vertex scalp, s/p biopsy 8/31/2020  - Current tx: clobetasol propionate 0.05% shampoo every other day, tretinoin 0.025% cream to the face every other day   - Stopped plaquenil 12/2019 per her eye doctor as concern for retinopathy but later this was disproven  - Previous tx: fluocinolone acetonide 0.01% oil, DHS-Zinc shampoo  - s/p ILK 10 ~q5wk: including today     2. Hx of NMSC  -BCC on right nasal ala s/p Mohs 11/27/17  -Nodular BCC on the vertex scalp s/p Mohs 6/5/15  -SCC of scalp, s/p Mohs 4/18/2014  -Hx of 1-2 other NMSC on scalp (BCCs)     3. Verrucous keratosis on right forearm, s/p shave biopsy on 12/20/18     4. Facial papules related to FFA   - Current tx: tretinoin 0.025% cream        Encounter Date: Dec 15, 2020    CC:   Chief Complaint   Patient presents with     Derm Problem     Ester is following up regarding hair loss and feels condition is the \"same\"     History of Present Illness:  Ms. Ester Barba is a 75 year old female who presents as a follow-up for lichen planopilaris. The patient was last seen on 10/26/20 when she was treated with IL kenalog 10 injections for her LPP, cryotherapy for inflammed seborrheic keratoses on the forehead and right cheek.  Since then , she reports that she has had worsening arthritis in her hands and knees since coming off her plaquenil. She tried a few days of Turmeric 1000 mg for anti inflammation but this led to diarrhea for 2 weeks and has since had epigastric and abdominal burning pain, for which she is taking Nexium. She would like to know if her " epigastric pain is related to her underlying Lichen planus.      Regarding her LPP, she notes that her hair shedding is stable but has noticed that in the last two weeks the scarred area around the rim and crown has been constantly painful, and worse with palpitation but no redness or discharge. Endorses mild pruritus but no burning. Would like intralesional kenalog today. Continues to use the clobetasol every other day.    No other concerns today and in general state of health.     Past Medical History:   Patient Active Problem List   Diagnosis     Porokeratosis     Squamous cell carcinoma     Neoplasm of uncertain behavior     Lichen planopilaris     Dermatitis     Cicatricial alopecia     Keratosis, inflamed seborrheic     History of skin cancer     Basal cell carcinoma of vertex scalp s/p mohs 6-5-15     Medication management     Actinic keratosis     Medication monitoring encounter     Dermatitis, seborrheic     Erosive pustular dermatosis     Tick bite, initial encounter     Vulvar atrophy     Factor V Leiden mutation (H)     Splenic artery aneurysm (H)     Post concussion syndrome     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 She has never used smokeless tobacco. She reports current alcohol use. She reports that she does not use drugs.    Family History:  Family History   Problem Relation Age of Onset     Skin Cancer Sister         basal cell carcinoma     Melanoma No family hx of       Glaucoma No family hx of      Macular Degeneration No family hx of        Medications:  Current Outpatient Medications   Medication Sig Dispense Refill     Biotin 10 MG TABS tablet        Calcium 1500 MG TABS Take  by mouth.       Cholecalciferol (VITAMIN D) 1000 UNIT capsule Take  by mouth. Total 2200 units daily.       clobetasol propionate (CLOBEX) 0.05 % external shampoo APPLY TO DRY SCALP EVERY OTHER DAY, LEAVE ON FOR 15 MINUTES, THEN LATHER AND RINSE 118 mL 1     COMPOUNDED NON-CONTROLLED SUBSTANCE (CMPD RX) - PHARMACY TO MIX COMPOUNDED MEDICATION Place 1 Application vaginally       esomeprazole (NEXIUM) 40 MG DR capsule Take 40 mg by mouth every morning (before breakfast) Take 30-60 minutes before eating.       Fluocinolone Acetonide Scalp (DERMA-SMOOTHE/FS SCALP) 0.01 % OIL oil Apply once a week to scalp for lichen planopilaris 118 mL 11     glucosamine-chondroitin 500-400 MG CAPS per capsule Take 1 capsule by mouth       ibuprofen (ADVIL,MOTRIN) 800 MG tablet Take 800 mg by mouth every 8 hours as needed.       Loratadine (CLARITIN PO) Take  by mouth.       NEW MED Biest 1.5mg/gram cream(pg free)  Insert 1 gram vaginally twice weekly as directed 40 g 0     tretinoin (RETIN-A) 0.025 % external cream Apply a pea size amount to face,  Do every other night as tolerated 45 g 1     triamcinolone acetonide (KENALOG) 10 MG/ML injection See med note 5 mL 0     triamcinolone acetonide (KENALOG) 10 MG/ML injection See med note 5 mL 0     fluconazole (DIFLUCAN) 200 MG tablet Take 1 tablet (200 mg) by mouth daily 45 tablet 4      Allergies   Allergen Reactions     Dust Mites Other (See Comments)     Sneezing, coughing. asthma  Itchy watery eyes, sneezing     Estrogens      Other reaction(s): Hypersensitivity  Topical caused burning sensation of skin     Imiquimod Other (See Comments)     Hair loss     Levaquin [Levofloxacin Hemihydrate] Other (See Comments)     Muscle weakness     Levofloxacin Other (See Comments)      MUSCLE WEAKNESS, ARTHRITIS LIKE SYMPTOMS.       Mold Other (See Comments)     Sneezing, asthma, weezing     Pollen Extract/Tree Extract Other (See Comments)     Sneezing, weezing     Sulfa Drugs Hives     Sulfamethoxazole-Trimethoprim Hives     Latex Rash     LATEX BANDAGES; tapes adhesive     Penicillin G Rash     Penicillins Rash       Review of Systems:  -Constitutional: Otherwise doing well.   -HEENT: Patient denies nonhealing oral sores.  -Skin: As above in HPI. No additional skin concerns.    Physical exam:  Vitals: There were no vitals taken for this visit.  GEN: Well developed, well-nourished, in no acute distress, in a pleasant mood.    SKIN: Focused examination of the face and scalp was performed.  - Waxy stuck on tan papules on the forehead  - Scarring with telangiectasias scattered on the crown with some areas of yellow crusting and tenderness to palpation  -No other lesions of concern on areas examined    Impression/Plan:  1. Lichen planopilaris, clinically stable but with mild perifollicular scale but with continued tenderness to palpation.    Continue clobetasol shampoo every other day    Kenalog intralesional injection procedure note: After verbal consent and discussion of risks including but not limited to atrophy, pain, and bruising, time out was performed, the patient underwent positioning, 1 total cc of Kenalog 10 mg/cc was injected into 10 site(s) on the scalp. The patient tolerated the procedure well and left the Dermatology clinic in good condition.     2. Seborrheic keratosis, inflamed - left aspect forehead x1.     Continue tretinoin 0.025% cream every other day to the face    Cryotherapy procedure note: After verbal consent and discussion of risks and benefits including but no limited to dyspigmentation/scar, blister, and pain, 1 lesion treated with 1-2mm freeze border for 2 cycles with liquid nitrogen. Post cryotherapy instructions were provided.     3) Possible GERD/reflux:   Recommend  follow up with PCP/GI for endoscopic evaluation to exclude lichen planus involvement of esophagus vs other causes of reflux    Follow-up in 2 months, earlier for new or changing lesions.     Dr. Soto staffed the patient.    Staff Involved:  Resident(María Nelson MD)/Staff(as above)    Patient was seen and examined with the medicine resident. I agree with the history, review of systems, physical examination, assessments and plan. Cryotherapy procedure was done together. ILK injections were primarily done by me.    Barbara Soto MD  Professor and  Chair  Department of Dermatology  [facility]

## 2021-01-15 ENCOUNTER — HEALTH MAINTENANCE LETTER (OUTPATIENT)
Age: 76
End: 2021-01-15

## 2021-02-15 ENCOUNTER — OFFICE VISIT (OUTPATIENT)
Dept: DERMATOLOGY | Facility: CLINIC | Age: 76
End: 2021-02-15
Payer: MEDICARE

## 2021-02-15 DIAGNOSIS — L66.10 LICHEN PLANOPILARIS: Primary | ICD-10-CM

## 2021-02-15 DIAGNOSIS — L82.0 INFLAMED SEBORRHEIC KERATOSIS: ICD-10-CM

## 2021-02-15 DIAGNOSIS — D48.9 NEOPLASM OF UNCERTAIN BEHAVIOR: ICD-10-CM

## 2021-02-15 PROCEDURE — 88305 TISSUE EXAM BY PATHOLOGIST: CPT | Performed by: PATHOLOGY

## 2021-02-15 PROCEDURE — 99213 OFFICE O/P EST LOW 20 MIN: CPT | Mod: 25 | Performed by: DERMATOLOGY

## 2021-02-15 PROCEDURE — 11901 INJECT SKIN LESIONS >7: CPT | Mod: XS | Performed by: DERMATOLOGY

## 2021-02-15 PROCEDURE — 17110 DESTRUCTION B9 LES UP TO 14: CPT | Mod: GC | Performed by: DERMATOLOGY

## 2021-02-15 PROCEDURE — 11102 TANGNTL BX SKIN SINGLE LES: CPT | Mod: XS | Performed by: DERMATOLOGY

## 2021-02-15 ASSESSMENT — PAIN SCALES - GENERAL: PAINLEVEL: MILD PAIN (3)

## 2021-02-15 NOTE — NURSING NOTE
Dermatology Rooming Note    Ester Barba's goals for this visit include:   Chief Complaint   Patient presents with     Hair Loss     Ester is here today for a hair loss follow up and injections      PIERCE Glaser

## 2021-02-15 NOTE — PROGRESS NOTES
Drug Administration Record    Prior to injection, verified patient identity using patient's name and date of birth.  Due to injection administration, patient instructed to remain in clinic for 15 minutes  afterwards, and to report any adverse reaction to me immediately.    Drug Name: triamcinolone acetonide(kenalog)  Dose: 2 mL of triamcinolone 10mg/mL, 20mg dose  Route administered: ID  NDC #: Kenalog-10 (0742-2411-98)  Amount of waste(mL): 3  Reason for waste: Multi dose vial    LOT #: [id number]  SITE: Scalp  : [place]  EXPIRATION DATE: APR 2022

## 2021-02-15 NOTE — LETTER
2/15/2021       RE: Ester Barba  [address] [place] [address]     Dear Colleague,    Thank you for referring your patient, Ester Barba, to the [facility] DERMATOLOGY CLINIC [place] at [facility]. Please see a copy of my visit note below.    [facility] Dermatology Note    Dermatology Problem List:  1. Lichen planopilaris  - Lichenoid keratosis, vertex scalp, s/p biopsy 8/31/2020  - Current tx: ILK 10 ~q5wk, clobetasol propionate 0.05% shampoo every other day, fluocinolone oil weekly, tretinoin 0.025% cream to the face every other day   - Stopped plaquenil 12/2019 per her eye doctor as concern for retinopathy but later this was disproven  - Previous tx: fluocinolone acetonide 0.01% oil, DHS-Zinc shampoo     2. Hx of NMSC  -BCC on right nasal ala s/p Mohs 11/27/17  -Nodular BCC on the vertex scalp s/p Mohs 6/5/15  -SCC of scalp, s/p Mohs 4/18/2014  -Hx of 1-2 other NMSC on scalp (BCCs)     3. Verrucous keratosis on right forearm, s/p shave biopsy on 12/20/18     4. 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 Ester Barba is a 75 year old female who presents as a follow-up for lichen planopilaris. She notes that her hair shedding is stable. Continues to use the clobetasol every other day. No itching, burning, or pain. Using tretinoin cream on face without issues, reports mild dryness.    She does have a scaly spot on the right vertex scalp that is tender to touch.    No other concerns today and in general state of health.     Past Medical History:   Patient Active Problem List   Diagnosis     Porokeratosis     Squamous cell carcinoma     Neoplasm of uncertain behavior     Lichen planopilaris     Dermatitis      Cicatricial alopecia     Keratosis, inflamed seborrheic     History of skin cancer     Basal cell carcinoma of vertex scalp s/p mohs 6-5-15     Medication management     Actinic keratosis     Medication monitoring encounter     Dermatitis, seborrheic     Erosive pustular dermatosis     Tick bite, initial encounter     Vulvar atrophy     Factor V Leiden mutation (H)     Splenic artery aneurysm (H)     Post concussion syndrome     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 She has never used smokeless tobacco. She reports current alcohol use. She reports that she does not use drugs.    Family History:  Family History   Problem Relation Age of Onset     Skin Cancer Sister         basal cell carcinoma     Melanoma No family hx of      Glaucoma No family hx of      Macular Degeneration No family hx of        Medications:  Current Outpatient Medications   Medication Sig Dispense Refill     Biotin 10 MG TABS tablet        Calcium 1500 MG TABS Take  by mouth.       Cholecalciferol (VITAMIN D) 1000 UNIT capsule Take  by mouth. Total 2200 units daily.       clobetasol propionate (CLOBEX) 0.05 % external shampoo APPLY TO DRY SCALP EVERY OTHER DAY, LEAVE ON FOR 15 MINUTES, THEN LATHER AND RINSE 118 mL 1     COMPOUNDED NON-CONTROLLED SUBSTANCE (CMPD RX) - PHARMACY TO MIX COMPOUNDED MEDICATION Place 1 Application vaginally       esomeprazole (NEXIUM) 40 MG DR capsule Take 40 mg by mouth every morning  (before breakfast) Take 30-60 minutes before eating.       Fluocinolone Acetonide Scalp (DERMA-SMOOTHE/FS SCALP) 0.01 % OIL oil Apply once a week to scalp for lichen planopilaris 118 mL 11     glucosamine-chondroitin 500-400 MG CAPS per capsule Take 1 capsule by mouth       ibuprofen (ADVIL,MOTRIN) 800 MG tablet Take 800 mg by mouth every 8 hours as needed.       Loratadine (CLARITIN PO) Take  by mouth.       NEW MED Biest 1.5mg/gram cream(pg free)  Insert 1 gram vaginally twice weekly as directed 40 g 0     tretinoin (RETIN-A) 0.025 % external cream Apply a pea size amount to face,  Do every other night as tolerated 45 g 1     triamcinolone acetonide (KENALOG) 10 MG/ML injection See med note 5 mL 0     triamcinolone acetonide (KENALOG) 10 MG/ML injection See med note 5 mL 0     fluconazole (DIFLUCAN) 200 MG tablet Take 1 tablet (200 mg) by mouth daily 45 tablet 4      Allergies   Allergen Reactions     Dust Mites Other (See Comments)     Sneezing, coughing. asthma  Itchy watery eyes, sneezing     Estrogens      Other reaction(s): Hypersensitivity  Topical caused burning sensation of skin     Imiquimod Other (See Comments)     Hair loss     Levaquin [Levofloxacin Hemihydrate] Other (See Comments)     Muscle weakness     Levofloxacin Other (See Comments)     MUSCLE WEAKNESS, ARTHRITIS LIKE SYMPTOMS.       Mold Other (See Comments)     Sneezing, asthma, weezing     Pollen Extract/Tree Extract Other (See Comments)     Sneezing, weezing     Sulfa Drugs Hives     Sulfamethoxazole-Trimethoprim Hives     Latex Rash     LATEX BANDAGES; tapes adhesive     Penicillin G Rash     Penicillins Rash       Review of Systems:  -Constitutional: Otherwise doing well.   -HEENT: Patient denies nonhealing oral sores.  -Skin: As above in HPI. No additional skin concerns.    Physical exam:  Vitals: There were no vitals taken for this visit.  GEN: Well developed, well-nourished, in no acute distress, in a pleasant mood.    SKIN: Focused  examination of the face and scalp was performed.  - Right vertex scalp with yellow crusty tender plaque  - Inflamed waxy papules on right temple and right cheek  - Waxy stuck on tan papules on the forehead  - Scarring with telangiectasias scattered on the crown with some areas of yellow crusting and tenderness to palpation  -No other lesions of concern on areas examined    Impression/Plan:  1. Lichen planopilaris, clinically stable  - Resume fluocinolone oil weekly  - Continue clobetasol shampoo every other day  - Continue tretinoin 0.025% cream for facial papules  - Kenalog intralesional injection procedure note: After verbal consent and discussion of risks including but not limited to atrophy, pain, and bruising, time out was performed, the patient underwent positioning, 2.0 total cc of Kenalog 10 mg/cc was injected into 20 site(s) on the scalp. The patient tolerated the procedure well and left the Dermatology clinic in good condition.    2. Neoplasm of uncertain behavior, right vertex scalp  - Shave biopsy: Location(s) right vertex scalp.  After discussion of benefits and risks including but not limited to bleeding/bruising, pain/swelling, infection, scar, incomplete removal, nerve damage/numbness, recurrence, and non-diagnostic biopsy, written consent, verbal consent and photographs were obtained. Time-out was performed. The area was cleaned with isopropyl alcohol. 0.5mL of 1% lidocaine with epinephrine was injected to obtain adequate anesthesia of each lesion. Shave biopsy was performed. Hemostasis was achieved with aluminium chloride. Vaseline and a sterile dressing were applied. The patient tolerated the procedure and no complications were noted. The patient was provided with verbal and written post care instructions.     3. ISK, right temple  - Cryotherapy procedure note: After verbal consent and discussion of risks and benefits including but no limited to dyspigmentation/scar, blister, and pain, 2 was(were)  treated with 1-2mm freeze border for 2 cycles with liquid nitrogen. Post cryotherapy instructions were provided.     Follow-up in 6 weeks, earlier for new or changing lesions.     Dr. Soto staffed the patient.    Staff Involved:  Percy Sue MD  Dermatology Resident      Drug Administration Record    Prior to injection, verified patient identity using patient's name and date of birth.  Due to injection administration, patient instructed to remain in clinic for 15 minutes  afterwards, and to report any adverse reaction to me immediately.    Drug Name: triamcinolone acetonide(kenalog)  Dose: 2 mL of triamcinolone 10mg/mL, 20mg dose  Route administered: ID  NDC #: Kenalog-10 (4464-7101-75)  Amount of waste(mL): 3  Reason for waste: Multi dose vial    LOT #: [id number]  SITE: Scalp  : [employer]  EXPIRATION DATE: APR 2022      Again, thank you for allowing me to participate in the care of your patient.      Sincerely,    Barbara Soto MD

## 2021-02-15 NOTE — PATIENT INSTRUCTIONS
Wound Care After a Biopsy    What is a skin biopsy?  A skin biopsy allows the doctor to examine a very small piece of tissue under the microscope to determine the diagnosis and the best treatment for the skin condition. A local anesthetic (numbing medicine)  is injected with a very small needle into the skin area to be tested. A small piece of skin is taken from the area. Sometimes a suture (stitch) is used.     What are the risks of a skin biopsy?  I will experience scar, bleeding, swelling, pain, crusting and redness. I may experience incomplete removal or recurrence. Risks of this procedure are excessive bleeding, bruising, infection, nerve damage, numbness, thick (hypertrophic or keloidal) scar and non-diagnostic biopsy.    How should I care for my wound for the first 24 hours?    Keep the wound dry and covered for 24 hours    If it bleeds, hold direct pressure on the area for 15 minutes. If bleeding does not stop then go to the emergency room    Avoid strenuous exercise the first 1-2 days or as your doctor instructs you    How should I care for the wound after 24 hours?    After 24 hours, remove the bandage    You may bathe or shower as normal    If you had a scalp biopsy, you can shampoo as usual and can use shower water to clean the biopsy site daily    Clean the wound twice a day with gentle soap and water    Do not scrub, be gentle    Apply white petroleum/Vaseline after cleaning the wound with a cotton swab or a clean finger, and keep the site covered with a Bandaid /bandage. Bandages are not necessary with a scalp biopsy    If you are unable to cover the site with a Bandaid /bandage, re-apply ointment 2-3 times a day to keep the site moist. Moisture will help with healing    Avoid strenuous activity for first 1-2 days    Avoid lakes, rivers, pools, and oceans until the stitches are removed or the site is healed    How do I clean my wound?    Wash hands thoroughly with soap or use hand  before all  wound care    Clean the wound with gentle soap and water    Apply white petroleum/Vaseline  to wound after it is clean    Replace the Bandaid /bandage to keep the wound covered for the first few days or as instructed by your doctor    If you had a scalp biopsy, warm shower water to the area on a daily basis should suffice    What should I use to clean my wound?     Cotton-tipped applicators (Qtips )    White petroleum jelly (Vaseline ). Use a clean new container and use Q-tips to apply.    Bandaids   as needed    Gentle soap     How should I care for my wound long term?    Do not get your wound dirty    Keep up with wound care for one week or until the area is healed.    A small scab will form and fall off by itself when the area is completely healed. The area will be red and will become pink in color as it heals. Sun protection is very important for how your scar will turn out. Sunscreen with an SPF 30 or greater is recommended once the area is healed.    If you have stitches, stitches need to be removed in 14 days. You may return to our clinic for this or you may have it done locally at your doctor s office.    You should have some soreness but it should be mild and slowly go away over several days. Talk to your doctor about using tylenol for pain,    When should I call my doctor?  If you have increased:     Pain or swelling    Pus or drainage (clear or slightly yellow drainage is ok)    Temperature over 100F    Spreading redness or warmth around wound    When will I hear about my results?  The biopsy results can take 2-3 weeks to come back. The clinic will call you with the results, send you a MUBIt message, or have you schedule a follow-up clinic or phone time to discuss the results. Contact our clinics if you do not hear from us in 3 weeks.     Who should I call with questions?    [facility]: [phone]     [facility]: [phone]    For  urgent needs outside of business hours call the [facility] at [phone] and ask for the dermatology resident on call    Cryotherapy    What is it?    Use of a very cold liquid, such as liquid nitrogen, to freeze and destroy abnormal skin cells that need to be removed    What should I expect?    Tenderness and redness    A small blister that might grow and fill with dark purple blood. There may be crusting.    More than one treatment may be needed if the lesions do not go away.    How do I care for the treated area?    Gently wash the area with your hands when bathing.    Use a thin layer of Vaseline to help with healing. You may use a Band-Aid.     The area should heal within 7-10 days and may leave behind a pink or lighter color.     Do not use an antibiotic or Neosporin ointment.     You may take acetaminophen (Tylenol) for pain.     Call your Doctor if you have:    Severe pain    Signs of infection (warmth, redness, cloudy yellow drainage, and or a bad smell)    Questions or concerns    Who should I call with questions?       [facility]: [phone]       [facility]: [phone]       For urgent needs outside of business hours call the [facility] at [phone]        and ask for the dermatology resident on call

## 2021-02-15 NOTE — PROGRESS NOTES
[facility] Dermatology Note    Dermatology Problem List:  1. Lichen planopilaris  - Lichenoid keratosis, vertex scalp, s/p biopsy 8/31/2020  - Current tx: ILK 10 ~q5wk, clobetasol propionate 0.05% shampoo every other day, fluocinolone oil weekly, tretinoin 0.025% cream to the face every other day   - Stopped plaquenil 12/2019 per her eye doctor as concern for retinopathy but later this was disproven  - Previous tx: fluocinolone acetonide 0.01% oil, DHS-Zinc shampoo     2. Hx of NMSC  -BCC on right nasal ala s/p Mohs 11/27/17  -Nodular BCC on the vertex scalp s/p Mohs 6/5/15  -SCC of scalp, s/p Mohs 4/18/2014  -Hx of 1-2 other NMSC on scalp (BCCs)     3. Verrucous keratosis on right forearm, s/p shave biopsy on 12/20/18     4. Facial papules related to FFA   - Current tx: tretinoin 0.025% cream     5. Neoplasm of uncertain behavior, right vertex scalp  - biopsy today 2/15/21       Encounter Date: Feb 15, 2021    CC:   Chief Complaint   Patient presents with     Hair Loss     Ester is here today for a hair loss follow up and injections      History of Present Illness:  Ms. Ester Barba is a 75 year old female who presents as a follow-up for lichen planopilaris. She notes that her hair shedding is stable. Continues to use the clobetasol every other day. No itching, burning, or pain. Using tretinoin cream on face without issues, reports mild dryness.    She does have a scaly spot on the right vertex scalp that is tender to touch.    No other concerns today and in general state of health.     Past Medical History:   Patient Active Problem List   Diagnosis     Porokeratosis     Squamous cell carcinoma     Neoplasm of uncertain behavior     Lichen planopilaris     Dermatitis     Cicatricial alopecia     Keratosis, inflamed seborrheic     History of skin cancer     Basal cell carcinoma of vertex scalp s/p mohs 6-5-15     Medication management     Actinic keratosis     Medication monitoring encounter      Dermatitis, seborrheic     Erosive pustular dermatosis     Tick bite, initial encounter     Vulvar atrophy     Factor V Leiden mutation (H)     Splenic artery aneurysm (H)     Post concussion syndrome     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 She has never used smokeless tobacco. She reports current alcohol use. She reports that she does not use drugs.    Family History:  Family History   Problem Relation Age of Onset     Skin Cancer Sister         basal cell carcinoma     Melanoma No family hx of      Glaucoma No family hx of      Macular Degeneration No family hx of        Medications:  Current Outpatient Medications   Medication Sig Dispense Refill     Biotin 10 MG TABS tablet        Calcium 1500 MG TABS Take  by mouth.       Cholecalciferol (VITAMIN D) 1000 UNIT capsule Take  by mouth. Total 2200 units daily.       clobetasol propionate (CLOBEX) 0.05 % external shampoo APPLY TO DRY SCALP EVERY OTHER DAY, LEAVE ON FOR 15 MINUTES, THEN LATHER AND RINSE 118 mL 1     COMPOUNDED NON-CONTROLLED SUBSTANCE (CMPD RX) - PHARMACY TO MIX COMPOUNDED MEDICATION Place 1 Application vaginally       esomeprazole (NEXIUM) 40 MG DR capsule Take 40 mg by mouth every morning (before breakfast) Take 30-60 minutes before eating.       Fluocinolone Acetonide Scalp (DERMA-SMOOTHE/FS SCALP) 0.01 % OIL oil Apply once a week to scalp for lichen planopilaris 118 mL 11     glucosamine-chondroitin 500-400 MG  CAPS per capsule Take 1 capsule by mouth       ibuprofen (ADVIL,MOTRIN) 800 MG tablet Take 800 mg by mouth every 8 hours as needed.       Loratadine (CLARITIN PO) Take  by mouth.       NEW MED Biest 1.5mg/gram cream(pg free)  Insert 1 gram vaginally twice weekly as directed 40 g 0     tretinoin (RETIN-A) 0.025 % external cream Apply a pea size amount to face,  Do every other night as tolerated 45 g 1     triamcinolone acetonide (KENALOG) 10 MG/ML injection See med note 5 mL 0     triamcinolone acetonide (KENALOG) 10 MG/ML injection See med note 5 mL 0     fluconazole (DIFLUCAN) 200 MG tablet Take 1 tablet (200 mg) by mouth daily 45 tablet 4      Allergies   Allergen Reactions     Dust Mites Other (See Comments)     Sneezing, coughing. asthma  Itchy watery eyes, sneezing     Estrogens      Other reaction(s): Hypersensitivity  Topical caused burning sensation of skin     Imiquimod Other (See Comments)     Hair loss     Levaquin [Levofloxacin Hemihydrate] Other (See Comments)     Muscle weakness     Levofloxacin Other (See Comments)     MUSCLE WEAKNESS, ARTHRITIS LIKE SYMPTOMS.       Mold Other (See Comments)     Sneezing, asthma, weezing     Pollen Extract/Tree Extract Other (See Comments)     Sneezing, weezing     Sulfa Drugs Hives     Sulfamethoxazole-Trimethoprim Hives     Latex Rash     LATEX BANDAGES; tapes adhesive     Penicillin G Rash     Penicillins Rash       Review of Systems:  -Constitutional: Otherwise doing well.   -HEENT: Patient denies nonhealing oral sores.  -Skin: As above in HPI. No additional skin concerns.    Physical exam:  Vitals: There were no vitals taken for this visit.  GEN: Well developed, well-nourished, in no acute distress, in a pleasant mood.    SKIN: Focused examination of the face and scalp was performed.  - Right vertex scalp with yellow crusty tender plaque  - Inflamed waxy papules on right temple and right cheek  - Waxy stuck on tan papules on the forehead  - Scarring with  telangiectasias scattered on the crown with some areas of yellow crusting and tenderness to palpation  -No other lesions of concern on areas examined    Impression/Plan:  1. Lichen planopilaris, clinically stable  - Resume fluocinolone oil weekly  - Continue clobetasol shampoo every other day  - Continue tretinoin 0.025% cream for facial papules  - Kenalog intralesional injection procedure note: After verbal consent and discussion of risks including but not limited to atrophy, pain, and bruising, time out was performed, the patient underwent positioning, 2.0 total cc of Kenalog 10 mg/cc was injected into 20 site(s) on the scalp. The patient tolerated the procedure well and left the Dermatology clinic in good condition.    2. Neoplasm of uncertain behavior, right vertex scalp  - Shave biopsy: Location(s) right vertex scalp.  After discussion of benefits and risks including but not limited to bleeding/bruising, pain/swelling, infection, scar, incomplete removal, nerve damage/numbness, recurrence, and non-diagnostic biopsy, written consent, verbal consent and photographs were obtained. Time-out was performed. The area was cleaned with isopropyl alcohol. 1 mL of 1% lidocaine with epinephrine was injected to obtain adequate anesthesia of each lesion. Shave biopsy was performed. Hemostasis was achieved with aluminium chloride. Vaseline and a sterile dressing were applied. The patient tolerated the procedure and no complications were noted. The patient was provided with verbal and written post care instructions.     3. ISK, right temple  - Cryotherapy procedure note: After verbal consent and discussion of risks and benefits including but no limited to dyspigmentation/scar, blister, and pain, 2 lesions were treated with 1-2mm freeze border for 2 cycles with liquid nitrogen. Post cryotherapy instructions were provided.     Follow-up in 6 weeks, earlier for new or changing lesions.     Dr. Soto staffed the  patient.    Staff Involved:  Percy Sue MD  Dermatology Resident    Patient was seen and examined with the dermatology resident. I agree with the history, review of systems, physical examination, assessments and plan. I was present for the entire cryotherapy procedure and for the key part of the biopsy procedure.    Barbara Soto MD  Professor and  Chair  Department of Dermatology  [facility]

## 2021-02-15 NOTE — NURSING NOTE
Lidocaine-epinephrine 1-1:893432 % injection   0.5 mL once for one use, starting 2/15/2021 ending 2/15/2021,  2mL disp, R-0, injection  Injected by Dr. Sue

## 2021-02-18 LAB — COPATH REPORT: NORMAL

## 2021-02-22 ENCOUNTER — TELEPHONE (OUTPATIENT)
Dept: DERMATOLOGY | Facility: CLINIC | Age: 76
End: 2021-02-22

## 2021-02-22 DIAGNOSIS — D04.4 SQUAMOUS CELL CARCINOMA IN SITU (SCCIS) OF SCALP: Primary | ICD-10-CM

## 2021-02-25 NOTE — TELEPHONE ENCOUNTER
FUTURE VISIT INFORMATION      FUTURE VISIT INFORMATION:    Date: 3.2.21    Time: 9:30    Location: [facility]  REFERRAL INFORMATION:    Referring provider:  Dr. Soto    Referring providers clinic:  [facility]    Reason for visit/diagnosis SCC scalp (Pt has had mohs in the past, did not want a consult)    RECORDS REQUESTED FROM:       Clinic name Comments Records Status Photos Status   [facility] 2.15.21  Path # [id number] [facility]

## 2021-03-02 ENCOUNTER — OFFICE VISIT (OUTPATIENT)
Dept: DERMATOLOGY | Facility: CLINIC | Age: 76
End: 2021-03-02
Payer: MEDICARE

## 2021-03-02 ENCOUNTER — PRE VISIT (OUTPATIENT)
Dept: DERMATOLOGY | Facility: CLINIC | Age: 76
End: 2021-03-02

## 2021-03-02 VITALS — DIASTOLIC BLOOD PRESSURE: 68 MMHG | SYSTOLIC BLOOD PRESSURE: 146 MMHG | HEART RATE: 82 BPM

## 2021-03-02 DIAGNOSIS — C44.42 SQUAMOUS CELL CARCINOMA OF SCALP: Primary | ICD-10-CM

## 2021-03-02 PROCEDURE — 17311 MOHS 1 STAGE H/N/HF/G: CPT | Mod: GC | Performed by: DERMATOLOGY

## 2021-03-02 ASSESSMENT — PAIN SCALES - GENERAL
PAINLEVEL: NO PAIN (1)
PAINLEVEL: NO PAIN (0)

## 2021-03-02 NOTE — LETTER
3/2/2021       RE: Ester Barba  [address] [place] [address]     Dear Colleague,    Thank you for referring your patient, Ester Barba, to the [facility] DERMATOLOGIC SURGERY CLINIC [place] at [facility]. Please see a copy of my visit note below.    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    Informed consent was obtained and the patient underwent the procedure as follows:  The patient was placed supine on the operating table.  The cancer was identified, outlined with a marker, and verified by the patient.  The entire surgical field was prepped with Hibiclens.  The surgical site was anesthetized using Lidocaine 1% with epi 1:100,000.      The area of clinically apparent tumor was debulked. The layer of tissue was then surgically excised using a #15 blade and was then transferred onto a specimen sheet maintaining the orientation of the specimen. Hemostasis was obtained using heat cautery. The wound site was then covered with a dressing while the tissue samples were processed for examination.    The excised tissue was transported to the Mohs histology laboratory maintaining the  tissue orientation.  The tissue specimen was relaxed so that the entire surgical margin was in a a single horizontal plane for sectioning and inked for precise mapping.  A precise reference map was drawn to reflect the sectioning of the specimen, colored inking of the margins, and orientation on the patient. The tissue was processed using horizontal sectioning of the base and continuous peripheral margins.  The histopathologic sections were reviewed in conjunction with the reference map.    Total blocks: 1    Total slides:  3    There were no cancer cells visualized on examination, therefore Mohs surgery was complete.    EVALUATION OF MOHS DEFECT FOR RECONSTRUCTION    The patient is status post Mohs' micrographic surgery.  The surgical site was examined with attention to normal anatomic and functional relationships.  After consideration and discussion of multiple options with the patient, it was determined that healing by second intention would offer the best chance for preservation/restoration of all normal anatomic and functional relationships.  The patient verbalized understanding and agrees with this plan. It is also understood that should second intention healing be sub-optimal, additional procedures such as scar revision, steroid injection or dermabrasion may be recommended.    The open wound was cleaned with Chlorhexidine and a Puracol dressing was sutured into place using 5-0 FAG gut in a simple running fashion. The are was rinsed with sterile saline, and a thick layer of ointment was applied.  A pressure dressing consisting of non-adherent gauze, gauze and hypafixwas applied.   Wound care was discussed with patient both orally and in writing.  The patient stated understanding and agreement of the course of care.    Ye Chahal MD  PGY-6    Micrographic Surgery and Dermatologic Oncology Fellow  March 2, 2021                        Photo placed into patients chart note for further reference.        Attestation signed by Jorge Warren MD at 3/8/2021 11:56 AM:    Attending attestation:  I was present for key elements of the procedure and immediately available for all other portions of the procedure.  I have reviewed the note and edited it as necessary.    Jorge Warren M.D.  Professor  Director of Dermatologic Surgery  Department of Dermatology  [facility]    Dermatology Surgery Clinic  [facility] [place] [id number]

## 2021-03-02 NOTE — NURSING NOTE
Chief Complaint   Patient presents with     Derm Problem     Patient is here today for mohs on right vertex scalp.     Joan GALLEGOS CMA

## 2021-03-02 NOTE — PROGRESS NOTES
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    Informed consent was obtained and the patient underwent the procedure as follows:  The patient was placed supine on the operating table.  The cancer was identified, outlined with a marker, and verified by the patient.  The entire surgical field was prepped with Hibiclens.  The surgical site was anesthetized using Lidocaine 1% with epi 1:100,000.      The area of clinically apparent tumor was debulked. The layer of tissue was then surgically excised using a #15 blade and was then transferred onto a specimen sheet maintaining the orientation of the specimen. Hemostasis was obtained using heat cautery. The wound site was then covered with a dressing while the tissue samples were processed for examination.    The excised tissue was transported to the Mohs histology laboratory maintaining the tissue orientation.  The tissue specimen was relaxed so that the entire surgical margin was in a a single horizontal plane for sectioning and inked for precise mapping.  A precise reference map was drawn to reflect the sectioning of the specimen, colored inking of the margins, and orientation on the patient. The  tissue was processed using horizontal sectioning of the base and continuous peripheral margins.  The histopathologic sections were reviewed in conjunction with the reference map.    Total blocks: 1    Total slides:  3    There were no cancer cells visualized on examination, therefore Mohs surgery was complete.    EVALUATION OF MOHS DEFECT FOR RECONSTRUCTION    The patient is status post Mohs' micrographic surgery.  The surgical site was examined with attention to normal anatomic and functional relationships.  After consideration and discussion of multiple options with the patient, it was determined that healing by second intention would offer the best chance for preservation/restoration of all normal anatomic and functional relationships.  The patient verbalized understanding and agrees with this plan. It is also understood that should second intention healing be sub-optimal, additional procedures such as scar revision, steroid injection or dermabrasion may be recommended.    The open wound was cleaned with Chlorhexidine and a Puracol dressing was sutured into place using 5-0 FAG gut in a simple running fashion. The are was rinsed with sterile saline, and a thick layer of ointment was applied.  A pressure dressing consisting of non-adherent gauze, gauze and hypafixwas applied.   Wound care was discussed with patient both orally and in writing.  The patient stated understanding and agreement of the course of care.    Ye Chahal MD  PGY-6    Micrographic Surgery and Dermatologic Oncology Fellow  March 2, 2021                        Photo placed into patients chart note for further reference.

## 2021-03-02 NOTE — PATIENT INSTRUCTIONS
Wound care    I will experience scar, bleeding, swelling, pain, crusting and redness. I may experience incomplete removal or recurrence. Risks are bleeding, bruising, infection, nerve damage, & large wound. A second procedure may be recommended to obtain the best cosmetic or functional result. A three month office visit with Surgeon is recommended for scar evaluation.     Caring for your skin after surgery    After your surgery, a pressure bandage will be placed over the area that has stitches. This will prevent bleeding. Please follow these instructions over the next 1 to 2 weeks. Following this regimen will help to prevent complications as your wound heals.     For the first 48 hours after your surgery:      Leave the pressure dressing on and keep it dry. If it should come loose, you may re-tape it, but do not take it off.    Relax and take it easy. Do not do any vigorous exercise or heavy lifting. This could cause the wound to bleed.    Post-Operative pain is usually mild. You may take plain or extra-strength Tylenol (acetaminophen) (do not take more than 4,000mg in one day) every 4 hours as needed.     Do not take any medicine that contains aspirin, ibuprofen or motrin unless you have been recommended these by a doctor.      Avoid alcohol and vitamin E as these may increase your tendency to bleed.     You may put an ice pack around the bandaged area for 20 minutes at a time as needed. This may help reduce swelling, bruising, and pain. Make sure the ice pack is waterproof so that the pressure bandage doesn t get wet.    If the wound is on the face try you sleep with your head elevated. Either in a recliner or propped up in bed, this will decrease swelling around the eyes.     You may see a small amount of drainage or blood on your pressure bandage. This is normal. However:  o If drainage or bleeding continues or saturates the bandage, you will need to apply firm pressure over the bandage with a piece of gauze for  15 minutes.  o If bleeding continues after applying pressure for 15 minutes, apply an ice pack to the bandaged area for 15 minutes.  o If bleeding still continues, call our office or go to the nearest emergency room.    Remove pressure dressing 48 hours after surgery on scalp :      Carefully remove the pressure bandage. If it seems sticky or too difficult to get off, you may need to soak it off in the shower.    After the pressure dressing is removed, you may shower and get the wound wet. However, Do Not let the forceful stream of the shower hit the wound directly.    Follow these wound care and dressing change instructions:  o  You may allow water to run over the site. Take a clean wash cloth wet with soapy warm water and gently pat the suture site to help remove any crust or drainage.   o Do Not rub or scrub the site    o After site is clean pat dry and apply a thin layer of Vaseline ointment  over the suture site with a cotton swab.  o Cover the suture site with Telfa (non-stick) dressing. You may tape a piece of gauze over the Telfa for extra protection if you wish.  o Continue the wound care and dressing changes every day until you come back to have your stitches taken out. Or if you have dissolving stitches please continue wound care for one week.   o Dissolving stitches, if you have been told your stitches are dissolving they should dissolve in one week. If the stiches do not dissolve in one week you can use a Qtip with hydrogen peroxide on it and roll it along the suture line to help the stitches dissolve and come out. Please give us a call if stitches are still in after one week.     What to expect:      The first couple of days your wound may be tender and may bleed slightly when doing wound care.    There may be swelling and bruising around the wound, especially if it is near the eyes. For your comfort, you may apply ice or cold compresses to the bruises after your have removed the pressure  bandage.    The area around your wound may be numb for several weeks or even months.    You may experience periodic sharp pain or mild itching around the wound as it heals.     The suture line will look dark pink at first and the edges of the wound will be reddened. This will lighten up each day.      When to call us:      You have bleeding that will not stop after applying pressure and ice.    You have pain that is not controlled with Tylenol (acetaminophen.)    You have signs or symptoms of an infection such as:  o Fever over 100 degrees Fahrenheit  o Redness, warmth or foul-smelling drainage from the wound  o If you have any questions, or are not sure how to take care of the wound.    Phone numbers:    During business hours (M-F 8:00-4:30 p.m.)  [facility]-  [phone] Option 1 appt. desk  [phone]  Option 3 nurse triage line  ---------------------------------------------------------  Evenings/Weekends/Holidays  Hospital - [phone]   TTY for hearing [phone]  *Ask  to page dermatologist on-call  Emergency Yxcd-[phone]  TTY for hearing impaired- [phone]

## 2021-03-16 ENCOUNTER — VIRTUAL VISIT (OUTPATIENT)
Dept: DERMATOLOGY | Facility: CLINIC | Age: 76
End: 2021-03-16
Payer: MEDICARE

## 2021-03-16 DIAGNOSIS — D04.4 SQUAMOUS CELL CARCINOMA IN SITU (SCCIS) OF SCALP: ICD-10-CM

## 2021-03-16 PROCEDURE — 99024 POSTOP FOLLOW-UP VISIT: CPT | Mod: 95 | Performed by: DERMATOLOGY

## 2021-03-16 NOTE — NURSING NOTE
Chief Complaint   Patient presents with     Derm Problem     follow up granulation on scalp, pain while showering.      Joan GALLEGOS CMA

## 2021-03-16 NOTE — PROGRESS NOTES
F/U for granulating wound on scalp after Mohs    Patient reports things are going well.  Doing daily wound care as discussed.    Picture from today in Media tab shows well healing wound on scalp.    Healing wound on scalp -- no problems.  Counseled continue wound care.  RTC 4 weeks.  Total phone and picture/records review time 10 minutes.    Jorge Warren MD

## 2021-03-16 NOTE — LETTER
DERMATOLOGY  [address]  [id number]  [place] [facility] [address]  [phone]           Ester JOSE Jameson  [address] [facility] [address]       Dear Ms. Barba,    The result of your recent test is noted below. The bacterial culture showed only normal skin moustapha which is great. I hope the site is continuing to heal well.    Component 2wk ago   Specimen Description Skin Scalp    Special Requests Specimen collected in eSwab transport (white cap)    Culture Micro Moderate growth   Normal skin moustapha          It was a pleasure to see you at your last appointment. If you have any questions or concerns, please call my nurse at [phone].      Sincerely,      Barbara Soto MD  Professor and Chair  Department of Dermatology  [facility]

## 2021-03-16 NOTE — LETTER
3/16/2021       RE: Ester Barba  [address]  [place] [facility] [address]     Dear Colleague,    Thank you for referring your patient, Ester Barba, to the [facility] DERMATOLOGIC SURGERY CLINIC [place] at [facility]. Please see a copy of my visit note below.    F/U for granulating wound on scalp after Mohs    Patient reports things are going well.  Doing daily wound care as discussed.    Picture from today in Media tab shows well healing wound on scalp.    Healing wound on scalp -- no problems.  Counseled continue wound care.  RTC 4 weeks.  Total phone and picture/records review time 10 minutes.    Jorge Warren MD

## 2021-03-23 ENCOUNTER — OFFICE VISIT (OUTPATIENT)
Dept: DERMATOLOGY | Facility: CLINIC | Age: 76
End: 2021-03-23
Payer: MEDICARE

## 2021-03-23 DIAGNOSIS — L30.9 DERMATITIS: ICD-10-CM

## 2021-03-23 DIAGNOSIS — L66.12 FRONTAL FIBROSING ALOPECIA: ICD-10-CM

## 2021-03-23 DIAGNOSIS — S01.00XS OPEN WOUND OF SCALP, UNSPECIFIED OPEN WOUND TYPE, SEQUELA: Primary | ICD-10-CM

## 2021-03-23 DIAGNOSIS — L66.10 LICHEN PLANOPILARIS: ICD-10-CM

## 2021-03-23 PROCEDURE — 11901 INJECT SKIN LESIONS >7: CPT | Mod: GC | Performed by: DERMATOLOGY

## 2021-03-23 PROCEDURE — 99213 OFFICE O/P EST LOW 20 MIN: CPT | Mod: 25 | Performed by: DERMATOLOGY

## 2021-03-23 PROCEDURE — 87070 CULTURE OTHR SPECIMN AEROBIC: CPT | Performed by: PATHOLOGY

## 2021-03-23 RX ORDER — CLOBETASOL PROPIONATE 0.05 G/100ML
SHAMPOO TOPICAL
Qty: 118 ML | Refills: 1 | Status: SHIPPED | OUTPATIENT
Start: 2021-03-23 | End: 2021-05-11

## 2021-03-23 ASSESSMENT — PAIN SCALES - GENERAL: PAINLEVEL: NO PAIN (0)

## 2021-03-23 NOTE — PROGRESS NOTES
[facility] Dermatology Note  Encounter Date: Mar 23, 2021  Office Visit     Dermatology Problem List:  1. Lichen planopilaris  - Lichenoid keratosis, vertex scalp, s/p biopsy 8/31/2020  - Current tx: ILK 10 ~q5wk, clobetasol propionate 0.05% shampoo every other day, fluocinolone oil weekly, tretinoin 0.025% cream to the face every other day   - Stopped plaquenil 12/2019 per her eye doctor as concern for retinopathy but later this was disproven  - Previous tx: fluocinolone acetonide 0.01% oil, DHS-Zinc shampoo     2. Hx of NMSC  -SCCis, R vertex scalp, s/p MMS 3/2/21, bacterial cx obtained from site 3/23/21  -BCC on right nasal ala s/p Mohs 11/27/17  -Nodular BCC on the vertex scalp s/p Mohs 6/5/15  -SCC of scalp, s/p Mohs 4/18/2014  -Hx of 1-2 other NMSC on scalp (BCCs)     3. Verrucous keratosis on right forearm, s/p shave biopsy on 12/20/18     4. 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  - Continue to alternate clobetasol 0.05% shampoo with DHS zinc shampoo daily  - Continue to apply tretinoin 0.025% cream to face nightly  - Continue to apply flucinolone oil to scalp weekly  - ILK-10 mg/cc injected to scalp on examination today (see procedure note below)    # Hx NMSC. Ms. Barba recently underwent MMS on R vertex scalp for a SCCis on 3/2/21 with concern for possible post-op wound infection on examination today.  - Bacterial cx from vertex scalp obtained today  - photos taken for photodocumentation     Procedures Performed:   - Intra-lesional triamcinolone procedure note. After positioning and cleansing with isopropyl alcohol, 2 total mL of triamcinolone 10 mg/mL was injected into 20 sites on the scalp. The patient tolerated the procedure well and left the dermatology clinic in good  condition.    Follow-up: 3 month(s) in-person, or earlier for new or changing lesions    Staff and Resident:     Noemy Foster MD  PGY-3 Dermatology Resident     Patient was seen and examined with the dermatology resident. I agree with the history, review of systems, physical examination, assessments and plan. ILK injections were done together.    Barbara Soto MD  Professor and  Chair  Department of Dermatology  [facility]  ____________________________________________    CC: Hair Loss (Ester is here today for a follow up regarding hair loss. She states that she would like a few spots checked out on her scalp. She further states that she has been noticing quite a bit of hair coming out when she spencer it. )    HPI:  Ms. Ester Barba is a(n) 75 year old female who presents today as a return patient for FFA.      - Last seen in clinic on 2/15/21  - Current tx: fluocinolone oil weekly, clobetasol shampoo every other day, DHS zinc shampoo every other day, and tretinoin 0.025% cream to facial papules   - 1 Cannister of rogaine lasts: NA  - Scalp pain: Yes, notable along previous MMS site on R vertex scalp  - Scalp burning: None  - Scalp itching: None  - Eyebrow or eyelash changes: decreased eyebrow density, but stable since last visit  - Nail changes: increased nail breakage  - Overall course: Since last visit, she states that she has noticed decreased hair density around vertex scalp near MMS site.  Her scalp continues to be tender at the surgery site.    Patient is otherwise feeling well, without additional skin concerns.    Labs Reviewed:  N/A    Physical Exam:  Vitals: There were no vitals taken for this visit.  SKIN: Focused examination of face, scalp and b/l hands was performed.  - On the vertex scalp, there is a healing surgical ulcer with granulation tissue present at the base with a peripheral rim of erythema.    - Along the frontal, parietal and vertex scalp, there are pink patches with  perifollicular erythema and scale   - Decreased hair density present on the b/l eyebrows   - normal eyelash density  - longitudinal ridging present on the b/l fingernails   - No other lesions of concern on areas examined.     Medications:  Current Outpatient Medications   Medication     Biotin 10 MG TABS tablet     Calcium 1500 MG TABS     Cholecalciferol (VITAMIN D) 1000 UNIT capsule     clobetasol propionate (CLOBEX) 0.05 % external shampoo     COMPOUNDED NON-CONTROLLED SUBSTANCE (CMPD RX) - PHARMACY TO MIX COMPOUNDED MEDICATION     esomeprazole (NEXIUM) 40 MG DR capsule     Fluocinolone Acetonide Scalp (DERMA-SMOOTHE/FS SCALP) 0.01 % OIL oil     glucosamine-chondroitin 500-400 MG CAPS per capsule     ibuprofen (ADVIL,MOTRIN) 800 MG tablet     Loratadine (CLARITIN PO)     NEW MED     tretinoin (RETIN-A) 0.025 % external cream     triamcinolone acetonide (KENALOG) 10 MG/ML injection     triamcinolone acetonide (KENALOG) 10 MG/ML injection     fluconazole (DIFLUCAN) 200 MG tablet     Current Facility-Administered Medications   Medication     triamcinolone acetonide (KENALOG-10) injection 10 mg     triamcinolone acetonide (KENALOG-10) injection 10 mg     triamcinolone acetonide (KENALOG-10) injection 10 mg     triamcinolone acetonide (KENALOG-10) injection 10 mg     triamcinolone acetonide (KENALOG-10) injection 20 mg     triamcinolone acetonide (KENALOG-10) injection 20 mg     triamcinolone acetonide (KENALOG-10) injection 20 mg      Past Medical History:   Patient Active Problem List   Diagnosis     Porokeratosis     Squamous cell carcinoma     Neoplasm of uncertain behavior     Lichen planopilaris     Dermatitis     Cicatricial alopecia     Keratosis, inflamed seborrheic     History of skin cancer     Basal cell carcinoma of vertex scalp s/p mohs 6-5-15     Medication management     Actinic keratosis     Medication monitoring encounter     Dermatitis, seborrheic     Erosive pustular dermatosis     Tick bite,  initial encounter     Vulvar atrophy     Factor V Leiden mutation (H)     Splenic artery aneurysm (H)     Post concussion syndrome     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

## 2021-03-23 NOTE — NURSING NOTE
Dermatology Rooming Note    Ester JOSE Barba's goals for this visit include:   Chief Complaint   Patient presents with     Hair Loss     Ester is here today for a follow up regarding hair loss. She states that she would like a few spots checked out on her scalp. She further states that she has been noticing quite a bit of hair coming out when she spencer it.      Cricket Olvera, EMT

## 2021-03-23 NOTE — NURSING NOTE
Lidocaine-epinephrine 1-1:528430 % injection   1 mL once for one use, starting 3/23/2021 ending 3/23/2021,  2mL disp, R-0, injection  Injected by Dr. Foster

## 2021-03-23 NOTE — LETTER
3/23/2021       RE: Ester Barba  [address] [facility] [address]     Dear Colleague,    Thank you for referring your patient, Ester Barba, to the [facility] DERMATOLOGY CLINIC [place] at [facility]. Please see a copy of my visit note below.    [facility] Dermatology Note  Encounter Date: Mar 23, 2021  Office Visit     Dermatology Problem List:  1. Lichen planopilaris  - Lichenoid keratosis, vertex scalp, s/p biopsy 8/31/2020  - Current tx: ILK 10 ~q5wk, clobetasol propionate 0.05% shampoo every other day, fluocinolone oil weekly, tretinoin 0.025% cream to the face every other day   - Stopped plaquenil 12/2019 per her eye doctor as concern for retinopathy but later this was disproven  - Previous tx: fluocinolone acetonide 0.01% oil, DHS-Zinc shampoo     2. Hx of NMSC  -SCCis, R vertex scalp, s/p MMS 3/2/21, bacterial cx obtained from site 3/23/21  -BCC on right nasal ala s/p Mohs 11/27/17  -Nodular BCC on the vertex scalp s/p Mohs 6/5/15  -SCC of scalp, s/p Mohs 4/18/2014  -Hx of 1-2 other NMSC on scalp (BCCs)     3. Verrucous keratosis on right forearm, s/p shave biopsy on 12/20/18     4. 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  - Continue to alternate clobetasol 0.05% shampoo with DHS zinc shampoo daily  - Continue to apply tretinoin 0.025% cream to face nightly  - Continue to apply flucinolone oil to scalp weekly  - ILK-10 mg/cc injected to scalp on examination today (see procedure note below)    # Hx NMSC. Ms. Barba recently underwent MMS on R vertex scalp for a SCCis on 3/2/21 with concern for possible post-op wound infection on examination today.  - Bacterial cx from vertex scalp obtained today  - photos taken for  photodocumentation     Procedures Performed:   - Intra-lesional triamcinolone procedure note. After positioning and cleansing with isopropyl alcohol, 2 total mL of triamcinolone 10 mg/mL was injected into 20 sites on the scalp. The patient tolerated the procedure well and left the dermatology clinic in good condition.    Follow-up: 3 month(s) in-person, or earlier for new or changing lesions    Staff and Resident:     Noemy Foster MD  PGY-3 Dermatology Resident     Patient was seen and examined with the dermatology resident. I agree with the history, review of systems, physical examination, assessments and plan. ILK injections were done together.    Barbara Soto MD  Professor and  Chair  Department of Dermatology  [facility]  ____________________________________________    CC: Hair Loss (Ester is here today for a follow up regarding hair loss. She states that she would like a few spots checked out on her scalp. She further states that she has been noticing quite a bit of hair coming out when she spencer it. )    HPI:  Ms. Ester Barba is a(n) 75 year old female who presents today as a return patient for FFA.      - Last seen in clinic on 2/15/21  - Current tx: fluocinolone oil weekly, clobetasol shampoo every other day, DHS zinc shampoo every other day, and tretinoin 0.025% cream to facial papules   - 1 Cannister of rogaine lasts: NA  - Scalp pain: Yes, notable along previous MMS site on R vertex scalp  - Scalp burning: None  - Scalp itching: None  - Eyebrow or eyelash changes: decreased eyebrow density, but stable since last visit  - Nail changes: increased nail breakage  - Overall course: Since last visit, she states that she has noticed decreased hair density around vertex scalp near MMS site.  Her scalp continues to be tender at the surgery site.    Patient is otherwise feeling well, without additional skin concerns.    Labs Reviewed:  N/A    Physical Exam:  Vitals: There were no vitals taken for  this visit.  SKIN: Focused examination of face, scalp and b/l hands was performed.  - On the vertex scalp, there is a healing surgical ulcer with granulation tissue present at the base with a peripheral rim of erythema.    - Along the frontal, parietal and vertex scalp, there are pink patches with perifollicular erythema and scale   - Decreased hair density present on the b/l eyebrows   - normal eyelash density  - longitudinal ridging present on the b/l fingernails   - No other lesions of concern on areas examined.     Medications:  Current Outpatient Medications   Medication     Biotin 10 MG TABS tablet     Calcium 1500 MG TABS     Cholecalciferol (VITAMIN D) 1000 UNIT capsule     clobetasol propionate (CLOBEX) 0.05 % external shampoo     COMPOUNDED NON-CONTROLLED SUBSTANCE (CMPD RX) - PHARMACY TO MIX COMPOUNDED MEDICATION     esomeprazole (NEXIUM) 40 MG DR capsule     Fluocinolone Acetonide Scalp (DERMA-SMOOTHE/FS SCALP) 0.01 % OIL oil     glucosamine-chondroitin 500-400 MG CAPS per capsule     ibuprofen (ADVIL,MOTRIN) 800 MG tablet     Loratadine (CLARITIN PO)     NEW MED     tretinoin (RETIN-A) 0.025 % external cream     triamcinolone acetonide (KENALOG) 10 MG/ML injection     triamcinolone acetonide (KENALOG) 10 MG/ML injection     fluconazole (DIFLUCAN) 200 MG tablet     Current Facility-Administered Medications   Medication     triamcinolone acetonide (KENALOG-10) injection 10 mg     triamcinolone acetonide (KENALOG-10) injection 10 mg     triamcinolone acetonide (KENALOG-10) injection 10 mg     triamcinolone acetonide (KENALOG-10) injection 10 mg     triamcinolone acetonide (KENALOG-10) injection 20 mg     triamcinolone acetonide (KENALOG-10) injection 20 mg     triamcinolone acetonide (KENALOG-10) injection 20 mg      Past Medical History:   Patient Active Problem List   Diagnosis     Porokeratosis     Squamous cell carcinoma     Neoplasm of uncertain behavior     Lichen planopilaris     Dermatitis      Cicatricial alopecia     Keratosis, inflamed seborrheic     History of skin cancer     Basal cell carcinoma of vertex scalp s/p mohs 6-5-15     Medication management     Actinic keratosis     Medication monitoring encounter     Dermatitis, seborrheic     Erosive pustular dermatosis     Tick bite, initial encounter     Vulvar atrophy     Factor V Leiden mutation (H)     Splenic artery aneurysm (H)     Post concussion syndrome     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    Drug Administration Record    Prior to injection, verified patient identity using patient's name and date of birth.  Due to injection administration, patient instructed to remain in clinic for 15 minutes  afterwards, and to report any adverse reaction to me immediately.    Drug Name: triamcinolone acetonide(kenalog)  Dose: 2 mL of triamcinolone 10mg/mL, 20 mg dose  Route administered: ID  NDC #: Kenalog-10 (7754-2878-76)  Amount of waste(mL): 3  Reason for waste: Multi dose vial    LOT #: [id number]  SITE: Scalp  : [employer]  EXPIRATION DATE: JUN 2022      Again, thank you for allowing me to participate in the care of your patient.      Sincerely,    Barbara Soto MD

## 2021-03-23 NOTE — PROGRESS NOTES
Drug Administration Record    Prior to injection, verified patient identity using patient's name and date of birth.  Due to injection administration, patient instructed to remain in clinic for 15 minutes  afterwards, and to report any adverse reaction to me immediately.    Drug Name: triamcinolone acetonide(kenalog)  Dose: 2 mL of triamcinolone 10mg/mL, 20 mg dose  Route administered: ID  NDC #: Kenalog-10 (6530-6617-41)  Amount of waste(mL): 3  Reason for waste: Multi dose vial    LOT #: [id number]  SITE: Scalp  : [facility]  EXPIRATION DATE: JUN 2022

## 2021-03-25 LAB
BACTERIA SPEC CULT: NORMAL
Lab: NORMAL
SPECIMEN SOURCE: NORMAL

## 2021-04-15 ENCOUNTER — TELEPHONE (OUTPATIENT)
Dept: OBGYN | Facility: CLINIC | Age: 76
End: 2021-04-15

## 2021-04-15 NOTE — TELEPHONE ENCOUNTER
Pt called stating that she had some spotting and wanted to know if she could wait until May to see Dr Mayfield.  This writer discussed with the pt increase in bleeding and reasons to be seen sooner.  Pt agreed to the plan.

## 2021-05-11 ENCOUNTER — OFFICE VISIT (OUTPATIENT)
Dept: DERMATOLOGY | Facility: CLINIC | Age: 76
End: 2021-05-11
Payer: MEDICARE

## 2021-05-11 DIAGNOSIS — L30.9 DERMATITIS: ICD-10-CM

## 2021-05-11 DIAGNOSIS — L66.10 LICHEN PLANOPILARIS: ICD-10-CM

## 2021-05-11 DIAGNOSIS — L57.0 ACTINIC KERATOSIS: ICD-10-CM

## 2021-05-11 DIAGNOSIS — D48.9 NEOPLASM OF UNCERTAIN BEHAVIOR: Primary | ICD-10-CM

## 2021-05-11 PROCEDURE — 11901 INJECT SKIN LESIONS >7: CPT | Mod: XS | Performed by: DERMATOLOGY

## 2021-05-11 PROCEDURE — 11102 TANGNTL BX SKIN SINGLE LES: CPT | Mod: GC | Performed by: DERMATOLOGY

## 2021-05-11 PROCEDURE — 17003 DESTRUCT PREMALG LES 2-14: CPT | Mod: 59 | Performed by: DERMATOLOGY

## 2021-05-11 PROCEDURE — 88305 TISSUE EXAM BY PATHOLOGIST: CPT | Performed by: DERMATOLOGY

## 2021-05-11 PROCEDURE — 99213 OFFICE O/P EST LOW 20 MIN: CPT | Mod: 25 | Performed by: DERMATOLOGY

## 2021-05-11 PROCEDURE — 17000 DESTRUCT PREMALG LESION: CPT | Mod: 59 | Performed by: DERMATOLOGY

## 2021-05-11 RX ORDER — CLOBETASOL PROPIONATE 0.05 G/100ML
SHAMPOO TOPICAL
Qty: 118 ML | Refills: 1 | Status: SHIPPED | OUTPATIENT
Start: 2021-05-11 | End: 2021-11-18

## 2021-05-11 RX ORDER — FLUOCINOLONE ACETONIDE 0.11 MG/ML
OIL TOPICAL
Qty: 118 ML | Refills: 11 | Status: SHIPPED | OUTPATIENT
Start: 2021-05-11 | End: 2021-11-18

## 2021-05-11 ASSESSMENT — PAIN SCALES - GENERAL: PAINLEVEL: NO PAIN (0)

## 2021-05-11 NOTE — PROGRESS NOTES
[facility] Dermatology Note  Encounter Date: May 11, 2021  Office Visit     Dermatology Problem List:  1. Lichen planopilaris  - Lichenoid keratosis, vertex scalp, s/p biopsy 8/31/2020  - Current tx: ILK 10 ~q5wk, clobetasol propionate 0.05% shampoo every other day, fluocinolone oil weekly, tretinoin 0.025% cream to the face every other day   - Stopped plaquenil 12/2019 per her eye doctor as concern for retinopathy but later this was disproven  - Previous tx: fluocinolone acetonide 0.01% oil, DHS-Zinc shampoo     2. Hx of NMSC  -SCCis, R vertex scalp, s/p MMS 3/2/21, bacterial cx obtained from site 3/23/21 negative  -BCC on right nasal ala s/p Mohs 11/27/17  -Nodular BCC on the vertex scalp s/p Mohs 6/5/15  -SCC of scalp, s/p Mohs 4/18/2014  -Hx of 1-2 other NMSC on scalp (BCCs)     3. Verrucous keratosis on right forearm, s/p shave biopsy on 12/20/18     4. 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  - Continue to alternate clobetasol 0.05% shampoo with DHS zinc shampoo daily  - Continue to apply tretinoin 0.025% cream to face nightly  - ILK-10 mg/cc injected to scalp on examination today (see procedure note below)  - Restart dermasmoothe oil once weekly    # NUB left forehead. DDx BCC vs iSK vs traumatized SH  Shave biopsy: Location(s) left forehead.  After discussion of benefits and risks including but not limited to bleeding/bruising, pain/swelling, infection, scar, incomplete removal, nerve damage/numbness, recurrence, and non-diagnostic biopsy, written consent, verbal consent and photographs were obtained. Time-out was performed. The area was cleaned with isopropyl alcohol. 0.5mL of 1% lidocaine with epinephrine was injected to obtain adequate anesthesia of each lesion. Shave  biopsy was performed. Hemostasis was achieved with aluminium chloride. Vaseline and a sterile dressing were applied. The patient tolerated the procedure and no complications were noted. The patient was provided with verbal and written post care instructions.     # AK, right temple x3, left parietal scalp  Cryotherapy procedure note: After verbal consent and discussion of risks and benefits including but no limited to dyspigmentation/scar, blister, and pain, x3 were treated with 1-2mm freeze border for 2 cycles with liquid nitrogen.     # Hx NMSC. Ms. Barba recently underwent MMS on R vertex scalp for a SCCis on 3/2/21. Previously swabbed and was negative for bacterial infection. Now improving with vaseline, dresses daily with Telfa  - Continue daily Vaseline and Telfa      #Patient concern for allergies, GI and sinus concerns  - Recently underwent endoscopy without abnormality  - Patient states she follows with ENT, and will address with them        Procedures Performed:   - Intra-lesional triamcinolone procedure note. After positioning and cleansing with isopropyl alcohol, 2 total mL of triamcinolone 10 mg/mL was injected into 20 sites on the scalp. The patient tolerated the procedure well and left the dermatology clinic in good condition.    Follow-up: 3 month(s) in-person, or earlier for new or changing lesions    Staff and Resident:     Cyndie Hair PGY3  Dermatology Resident  pager      Patient was seen and examined with the dermatology resident. I agree with the history, review of systems, physical examination, assessments and plan. I was present for the cryotherapy procedure and for the key portion of the shave biopsy procedure.    Barbara Soto MD  Professor and  Chair  Department of Dermatology  [facility]    ____________________________________________    CC: Derm Problem (LPP - Ester states her MOHS sight has been healing slowly )    HPI:  Ms. Ester Barba is a(n) 75 year old  female who presents today as a return patient for FFA.      - Last seen in clinic on 3/2021  - Current tx:clobetasol shampoo every other day, DHS zinc shampoo every other day, and tretinoin 0.025% cream to facial papules   - 1 Cannister of rogaine lasts: NA  - Scalp pain: Yes, notable along previous MMS site on R vertex scalp  - Scalp burning: None  - Scalp itching: None  - Eyebrow or eyelash changes: decreased eyebrow density, but stable since last visit  - Nail changes: increased nail breakage  - Overall course: Since last visit, she states that she has noticed decreased hair density around vertex scalp near MMS site.  Her scalp continues to be tender at the surgery site.    Patient is otherwise feeling well, without additional skin concerns.    Labs Reviewed:  N/A    Physical Exam:  Vitals: There were no vitals taken for this visit.  SKIN: Focused examination of face, scalp and b/l hands was performed.  - On the vertex scalp, there is a healing surgical ulcer with granulation tissue present at the base with a peripheral rim of erythema.    - Along the frontal, parietal and vertex scalp, there are pink patches with perifollicular erythema and scale   - Decreased hair density present on the b/l eyebrows   - normal eyelash density  - longitudinal ridging present on the b/l fingernails   - No other lesions of concern on areas examined.     Medications:  Current Outpatient Medications   Medication     Biotin 10 MG TABS tablet     Calcium 1500 MG TABS     Cholecalciferol (VITAMIN D) 1000 UNIT capsule     clobetasol propionate (CLOBEX) 0.05 % external shampoo     COMPOUNDED NON-CONTROLLED SUBSTANCE (CMPD RX) - PHARMACY TO MIX COMPOUNDED MEDICATION     esomeprazole (NEXIUM) 40 MG DR capsule     Fluocinolone Acetonide Scalp (DERMA-SMOOTHE/FS SCALP) 0.01 % OIL oil     glucosamine-chondroitin 500-400 MG CAPS per capsule     ibuprofen (ADVIL,MOTRIN) 800 MG tablet     Loratadine (CLARITIN PO)     NEW MED     tretinoin  (RETIN-A) 0.025 % external cream     triamcinolone acetonide (KENALOG) 10 MG/ML injection     triamcinolone acetonide (KENALOG) 10 MG/ML injection     fluconazole (DIFLUCAN) 200 MG tablet     Current Facility-Administered Medications   Medication     triamcinolone acetonide (KENALOG-10) injection 10 mg     triamcinolone acetonide (KENALOG-10) injection 10 mg     triamcinolone acetonide (KENALOG-10) injection 10 mg     triamcinolone acetonide (KENALOG-10) injection 10 mg     triamcinolone acetonide (KENALOG-10) injection 20 mg     triamcinolone acetonide (KENALOG-10) injection 20 mg     triamcinolone acetonide (KENALOG-10) injection 20 mg      Past Medical History:   Patient Active Problem List   Diagnosis     Porokeratosis     Squamous cell carcinoma     Neoplasm of uncertain behavior     Lichen planopilaris     Dermatitis     Cicatricial alopecia     Keratosis, inflamed seborrheic     History of skin cancer     Basal cell carcinoma of vertex scalp s/p mohs 6-5-15     Medication management     Actinic keratosis     Medication monitoring encounter     Dermatitis, seborrheic     Erosive pustular dermatosis     Tick bite, initial encounter     Vulvar atrophy     Factor V Leiden mutation (H)     Splenic artery aneurysm (H)     Post concussion syndrome     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

## 2021-05-11 NOTE — NURSING NOTE
Drug Administration Record     Prior to injection, verified patient identity using patient's name and date of birth.  Due to injection administration, patient instructed to remain in clinic for 15 minutes  afterwards, and to report any adverse reaction to me immediately.     Drug Name: triamcinolone acetonide(kenalog)  Dose: 2mL of triamcinolone 10mg/mL, 20mg dose  Route administered: ID  NDC #: Kenalog-10 (0387-0230-78)  Amount of waste(mL):3  Reason for waste: Multi dose vial     LOT #: [id number]  SITE: Scalp  : [employer]  EXPIRATION DATE: 08/2022

## 2021-05-11 NOTE — NURSING NOTE
Dermatology Rooming Note    Ester Barba's goals for this visit include:   Chief Complaint   Patient presents with     Derm Problem     LPALBERTO - Ester states her MOHS sight has been healing slowly      Mandy De La Rosa, BHAVNA

## 2021-05-11 NOTE — PATIENT INSTRUCTIONS
Allegra (fexofenadine) 180 mg daily    Wound Care After a Biopsy    What is a skin biopsy?  A skin biopsy allows the doctor to examine a very small piece of tissue under the microscope to determine the diagnosis and the best treatment for the skin condition. A local anesthetic (numbing medicine)  is injected with a very small needle into the skin area to be tested. A small piece of skin is taken from the area. Sometimes a suture (stitch) is used.     What are the risks of a skin biopsy?  I will experience scar, bleeding, swelling, pain, crusting and redness. I may experience incomplete removal or recurrence. Risks of this procedure are excessive bleeding, bruising, infection, nerve damage, numbness, thick (hypertrophic or keloidal) scar and non-diagnostic biopsy.    How should I care for my wound for the first 24 hours?    Keep the wound dry and covered for 24 hours    If it bleeds, hold direct pressure on the area for 15 minutes. If bleeding does not stop then go to the emergency room    Avoid strenuous exercise the first 1-2 days or as your doctor instructs you    How should I care for the wound after 24 hours?    After 24 hours, remove the bandage    You may bathe or shower as normal    If you had a scalp biopsy, you can shampoo as usual and can use shower water to clean the biopsy site daily    Clean the wound twice a day with gentle soap and water    Do not scrub, be gentle    Apply white petroleum/Vaseline after cleaning the wound with a cotton swab or a clean finger, and keep the site covered with a Bandaid /bandage. Bandages are not necessary with a scalp biopsy    If you are unable to cover the site with a Bandaid /bandage, re-apply ointment 2-3 times a day to keep the site moist. Moisture will help with healing    Avoid strenuous activity for first 1-2 days    Avoid lakes, rivers, pools, and oceans until the stitches are removed or the site is healed    How do I clean my wound?    Wash hands thoroughly  with soap or use hand  before all wound care    Clean the wound with gentle soap and water    Apply white petroleum/Vaseline  to wound after it is clean    Replace the Bandaid /bandage to keep the wound covered for the first few days or as instructed by your doctor    If you had a scalp biopsy, warm shower water to the area on a daily basis should suffice    What should I use to clean my wound?     Cotton-tipped applicators (Qtips )    White petroleum jelly (Vaseline ). Use a clean new container and use Q-tips to apply.    Bandaids   as needed    Gentle soap     How should I care for my wound long term?    Do not get your wound dirty    Keep up with wound care for one week or until the area is healed.    A small scab will form and fall off by itself when the area is completely healed. The area will be red and will become pink in color as it heals. Sun protection is very important for how your scar will turn out. Sunscreen with an SPF 30 or greater is recommended once the area is healed.    If you have stitches, stitches need to be removed in 14 days. You may return to our clinic for this or you may have it done locally at your doctor s office.    You should have some soreness but it should be mild and slowly go away over several days. Talk to your doctor about using tylenol for pain,    When should I call my doctor?  If you have increased:     Pain or swelling    Pus or drainage (clear or slightly yellow drainage is ok)    Temperature over 100F    Spreading redness or warmth around wound    When will I hear about my results?  The biopsy results can take 2-3 weeks to come back. The clinic will call you with the results, send you a [employer] message, or have you schedule a follow-up clinic or phone time to discuss the results. Contact our clinics if you do not hear from us in 3 weeks.     Who should I call with questions?    [facility]: [phone]     [facility]  [facility]: [phone]    For urgent needs outside of business hours call the [facility] at [phone] and ask for the dermatology resident on call

## 2021-05-11 NOTE — LETTER
5/11/2021       RE: Ester Barba  [address] [place] [address]     Dear Colleague,    Thank you for referring your patient, Ester Barba, to the [facility] DERMATOLOGY CLINIC [place] at [facility]. Please see a copy of my visit note below.    [facility] Dermatology Note  Encounter Date: May 11, 2021  Office Visit     Dermatology Problem List:  1. Lichen planopilaris  - Lichenoid keratosis, vertex scalp, s/p biopsy 8/31/2020  - Current tx: ILK 10 ~q5wk, clobetasol propionate 0.05% shampoo every other day, fluocinolone oil weekly, tretinoin 0.025% cream to the face every other day   - Stopped plaquenil 12/2019 per her eye doctor as concern for retinopathy but later this was disproven  - Previous tx: fluocinolone acetonide 0.01% oil, DHS-Zinc shampoo     2. Hx of NMSC  -SCCis, R vertex scalp, s/p MMS 3/2/21, bacterial cx obtained from site 3/23/21 negative  -BCC on right nasal ala s/p Mohs 11/27/17  -Nodular BCC on the vertex scalp s/p Mohs 6/5/15  -SCC of scalp, s/p Mohs 4/18/2014  -Hx of 1-2 other NMSC on scalp (BCCs)     3. Verrucous keratosis on right forearm, s/p shave biopsy on 12/20/18     4. 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  - Continue to alternate clobetasol 0.05% shampoo with DHS zinc shampoo daily  - Continue to apply tretinoin 0.025% cream to face nightly  - ILK-10 mg/cc injected to scalp on examination today (see procedure note below)  - Restart dermasmoothe oil once weekly    # NUB left forehead. DDx BCC vs iSK vs traumatized SH  Shave biopsy: Location(s) left forehead.  After discussion of benefits and risks including but not limited to bleeding/bruising, pain/swelling, infection, scar, incomplete  removal, nerve damage/numbness, recurrence, and non-diagnostic biopsy, written consent, verbal consent and photographs were obtained. Time-out was performed. The area was cleaned with isopropyl alcohol. 0.5mL of 1% lidocaine with epinephrine was injected to obtain adequate anesthesia of each lesion. Shave biopsy was performed. Hemostasis was achieved with aluminium chloride. Vaseline and a sterile dressing were applied. The patient tolerated the procedure and no complications were noted. The patient was provided with verbal and written post care instructions.     # AK, right temple x2, left parietal scalp  Cryotherapy procedure note: After verbal consent and discussion of risks and benefits including but no limited to dyspigmentation/scar, blister, and pain, x3 was(were) treated with 1-2mm freeze border for 2 cycles with liquid nitrogen. Post cryotherapy instructions were provided.     # Hx NMSC. Ms. Barba recently underwent MMS on R vertex scalp for a SCCis on 3/2/21. Previously swabbed and was negative for bacterial infection. Now improving with vaseline, dresses daily with Telfa  - Continue daily Vaseline and Telfa      #Patient concern for allergies, GI and sinus concerns  - Recently underwent endoscopy without abnormality  - Patient states she follows with ENT, and will address with them        Procedures Performed:   - Intra-lesional triamcinolone procedure note. After positioning and cleansing with isopropyl alcohol, 2 total mL of triamcinolone 10 mg/mL was injected into 20 sites on the scalp. The patient tolerated the procedure well and left the dermatology clinic in good condition.    Follow-up: 3 month(s) in-person, or earlier for new or changing lesions    Staff and Resident:     Cyndie Hair PGY3  Dermatology Resident  pager      ____________________________________________    CC: Derm Problem (LPP - Ester states her MOHS sight has been healing slowly )    HPI:  Ms. Ester Barba is a(n) 75 year  old female who presents today as a return patient for FFA.      - Last seen in clinic on 3/2021  - Current tx:clobetasol shampoo every other day, DHS zinc shampoo every other day, and tretinoin 0.025% cream to facial papules   - 1 Cannister of rogaine lasts: NA  - Scalp pain: Yes, notable along previous MMS site on R vertex scalp  - Scalp burning: None  - Scalp itching: None  - Eyebrow or eyelash changes: decreased eyebrow density, but stable since last visit  - Nail changes: increased nail breakage  - Overall course: Since last visit, she states that she has noticed decreased hair density around vertex scalp near MMS site.  Her scalp continues to be tender at the surgery site.    Patient is otherwise feeling well, without additional skin concerns.    Labs Reviewed:  N/A    Physical Exam:  Vitals: There were no vitals taken for this visit.  SKIN: Focused examination of face, scalp and b/l hands was performed.  - On the vertex scalp, there is a healing surgical ulcer with granulation tissue present at the base with a peripheral rim of erythema.    - Along the frontal, parietal and vertex scalp, there are pink patches with perifollicular erythema and scale   - Decreased hair density present on the b/l eyebrows   - normal eyelash density  - longitudinal ridging present on the b/l fingernails   - No other lesions of concern on areas examined.     Medications:  Current Outpatient Medications   Medication     Biotin 10 MG TABS tablet     Calcium 1500 MG TABS     Cholecalciferol (VITAMIN D) 1000 UNIT capsule     clobetasol propionate (CLOBEX) 0.05 % external shampoo     COMPOUNDED NON-CONTROLLED SUBSTANCE (CMPD RX) - PHARMACY TO MIX COMPOUNDED MEDICATION     esomeprazole (NEXIUM) 40 MG DR capsule     Fluocinolone Acetonide Scalp (DERMA-SMOOTHE/FS SCALP) 0.01 % OIL oil     glucosamine-chondroitin 500-400 MG CAPS per capsule     ibuprofen (ADVIL,MOTRIN) 800 MG tablet     Loratadine (CLARITIN PO)     NEW MED     tretinoin  (RETIN-A) 0.025 % external cream     triamcinolone acetonide (KENALOG) 10 MG/ML injection     triamcinolone acetonide (KENALOG) 10 MG/ML injection     fluconazole (DIFLUCAN) 200 MG tablet     Current Facility-Administered Medications   Medication     triamcinolone acetonide (KENALOG-10) injection 10 mg     triamcinolone acetonide (KENALOG-10) injection 10 mg     triamcinolone acetonide (KENALOG-10) injection 10 mg     triamcinolone acetonide (KENALOG-10) injection 10 mg     triamcinolone acetonide (KENALOG-10) injection 20 mg     triamcinolone acetonide (KENALOG-10) injection 20 mg     triamcinolone acetonide (KENALOG-10) injection 20 mg      Past Medical History:   Patient Active Problem List   Diagnosis     Porokeratosis     Squamous cell carcinoma     Neoplasm of uncertain behavior     Lichen planopilaris     Dermatitis     Cicatricial alopecia     Keratosis, inflamed seborrheic     History of skin cancer     Basal cell carcinoma of vertex scalp s/p mohs 6-5-15     Medication management     Actinic keratosis     Medication monitoring encounter     Dermatitis, seborrheic     Erosive pustular dermatosis     Tick bite, initial encounter     Vulvar atrophy     Factor V Leiden mutation (H)     Splenic artery aneurysm (H)     Post concussion syndrome     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      Again, thank you for allowing me to participate in the care of your patient.      Sincerely,    Barbara Soto MD

## 2021-05-11 NOTE — NURSING NOTE
Lidocaine-epinephrine 1-1:959191 % injection   1mL once for one use, starting 5/11/2021 ending 5/11/2021,  2mL disp, R-0, injection  Injected by Dr. Hair

## 2021-05-14 LAB — COPATH REPORT: NORMAL

## 2021-05-16 NOTE — CONFIDENTIAL NOTE
Please notify patient of this skin cancer diagnosis and schedule for derm surgery with Dr. Warren. She has been seen by him previously.quesitons, let me know.  Regards  MH

## 2021-05-18 ENCOUNTER — TELEPHONE (OUTPATIENT)
Dept: DERMATOLOGY | Facility: CLINIC | Age: 76
End: 2021-05-18

## 2021-05-18 DIAGNOSIS — C44.319 BASAL CELL CARCINOMA (BCC) OF FOREHEAD: Primary | ICD-10-CM

## 2021-05-18 NOTE — TELEPHONE ENCOUNTER
----- Message from Barbara Soto MD sent at 5/16/2021  1:17 PM CDT -----  Please notify patient of this skin cancer diagnosis and schedule for derm surgery with Dr. Warren. She has been seen by him previously.quesitons, let me know.  Regards    Please notify patient of this skin cancer diagnosis and schedule for derm surgery with Dr. Warren. She has been seen by him previously.quesitons, let me know.  Regards    Please notify patient of this skin cancer diagnosis and schedule for derm surgery with Dr. Warren. She has been seen by him previously.quesitons, let me know.  Regards    Please notify patient of this skin cancer diagnosis and schedule for derm surgery with Dr. Warren. She has been seen by him previously.quesitons, let me know.  Regards    Please notify patient of this skin cancer diagnosis and schedule for derm surgery with Dr. Warren. She has been seen by him previously.

## 2021-05-26 ENCOUNTER — OFFICE VISIT (OUTPATIENT)
Dept: OBGYN | Facility: CLINIC | Age: 76
End: 2021-05-26
Attending: OBSTETRICS & GYNECOLOGY
Payer: MEDICARE

## 2021-05-26 VITALS
DIASTOLIC BLOOD PRESSURE: 82 MMHG | WEIGHT: 130 LBS | HEART RATE: 66 BPM | HEIGHT: 66 IN | SYSTOLIC BLOOD PRESSURE: 150 MMHG | BODY MASS INDEX: 20.89 KG/M2

## 2021-05-26 DIAGNOSIS — N39.41 URGE INCONTINENCE OF URINE: ICD-10-CM

## 2021-05-26 DIAGNOSIS — N95.0 POSTMENOPAUSAL BLEEDING: Primary | ICD-10-CM

## 2021-05-26 LAB
ALBUMIN UR-MCNC: NEGATIVE MG/DL
APPEARANCE UR: CLEAR
BACTERIA #/AREA URNS HPF: ABNORMAL /HPF
BILIRUB UR QL STRIP: NEGATIVE
CAOX CRY #/AREA URNS HPF: ABNORMAL /HPF
COLOR UR AUTO: ABNORMAL
GLUCOSE UR STRIP-MCNC: NEGATIVE MG/DL
HGB UR QL STRIP: NEGATIVE
KETONES UR STRIP-MCNC: NEGATIVE MG/DL
LEUKOCYTE ESTERASE UR QL STRIP: NEGATIVE
MUCOUS THREADS #/AREA URNS LPF: PRESENT /LPF
NITRATE UR QL: NEGATIVE
PH UR STRIP: 5.5 PH (ref 5–7)
RBC #/AREA URNS AUTO: 1 /HPF (ref 0–2)
SOURCE: ABNORMAL
SP GR UR STRIP: 1.02 (ref 1–1.03)
SQUAMOUS #/AREA URNS AUTO: 5 /HPF (ref 0–1)
UROBILINOGEN UR STRIP-MCNC: NORMAL MG/DL (ref 0–2)
WBC #/AREA URNS AUTO: 1 /HPF (ref 0–5)

## 2021-05-26 PROCEDURE — 81001 URINALYSIS AUTO W/SCOPE: CPT | Performed by: OBSTETRICS & GYNECOLOGY

## 2021-05-26 PROCEDURE — G0463 HOSPITAL OUTPT CLINIC VISIT: HCPCS

## 2021-05-26 PROCEDURE — 99213 OFFICE O/P EST LOW 20 MIN: CPT | Mod: GC | Performed by: OBSTETRICS & GYNECOLOGY

## 2021-05-26 ASSESSMENT — ANXIETY QUESTIONNAIRES
3. WORRYING TOO MUCH ABOUT DIFFERENT THINGS: NOT AT ALL
6. BECOMING EASILY ANNOYED OR IRRITABLE: NOT AT ALL
5. BEING SO RESTLESS THAT IT IS HARD TO SIT STILL: NOT AT ALL
1. FEELING NERVOUS, ANXIOUS, OR ON EDGE: NOT AT ALL
2. NOT BEING ABLE TO STOP OR CONTROL WORRYING: NOT AT ALL
GAD7 TOTAL SCORE: 0
7. FEELING AFRAID AS IF SOMETHING AWFUL MIGHT HAPPEN: NOT AT ALL

## 2021-05-26 ASSESSMENT — PATIENT HEALTH QUESTIONNAIRE - PHQ9
5. POOR APPETITE OR OVEREATING: NOT AT ALL
SUM OF ALL RESPONSES TO PHQ QUESTIONS 1-9: 0

## 2021-05-26 ASSESSMENT — MIFFLIN-ST. JEOR: SCORE: 1106.43

## 2021-05-26 NOTE — PROGRESS NOTES
"[facility]  Follow-Up Visit    S: Ms. Ester Barba is a 75 year old  here for vaginal bleeding.    Patient states over the past year or so she has had intermittent brown discharge. This resolves spontaneously. Not associated with any pain or other discharge. She feels this may be due to external tissue breakdown rather than uterine bleeding. She endorses some discomfort at the vaginal introitus. She uses a compounded estrogen cream that is helpful.     Patient also endorses significant urinary urgency. She states when she is at home she will have timed voids every hour, which allows her to avoid urinary accidents. She has also been restricting her fluid intake. She regularly does kegels, but does not feel this has made any difference. She is not able to hold her urine when she is coming home from running errands.       O:  BP (!) 150/82   Pulse 66   Ht 1.676 m (5' 6\")   Wt 59 kg (130 lb)   BMI 20.98 kg/m    Gen: Well-appearing, NAD  HEENT: Normocephalic, atraumatic  CV:  RRR, no m/r/g auscultated  Pulm: CTAB, no w/r/r auscultated  Abd: Soft, non-tender, non-distended  Ext: No LE edema, extremities warm and well perfused    Pelvic:  Atrophy of external female genitalia, but normal architecture. Tender to palpation. No obvious lesions or inflammation. Normal hair distribution. Mild discharge.     Labs:        Assessment and Plan:  Ester Barba is a 76yo  who presents for vaginal bleeding and urinary urgency.    Postmenopausal bleeding in setting vulvar atrophy  - Placed refill for compounded estrogen cream  - On exam, bleeding does not seem to be coming from external tissue.  - TVUS ordered to evaluate uterine cavity    Urinary urgency  - Patient has significant symptoms of urinary urgency.   - UA ordered today  - Referral placed for patient to see Dr. Quintero    Lichen sclerosus/Planus  -stable, SIDDHARTH        Age 65+ Annual Preventive Exam  - Screening   Cervical cancer if history of EAMON II-III   CBE " and Mammography yearly. Last Mammogram normal in 2018.    Colorectal cancer - colonoscopy q 10 (preferred). Colonoscopy normal in 2018.   Diabetes - q 3 years 45+   Cholesterol and triglycerides - q 5 years 45+   Thyroid - q 5 years 50+: TSH normal 2016    Hepatitis C - once, age 50-75    - Immunizations   Tdap q 10 years   Herpes zoster once starting at 60 years   Influenza yearly   MMR if not previously immunized   Pneumococcal vaccine once   Varicella if not previously immunized or history of disease        Duane Lin MD  Obstetrics and Gyncology, PGY1  May 26, 2021 , 11:15 AM      I have seen and examined the patient with the resident. I have reviewed, edited, and agree with the note.     Janeth Mayfield MD

## 2021-05-26 NOTE — LETTER
"2021       RE: Ester Barba   [place] [facility] [address]     Dear Colleague,    Thank you for referring your patient, Ester Barba, to the [facility] WOMEN'S CLINIC [place] at [facility]. Please see a copy of my visit note below.    [facility]  Follow-Up Visit    S: Ms. Ester Barba is a 75 year old  here for vaginal bleeding.    Patient states over the past year or so she has had intermittent brown discharge. This resolves spontaneously. Not associated with any pain or other discharge. She feels this may be due to external tissue breakdown rather than uterine bleeding. She endorses some discomfort at the vaginal introitus. She uses a compounded estrogen cream that is helpful.     Patient also endorses significant urinary urgency. She states when she is at home she will have timed voids every hour, which allows her to avoid urinary accidents. She has also been restricting her fluid intake. She regularly does kegels, but does not feel this has made any difference. She is not able to hold her urine when she is coming home from running errands.       O:  BP (!) 150/82   Pulse 66   Ht 1.676 m (5' 6\")   Wt 59 kg (130 lb)   BMI 20.98 kg/m    Gen: Well-appearing, NAD  HEENT: Normocephalic, atraumatic  CV:  RRR, no m/r/g auscultated  Pulm: CTAB, no w/r/r auscultated  Abd: Soft, non-tender, non-distended  Ext: No LE edema, extremities warm and well perfused    Pelvic:  Atrophy of external female genitalia, but normal architecture. Tender to palpation. No obvious lesions or inflammation. Normal hair distribution. Mild discharge.     Labs:        Assessment and Plan:  Ester Barba is a 74yo  who presents for vaginal bleeding and urinary urgency.    Postmenopausal bleeding in setting vulvar atrophy  - Placed refill for compounded estrogen cream  - On exam, bleeding does not seem to be coming from external tissue.  - TVUS ordered to evaluate " uterine cavity    Urinary urgency  - Patient has significant symptoms of urinary urgency.   - UA ordered today  - Referral placed for patient to see Dr. Quintero    Lichen sclerosus/Planus  -stable, SIDDHARTH        Age 65+ Annual Preventive Exam  - Screening   Cervical cancer if history of EAMON II-III   CBE and Mammography yearly. Last Mammogram normal in 2018.    Colorectal cancer - colonoscopy q 10 (preferred). Colonoscopy normal in 2018.   Diabetes - q 3 years 45+   Cholesterol and triglycerides - q 5 years 45+   Thyroid - q 5 years 50+: TSH normal 2016    Hepatitis C - once, age 50-75    - Immunizations   Tdap q 10 years   Herpes zoster once starting at 60 years   Influenza yearly   MMR if not previously immunized   Pneumococcal vaccine once   Varicella if not previously immunized or history of disease        Duane Lin MD  Obstetrics and Gyncology, PGY1  May 26, 2021 , 11:15 AM      I have seen and examined the patient with the resident. I have reviewed, edited, and agree with the note.     Janeth Mayfield MD

## 2021-05-27 ASSESSMENT — ANXIETY QUESTIONNAIRES: GAD7 TOTAL SCORE: 0

## 2021-06-09 ENCOUNTER — OFFICE VISIT (OUTPATIENT)
Dept: DERMATOLOGY | Facility: CLINIC | Age: 76
End: 2021-06-09
Payer: MEDICARE

## 2021-06-09 VITALS — SYSTOLIC BLOOD PRESSURE: 129 MMHG | DIASTOLIC BLOOD PRESSURE: 64 MMHG | HEART RATE: 72 BPM

## 2021-06-09 DIAGNOSIS — C44.319 BASAL CELL CARCINOMA (BCC) OF FOREHEAD: ICD-10-CM

## 2021-06-09 PROCEDURE — 13132 CMPLX RPR F/C/C/M/N/AX/G/H/F: CPT | Mod: GC | Performed by: DERMATOLOGY

## 2021-06-09 PROCEDURE — 17311 MOHS 1 STAGE H/N/HF/G: CPT | Mod: GC | Performed by: DERMATOLOGY

## 2021-06-09 ASSESSMENT — PAIN SCALES - GENERAL: PAINLEVEL: NO PAIN (0)

## 2021-06-09 NOTE — NURSING NOTE
Chief Complaint   Patient presents with     Derm Problem     Patient is here today for mohs on left forehead.      Joan GALLEGOS CMA

## 2021-06-09 NOTE — PROGRESS NOTES
[facility] Dermatologic Surgery Clinic [place] Procedure Note      Date of Service:  Jun 9, 2021  Surgery: Mohs micrographic surgery    Case 1  Repair Type: Complex linear repair  Repair Size: 4.0 cm  Suture Material: 4-0 Monocryl and 5-0 Fast absorbing gut  Tumor Type: BCC  Location: Left forehead  Derm-Path Accession #: [id number]  PreOp Size: 1.0 x 0.8 cm  PostOp Size: 1.5 x 1.0 cm  Mohs Accession #:   Level of Defect: Fat      Procedure:  We discussed the principles of treatment and most likely complications including scarring, bleeding, infection, swelling, pain, crusting, nerve damage, large wound,  incomplete excision, wound dehiscence,  nerve damage, recurrence, and a second procedure may be recommended to obtain the best cosmetic or functional result.    Informed consent was obtained and the patient underwent the procedure as follows:  The patient was placed supine on the operating table.  The cancer was identified, outlined with a marker, and verified by the patient.  The entire surgical field was prepped with chlorhexidine.  The surgical site was anesthetized using 1% lidocaine with epinephrine.    The area of clinically apparent tumor was debulked. The layer of tissue was then surgically excised using a #15 blade and was then transferred onto a specimen sheet maintaining the orientation of the specimen. Hemostasis was obtained using electrocoagulation. The wound site was then covered with a dressing while the tissue samples were processed for examination.    The excised tissue was transported to the Mohs histology laboratory maintaining the tissue orientation.  The tissue specimen was relaxed so that the entire surgical margin was in a a single horizontal plane for sectioning and inked for precise mapping.  A precise reference map was drawn to reflect the sectioning of the specimen, colored inking of the margins, and orientation on the patient. The tissue was processed using horizontal  sectioning of the base and continuous peripheral margins.  The histopathologic sections were reviewed in conjunction with the reference map.    Total blocks: 1    Total slides:  2    There were no cancer cells visualized on examination, therefore Mohs surgery was complete.    REPAIR:     A complex layered linear closure was selected as the procedure which would maximally preserve both function and cosmesis and for the following reasons: 1) the defect was widely undermined and 2) given the wound size, depth, tension, and location.     After the excision of the tumor, the area was extensively and carefully undermined using blunt Metzenbaum scissors. Hemostasis was obtained with spot electrocautery and ligation of vessels where necessary. .The subcutaneous and dermal layers were then closed with 4-0 Monocryl sutures. The epidermis was then carefully approximated along the length of the wound using 5-0 Fast absorbing gut simple running sutures.     Estimated blood loss was less than 10 ml for all surgical sites. A sterile pressure dressing was applied and wound care instructions, with a written handout, were given. The patient was discharged from the Dermatologic Surgery Center alert and ambulatory.    Dr. Chahal performed the micrographic surgery and reconstruction under the direct supervision of Dr. Warren, who was present for the entire micrographic surgery and key portions of the reconstruction, and always immediately available.     Ye Chahal MD  PGY-6    Micrographic Surgery and Dermatologic Oncology Fellow  June 9, 2021

## 2021-06-09 NOTE — LETTER
6/9/2021       RE: Ester Barba  [address] [place] [address]     Dear Colleague,    Thank you for referring your patient, Ester Barba, to the [facility] DERMATOLOGIC SURGERY CLINIC [place] at [facility]. Please see a copy of my visit note below.    [facility] Dermatologic Surgery Clinic [place] Procedure Note      Date of Service:  Jun 9, 2021  Surgery: Mohs micrographic surgery    Case 1  Repair Type: Complex linear repair  Repair Size: 4.0 cm  Suture Material: 4-0 Monocryl and 5-0 Fast absorbing gut  Tumor Type: BCC  Location: Left forehead  Derm-Path Accession #: [id number]  PreOp Size: 1.0 x 0.8 cm  PostOp Size: 1.5 x 1.0 cm  Mohs Accession #:   Level of Defect: Fat      Procedure:  We discussed the principles of treatment and most likely complications including scarring, bleeding, infection, swelling, pain, crusting, nerve damage, large wound,  incomplete excision, wound dehiscence,  nerve damage, recurrence, and a second procedure may be recommended to obtain the best cosmetic or functional result.    Informed consent was obtained and the patient underwent the procedure as follows:  The patient was placed supine on the operating table.  The cancer was identified, outlined with a marker, and verified by the patient.  The entire surgical field was prepped with chlorhexidine.  The surgical site was anesthetized using 1% lidocaine with epinephrine.    The area of clinically apparent tumor was debulked. The layer of tissue was then surgically excised using a #15 blade and was then transferred onto a specimen sheet maintaining the orientation of the specimen. Hemostasis was obtained using electrocoagulation. The wound site was then covered with a dressing while the tissue samples were processed for examination.    The excised tissue was transported to the Mohs histology laboratory maintaining the tissue orientation.  The tissue  specimen was relaxed so that the entire surgical margin was in a a single horizontal plane for sectioning and inked for precise mapping.  A precise reference map was drawn to reflect the sectioning of the specimen, colored inking of the margins, and orientation on the patient. The tissue was processed using horizontal sectioning of the base and continuous peripheral margins.  The histopathologic sections were reviewed in conjunction with the reference map.    Total blocks: 1    Total slides:  2    There were no cancer cells visualized on examination, therefore Mohs surgery was complete.    REPAIR:     A complex layered linear closure was selected as the procedure which would maximally preserve both function and cosmesis and for the following reasons: 1) the defect was widely undermined and 2) given the wound size, depth, tension, and location.     After the excision of the tumor, the area was extensively and carefully undermined using blunt Metzenbaum scissors. Hemostasis was obtained with spot electrocautery and ligation of vessels where necessary. .The subcutaneous and dermal layers were then closed with 4-0 Monocryl sutures. The epidermis was then carefully approximated along the length of the wound using 5-0 Fast absorbing gut simple running sutures.     Estimated blood loss was less than 10 ml for all surgical sites. A sterile pressure dressing was applied and wound care instructions, with a written handout, were given. The patient was discharged from the Dermatologic Surgery Center alert and ambulatory.    Dr. Chahal performed the micrographic surgery and reconstruction under the direct supervision of Dr. Warren, who was present for the entire micrographic surgery and key portions of the reconstruction, and always immediately available.     Ye Chahal MD  PGY-6    Micrographic Surgery and Dermatologic Oncology Fellow  June 9, 2021        Attestation signed by Jorge Warren MD at 6/22/2021  9:40  AM:    Attending attestation:  I was present for key elements of the procedure and immediately available for all other portions of the procedure.  I have reviewed the note and edited it as necessary.    Jorge Warren M.D.  Professor  Director of Dermatologic Surgery  Department of Dermatology  [facility]    Dermatology Surgery Clinic  [facility]  [address] [place], [place] [id number]

## 2021-06-10 DIAGNOSIS — Z79.899 LONG-TERM USE OF PLAQUENIL: Primary | ICD-10-CM

## 2021-06-17 ENCOUNTER — OFFICE VISIT (OUTPATIENT)
Dept: OPHTHALMOLOGY | Facility: CLINIC | Age: 76
End: 2021-06-17
Attending: OPHTHALMOLOGY
Payer: MEDICARE

## 2021-06-17 DIAGNOSIS — Z79.899 LONG-TERM USE OF PLAQUENIL: Primary | ICD-10-CM

## 2021-06-17 PROCEDURE — 92250 FUNDUS PHOTOGRAPHY W/I&R: CPT | Performed by: OPHTHALMOLOGY

## 2021-06-17 PROCEDURE — 99213 OFFICE O/P EST LOW 20 MIN: CPT | Performed by: OPHTHALMOLOGY

## 2021-06-17 PROCEDURE — 92134 CPTRZ OPH DX IMG PST SGM RTA: CPT | Performed by: OPHTHALMOLOGY

## 2021-06-17 PROCEDURE — 99207 FUNDUS AUTOFLUORESCENCE IMAGE (FAF) OU (BOTH EYES): CPT | Performed by: OPHTHALMOLOGY

## 2021-06-17 PROCEDURE — G0463 HOSPITAL OUTPT CLINIC VISIT: HCPCS

## 2021-06-17 ASSESSMENT — VISUAL ACUITY
OS_CC: 20/25
OD_CC: 20/20
METHOD: SNELLEN - LINEAR
OS_CC+: -3

## 2021-06-17 ASSESSMENT — REFRACTION_WEARINGRX
OD_CYLINDER: +0.75
OS_CYLINDER: +1.50
OS_SPHERE: -1.25
OD_AXIS: 025
SPECS_TYPE: PAL
OD_SPHERE: -0.25
OS_ADD: +2.75
OD_ADD: +2.75
OS_AXIS: 137

## 2021-06-17 ASSESSMENT — CUP TO DISC RATIO
OS_RATIO: 0.3
OD_RATIO: 0.3

## 2021-06-17 ASSESSMENT — SLIT LAMP EXAM - LIDS
COMMENTS: NORMAL
COMMENTS: NORMAL

## 2021-06-17 ASSESSMENT — CONF VISUAL FIELD
OS_NORMAL: 1
METHOD: COUNTING FINGERS
OD_NORMAL: 1

## 2021-06-17 ASSESSMENT — TONOMETRY
IOP_METHOD: TONOPEN
OS_IOP_MMHG: 13
OD_IOP_MMHG: 16

## 2021-06-17 ASSESSMENT — EXTERNAL EXAM - RIGHT EYE: OD_EXAM: NORMAL

## 2021-06-17 ASSESSMENT — EXTERNAL EXAM - LEFT EYE: OS_EXAM: NORMAL

## 2021-06-17 NOTE — PROGRESS NOTES
I have confirmed the patient's and reviewed Past Medical History, Past Surgical History, Social History, Family History, Problem List, Medication List and agree with Tech note.     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 She was taking Plaquenil 400 mg daily for ~15 years but stopped 12/3/2019 after RPE changes were noted in OCT by her local ophthalmologist and retinologist in [facility]. she has since NOT used plaquenil         PAST OCULAR SURGERY  None        RETINAL IMAGING:  OCT 1/16/2020  OD - minor RPE and IS/OS junctions changes   OS - minor RPE and IS/OS junctions changes         FAF 1/16/2020  each eye dot like hypoAF spots at macula, not significant for AMD or plaquenil toxicity     Optos 1/16/2020  each eye dot like hypoAF spots at macula but midperiphery and periphery appear within normal limits    mferg:  No signs of plaquenil toxicity 1/2020.     ASSESSMENT & PLAN     1. Long-term use of Plaquenil    plaquenil use 400 mg daily x ~15 years  Stopped 12/3/2019 after noting some RPE changes by local ophthalmologist  OCT subtle focal RPE changes; hypoAF spots in macula  No FAF changes are due to plaquenil toxicity  Risk factors for plaquenil toxicity: high dose of plaquenil for long time and macular pathology; No other risk factors for plaquenil toxicity (no liver/kidney disease, lean person)    Asked patient to bring her OCT and VF records from her local doctor, there is a scanned letter from 2009.     return to clinic one year : for DFE and Exam/OCT / fundus autofluorescence both eyes           Malathi Agustin MD PhD.  Professor & Chair

## 2021-06-17 NOTE — NURSING NOTE
Chief Complaints and History of Present Illnesses   Patient presents with     Plaquenil     Chief Complaint(s) and History of Present Illness(es)     Plaquenil               Comments     Ester is here to continue care for Long-term use of Plaquenil. She stopped plaquenil about two years ago. She feels vision is about the same as last visit. She complains of dry eye BE.     Silvino Stout COT 1:08 PM June 17, 2021

## 2021-06-21 ENCOUNTER — PREP FOR PROCEDURE (OUTPATIENT)
Dept: UROLOGY | Facility: CLINIC | Age: 76
End: 2021-06-21

## 2021-06-21 ENCOUNTER — OFFICE VISIT (OUTPATIENT)
Dept: UROLOGY | Facility: CLINIC | Age: 76
End: 2021-06-21
Attending: OBSTETRICS & GYNECOLOGY
Payer: MEDICARE

## 2021-06-21 ENCOUNTER — ANCILLARY PROCEDURE (OUTPATIENT)
Dept: ULTRASOUND IMAGING | Facility: CLINIC | Age: 76
End: 2021-06-21
Attending: OBSTETRICS & GYNECOLOGY
Payer: MEDICARE

## 2021-06-21 VITALS
HEART RATE: 54 BPM | SYSTOLIC BLOOD PRESSURE: 172 MMHG | BODY MASS INDEX: 21.82 KG/M2 | WEIGHT: 135.2 LBS | DIASTOLIC BLOOD PRESSURE: 71 MMHG

## 2021-06-21 DIAGNOSIS — N39.41 URGE INCONTINENCE OF URINE: Primary | ICD-10-CM

## 2021-06-21 DIAGNOSIS — N84.2 VAGINAL POLYP: ICD-10-CM

## 2021-06-21 DIAGNOSIS — R39.15 URGENCY OF URINATION: ICD-10-CM

## 2021-06-21 DIAGNOSIS — N84.2 VAGINAL POLYP: Primary | ICD-10-CM

## 2021-06-21 DIAGNOSIS — N39.41 URGE INCONTINENCE: ICD-10-CM

## 2021-06-21 DIAGNOSIS — N95.0 POSTMENOPAUSAL BLEEDING: ICD-10-CM

## 2021-06-21 DIAGNOSIS — R39.15 URINARY URGENCY: ICD-10-CM

## 2021-06-21 PROCEDURE — 76830 TRANSVAGINAL US NON-OB: CPT | Mod: 26 | Performed by: OBSTETRICS & GYNECOLOGY

## 2021-06-21 PROCEDURE — G0463 HOSPITAL OUTPT CLINIC VISIT: HCPCS

## 2021-06-21 PROCEDURE — 99215 OFFICE O/P EST HI 40 MIN: CPT | Performed by: OBSTETRICS & GYNECOLOGY

## 2021-06-21 PROCEDURE — 76830 TRANSVAGINAL US NON-OB: CPT

## 2021-06-21 RX ORDER — ALBUTEROL SULFATE 90 UG/1
1-2 AEROSOL, METERED RESPIRATORY (INHALATION)
COMMUNITY
Start: 2019-12-19 | End: 2022-03-21

## 2021-06-21 ASSESSMENT — PAIN SCALES - GENERAL: PAINLEVEL: NO PAIN (0)

## 2021-06-21 NOTE — PATIENT INSTRUCTIONS
Dear Ester, henny to meet you today.     Our surgery scheduler will contact you in the next week or so to schedule the excision of polyp and cystoscopy ( looking inside your bladder with a camera) procedures.     I have referred you for pelvic floor physical therapy as well to help with your bladder symptoms    Please call our clinic with any questions at  or send a My chart message    Sandy Quintero MD, [facility]  Female Pelvic Medicine and Reconstructive Surgery  [facility]

## 2021-06-21 NOTE — LETTER
2021       RE: Ester Barba  [address]  [facility] [address]     Dear Colleague,    Thank you for referring your patient, Ester Barba, to the [facility] [place] [facility]. Please see a copy of my visit note below.    2021    Referring Provider: Janeth Mayfield MD  [address]  [place] [address]    Primary Care Provider: Fernando Weathers    CC: Urgency urinary incontinence    HPI:  Ester Barba is a 75 year old  female who presents for evaluation of urinary urgency, frequency and urgency urinary incontinence.   Her med hx is sig for hx of concussion in 2019 (Fall in a parking lot-post concussion syndrome) and  factor V leiden mutation (not on anticoagulation) and partial sigmoidectomy  in  for diverticulitis.      Urinary Symptoms/Voiding function  Has been having urinary urgency and urgency incontinence for the last year or so. Says it may have preceeded her concussion in 2019. She voids every hour, reduces her fluid intake and does a lot of toilet mapping to avoid leakage. Does not wear incontinence pads although she does have accidents occasionally with urgency especially if she is coming home from shopping etc. She denies bothersome stress leakage. She feels like she empties her bladder well. Drinks mostly water ( about 30 oz per day) and half a cap of coffee, and 1 can of slim fast. No other bladder irritants.     Pelvic Organ Prolapse Symptoms  Denies vaginal bulge or pressure sensation.      Gastrointestinal Symptoms:  Denies chronic diarrhea, constipation. Denies bothersome fecal or flatal incontinence. Has a hx of diverticulitis, s/p partial sig colon resection      Sexual function/Pelvic floor pain/GYN:   No penetrative intercourse (  reasons). Has been using vaginal estrogen cream once a week for years.       Relevant Medical History:    Diabetes? no  High Blood pressure? cHTN      Recurrent UTIs? No   Sleep Apnea? no  Obesity? no  History of Blood clots? No ( has factor V leiden mutation, diagnosed in a family hx work up)  Other medical problems: As above    Past Surgical History:   Procedure Laterality Date     BIOPSY OF SKIN LESION       CATARACT IOL, RT/LT Bilateral 2018     MOHS MICROGRAPHIC PROCEDURE       NO HISTORY OF SURGERY  2014    derm     SIGMOIDECTOMY      for diverticulitis     OB/Gyn History:  OB History    Para Term  AB Living   2 2 2 0 0 2   SAB TAB Ectopic Multiple Live Births   0 0 0 0 0      # Outcome Date GA Lbr Adalberto/2nd Weight Sex Delivery Anes PTL Lv   2 Term            1 Term                Medications/Vitamins/Supplements:     Current Outpatient Medications   Medication     albuterol (PROAIR HFA/PROVENTIL HFA/VENTOLIN HFA) 108 (90 Base) MCG/ACT inhaler     Biotin 10 MG TABS tablet     Calcium 1500 MG TABS     Cholecalciferol (VITAMIN D) 1000 UNIT capsule     clobetasol propionate (CLOBEX) 0.05 % external shampoo     COMPOUNDED NON-CONTROLLED SUBSTANCE (CMPD RX) - PHARMACY TO MIX COMPOUNDED MEDICATION     esomeprazole (NEXIUM) 40 MG DR capsule     Fluocinolone Acetonide Scalp (DERMA-SMOOTHE/FS SCALP) 0.01 % OIL oil     glucosamine-chondroitin 500-400 MG CAPS per capsule     ibuprofen (ADVIL,MOTRIN) 800 MG tablet     Loratadine (CLARITIN PO)     NEW MED     tretinoin (RETIN-A) 0.025 % external cream     triamcinolone acetonide (KENALOG) 10 MG/ML injection     triamcinolone acetonide (KENALOG) 10 MG/ML injection     Current Facility-Administered Medications   Medication     triamcinolone acetonide (KENALOG-10) injection 10 mg         Medical History:      Past Medical History:   Diagnosis Date     Arthritis     osteoarthritis     Basal cell carcinoma      Bilateral occipital neuralgia 2019     Concussion 2019     Squamous cell carcinoma      ROS  Social History    Social History     Socioeconomic History     Marital status:       Spouse name: Not on file     Number of children: Not on file     Years of education: Not on file     Highest education level: Not on file   Occupational History     Not on file   Social Needs     Financial resource strain: Not on file     Food insecurity     Worry: Not on file     Inability: Not on file     Transportation needs     Medical: Not on file     Non-medical: Not on file   Tobacco Use     Smoking status: Former Smoker     Quit date: 1965     Years since quittin.4     Smokeless tobacco: Never Used   Substance and Sexual Activity     Alcohol use: Yes     Alcohol/week: 0.0 standard drinks     Comment: glass of wine     Drug use: No     Sexual activity: Not Currently     Birth control/protection: Post-menopausal   Lifestyle     Physical activity     Days per week: Not on file     Minutes per session: Not on file     Stress: Not on file   Relationships     Social connections     Talks on phone: Not on file     Gets together: Not on file     Attends [employer] service: Not on file     Active member of club or organization: Not on file     Attends meetings of clubs or organizations: Not on file     Relationship status: Not on file     Intimate partner violence     Fear of current or ex partner: Not on file     Emotionally abused: Not on file     Physically abused: Not on file     Forced sexual activity: Not on file   Other Topics Concern     Parent/sibling w/ CABG, MI or angioplasty before 65F 55M? Not Asked   Social History Narrative    How much exercise per week? 2 days a week    How much calcium per day? Everyday supplements and milk and cheese       How much caffeine per day? 2-3 cups    How much vitamin D per day? supplment    Do you/your family wear seatbelts?  Yes    Do you/your family use safety helmets? Yes    Do you/your family use sunscreen? Yes    Do you/your family keep firearms in the home? Yes    Do you/your family have a smoke detector(s)? Yes        Do you feel safe in your home? Yes     Has anyone ever touched you in an unwanted manner? No     Explain        Family History  Family History   Problem Relation Age of Onset     Skin Cancer Sister         basal cell carcinoma     Melanoma No family hx of      Glaucoma No family hx of      Macular Degeneration No family hx of        Allergy    Allergies   Allergen Reactions     Dust Mites Other (See Comments)     Sneezing, coughing. asthma  Itchy watery eyes, sneezing     Estrogens Itching     Other reaction(s): Hypersensitivity  Topical caused burning sensation of skin  Topical caused burning sensation of skin; Premarin cream only - possibly only an bi-ingredient     Imiquimod Other (See Comments)     Hair loss     Levaquin [Levofloxacin Hemihydrate] Other (See Comments)     Muscle weakness     Levofloxacin Other (See Comments)     MUSCLE WEAKNESS, ARTHRITIS LIKE SYMPTOMS.       Mold Other (See Comments)     Sneezing, asthma, weezing     Pollen Extract/Tree Extract Other (See Comments)     Sneezing, weezing     Sulfa Drugs Hives     Sulfamethoxazole-Trimethoprim Hives     Latex Rash     LATEX BANDAGES; tapes adhesive     Penicillin G Rash     Penicillins Rash       Current Outpatient Medications   Medication     albuterol (PROAIR HFA/PROVENTIL HFA/VENTOLIN HFA) 108 (90 Base) MCG/ACT inhaler     Biotin 10 MG TABS tablet     Calcium 1500 MG TABS     Cholecalciferol (VITAMIN D) 1000 UNIT capsule     clobetasol propionate (CLOBEX) 0.05 % external shampoo     COMPOUNDED NON-CONTROLLED SUBSTANCE (CMPD RX) - PHARMACY TO MIX COMPOUNDED MEDICATION     esomeprazole (NEXIUM) 40 MG DR capsule     Fluocinolone Acetonide Scalp (DERMA-SMOOTHE/FS SCALP) 0.01 % OIL oil     glucosamine-chondroitin 500-400 MG CAPS per capsule     ibuprofen (ADVIL,MOTRIN) 800 MG tablet     Loratadine (CLARITIN PO)     NEW MED     tretinoin (RETIN-A) 0.025 % external cream     triamcinolone acetonide (KENALOG) 10 MG/ML injection     triamcinolone acetonide (KENALOG) 10 MG/ML injection      Current Facility-Administered Medications   Medication     triamcinolone acetonide (KENALOG-10) injection 10 mg       Physical Exam:   BP (!) 161/76   Pulse 54   Wt 61.3 kg (135 lb 3.2 oz)   BMI 21.82 kg/m   No LMP recorded. Patient is postmenopausal. Body mass index is 21.82 kg/m .    Gen:  is alert, comfortable in no acute distress,   Abdomen: Abdomen is soft, non-tender, non-distended,   Lungs: non-labored breathing.     Pelvic Exam: Limited exam today due to pain and guarding/ Muscle spasms visible on her lower extremities and gluts during the exam.   Normal external female genitalia. The urethra was slightly red -carruncle.    Vagina: two polypoid structures that appear red and irritated bilaterally in the anterior sulcus of the vagina. I was unable to fully evaluate them due to extreme tenderness despite lidocaine gel.   Uterus: Not evaluated today due to pain   Ovaries: Not evaluated today  Vulva: No visible lesions although some erythema on the vulva  Rectal: deferred    Pelvic floor muscles: Levator myalgia diffusely in the pubococcygeus and ileococcygeus muscles    POPQ EXAM FOR PROLAPSE SEVERITY  No prolapse although limited exam today      Voiding trial:    VOID 375 ml  PVR 63 mL by Bladder ultrasound  Leak with Cough stress test : no, Prolapse reduced no    Labs:   Color Urine (no units)   Date Value   05/26/2021 Light Yellow     Appearance Urine (no units)   Date Value   05/26/2021 Clear     Glucose Urine (mg/dL)   Date Value   05/26/2021 Negative     Bilirubin Urine (no units)   Date Value   05/26/2021 Negative     Ketones Urine (mg/dL)   Date Value   05/26/2021 Negative     Specific Gravity Urine (no units)   Date Value   05/26/2021 1.022     pH Urine (pH)   Date Value   05/26/2021 5.5     Protein Albumin Urine (mg/dL)   Date Value   05/26/2021 Negative     Urobilinogen Urine (EU/dL)   Date Value   10/09/2015 0.2     Nitrite Urine (no units)   Date Value   05/26/2021 Negative     Leukocyte  Esterase Urine (no units)   Date Value   05/26/2021 Negative     CBC RESULTS:   Recent Labs   Lab Test 11/04/19  1705   WBC 5.6   RBC 4.03   HGB 13.1   HCT 39.5   MCV 98   MCH 32.5   MCHC 33.2   RDW 13.3          A/P: Ester Barba is a 75 year old F with     Ester was seen today for consult.    Diagnoses and all orders for this visit:    Urge incontinence of urine  -     Adult Urology Referral  -     [facility] RE[facility]; Future    Vaginal polyp    Urgency of urination        For her urgency incontinence, we discussed today some behavioral modifications ( avoiding caffiene, alcohol etc) and a trial of pelvic floor physical therapy as first step.     She does have a lot of pelvic floor pain and visible muscle spasms that may be contributing. PT should help with some of that.     Also two irritated vaginal polyps noted today. Unable to do a full assessment or biopsy due to pain and guarding    We discussed best option will be to do a better exam and excisional biopsy of the polyps under sedation along with cystoscopy given her irritative bladder symptoms.     She is aggreable to this plan. Procedure request placed       I spent a total of 60 minutes with  Ester Barba  on the date of the encounter in chart review, face to face patient visit, review of tests, documentation and/or discussion with other providers about the issues documented above.     Sandy Quintero MD, [facility]  , Department of OBGYN  Female Pelvic Medicine and Reconstructive Surgery ( Urogynecology)  CC  Patient Care Team:  Fernando Weathesr MD as PCP - General (Internal Medicine)  Janeth Mayfield MD as MD (OB/Gyn)  Barbara Soto MD as MD (Dermatology)  Sharon Kaur MD as Sharon Hi MD as Barbara Hogan MD as Assigned Surgical Provider  JANETH MAYFIELD

## 2021-06-21 NOTE — PROGRESS NOTES
2021    Referring Provider: Janeth Mayfield MD  [address]  [place] [address]    Primary Care Provider: Fernando Weathers    CC: Urgency urinary incontinence    HPI:  Ester Barba is a 75 year old  female who presents for evaluation of urinary urgency, frequency and urgency urinary incontinence.   Her med hx is sig for hx of concussion in 2019 (Fall in a parking lot-post concussion syndrome) and  factor V leiden mutation (not on anticoagulation) and partial sigmoidectomy  in  for diverticulitis.      Urinary Symptoms/Voiding function  Has been having urinary urgency and urgency incontinence for the last year or so. Says it may have preceeded her concussion in 2019. She voids every hour, reduces her fluid intake and does a lot of toilet mapping to avoid leakage. Does not wear incontinence pads although she does have accidents occasionally with urgency especially if she is coming home from shopping etc. She denies bothersome stress leakage. She feels like she empties her bladder well. Drinks mostly water ( about 30 oz per day) and half a cap of coffee, and 1 can of slim fast. No other bladder irritants.     Pelvic Organ Prolapse Symptoms  Denies vaginal bulge or pressure sensation.      Gastrointestinal Symptoms:  Denies chronic diarrhea, constipation. Denies bothersome fecal or flatal incontinence. Has a hx of diverticulitis, s/p partial sig colon resection      Sexual function/Pelvic floor pain/GYN:   No penetrative intercourse (  reasons). Has been using vaginal estrogen cream once a week for years.       Relevant Medical History:    Diabetes? no  High Blood pressure? cHTN     Recurrent UTIs? No   Sleep Apnea? no  Obesity? no  History of Blood clots? No ( has factor V leiden mutation, diagnosed in a family hx work up)  Other medical problems: As above    Past Surgical History:   Procedure Laterality Date     BIOPSY OF SKIN LESION       CATARACT IOL, RT/LT Bilateral       MOHS MICROGRAPHIC PROCEDURE       NO HISTORY OF SURGERY  2014    derm     SIGMOIDECTOMY      for diverticulitis     OB/Gyn History:  OB History    Para Term  AB Living   2 2 2 0 0 2   SAB TAB Ectopic Multiple Live Births   0 0 0 0 0      # Outcome Date GA Lbr Adalberto/2nd Weight Sex Delivery Anes PTL Lv   2 Term            1 Term                Medications/Vitamins/Supplements:     Current Outpatient Medications   Medication     albuterol (PROAIR HFA/PROVENTIL HFA/VENTOLIN HFA) 108 (90 Base) MCG/ACT inhaler     Biotin 10 MG TABS tablet     Calcium 1500 MG TABS     Cholecalciferol (VITAMIN D) 1000 UNIT capsule     clobetasol propionate (CLOBEX) 0.05 % external shampoo     COMPOUNDED NON-CONTROLLED SUBSTANCE (CMPD RX) - PHARMACY TO MIX COMPOUNDED MEDICATION     esomeprazole (NEXIUM) 40 MG DR capsule     Fluocinolone Acetonide Scalp (DERMA-SMOOTHE/FS SCALP) 0.01 % OIL oil     glucosamine-chondroitin 500-400 MG CAPS per capsule     ibuprofen (ADVIL,MOTRIN) 800 MG tablet     Loratadine (CLARITIN PO)     NEW MED     tretinoin (RETIN-A) 0.025 % external cream     triamcinolone acetonide (KENALOG) 10 MG/ML injection     triamcinolone acetonide (KENALOG) 10 MG/ML injection     Current Facility-Administered Medications   Medication     triamcinolone acetonide (KENALOG-10) injection 10 mg         Medical History:      Past Medical History:   Diagnosis Date     Arthritis     osteoarthritis     Basal cell carcinoma      Bilateral occipital neuralgia 2019     Concussion 2019     Squamous cell carcinoma      ROS  Social History    Social History     Socioeconomic History     Marital status:      Spouse name: Not on file     Number of children: Not on file     Years of education: Not on file     Highest education level: Not on file   Occupational History     Not on file   Social Needs     Financial resource strain: Not on file     Food insecurity     Worry: Not on file     Inability: Not on  file     Transportation needs     Medical: Not on file     Non-medical: Not on file   Tobacco Use     Smoking status: Former Smoker     Quit date: 1965     Years since quittin.4     Smokeless tobacco: Never Used   Substance and Sexual Activity     Alcohol use: Yes     Alcohol/week: 0.0 standard drinks     Comment: glass of wine     Drug use: No     Sexual activity: Not Currently     Birth control/protection: Post-menopausal   Lifestyle     Physical activity     Days per week: Not on file     Minutes per session: Not on file     Stress: Not on file   Relationships     Social connections     Talks on phone: Not on file     Gets together: Not on file     Attends Sikhism service: Not on file     Active member of club or organization: Not on file     Attends meetings of clubs or organizations: Not on file     Relationship status: Not on file     Intimate partner violence     Fear of current or ex partner: Not on file     Emotionally abused: Not on file     Physically abused: Not on file     Forced sexual activity: Not on file   Other Topics Concern     Parent/sibling w/ CABG, MI or angioplasty before 65F 55M? Not Asked   Social History Narrative    How much exercise per week? 2 days a week    How much calcium per day? Everyday supplements and milk and cheese       How much caffeine per day? 2-3 cups    How much vitamin D per day? supplment    Do you/your family wear seatbelts?  Yes    Do you/your family use safety helmets? Yes    Do you/your family use sunscreen? Yes    Do you/your family keep firearms in the home? Yes    Do you/your family have a smoke detector(s)? Yes        Do you feel safe in your home? Yes    Has anyone ever touched you in an unwanted manner? No     Explain        Family History  Family History   Problem Relation Age of Onset     Skin Cancer Sister         basal cell carcinoma     Melanoma No family hx of      Glaucoma No family hx of      Macular Degeneration No family hx of         Allergy    Allergies   Allergen Reactions     Dust Mites Other (See Comments)     Sneezing, coughing. asthma  Itchy watery eyes, sneezing     Estrogens Itching     Other reaction(s): Hypersensitivity  Topical caused burning sensation of skin  Topical caused burning sensation of skin; Premarin cream only - possibly only an bi-ingredient     Imiquimod Other (See Comments)     Hair loss     Levaquin [Levofloxacin Hemihydrate] Other (See Comments)     Muscle weakness     Levofloxacin Other (See Comments)     MUSCLE WEAKNESS, ARTHRITIS LIKE SYMPTOMS.       Mold Other (See Comments)     Sneezing, asthma, weezing     Pollen Extract/Tree Extract Other (See Comments)     Sneezing, weezing     Sulfa Drugs Hives     Sulfamethoxazole-Trimethoprim Hives     Latex Rash     LATEX BANDAGES; tapes adhesive     Penicillin G Rash     Penicillins Rash       Current Outpatient Medications   Medication     albuterol (PROAIR HFA/PROVENTIL HFA/VENTOLIN HFA) 108 (90 Base) MCG/ACT inhaler     Biotin 10 MG TABS tablet     Calcium 1500 MG TABS     Cholecalciferol (VITAMIN D) 1000 UNIT capsule     clobetasol propionate (CLOBEX) 0.05 % external shampoo     COMPOUNDED NON-CONTROLLED SUBSTANCE (CMPD RX) - PHARMACY TO MIX COMPOUNDED MEDICATION     esomeprazole (NEXIUM) 40 MG DR capsule     Fluocinolone Acetonide Scalp (DERMA-SMOOTHE/FS SCALP) 0.01 % OIL oil     glucosamine-chondroitin 500-400 MG CAPS per capsule     ibuprofen (ADVIL,MOTRIN) 800 MG tablet     Loratadine (CLARITIN PO)     NEW MED     tretinoin (RETIN-A) 0.025 % external cream     triamcinolone acetonide (KENALOG) 10 MG/ML injection     triamcinolone acetonide (KENALOG) 10 MG/ML injection     Current Facility-Administered Medications   Medication     triamcinolone acetonide (KENALOG-10) injection 10 mg       Physical Exam:   BP (!) 161/76   Pulse 54   Wt 61.3 kg (135 lb 3.2 oz)   BMI 21.82 kg/m   No LMP recorded. Patient is postmenopausal. Body mass index is 21.82  kg/m .    Gen:  is alert, comfortable in no acute distress,   Abdomen: Abdomen is soft, non-tender, non-distended,   Lungs: non-labored breathing.     Pelvic Exam: Limited exam today due to pain and guarding/ Muscle spasms visible on her lower extremities and gluts during the exam.   Normal external female genitalia. The urethra was slightly red -carruncle.    Vagina: two polypoid structures that appear red and irritated bilaterally in the anterior sulcus of the vagina. I was unable to fully evaluate them due to extreme tenderness despite lidocaine gel.   Uterus: Not evaluated today due to pain   Ovaries: Not evaluated today  Vulva: No visible lesions although some erythema on the vulva  Rectal: deferred    Pelvic floor muscles: Levator myalgia diffusely in the pubococcygeus and ileococcygeus muscles    POPQ EXAM FOR PROLAPSE SEVERITY  No prolapse although limited exam today      Voiding trial:    VOID 375 ml  PVR 63 mL by Bladder ultrasound  Leak with Cough stress test : no, Prolapse reduced no    Labs:   Color Urine (no units)   Date Value   05/26/2021 Light Yellow     Appearance Urine (no units)   Date Value   05/26/2021 Clear     Glucose Urine (mg/dL)   Date Value   05/26/2021 Negative     Bilirubin Urine (no units)   Date Value   05/26/2021 Negative     Ketones Urine (mg/dL)   Date Value   05/26/2021 Negative     Specific Gravity Urine (no units)   Date Value   05/26/2021 1.022     pH Urine (pH)   Date Value   05/26/2021 5.5     Protein Albumin Urine (mg/dL)   Date Value   05/26/2021 Negative     Urobilinogen Urine (EU/dL)   Date Value   10/09/2015 0.2     Nitrite Urine (no units)   Date Value   05/26/2021 Negative     Leukocyte Esterase Urine (no units)   Date Value   05/26/2021 Negative     CBC RESULTS:   Recent Labs   Lab Test 11/04/19  1705   WBC 5.6   RBC 4.03   HGB 13.1   HCT 39.5   MCV 98   MCH 32.5   MCHC 33.2   RDW 13.3          A/P: Ester Barba is a 75 year old F with     Ester was seen today  for consult.    Diagnoses and all orders for this visit:    Urge incontinence of urine  -     Adult Urology Referral  -     [facility]; Future    Vaginal polyp    Urgency of urination        For her urgency incontinence, we discussed today some behavioral modifications ( avoiding caffiene, alcohol etc) and a trial of pelvic floor physical therapy as first step.     She does have a lot of pelvic floor pain and visible muscle spasms that may be contributing. PT should help with some of that.     Also two irritated vaginal polyps noted today. Unable to do a full assessment or biopsy due to pain and guarding    We discussed best option will be to do a better exam and excisional biopsy of the polyps under sedation along with cystoscopy given her irritative bladder symptoms.     She is aggreable to this plan. Procedure request placed       I spent a total of 60 minutes with  Ester GARCIA Barba  on the date of the encounter in chart review, face to face patient visit, review of tests, documentation and/or discussion with other providers about the issues documented above.     Sandy Quintero MD, [facility]  , Department of OBGYN  Female Pelvic Medicine and Reconstructive Surgery ( Urogynecology)  CC  Patient Care Team:  Fernando Weathers MD as PCP - General (Internal Medicine)  Janeth Mayfield MD as MD (OB/Gyn)  Barbara Soto MD as MD (Dermatology)  Sharon Kaur MD as Sharon Hi MD as Barbara Hogan MD as Assigned Surgical Provider  JANETH MAYFIELD

## 2021-06-21 NOTE — Clinical Note
Juanito Quiles, I have placed a surgery request for this patient. It will be a 45 min procedure probably as an out patient. Ok to schedule it at  or [facility] depending on what is available. If you schedule it this Wednesday, please don't schedule it before 8:30 am or after 4 pm

## 2021-06-21 NOTE — NURSING NOTE
I spoke with the patient and she mentioned that she did not have a fall she feels as if her knee has been wanting to give out on her and it feels like her right knee is very weak. She is concerned that with cerebral palsy, she may end up falling and hurting herself badly. I asked her if she has been using a walker at all, she has only used a cane to get around and she does not fell very supported on that. She also mentioned possibly a different brace to help as well.  Please advise.    Breanna GONSALEZ   
Patient felt wetness but no positive visual for urinary stress incontinence.  She voided 375 ml and PVR was 63 ml  
rolling walker

## 2021-06-29 DIAGNOSIS — R23.8 PAPULE: ICD-10-CM

## 2021-06-29 DIAGNOSIS — N90.5 VULVAR ATROPHY: ICD-10-CM

## 2021-06-29 NOTE — TELEPHONE ENCOUNTER
Received refill request for biestrogen.  Last in clinic 5/2021.      Paper refill routed to Dr Mayfield for approval

## 2021-07-01 ENCOUNTER — TELEPHONE (OUTPATIENT)
Dept: OBGYN | Facility: CLINIC | Age: 76
End: 2021-07-01

## 2021-07-01 RX ORDER — TRETINOIN 0.25 MG/G
CREAM TOPICAL
Qty: 45 G | Refills: 1 | Status: SHIPPED | OUTPATIENT
Start: 2021-07-01 | End: 2021-11-18

## 2021-07-02 ENCOUNTER — TELEPHONE (OUTPATIENT)
Dept: OBGYN | Facility: CLINIC | Age: 76
End: 2021-07-02

## 2021-07-02 ENCOUNTER — TELEPHONE (OUTPATIENT)
Dept: UROLOGY | Facility: CLINIC | Age: 76
End: 2021-07-02

## 2021-07-02 ENCOUNTER — TELEPHONE (OUTPATIENT)
Dept: DERMATOLOGY | Facility: CLINIC | Age: 76
End: 2021-07-02

## 2021-07-02 DIAGNOSIS — Z11.59 ENCOUNTER FOR SCREENING FOR OTHER VIRAL DISEASES: ICD-10-CM

## 2021-07-02 DIAGNOSIS — Z20.822 ENCOUNTER FOR LABORATORY TESTING FOR COVID-19 VIRUS: Primary | ICD-10-CM

## 2021-07-02 PROBLEM — N39.41 URGE INCONTINENCE: Status: ACTIVE | Noted: 2021-07-02

## 2021-07-02 PROBLEM — R39.15 URINARY URGENCY: Status: ACTIVE | Noted: 2021-07-02

## 2021-07-02 PROBLEM — N84.2 VAGINAL POLYP: Status: ACTIVE | Noted: 2021-07-02

## 2021-07-02 NOTE — TELEPHONE ENCOUNTER
Confirmed surgery date, time and location, 7/14/21, arrival time at 7:00a.m with nothing to eat eight hours before scheduled surgeryt ruiz, clear liquids up to two hours before, h&p required within 30 days of surgery, COVID testing 96 hours prior, map and letter mailed out.     to complete the following fields:            CHECKLIST     Google Calendar : Yes     Resident notified: Not Applicable     Clinic schedule blocked:  Not Applicable    Patient notified:Yes      Pre op information sent: Yes     Given to patient over the phone.Yes    Comments:

## 2021-07-02 NOTE — TELEPHONE ENCOUNTER
Covid order for surgery placed.     Called and spoke with patient and informed her that the order had been placed. She verbalized understanding and agreement, denied further questions/concerns.

## 2021-07-02 NOTE — TELEPHONE ENCOUNTER
[facility] Call Center    Phone Message    May a detailed message be left on voicemail: yes     Reason for Call: Other: Pt calling because she needs orders placed for a pre procedure covid test. She reports that she will be going out of town after today so she was wondering if order could be placed today so she can get it scheduled. Please call back to discuss.     Action Taken: Message routed to:  [facility]    Travel Screening: Not Applicable

## 2021-07-07 ENCOUNTER — TELEPHONE (OUTPATIENT)
Dept: OBGYN | Facility: CLINIC | Age: 76
End: 2021-07-07

## 2021-07-07 NOTE — TELEPHONE ENCOUNTER
Patient made aware of new arrival time for 7/14/21, 11:30a.m with nothing to eat eight hours before and clear liquids up to two hours before.

## 2021-07-09 ENCOUNTER — ANESTHESIA EVENT (OUTPATIENT)
Dept: SURGERY | Facility: CLINIC | Age: 76
End: 2021-07-09
Payer: MEDICARE

## 2021-07-12 ENCOUNTER — PREP FOR PROCEDURE (OUTPATIENT)
Dept: UROLOGY | Facility: CLINIC | Age: 76
End: 2021-07-12

## 2021-07-12 ENCOUNTER — OFFICE VISIT (OUTPATIENT)
Dept: INTERNAL MEDICINE | Facility: CLINIC | Age: 76
End: 2021-07-12
Attending: INTERNAL MEDICINE
Payer: MEDICARE

## 2021-07-12 ENCOUNTER — OFFICE VISIT (OUTPATIENT)
Dept: DERMATOLOGY | Facility: CLINIC | Age: 76
End: 2021-07-12
Payer: MEDICARE

## 2021-07-12 ENCOUNTER — LAB (OUTPATIENT)
Dept: LAB | Facility: CLINIC | Age: 76
End: 2021-07-12
Attending: INTERNAL MEDICINE
Payer: MEDICARE

## 2021-07-12 ENCOUNTER — LAB (OUTPATIENT)
Dept: LAB | Facility: CLINIC | Age: 76
End: 2021-07-12
Attending: OBSTETRICS & GYNECOLOGY
Payer: MEDICARE

## 2021-07-12 ENCOUNTER — VIRTUAL VISIT (OUTPATIENT)
Dept: UROLOGY | Facility: CLINIC | Age: 76
End: 2021-07-12
Attending: OBSTETRICS & GYNECOLOGY
Payer: MEDICARE

## 2021-07-12 VITALS
SYSTOLIC BLOOD PRESSURE: 131 MMHG | BODY MASS INDEX: 21.69 KG/M2 | WEIGHT: 135 LBS | HEART RATE: 69 BPM | HEIGHT: 66 IN | DIASTOLIC BLOOD PRESSURE: 77 MMHG

## 2021-07-12 DIAGNOSIS — N84.2 VAGINAL POLYP: Primary | ICD-10-CM

## 2021-07-12 DIAGNOSIS — Z20.822 ENCOUNTER FOR LABORATORY TESTING FOR COVID-19 VIRUS: ICD-10-CM

## 2021-07-12 DIAGNOSIS — L98.8 EROSIVE PUSTULAR DERMATOSIS: ICD-10-CM

## 2021-07-12 DIAGNOSIS — L30.9 DERMATITIS: ICD-10-CM

## 2021-07-12 DIAGNOSIS — Z01.818 PREOP GENERAL PHYSICAL EXAM: ICD-10-CM

## 2021-07-12 DIAGNOSIS — R39.15 URINARY URGENCY: ICD-10-CM

## 2021-07-12 DIAGNOSIS — N39.41 URGE INCONTINENCE: ICD-10-CM

## 2021-07-12 DIAGNOSIS — L66.10 LICHEN PLANOPILARIS: Primary | ICD-10-CM

## 2021-07-12 DIAGNOSIS — Z11.59 ENCOUNTER FOR SCREENING FOR OTHER VIRAL DISEASES: ICD-10-CM

## 2021-07-12 DIAGNOSIS — D69.6 THROMBOCYTOPENIA (H): ICD-10-CM

## 2021-07-12 DIAGNOSIS — D69.6 THROMBOCYTOPENIA (H): Primary | ICD-10-CM

## 2021-07-12 DIAGNOSIS — N90.5 VULVAR ATROPHY: ICD-10-CM

## 2021-07-12 DIAGNOSIS — T14.8XXA OPEN WOUND OF SKIN: ICD-10-CM

## 2021-07-12 LAB
BASOPHILS # BLD AUTO: 0 10E3/UL (ref 0–0.2)
BASOPHILS NFR BLD AUTO: 1 %
EOSINOPHIL # BLD AUTO: 0.1 10E3/UL (ref 0–0.7)
EOSINOPHIL NFR BLD AUTO: 3 %
ERYTHROCYTE [DISTWIDTH] IN BLOOD BY AUTOMATED COUNT: 13.5 % (ref 10–15)
HCT VFR BLD AUTO: 38.2 % (ref 35–47)
HGB BLD-MCNC: 12.9 G/DL (ref 11.7–15.7)
IMM GRANULOCYTES # BLD: 0 10E3/UL
IMM GRANULOCYTES NFR BLD: 0 %
LYMPHOCYTES # BLD AUTO: 1.9 10E3/UL (ref 0.8–5.3)
LYMPHOCYTES NFR BLD AUTO: 42 %
MCH RBC QN AUTO: 32.4 PG (ref 26.5–33)
MCHC RBC AUTO-ENTMCNC: 33.8 G/DL (ref 31.5–36.5)
MCV RBC AUTO: 96 FL (ref 78–100)
MONOCYTES # BLD AUTO: 0.5 10E3/UL (ref 0–1.3)
MONOCYTES NFR BLD AUTO: 11 %
NEUTROPHILS # BLD AUTO: 1.9 10E3/UL (ref 1.6–8.3)
NEUTROPHILS NFR BLD AUTO: 43 %
NRBC # BLD AUTO: 0 10E3/UL
NRBC BLD AUTO-RTO: 0 /100
PLATELET # BLD AUTO: 146 10E3/UL (ref 150–450)
RBC # BLD AUTO: 3.98 10E6/UL (ref 3.8–5.2)
WBC # BLD AUTO: 4.4 10E3/UL (ref 4–11)

## 2021-07-12 PROCEDURE — 99443 PR PHYSICIAN TELEPHONE EVALUATION 21-30 MIN: CPT | Mod: 95 | Performed by: OBSTETRICS & GYNECOLOGY

## 2021-07-12 PROCEDURE — U0003 INFECTIOUS AGENT DETECTION BY NUCLEIC ACID (DNA OR RNA); SEVERE ACUTE RESPIRATORY SYNDROME CORONAVIRUS 2 (SARS-COV-2) (CORONAVIRUS DISEASE [COVID-19]), AMPLIFIED PROBE TECHNIQUE, MAKING USE OF HIGH THROUGHPUT TECHNOLOGIES AS DESCRIBED BY CMS-2020-01-R: HCPCS

## 2021-07-12 PROCEDURE — 85025 COMPLETE CBC W/AUTO DIFF WBC: CPT

## 2021-07-12 PROCEDURE — 11901 INJECT SKIN LESIONS >7: CPT | Performed by: DERMATOLOGY

## 2021-07-12 PROCEDURE — G0463 HOSPITAL OUTPT CLINIC VISIT: HCPCS

## 2021-07-12 PROCEDURE — 99204 OFFICE O/P NEW MOD 45 MIN: CPT | Performed by: INTERNAL MEDICINE

## 2021-07-12 PROCEDURE — U0005 INFEC AGEN DETEC AMPLI PROBE: HCPCS

## 2021-07-12 PROCEDURE — 36415 COLL VENOUS BLD VENIPUNCTURE: CPT

## 2021-07-12 PROCEDURE — 99213 OFFICE O/P EST LOW 20 MIN: CPT | Mod: 25 | Performed by: DERMATOLOGY

## 2021-07-12 RX ORDER — CEFAZOLIN SODIUM 2 G/100ML
2 INJECTION, SOLUTION INTRAVENOUS SEE ADMIN INSTRUCTIONS
Status: CANCELLED | OUTPATIENT
Start: 2021-07-12

## 2021-07-12 RX ORDER — ACETAMINOPHEN 325 MG/1
975 TABLET ORAL ONCE
Status: CANCELLED | OUTPATIENT
Start: 2021-07-12 | End: 2021-07-12

## 2021-07-12 RX ORDER — CEFAZOLIN SODIUM 2 G/100ML
2 INJECTION, SOLUTION INTRAVENOUS
Status: CANCELLED | OUTPATIENT
Start: 2021-07-12

## 2021-07-12 ASSESSMENT — PAIN SCALES - GENERAL
PAINLEVEL: NO PAIN (0)
PAINLEVEL: MODERATE PAIN (5)

## 2021-07-12 ASSESSMENT — ANXIETY QUESTIONNAIRES
7. FEELING AFRAID AS IF SOMETHING AWFUL MIGHT HAPPEN: NOT AT ALL
IF YOU CHECKED OFF ANY PROBLEMS ON THIS QUESTIONNAIRE, HOW DIFFICULT HAVE THESE PROBLEMS MADE IT FOR YOU TO DO YOUR WORK, TAKE CARE OF THINGS AT HOME, OR GET ALONG WITH OTHER PEOPLE: NOT DIFFICULT AT ALL
1. FEELING NERVOUS, ANXIOUS, OR ON EDGE: NOT AT ALL
2. NOT BEING ABLE TO STOP OR CONTROL WORRYING: NOT AT ALL
6. BECOMING EASILY ANNOYED OR IRRITABLE: NOT AT ALL
GAD7 TOTAL SCORE: 0
5. BEING SO RESTLESS THAT IT IS HARD TO SIT STILL: NOT AT ALL
3. WORRYING TOO MUCH ABOUT DIFFERENT THINGS: NOT AT ALL

## 2021-07-12 ASSESSMENT — MIFFLIN-ST. JEOR: SCORE: 1129.11

## 2021-07-12 ASSESSMENT — PATIENT HEALTH QUESTIONNAIRE - PHQ9
5. POOR APPETITE OR OVEREATING: NOT AT ALL
SUM OF ALL RESPONSES TO PHQ QUESTIONS 1-9: 0

## 2021-07-12 NOTE — NURSING NOTE
"Dermatology Rooming Note    Ester Barba's goals for this visit include:   Chief Complaint   Patient presents with     Derm Problem     Ester is here today for hair loss. She states \" I am still losing hair.\"     PIERCE Dacosta  "

## 2021-07-12 NOTE — TELEPHONE ENCOUNTER
"Refill request received for Biestrogen cream. Last in clinic 05/26/2021, per visit note, \"Placed refill for compounded estrogen cream\".    Called and spoke with pharmacist at [facility] compounding pharmacy and verbal order given to refill Bioestrogen 1.5mg/gram cream, dispense 40 grams with 1 refill. Sig: apply 1 gram vaginally 2 nights per week.    Called and spoke with patient and informed her refill was completed. She verbalized understanding, denied questions or concerns.   "

## 2021-07-12 NOTE — LETTER
2021       RE: Ester Barba  [address]  [place] [address]     Dear Colleague,    Thank you for referring your patient, Ester Barba, to the [facility] [place] at [facility]. Please see a copy of my visit note below.    Patient here for billable telephone evaluation   What phone number would you like to be contacted at? [phone]    How would you like to obtain your AVS? [place]    2021    Referring Provider: Referred Self, MD  No address on file    Primary Care Provider: Fernando Weathers    Reason for visit: Preoperative discussion    Planned procedure: Excisional biopsy of vaginal polyps, cystoscopy, Dilatation and curettage      HPI:  Ester Barba is a 75 year old female for telephone visit today to discussed her upcoming planned procedure for vaginal polyps and urinary urgency. Yakut  on the phone.   I last saw her on 21 with detailed notes below. I was unable to do in clinic biopsy of polyps due to severe levator myalgia. Her pelvic US on 21 ordered by Dr Hernandez  final result is back and shows normal endometrial strip but some fluid in endometrial cavity. She says that she hasn't had any post menopausal bleeding per say but has noted occasional brown discharge.     This note was copied and pasted from Dr Quintero on 21    HPI:  Ester Barba is a 75 year old  female who presents for evaluation of urinary urgency, frequency and urgency urinary incontinence.   Her med hx is sig for hx of concussion in 2019 (Fall in a parking lot-post concussion syndrome) and  factor V leiden mutation (not on anticoagulation) and partial sigmoidectomy  in 2015 for diverticulitis.      Urinary Symptoms/Voiding function  Has been having urinary urgency and urgency incontinence for the last year or so. Says it may have preceeded her concussion in 2019. She voids every hour, reduces her fluid intake and does a  lot of toilet mapping to avoid leakage. Does not wear incontinence pads although she does have accidents occasionally with urgency especially if she is coming home from shopping etc. She denies bothersome stress leakage. She feels like she empties her bladder well. Drinks mostly water ( about 30 oz per day) and half a cap of coffee, and 1 can of slim fast. No other bladder irritants.     Pelvic Organ Prolapse Symptoms  Denies vaginal bulge or pressure sensation.      Gastrointestinal Symptoms:  Denies chronic diarrhea, constipation. Denies bothersome fecal or flatal incontinence. Has a hx of diverticulitis, s/p partial sig colon resection      Sexual function/Pelvic floor pain/GYN:   No penetrative intercourse (  reasons). Has been using vaginal estrogen cream once a week for years.       Relevant Medical History:    Diabetes? no  High Blood pressure? cHTN     Recurrent UTIs? No   Sleep Apnea? no  Obesity? no  History of Blood clots? No ( has factor V leiden mutation, diagnosed in a family hx work up)  Other medical problems: As above      Physical Exam:   BP (!) 161/76   Pulse 54   Wt 61.3 kg (135 lb 3.2 oz)   BMI 21.82 kg/m   No LMP recorded. Patient is postmenopausal. Body mass index is 21.82 kg/m .    Gen:  is alert, comfortable in no acute distress,   Abdomen: Abdomen is soft, non-tender, non-distended,   Lungs: non-labored breathing.     Pelvic Exam: Limited exam today due to pain and guarding/ Muscle spasms visible on her lower extremities and gluts during the exam.   Normal external female genitalia. The urethra was slightly red -carruncle.    Vagina: two polypoid structures that appear red and irritated bilaterally in the anterior sulcus of the vagina. I was unable to fully evaluate them due to extreme tenderness despite lidocaine gel.   Uterus: Not evaluated today due to pain   Ovaries: Not evaluated today  Vulva: No visible lesions although some erythema on the vulva  Rectal: deferred    Pelvic  floor muscles: Levator myalgia diffusely in the pubococcygeus and ileococcygeus muscles    POPQ EXAM FOR PROLAPSE SEVERITY  No prolapse although limited exam today      Voiding trial:    VOID 375 ml  PVR 63 mL by Bladder ultrasound  Leak with Cough stress test : no, Prolapse reduced no         Past Medical History:   Diagnosis Date     Arthritis     osteoarthritis     Basal cell carcinoma      Bilateral occipital neuralgia 01/21/2019     Concussion 01/21/2019     Squamous cell carcinoma      Past Surgical History:   Procedure Laterality Date     BIOPSY OF SKIN LESION       CATARACT IOL, RT/LT Bilateral 2018     MOHS MICROGRAPHIC PROCEDURE       NO HISTORY OF SURGERY  02/17/2014    derm     SIGMOIDECTOMY  2015    for diverticulitis     Current Outpatient Medications   Medication     albuterol (PROAIR HFA/PROVENTIL HFA/VENTOLIN HFA) 108 (90 Base) MCG/ACT inhaler     Biotin 10 MG TABS tablet     Calcium 1500 MG TABS     Cholecalciferol (VITAMIN D) 1000 UNIT capsule     clobetasol propionate (CLOBEX) 0.05 % external shampoo     COMPOUNDED NON-CONTROLLED SUBSTANCE (CMPD RX) - PHARMACY TO MIX COMPOUNDED MEDICATION     esomeprazole (NEXIUM) 40 MG DR capsule     Fluocinolone Acetonide Scalp (DERMA-SMOOTHE/FS SCALP) 0.01 % OIL oil     glucosamine-chondroitin 500-400 MG CAPS per capsule     ibuprofen (ADVIL,MOTRIN) 800 MG tablet     Loratadine (CLARITIN PO)     NEW MED     tretinoin (RETIN-A) 0.025 % external cream     triamcinolone acetonide (KENALOG) 10 MG/ML injection     triamcinolone acetonide (KENALOG) 10 MG/ML injection     Current Facility-Administered Medications   Medication     triamcinolone acetonide (KENALOG-10) injection 10 mg        Allergies   Allergen Reactions     Dust Mites Other (See Comments)     Sneezing, coughing. asthma  Itchy watery eyes, sneezing     Estrogens Itching     Other reaction(s): Hypersensitivity  Topical caused burning sensation of skin  Topical caused burning sensation of skin; Premarin  cream only - possibly only an bi-ingredient     Imiquimod Other (See Comments)     Hair loss     Levaquin [Levofloxacin Hemihydrate] Other (See Comments)     Muscle weakness     Levofloxacin Other (See Comments)     MUSCLE WEAKNESS, ARTHRITIS LIKE SYMPTOMS.       Mold Other (See Comments)     Sneezing, asthma, weezing     Pollen Extract/Tree Extract Other (See Comments)     Sneezing, weezing     Sulfa Drugs Hives     Sulfamethoxazole-Trimethoprim Hives     Latex Rash     LATEX BANDAGES; tapes adhesive     Penicillin G Rash     Penicillins Rash     Social History     Socioeconomic History     Marital status:      Spouse name: Not on file     Number of children: Not on file     Years of education: Not on file     Highest education level: Not on file   Occupational History     Not on file   Tobacco Use     Smoking status: Former Smoker     Quit date: 1965     Years since quittin.5     Smokeless tobacco: Never Used   Substance and Sexual Activity     Alcohol use: Yes     Alcohol/week: 0.0 standard drinks     Comment: glass of wine     Drug use: No     Sexual activity: Not Currently     Birth control/protection: Post-menopausal   Other Topics Concern     Parent/sibling w/ CABG, MI or angioplasty before 65F 55M? Not Asked   Social History Narrative    How much exercise per week? 2 days a week    How much calcium per day? Everyday supplements and milk and cheese       How much caffeine per day? 2-3 cups    How much vitamin D per day? supplment    Do you/your family wear seatbelts?  Yes    Do you/your family use safety helmets? Yes    Do you/your family use sunscreen? Yes    Do you/your family keep firearms in the home? Yes    Do you/your family have a smoke detector(s)? Yes        Do you feel safe in your home? Yes    Has anyone ever touched you in an unwanted manner? No     Explain      Social Determinants of Health     Financial Resource Strain:      Difficulty of Paying Living Expenses:    Food  Insecurity:      Worried About Running Out of Food in the Last Year:      Ran Out of Food in the Last Year:    Transportation Needs:      Lack of Transportation (Medical):      Lack of Transportation (Non-Medical):    Physical Activity:      Days of Exercise per Week:      Minutes of Exercise per Session:    Stress:      Feeling of Stress :    Social Connections:      Frequency of Communication with Friends and Family:      Frequency of Social Gatherings with Friends and Family:      Attends Denominational Services:      Active Member of Clubs or Organizations:      Attends Club or Organization Meetings:      Marital Status:    Intimate Partner Violence:      Fear of Current or Ex-Partner:      Emotionally Abused:      Physically Abused:      Sexually Abused:      Pelvic US 6/21/21  Pelvic findings:    Right Adnexa: Normal    Left Adnexa: Normal    Bladder:  Normal          Cul - de - sac fluid: None     Ovarian follicles:    Right ovary:  1.1 x0.8 x 0.6cm.      0 follicles      Left ovary:  1.0 x 1.1 x 0.4cm.      0 follicles    6/21/21    WBC Count, Corrected 3.7 - 12.1 10(3)/uL 5.8        RBC Count 3.76 - 5.39 10(6)/uL 4.07        Hemoglobin 11.2 - 15.8 g/dL 13.5        Hematocrit 33.9 - 47.5 % 39.6        MCV 80.6 - 98.5 fL 97.3        MCH 26.4 - 32.9 pg 33.3High         MCHC 32.0 - 36.0 g/dL 34.2        RDW 10.0 - 16.8 % 13.9        Platelets 179 - 450 10(3)/uL 147Low         MPV 7.4 - 10.4 fL 9.7        % Neutrophils 43.0 - 80.0 % 48.0        % Lymphocytes 16.0 - 49.0 % 39.9        % Monocytes 0.0 - 10.0 % 9.8        % Eosinophils 0.0 - 7.0 % 1.7        % Basophils 0.0 - 2.0 % 0.6        Abs Neutrophils 1.8 - 9.2 10(3)/uL 2.8        Abs Lymphocytes 1.2 - 3.9 10(3)/uL 2.3        Abs Monocytes 0.0 - 1.1 10(3)/uL 0.6        Abs Eosinophils 0.0 - 0.8 10(3)/uL 0.1        Abs Basophils 0.0 - 0.2 10(3)/uL 0.0          Asssessment and plan  Encounter Diagnoses   Name Primary?     Vaginal polyp Yes     Urinary urgency       Urge incontinence      For Exsional biopsy of vaginal polyps, cystoscopy, Dilatation and curettage  We discussed today that we will add D&C to her procedure given the small fluid collection in her endometrial cavity on US and some ongoing brown discharge. We discussed risks/benefit of the various procedures. We discussed risk of bleeding ( She has thrombocytopenia, facter V leiden mutation-last platlet count last month was 147), risk of injury to bladder, bowel, risk of infection and risk of uterine perforation with D&C. We also discussed other cardiovascular and respiratory risks associated with surgery and anesthesia. We discussed that I will send her biopsy for pathology evaluation and we will review these together at her 2 week post op virtual visit once the results are back. All questions answered.     Ibuprofen/tylenol for post procedure pain     I spent a total of 30 minutes  with  Ester Barba  And a Croatian interpretor on the date of the encounter in chart review, telephone patient visit, review of tests, documentation and/or discussion with other providers about the issues documented above.     Sandy Quintero MD on 7/12/2021 at 8:55 AM

## 2021-07-12 NOTE — H&P
Patient here for billable telephone evaluation   What phone number would you like to be contacted at? [phone]    How would you like to obtain your AVS? MyChart    2021    Referring Provider: Referred Self, MD  No address on file    Primary Care Provider: Fernando Weathers    Reason for visit: Preoperative discussion    Planned procedure: Excisional biopsy of vaginal polyps, cystoscopy, Dilatation and curettage      HPI:  Ester Barba is a 75 year old female for telephone visit today to discussed her upcoming planned procedure for vaginal polyps and urinary urgency.  I last saw her on 21 with detailed notes below. I was unable to do in clinic biopsy of polyps due to severe levator myalgia. Her pelvic US on 21 ordered by Dr Hernandez  final result is back and shows normal endometrial strip but some fluid in endometrial cavity. She says that she hasn't had any post menopausal bleeding per say but has noted occasional brown discharge.     This note was copied and pasted from Dr Quintero on 21    HPI:  Ester Barba is a 75 year old  female who presents for evaluation of urinary urgency, frequency and urgency urinary incontinence.   Her med hx is sig for hx of concussion in 2019 (Fall in a parking lot-post concussion syndrome) and  factor V leiden mutation (not on anticoagulation) and partial sigmoidectomy  in 2015 for diverticulitis.      Urinary Symptoms/Voiding function  Has been having urinary urgency and urgency incontinence for the last year or so. Says it may have preceeded her concussion in 2019. She voids every hour, reduces her fluid intake and does a lot of toilet mapping to avoid leakage. Does not wear incontinence pads although she does have accidents occasionally with urgency especially if she is coming home from shopping etc. She denies bothersome stress leakage. She feels like she empties her bladder well. Drinks mostly water ( about 30 oz per day) and half a cap of coffee, and 1  can of slim fast. No other bladder irritants.     Pelvic Organ Prolapse Symptoms  Denies vaginal bulge or pressure sensation.      Gastrointestinal Symptoms:  Denies chronic diarrhea, constipation. Denies bothersome fecal or flatal incontinence. Has a hx of diverticulitis, s/p partial sig colon resection      Sexual function/Pelvic floor pain/GYN:   No penetrative intercourse (  reasons). Has been using vaginal estrogen cream once a week for years.       Relevant Medical History:    Diabetes? no  High Blood pressure? cHTN     Recurrent UTIs? No   Sleep Apnea? no  Obesity? no  History of Blood clots? No ( has factor V leiden mutation, diagnosed in a family hx work up)  Other medical problems: As above      Physical Exam:   BP (!) 161/76   Pulse 54   Wt 61.3 kg (135 lb 3.2 oz)   BMI 21.82 kg/m   No LMP recorded. Patient is postmenopausal. Body mass index is 21.82 kg/m .    Gen:  is alert, comfortable in no acute distress,   Abdomen: Abdomen is soft, non-tender, non-distended,   Lungs: non-labored breathing.     Pelvic Exam: Limited exam today due to pain and guarding/ Muscle spasms visible on her lower extremities and gluts during the exam.   Normal external female genitalia. The urethra was slightly red -carruncle.    Vagina: two polypoid structures that appear red and irritated bilaterally in the anterior sulcus of the vagina. I was unable to fully evaluate them due to extreme tenderness despite lidocaine gel.   Uterus: Not evaluated today due to pain   Ovaries: Not evaluated today  Vulva: No visible lesions although some erythema on the vulva  Rectal: deferred    Pelvic floor muscles: Levator myalgia diffusely in the pubococcygeus and ileococcygeus muscles    POPQ EXAM FOR PROLAPSE SEVERITY  No prolapse although limited exam today      Voiding trial:    VOID 375 ml  PVR 63 mL by Bladder ultrasound  Leak with Cough stress test : no, Prolapse reduced no         Past Medical History:   Diagnosis Date      Arthritis     osteoarthritis     Basal cell carcinoma      Bilateral occipital neuralgia 01/21/2019     Concussion 01/21/2019     Squamous cell carcinoma      Past Surgical History:   Procedure Laterality Date     BIOPSY OF SKIN LESION       CATARACT IOL, RT/LT Bilateral 2018     MOHS MICROGRAPHIC PROCEDURE       NO HISTORY OF SURGERY  02/17/2014    derm     SIGMOIDECTOMY  2015    for diverticulitis     Current Outpatient Medications   Medication     albuterol (PROAIR HFA/PROVENTIL HFA/VENTOLIN HFA) 108 (90 Base) MCG/ACT inhaler     Biotin 10 MG TABS tablet     Calcium 1500 MG TABS     Cholecalciferol (VITAMIN D) 1000 UNIT capsule     clobetasol propionate (CLOBEX) 0.05 % external shampoo     COMPOUNDED NON-CONTROLLED SUBSTANCE (CMPD RX) - PHARMACY TO MIX COMPOUNDED MEDICATION     esomeprazole (NEXIUM) 40 MG DR capsule     Fluocinolone Acetonide Scalp (DERMA-SMOOTHE/FS SCALP) 0.01 % OIL oil     glucosamine-chondroitin 500-400 MG CAPS per capsule     ibuprofen (ADVIL,MOTRIN) 800 MG tablet     Loratadine (CLARITIN PO)     NEW MED     tretinoin (RETIN-A) 0.025 % external cream     triamcinolone acetonide (KENALOG) 10 MG/ML injection     triamcinolone acetonide (KENALOG) 10 MG/ML injection     Current Facility-Administered Medications   Medication     triamcinolone acetonide (KENALOG-10) injection 10 mg        Allergies   Allergen Reactions     Dust Mites Other (See Comments)     Sneezing, coughing. asthma  Itchy watery eyes, sneezing     Estrogens Itching     Other reaction(s): Hypersensitivity  Topical caused burning sensation of skin  Topical caused burning sensation of skin; Premarin cream only - possibly only an bi-ingredient     Imiquimod Other (See Comments)     Hair loss     Levaquin [Levofloxacin Hemihydrate] Other (See Comments)     Muscle weakness     Levofloxacin Other (See Comments)     MUSCLE WEAKNESS, ARTHRITIS LIKE SYMPTOMS.       Mold Other (See Comments)     Sneezing, asthma, weezing     Pollen  Extract/Tree Extract Other (See Comments)     Sneezing, weezing     Sulfa Drugs Hives     Sulfamethoxazole-Trimethoprim Hives     Latex Rash     LATEX BANDAGES; tapes adhesive     Penicillin G Rash     Penicillins Rash     Social History     Socioeconomic History     Marital status:      Spouse name: Not on file     Number of children: Not on file     Years of education: Not on file     Highest education level: Not on file   Occupational History     Not on file   Tobacco Use     Smoking status: Former Smoker     Quit date: 1965     Years since quittin.5     Smokeless tobacco: Never Used   Substance and Sexual Activity     Alcohol use: Yes     Alcohol/week: 0.0 standard drinks     Comment: glass of wine     Drug use: No     Sexual activity: Not Currently     Birth control/protection: Post-menopausal   Other Topics Concern     Parent/sibling w/ CABG, MI or angioplasty before 65F 55M? Not Asked   Social History Narrative    How much exercise per week? 2 days a week    How much calcium per day? Everyday supplements and milk and cheese       How much caffeine per day? 2-3 cups    How much vitamin D per day? supplment    Do you/your family wear seatbelts?  Yes    Do you/your family use safety helmets? Yes    Do you/your family use sunscreen? Yes    Do you/your family keep firearms in the home? Yes    Do you/your family have a smoke detector(s)? Yes        Do you feel safe in your home? Yes    Has anyone ever touched you in an unwanted manner? No     Explain      Social Determinants of Health     Financial Resource Strain:      Difficulty of Paying Living Expenses:    Food Insecurity:      Worried About Running Out of Food in the Last Year:      Ran Out of Food in the Last Year:    Transportation Needs:      Lack of Transportation (Medical):      Lack of Transportation (Non-Medical):    Physical Activity:      Days of Exercise per Week:      Minutes of Exercise per Session:    Stress:      Feeling of  Stress :    Social Connections:      Frequency of Communication with Friends and Family:      Frequency of Social Gatherings with Friends and Family:      Attends Worship Services:      Active Member of Clubs or Organizations:      Attends Club or Organization Meetings:      Marital Status:    Intimate Partner Violence:      Fear of Current or Ex-Partner:      Emotionally Abused:      Physically Abused:      Sexually Abused:      Pelvic US 6/21/21  Pelvic findings:    Right Adnexa: Normal    Left Adnexa: Normal    Bladder:  Normal          Cul - de - sac fluid: None     Ovarian follicles:    Right ovary:  1.1 x0.8 x 0.6cm.      0 follicles      Left ovary:  1.0 x 1.1 x 0.4cm.      0 follicles    6/21/21    WBC Count, Corrected 3.7 - 12.1 10(3)/uL 5.8        RBC Count 3.76 - 5.39 10(6)/uL 4.07        Hemoglobin 11.2 - 15.8 g/dL 13.5        Hematocrit 33.9 - 47.5 % 39.6        MCV 80.6 - 98.5 fL 97.3        MCH 26.4 - 32.9 pg 33.3High         MCHC 32.0 - 36.0 g/dL 34.2        RDW 10.0 - 16.8 % 13.9        Platelets 179 - 450 10(3)/uL 147Low         MPV 7.4 - 10.4 fL 9.7        % Neutrophils 43.0 - 80.0 % 48.0        % Lymphocytes 16.0 - 49.0 % 39.9        % Monocytes 0.0 - 10.0 % 9.8        % Eosinophils 0.0 - 7.0 % 1.7        % Basophils 0.0 - 2.0 % 0.6        Abs Neutrophils 1.8 - 9.2 10(3)/uL 2.8        Abs Lymphocytes 1.2 - 3.9 10(3)/uL 2.3        Abs Monocytes 0.0 - 1.1 10(3)/uL 0.6        Abs Eosinophils 0.0 - 0.8 10(3)/uL 0.1        Abs Basophils 0.0 - 0.2 10(3)/uL 0.0          Asssessment and plan  Encounter Diagnoses   Name Primary?     Vaginal polyp Yes     Urinary urgency      Urge incontinence      For Exsional biopsy of vaginal polyps, cystoscopy, Dilatation and curettage  We discussed today that we will add D&C to her procedure given the small fluid collection in her endometrial cavity on US and some ongoing brown discharge. We discussed risks/benefit of the various procedures. We discussed risk of  bleeding ( She has thrombocytopenia, facter V leiden mutation-last platlet count last month was 147), risk of injury to bladder, bowel, risk of infection and risk of uterine perforation with D&C. We also discussed other cardiovascular and respiratory risks associated with surgery and anesthesia. We discussed that I will send her biopsy for pathology evaluation and we will review these together at her 2 week post op virtual visit once the results are back. All questions answered.     Ibuprofen/tylenol for post procedure pain     I spent a total of 20 minutes  with  Ester Barba  on the date of the encounter in chart review, telephone patient visit, review of tests, documentation and/or discussion with other providers about the issues documented above.     Sandy Quintero MD on 7/12/2021 at 8:55 AM

## 2021-07-12 NOTE — PROGRESS NOTES
[facility] WOMEN'S CLINIC [facility] PROFESSIONAL [facility]  3RD FLR,[address]  [address]  [facility]  [facility] [address]  Phone: [phone]  Primary Provider: Fernando Weathers  Pre-op Performing Provider: JESSICA MORENO      PREOPERATIVE EVALUATION:  Today's date: 7/12/2021    Ester Barba is a 75 year old female who presents for a preoperative evaluation.    Surgical Information:  Surgery/Procedure: cystoscopy, resection of vaginal polyps, D&C  Surgery Location: [facility]  Surgeon: Dr. Quintero  Surgery Date: 7/14/2021  Time of Surgery: 1:30 PM  Where patient plans to recover: At home with family  Fax number for surgical facility: Note does not need to be faxed, will be available electronically in Epic.    Type of Anesthesia Anticipated: Local with MAC    Assessment & Plan     The proposed surgical procedure is considered LOW risk.    Thrombocytopenia (H)  Patient reports history of mild thrombocytopenia. Recommend checking CBC today.   - CBC with Platelets Differential; Future    Preop general physical exam  Patient has no significant cardiopulmonary risk factors for the planned surgery            Risks and Recommendations:  The patient has the following additional risks and recommendations for perioperative complications:  Pulmonary:    - Incentive spirometry post-op    Medication Instructions:  Patient is to take all scheduled medications on the day of surgery    RECOMMENDATION:  APPROVAL GIVEN to proceed with proposed procedure, without further diagnostic evaluation.              45 minutes spent on the date of the encounter doing chart review, history and exam, documentation and further activities per the note        Subjective     HPI related to upcoming procedure: Patient reports that she was found to have vaginal polyps recently. She reports that she also has some brow vaginl discharge. She is scheduled for cystoscopy for evaluation of urinary  frequency.    Patient reports past medical history of thrombocytopenia and neutropenia. She denies issues with prior surgeries.       No flowsheet data found.    Health Care Directive:  Patient does not have a Health Care Directive or Living Will: Patient states has Advance Directive and will bring in a copy to clinic.    Preoperative Review of :   reviewed - no record of controlled substances prescribed.      Status of Chronic Conditions:  See problem list for active medical problems.  Problems all longstanding and stable, except as noted/documented.  See ROS for pertinent symptoms related to these conditions.      Review of Systems  CONSTITUTIONAL: NEGATIVE for fever, chills, change in weight  INTEGUMENTARY/SKIN: NEGATIVE for worrisome rashes, moles or lesions  EYES: NEGATIVE for vision changes or irritation  ENT/MOUTH: NEGATIVE for ear, mouth and throat problems  RESP: NEGATIVE for significant cough or SOB  BREAST: NEGATIVE for masses, tenderness or discharge  CV: NEGATIVE for chest pain, palpitations or peripheral edema  GI: NEGATIVE for nausea, abdominal pain, heartburn, or change in bowel habits  : NEGATIVE for frequency, dysuria, or hematuria  MUSCULOSKELETAL: NEGATIVE for significant arthralgias or myalgia  NEURO: NEGATIVE for weakness, dizziness or paresthesias  ENDOCRINE: NEGATIVE for temperature intolerance, skin/hair changes  HEME: NEGATIVE for bleeding problems  PSYCHIATRIC: NEGATIVE for changes in mood or affect    Patient Active Problem List    Diagnosis Date Noted     Vaginal polyp 07/02/2021     Priority: Medium     Added automatically from request for surgery [id number]       Urinary urgency 07/02/2021     Priority: Medium     Added automatically from request for surgery [id number]       Urge incontinence 07/02/2021     Priority: Medium     Added automatically from request for surgery [id number]       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 We usually plan to coil the splenic artery aneurysm or the whole artery and infarct the spleen intentionally to treat the aneurysm. Since she does have a history of immunosuppression I would not plan to do that unless her aneurysm is growing almost twice the size it is now, almost greater than 2 cm. She is in agreement with this plan. I would recommend doing yearly CAT scans and this can be done without contrast and just to evaluate the size of the splenic artery aneurysm. This can be easily followed because the artery is calcified. There are no other modifiers that she can do to prevent further aneurysm growth.        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     Priority: Medium     Dermatitis 02/19/2012     Priority: Medium     Lichen planopilaris 09/10/2011     Priority: Medium     Porokeratosis 08/22/2011     Priority:  Medium     Squamous cell carcinoma 08/22/2011     Priority: Medium     Neoplasm of uncertain behavior 08/22/2011     Priority: Medium      Past Medical History:   Diagnosis Date     Arthritis     osteoarthritis     Basal cell carcinoma      Bilateral occipital neuralgia 01/21/2019     Concussion 01/21/2019     Squamous cell carcinoma      Past Surgical History:   Procedure Laterality Date     BIOPSY OF SKIN LESION       CATARACT IOL, RT/LT Bilateral 2018     MOHS MICROGRAPHIC PROCEDURE       NO HISTORY OF SURGERY  02/17/2014    derm     SIGMOIDECTOMY  2015    for diverticulitis     Current Outpatient Medications   Medication Sig Dispense Refill     albuterol (PROAIR HFA/PROVENTIL HFA/VENTOLIN HFA) 108 (90 Base) MCG/ACT inhaler Inhale 1-2 puffs into the lungs       Biotin 10 MG TABS tablet        Calcium 1500 MG TABS Take  by mouth.       Cholecalciferol (VITAMIN D) 1000 UNIT capsule Take  by mouth. Total 2200 units daily.       clobetasol propionate (CLOBEX) 0.05 % external shampoo APPLY TO DRY SCALP EVERY OTHER DAY, LEAVE ON FOR 15 MINUTES, THEN LATHER AND RINSE 118 mL 1     esomeprazole (NEXIUM) 40 MG DR capsule Take 40 mg by mouth every morning (before breakfast) Take 30-60 minutes before eating.       Fluocinolone Acetonide Scalp (DERMA-SMOOTHE/FS SCALP) 0.01 % OIL oil Apply once a week to scalp for lichen planopilaris 118 mL 11     glucosamine-chondroitin 500-400 MG CAPS per capsule Take 1 capsule by mouth       ibuprofen (ADVIL,MOTRIN) 800 MG tablet Take 800 mg by mouth every 8 hours as needed.       Loratadine (CLARITIN PO) Take  by mouth.       NEW MED Biest 1.5mg/gram cream(pg free)  Insert 1 gram vaginally twice weekly as directed 40 g 1     tretinoin (RETIN-A) 0.025 % external cream APPLY TO AFFECTED AREA(S) A PEA SIZE AMOUNT TO FACE EVERY OTHER NIGHT AS TOLERATED 45 g 1     triamcinolone acetonide (KENALOG) 10 MG/ML injection See med note 5 mL 0     triamcinolone acetonide (KENALOG) 10 MG/ML injection  "See med note 5 mL 0     COMPOUNDED NON-CONTROLLED SUBSTANCE (CMPD RX) - PHARMACY TO MIX COMPOUNDED MEDICATION Place 1 Application vaginally         Allergies   Allergen Reactions     Dust Mites Other (See Comments)     Sneezing, coughing. asthma  Itchy watery eyes, sneezing     Estrogens Itching     Other reaction(s): Hypersensitivity  Topical caused burning sensation of skin  Topical caused burning sensation of skin; Premarin cream only - possibly only an bi-ingredient     Imiquimod Other (See Comments)     Hair loss     Levaquin [Levofloxacin Hemihydrate] Other (See Comments)     Muscle weakness     Levofloxacin Other (See Comments)     MUSCLE WEAKNESS, ARTHRITIS LIKE SYMPTOMS.       Mold Other (See Comments)     Sneezing, asthma, weezing     Pollen Extract/Tree Extract Other (See Comments)     Sneezing, weezing     Sulfa Drugs Hives     Sulfamethoxazole-Trimethoprim Hives     Latex Rash     LATEX BANDAGES; tapes adhesive     Penicillin G Rash     Penicillins Rash        Social History     Tobacco Use     Smoking status: Former Smoker     Quit date: 1965     Years since quittin.5     Smokeless tobacco: Never Used   Substance Use Topics     Alcohol use: Yes     Alcohol/week: 0.0 standard drinks     Comment: glass of wine     Family History   Problem Relation Age of Onset     Skin Cancer Sister         basal cell carcinoma     Melanoma No family hx of      Glaucoma No family hx of      Macular Degeneration No family hx of      History   Drug Use No         Objective     /77   Pulse 69   Ht 1.676 m (5' 6\")   Wt 61.2 kg (135 lb)   Breastfeeding No   BMI 21.79 kg/m      Physical Exam  GENERAL APPEARANCE: healthy, alert and no distress  EYES: Eyes grossly normal to inspection, PERRL and conjunctivae and sclerae normal  HENT: normal cephalic/atraumatic  RESP: lungs clear to auscultation - no rales, rhonchi or wheezes  CV: regular rate and rhythm, normal S1 S2, no S3 or S4 and no murmur, click or rub "   ABDOMEN: soft, nontender, no HSM or masses and bowel sounds normal  NEURO: Normal strength and tone, sensory exam grossly normal, mentation intact and speech normal    Recent Labs   Lab Test 11/04/19  1705   HGB 13.1         POTASSIUM 3.8   CR 0.72        Diagnostics:  Labs pending at this time.  Results will be reviewed when available.   No EKG required, no history of coronary heart disease, significant arrhythmia, peripheral arterial disease or other structural heart disease.    Revised Cardiac Risk Index (RCRI):  The patient has the following serious cardiovascular risks for perioperative complications:   - No serious cardiac risks = 0 points     RCRI Interpretation: 0 points: Class I (very low risk - 0.4% complication rate)           Signed Electronically by: Destiney Navas MD  Copy of this evaluation report is provided to requesting physician.

## 2021-07-12 NOTE — NURSING NOTE
Chief Complaint   Patient presents with     Pre Op Exam     Cystoscopy polyp removal and D&C  7/14/2021   Mary Rodgers LPN

## 2021-07-12 NOTE — PROGRESS NOTES
Patient here for billable telephone evaluation   What phone number would you like to be contacted at? [phone]    How would you like to obtain your AVS? Telmahart    2021    Referring Provider: Referred Self, MD  No address on file    Primary Care Provider: Fernando Weathers    Reason for visit: Preoperative discussion    Planned procedure: Excisional biopsy of vaginal polyps, cystoscopy, Dilatation and curettage      HPI:  Ester Barba is a 75 year old female for telephone visit today to discussed her upcoming planned procedure for vaginal polyps and urinary urgency. Citizen of [place]  on the phone.   I last saw her on 21 with detailed notes below. I was unable to do in clinic biopsy of polyps due to severe levator myalgia. Her pelvic US on 21 ordered by Dr Hernandez  final result is back and shows normal endometrial strip but some fluid in endometrial cavity. She says that she hasn't had any post menopausal bleeding per say but has noted occasional brown discharge.     This note was copied and pasted from Dr Quintero on 21    HPI:  Ester Barba is a 75 year old  female who presents for evaluation of urinary urgency, frequency and urgency urinary incontinence.   Her med hx is sig for hx of concussion in 2019 (Fall in a parking lot-post concussion syndrome) and  factor V leiden mutation (not on anticoagulation) and partial sigmoidectomy  in 2015 for diverticulitis.      Urinary Symptoms/Voiding function  Has been having urinary urgency and urgency incontinence for the last year or so. Says it may have preceeded her concussion in 2019. She voids every hour, reduces her fluid intake and does a lot of toilet mapping to avoid leakage. Does not wear incontinence pads although she does have accidents occasionally with urgency especially if she is coming home from shopping etc. She denies bothersome stress leakage. She feels like she empties her bladder well. Drinks mostly water ( about 30 oz per  day) and half a cap of coffee, and 1 can of slim fast. No other bladder irritants.     Pelvic Organ Prolapse Symptoms  Denies vaginal bulge or pressure sensation.      Gastrointestinal Symptoms:  Denies chronic diarrhea, constipation. Denies bothersome fecal or flatal incontinence. Has a hx of diverticulitis, s/p partial sig colon resection      Sexual function/Pelvic floor pain/GYN:   No penetrative intercourse (  reasons). Has been using vaginal estrogen cream once a week for years.       Relevant Medical History:    Diabetes? no  High Blood pressure? cHTN     Recurrent UTIs? No   Sleep Apnea? no  Obesity? no  History of Blood clots? No ( has factor V leiden mutation, diagnosed in a family hx work up)  Other medical problems: As above      Physical Exam:   BP (!) 161/76   Pulse 54   Wt 61.3 kg (135 lb 3.2 oz)   BMI 21.82 kg/m   No LMP recorded. Patient is postmenopausal. Body mass index is 21.82 kg/m .    Gen:  is alert, comfortable in no acute distress,   Abdomen: Abdomen is soft, non-tender, non-distended,   Lungs: non-labored breathing.     Pelvic Exam: Limited exam today due to pain and guarding/ Muscle spasms visible on her lower extremities and gluts during the exam.   Normal external female genitalia. The urethra was slightly red -carruncle.    Vagina: two polypoid structures that appear red and irritated bilaterally in the anterior sulcus of the vagina. I was unable to fully evaluate them due to extreme tenderness despite lidocaine gel.   Uterus: Not evaluated today due to pain   Ovaries: Not evaluated today  Vulva: No visible lesions although some erythema on the vulva  Rectal: deferred    Pelvic floor muscles: Levator myalgia diffusely in the pubococcygeus and ileococcygeus muscles    POPQ EXAM FOR PROLAPSE SEVERITY  No prolapse although limited exam today      Voiding trial:    VOID 375 ml  PVR 63 mL by Bladder ultrasound  Leak with Cough stress test : no, Prolapse reduced no         Past  Medical History:   Diagnosis Date     Arthritis     osteoarthritis     Basal cell carcinoma      Bilateral occipital neuralgia 01/21/2019     Concussion 01/21/2019     Squamous cell carcinoma      Past Surgical History:   Procedure Laterality Date     BIOPSY OF SKIN LESION       CATARACT IOL, RT/LT Bilateral 2018     MOHS MICROGRAPHIC PROCEDURE       NO HISTORY OF SURGERY  02/17/2014    derm     SIGMOIDECTOMY  2015    for diverticulitis     Current Outpatient Medications   Medication     albuterol (PROAIR HFA/PROVENTIL HFA/VENTOLIN HFA) 108 (90 Base) MCG/ACT inhaler     Biotin 10 MG TABS tablet     Calcium 1500 MG TABS     Cholecalciferol (VITAMIN D) 1000 UNIT capsule     clobetasol propionate (CLOBEX) 0.05 % external shampoo     COMPOUNDED NON-CONTROLLED SUBSTANCE (CMPD RX) - PHARMACY TO MIX COMPOUNDED MEDICATION     esomeprazole (NEXIUM) 40 MG DR capsule     Fluocinolone Acetonide Scalp (DERMA-SMOOTHE/FS SCALP) 0.01 % OIL oil     glucosamine-chondroitin 500-400 MG CAPS per capsule     ibuprofen (ADVIL,MOTRIN) 800 MG tablet     Loratadine (CLARITIN PO)     NEW MED     tretinoin (RETIN-A) 0.025 % external cream     triamcinolone acetonide (KENALOG) 10 MG/ML injection     triamcinolone acetonide (KENALOG) 10 MG/ML injection     Current Facility-Administered Medications   Medication     triamcinolone acetonide (KENALOG-10) injection 10 mg        Allergies   Allergen Reactions     Dust Mites Other (See Comments)     Sneezing, coughing. asthma  Itchy watery eyes, sneezing     Estrogens Itching     Other reaction(s): Hypersensitivity  Topical caused burning sensation of skin  Topical caused burning sensation of skin; Premarin cream only - possibly only an bi-ingredient     Imiquimod Other (See Comments)     Hair loss     Levaquin [Levofloxacin Hemihydrate] Other (See Comments)     Muscle weakness     Levofloxacin Other (See Comments)     MUSCLE WEAKNESS, ARTHRITIS LIKE SYMPTOMS.       Mold Other (See Comments)      Sneezing, asthma, weezing     Pollen Extract/Tree Extract Other (See Comments)     Sneezing, weezing     Sulfa Drugs Hives     Sulfamethoxazole-Trimethoprim Hives     Latex Rash     LATEX BANDAGES; tapes adhesive     Penicillin G Rash     Penicillins Rash     Social History     Socioeconomic History     Marital status:      Spouse name: Not on file     Number of children: Not on file     Years of education: Not on file     Highest education level: Not on file   Occupational History     Not on file   Tobacco Use     Smoking status: Former Smoker     Quit date: 1965     Years since quittin.5     Smokeless tobacco: Never Used   Substance and Sexual Activity     Alcohol use: Yes     Alcohol/week: 0.0 standard drinks     Comment: glass of wine     Drug use: No     Sexual activity: Not Currently     Birth control/protection: Post-menopausal   Other Topics Concern     Parent/sibling w/ CABG, MI or angioplasty before 65F 55M? Not Asked   Social History Narrative    How much exercise per week? 2 days a week    How much calcium per day? Everyday supplements and milk and cheese       How much caffeine per day? 2-3 cups    How much vitamin D per day? supplment    Do you/your family wear seatbelts?  Yes    Do you/your family use safety helmets? Yes    Do you/your family use sunscreen? Yes    Do you/your family keep firearms in the home? Yes    Do you/your family have a smoke detector(s)? Yes        Do you feel safe in your home? Yes    Has anyone ever touched you in an unwanted manner? No     Explain      Social Determinants of Health     Financial Resource Strain:      Difficulty of Paying Living Expenses:    Food Insecurity:      Worried About Running Out of Food in the Last Year:      Ran Out of Food in the Last Year:    Transportation Needs:      Lack of Transportation (Medical):      Lack of Transportation (Non-Medical):    Physical Activity:      Days of Exercise per Week:      Minutes of Exercise per  Session:    Stress:      Feeling of Stress :    Social Connections:      Frequency of Communication with Friends and Family:      Frequency of Social Gatherings with Friends and Family:      Attends Spiritism Services:      Active Member of Clubs or Organizations:      Attends Club or Organization Meetings:      Marital Status:    Intimate Partner Violence:      Fear of Current or Ex-Partner:      Emotionally Abused:      Physically Abused:      Sexually Abused:      Pelvic US 6/21/21  Pelvic findings:    Right Adnexa: Normal    Left Adnexa: Normal    Bladder:  Normal          Cul - de - sac fluid: None     Ovarian follicles:    Right ovary:  1.1 x0.8 x 0.6cm.      0 follicles      Left ovary:  1.0 x 1.1 x 0.4cm.      0 follicles    6/21/21    WBC Count, Corrected 3.7 - 12.1 10(3)/uL 5.8        RBC Count 3.76 - 5.39 10(6)/uL 4.07        Hemoglobin 11.2 - 15.8 g/dL 13.5        Hematocrit 33.9 - 47.5 % 39.6        MCV 80.6 - 98.5 fL 97.3        MCH 26.4 - 32.9 pg 33.3High         MCHC 32.0 - 36.0 g/dL 34.2        RDW 10.0 - 16.8 % 13.9        Platelets 179 - 450 10(3)/uL 147Low         MPV 7.4 - 10.4 fL 9.7        % Neutrophils 43.0 - 80.0 % 48.0        % Lymphocytes 16.0 - 49.0 % 39.9        % Monocytes 0.0 - 10.0 % 9.8        % Eosinophils 0.0 - 7.0 % 1.7        % Basophils 0.0 - 2.0 % 0.6        Abs Neutrophils 1.8 - 9.2 10(3)/uL 2.8        Abs Lymphocytes 1.2 - 3.9 10(3)/uL 2.3        Abs Monocytes 0.0 - 1.1 10(3)/uL 0.6        Abs Eosinophils 0.0 - 0.8 10(3)/uL 0.1        Abs Basophils 0.0 - 0.2 10(3)/uL 0.0          Asssessment and plan  Encounter Diagnoses   Name Primary?     Vaginal polyp Yes     Urinary urgency      Urge incontinence      For Exsional biopsy of vaginal polyps, cystoscopy, Dilatation and curettage  We discussed today that we will add D&C to her procedure given the small fluid collection in her endometrial cavity on US and some ongoing brown discharge. We discussed risks/benefit of the various  procedures. We discussed risk of bleeding ( She has thrombocytopenia, facter V leiden mutation-last platlet count last month was 147), risk of injury to bladder, bowel, risk of infection and risk of uterine perforation with D&C. We also discussed other cardiovascular and respiratory risks associated with surgery and anesthesia. We discussed that I will send her biopsy for pathology evaluation and we will review these together at her 2 week post op virtual visit once the results are back. All questions answered.     Ibuprofen/tylenol for post procedure pain     I spent a total of 30 minutes  with  Ester Barba  And a Wolof interpretor on the date of the encounter in chart review, telephone patient visit, review of tests, documentation and/or discussion with other providers about the issues documented above.     Sandy Quintero MD on 7/12/2021 at 8:55 AM

## 2021-07-12 NOTE — PATIENT INSTRUCTIONS

## 2021-07-12 NOTE — PROGRESS NOTES
[facility] Dermatology Note  Encounter Date: Jul 12, 2021  Office Visit     Dermatology Problem List:  1. Lichen planopilaris  - Lichenoid keratosis, vertex scalp, s/p biopsy 8/31/2020  - Current tx: ILK 10 ~q5wk, (ILK10 today) clobetasol propionate 0.05% shampoo every other day, fluocinolone oil weekly, tretinoin 0.025% cream to the face every other day   - Stopped plaquenil 12/2019 per her eye doctor as concern for retinopathy but later this was disproven  - Previous tx: fluocinolone acetonide 0.01% oil, DHS-Zinc shampoo     2. Hx of NMSC  -BCC, left cheek, s/p MMS 6/9/21  -SCCis, R vertex scalp, s/p MMS 3/2/21, bacterial cx obtained from site 3/23/21  -BCC on right nasal ala s/p Mohs 11/27/17  -Nodular BCC on the vertex scalp s/p Mohs 6/5/15  -SCC of scalp, s/p Mohs 4/18/2014  -Hx of 1-2 other NMSC on scalp (BCCs)     3. Verrucous keratosis on right forearm, s/p shave biopsy on 12/20/18     4. Facial papules related to FFA   - Current tx: tretinoin 0.025% cream   ____________________________________________    Assessment & Plan:   # LPP with concomitant FFA overlap, mild flare around the parietal, vertex and frontal scalp on examination today.   - Continue to alternate clobetasol 0.05% shampoo with DHS zinc shampoo daily  - Continue to apply tretinoin 0.025% cream to face nightly  - Continue to apply flucinolone oil to scalp weekly  - ILK-10 mg/cc injected to scalp on examination today (see procedure note below)    # Ulcerated papule on vertex scalp consistent with recent trauma in setting of healing surgical scar s/p MMS 3/2/21. No concerns for infection. Recommend Duoderm be applied 2-3 days.   - Duoderm applied today; sample provided   - Photos taken for photodocumentation     Procedures Performed:   - Intra-lesional triamcinolone procedure note. After positioning and cleansing with isopropyl alcohol, 0.85cc of triamcinolone 10 mg/mL was injected into 20 site(s) on the scalp. The patient  tolerated the procedure well and left the dermatology clinic in good condition.    Follow-up: 6 week(s) in-person, or earlier for new or changing lesions    Staff and Medical Student:    I saw and examined this patient in the presence of Dr. Soto.    Elmer Zuleta, MS4  [facility]    Staff Physician:  I was present with the medical student who participated in the service and in the documentation of the note. I have verified the history and personally performed the physical exam and medical decision making. I agree with the assessment and plan of care as documented in the note.  ILK injections were primarily done by me.        Scribe Disclosure:  I, Sheila Milligan, am serving as a scribe at 10:47 AM on 7/12/2021 to document services personally performed by Barbara Soto MD based on my observations and the provider's statements to me.       Provider Disclosure:   The documentation recorded by the scribe accurately reflects the services I personally performed and the decisions made by me.    Barbara Soto MD  Professor and Chair  Department of Dermatology  [facility]: Phone: [phone], Fax:[phone]  [facility]: Phone: [phone], Fax: [phone]          ____________________________________________    CC: No chief complaint on file.    HPI:  Ms. Ester Barba is a 75 year old female who presents as a return patient for follow-up of hair loss, diagnosed as LLP with concomitant FFA overlap.   - Last seen in-clinic by myself on 3/23/2021  - Shedding or thinning, or both: Both  - Current tx: alternate clobetasol 0.05% shampoo and DHS zinc shampoo, tretinoin 0.025% cream nightly, fluocinolone oil 2x/week, ILK 10. She will alternate clobetasol 0.05% shampoo with fluocinolone oil. She uses DHS zinc shampoo once week.   - If using Rogaine, 1 cannister lasts how long: n/a  - Scalp or hair  care habits/products: will use Redken 18 gel every other day. Washes her hair every day.    No Any new medications, supplements, or products? (please list below)      Yes Scalp pain    Yes Scalp burning    Yes Scalp itching    No Eyebrow changes    No  Eyelash changes   No  Beard changes    No Other body hair changes    No Nail changes    No Additional symptoms? (please list below)     - Overall course: She believes she is stable. She notes her scalp is painful currently, although she notes she hit her head this weekend and also had MMS. No new areas of hair loss, although shedding/thinning. Takes claritin, biotin, vitamin D.    - COVID status: no      Also had MMS surgery 3-4 weeks ago on the forehead for BCC. SCCis, R vertex scalp, s/p MMS 3/2/21. She has a sore on the vertex scalp that has been there which was not healing post Mohs, but it has worsened after hitting her head on a boat this weekend. It is associated with pain; no bleeding, itching.    Patient is otherwise feeling well, in usual state of health, and has no additional skin concerns today.       Labs:  None reviewed.    Physical Exam:  Vitals: There were no vitals taken for this visit.  GEN: Well developed, well-nourished, in no acute distress, in a pleasant mood.    SKIN: Focused examination of scalp was performed.  - There is an ulcerated papule with angulated border on vertex scalp  - There is perifollicular erythema and scale with scarring hair loss along vertex scalp. Diffuse erythema.  - There is thick yellow crust scattered on the vertex scalp  - Normal eyelash density  - Normal eyebrow density  - No nail pitting or dystrophy   - No scalp folliculitis/pustules   - Scattered facial papules   - No other lesions of concern on areas examined.       Medications:  Current Outpatient Medications   Medication     albuterol (PROAIR HFA/PROVENTIL HFA/VENTOLIN HFA) 108 (90 Base) MCG/ACT inhaler     Biotin 10 MG TABS tablet     Calcium 1500 MG TABS      Cholecalciferol (VITAMIN D) 1000 UNIT capsule     clobetasol propionate (CLOBEX) 0.05 % external shampoo     COMPOUNDED NON-CONTROLLED SUBSTANCE (CMPD RX) - PHARMACY TO MIX COMPOUNDED MEDICATION     esomeprazole (NEXIUM) 40 MG DR capsule     Fluocinolone Acetonide Scalp (DERMA-SMOOTHE/FS SCALP) 0.01 % OIL oil     glucosamine-chondroitin 500-400 MG CAPS per capsule     ibuprofen (ADVIL,MOTRIN) 800 MG tablet     Loratadine (CLARITIN PO)     NEW MED     tretinoin (RETIN-A) 0.025 % external cream     triamcinolone acetonide (KENALOG) 10 MG/ML injection     triamcinolone acetonide (KENALOG) 10 MG/ML injection     Current Facility-Administered Medications   Medication     triamcinolone acetonide (KENALOG-10) injection 10 mg      Past Medical History:   Patient Active Problem List   Diagnosis     Porokeratosis     Squamous cell carcinoma     Neoplasm of uncertain behavior     Lichen planopilaris     Dermatitis     Cicatricial alopecia     Keratosis, inflamed seborrheic     History of skin cancer     Basal cell carcinoma of vertex scalp s/p mohs 6-5-15     Medication management     Actinic keratosis     Medication monitoring encounter     Dermatitis, seborrheic     Erosive pustular dermatosis     Tick bite, initial encounter     Vulvar atrophy     Factor V Leiden mutation (H)     Splenic artery aneurysm (H)     Post concussion syndrome     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

## 2021-07-12 NOTE — LETTER
7/12/2021       RE: Ester Barba  [address] [place] [address]     Dear Colleague,    Thank you for referring your patient, Ester Barba, to the [facility] [place] [facility]. Please see a copy of my visit note below.    [facility] Dermatology Note  Encounter Date: Jul 12, 2021  Office Visit     Dermatology Problem List:  1. Lichen planopilaris  - Lichenoid keratosis, vertex scalp, s/p biopsy 8/31/2020  - Current tx: ILK 10 ~q5wk, (ILK10 today) clobetasol propionate 0.05% shampoo every other day, fluocinolone oil weekly, tretinoin 0.025% cream to the face every other day   - Stopped plaquenil 12/2019 per her eye doctor as concern for retinopathy but later this was disproven  - Previous tx: fluocinolone acetonide 0.01% oil, DHS-Zinc shampoo     2. Hx of NMSC  -BCC, left cheek, s/p MMS 6/9/21  -SCCis, R vertex scalp, s/p MMS 3/2/21, bacterial cx obtained from site 3/23/21  -BCC on right nasal ala s/p Mohs 11/27/17  -Nodular BCC on the vertex scalp s/p Mohs 6/5/15  -SCC of scalp, s/p Mohs 4/18/2014  -Hx of 1-2 other NMSC on scalp (BCCs)     3. Verrucous keratosis on right forearm, s/p shave biopsy on 12/20/18     4. Facial papules related to FFA   - Current tx: tretinoin 0.025% cream   ____________________________________________    Assessment & Plan:   # LPP with concomitant FFA overlap, mild flare around the parietal, vertex and frontal scalp on examination today.   - Continue to alternate clobetasol 0.05% shampoo with DHS zinc shampoo daily  - Continue to apply tretinoin 0.025% cream to face nightly  - Continue to apply flucinolone oil to scalp weekly  - ILK-10 mg/cc injected to scalp on examination today (see procedure note below)    # Ulcerated papule on vertex scalp consistent with recent trauma in setting of healing surgical scar s/p MMS 3/2/21. No concerns for infection. Recommend Duoderm be applied 2-3  days.   - Duoderm applied today; sample provided   - Photos taken for photodocumentation     Procedures Performed:   - Intra-lesional triamcinolone procedure note. After positioning and cleansing with isopropyl alcohol, 0.85cc of triamcinolone 10 mg/mL was injected into 20 site(s) on the scalp. The patient tolerated the procedure well and left the dermatology clinic in good condition.    Follow-up: 6 week(s) in-person, or earlier for new or changing lesions    Staff and Medical Student:    I saw and examined this patient in the presence of Dr. Soto.    Elmer Zuleta, MS4  [facility]    Staff Physician:  I was present with the medical student who participated in the service and in the documentation of the note. I have verified the history and personally performed the physical exam and medical decision making. I agree with the assessment and plan of care as documented in the note.  ILK injections were primarily done by me.        Scribe Disclosure:  I, Sheila Milligan, am serving as a scribe at 10:47 AM on 7/12/2021 to document services personally performed by Barbara Soto MD based on my observations and the provider's statements to me.       Provider Disclosure:   The documentation recorded by the scribe accurately reflects the services I personally performed and the decisions made by me.    Barbara Soto MD  Professor and Chair  Department of Dermatology  [facility]: Phone: [phone], Fax:[phone]  [facility]: Phone: [phone], Fax: [phone]          ____________________________________________    CC: No chief complaint on file.    HPI:  Ms. Ester Barba is a 75 year old female who presents as a return patient for follow-up of hair loss, diagnosed as LLP with concomitant FFA overlap.   - Last seen in-clinic by myself on 3/23/2021  - Shedding or thinning, or both: Both  -  Current tx: alternate clobetasol 0.05% shampoo and DHS zinc shampoo, tretinoin 0.025% cream nightly, fluocinolone oil 2x/week, ILK 10. She will alternate clobetasol 0.05% shampoo with fluocinolone oil. She uses DHS zinc shampoo once week.   - If using Rogaine, 1 cannister lasts how long: n/a  - Scalp or hair care habits/products: will use Redken 18 gel every other day. Washes her hair every day.    No Any new medications, supplements, or products? (please list below)      Yes Scalp pain    Yes Scalp burning    Yes Scalp itching    No Eyebrow changes    No  Eyelash changes   No  Beard changes    No Other body hair changes    No Nail changes    No Additional symptoms? (please list below)     - Overall course: She believes she is stable. She notes her scalp is painful currently, although she notes she hit her head this weekend and also had MMS. No new areas of hair loss, although shedding/thinning. Takes claritin, biotin, vitamin D.    - COVID status: no      Also had MMS surgery 3-4 weeks ago on the forehead for BCC. SCCis, R vertex scalp, s/p MMS 3/2/21. She has a sore on the vertex scalp that has been there which was not healing post Mohs, but it has worsened after hitting her head on a boat this weekend. It is associated with pain; no bleeding, itching.    Patient is otherwise feeling well, in usual state of health, and has no additional skin concerns today.       Labs:  None reviewed.    Physical Exam:  Vitals: There were no vitals taken for this visit.  GEN: Well developed, well-nourished, in no acute distress, in a pleasant mood.    SKIN: Focused examination of scalp was performed.  - There is an ulcerated papule with angulated border on vertex scalp  - There is perifollicular erythema and scale with scarring hair loss along vertex scalp. Diffuse erythema.  - There is thick yellow crust scattered on the vertex scalp  - Normal eyelash density  - Normal eyebrow density  - No nail pitting or dystrophy   - No scalp  folliculitis/pustules   - Scattered facial papules   - No other lesions of concern on areas examined.       Medications:  Current Outpatient Medications   Medication     albuterol (PROAIR HFA/PROVENTIL HFA/VENTOLIN HFA) 108 (90 Base) MCG/ACT inhaler     Biotin 10 MG TABS tablet     Calcium 1500 MG TABS     Cholecalciferol (VITAMIN D) 1000 UNIT capsule     clobetasol propionate (CLOBEX) 0.05 % external shampoo     COMPOUNDED NON-CONTROLLED SUBSTANCE (CMPD RX) - PHARMACY TO MIX COMPOUNDED MEDICATION     esomeprazole (NEXIUM) 40 MG DR capsule     Fluocinolone Acetonide Scalp (DERMA-SMOOTHE/FS SCALP) 0.01 % OIL oil     glucosamine-chondroitin 500-400 MG CAPS per capsule     ibuprofen (ADVIL,MOTRIN) 800 MG tablet     Loratadine (CLARITIN PO)     NEW MED     tretinoin (RETIN-A) 0.025 % external cream     triamcinolone acetonide (KENALOG) 10 MG/ML injection     triamcinolone acetonide (KENALOG) 10 MG/ML injection     Current Facility-Administered Medications   Medication     triamcinolone acetonide (KENALOG-10) injection 10 mg      Past Medical History:   Patient Active Problem List   Diagnosis     Porokeratosis     Squamous cell carcinoma     Neoplasm of uncertain behavior     Lichen planopilaris     Dermatitis     Cicatricial alopecia     Keratosis, inflamed seborrheic     History of skin cancer     Basal cell carcinoma of vertex scalp s/p mohs 6-5-15     Medication management     Actinic keratosis     Medication monitoring encounter     Dermatitis, seborrheic     Erosive pustular dermatosis     Tick bite, initial encounter     Vulvar atrophy     Factor V Leiden mutation (H)     Splenic artery aneurysm (H)     Post concussion syndrome     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        Again, thank you for allowing me to participate in the care of your patient.      Sincerely,    Barbara Soto MD

## 2021-07-12 NOTE — H&P (VIEW-ONLY)
Patient here for billable telephone evaluation   What phone number would you like to be contacted at? [phone]    How would you like to obtain your AVS? MyChart    2021    Referring Provider: Referred Self, MD  No address on file    Primary Care Provider: Fernando Weathers    Reason for visit: Preoperative discussion    Planned procedure: Excisional biopsy of vaginal polyps, cystoscopy, Dilatation and curettage      HPI:  Ester Barba is a 75 year old female for telephone visit today to discussed her upcoming planned procedure for vaginal polyps and urinary urgency.  I last saw her on 21 with detailed notes below. I was unable to do in clinic biopsy of polyps due to severe levator myalgia. Her pelvic US on 21 ordered by Dr Hernandez  final result is back and shows normal endometrial strip but some fluid in endometrial cavity. She says that she hasn't had any post menopausal bleeding per say but has noted occasional brown discharge.     This note was copied and pasted from Dr Quintero on 21    HPI:  Ester Barba is a 75 year old  female who presents for evaluation of urinary urgency, frequency and urgency urinary incontinence.   Her med hx is sig for hx of concussion in 2019 (Fall in a parking lot-post concussion syndrome) and  factor V leiden mutation (not on anticoagulation) and partial sigmoidectomy  in 2015 for diverticulitis.      Urinary Symptoms/Voiding function  Has been having urinary urgency and urgency incontinence for the last year or so. Says it may have preceeded her concussion in 2019. She voids every hour, reduces her fluid intake and does a lot of toilet mapping to avoid leakage. Does not wear incontinence pads although she does have accidents occasionally with urgency especially if she is coming home from shopping etc. She denies bothersome stress leakage. She feels like she empties her bladder well. Drinks mostly water ( about 30 oz per day) and half a cap of coffee, and 1  can of slim fast. No other bladder irritants.     Pelvic Organ Prolapse Symptoms  Denies vaginal bulge or pressure sensation.      Gastrointestinal Symptoms:  Denies chronic diarrhea, constipation. Denies bothersome fecal or flatal incontinence. Has a hx of diverticulitis, s/p partial sig colon resection      Sexual function/Pelvic floor pain/GYN:   No penetrative intercourse (  reasons). Has been using vaginal estrogen cream once a week for years.       Relevant Medical History:    Diabetes? no  High Blood pressure? cHTN     Recurrent UTIs? No   Sleep Apnea? no  Obesity? no  History of Blood clots? No ( has factor V leiden mutation, diagnosed in a family hx work up)  Other medical problems: As above      Physical Exam:   BP (!) 161/76   Pulse 54   Wt 61.3 kg (135 lb 3.2 oz)   BMI 21.82 kg/m   No LMP recorded. Patient is postmenopausal. Body mass index is 21.82 kg/m .    Gen:  is alert, comfortable in no acute distress,   Abdomen: Abdomen is soft, non-tender, non-distended,   Lungs: non-labored breathing.     Pelvic Exam: Limited exam today due to pain and guarding/ Muscle spasms visible on her lower extremities and gluts during the exam.   Normal external female genitalia. The urethra was slightly red -carruncle.    Vagina: two polypoid structures that appear red and irritated bilaterally in the anterior sulcus of the vagina. I was unable to fully evaluate them due to extreme tenderness despite lidocaine gel.   Uterus: Not evaluated today due to pain   Ovaries: Not evaluated today  Vulva: No visible lesions although some erythema on the vulva  Rectal: deferred    Pelvic floor muscles: Levator myalgia diffusely in the pubococcygeus and ileococcygeus muscles    POPQ EXAM FOR PROLAPSE SEVERITY  No prolapse although limited exam today      Voiding trial:    VOID 375 ml  PVR 63 mL by Bladder ultrasound  Leak with Cough stress test : no, Prolapse reduced no         Past Medical History:   Diagnosis Date      Arthritis     osteoarthritis     Basal cell carcinoma      Bilateral occipital neuralgia 01/21/2019     Concussion 01/21/2019     Squamous cell carcinoma      Past Surgical History:   Procedure Laterality Date     BIOPSY OF SKIN LESION       CATARACT IOL, RT/LT Bilateral 2018     MOHS MICROGRAPHIC PROCEDURE       NO HISTORY OF SURGERY  02/17/2014    derm     SIGMOIDECTOMY  2015    for diverticulitis     Current Outpatient Medications   Medication     albuterol (PROAIR HFA/PROVENTIL HFA/VENTOLIN HFA) 108 (90 Base) MCG/ACT inhaler     Biotin 10 MG TABS tablet     Calcium 1500 MG TABS     Cholecalciferol (VITAMIN D) 1000 UNIT capsule     clobetasol propionate (CLOBEX) 0.05 % external shampoo     COMPOUNDED NON-CONTROLLED SUBSTANCE (CMPD RX) - PHARMACY TO MIX COMPOUNDED MEDICATION     esomeprazole (NEXIUM) 40 MG DR capsule     Fluocinolone Acetonide Scalp (DERMA-SMOOTHE/FS SCALP) 0.01 % OIL oil     glucosamine-chondroitin 500-400 MG CAPS per capsule     ibuprofen (ADVIL,MOTRIN) 800 MG tablet     Loratadine (CLARITIN PO)     NEW MED     tretinoin (RETIN-A) 0.025 % external cream     triamcinolone acetonide (KENALOG) 10 MG/ML injection     triamcinolone acetonide (KENALOG) 10 MG/ML injection     Current Facility-Administered Medications   Medication     triamcinolone acetonide (KENALOG-10) injection 10 mg        Allergies   Allergen Reactions     Dust Mites Other (See Comments)     Sneezing, coughing. asthma  Itchy watery eyes, sneezing     Estrogens Itching     Other reaction(s): Hypersensitivity  Topical caused burning sensation of skin  Topical caused burning sensation of skin; Premarin cream only - possibly only an bi-ingredient     Imiquimod Other (See Comments)     Hair loss     Levaquin [Levofloxacin Hemihydrate] Other (See Comments)     Muscle weakness     Levofloxacin Other (See Comments)     MUSCLE WEAKNESS, ARTHRITIS LIKE SYMPTOMS.       Mold Other (See Comments)     Sneezing, asthma, weezing     Pollen  Extract/Tree Extract Other (See Comments)     Sneezing, weezing     Sulfa Drugs Hives     Sulfamethoxazole-Trimethoprim Hives     Latex Rash     LATEX BANDAGES; tapes adhesive     Penicillin G Rash     Penicillins Rash     Social History     Socioeconomic History     Marital status:      Spouse name: Not on file     Number of children: Not on file     Years of education: Not on file     Highest education level: Not on file   Occupational History     Not on file   Tobacco Use     Smoking status: Former Smoker     Quit date: 1965     Years since quittin.5     Smokeless tobacco: Never Used   Substance and Sexual Activity     Alcohol use: Yes     Alcohol/week: 0.0 standard drinks     Comment: glass of wine     Drug use: No     Sexual activity: Not Currently     Birth control/protection: Post-menopausal   Other Topics Concern     Parent/sibling w/ CABG, MI or angioplasty before 65F 55M? Not Asked   Social History Narrative    How much exercise per week? 2 days a week    How much calcium per day? Everyday supplements and milk and cheese       How much caffeine per day? 2-3 cups    How much vitamin D per day? supplment    Do you/your family wear seatbelts?  Yes    Do you/your family use safety helmets? Yes    Do you/your family use sunscreen? Yes    Do you/your family keep firearms in the home? Yes    Do you/your family have a smoke detector(s)? Yes        Do you feel safe in your home? Yes    Has anyone ever touched you in an unwanted manner? No     Explain      Social Determinants of Health     Financial Resource Strain:      Difficulty of Paying Living Expenses:    Food Insecurity:      Worried About Running Out of Food in the Last Year:      Ran Out of Food in the Last Year:    Transportation Needs:      Lack of Transportation (Medical):      Lack of Transportation (Non-Medical):    Physical Activity:      Days of Exercise per Week:      Minutes of Exercise per Session:    Stress:      Feeling of  Stress :    Social Connections:      Frequency of Communication with Friends and Family:      Frequency of Social Gatherings with Friends and Family:      Attends Mu-ism Services:      Active Member of Clubs or Organizations:      Attends Club or Organization Meetings:      Marital Status:    Intimate Partner Violence:      Fear of Current or Ex-Partner:      Emotionally Abused:      Physically Abused:      Sexually Abused:      Pelvic US 6/21/21  Pelvic findings:    Right Adnexa: Normal    Left Adnexa: Normal    Bladder:  Normal          Cul - de - sac fluid: None     Ovarian follicles:    Right ovary:  1.1 x0.8 x 0.6cm.      0 follicles      Left ovary:  1.0 x 1.1 x 0.4cm.      0 follicles    6/21/21    WBC Count, Corrected 3.7 - 12.1 10(3)/uL 5.8        RBC Count 3.76 - 5.39 10(6)/uL 4.07        Hemoglobin 11.2 - 15.8 g/dL 13.5        Hematocrit 33.9 - 47.5 % 39.6        MCV 80.6 - 98.5 fL 97.3        MCH 26.4 - 32.9 pg 33.3High         MCHC 32.0 - 36.0 g/dL 34.2        RDW 10.0 - 16.8 % 13.9        Platelets 179 - 450 10(3)/uL 147Low         MPV 7.4 - 10.4 fL 9.7        % Neutrophils 43.0 - 80.0 % 48.0        % Lymphocytes 16.0 - 49.0 % 39.9        % Monocytes 0.0 - 10.0 % 9.8        % Eosinophils 0.0 - 7.0 % 1.7        % Basophils 0.0 - 2.0 % 0.6        Abs Neutrophils 1.8 - 9.2 10(3)/uL 2.8        Abs Lymphocytes 1.2 - 3.9 10(3)/uL 2.3        Abs Monocytes 0.0 - 1.1 10(3)/uL 0.6        Abs Eosinophils 0.0 - 0.8 10(3)/uL 0.1        Abs Basophils 0.0 - 0.2 10(3)/uL 0.0          Asssessment and plan  Encounter Diagnoses   Name Primary?     Vaginal polyp Yes     Urinary urgency      Urge incontinence      For Exsional biopsy of vaginal polyps, cystoscopy, Dilatation and curettage  We discussed today that we will add D&C to her procedure given the small fluid collection in her endometrial cavity on US and some ongoing brown discharge. We discussed risks/benefit of the various procedures. We discussed risk of  bleeding ( She has thrombocytopenia, facter V leiden mutation-last platlet count last month was 147), risk of injury to bladder, bowel, risk of infection and risk of uterine perforation with D&C. We also discussed other cardiovascular and respiratory risks associated with surgery and anesthesia. We discussed that I will send her biopsy for pathology evaluation and we will review these together at her 2 week post op virtual visit once the results are back. All questions answered.     Ibuprofen/tylenol for post procedure pain     I spent a total of 20 minutes  with  Ester Barba  on the date of the encounter in chart review, telephone patient visit, review of tests, documentation and/or discussion with other providers about the issues documented above.     Sandy Quintero MD on 7/12/2021 at 8:55 AM

## 2021-07-12 NOTE — LETTER
7/12/2021       RE: Ester Barba  [address]  [place] [address]     Dear Colleague,    Thank you for referring your patient, Ester Barba, to the [facility]. Please see a copy of my visit note below.    [facility] PROFESSIONAL [facility]  3RD FLR,[address]  [address]  [facility]  [facility] [address]  Phone: [phone]  Primary Provider: Fernando Weathers  Pre-op Performing Provider: JESSICA MORENO      PREOPERATIVE EVALUATION:  Today's date: 7/12/2021    Ester Barba is a 75 year old female who presents for a preoperative evaluation.    Surgical Information:  Surgery/Procedure: cystoscopy, resection of vaginal polyps, D&C  Surgery Location: [facility]  Surgeon: Dr. Quintero  Surgery Date: 7/14/2021  Time of Surgery: 1:30 PM  Where patient plans to recover: At home with family  Fax number for surgical facility: Note does not need to be faxed, will be available electronically in Epic.    Type of Anesthesia Anticipated: Local with MAC    Assessment & Plan     The proposed surgical procedure is considered LOW risk.    Thrombocytopenia (H)  Patient reports history of mild thrombocytopenia. Recommend checking CBC today.   - CBC with Platelets Differential; Future    Preop general physical exam  Patient has no significant cardiopulmonary risk factors for the planned surgery            Risks and Recommendations:  The patient has the following additional risks and recommendations for perioperative complications:  Pulmonary:    - Incentive spirometry post-op    Medication Instructions:  Patient is to take all scheduled medications on the day of surgery    RECOMMENDATION:  APPROVAL GIVEN to proceed with proposed procedure, without further diagnostic evaluation.              45 minutes spent on the date of the encounter doing chart review, history and exam,  documentation and further activities per the note        Subjective     HPI related to upcoming procedure: Patient reports that she was found to have vaginal polyps recently. She reports that she also has some brow vaginl discharge. She is scheduled for cystoscopy for evaluation of urinary frequency.    Patient reports past medical history of thrombocytopenia and neutropenia. She denies issues with prior surgeries.       No flowsheet data found.    Health Care Directive:  Patient does not have a Health Care Directive or Living Will: Patient states has Advance Directive and will bring in a copy to clinic.    Preoperative Review of :   reviewed - no record of controlled substances prescribed.      Status of Chronic Conditions:  See problem list for active medical problems.  Problems all longstanding and stable, except as noted/documented.  See ROS for pertinent symptoms related to these conditions.      Review of Systems  CONSTITUTIONAL: NEGATIVE for fever, chills, change in weight  INTEGUMENTARY/SKIN: NEGATIVE for worrisome rashes, moles or lesions  EYES: NEGATIVE for vision changes or irritation  ENT/MOUTH: NEGATIVE for ear, mouth and throat problems  RESP: NEGATIVE for significant cough or SOB  BREAST: NEGATIVE for masses, tenderness or discharge  CV: NEGATIVE for chest pain, palpitations or peripheral edema  GI: NEGATIVE for nausea, abdominal pain, heartburn, or change in bowel habits  : NEGATIVE for frequency, dysuria, or hematuria  MUSCULOSKELETAL: NEGATIVE for significant arthralgias or myalgia  NEURO: NEGATIVE for weakness, dizziness or paresthesias  ENDOCRINE: NEGATIVE for temperature intolerance, skin/hair changes  HEME: NEGATIVE for bleeding problems  PSYCHIATRIC: NEGATIVE for changes in mood or affect    Patient Active Problem List    Diagnosis Date Noted     Vaginal polyp 07/02/2021     Priority: Medium     Added automatically from request for surgery [id number]       Urinary urgency 07/02/2021      Priority: Medium     Added automatically from request for surgery [id number]       Urge incontinence 07/02/2021     Priority: Medium     Added automatically from request for surgery [id number]       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 We usually plan to coil the splenic artery aneurysm or the whole artery and infarct the spleen intentionally to treat the aneurysm. Since she does have a history of immunosuppression I would not plan to do that unless her aneurysm is growing almost twice the size it is now, almost greater than 2 cm. She is in agreement with this plan. I would recommend doing yearly CAT scans and this can be done without contrast and just to evaluate the size of the splenic artery aneurysm. This can be easily followed because the artery is calcified. There are no other modifiers that she can do to prevent further aneurysm growth.        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     Priority: Medium     Dermatitis 02/19/2012     Priority: Medium     Lichen planopilaris 09/10/2011     Priority: Medium     Porokeratosis 08/22/2011     Priority: Medium     Squamous cell carcinoma 08/22/2011     Priority: Medium     Neoplasm of uncertain behavior 08/22/2011     Priority: Medium      Past Medical History:   Diagnosis Date     Arthritis     osteoarthritis     Basal cell carcinoma      Bilateral occipital neuralgia 01/21/2019     Concussion 01/21/2019     Squamous cell carcinoma      Past Surgical History:   Procedure Laterality Date     BIOPSY OF SKIN LESION       CATARACT IOL, RT/LT Bilateral 2018     MOHS MICROGRAPHIC PROCEDURE       NO HISTORY OF SURGERY  02/17/2014    derm     SIGMOIDECTOMY  2015    for diverticulitis     Current Outpatient Medications   Medication Sig Dispense Refill     albuterol (PROAIR HFA/PROVENTIL HFA/VENTOLIN HFA) 108 (90 Base) MCG/ACT inhaler Inhale 1-2 puffs into the lungs       Biotin 10 MG TABS tablet        Calcium 1500 MG TABS Take  by mouth.       Cholecalciferol (VITAMIN D) 1000 UNIT capsule Take  by mouth. Total 2200 units daily.       clobetasol propionate (CLOBEX) 0.05 % external shampoo APPLY TO DRY SCALP EVERY OTHER DAY, LEAVE ON FOR 15 MINUTES, THEN LATHER AND RINSE 118 mL 1     esomeprazole (NEXIUM) 40 MG DR capsule Take 40 mg by mouth every morning (before breakfast) Take 30-60 minutes before eating.       Fluocinolone Acetonide Scalp (DERMA-SMOOTHE/FS SCALP) 0.01 % OIL oil Apply once a week to scalp for lichen planopilaris 118 mL 11     glucosamine-chondroitin 500-400 MG CAPS per capsule Take 1 capsule by mouth       ibuprofen (ADVIL,MOTRIN) 800 MG tablet Take 800 mg by mouth every 8 hours as needed.       Loratadine (CLARITIN PO) Take  by mouth.       NEW MED Biest 1.5mg/gram cream(pg free)  Insert 1 gram vaginally twice weekly  "as directed 40 g 1     tretinoin (RETIN-A) 0.025 % external cream APPLY TO AFFECTED AREA(S) A PEA SIZE AMOUNT TO FACE EVERY OTHER NIGHT AS TOLERATED 45 g 1     triamcinolone acetonide (KENALOG) 10 MG/ML injection See med note 5 mL 0     triamcinolone acetonide (KENALOG) 10 MG/ML injection See med note 5 mL 0     COMPOUNDED NON-CONTROLLED SUBSTANCE (CMPD RX) - PHARMACY TO MIX COMPOUNDED MEDICATION Place 1 Application vaginally         Allergies   Allergen Reactions     Dust Mites Other (See Comments)     Sneezing, coughing. asthma  Itchy watery eyes, sneezing     Estrogens Itching     Other reaction(s): Hypersensitivity  Topical caused burning sensation of skin  Topical caused burning sensation of skin; Premarin cream only - possibly only an bi-ingredient     Imiquimod Other (See Comments)     Hair loss     Levaquin [Levofloxacin Hemihydrate] Other (See Comments)     Muscle weakness     Levofloxacin Other (See Comments)     MUSCLE WEAKNESS, ARTHRITIS LIKE SYMPTOMS.       Mold Other (See Comments)     Sneezing, asthma, weezing     Pollen Extract/Tree Extract Other (See Comments)     Sneezing, weezing     Sulfa Drugs Hives     Sulfamethoxazole-Trimethoprim Hives     Latex Rash     LATEX BANDAGES; tapes adhesive     Penicillin G Rash     Penicillins Rash        Social History     Tobacco Use     Smoking status: Former Smoker     Quit date: 1965     Years since quittin.5     Smokeless tobacco: Never Used   Substance Use Topics     Alcohol use: Yes     Alcohol/week: 0.0 standard drinks     Comment: glass of wine     Family History   Problem Relation Age of Onset     Skin Cancer Sister         basal cell carcinoma     Melanoma No family hx of      Glaucoma No family hx of      Macular Degeneration No family hx of      History   Drug Use No         Objective     /77   Pulse 69   Ht 1.676 m (5' 6\")   Wt 61.2 kg (135 lb)   Breastfeeding No   BMI 21.79 kg/m      Physical Exam  GENERAL APPEARANCE: healthy, " alert and no distress  EYES: Eyes grossly normal to inspection, PERRL and conjunctivae and sclerae normal  HENT: normal cephalic/atraumatic  RESP: lungs clear to auscultation - no rales, rhonchi or wheezes  CV: regular rate and rhythm, normal S1 S2, no S3 or S4 and no murmur, click or rub   ABDOMEN: soft, nontender, no HSM or masses and bowel sounds normal  NEURO: Normal strength and tone, sensory exam grossly normal, mentation intact and speech normal    Recent Labs   Lab Test 11/04/19  1705   HGB 13.1         POTASSIUM 3.8   CR 0.72        Diagnostics:  Labs pending at this time.  Results will be reviewed when available.   No EKG required, no history of coronary heart disease, significant arrhythmia, peripheral arterial disease or other structural heart disease.    Revised Cardiac Risk Index (RCRI):  The patient has the following serious cardiovascular risks for perioperative complications:   - No serious cardiac risks = 0 points     RCRI Interpretation: 0 points: Class I (very low risk - 0.4% complication rate)           Signed Electronically by: Destiney Navas MD  Copy of this evaluation report is provided to requesting physician.

## 2021-07-13 LAB — SARS-COV-2 RNA RESP QL NAA+PROBE: NEGATIVE

## 2021-07-13 ASSESSMENT — ANXIETY QUESTIONNAIRES: GAD7 TOTAL SCORE: 0

## 2021-07-14 ENCOUNTER — HOSPITAL ENCOUNTER (OUTPATIENT)
Facility: CLINIC | Age: 76
Discharge: HOME | End: 2021-07-14
Attending: OBSTETRICS & GYNECOLOGY | Admitting: OBSTETRICS & GYNECOLOGY
Payer: MEDICARE

## 2021-07-14 ENCOUNTER — ANESTHESIA (OUTPATIENT)
Dept: SURGERY | Facility: CLINIC | Age: 76
End: 2021-07-14
Payer: MEDICARE

## 2021-07-14 VITALS
HEIGHT: 66 IN | RESPIRATION RATE: 16 BRPM | DIASTOLIC BLOOD PRESSURE: 60 MMHG | BODY MASS INDEX: 21.51 KG/M2 | HEART RATE: 65 BPM | TEMPERATURE: 97.7 F | SYSTOLIC BLOOD PRESSURE: 142 MMHG | OXYGEN SATURATION: 97 % | WEIGHT: 133.82 LBS

## 2021-07-14 DIAGNOSIS — N39.41 URGE INCONTINENCE: ICD-10-CM

## 2021-07-14 DIAGNOSIS — N84.2 VAGINAL POLYP: ICD-10-CM

## 2021-07-14 DIAGNOSIS — R39.15 URINARY URGENCY: ICD-10-CM

## 2021-07-14 LAB
ABO/RH(D): NORMAL
ANTIBODY SCREEN: NEGATIVE
SPECIMEN EXPIRATION DATE: NORMAL

## 2021-07-14 PROCEDURE — 258N000003 HC RX IP 258 OP 636: Performed by: OBSTETRICS & GYNECOLOGY

## 2021-07-14 PROCEDURE — 58120 DILATION AND CURETTAGE: CPT | Mod: 59 | Performed by: OBSTETRICS & GYNECOLOGY

## 2021-07-14 PROCEDURE — 360N000075 HC SURGERY LEVEL 2, PER MIN: Performed by: OBSTETRICS & GYNECOLOGY

## 2021-07-14 PROCEDURE — 88305 TISSUE EXAM BY PATHOLOGIST: CPT | Mod: TC | Performed by: OBSTETRICS & GYNECOLOGY

## 2021-07-14 PROCEDURE — 999N000141 HC STATISTIC PRE-PROCEDURE NURSING ASSESSMENT: Performed by: OBSTETRICS & GYNECOLOGY

## 2021-07-14 PROCEDURE — 370N000017 HC ANESTHESIA TECHNICAL FEE, PER MIN: Performed by: OBSTETRICS & GYNECOLOGY

## 2021-07-14 PROCEDURE — 250N000011 HC RX IP 250 OP 636: Performed by: NURSE ANESTHETIST, CERTIFIED REGISTERED

## 2021-07-14 PROCEDURE — 250N000013 HC RX MED GY IP 250 OP 250 PS 637: Performed by: OBSTETRICS & GYNECOLOGY

## 2021-07-14 PROCEDURE — 250N000011 HC RX IP 250 OP 636: Performed by: OBSTETRICS & GYNECOLOGY

## 2021-07-14 PROCEDURE — 36415 COLL VENOUS BLD VENIPUNCTURE: CPT | Performed by: OBSTETRICS & GYNECOLOGY

## 2021-07-14 PROCEDURE — 272N000001 HC OR GENERAL SUPPLY STERILE: Performed by: OBSTETRICS & GYNECOLOGY

## 2021-07-14 PROCEDURE — 57135 EXCISION VAGINAL CYST/TUMOR: CPT | Performed by: OBSTETRICS & GYNECOLOGY

## 2021-07-14 PROCEDURE — 250N000009 HC RX 250: Performed by: OBSTETRICS & GYNECOLOGY

## 2021-07-14 PROCEDURE — 250N000011 HC RX IP 250 OP 636: Performed by: ANESTHESIOLOGY

## 2021-07-14 PROCEDURE — 710N000012 HC RECOVERY PHASE 2, PER MINUTE: Performed by: OBSTETRICS & GYNECOLOGY

## 2021-07-14 PROCEDURE — 86900 BLOOD TYPING SEROLOGIC ABO: CPT | Performed by: OBSTETRICS & GYNECOLOGY

## 2021-07-14 RX ORDER — BUPIVACAINE HYDROCHLORIDE AND EPINEPHRINE 2.5; 5 MG/ML; UG/ML
INJECTION, SOLUTION INFILTRATION; PERINEURAL PRN
Status: DISCONTINUED | OUTPATIENT
Start: 2021-07-14 | End: 2021-07-14 | Stop reason: HOSPADM

## 2021-07-14 RX ORDER — IBUPROFEN 600 MG/1
600 TABLET, FILM COATED ORAL ONCE
Status: DISCONTINUED | OUTPATIENT
Start: 2021-07-14 | End: 2021-07-14 | Stop reason: HOSPADM

## 2021-07-14 RX ORDER — ONDANSETRON 2 MG/ML
4 INJECTION INTRAMUSCULAR; INTRAVENOUS EVERY 30 MIN PRN
Status: DISCONTINUED | OUTPATIENT
Start: 2021-07-14 | End: 2021-07-14 | Stop reason: HOSPADM

## 2021-07-14 RX ORDER — ACETAMINOPHEN 325 MG/1
975 TABLET ORAL ONCE
Status: COMPLETED | OUTPATIENT
Start: 2021-07-14 | End: 2021-07-14

## 2021-07-14 RX ORDER — FENTANYL CITRATE 50 UG/ML
INJECTION, SOLUTION INTRAMUSCULAR; INTRAVENOUS PRN
Status: DISCONTINUED | OUTPATIENT
Start: 2021-07-14 | End: 2021-07-14

## 2021-07-14 RX ORDER — ACETAMINOPHEN 325 MG/1
975 TABLET ORAL ONCE
Status: DISCONTINUED | OUTPATIENT
Start: 2021-07-14 | End: 2021-07-14 | Stop reason: HOSPADM

## 2021-07-14 RX ORDER — LIDOCAINE 40 MG/G
CREAM TOPICAL
Status: DISCONTINUED | OUTPATIENT
Start: 2021-07-14 | End: 2021-07-14 | Stop reason: HOSPADM

## 2021-07-14 RX ORDER — KETOROLAC TROMETHAMINE 30 MG/ML
15 INJECTION, SOLUTION INTRAMUSCULAR; INTRAVENOUS ONCE
Status: COMPLETED | OUTPATIENT
Start: 2021-07-14 | End: 2021-07-14

## 2021-07-14 RX ORDER — SODIUM CHLORIDE, SODIUM LACTATE, POTASSIUM CHLORIDE, CALCIUM CHLORIDE 600; 310; 30; 20 MG/100ML; MG/100ML; MG/100ML; MG/100ML
INJECTION, SOLUTION INTRAVENOUS CONTINUOUS
Status: DISCONTINUED | OUTPATIENT
Start: 2021-07-14 | End: 2021-07-14 | Stop reason: HOSPADM

## 2021-07-14 RX ORDER — PROPOFOL 10 MG/ML
INJECTION, EMULSION INTRAVENOUS PRN
Status: DISCONTINUED | OUTPATIENT
Start: 2021-07-14 | End: 2021-07-14

## 2021-07-14 RX ORDER — DEXAMETHASONE SODIUM PHOSPHATE 4 MG/ML
INJECTION, SOLUTION INTRA-ARTICULAR; INTRALESIONAL; INTRAMUSCULAR; INTRAVENOUS; SOFT TISSUE PRN
Status: DISCONTINUED | OUTPATIENT
Start: 2021-07-14 | End: 2021-07-14

## 2021-07-14 RX ORDER — CEFAZOLIN SODIUM 2 G/100ML
2 INJECTION, SOLUTION INTRAVENOUS
Status: COMPLETED | OUTPATIENT
Start: 2021-07-14 | End: 2021-07-14

## 2021-07-14 RX ORDER — CEFAZOLIN SODIUM 2 G/100ML
2 INJECTION, SOLUTION INTRAVENOUS SEE ADMIN INSTRUCTIONS
Status: DISCONTINUED | OUTPATIENT
Start: 2021-07-14 | End: 2021-07-14 | Stop reason: HOSPADM

## 2021-07-14 RX ORDER — FENTANYL CITRATE 50 UG/ML
25 INJECTION, SOLUTION INTRAMUSCULAR; INTRAVENOUS EVERY 5 MIN PRN
Status: DISCONTINUED | OUTPATIENT
Start: 2021-07-14 | End: 2021-07-14 | Stop reason: HOSPADM

## 2021-07-14 RX ORDER — ONDANSETRON 4 MG/1
4 TABLET, ORALLY DISINTEGRATING ORAL EVERY 30 MIN PRN
Status: DISCONTINUED | OUTPATIENT
Start: 2021-07-14 | End: 2021-07-14 | Stop reason: HOSPADM

## 2021-07-14 RX ORDER — PROPOFOL 10 MG/ML
INJECTION, EMULSION INTRAVENOUS CONTINUOUS PRN
Status: DISCONTINUED | OUTPATIENT
Start: 2021-07-14 | End: 2021-07-14

## 2021-07-14 RX ORDER — ONDANSETRON 2 MG/ML
INJECTION INTRAMUSCULAR; INTRAVENOUS PRN
Status: DISCONTINUED | OUTPATIENT
Start: 2021-07-14 | End: 2021-07-14

## 2021-07-14 RX ADMIN — SODIUM CHLORIDE, POTASSIUM CHLORIDE, SODIUM LACTATE AND CALCIUM CHLORIDE: 600; 310; 30; 20 INJECTION, SOLUTION INTRAVENOUS at 14:02

## 2021-07-14 RX ADMIN — ONDANSETRON 4 MG: 2 INJECTION INTRAMUSCULAR; INTRAVENOUS at 14:30

## 2021-07-14 RX ADMIN — DEXAMETHASONE SODIUM PHOSPHATE 4 MG: 4 INJECTION, SOLUTION INTRAMUSCULAR; INTRAVENOUS at 14:30

## 2021-07-14 RX ADMIN — PROPOFOL 25 MG: 10 INJECTION, EMULSION INTRAVENOUS at 14:02

## 2021-07-14 RX ADMIN — CEFAZOLIN 2 G: 10 INJECTION, POWDER, FOR SOLUTION INTRAVENOUS at 14:11

## 2021-07-14 RX ADMIN — KETOROLAC TROMETHAMINE 15 MG: 30 INJECTION, SOLUTION INTRAMUSCULAR at 15:25

## 2021-07-14 RX ADMIN — FENTANYL CITRATE 100 MCG: 50 INJECTION, SOLUTION INTRAMUSCULAR; INTRAVENOUS at 14:02

## 2021-07-14 RX ADMIN — ACETAMINOPHEN 975 MG: 325 TABLET, FILM COATED ORAL at 12:54

## 2021-07-14 RX ADMIN — PROPOFOL 150 MCG/KG/MIN: 10 INJECTION, EMULSION INTRAVENOUS at 14:02

## 2021-07-14 ASSESSMENT — MIFFLIN-ST. JEOR: SCORE: 1123.75

## 2021-07-14 NOTE — ANESTHESIA POSTPROCEDURE EVALUATION
Patient: Ester Barba    Procedure(s):  CYSTOSCOPY  Excision of vaginal Polyp, Dilation and Currettage and Cystoscopy  Dilation and curettage    Diagnosis:Vaginal polyp [N84.2]  Urinary urgency [R39.15]  Urge incontinence [N39.41]  Diagnosis Additional Information: No value filed.    Anesthesia Type:  MAC    Note:     Postop Pain Control: Uneventful            Sign Out: Well controlled pain   PONV: No   Neuro/Psych: Uneventful            Sign Out: Acceptable/Baseline neuro status   Airway/Respiratory: Uneventful            Sign Out: Acceptable/Baseline resp. status   CV/Hemodynamics: Uneventful            Sign Out: Acceptable CV status; No obvious hypovolemia; No obvious fluid overload   Other NRE: NONE   DID A NON-ROUTINE EVENT OCCUR?            Last vitals:  Vitals:    07/14/21 1510 07/14/21 1515 07/14/21 1530   BP: 120/69 129/65 (!) 148/82   Pulse:  68 56   Resp: 18     Temp: 36.5  C (97.7  F)     SpO2: 96% 96% 97%       Last vitals prior to Anesthesia Care Transfer:  CRNA VITALS  7/14/2021 1438 - 7/14/2021 1538      7/14/2021             Resp Rate (observed):  (!) 2          Electronically Signed By: Mike Patel DO  July 14, 2021  4:10 PM

## 2021-07-14 NOTE — PROCEDURES
[facility]     Procedure Note     Pre-operative diagnosis: Vaginal polyp [N84.2]  Urinary urgency [R39.15]  Urge incontinence [N39.41]   Post-operative diagnosis Same as preop diagnosis   Procedure: Procedure(s):  CYSTOSCOPY  Excision of vaginal Polyp, Dilation and Currettage and Cystoscopy  Dilation and curettage   Surgeon: Sandy Quintero MD   Assistants(s): None   Anesthesia: Combined MAC with Topical          Estimated blood loss: Minimal   Total IV fluids: (See anesthesia record)   Blood transfusion: (See anesthesia record)   Total urine output: (See anesthesia record)   Drains: None   Specimens: ID Type Source Tests Collected by Time Destination   1 : Vaginal Polyp Tissue Vagina SURGICAL PATHOLOGY EXAM Sandy Quintero MD 7/14/2021  2:33 PM     2 : endometrial Currettings Tissue Uterus SURGICAL PATHOLOGY EXAM Sandy Quintero MD 7/14/2021  2:47 PM            Findings: Vaginal polyps appear erythmathous but not ulcerated. Smooth appearance located bilatrally in the vaginal sulcus closer to the  vaginal opening. Approximately 1 cm long and 0.5cm wide. Cystoscopy showed normal bladder and urethral mucosa without any lesions, or foreign bodies. Ureteric orifices normally localized in the trigone. Efflux difficult to confirm due to clear urine     Complications: None        Pt was taken to the operating room after consent paperwork was reviewed and patient's questions and concerns were addressed. Pt was then placed on the operating table in dorsal position. A team pause was performed in the presence of Scrub nurse, circulator nurse, the surgeon listed above and anesthesiology team.  MAC anesthesia obtained without diffuculty . Patient was then placed in a dorsal lithotomy position with nahomy stirrups. She was prepped and draped in the normal sterile fashion.       Attention was then turned towards the excision of bilateral vaginal polyps noted in the lateral sulcus of the  vagina near the introitus. Findings as described above. 25% bupivicaine with epi injected at the base of the polyps and the polyps removed using sharp dissection with a knife in an elliptical manner. The base of the excision site cauterized to get adequate hemostasis. The excision site sutured with a running 2-0 vicryl suture with great hemostasis noted. This was done for both left and right polyp.     Attention was then placed on the D&C portion of the procedure. The cervix was grasped with tenaculum and dotty dilators were used to dilate the cervix to approximately size 7. The uterus was sounded to 5 cm. The endometrium was then gently curretaged with scant specimens retrieved. Minimal bleeding.     Finally cystoscopy was performed with a 70 degree rigid cystoscope. Normal bladder and urethral mucosa, no lesions or foreign bodies noted. The ureters were visualized. Difficult to appreciate  efflux definitively due to clear urine.     Vaginal polyps and endometrial currettage specimens sent to pathology in separate containers. Patient tolerated the procedure well.

## 2021-07-14 NOTE — ANESTHESIA CARE TRANSFER NOTE
Patient: Ester Barba    Procedure(s):  CYSTOSCOPY  Excision of vaginal Polyp, Dilation and Currettage and Cystoscopy  Dilation and curettage    Diagnosis: Vaginal polyp [N84.2]  Urinary urgency [R39.15]  Urge incontinence [N39.41]  Diagnosis Additional Information: No value filed.    Anesthesia Type:   MAC     Note:    Oropharynx: oropharynx clear of all foreign objects and spontaneously breathing  Level of Consciousness: awake  Oxygen Supplementation: room air    Independent Airway: airway patency satisfactory and stable  Dentition: dentition unchanged  Vital Signs Stable: post-procedure vital signs reviewed and stable  Report to RN Given: handoff report given  Patient transferred to: Phase II    Handoff Report: Identifed the Patient, Identified the Reponsible Provider, Reviewed the pertinent medical history, Discussed the surgical course, Reviewed Intra-OP anesthesia mangement and issues during anesthesia, Set expectations for post-procedure period and Allowed opportunity for questions and acknowledgement of understanding      Vitals: (Last set prior to Anesthesia Care Transfer)  CRNA VITALS  7/14/2021 1438 - 7/14/2021 1515      7/14/2021             Resp Rate (observed):  (!) 2        Electronically Signed By: DELIA Kelly CRNA  July 14, 2021  3:15 PM

## 2021-07-14 NOTE — DISCHARGE INSTRUCTIONS
"  Discharge Instructions:   Diet:  As tolerated. Drink enough fluids to quench thirst.     Bath or shower as desired    Activity: You may resume full activities without restriction after you recover from the anesthetic medications.  Don't drive or operate machinery until at least 48 hours after surgery     Bladder Care: An \"irritable\" bladder is common the first few days after surgery.  You may need to void more frequently after surgery, and you may have stronger urgency than usual.   Call us (numbers listed below) if you experience:  1.  Signs of a bladder infection: burning with urination, frequency and urgency      If you can't fully empty your bladder:    If you have a strong urge to urinate, and you cannot void at all, call immediately.  Let the nurse know that you may have urinary retention and that you need a bladder scan and catheter drainage of your bladder.    Urinary Bleeding:  Light bleeding normal the urine is common after this procedure and this will gradually resolve during the few days.  Pink or \"Preet-aid\" red urine is normal.  Call if you have heavy bright bleeding or clots. Some light  vaginal bleeding is also normal     Managing Pain after this Procedure.  Pain after this surgery is usually quite minimal, but if you are uncomfortable, we recommend one of the following:    Acetaminophen (Tylenol):  For  pain (1-3 on pain scale) 1- 2 tablets by mouth every 4 - 6 hours.  Common side effects:  Minimal  Warnings: Keep your total acetaminophen intake to less than 3,600 mg per day secondary to prevent liver damage.   __________________________________  NSAID (Non-Steroidal Anti-Inflammatory Drug) group  Commonly, we use Ibuprofen (For moderate pain [ 3-5 on pain scale ] Motrin, or Advil as directed by your provider ).  Common side effects: Mild constipation, may aggravate GERD (gastrointestinal reflux or heartburn)  Warnings:  Don't take drugs in this group if you have low kidney function or renal " failure.  Other mediations in this group include:  aspirin, naproxen (Aleve), celecoxib (Celebrex), ketorolac (Toradol), meloxicam (Mobix)--Don't take more than one from this group at a time.   __________________________________        Discharge medications:  Non ordered today        Discharge Instructions: Following a Dilation   and Curettage/Dilation and Evacuation    What to expect:    Expect small to moderate amount of vaginal bleeding which should taper off in 4-5 days. It should not be heavier than your regular menstrual flow.    Do not douche, and use a pad rather than tampons.     No intercourse until bleeding has ceased.    Activity:    Rest the day of surgery. You may resume normal activity the next day.    You may bathe or shower.    Avoid heavy lifting (10-15 lbs) for one week.    Comfort:    The amount of discomfort you can expect is very unpredictable. If you have pain that cannot be controlled with non-aspirin pain relievers or with the prescription you may have received, you should notify your doctor.    Abdominal cramping (like menstrual cramps) or low back ache are common and should not be a cause for concern. You will be drowsy and weak the day of surgery and possibly the following day.    Diet:    You have no restrictions on your diet. Following surgery, drink plenty of fluids and eat a light meal.    Nausea:    The anesthesia medications you received during your surgical procedure may produce some nausea.    If you feel nauseated, stay in bed, keep your head down and try drinking fluids such as Seven-Up, tea or soup.    Notify Physician at once if you experience:    A fever over 100.4 degrees (a low grade fever under 100 degrees is usual after surgery).    Heavy flow and/or passing large clots. Saturating more than 1 pad per hour for 2 or more hours.     Severe pain or cramps.    Important numbers  [facility] ([address]) [facility]: [phone]   [facility]  [facility] ([address]) : [phone]  Rev.       Same-Day Surgery   Adult Discharge Orders & Instructions     For 24 hours after surgery:  1. Get plenty of rest.  A responsible adult must stay with you for at least 24 hours after you leave the hospital.   2. Pain medication can slow your reflexes. Do not drive or use heavy equipment.  If you have weakness or tingling, don't drive or use heavy equipment until this feeling goes away.  3. Mixing alcohol and pain medication can cause dizziness and slow your breathing. It can even be fatal. Do not drink alcohol while taking pain medication.  4. Avoid strenuous or risky activities.  Ask for help when climbing stairs.   5. You may feel lightheaded.  If so, sit for a few minutes before standing.  Have someone help you get up.   6. If you have nausea (feel sick to your stomach), drink only clear liquids such as apple juice, ginger ale, broth or 7-Up.  Rest may also help.  Be sure to drink enough fluids.  Move to a regular diet as you feel able. Take pain medications with a small amount of solid food, such as toast or crackers, to avoid nausea.   7. A slight fever is normal. Call the doctor if your fever is over 100 F (37.7 C) (taken under the tongue) or lasts longer than 24 hours.  8. You may have a dry mouth, muscle aches, trouble sleeping or a sore throat.  These symptoms should go away after 24 hours.  9. Do not make important or legal decisions.   Pain Management:      1. Take pain medication (if prescribed) for pain as directed by your physician.        2. WARNING: If the pain medication you have been prescribed contains Tylenol  (acetaminophen), DO NOT take additional doses of Tylenol (acetaminophen).     Call your doctor for any of the followin.  Signs of infection (fever, growing tenderness at the surgery site, severe pain, a large amount of drainage or bleeding, foul-smelling drainage, redness, swelling).    2.  It has been over 8 to 10 hours since  surgery and you are still not able to urinate (pee).    3.  Headache for over 24 hours.    4.  Numbness, tingling or weakness the day after surgery (if you had spinal anesthesia).  To contact a doctor, call Dr. Qiuntero at [phone] or:      [phone] and ask for the Resident On Call for:          OBGYN (answered 24 hours a day)      Emergency Department:  [facility]: [phone]  [facility]: [phone]               Rev. 10/2014

## 2021-07-14 NOTE — ANESTHESIA PREPROCEDURE EVALUATION
Anesthesia Pre-Procedure Evaluation    Patient: Ester Barba   MRN: [id number] : 1945        Preoperative Diagnosis: Vaginal polyp [N84.2]  Urinary urgency [R39.15]  Urge incontinence [N39.41]   Procedure : Procedure(s):  CYSTOSCOPY  BIOPSY, VAGINA; removal of vaginal polyp     Past Medical History:   Diagnosis Date     Arthritis     osteoarthritis     Basal cell carcinoma      Bilateral occipital neuralgia 2019     Concussion 2019     Squamous cell carcinoma       Past Surgical History:   Procedure Laterality Date     BIOPSY OF SKIN LESION       CATARACT IOL, RT/LT Bilateral 2018     MOHS MICROGRAPHIC PROCEDURE       NO HISTORY OF SURGERY  2014    derm     SIGMOIDECTOMY      for diverticulitis      Allergies   Allergen Reactions     Dust Mites Other (See Comments)     Sneezing, coughing. asthma  Itchy watery eyes, sneezing     Estrogens Itching     Other reaction(s): Hypersensitivity  Topical caused burning sensation of skin  Topical caused burning sensation of skin; Premarin cream only - possibly only an bi-ingredient     Imiquimod Other (See Comments)     Hair loss     Levaquin [Levofloxacin Hemihydrate] Other (See Comments)     Muscle weakness     Levofloxacin Other (See Comments)     MUSCLE WEAKNESS, ARTHRITIS LIKE SYMPTOMS.       Mold Other (See Comments)     Sneezing, asthma, weezing     Pollen Extract/Tree Extract Other (See Comments)     Sneezing, weezing     Sulfa Drugs Hives     Sulfamethoxazole-Trimethoprim Hives     Latex Rash     LATEX BANDAGES; tapes adhesive     Penicillin G Rash     Penicillins Rash      Social History     Tobacco Use     Smoking status: Former Smoker     Quit date: 1965     Years since quittin.5     Smokeless tobacco: Never Used   Substance Use Topics     Alcohol use: Yes     Alcohol/week: 0.0 standard drinks     Comment: glass of wine      Wt Readings from Last 1 Encounters:   21 60.7 kg (133 lb 13.1 oz)        Anesthesia Evaluation    Pt has had prior anesthetic. Type: General.    No history of anesthetic complications       ROS/MED HX  ENT/Pulmonary:  - neg pulmonary ROS     Neurologic:  - neg neurologic ROS     Cardiovascular:  - neg cardiovascular ROS  (-) murmur   METS/Exercise Tolerance: >4 METS    Hematologic:  - neg hematologic  ROS     Musculoskeletal:  - neg musculoskeletal ROS     GI/Hepatic:     (+) GERD, Asymptomatic on medication,     Renal/Genitourinary: Comment: Urinary frequency  Vaginal polyps      Endo:  - neg endo ROS     Psychiatric/Substance Use:  - neg psychiatric ROS     Infectious Disease:  - neg infectious disease ROS     Malignancy:  - neg malignancy ROS     Other:  - neg other ROS          Physical Exam    Airway        Mallampati: I   TM distance: > 3 FB   Neck ROM: full   Mouth opening: > 3 cm    Respiratory Devices and Support         Dental  no notable dental history         Cardiovascular          Rhythm and rate: regular and normal (-) no murmur    Pulmonary           breath sounds clear to auscultation           OUTSIDE LABS:  CBC:   Lab Results   Component Value Date    WBC 4.4 07/12/2021    WBC 5.6 11/04/2019    HGB 12.9 07/12/2021    HGB 13.1 11/04/2019    HCT 38.2 07/12/2021    HCT 39.5 11/04/2019     (L) 07/12/2021     11/04/2019     BMP:   Lab Results   Component Value Date     11/04/2019     06/18/2019    POTASSIUM 3.8 11/04/2019    POTASSIUM 3.7 06/18/2019    CHLORIDE 107 11/04/2019    CHLORIDE 105 06/18/2019    CO2 31 11/04/2019    CO2 30 06/18/2019    BUN 16 11/04/2019    BUN 16 06/18/2019    CR 0.72 11/04/2019    CR 0.67 06/18/2019    GLC 91 07/14/2021     (H) 11/04/2019     COAGS: No results found for: PTT, INR, FIBR  POC: No results found for: BGM, HCG, HCGS  HEPATIC:   Lab Results   Component Value Date    ALBUMIN 4.1 11/04/2019    PROTTOTAL 7.3 11/04/2019    ALT 27 11/04/2019    AST 21 11/04/2019    ALKPHOS 97 11/04/2019    BILITOTAL 0.4 11/04/2019     OTHER:   Lab  Results   Component Value Date    NASEEM 9.4 11/04/2019    TSH 1.95 02/08/2016    CRP 0.6 02/24/2006    SED 11 02/24/2006       Anesthesia Plan    ASA Status:  2      Anesthesia Type: MAC.     - Reason for MAC: immobility needed   Induction: Intravenous.   Maintenance: TIVA.        Consents    Anesthesia Plan(s) and associated risks, benefits, and realistic alternatives discussed. Questions answered and patient/representative(s) expressed understanding.     - Discussed with:  Patient      - Extended Intubation/Ventilatory Support Discussed: No.      - Patient is DNR/DNI Status: No    Use of blood products discussed: No .     Postoperative Care    Pain management: IV analgesics, Oral pain medications.   PONV prophylaxis: Ondansetron (or other 5HT-3)     Comments:    Discussed common and potentially harmful risks for Native Airway, MAC (GA as backup).   These risks include, but were not limited to: Conversion to secured airway, Sore throat, Airway injury, Dental injury, Aspiration, Respiratory issues (Bronchospasm, Laryngospasm, Desaturation), Hemodynamic issues (Arrhythmia, Hypotension, Ischemia), Potential long term consequences of respiratory and hemodynamic issues, PONV, Emergence delirium/agitation  Risks of invasive procedures were not discussed: N/A    All questions were answered.            Nilesh Bush MD

## 2021-07-14 NOTE — OP NOTE
[facility]    Brief Operative Note    Pre-operative diagnosis: Vaginal polyp [N84.2]  Urinary urgency [R39.15]  Urge incontinence [N39.41]   Post-operative diagnosis * No post-op diagnosis entered *   Procedure: Procedure(s):  CYSTOSCOPY  Excision of vaginal Polyp, Dilation and Currettage and Cystoscopy  Dilation and curettage   Surgeon: Sandy Quintero MD   Assistants(s): None   Anesthesia: Combined MAC with Topical    Estimated blood loss: Minimal   Total IV fluids: (See anesthesia record)   Blood transfusion: (See anesthesia record)   Total urine output: (See anesthesia record)   Drains: None   Specimens: ID Type Source Tests Collected by Time Destination   1 : Vaginal Polyp Tissue Vagina SURGICAL PATHOLOGY EXAM Sandy Quintero MD 7/14/2021  2:33 PM    2 : endometrial Currettings Tissue Uterus SURGICAL PATHOLOGY EXAM Sandy Quintero MD 7/14/2021  2:47 PM       Findings: None   Complications: Vaginal polyps appear erythmathous but not ulcerated. Smooth appearance located bilatrally in the vaginal sulcus closer to the  vaginal opening. Approximately 1 cm long and 0.5cm wide. Cystoscopy showed normal bladder and urethral mucosa without any lesions, or foreign bodies. Ureteric orifices normally localized in the trigone. Efflux difficult to confirm due to clear urine   Implants: None

## 2021-07-15 LAB
PATH REPORT.COMMENTS IMP SPEC: NORMAL
PATH REPORT.COMMENTS IMP SPEC: NORMAL
PATH REPORT.FINAL DX SPEC: NORMAL
PATH REPORT.GROSS SPEC: NORMAL
PATH REPORT.RELEVANT HX SPEC: NORMAL
PHOTO IMAGE: NORMAL

## 2021-07-15 PROCEDURE — 88305 TISSUE EXAM BY PATHOLOGIST: CPT | Mod: 26 | Performed by: PATHOLOGY

## 2021-07-20 ENCOUNTER — TELEPHONE (OUTPATIENT)
Dept: DERMATOLOGY | Facility: CLINIC | Age: 76
End: 2021-07-20

## 2021-07-20 NOTE — TELEPHONE ENCOUNTER
Prior Authorization Retail Medication Request    Medication/Dose: tretinoin (RETIN-A) 0.025 % external cream  ICD code (if different than what is on RX):  R23.8  Previously Tried and Failed:  See provider note  Rationale:  Apply to affected areas a pea size amount to face very other night as elias.    Cover my meds key: [id number]

## 2021-07-26 ENCOUNTER — VIRTUAL VISIT (OUTPATIENT)
Dept: UROLOGY | Facility: CLINIC | Age: 76
End: 2021-07-26
Attending: OBSTETRICS & GYNECOLOGY
Payer: MEDICARE

## 2021-07-26 DIAGNOSIS — Z09 POSTOPERATIVE FOLLOW-UP: Primary | ICD-10-CM

## 2021-07-26 PROCEDURE — 99442 PR PHYSICIAN TELEPHONE EVALUATION 11-20 MIN: CPT | Mod: 95 | Performed by: OBSTETRICS & GYNECOLOGY

## 2021-07-26 NOTE — LETTER
7/26/2021       RE: Ester Barba  [address]  [place] [address]     Dear Colleague,    Thank you for referring your patient, Ester Barba, to the [facility] [place] at [facility]. Please see a copy of my visit note below.    Patient here for billable telephone evaluation   What phone number would you like to be contacted at? [phone]    How would you like to obtain your AVS? MyChart    July 26, 2021    Referring Provider: Referred Self, MD  No address on file    Primary Care Provider: Winnie Sepulveda  Reason for call: 2 week post op       HPI:  Ester Barba is a 75 year old female beingS/p Excisional biopsy of vaginal polyps, cystoscopy, Dilatation and curettage on 7/14/21 ( done for some fluid collection in endometrium on recent pelvic US). Doing well. Some discomfort in the vagina. No bleeding. We reviewed the findings of her pathology report.  She is planning to see her pelvic PT next week for the first visit to help with her OAB symptoms. Follow up with me in person in Aug as scheduled.       Pathology: 7/15/21  Final Diagnosis   A. Vaginal, polyp, removal:  - Fibroepithelial polyp  - Negative for dysplasia or malignancy     B. Endometrium, curettage:  - Blood admixed with mucus, no endometrial tissue identified        Asssessment and plan  Encounter Diagnosis   Name Primary?     Postoperative follow-up Yes       Doing well overall  We discussed that her pathology reports were normal but we weren't able to get sufficient specimen from the D&C likely due to atrophic endometrium.SHe knows that if she has post menopausal bleeding, she will need to be evaluated further.     Follow up with PT for her OAB symptoms.          I spent a total of 20 minutes  with  Ester Barba  on the date of the encounter in chart review, telephone patient visit, review of tests, documentation and/or discussion with other providers about the issues  documented above.     Sandy Quintero MD on 7/26/2021 at 2:53 PM

## 2021-07-26 NOTE — PROGRESS NOTES
Patient here for billable telephone evaluation   What phone number would you like to be contacted at? [phone]    How would you like to obtain your AVS? MyChart    July 26, 2021    Referring Provider: Referred Self, MD  No address on file    Primary Care Provider: Winnie Sepulveda  Reason for call: 2 week post op       HPI:  Ester Barba is a 75 year old female beingS/p Excisional biopsy of vaginal polyps, cystoscopy, Dilatation and curettage on 7/14/21 ( done for some fluid collection in endometrium on recent pelvic US). Doing well. Some discomfort in the vagina. No bleeding. We reviewed the findings of her pathology report.  She is planning to see her pelvic PT next week for the first visit to help with her OAB symptoms. Follow up with me in person in Aug as scheduled.       Pathology: 7/15/21  Final Diagnosis   A. Vaginal, polyp, removal:  - Fibroepithelial polyp  - Negative for dysplasia or malignancy     B. Endometrium, curettage:  - Blood admixed with mucus, no endometrial tissue identified        Asssessment and plan  Encounter Diagnosis   Name Primary?     Postoperative follow-up Yes       Doing well overall  We discussed that her pathology reports were normal but we weren't able to get sufficient specimen from the D&C likely due to atrophic endometrium.SHe knows that if she has post menopausal bleeding, she will need to be evaluated further.     Follow up with PT for her OAB symptoms.          I spent a total of 20 minutes  with  Ester Barba  on the date of the encounter in chart review, telephone patient visit, review of tests, documentation and/or discussion with other providers about the issues documented above.     Sandy Quintero MD on 7/26/2021 at 2:53 PM

## 2021-07-29 ENCOUNTER — THERAPY VISIT (OUTPATIENT)
Dept: PHYSICAL THERAPY | Facility: CLINIC | Age: 76
End: 2021-07-29
Payer: MEDICARE

## 2021-07-29 DIAGNOSIS — N39.41 URGE URINARY INCONTINENCE: ICD-10-CM

## 2021-07-29 DIAGNOSIS — M62.89 PFD (PELVIC FLOOR DYSFUNCTION): ICD-10-CM

## 2021-07-29 PROCEDURE — 97110 THERAPEUTIC EXERCISES: CPT | Mod: GP | Performed by: PHYSICAL THERAPIST

## 2021-07-29 PROCEDURE — 97112 NEUROMUSCULAR REEDUCATION: CPT | Mod: GP | Performed by: PHYSICAL THERAPIST

## 2021-07-29 PROCEDURE — 97161 PT EVAL LOW COMPLEX 20 MIN: CPT | Mod: GP | Performed by: PHYSICAL THERAPIST

## 2021-07-29 NOTE — LETTER
DEPARTMENT OF HEALTH AND HUMAN SERVICES  CENTERS FOR MEDICARE & MEDICAID SERVICES    PLAN/UPDATED PLAN OF PROGRESS FOR OUTPATIENT REHABILITATION     PATIENTS NAME:  Ester Barba   : 1945  PROVIDER NUMBER:    [id number]  HICN: [id number]   PROVIDER NAME: [facility] [place]  MEDICAL RECORD NUMBER: [id number]   START OF CARE DATE:  SOC Date: 21   TYPE:  PT  PRIMARY/TREATMENT DIAGNOSIS: (Pertinent Medical Diagnosis)     PFD (pelvic floor dysfunction)  Urge urinary incontinence  VISITS FROM START OF CARE:  Rxs Used: 1     Physical Therapy Initial Evaluation  Subjective:    Therapist Generated HPI Evaluation  Problem details: MD order 21  Ester has been having urgency and frequency issue with urination for years. At home she uses restroom every hour but when she travels and holds it longer she ends up having leakage. She does not drink too much water when she travels due to this issue. Large leaks with episodes mentioned. She has h/o lichen planus of vulva and vagina and uses estrogen to help with tenderness in her vagina. She is not sexually active. No abdomen pain mentioned. She underwent sigmoid colon resection for diverticulitis. She uses metamucil for normal BM. She mentions constant irritation in the pelvic area due to Lichen planus. She underwent DNC, had polyps removed 2 weeks ago. She was sent to PT for further management..         Type of problem:  Incontinence and pelvic dysfunction.  This is a chronic condition.  Condition occurred with:  Insidious onset.  Patient reports pain:  N/a.  Pain is described as aching   Pain is worse during the day.  Since onset symptoms are gradually worsening.  Exacerbated by: urgency.  and relieved by nothing.  Barriers include:  None as reported by patient.    Patient Health History  Ester Barba being seen for urge UI.   Date of Onset: years back.   Problem occurred: unknown   Pain is reported as 1/10 on pain scale.  General  health as reported by patient is good.  Pertinent medical history includes: asthma, cancer, concussions/dizziness, menopausal, osteoarthritis and osteoporosis.   Red flags:  Changes in bowel and bladder habits and pain at rest/night.  Medical allergies: other and latex. Other medical allergies details: pencillin, sulfa, levoquin.   Surgeries include:  Cancer surgery. Other surgery history details: skin, rotator cuff, DNC, sigmoid colon resection.    Current medications:  Hormone replacement therapy and steroids.    Current occupation is retired psychologist.          PATIENTS NAME:  Ester Barba   : 1945    Objective:  System  Pelvic Dysfunction Evaluation:    Flexibility:  normal  Abdominal Wall:  normal  Pelvic Clock Exam:  Pelvic clock exam: lichen planus, recent polyp removal and DNC irritation.  Ischiocavernosis pain:  ++  Bulbocavernosis pain:  ++  Transverse Perineal:  ++  Levator ANI:  ++  Perineal Body:  ++  External Assessment:    Skin Condition:  Irritation and atrophic  Scars:  Tender  Bearing Down/Coughing:  Normal  Tissue Symmetry:  Normal  Muscle Contraction/Perineal Mobility:  Substitution and elevation and urogential triangle descent  Internal Assessment:  Internal assessment pelvic: Laycock 25-7/7/4. Poor PFM isolation and awareness demonstrated, Increased gluteal and abdominal substitution.  Sensory Exam:  Deferred at this time  Contraction/Grade:  Weak squeeze, 2 second hold (2)  Additional History:  Delivery History:  Vaginal delivery  Number of Pregnancies: 2  Number of Live Births: 2  Caffeine Consumption:  1/2 cup coffee          Assessment/Plan:    Patient is a 75 year old female with pelvic complaints.    Patient has the following significant findings with corresponding treatment plan.                Diagnosis 1:  PFD- Urgency, Frequency, Urge UI  Pain -  hot/cold therapy, STS, self management, education and home program  Decreased strength - therapeutic exercise, therapeutic  activities and home program  Decreased function - therapeutic activities and home program    Therapy Evaluation Codes:   1) History comprised of:   Personal factors that impact the plan of care:      Past/current experiences and Time since onset of symptoms.    Comorbidity factors that impact the plan of care are:      check HPI.     Medications impacting care: check HPI.  2) Examination of Body Systems comprised of:   Body structures and functions that impact the plan of care:      Pelvis.   Activity limitations that impact the plan of care are:      Frequency, Urgency and Urge incontinence.  3) Clinical presentation characteristics are:   Stable/Uncomplicated.  4) Decision-Making    Low complexity using standardized patient assessment instrument and/or measureable assessment of functional outcome.  Cumulative Therapy Evaluation is: Low complexity.    Previous and current functional limitations:  (See Goal Flow Sheet for this information)    Short term and Long term goals: (See Goal Flow Sheet for this information)   PATIENTS NAME:  Ester Barba   : 1945    Communication ability:  Patient appears to be able to clearly communicate and understand verbal and written communication and follow directions correctly.  Treatment Explanation - The following has been discussed with the patient:     RX ordered/plan of care  Anticipated outcomes  Possible risks and side effects  This patient would benefit from PT intervention to resume normal activities.   Rehab potential is good.    Frequency:  2 X a month, once daily  Duration:  for 3 months  Discharge Plan:  Achieve all LTG.  Independent in home treatment program.  Reach maximal therapeutic benefit.      Caregiver Signature/Credentials _____________________________ Date ________      Treating Provider: Betsy Ely, PT   I have reviewed and certified the need for these services and plan of treatment while under my care.        PHYSICIAN'S SIGNATURE:    "_________________________________________  Date___________   Sandy Quintero MD    Certification period:  Beginning of Cert date period: 07/29/21 to  End of Cert period date: 10/26/21     Functional Level Progress Report: Please see attached \"Goal Flow sheet for Functional level.\"    ____X____ Continue Services or       ________ DC Services                Service dates: From  SOC Date: 07/29/21 date to present                         "

## 2021-07-29 NOTE — PROGRESS NOTES
Physical Therapy Initial Evaluation  Subjective:    Therapist Generated HPI Evaluation  Problem details: MD order 6/21/21    Ester has been having urgency and frequency issue with urination for years. At home she uses restroom every hour but when she travels and holds it longer she ends up having leakage. She does not drink too much water when she travels due to this issue. Large leaks with episodes mentioned. She has h/o lichen planus of vulva and vagina and uses estrogen to help with tenderness in her vagina. She is not sexually active. No abdomen pain mentioned. She underwent sigmoid colon resection for diverticulitis. She uses metamucil for normal BM. She mentions constant irritation in the pelvic area due to Lichen planus. She underwent DNC, had polyps removed 2 weeks ago. She was sent to PT for further management..         Type of problem:  Incontinence and pelvic dysfunction.    This is a chronic condition.  Condition occurred with:  Insidious onset.    Patient reports pain:  N/a.  Pain is described as aching   Pain is worse during the day.  Since onset symptoms are gradually worsening.  Exacerbated by: urgency.  and relieved by nothing.      Barriers include:  None as reported by patient.    Patient Health History  Ester Barba being seen for urge UI.     Date of Onset: years back.   Problem occurred: unknown   Pain is reported as 1/10 on pain scale.  General health as reported by patient is good.  Pertinent medical history includes: asthma, cancer, concussions/dizziness, menopausal, osteoarthritis and osteoporosis.   Red flags:  Changes in bowel and bladder habits and pain at rest/night.  Medical allergies: other and latex. Other medical allergies details: pencillin, sulfa, levoquin.   Surgeries include:  Cancer surgery. Other surgery history details: skin, rotator cuff, DNC, sigmoid colon resection.    Current medications:  Hormone replacement therapy and steroids.    Current occupation is retired  psychologist.                                       Objective:  System                                 Pelvic Dysfunction Evaluation:        Flexibility:  normal      Abdominal Wall:  normal        Pelvic Clock Exam:  Pelvic clock exam: lichen planus, recent polyp removal and DNC irritation.  Ischiocavernosis pain:  ++  Bulbocavernosis pain:  ++  Transverse Perineal:  ++  Levator ANI:  ++  Perineal Body:  ++      External Assessment:    Skin Condition:  Irritation and atrophic  Scars:  Tender  Bearing Down/Coughing:  Normal  Tissue Symmetry:  Normal    Muscle Contraction/Perineal Mobility:  Substitution and elevation and urogential triangle descent  Internal Assessment:  Internal assessment pelvic: Laycock 25-7/7/4. Poor PFM isolation and awareness demonstrated, Increased gluteal and abdominal substitution.  Sensory Exam:  Deferred at this time  Contraction/Grade:  Weak squeeze, 2 second hold (2)          Additional History:  Delivery History:  Vaginal delivery  Number of Pregnancies: 2  Number of Live Births: 2  Caffeine Consumption:  1/2 cup coffee                     General     ROS    Assessment/Plan:    Patient is a 75 year old female with pelvic complaints.    Patient has the following significant findings with corresponding treatment plan.                Diagnosis 1:  PFD- Urgency, Frequency, Urge UI  Pain -  hot/cold therapy, STS, self management, education and home program  Decreased strength - therapeutic exercise, therapeutic activities and home program  Decreased function - therapeutic activities and home program    Therapy Evaluation Codes:   1) History comprised of:   Personal factors that impact the plan of care:      Past/current experiences and Time since onset of symptoms.    Comorbidity factors that impact the plan of care are:      check HPI.     Medications impacting care: check HPI.  2) Examination of Body Systems comprised of:   Body structures and functions that impact the plan of care:       Pelvis.   Activity limitations that impact the plan of care are:      Frequency, Urgency and Urge incontinence.  3) Clinical presentation characteristics are:   Stable/Uncomplicated.  4) Decision-Making    Low complexity using standardized patient assessment instrument and/or measureable assessment of functional outcome.  Cumulative Therapy Evaluation is: Low complexity.    Previous and current functional limitations:  (See Goal Flow Sheet for this information)    Short term and Long term goals: (See Goal Flow Sheet for this information)     Communication ability:  Patient appears to be able to clearly communicate and understand verbal and written communication and follow directions correctly.  Treatment Explanation - The following has been discussed with the patient:   RX ordered/plan of care  Anticipated outcomes  Possible risks and side effects  This patient would benefit from PT intervention to resume normal activities.   Rehab potential is good.    Frequency:  2 X a month, once daily  Duration:  for 3 months  Discharge Plan:  Achieve all LTG.  Independent in home treatment program.  Reach maximal therapeutic benefit.    Please refer to the daily flowsheet for treatment today, total treatment time and time spent performing 1:1 timed codes.

## 2021-08-23 ENCOUNTER — OFFICE VISIT (OUTPATIENT)
Dept: UROLOGY | Facility: CLINIC | Age: 76
End: 2021-08-23
Attending: OBSTETRICS & GYNECOLOGY
Payer: MEDICARE

## 2021-08-23 VITALS
BODY MASS INDEX: 21.38 KG/M2 | WEIGHT: 133 LBS | SYSTOLIC BLOOD PRESSURE: 135 MMHG | HEART RATE: 78 BPM | HEIGHT: 66 IN | DIASTOLIC BLOOD PRESSURE: 79 MMHG

## 2021-08-23 DIAGNOSIS — N39.41 URGE URINARY INCONTINENCE: ICD-10-CM

## 2021-08-23 DIAGNOSIS — R39.15 URINARY URGENCY: ICD-10-CM

## 2021-08-23 DIAGNOSIS — R35.0 URINARY FREQUENCY: ICD-10-CM

## 2021-08-23 DIAGNOSIS — Z09 POSTOPERATIVE FOLLOW-UP: Primary | ICD-10-CM

## 2021-08-23 PROCEDURE — 99214 OFFICE O/P EST MOD 30 MIN: CPT | Performed by: OBSTETRICS & GYNECOLOGY

## 2021-08-23 PROCEDURE — G0463 HOSPITAL OUTPT CLINIC VISIT: HCPCS

## 2021-08-23 ASSESSMENT — MIFFLIN-ST. JEOR: SCORE: 1119.78

## 2021-08-23 ASSESSMENT — PAIN SCALES - GENERAL: PAINLEVEL: NO PAIN (0)

## 2021-08-23 NOTE — Clinical Note
Juanito Meyer, I saw this rayray patient of ours today and she has a lot of levator myalgia. She guards even before any pelvic exam and I can see her gluts and thigh muscles spasming. I told her that some of her OAB symptom may be driven by this. Down regularting some of this will really help her. Thanks for taking care of her!

## 2021-08-23 NOTE — PROGRESS NOTES
"Reason for visit:  6 week post op     Procedure:  Excisional biopsy of vaginal polyps, cystoscopy, Dilatation and curettage on 7/14/21 ( done for some fluid collection in endometrium on recent pelvic US). Neg pathology    Subjective: . Reports that she is doing ok since her procedure. No vaginal bleeding or pain. She does continue to have urgency, frequency and urgency incontinence  She has known levator myalgia and overactive bladder for which I had referred her for pelvic PT and she has been receiving care through the [facility].     Pelvic exam:   /79   Pulse 78   Ht 1.676 m (5' 5.98\")   Wt 60.3 kg (133 lb)   BMI 21.48 kg/m      Vagina: appears healthy, no bleeding, polyp excision sites well healed. Poorly tolerates vaginal exam due to significant levator myalgia. She also contracts her gluts and thigh muscles to guard even before any pelvic exam. See my prior notes indicating levator myalgia    Assessment and plan  Encounter Diagnoses   Name Primary?     Postoperative follow-up Yes     Urge urinary incontinence      Urinary urgency      Urinary frequency      Doing well overall from post op perspective  Encouraged her to continue her pelvic PT for both her pelvic pain and her OAB symptoms that are likely aggrevated by her levator mylaLGIA   Use her vaginal estrace cream 1-2 times a week  Follow up with me in 3 months virtually        I spent a total of 30 minutes face-to-face with Ester Barba on the date of the encounter in chart review, patient visit, review of tests, documentation and/or discussion with other providers about the issues documented above.     "

## 2021-08-23 NOTE — PATIENT INSTRUCTIONS
Dear Ester, henny to see you today. As we discussed, I like you to work with your pelvic floor physical therapist on relaxing your pelvic floor muscles and managing some of the pain . Rehabilitating the pelvic floor will help your bladder as well but it will take 3-6 months to see good improvement. Also use your vaginal estrace cream 1-2 times a week to help with dryness/irritation. This will also improve your urinary symptoms.     Please call our clinic with any questions at  or send a My chart message    Sandy Quintero MD, [facility]  Female Pelvic Medicine and Reconstructive Surgery  [facility]

## 2021-08-23 NOTE — LETTER
"8/23/2021       RE: Ester Barba  [address] [facility] [place] [address]     Dear Colleague,    Thank you for referring your patient, Ester Barba, to the [facility] [place] [facility]. Please see a copy of my visit note below.    Reason for visit:  6 week post op     Procedure:  Excisional biopsy of vaginal polyps, cystoscopy, Dilatation and curettage on 7/14/21 ( done for some fluid collection in endometrium on recent pelvic US). Neg pathology    Subjective: . Reports that she is doing ok since her procedure. No vaginal bleeding or pain. She does continue to have urgency, frequency and urgency incontinence  She has known levator myalgia and overactive bladder for which I had referred her for pelvic PT and she has been receiving care through the [place] [facility].     Pelvic exam:   /79   Pulse 78   Ht 1.676 m (5' 5.98\")   Wt 60.3 kg (133 lb)   BMI 21.48 kg/m      Vagina: appears healthy, no bleeding, polyp excision sites well healed. Poorly tolerates vaginal exam due to significant levator myalgia. She also contracts her gluts and thigh muscles to guard even before any pelvic exam. See my prior notes indicating levator myalgia    Assessment and plan  Encounter Diagnoses   Name Primary?     Postoperative follow-up Yes     Urge urinary incontinence      Urinary urgency      Urinary frequency      Doing well overall from post op perspective  Encouraged her to continue her pelvic PT for both her pelvic pain and her OAB symptoms that are likely aggrevated by her levator mylaLGIA   Use her vaginal estrace cream 1-2 times a week  Follow up with me in 3 months virtually        I spent a total of 30 minutes face-to-face with Ester Barba on the date of the encounter in chart review, patient visit, review of tests, documentation and/or discussion with other providers about the issues documented above.     Again, thank you for " allowing me to participate in the care of your patient.      Sincerely,    Sandy Quintero MD

## 2021-08-25 ENCOUNTER — THERAPY VISIT (OUTPATIENT)
Dept: PHYSICAL THERAPY | Facility: CLINIC | Age: 76
End: 2021-08-25
Payer: MEDICARE

## 2021-08-25 DIAGNOSIS — M62.89 PFD (PELVIC FLOOR DYSFUNCTION): ICD-10-CM

## 2021-08-25 DIAGNOSIS — N39.41 URGE URINARY INCONTINENCE: ICD-10-CM

## 2021-08-25 PROCEDURE — 97112 NEUROMUSCULAR REEDUCATION: CPT | Mod: GP | Performed by: PHYSICAL THERAPIST

## 2021-08-25 PROCEDURE — 97140 MANUAL THERAPY 1/> REGIONS: CPT | Mod: GP | Performed by: PHYSICAL THERAPIST

## 2021-09-04 ENCOUNTER — HEALTH MAINTENANCE LETTER (OUTPATIENT)
Age: 76
End: 2021-09-04

## 2021-09-16 ENCOUNTER — THERAPY VISIT (OUTPATIENT)
Dept: PHYSICAL THERAPY | Facility: CLINIC | Age: 76
End: 2021-09-16
Payer: MEDICARE

## 2021-09-16 DIAGNOSIS — M62.89 PFD (PELVIC FLOOR DYSFUNCTION): ICD-10-CM

## 2021-09-16 DIAGNOSIS — N39.41 URGE URINARY INCONTINENCE: ICD-10-CM

## 2021-09-16 PROCEDURE — 97140 MANUAL THERAPY 1/> REGIONS: CPT | Mod: GP | Performed by: PHYSICAL THERAPIST

## 2021-09-16 PROCEDURE — 97112 NEUROMUSCULAR REEDUCATION: CPT | Mod: GP | Performed by: PHYSICAL THERAPIST

## 2021-09-16 PROCEDURE — 97110 THERAPEUTIC EXERCISES: CPT | Mod: GP | Performed by: PHYSICAL THERAPIST

## 2021-09-24 ENCOUNTER — OFFICE VISIT (OUTPATIENT)
Dept: DERMATOLOGY | Facility: CLINIC | Age: 76
End: 2021-09-24
Payer: MEDICARE

## 2021-09-24 DIAGNOSIS — L30.9 DERMATITIS: ICD-10-CM

## 2021-09-24 DIAGNOSIS — L82.0 INFLAMED SEBORRHEIC KERATOSIS: ICD-10-CM

## 2021-09-24 DIAGNOSIS — L57.0 ACTINIC KERATOSIS: ICD-10-CM

## 2021-09-24 DIAGNOSIS — L66.10 LICHEN PLANOPILARIS: Primary | ICD-10-CM

## 2021-09-24 PROCEDURE — 17110 DESTRUCTION B9 LES UP TO 14: CPT | Performed by: DERMATOLOGY

## 2021-09-24 PROCEDURE — 17000 DESTRUCT PREMALG LESION: CPT | Mod: XS | Performed by: DERMATOLOGY

## 2021-09-24 PROCEDURE — 11901 INJECT SKIN LESIONS >7: CPT | Mod: XS | Performed by: DERMATOLOGY

## 2021-09-24 ASSESSMENT — PAIN SCALES - GENERAL: PAINLEVEL: NO PAIN (0)

## 2021-09-24 NOTE — LETTER
9/24/2021       RE: Ester Barba  [address] [place] [address]     Dear Colleague,    Thank you for referring your patient, Ester Barba, to the [facility] DERMATOLOGY CLINIC [place] at [facility]. Please see a copy of my visit note below.    [facility] Dermatology Note  Encounter Date: Sep 24, 2021  Office Visit     Dermatology Problem List:  1. Lichen planopilaris, improving  - bx 8/31/21 revealed lichenoid keratosis of the vertex scalp  - LLPPAI score today (9/24) 0.67 for pruritus and perifollicular scale  - Current tx: ILK 10 injections every 5 weeks,  clobetasol propionate 0.05% shampoo alternating w/ zinc shampoo every other day, fluocinolone oil one weekly, tretinoin 0.025% cream to the face every other day   - Prev tx: plaquenil w/ significant improvement. Discontinued 12/2019 with concerns for retinopathy. However, this was later disproven.    2. Hx of NMSC  -BCC, left cheek, s/p MMS 6/9/21  -SCCis, R vertex scalp, s/p MMS 3/2/21, bacterial cx obtained from site 3/23/21  -BCC on right nasal ala s/p Mohs 11/27/17  -Nodular BCC on the vertex scalp s/p Mohs 6/5/15  -SCC of scalp, s/p Mohs 4/18/2014  -Hx of 1-2 other NMSC on scalp (BCCs)    3. Facial papules related to FFA   - Current tx: tretinoin 0.025% cream     4. AK, R temple  - Cryotherapy 9/24     5. Inflamed SKs, R cheek and R temporal scalp  - Cryotherapy, 9/24  ____________________________________________    Assessment & Plan:    # LPP with concomitant FFA overlap, improved at today's visit. Tender on vertex and in a ring distribution around the vertex scalp. Increased thinning noted since last visit. Increased itching.   - Continue alternating clobetasol 0.05% shampoo with DHS zinc shampoo daily  - Continue to apply tretinoin 0.025% cream to face nightly  - Continue to apply flucinolone oil to scalp twice weekly  - ILK-10 mg/cc injected to scalp on examination today  (see procedure note below)    #Inflamed SKs, R cheek and R temporal scalp  - SK on cheek was previously treated w/ cryo, did not resolve  - Cryotherapy performed today (see procedure note(s) below).    # AK, R temple  - Cryotherapy performed today (see procedure note(s) below).    # Erosions on vertex scalp consistent with recent trauma and inflammation. No concerns for infection. Recommend Duoderm be applied 2-3 days.   - Duoderm applied today; sample provided      Procedures Performed:   - Intra-lesional triamcinolone procedure note. After positioning and cleansing with isopropyl alcohol, 1.9 total mL of triamcinolone 10 mg/mL was injected into 19 sites on the vertex scalp and in a ring distribution around the vertex scalp. The patient tolerated the procedure well and left the dermatology clinic in good condition.  - Cryotherapy procedure note, location(s): R temple, R cheek, R temporal scalp. After verbal consent and discussion of risks and benefits including, but not limited to, dyspigmentation/scar, blister, and pain, 3 lesion(s) was(were) treated with 1-2 mm freeze border for 1-2 cycles with liquid nitrogen. Post cryotherapy instructions were provided.    - Procedure(s) performed by faculty with medical student participation.     Follow-up: 3 month(s) in-person, or earlier for new or changing lesions    Staff and Medical Student:     I, Kianna Ellington, staffed and saw this patient with Dr. Soto.    Staff Physician:  I was present with the medical student who participated in the service and in the documentation of the note. I have verified the history and personally performed the physical exam and medical decision making. I agree with the assessment and plan of care as documented in the note.  ILK injections were done together as was the cryotherapy procedure.      Barbara Soto MD  Professor and Chair  Department of Dermatology  [facility]:  "Phone: [phone], Fax:[phone]  [facility]: Phone: [phone], Fax: [phone]        ____________________________________________    CC: Hair Loss (hair loss follow up )    HPI:  Ms. Ester Barba is a(n) 75 year old female who presents today for follow-up  for hair loss.     Patient has noticed some increased thinning of her hair in a ring distribution around the vertex of her scalp since last visit. This area is tender to touch and itchy. The vertex scalp has a thick yellow crust and requires a bandage most days. Also describes some increased itching at the base of the scalp.    She thinks overall her disease is 'stable' as she is still losing hair and not improving. She noticed significant improvement when taking Plaquenil, however this treatment was discontinued b/c there were concerns of retinopathy.    Patient has a persistent, inflamed SK on R temporal face previously treated w/ cryotherapy. She also has a question about a hyperpigmented macule on her R forehead that has 'been there forever'. Her scar from BCC removal in 6/21 is slightly elevated and tender to touch.     Patient is otherwise feeling well, without additional skin concerns.    Labs:  None reviewed.    Physical Exam:  Vitals: There were no vitals taken for this visit.  SKIN: Focused examination of scalp and face was performed.  - Decreased density of eyebrows and eyelashes, stable  - No pitting or nail dystrophy  - Occasional pustules on scalp  - Mild scaling noted on occipital and parietal scalp  - Mild blue discoloration above upper lip associate with active LP  - Purpuric lesions on vertex associated with trauma (\"bumped head\")  - Non-healing erosions w/ thick yellow crust and significant hair loss on vertex scalp  - elevated scar on L temporal scalp from prior BCC 6/21  - erythematous waxy stuck on papule on R temporal face, previously tx w/ cryo  - No other lesions of concern on " areas examined.     Medications:  Current Outpatient Medications   Medication     Cholecalciferol (VITAMIN D) 1000 UNIT capsule     clobetasol propionate (CLOBEX) 0.05 % external shampoo     COMPOUNDED NON-CONTROLLED SUBSTANCE (CMPD RX) - PHARMACY TO MIX COMPOUNDED MEDICATION     esomeprazole (NEXIUM) 40 MG DR capsule     Fluocinolone Acetonide Scalp (DERMA-SMOOTHE/FS SCALP) 0.01 % OIL oil     glucosamine-chondroitin 500-400 MG CAPS per capsule     ibuprofen (ADVIL,MOTRIN) 800 MG tablet     Loratadine (CLARITIN PO)     NEW MED     tretinoin (RETIN-A) 0.025 % external cream     triamcinolone acetonide (KENALOG) 10 MG/ML injection     albuterol (PROAIR HFA/PROVENTIL HFA/VENTOLIN HFA) 108 (90 Base) MCG/ACT inhaler     Biotin 10 MG TABS tablet     Calcium 1500 MG TABS     Current Facility-Administered Medications   Medication     triamcinolone acetonide (KENALOG-10) injection 10 mg      Past Medical History:   Patient Active Problem List   Diagnosis     Porokeratosis     Squamous cell carcinoma     Neoplasm of uncertain behavior     Lichen planopilaris     Dermatitis     Cicatricial alopecia     Keratosis, inflamed seborrheic     History of skin cancer     Basal cell carcinoma of vertex scalp s/p mohs 6-5-15     Medication management     Actinic keratosis     Medication monitoring encounter     Dermatitis, seborrheic     Erosive pustular dermatosis     Tick bite, initial encounter     Vulvar atrophy     Factor V Leiden mutation (H)     Splenic artery aneurysm (H)     Post concussion syndrome     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      Again, thank you for allowing me to participate in the care of your patient.      Sincerely,    Barbara Soto MD

## 2021-09-24 NOTE — PROGRESS NOTES
[facility] Dermatology Note  Encounter Date: Sep 24, 2021  Office Visit     Dermatology Problem List:  1. Lichen planopilaris, improving  - bx 8/31/21 revealed lichenoid keratosis of the vertex scalp  - LLPPAI score today (9/24) 0.67 for pruritus and perifollicular scale  - Current tx: ILK 10 injections every 5 weeks,  clobetasol propionate 0.05% shampoo alternating w/ zinc shampoo every other day, fluocinolone oil one weekly, tretinoin 0.025% cream to the face every other day   - Prev tx: plaquenil w/ significant improvement. Discontinued 12/2019 with concerns for retinopathy. However, this was later disproven.    2. Hx of NMSC  -BCC, left cheek, s/p MMS 6/9/21  -SCCis, R vertex scalp, s/p MMS 3/2/21, bacterial cx obtained from site 3/23/21  -BCC on right nasal ala s/p Mohs 11/27/17  -Nodular BCC on the vertex scalp s/p Mohs 6/5/15  -SCC of scalp, s/p Mohs 4/18/2014  -Hx of 1-2 other NMSC on scalp (BCCs)    3. Facial papules related to FFA   - Current tx: tretinoin 0.025% cream     4. AK, R temple  - Cryotherapy 9/24     5. Inflamed SKs, R cheek and R temporal scalp  - Cryotherapy, 9/24  ____________________________________________    Assessment & Plan:    # LPP with concomitant FFA overlap, improved at today's visit. Tender on vertex and in a ring distribution around the vertex scalp. Increased thinning noted since last visit. Increased itching.   - Continue alternating clobetasol 0.05% shampoo with DHS zinc shampoo daily  - Continue to apply tretinoin 0.025% cream to face nightly  - Continue to apply flucinolone oil to scalp twice weekly  - ILK-10 mg/cc injected to scalp on examination today (see procedure note below)    #Inflamed SKs, R cheek and R temporal scalp  - SK on cheek was previously treated w/ cryo, did not resolve  - Cryotherapy performed today (see procedure note(s) below).    # AK, R temple  - Cryotherapy performed today (see procedure note(s) below).    # Erosions on vertex  scalp consistent with recent trauma and inflammation. No concerns for infection. Recommend Duoderm be applied 2-3 days.   - Duoderm applied today; sample provided      Procedures Performed:   - Intra-lesional triamcinolone procedure note. After positioning and cleansing with isopropyl alcohol, 1.9 total mL of triamcinolone 10 mg/mL was injected into 19 sites on the vertex scalp and in a ring distribution around the vertex scalp. The patient tolerated the procedure well and left the dermatology clinic in good condition.  - Cryotherapy procedure note, location(s): R temple, R cheek, R temporal scalp. After verbal consent and discussion of risks and benefits including, but not limited to, dyspigmentation/scar, blister, and pain, 3 lesion(s) was(were) treated with 1-2 mm freeze border for 1-2 cycles with liquid nitrogen. Post cryotherapy instructions were provided.    - Procedure(s) performed by faculty with medical student participation.     Follow-up: 3 month(s) in-person, or earlier for new or changing lesions    Staff and Medical Student:     I, Kianna Ellington, staffed and saw this patient with Dr. Soto.    Staff Physician:  I was present with the medical student who participated in the service and in the documentation of the note. I have verified the history and personally performed the physical exam and medical decision making. I agree with the assessment and plan of care as documented in the note.  ILK injections were done together as was the cryotherapy procedure.      Barbara Soto MD  Professor and Chair  Department of Dermatology  [facility]: Phone: [phone], Fax:[phone]  [facility]: Phone: [phone], Fax: [phone]        ____________________________________________    CC: Hair Loss (hair loss follow up )    HPI:  Ms. Ester Barba is a(n) 75 year old female who presents today for  "follow-up  for hair loss.     Patient has noticed some increased thinning of her hair in a ring distribution around the vertex of her scalp since last visit. This area is tender to touch and itchy. The vertex scalp has a thick yellow crust and requires a bandage most days. Also describes some increased itching at the base of the scalp.    She thinks overall her disease is 'stable' as she is still losing hair and not improving. She noticed significant improvement when taking Plaquenil, however this treatment was discontinued b/c there were concerns of retinopathy.    Patient has a persistent, inflamed SK on R temporal face previously treated w/ cryotherapy. She also has a question about a hyperpigmented macule on her R forehead that has 'been there forever'. Her scar from BCC removal in 6/21 is slightly elevated and tender to touch.     Patient is otherwise feeling well, without additional skin concerns.    Labs:  None reviewed.    Physical Exam:  Vitals: There were no vitals taken for this visit.  SKIN: Focused examination of scalp and face was performed.  - Decreased density of eyebrows and eyelashes, stable  - No pitting or nail dystrophy  - Occasional pustules on scalp  - Mild scaling noted on occipital and parietal scalp  - Mild blue discoloration above upper lip associate with active LP  - Purpuric lesions on vertex associated with trauma (\"bumped head\")  - Non-healing erosions w/ thick yellow crust and significant hair loss on vertex scalp  - elevated scar on L temporal scalp from prior BCC 6/21  - erythematous waxy stuck on papule on R temporal face, previously tx w/ cryo  - No other lesions of concern on areas examined.     Medications:  Current Outpatient Medications   Medication     Cholecalciferol (VITAMIN D) 1000 UNIT capsule     clobetasol propionate (CLOBEX) 0.05 % external shampoo     COMPOUNDED NON-CONTROLLED SUBSTANCE (CMPD RX) - PHARMACY TO MIX COMPOUNDED MEDICATION     esomeprazole (NEXIUM) 40 MG "  capsule     Fluocinolone Acetonide Scalp (DERMA-SMOOTHE/FS SCALP) 0.01 % OIL oil     glucosamine-chondroitin 500-400 MG CAPS per capsule     ibuprofen (ADVIL,MOTRIN) 800 MG tablet     Loratadine (CLARITIN PO)     NEW MED     tretinoin (RETIN-A) 0.025 % external cream     triamcinolone acetonide (KENALOG) 10 MG/ML injection     albuterol (PROAIR HFA/PROVENTIL HFA/VENTOLIN HFA) 108 (90 Base) MCG/ACT inhaler     Biotin 10 MG TABS tablet     Calcium 1500 MG TABS     Current Facility-Administered Medications   Medication     triamcinolone acetonide (KENALOG-10) injection 10 mg      Past Medical History:   Patient Active Problem List   Diagnosis     Porokeratosis     Squamous cell carcinoma     Neoplasm of uncertain behavior     Lichen planopilaris     Dermatitis     Cicatricial alopecia     Keratosis, inflamed seborrheic     History of skin cancer     Basal cell carcinoma of vertex scalp s/p mohs 6-5-15     Medication management     Actinic keratosis     Medication monitoring encounter     Dermatitis, seborrheic     Erosive pustular dermatosis     Tick bite, initial encounter     Vulvar atrophy     Factor V Leiden mutation (H)     Splenic artery aneurysm (H)     Post concussion syndrome     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

## 2021-09-24 NOTE — PATIENT INSTRUCTIONS
Cryotherapy    What is it?    Use of a very cold liquid, such as liquid nitrogen, to freeze and destroy abnormal skin cells that need to be removed    What should I expect?    Tenderness and redness    A small blister that might grow and fill with dark purple blood. There may be crusting.    More than one treatment may be needed if the lesions do not go away.    How do I care for the treated area?    Gently wash the area with your hands when bathing.    Use a thin layer of Vaseline to help with healing. You may use a Band-Aid.     The area should heal within 7-10 days and may leave behind a pink or lighter color.     Do not use an antibiotic or Neosporin ointment.     You may take acetaminophen (Tylenol) for pain.     Call your doctor if you have:    Severe pain    Signs of infection (warmth, redness, cloudy yellow drainage, and or a bad smell)    Questions or concerns    Who should I call with questions?       [facility]: [phone]       [facility]: [phone]       For urgent needs outside of business hours call the [facility] at [phone] and ask for the dermatology resident on call    Continue using fluocinolone oil two times per week, can apply all over the scalp. Continue using clobetasol shampoo every other day, alternating with zinc shampoo.    Your scalp is improving!

## 2021-10-21 ENCOUNTER — THERAPY VISIT (OUTPATIENT)
Dept: PHYSICAL THERAPY | Facility: CLINIC | Age: 76
End: 2021-10-21
Payer: MEDICARE

## 2021-10-21 DIAGNOSIS — N39.41 URGE URINARY INCONTINENCE: ICD-10-CM

## 2021-10-21 DIAGNOSIS — M62.89 PFD (PELVIC FLOOR DYSFUNCTION): ICD-10-CM

## 2021-10-21 PROCEDURE — 97112 NEUROMUSCULAR REEDUCATION: CPT | Mod: GP | Performed by: PHYSICAL THERAPIST

## 2021-10-21 PROCEDURE — 97110 THERAPEUTIC EXERCISES: CPT | Mod: GP | Performed by: PHYSICAL THERAPIST

## 2021-11-18 ENCOUNTER — OFFICE VISIT (OUTPATIENT)
Dept: DERMATOLOGY | Facility: CLINIC | Age: 76
End: 2021-11-18
Payer: MEDICARE

## 2021-11-18 ENCOUNTER — THERAPY VISIT (OUTPATIENT)
Dept: PHYSICAL THERAPY | Facility: CLINIC | Age: 76
End: 2021-11-18
Payer: MEDICARE

## 2021-11-18 DIAGNOSIS — L66.10 LICHEN PLANOPILARIS: ICD-10-CM

## 2021-11-18 DIAGNOSIS — L30.9 DERMATITIS: ICD-10-CM

## 2021-11-18 DIAGNOSIS — R23.8 PAPULE: ICD-10-CM

## 2021-11-18 DIAGNOSIS — L08.9 WOUND INFECTION: Primary | ICD-10-CM

## 2021-11-18 DIAGNOSIS — T14.8XXA WOUND INFECTION: Primary | ICD-10-CM

## 2021-11-18 DIAGNOSIS — M62.89 PFD (PELVIC FLOOR DYSFUNCTION): ICD-10-CM

## 2021-11-18 DIAGNOSIS — N39.41 URGE URINARY INCONTINENCE: ICD-10-CM

## 2021-11-18 PROCEDURE — 11900 INJECT SKIN LESIONS </W 7: CPT | Mod: GC

## 2021-11-18 PROCEDURE — 97112 NEUROMUSCULAR REEDUCATION: CPT | Mod: GP | Performed by: PHYSICAL THERAPIST

## 2021-11-18 PROCEDURE — 99213 OFFICE O/P EST LOW 20 MIN: CPT | Mod: 25

## 2021-11-18 PROCEDURE — 87205 SMEAR GRAM STAIN: CPT

## 2021-11-18 PROCEDURE — 97110 THERAPEUTIC EXERCISES: CPT | Mod: GP | Performed by: PHYSICAL THERAPIST

## 2021-11-18 RX ORDER — DOXYCYCLINE 100 MG/1
100 CAPSULE ORAL 2 TIMES DAILY
Qty: 14 CAPSULE | Refills: 0 | Status: SHIPPED | OUTPATIENT
Start: 2021-11-18 | End: 2021-11-18

## 2021-11-18 RX ORDER — TRETINOIN 0.25 MG/G
CREAM TOPICAL
Qty: 45 G | Refills: 1 | Status: SHIPPED | OUTPATIENT
Start: 2021-11-18 | End: 2023-08-17

## 2021-11-18 RX ORDER — DOXYCYCLINE 100 MG/1
100 CAPSULE ORAL 2 TIMES DAILY
Qty: 14 CAPSULE | Refills: 0 | Status: SHIPPED | OUTPATIENT
Start: 2021-11-18 | End: 2022-10-15

## 2021-11-18 RX ORDER — FLUOCINOLONE ACETONIDE 0.11 MG/ML
OIL TOPICAL
Qty: 118 ML | Refills: 11 | Status: SHIPPED | OUTPATIENT
Start: 2021-11-18 | End: 2023-01-13

## 2021-11-18 RX ORDER — CLOBETASOL PROPIONATE 0.05 G/100ML
SHAMPOO TOPICAL
Qty: 118 ML | Refills: 1 | Status: SHIPPED | OUTPATIENT
Start: 2021-11-18 | End: 2022-05-20

## 2021-11-18 ASSESSMENT — PAIN SCALES - GENERAL: PAINLEVEL: SEVERE PAIN (7)

## 2021-11-18 NOTE — PROGRESS NOTES
Drug Administration Record    Prior to injection, verified patient identity using patient's name and date of birth.  Due to injection administration, patient instructed to remain in clinic for 15 minutes  afterwards, and to report any adverse reaction to me immediately.    Drug Name: triamcinolone acetonide(kenalog)  Dose: 1 mL of triamcinolone 10mg/mL, 10 mg dose  Route administered: ID  NDC #: Kenalog-10 (9763-1139-73)  Amount of waste(mL): 4  Reason for waste: As per MD    LOT #: [id number]  SITE: See Note  : [employer]  EXPIRATION DATE: MAR 2023

## 2021-11-18 NOTE — LETTER
11/18/2021       RE: Ester Barba  [address] [place] [address]     Dear Colleague,    Thank you for referring your patient, Ester Barba, to the [facility] DERMATOLOG[facility] [place] at [facility]. Please see a copy of my visit note below.    [facility] Dermatology Note  Encounter Date: Nov 18, 2021  Office Visit     Dermatology Problem List:  1. Lichen planopilaris, improving  - bx 8/31/21 revealed lichenoid keratosis of the vertex scalp  - LLPPAI score today (9/24) 0.67 for pruritus and perifollicular scale  - Current tx: ILK 10 injections every 5 weeks,  clobetasol propionate 0.05% shampoo alternating w/ zinc shampoo every other day, fluocinolone oil one weekly, tretinoin 0.025% cream to the face every other day   - Prev tx: plaquenil w/ significant improvement. Discontinued 12/2019 with concerns for retinopathy. However, this was later disproven.    2. Hx of NMSC  -BCC, left cheek, s/p MMS 6/9/21  -SCCis, R vertex scalp, s/p MMS 3/2/21, bacterial cx obtained from site 3/23/21  -BCC on right nasal ala s/p Mohs 11/27/17  -Nodular BCC on the vertex scalp s/p Mohs 6/5/15  -SCC of scalp, s/p Mohs 4/18/2014  -Hx of 1-2 other NMSC on scalp (BCCs)    3. Facial papules related to FFA   - Current tx: tretinoin 0.025% cream     4. AK, R temple  - Cryotherapy 9/24     5. Inflamed SKs, R cheek and R temporal scalp  - Cryotherapy, 9/24    6. Skin infection, vertex scalp at prior site of MMS  - doxycycline 100 mg BID   ____________________________________________    Assessment & Plan:    1. Suspected wound infection: vertex scalp  - doxycycline monohydrate (MONODOX) 100 MG capsule; Take 1 capsule (100 mg) by mouth 2 times daily  Dispense: 14 capsule; Refill: 0  - Skin Aerobic Bacterial Culture Routine with Gram Stain    2. Lichen planopilaris: few crusted papulopustules surrounding area of infection  - INJECTION INTO SKIN LESIONS <=7  -  triamcinolone acetonide (KENALOG-10) injection 10 mg  - clobetasol propionate (CLOBEX) 0.05 % external shampoo; APPLY TO DRY SCALP EVERY OTHER DAY, LEAVE ON FOR 15 MINUTES, THEN LATHER AND RINSE  Dispense: 118 mL; Refill: 1  - Fluocinolone Acetonide Scalp (DERMA-SMOOTHE/FS SCALP) 0.01 % OIL oil; Apply once a week to scalp for lichen planopilaris  Dispense: 118 mL; Refill: 11  - tretinoin (RETIN-A) 0.025 % external cream; APPLY TO AFFECTED AREA(S) A PEA SIZE AMOUNT TO FACE EVERY OTHER NIGHT AS TOLERATED  Dispense: 45 g; Refill: 1    3. Dermatitis  - clobetasol propionate (CLOBEX) 0.05 % external shampoo; APPLY TO DRY SCALP EVERY OTHER DAY, LEAVE ON FOR 15 MINUTES, THEN LATHER AND RINSE  Dispense: 118 mL; Refill: 1    Procedures Performed:   - Intra-lesional triamcinolone procedure note. After positioning and cleansing with isopropyl alcohol, 0.3 ml total mL of triamcinolone 10 mg/mL was injected into 3 sites on the vertex scalp. The patient tolerated the procedure well and left the dermatology clinic in good condition.    Follow-up: appt scheduled 12/6/21 with Dr. Soto; earlier for new or changing lesions     Staff and Resident:     Casi Garcia MD      The patient was seen and staffed with Dr. Charles MD     Patient was seen and examined with the dermatology resident. I agree with the history, review of systems, physical examination, assessments and plan. I was present for the key portion of the ILK procedure.    Barbara Soto MD  Professor and  Chair  Department of Dermatology  [facility]    ____________________________________________    CC: Derm Problem (Spot on scalp of concern.)    HPI:  Ms. Ester Barba is a(n) 76 year old female who presents today for follow-up  of scalp lesion.  She has noted there is a bump on the top of her head since last visit in 9/2021.  She has noted thinning of her hair around this bump, and the area is very tender to touch.  It requires a bandage most  days.  Sometimes drains pus like material    Patient is otherwise feeling well, without additional skin concerns.    Labs:  None reviewed.    Physical Exam:  Vitals: There were no vitals taken for this visit.  SKIN: Focused examination of scalp and face was performed.  - Non-healing erosions w/ thick yellow crust and significant hair loss on vertex scalp; when palpated, lesion expresses purulent material  - few eroded papulopustules on frontal scalp   - No other lesions of concern on areas examined.     Medications:  Current Outpatient Medications   Medication     albuterol (PROAIR HFA/PROVENTIL HFA/VENTOLIN HFA) 108 (90 Base) MCG/ACT inhaler     Biotin 10 MG TABS tablet     Calcium 1500 MG TABS     Cholecalciferol (VITAMIN D) 1000 UNIT capsule     clobetasol propionate (CLOBEX) 0.05 % external shampoo     COMPOUNDED NON-CONTROLLED SUBSTANCE (CMPD RX) - PHARMACY TO MIX COMPOUNDED MEDICATION     esomeprazole (NEXIUM) 40 MG DR capsule     Fluocinolone Acetonide Scalp (DERMA-SMOOTHE/FS SCALP) 0.01 % OIL oil     glucosamine-chondroitin 500-400 MG CAPS per capsule     ibuprofen (ADVIL,MOTRIN) 800 MG tablet     Loratadine (CLARITIN PO)     NEW MED     tretinoin (RETIN-A) 0.025 % external cream     triamcinolone acetonide (KENALOG) 10 MG/ML injection     Current Facility-Administered Medications   Medication     triamcinolone acetonide (KENALOG-10) injection 10 mg      Past Medical History:   Patient Active Problem List   Diagnosis     Porokeratosis     Squamous cell carcinoma     Neoplasm of uncertain behavior     Lichen planopilaris     Dermatitis     Cicatricial alopecia     Keratosis, inflamed seborrheic     History of skin cancer     Basal cell carcinoma of vertex scalp s/p mohs 6-5-15     Medication management     Actinic keratosis     Medication monitoring encounter     Dermatitis, seborrheic     Erosive pustular dermatosis     Tick bite, initial encounter     Vulvar atrophy     Factor V Leiden mutation (H)      Splenic artery aneurysm (H)     Post concussion syndrome     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    Drug Administration Record    Prior to injection, verified patient identity using patient's name and date of birth.  Due to injection administration, patient instructed to remain in clinic for 15 minutes  afterwards, and to report any adverse reaction to me immediately.    Drug Name: triamcinolone acetonide(kenalog)  Dose: 1 mL of triamcinolone 10mg/mL, 10 mg dose  Route administered: ID  NDC #: Kenalog-10 (9813-4594-07)  Amount of waste(mL): 4  Reason for waste: As per MD    LOT #: [id number]  SITE: See Note  : [employer]  EXPIRATION DATE: MAR 2023

## 2021-11-18 NOTE — PROGRESS NOTES
[facility] Dermatology Note  Encounter Date: Nov 18, 2021  Office Visit     Dermatology Problem List:  1. Lichen planopilaris, improving  - bx 8/31/21 revealed lichenoid keratosis of the vertex scalp  - LLPPAI score today (9/24) 0.67 for pruritus and perifollicular scale  - Current tx: ILK 10 injections every 5 weeks,  clobetasol propionate 0.05% shampoo alternating w/ zinc shampoo every other day, fluocinolone oil one weekly, tretinoin 0.025% cream to the face every other day   - Prev tx: plaquenil w/ significant improvement. Discontinued 12/2019 with concerns for retinopathy. However, this was later disproven.    2. Hx of NMSC  -BCC, left cheek, s/p MMS 6/9/21  -SCCis, R vertex scalp, s/p MMS 3/2/21, bacterial cx obtained from site 3/23/21  -BCC on right nasal ala s/p Mohs 11/27/17  -Nodular BCC on the vertex scalp s/p Mohs 6/5/15  -SCC of scalp, s/p Mohs 4/18/2014  -Hx of 1-2 other NMSC on scalp (BCCs)    3. Facial papules related to FFA   - Current tx: tretinoin 0.025% cream     4. AK, R temple  - Cryotherapy 9/24     5. Inflamed SKs, R cheek and R temporal scalp  - Cryotherapy, 9/24    6. Skin infection, vertex scalp at prior site of MMS  - doxycycline 100 mg BID   ____________________________________________    Assessment & Plan:    1. Suspected wound infection: vertex scalp  - doxycycline monohydrate (MONODOX) 100 MG capsule; Take 1 capsule (100 mg) by mouth 2 times daily  Dispense: 14 capsule; Refill: 0  - Skin Aerobic Bacterial Culture Routine with Gram Stain    2. Lichen planopilaris: few crusted papulopustules surrounding area of infection  - INJECTION INTO SKIN LESIONS <=7  - triamcinolone acetonide (KENALOG-10) injection 10 mg  - clobetasol propionate (CLOBEX) 0.05 % external shampoo; APPLY TO DRY SCALP EVERY OTHER DAY, LEAVE ON FOR 15 MINUTES, THEN LATHER AND RINSE  Dispense: 118 mL; Refill: 1  - Fluocinolone Acetonide Scalp (DERMA-SMOOTHE/FS SCALP) 0.01 % OIL oil; Apply once a week  to scalp for lichen planopilaris  Dispense: 118 mL; Refill: 11  - tretinoin (RETIN-A) 0.025 % external cream; APPLY TO AFFECTED AREA(S) A PEA SIZE AMOUNT TO FACE EVERY OTHER NIGHT AS TOLERATED  Dispense: 45 g; Refill: 1    3. Dermatitis  - clobetasol propionate (CLOBEX) 0.05 % external shampoo; APPLY TO DRY SCALP EVERY OTHER DAY, LEAVE ON FOR 15 MINUTES, THEN LATHER AND RINSE  Dispense: 118 mL; Refill: 1    Procedures Performed:   - Intra-lesional triamcinolone procedure note. After positioning and cleansing with isopropyl alcohol, 0.3 ml total mL of triamcinolone 10 mg/mL was injected into 3 sites on the vertex scalp. The patient tolerated the procedure well and left the dermatology clinic in good condition.    Follow-up: appt scheduled 12/6/21 with Dr. Soto; earlier for new or changing lesions     Staff and Resident:     Casi Garcia MD      The patient was seen and staffed with Dr. Charles MD     Patient was seen and examined with the dermatology resident. I agree with the history, review of systems, physical examination, assessments and plan. I was present for the key portion of the ILK procedure.    Barbara Soto MD  Professor and  Chair  Department of Dermatology  [facility]    ____________________________________________    CC: Derm Problem (Spot on scalp of concern.)    HPI:  Ms. Ester Barba is a(n) 76 year old female who presents today for follow-up  of scalp lesion.  She has noted there is a bump on the top of her head since last visit in 9/2021.  She has noted thinning of her hair around this bump, and the area is very tender to touch.  It requires a bandage most days.  Sometimes drains pus like material    Patient is otherwise feeling well, without additional skin concerns.    Labs:  None reviewed.    Physical Exam:  Vitals: There were no vitals taken for this visit.  SKIN: Focused examination of scalp and face was performed.  - Non-healing erosions w/ thick yellow crust  and significant hair loss on vertex scalp; when palpated, lesion expresses purulent material  - few eroded papulopustules on frontal scalp   - No other lesions of concern on areas examined.     Medications:  Current Outpatient Medications   Medication     albuterol (PROAIR HFA/PROVENTIL HFA/VENTOLIN HFA) 108 (90 Base) MCG/ACT inhaler     Biotin 10 MG TABS tablet     Calcium 1500 MG TABS     Cholecalciferol (VITAMIN D) 1000 UNIT capsule     clobetasol propionate (CLOBEX) 0.05 % external shampoo     COMPOUNDED NON-CONTROLLED SUBSTANCE (CMPD RX) - PHARMACY TO MIX COMPOUNDED MEDICATION     esomeprazole (NEXIUM) 40 MG DR capsule     Fluocinolone Acetonide Scalp (DERMA-SMOOTHE/FS SCALP) 0.01 % OIL oil     glucosamine-chondroitin 500-400 MG CAPS per capsule     ibuprofen (ADVIL,MOTRIN) 800 MG tablet     Loratadine (CLARITIN PO)     NEW MED     tretinoin (RETIN-A) 0.025 % external cream     triamcinolone acetonide (KENALOG) 10 MG/ML injection     Current Facility-Administered Medications   Medication     triamcinolone acetonide (KENALOG-10) injection 10 mg      Past Medical History:   Patient Active Problem List   Diagnosis     Porokeratosis     Squamous cell carcinoma     Neoplasm of uncertain behavior     Lichen planopilaris     Dermatitis     Cicatricial alopecia     Keratosis, inflamed seborrheic     History of skin cancer     Basal cell carcinoma of vertex scalp s/p mohs 6-5-15     Medication management     Actinic keratosis     Medication monitoring encounter     Dermatitis, seborrheic     Erosive pustular dermatosis     Tick bite, initial encounter     Vulvar atrophy     Factor V Leiden mutation (H)     Splenic artery aneurysm (H)     Post concussion syndrome     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

## 2021-11-18 NOTE — NURSING NOTE
Dermatology Rooming Note    Ester Barba's goals for this visit include:   Chief Complaint   Patient presents with     Derm Problem     Spot on scalp of concern.     Joelle Campos, CMA

## 2021-11-19 NOTE — PROGRESS NOTES
[facility]    OUTPATIENT Physical Therapy ORTHOPEDIC EVALUATION  PLAN OF TREATMENT FOR OUTPATIENT REHABILITATION  (COMPLETE FOR INITIAL CLAIMS ONLY)  Patient's Last Name, First Name, M.I.  YOB: 1945  Ester Barba    Provider s Name:  [facility]   Medical Record No.  [id number]   Start of Care Date:  07/29/21   Onset Date:   06/21/21 (MD)   Type:     _X__PT   ___OT Medical Diagnosis:    Encounter Diagnoses   Name Primary?     PFD (pelvic floor dysfunction)      Urge urinary incontinence         Treatment Diagnosis:  PFD-Urgency. Frequency, Urge UI        Goals:     11/18/21 0700   Urinary Leakage   Current Functional Level Number of urinary leakage episodes in a day   Performance Level 4 leaks/ week   STG Target Performance Decrease leakage with urge   Performance Level moderate leaks 2/ day   Rationale continence throughout the day   Due Date 09/30/21   Date Goal Met 10/21/21   LTG Target Performance Decrease leakage with urge   Performance Level small leaks 3/ week   Rationale continence throughout the day   Due Date 01/31/22   If goal not met, Why? goal date extended, progressing slowly   Voiding Patterns   Current Functional Level Times a day that urgency is experienced   Peformance level every 1.5 hrs   STG Target Performance Reduce times in a day urgency is experienced to   Performance Level every 1.5 hrs   Rationale work toward normal voiding intervals to focus on ADLS, work, or school   Due Date 11/11/21   Date Goal Met 11/18/21   LTG Target Performance Reduce times in a day urgency is experienced to   Performance Level every 2 hours   Rationale work toward normal voiding intervals to focus on ADLS, work, or school   Due Date 01/31/22   If goal not met, Why? goal date extended, progressing slowly   Pelvic Muscle Strength   Current Functional Level Strength  of pelvic muscles are grade   Performance Level 3-/5   STG Target Performance Increase strength of pelvic muscles to grade   Performance Level 2+/5   Rationale continence throughout the day   Due Date 10/21/21   Date Goal Met 10/21/21   LTG Target Performance Increase strength of pelvic muscles to grade   Performance Level 3/5   Rationale continence throughout the day   Due Date 01/31/22   If goal not met, Why? goal date extended, progressing slowly       Therapy Frequency:  1 x month  Predicted Duration of Therapy Intervention:  1 more visit in January    Betsy Ely PT                 I CERTIFY THE NEED FOR THESE SERVICES FURNISHED UNDER        THIS PLAN OF TREATMENT AND WHILE UNDER MY CARE     (Physician co-signature of this document indicates review and certification of the therapy plan).                       Certification Date From:  11/18/21   Certification Date To:  01/31/22    Referring Provider:  Sandy Quintero    Initial Assessment        See Epic Evaluation SOC Date: 07/29/21

## 2021-11-20 LAB
BACTERIA SKIN AEROBE CULT: ABNORMAL
GRAM STAIN RESULT: ABNORMAL
GRAM STAIN RESULT: ABNORMAL

## 2021-11-20 NOTE — RESULT ENCOUNTER NOTE
Culture growing 2+ gram positive cocci.  Patient started on doxycycline on Thursday.  Informed via mychart of results and continue with medication.

## 2021-11-24 ENCOUNTER — TELEPHONE (OUTPATIENT)
Dept: DERMATOLOGY | Facility: CLINIC | Age: 76
End: 2021-11-24
Payer: MEDICARE

## 2021-11-24 DIAGNOSIS — T14.8XXA WOUND INFECTION: Primary | ICD-10-CM

## 2021-11-24 DIAGNOSIS — L08.9 WOUND INFECTION: Primary | ICD-10-CM

## 2021-11-24 RX ORDER — DOXYCYCLINE 100 MG/1
100 CAPSULE ORAL 2 TIMES DAILY
Qty: 14 CAPSULE | Refills: 0 | Status: SHIPPED | OUTPATIENT
Start: 2021-11-25 | End: 2021-12-02

## 2021-11-25 NOTE — TELEPHONE ENCOUNTER
Received page from patient 11/24/21. Seen 11/18/21 for wound infection of vertex scalp. Wound culture results with 2+ gram positive cocci, no sensitivities. Started on doxycycline 100 mg BID 11/18 and instructed to inform clinic if not resolved. States it is about the same and  with maybe some more redness around but no streaking. No fevers or nausea/vomiting, Will treat with extended course doxycycline 100 mg BID for another 7 days and recommed washing with topical benzoyl peroxide. Will also set up with nurse phone call early next week to see if patient needs in person evaluation. Instructed to seek medical care if developing fevers or worsening symptoms.    Staff: Dr. Chelsea Alexander MD  Dermatology Resident PGY2

## 2021-11-29 NOTE — TELEPHONE ENCOUNTER
"CC Dr Alexander as FYI:    Writer called patient to follow up on scalp wound.  Patient stated is doing better and feels the antibiotics are helping.  Patient states does not \"burn\" on wound area like it did and no puss/puffiness from area.  She is keeping it clean, taking the antibiotics and applying vaseline to the area.  Denies any fever, vomiting, nausea or vomiting.  Instructed if any of these sx to report to ED or urgent care right away.  Patient verbalized understanding.  She will contact us if still not improved after taking additional course of antibiotics.  Patient did not have any other questions.    Suzette De Los Santos RN      "

## 2021-12-04 DIAGNOSIS — L08.9 SKIN INFECTION: Primary | ICD-10-CM

## 2021-12-04 RX ORDER — MUPIROCIN 20 MG/G
OINTMENT TOPICAL
Qty: 22 G | Refills: 3 | Status: SHIPPED | OUTPATIENT
Start: 2021-12-04 | End: 2022-03-21

## 2021-12-04 NOTE — CONFIDENTIAL NOTE
"Following up on messages related to a \"wound\" on the scalp. At this time, it is fine to discontinue oral antibiotic treatment as already noted in messages and focus on using mupiricin daily with good scalp care.     Ms Barba is scheduled to be seen December 20th. This should work well but if her scalp becomes symptomatic or she becomes febrile, she will need to return to clinic earlier.     Barbara Soto MD    "

## 2021-12-06 ENCOUNTER — VIRTUAL VISIT (OUTPATIENT)
Dept: UROLOGY | Facility: CLINIC | Age: 76
End: 2021-12-06
Attending: OBSTETRICS & GYNECOLOGY
Payer: MEDICARE

## 2021-12-06 DIAGNOSIS — R35.0 URINARY FREQUENCY: ICD-10-CM

## 2021-12-06 DIAGNOSIS — N39.41 URGE INCONTINENCE: ICD-10-CM

## 2021-12-06 DIAGNOSIS — R39.15 URINARY URGENCY: Primary | ICD-10-CM

## 2021-12-06 PROCEDURE — 99442 PR PHYSICIAN TELEPHONE EVALUATION 11-20 MIN: CPT | Mod: 95 | Performed by: OBSTETRICS & GYNECOLOGY

## 2021-12-06 NOTE — LETTER
12/6/2021       RE: Ester Barba  [address]  [place] [address]     Dear Colleague,    Thank you for referring your patient, Ester Barba, to the [facility] [place] at [facility]. Please see a copy of my visit note below.    Patient here for billable telephone evaluation   What phone number would you like to be contacted at? [phone]    How would you like to obtain your AVS? Mail a copy    December 6, 2021    Referring Provider: No referring provider defined for this encounter.    Primary Care Provider: Winnie Sepulveda      HPI:  Ester Barba is a 76 year old female being followed for her overactive bladder symptoms ( urinary urgency, frequency and urge incontinence) as well as levator myalgia. The plan was for her to do pelvic PT and use vaginal estrace cream regularly. She is s/p vaginal polypectomy, D&C and cystoscopy by me on 7/14/21 ( done for some fluid collection in endometrium on pelvic US and a small vaginal polyp on pelvic exam). Today she says that She has been going for pelvic PT regularly and says she is doing better. She is able to hold her bladder for 2 hours now. Still has some trouble if she out and about more but in general getting better. Using her estrogen vaginal cream as well.           Asssessment and plan  Encounter Diagnoses   Name Primary?     Urinary urgency Yes     Urge incontinence      Urinary frequency      Overall, looks like she is improving with pelvic PT and vaginal estrogen cream .  She will follow up as needed for further management             I spent a total of 15 minutes  with  Ester Barba  on the date of the encounter in chart review, telephone patient visit, review of tests, documentation and/or discussion with other providers about the issues documented above.     Sandy Quintero MD on 12/6/2021 at 12:17 PM

## 2021-12-06 NOTE — PROGRESS NOTES
Patient here for billable telephone evaluation   What phone number would you like to be contacted at? [phone]    How would you like to obtain your AVS? Mail a copy    December 6, 2021    Referring Provider: No referring provider defined for this encounter.    Primary Care Provider: Winnie Sepulveda      HPI:  Ester Barba is a 76 year old female being followed for her overactive bladder symptoms ( urinary urgency, frequency and urge incontinence) as well as levator myalgia. The plan was for her to do pelvic PT and use vaginal estrace cream regularly. She is s/p vaginal polypectomy, D&C and cystoscopy by me on 7/14/21 ( done for some fluid collection in endometrium on pelvic US and a small vaginal polyp on pelvic exam). Today she says that She has been going for pelvic PT regularly and says she is doing better. She is able to hold her bladder for 2 hours now. Still has some trouble if she out and about more but in general getting better. Using her estrogen vaginal cream as well.           Asssessment and plan  Encounter Diagnoses   Name Primary?     Urinary urgency Yes     Urge incontinence      Urinary frequency      Overall, looks like she is improving with pelvic PT and vaginal estrogen cream .  She will follow up as needed for further management             I spent a total of 15 minutes  with  Ester Barba  on the date of the encounter in chart review, telephone patient visit, review of tests, documentation and/or discussion with other providers about the issues documented above.     Sandy Quintero MD on 12/6/2021 at 12:17 PM

## 2021-12-20 ENCOUNTER — OFFICE VISIT (OUTPATIENT)
Dept: DERMATOLOGY | Facility: CLINIC | Age: 76
End: 2021-12-20
Payer: MEDICARE

## 2021-12-20 VITALS — DIASTOLIC BLOOD PRESSURE: 68 MMHG | HEART RATE: 57 BPM | SYSTOLIC BLOOD PRESSURE: 118 MMHG

## 2021-12-20 DIAGNOSIS — L30.9 DERMATITIS: ICD-10-CM

## 2021-12-20 DIAGNOSIS — L57.0 ACTINIC KERATOSIS: ICD-10-CM

## 2021-12-20 DIAGNOSIS — L66.10 LICHEN PLANOPILARIS: Primary | ICD-10-CM

## 2021-12-20 PROCEDURE — 17000 DESTRUCT PREMALG LESION: CPT | Mod: XS | Performed by: DERMATOLOGY

## 2021-12-20 PROCEDURE — 11901 INJECT SKIN LESIONS >7: CPT | Mod: GC | Performed by: DERMATOLOGY

## 2021-12-20 PROCEDURE — 99213 OFFICE O/P EST LOW 20 MIN: CPT | Mod: 25 | Performed by: DERMATOLOGY

## 2021-12-20 PROCEDURE — 17003 DESTRUCT PREMALG LES 2-14: CPT | Mod: XS | Performed by: DERMATOLOGY

## 2021-12-20 ASSESSMENT — PAIN SCALES - GENERAL: PAINLEVEL: NO PAIN (0)

## 2021-12-20 NOTE — NURSING NOTE
Dermatology Rooming Note    Ester Barba's goals for this visit include:   Chief Complaint   Patient presents with     Derm Problem     Ester is here today in order to follow up on a scalp wound and scalp lichen planopilaris. She notes that the wound has decrease in size and her scalp isn't as tender as it was previously.      Cricket Olvera, EMT

## 2021-12-20 NOTE — PROGRESS NOTES
[facility] Dermatology Note  Encounter Date: Dec 20, 2021  Office Visit     Dermatology Problem List:  1. Lichen planopilaris, improving  - bx 8/31/21 revealed lichenoid keratosis of the vertex scalp  - LLPPAI score today (9/24) 0.67 for pruritus and perifollicular scale  - Current tx: ILK10 injections every 5 weeks (last 12/20/21),  clobetasol propionate 0.05% shampoo alternating w/ zinc shampoo every other day, fluocinolone oil one weekly, tretinoin 0.025% cream to the face every other day   - Prev tx: plaquenil w/ significant improvement. Discontinued 12/2019 with concerns for retinopathy. However, this was later disproven.     2. Hx of NMSC  -BCC, forehead, s/p MMS 6/9/21  -SCCis, R vertex scalp, s/p MMS 3/2/21, bacterial cx obtained from site 3/23/21  -BCC on right nasal ala s/p Mohs 11/27/17  -Nodular BCC on the vertex scalp s/p Mohs 6/5/15  -SCC of scalp, s/p Mohs 4/18/2014  -Hx of 1-2 other NMSC on scalp (BCCs)     3. 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 AK, R temple  - Cryotherapy 9/24, 12/20/21     5. Inflamed SKs, R cheek and R temporal scalp  - Cryotherapy, 9/24     6. Skin infection, vertex scalp at prior site of MMS  - doxycycline 100 mg BID   ____________________________________________    Assessment & Plan:     1. Mild impetigo scalp vertex  2. Hx vertex scalp wound infection post MOHS   There was concern for a scalp vertex infection. S/p doxycycline with improvement. Today there is residual scarring and impetiginizated crust on the scalp. Patient was applying topical mupirocin only to some of these areas. Emphasized that she should apply it to the entire area.   - cont mupirocin oint BID to scalp vertex.      2. Lichen planopilaris  Did well on plaquenil, but was discontinued 2/2 concern for plaquenil retinal toxicity, which was then dispproven. Patient feels that she is getting worse, especially since coming off of plaquenil and extending  the period between injections from 5 weeks to 8 weeks.   - s/p ILK, see procedure note  - cont ILK q5-6 weeks  - continue clobetasol shampoo and alternate with zinc shampoo every other day, fluocinolone oil 2-3x a week for the scalp, and tretinoin cream for the face.    3. AKs, face  - s/p LN, see procedure note 12/20/21    Procedures Performed:   - Cryotherapy procedure note, location(s): R temple and forehead. After verbal consent and discussion of risks and benefits including, but not limited to, dyspigmentation/scar, blister, and pain, 4 lesion(s) was(were) treated with 1-2 mm freeze border for 1-2 cycles with liquid nitrogen. Post cryotherapy instructions were provided.  - Intra-lesional triamcinolone procedure note. After positioning and cleansing with isopropyl alcohol, 2 total mL of triamcinolone 10 mg/mL was injected into 15 lesion(s) on the scalp. The patient tolerated the procedure well and left the dermatology clinic in good condition.    Follow-up: 5-6 week(s) in-person, or earlier for new or changing lesions    Staff and Scribe:     Dr Amado - PGY3 resident    Scribe Disclosure:  I, Mark Frost, am serving as a scribe to document services personally performed by Barbara Soto MD based on data collection and the provider's statements to me.     Provider Disclosure:   The documentation recorded by the scribe accurately reflects the services I personally performed and the decisions made by me.    Barbara Soto MD  Professor and Chair  Department of Dermatology  [facility]      Patient was seen and examined with the medicine/dermatology resident. I agree with the history, review of systems, physical examination, assessments and plan. I was present for the entire cryotherapy procedure and for the ILK injection procedure.     Barbara Soto MD  Professor and  Chair  Department of Dermatology  [facility]  "[place]    ____________________________________________    CC: No chief complaint on file.    HPI:  Ms. Ester Barba is a 76 year old female who presents as a return patient for follow-up of hair loss, diagnosed as lichen planopilaris.   - Last seen in-clinic on 11/18/2021    - Scalp infection is improving, but still present. No fevers or purulent drainage or pain at this time (unless she pushes on it). She completed 2 weeks of doxycycline PO and is now using mupirocin BID.   - For her LPP, she is using the dermasmooth, clobetasol oil, ILK LV. Scalp has been more itchy and a bit sore. Dermasmooth helps calm these down. She feels like there is more thinning than hair shedding at this point. She thinks that she got worse after being taken off of plaquenil (taken off of it for possible plaquenil toxicity.)  - Overall: she feels things are stable, maybe slightly worse. Feels the injections every 5 weeks were helpful and then they were stretched to 8 weeks and she feels this made things worse.    No Any new medications, supplements, or products? (please list below)     No Scalp pain   No Scalp burning   Yes Scalp itching    no Eyebrow changes    no Eyelash changes   no Beard changes    no Other body hair changes    No (fragile) Nail changes    no Additional symptoms? (please list below)     - Overall course: stably worse  - COVID status: no    Patient is otherwise feeling well, in usual state of health, and has no additional skin concerns today.       Labs:  None reviewed.    Physical Exam:  Vitals: There were no vitals taken for this visit.  GEN: Well developed, well-nourished, in no acute distress, in a pleasant mood.    SKIN:   - No pitting or nail dystrophy  - Occasional pustules on scalp  - Mild scaling noted on occipital and parietal scalp  - Mild blue discoloration above upper lip  - Purpuric lesions on vertex associated with trauma (\"bumped head\")  - Non-healing erosions w/ thick yellow crust and significant " hair loss on vertex scalp  - elevated scar on L temporal scalp from prior BCC 6/21  - erythematous waxy stuck on papule on R temporal face, previously tx w/ cryo  - No other lesions of concern on areas examined.     - gritty pink papules on the forehead and temples    Medications:  Current Outpatient Medications   Medication     albuterol (PROAIR HFA/PROVENTIL HFA/VENTOLIN HFA) 108 (90 Base) MCG/ACT inhaler     Biotin 10 MG TABS tablet     Calcium 1500 MG TABS     Cholecalciferol (VITAMIN D) 1000 UNIT capsule     clobetasol propionate (CLOBEX) 0.05 % external shampoo     COMPOUNDED NON-CONTROLLED SUBSTANCE (CMPD RX) - PHARMACY TO MIX COMPOUNDED MEDICATION     doxycycline monohydrate (MONODOX) 100 MG capsule     esomeprazole (NEXIUM) 40 MG DR capsule     Fluocinolone Acetonide Scalp (DERMA-SMOOTHE/FS SCALP) 0.01 % OIL oil     glucosamine-chondroitin 500-400 MG CAPS per capsule     ibuprofen (ADVIL,MOTRIN) 800 MG tablet     Loratadine (CLARITIN PO)     mupirocin (BACTROBAN) 2 % external ointment     NEW MED     tretinoin (RETIN-A) 0.025 % external cream     triamcinolone acetonide (KENALOG) 10 MG/ML injection     Current Facility-Administered Medications   Medication     triamcinolone acetonide (KENALOG-10) injection 10 mg     triamcinolone acetonide (KENALOG-10) injection 10 mg      Past Medical History:   Patient Active Problem List   Diagnosis     Porokeratosis     Squamous cell carcinoma     Neoplasm of uncertain behavior     Lichen planopilaris     Dermatitis     Cicatricial alopecia     Keratosis, inflamed seborrheic     History of skin cancer     Basal cell carcinoma of vertex scalp s/p mohs 6-5-15     Medication management     Actinic keratosis     Medication monitoring encounter     Dermatitis, seborrheic     Erosive pustular dermatosis     Tick bite, initial encounter     Vulvar atrophy     Factor V Leiden mutation (H)     Splenic artery aneurysm (H)     Post concussion syndrome     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

## 2021-12-20 NOTE — PATIENT INSTRUCTIONS
1. Scalp impetigo  Mild evidence of impetigo (yellow crusts) on the scalp. No evidence of prior infection today.   - continue mupirocin to the areas of yellow crust twice a day.     2. Hair loss  - continue clobetasol shampoo and alternate with zinc shampoo every other day, fluocinolone oil 2-3x a week for the scalp, and tretinoin cream for the face.  - return for ILK injections every 5-6 weeks.    3. Actinic keratosis  - cryotherapy for pre-skin cancer (actinic keratosis) on the face done today.    Cryotherapy    What is it?    Use of a very cold liquid, such as liquid nitrogen, to freeze and destroy abnormal skin cells that need to be removed    What should I expect?    Tenderness and redness    A small blister that might grow and fill with dark purple blood. There may be crusting.    More than one treatment may be needed if the lesions do not go away.    How do I care for the treated area?    Gently wash the area with your hands when bathing.    Use a thin layer of Vaseline to help with healing. You may use a Band-Aid.     The area should heal within 7-10 days and may leave behind a pink or lighter color.     Do not use an antibiotic or Neosporin ointment.     You may take acetaminophen (Tylenol) for pain.     Call your doctor if you have:    Severe pain    Signs of infection (warmth, redness, cloudy yellow drainage, and or a bad smell)    Questions or concerns    Who should I call with questions?       [facility]: [phone]       [facility]: [phone]       For urgent needs outside of business hours call the [facility] at [phone] and ask for the dermatology resident on call

## 2021-12-20 NOTE — LETTER
12/20/2021       RE: Ester Barba  [address] [place] [address]     Dear Colleague,    Thank you for referring your patient, Ester Barba, to the [facility] DERMATOLOGY [facility]IC [place] [facility]. Please see a copy of my visit note below.    [facility] Dermatology Note  Encounter Date: Dec 20, 2021  Office Visit     Dermatology Problem List:  1. Lichen planopilaris, improving  - bx 8/31/21 revealed lichenoid keratosis of the vertex scalp  - LLPPAI score today (9/24) 0.67 for pruritus and perifollicular scale  - Current tx: ILK 10 injections every 5 weeks,  clobetasol propionate 0.05% shampoo alternating w/ zinc shampoo every other day, fluocinolone oil one weekly, tretinoin 0.025% cream to the face every other day   - Prev tx: plaquenil w/ significant improvement. Discontinued 12/2019 with concerns for retinopathy. However, this was later disproven.     2. Hx of NMSC  -BCC, left cheek, s/p MMS 6/9/21  -SCCis, R vertex scalp, s/p MMS 3/2/21, bacterial cx obtained from site 3/23/21  -BCC on right nasal ala s/p Mohs 11/27/17  -Nodular BCC on the vertex scalp s/p Mohs 6/5/15  -SCC of scalp, s/p Mohs 4/18/2014  -Hx of 1-2 other NMSC on scalp (BCCs)     3. 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 AK, R temple  - Cryotherapy 9/24      5. Inflamed SKs, R cheek and R temporal scalp  - Cryotherapy, 9/24     6. Skin infection, vertex scalp at prior site of MMS  - doxycycline 100 mg BID   ____________________________________________    Assessment & Plan:     1. Suspected wound infection: vertex scalp  - doxycycline monohydrate (MONODOX) 100 MG capsule; Take 1 capsule (100 mg) by mouth 2 times daily  Dispense: 14 capsule; Refill: 0  - Skin Aerobic Bacterial Culture Routine with Gram Stain     2. Lichen planopilaris: few crusted papulopustules surrounding area of infection  - INJECTION INTO SKIN LESIONS  <=7  - triamcinolone acetonide (KENALOG-10) injection 10 mg  - clobetasol propionate (CLOBEX) 0.05 % external shampoo; APPLY TO DRY SCALP EVERY OTHER DAY, LEAVE ON FOR 15 MINUTES, THEN LATHER AND RINSE  Dispense: 118 mL; Refill: 1  - Fluocinolone Acetonide Scalp (DERMA-SMOOTHE/FS SCALP) 0.01 % OIL oil; Apply once a week to scalp for lichen planopilaris  Dispense: 118 mL; Refill: 11  - tretinoin (RETIN-A) 0.025 % external cream; APPLY TO AFFECTED AREA(S) A PEA SIZE AMOUNT TO FACE EVERY OTHER NIGHT AS TOLERATED  Dispense: 45 g; Refill: 1     3. Dermatitis  - clobetasol propionate (CLOBEX) 0.05 % external shampoo; APPLY TO DRY SCALP EVERY OTHER DAY, LEAVE ON FOR 15 MINUTES, THEN LATHER AND RINSE  Dispense: 118 mL; Refill: 1    Procedures Performed:   {kkprocedurenotes:036653}    Follow-up: {kkfollowup:711435}    Staff and Scribe:     Scribe Disclosure:  I, Mark Frost, am serving as a scribe to document services personally performed by Barbara Soto MD based on data collection and the provider's statements to me.     ***  ____________________________________________    CC: No chief complaint on file.    HPI:  Ms. Ester Barba is a 76 year old female who presents as a return patient for follow-up of hair loss, diagnosed as lichen planopilaris.   - Last seen in-clinic on 11/18/2021  - Shedding or thinning, or both: ***  - Current tx: ***  - If using Rogaine, 1 cannister lasts how long: ***   - Scalp or hair care habits/products: ***  ***Yes/No Any new medications, supplements, or products? (please list below)     ***Yes/No Scalp pain   ***Yes/No Scalp burning   ***Yes/No Scalp itching    ***Yes/No Eyebrow changes    ***Yes/No Eyelash changes   ***Yes/No Beard changes    ***Yes/No Other body hair changes    ***Yes/No Nail changes    ***Yes/No Additional symptoms? (please list below)     - Overall course: ***  - COVID status: ***yes/no/unknown, ***date(if known)    Patient is otherwise feeling well, in  usual state of health, and has no additional skin concerns today.       Labs:  {kkreviewedlabs:524940} reviewed.    Physical Exam:  Vitals: There were no vitals taken for this visit.  GEN: Well developed, well-nourished, in no acute distress, in a pleasant mood.    SKIN: {kkSkinExam:573021}  - Khalil type: ***  - Noe part width of ***  - The layers of hair regrowth layers were noted to be  - *** cm for the first  - *** cm at the second  - *** cm at the third   - *** cm at the fourth   - *** diffuse erythema   - *** perifollicular erythema  - *** perifollicular scale   - *** scaling of the scalp   - *** negative hair pull test   - *** normal eyelash density  - *** normal eyebrow density  - *** no nail pitting or dystrophy   - *** scalp folliculitis/pustules   - In comparison to prior photographs, ***  - {Skin Exam Derm:976956}.   - No other lesions of concern on areas examined.     ***If FFA:  - R lateral forehead vein: elevated***/neutral***/depressed***  - Midline forehead vein: elevated***/neutral***/depressed***  - L ateral forehead vein: elevated***/neutral***/depressed***  - *** facial papules   - *** measurement of lateral canthus to lateral hairline   - *** measurement nasal bridge to anterior hairline    Medications:  Current Outpatient Medications   Medication     albuterol (PROAIR HFA/PROVENTIL HFA/VENTOLIN HFA) 108 (90 Base) MCG/ACT inhaler     Biotin 10 MG TABS tablet     Calcium 1500 MG TABS     Cholecalciferol (VITAMIN D) 1000 UNIT capsule     clobetasol propionate (CLOBEX) 0.05 % external shampoo     COMPOUNDED NON-CONTROLLED SUBSTANCE (CMPD RX) - PHARMACY TO MIX COMPOUNDED MEDICATION     doxycycline monohydrate (MONODOX) 100 MG capsule     esomeprazole (NEXIUM) 40 MG DR capsule     Fluocinolone Acetonide Scalp (DERMA-SMOOTHE/FS SCALP) 0.01 % OIL oil     glucosamine-chondroitin 500-400 MG CAPS per capsule     ibuprofen (ADVIL,MOTRIN) 800 MG tablet     Loratadine (CLARITIN PO)     mupirocin  (BACTROBAN) 2 % external ointment     NEW MED     tretinoin (RETIN-A) 0.025 % external cream     triamcinolone acetonide (KENALOG) 10 MG/ML injection     Current Facility-Administered Medications   Medication     triamcinolone acetonide (KENALOG-10) injection 10 mg     triamcinolone acetonide (KENALOG-10) injection 10 mg      Past Medical History:   Patient Active Problem List   Diagnosis     Porokeratosis     Squamous cell carcinoma     Neoplasm of uncertain behavior     Lichen planopilaris     Dermatitis     Cicatricial alopecia     Keratosis, inflamed seborrheic     History of skin cancer     Basal cell carcinoma of vertex scalp s/p mohs 6-5-15     Medication management     Actinic keratosis     Medication monitoring encounter     Dermatitis, seborrheic     Erosive pustular dermatosis     Tick bite, initial encounter     Vulvar atrophy     Factor V Leiden mutation (H)     Splenic artery aneurysm (H)     Post concussion syndrome     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      Again, thank you for allowing me to participate in the care of your patient.      Sincerely,    Barbara Soto MD

## 2022-01-06 ENCOUNTER — THERAPY VISIT (OUTPATIENT)
Dept: PHYSICAL THERAPY | Facility: CLINIC | Age: 77
End: 2022-01-06
Payer: MEDICARE

## 2022-01-06 DIAGNOSIS — M62.89 PFD (PELVIC FLOOR DYSFUNCTION): ICD-10-CM

## 2022-01-06 DIAGNOSIS — N39.41 URGE URINARY INCONTINENCE: ICD-10-CM

## 2022-01-06 PROCEDURE — 97112 NEUROMUSCULAR REEDUCATION: CPT | Mod: GP | Performed by: PHYSICAL THERAPIST

## 2022-01-06 PROCEDURE — 97110 THERAPEUTIC EXERCISES: CPT | Mod: GP | Performed by: PHYSICAL THERAPIST

## 2022-01-06 NOTE — PROGRESS NOTES
Subjective:  HPI  Physical Exam                    Objective:  System    Physical Exam    General     ROS    Assessment/Plan:    DISCHARGE REPORT    Progress reporting period is from 7/29/21 to 1/6/22.       SUBJECTIVE  Subjective changes noted by patient:  Subjective: Ester mentions some weeks are so muc better, cold air trigger still present.     Current pain level is   .     Previous pain level was    .   Changes in function:  Yes (See Goal flowsheet attached for changes in current functional level)  Adverse reaction to treatment or activity: None    OBJECTIVE  Changes noted in objective findings:  Yes,   Objective: Mina 3/10/8/9. 1 small leak/ day mentioned due to weather changes , otherwise few / week.      ASSESSMENT/PLAN  Updated problem list and treatment plan: Diagnosis 1:  PFD- UI    STG/LTGs have been met or progress has been made towards goals:  Yes (See Goal flow sheet completed today.)  Assessment of Progress: The patient's condition is improving.  The patient's condition has potential to improve.  The patient has met all of their long term goals.  Self Management Plans:  Patient is independent in a home treatment program.  Patient is independent in self management of symptoms.  I have re-evaluated this patient and find that the nature, scope, duration and intensity of the therapy is appropriate for the medical condition of the patient.  Ester continues to require the following intervention to meet STG and LTG's:  PT    Recommendations:  This patient is ready to be discharged from therapy and continue their home treatment program.    Please refer to the daily flowsheet for treatment today, total treatment time and time spent performing 1:1 timed codes.

## 2022-02-01 ENCOUNTER — OFFICE VISIT (OUTPATIENT)
Dept: DERMATOLOGY | Facility: CLINIC | Age: 77
End: 2022-02-01
Payer: MEDICARE

## 2022-02-01 DIAGNOSIS — L08.9 SKIN INFECTION: ICD-10-CM

## 2022-02-01 DIAGNOSIS — L57.0 ACTINIC KERATOSIS: ICD-10-CM

## 2022-02-01 DIAGNOSIS — L66.10 LICHEN PLANOPILARIS: Primary | ICD-10-CM

## 2022-02-01 PROCEDURE — 17000 DESTRUCT PREMALG LESION: CPT | Performed by: DERMATOLOGY

## 2022-02-01 PROCEDURE — 99213 OFFICE O/P EST LOW 20 MIN: CPT | Mod: 25 | Performed by: DERMATOLOGY

## 2022-02-01 PROCEDURE — 11901 INJECT SKIN LESIONS >7: CPT | Mod: XS | Performed by: DERMATOLOGY

## 2022-02-01 ASSESSMENT — PAIN SCALES - GENERAL: PAINLEVEL: NO PAIN (0)

## 2022-02-01 NOTE — NURSING NOTE
"Dermatology Rooming Note    Ester Barba's goals for this visit include:   Chief Complaint   Patient presents with     Derm Problem     Ester is here today for LPP. She states \" My LPP is doing the same\"     PIERCE Dacosta  "

## 2022-02-01 NOTE — LETTER
2/1/2022       RE: Ester Barba  [address] [place] [address]     Dear Colleague,    Thank you for referring your patient, Ester Barba, to the [facility] DERMATOLOGY [facility]INIC [place] at [facility]. Please see a copy of my visit note below.    [facility] Dermatology Note  Encounter Date: Feb 1, 2022  Office Visit     Dermatology Problem List:  FBSE at next visit  1. Lichen planopilaris. Bx 8/31/21 revealed lichenoid keratosis of the vertex scalp.  - LLPPAI score on 2/1/22 of 1 for pruritus and perifollicular erythema and scale  - Current tx: ILK10 injections every 5 weeks (last 12/20/21 and again today 2/1/22), clobetasol propionate 0.05% shampoo alternating w/ zinc shampoo every other day, fluocinolone oil one weekly  - Previous tx: plaquenil w/ significant improvement; discontinued 12/2019 with concerns for retinopathy, however, this was later disproven.  2. Facial papules related to FFA   - Current tx: tretinoin 0.025% cream every other day  3. Skin infection, vertex scalp at prior site of MMS  - s/p doxycycline 100 mg BID and mupirocin ointment BID  4. Hx of NMSC  -BCC, forehead, s/p MMS 6/9/21  -SCCis, R vertex scalp, s/p MMS 3/2/21, bacterial cx obtained from site 3/23/21  -BCC on right nasal ala s/p Mohs 11/27/17  -Nodular BCC on the vertex scalp s/p Mohs 6/5/15  -SCC of scalp, s/p Mohs 4/18/2014  -Hx of 1-2 other NMSC on scalp (BCCs)  5. AK, bilateral temples  - Cryotherapy 9/24/21, 12/20/21 and 2/1/21   6. Inflamed SKs, R cheek and R temporal scalp  - Cryotherapy 9/24/21       ____________________________________________    Assessment & Plan:     1. Skin infection, vertex scalp at prior site of MMS, resolved.   Most likely impetigo. Previous hx of vertex scalp wound infection post MMS. Initially treated with doxycycline with improvement.   - Discontinue mupirocin ointment  - Instructed to apply liberally apply Vaseline to the  affected areas to help with residual crusting     2. Lichen planopilaris, improving.  Previously did well on plaquenil but was discontinued due to concern for plaquenil retinal toxicity which was later dispproven. Patient feels that she is getting worse, especially since coming off of plaquenil and extending the period between injections from 5 to 8 weeks.   - ILK today, see procedure note below  - Continue ILK q5-6 weeks  - Continue clobetasol 0.05% shampoo alternating with zinc shampoo every other day  - Continue fluocinolone oil 2-3x a week  - Continue tretinoin 0.0025% cream every other day for the face     3. AKs, face  - Cryotherapy today, see procedure note below    Procedures Performed:   - Cryotherapy procedure note, location(s): right cheek. After verbal consent and discussion of risks and benefits including, but not limited to, dyspigmentation/scar, blister, and pain, 1 lesion(s) was(were) treated with 1-2 mm freeze border for 1-2 cycles with liquid nitrogen. Post cryotherapy instructions were provided.  - Intra-lesional triamcinolone procedure note. After positioning and cleansing with isopropyl alcohol, 2 total mL of triamcinolone 10 mg/mL was injected into 20 lesion(s) on the scalp. The patient tolerated the procedure well and left the dermatology clinic in good condition.    Follow-up: 1 month(s) in-person, or earlier for new or changing lesions    Staff and Medical Student:   Patient was seen and staffed with Dr. Soto.    Melba Katz, MS-4    Staff Physician:  I was present with the medical student who participated in the service and in the documentation of the note. I have verified the history and personally performed the physical exam and medical decision making. I agree with the assessment and plan of care as documented in the note.  ILK injections were primarily done by me. The cryotherapy procedure was done together.       Barbara Soto MD  Professor and Chair  Department of  "Dermatology  [facility]: Phone: [phone], Fax:[phone]  [facility]: Phone: [phone], Fax: [phone]        ____________________________________________    CC: Derm Problem (Ester is here today for LPP. She states \" My LPP is doing the same\")    HPI:  Ms. Ester Barba is a 76 year old female who presents as a return patient for follow-up of hair loss, diagnosed as lichen planopilaris.   - Last seen in-clinic on 12/20/21  - Shedding or thinning, or both: both  - Current tx: clobetasol shampoo alternating with zinc shampoo, fluocinolone oil, ILK injections and tretinoin cream  - Scalp or hair care habits/products: no changes    No Any new medications, supplements, or products? (please list below)     Yes; top of scalp tender to touch Scalp pain   No Scalp burning   Yes; daily Scalp itching    No Eyebrow changes    No Eyelash changes   No Beard changes    No Other body hair changes    No Nail changes    No Additional symptoms? (please list below)     - Overall course: Patient reports that her hair and scalp have been doing about the same since the previous visit. Does note that the infection on the top of her head has resolved; is applying mupirocin ointment to affected areas daily. Has been following hair routine as outlined above. States that she continues to experience daily itchiness throughout her scalp. This has been going on since she stopped taking hydroxychloroquine about one year ago. Also reports losing more hair on the top of her head since this time. Is washing her hair approximately five days a week.   - COVID status: vaccinated and boosted; no known COVID to her knowledge    Patient is otherwise feeling well, in usual state of health, and has no additional skin concerns today.     ROS: Not pertinent to the current visit.    Labs:  CBC  and CMP  reviewed.    Physical Exam:  Vitals: There " were no vitals taken for this visit.  GEN: Well developed, well-nourished, in no acute distress, in a pleasant mood.    SKIN: Focused examination of scalp and face was performed.  - Healed erosions with thick yellow crust and significant hair loss on vertex scalp.  - Red to purple macule on mid-frontal scalp.   - Minimal perifollicular erythema and scale.  - Normal eyebrow and eyelash density.  - No nail pitting or dystrophy.  - In comparison to prior photographs, examination appears stable in terms of hair loss but improved in terms of scalp infection/wound.  - There is an erythematous macule with overyling adherent scale on the right cheek.   - No other lesions of concern on areas examined.     Medications:  Current Outpatient Medications   Medication     Biotin 10 MG TABS tablet     Calcium 1500 MG TABS     Cholecalciferol (VITAMIN D) 1000 UNIT capsule     clobetasol propionate (CLOBEX) 0.05 % external shampoo     COMPOUNDED NON-CONTROLLED SUBSTANCE (CMPD RX) - PHARMACY TO MIX COMPOUNDED MEDICATION     esomeprazole (NEXIUM) 40 MG DR capsule     Fluocinolone Acetonide Scalp (DERMA-SMOOTHE/FS SCALP) 0.01 % OIL oil     glucosamine-chondroitin 500-400 MG CAPS per capsule     Loratadine (CLARITIN PO)     mupirocin (BACTROBAN) 2 % external ointment     NEW MED     tretinoin (RETIN-A) 0.025 % external cream     albuterol (PROAIR HFA/PROVENTIL HFA/VENTOLIN HFA) 108 (90 Base) MCG/ACT inhaler     doxycycline monohydrate (MONODOX) 100 MG capsule     ibuprofen (ADVIL,MOTRIN) 800 MG tablet     triamcinolone acetonide (KENALOG) 10 MG/ML injection     Current Facility-Administered Medications   Medication     triamcinolone acetonide (KENALOG-10) injection 10 mg     triamcinolone acetonide (KENALOG-10) injection 10 mg      Past Medical History:   Patient Active Problem List   Diagnosis     Porokeratosis     Squamous cell carcinoma     Neoplasm of uncertain behavior     Lichen planopilaris     Dermatitis     Cicatricial alopecia      Keratosis, inflamed seborrheic     History of skin cancer     Basal cell carcinoma of vertex scalp s/p mohs 6-5-15     Medication management     Actinic keratosis     Medication monitoring encounter     Dermatitis, seborrheic     Erosive pustular dermatosis     Tick bite, initial encounter     Vulvar atrophy     Factor V Leiden mutation (H)     Splenic artery aneurysm (H)     Post concussion syndrome     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      Again, thank you for allowing me to participate in the care of your patient.      Sincerely,    Barbara Soto MD

## 2022-02-01 NOTE — PROGRESS NOTES
[facility] Dermatology Note  Encounter Date: Feb 1, 2022  Office Visit     Dermatology Problem List:  FBSE at next visit  1. Lichen planopilaris. Bx 8/31/21 revealed lichenoid keratosis of the vertex scalp.  - LLPPAI score on 2/1/22 of 1 for pruritus and perifollicular erythema and scale  - Current tx: ILK10 injections every 5 weeks (last 12/20/21 and again today 2/1/22), clobetasol propionate 0.05% shampoo alternating w/ zinc shampoo every other day, fluocinolone oil one weekly  - Previous tx: plaquenil w/ significant improvement; discontinued 12/2019 with concerns for retinopathy, however, this was later disproven.  2. Facial papules related to FFA   - Current tx: tretinoin 0.025% cream every other day  3. Skin infection, vertex scalp at prior site of MMS  - s/p doxycycline 100 mg BID and mupirocin ointment BID  4. Hx of NMSC  -BCC, forehead, s/p MMS 6/9/21  -SCCis, R vertex scalp, s/p MMS 3/2/21, bacterial cx obtained from site 3/23/21  -BCC on right nasal ala s/p Mohs 11/27/17  -Nodular BCC on the vertex scalp s/p Mohs 6/5/15  -SCC of scalp, s/p Mohs 4/18/2014  -Hx of 1-2 other NMSC on scalp (BCCs)  5. AK, bilateral temples  - Cryotherapy 9/24/21, 12/20/21 and 2/1/21   6. Inflamed SKs, R cheek and R temporal scalp  - Cryotherapy 9/24/21       ____________________________________________    Assessment & Plan:     1. Skin infection, vertex scalp at prior site of MMS, resolved.   Most likely impetigo. Previous hx of vertex scalp wound infection post MMS. Initially treated with doxycycline with improvement.   - Discontinue mupirocin ointment  - Instructed to apply liberally apply Vaseline to the affected areas to help with residual crusting     2. Lichen planopilaris, improving.  Previously did well on plaquenil but was discontinued due to concern for plaquenil retinal toxicity which was later dispproven. Patient feels that she is getting worse, especially since coming off of plaquenil and  extending the period between injections from 5 to 8 weeks.   - ILK today, see procedure note below  - Continue ILK q5-6 weeks  - Continue clobetasol 0.05% shampoo alternating with zinc shampoo every other day  - Continue fluocinolone oil 2-3x a week  - Continue tretinoin 0.0025% cream every other day for the face     3. AKs, face  - Cryotherapy today, see procedure note below    Procedures Performed:   - Cryotherapy procedure note, location(s): right cheek. After verbal consent and discussion of risks and benefits including, but not limited to, dyspigmentation/scar, blister, and pain, 1 lesion(s) was(were) treated with 1-2 mm freeze border for 1-2 cycles with liquid nitrogen. Post cryotherapy instructions were provided.  - Intra-lesional triamcinolone procedure note. After positioning and cleansing with isopropyl alcohol, 2 total mL of triamcinolone 10 mg/mL was injected into 20 lesion(s) on the scalp. The patient tolerated the procedure well and left the dermatology clinic in good condition.    Follow-up: 1 month(s) in-person, or earlier for new or changing lesions    Staff and Medical Student:   Patient was seen and staffed with Dr. Soto.    Melba Katz, MS-4    Staff Physician:  I was present with the medical student who participated in the service and in the documentation of the note. I have verified the history and personally performed the physical exam and medical decision making. I agree with the assessment and plan of care as documented in the note.  ILK injections were primarily done by me. The cryotherapy procedure was done together.       Barbara Soto MD  Professor and Chair  Department of Dermatology  [facility]: Phone: [phone], Fax:[phone]  [facility]: Phone: [phone], Fax: [phone]        ____________________________________________    CC: Derm Problem (Ester is  "here today for LPP. She states \" My LPP is doing the same\")    HPI:  Ms. Ester Barba is a 76 year old female who presents as a return patient for follow-up of hair loss, diagnosed as lichen planopilaris.   - Last seen in-clinic on 12/20/21  - Shedding or thinning, or both: both  - Current tx: clobetasol shampoo alternating with zinc shampoo, fluocinolone oil, ILK injections and tretinoin cream  - Scalp or hair care habits/products: no changes    No Any new medications, supplements, or products? (please list below)     Yes; top of scalp tender to touch Scalp pain   No Scalp burning   Yes; daily Scalp itching    No Eyebrow changes    No Eyelash changes   No Beard changes    No Other body hair changes    No Nail changes    No Additional symptoms? (please list below)     - Overall course: Patient reports that her hair and scalp have been doing about the same since the previous visit. Does note that the infection on the top of her head has resolved; is applying mupirocin ointment to affected areas daily. Has been following hair routine as outlined above. States that she continues to experience daily itchiness throughout her scalp. This has been going on since she stopped taking hydroxychloroquine about one year ago. Also reports losing more hair on the top of her head since this time. Is washing her hair approximately five days a week.   - COVID status: vaccinated and boosted; no known COVID to her knowledge    Patient is otherwise feeling well, in usual state of health, and has no additional skin concerns today.     ROS: Not pertinent to the current visit.    Labs:  CBC  and CMP  reviewed.    Physical Exam:  Vitals: There were no vitals taken for this visit.  GEN: Well developed, well-nourished, in no acute distress, in a pleasant mood.    SKIN: Focused examination of scalp and face was performed.  - Healed erosions with thick yellow crust and significant hair loss on vertex scalp.  - Red to purple macule on mid-frontal " scalp.   - Minimal perifollicular erythema and scale.  - Normal eyebrow and eyelash density.  - No nail pitting or dystrophy.  - In comparison to prior photographs, examination appears stable in terms of hair loss but improved in terms of scalp infection/wound.  - There is an erythematous macule with overyling adherent scale on the right cheek.   - No other lesions of concern on areas examined.     Medications:  Current Outpatient Medications   Medication     Biotin 10 MG TABS tablet     Calcium 1500 MG TABS     Cholecalciferol (VITAMIN D) 1000 UNIT capsule     clobetasol propionate (CLOBEX) 0.05 % external shampoo     COMPOUNDED NON-CONTROLLED SUBSTANCE (CMPD RX) - PHARMACY TO MIX COMPOUNDED MEDICATION     esomeprazole (NEXIUM) 40 MG DR capsule     Fluocinolone Acetonide Scalp (DERMA-SMOOTHE/FS SCALP) 0.01 % OIL oil     glucosamine-chondroitin 500-400 MG CAPS per capsule     Loratadine (CLARITIN PO)     mupirocin (BACTROBAN) 2 % external ointment     NEW MED     tretinoin (RETIN-A) 0.025 % external cream     albuterol (PROAIR HFA/PROVENTIL HFA/VENTOLIN HFA) 108 (90 Base) MCG/ACT inhaler     doxycycline monohydrate (MONODOX) 100 MG capsule     ibuprofen (ADVIL,MOTRIN) 800 MG tablet     triamcinolone acetonide (KENALOG) 10 MG/ML injection     Current Facility-Administered Medications   Medication     triamcinolone acetonide (KENALOG-10) injection 10 mg     triamcinolone acetonide (KENALOG-10) injection 10 mg      Past Medical History:   Patient Active Problem List   Diagnosis     Porokeratosis     Squamous cell carcinoma     Neoplasm of uncertain behavior     Lichen planopilaris     Dermatitis     Cicatricial alopecia     Keratosis, inflamed seborrheic     History of skin cancer     Basal cell carcinoma of vertex scalp s/p mohs 6-5-15     Medication management     Actinic keratosis     Medication monitoring encounter     Dermatitis, seborrheic     Erosive pustular dermatosis     Tick bite, initial encounter      Vulvar atrophy     Factor V Leiden mutation (H)     Splenic artery aneurysm (H)     Post concussion syndrome     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

## 2022-02-01 NOTE — NURSING NOTE
Drug Administration Record    Prior to injection, verified patient identity using patient's name and date of birth.  Due to injection administration, patient instructed to remain in clinic for 15 minutes  afterwards, and to report any adverse reaction to me immediately.    Drug Name: triamcinolone acetonide(kenalog)  Dose: 2mL of triamcinolone 10mg/mL, 20mg dose  Route administered: ID  NDC #: Kenalog-10 (4061-4179-31)  Amount of waste(mL):3mL  Reason for waste: Multi dose vial    LOT #: [id number]  SITE: skin  : [employer]  EXPIRATION DATE: 4/2023    PIERCE Dacosta

## 2022-02-19 ENCOUNTER — HEALTH MAINTENANCE LETTER (OUTPATIENT)
Age: 77
End: 2022-02-19

## 2022-03-21 ENCOUNTER — OFFICE VISIT (OUTPATIENT)
Dept: OPHTHALMOLOGY | Facility: CLINIC | Age: 77
End: 2022-03-21
Attending: OPHTHALMOLOGY
Payer: MEDICARE

## 2022-03-21 ENCOUNTER — OFFICE VISIT (OUTPATIENT)
Dept: DERMATOLOGY | Facility: CLINIC | Age: 77
End: 2022-03-21
Payer: MEDICARE

## 2022-03-21 VITALS — SYSTOLIC BLOOD PRESSURE: 132 MMHG | HEART RATE: 66 BPM | DIASTOLIC BLOOD PRESSURE: 76 MMHG

## 2022-03-21 DIAGNOSIS — H10.213: Primary | ICD-10-CM

## 2022-03-21 DIAGNOSIS — D48.5 NEOPLASM OF UNCERTAIN BEHAVIOR OF SKIN: ICD-10-CM

## 2022-03-21 DIAGNOSIS — L66.10 LICHEN PLANOPILARIS: Primary | ICD-10-CM

## 2022-03-21 DIAGNOSIS — Z85.828 HISTORY OF SKIN CANCER: ICD-10-CM

## 2022-03-21 PROCEDURE — 99213 OFFICE O/P EST LOW 20 MIN: CPT | Mod: 25 | Performed by: DERMATOLOGY

## 2022-03-21 PROCEDURE — 11901 INJECT SKIN LESIONS >7: CPT | Mod: XS | Performed by: DERMATOLOGY

## 2022-03-21 PROCEDURE — 99213 OFFICE O/P EST LOW 20 MIN: CPT | Mod: GC | Performed by: STUDENT IN AN ORGANIZED HEALTH CARE EDUCATION/TRAINING PROGRAM

## 2022-03-21 PROCEDURE — G0463 HOSPITAL OUTPT CLINIC VISIT: HCPCS

## 2022-03-21 PROCEDURE — 88305 TISSUE EXAM BY PATHOLOGIST: CPT | Mod: TC | Performed by: DERMATOLOGY

## 2022-03-21 PROCEDURE — 88305 TISSUE EXAM BY PATHOLOGIST: CPT | Mod: 26 | Performed by: PATHOLOGY

## 2022-03-21 PROCEDURE — 11102 TANGNTL BX SKIN SINGLE LES: CPT | Performed by: DERMATOLOGY

## 2022-03-21 RX ORDER — LIDOCAINE HYDROCHLORIDE AND EPINEPHRINE 10; 10 MG/ML; UG/ML
3 INJECTION, SOLUTION INFILTRATION; PERINEURAL ONCE
Status: ACTIVE | OUTPATIENT
Start: 2022-03-21

## 2022-03-21 ASSESSMENT — CONF VISUAL FIELD
METHOD: COUNTING FINGERS
OD_NORMAL: 1
OS_NORMAL: 1

## 2022-03-21 ASSESSMENT — EXTERNAL EXAM - RIGHT EYE: OD_EXAM: NORMAL

## 2022-03-21 ASSESSMENT — SLIT LAMP EXAM - LIDS
COMMENTS: NORMAL
COMMENTS: NORMAL

## 2022-03-21 ASSESSMENT — REFRACTION_WEARINGRX
OS_AXIS: 137
SPECS_TYPE: PAL
OD_AXIS: 025
OD_ADD: +2.75
OS_SPHERE: -1.25
OS_CYLINDER: +1.50
OD_CYLINDER: +0.75
OS_ADD: +2.75
OD_SPHERE: -0.25

## 2022-03-21 ASSESSMENT — PAIN SCALES - GENERAL: PAINLEVEL: NO PAIN (0)

## 2022-03-21 ASSESSMENT — VISUAL ACUITY
OD_CC: 20/25
METHOD: SNELLEN - LINEAR
OS_CC: 20/25
OS_CC+: -2

## 2022-03-21 ASSESSMENT — EXTERNAL EXAM - LEFT EYE: OS_EXAM: NORMAL

## 2022-03-21 ASSESSMENT — TONOMETRY
IOP_METHOD: TONOPEN
OD_IOP_MMHG: 14
OS_IOP_MMHG: 13

## 2022-03-21 NOTE — NURSING NOTE
Drug Administration Record    Prior to injection, verified patient identity using patient's name and date of birth.  Due to injection administration, patient instructed to remain in clinic for 15 minutes  afterwards, and to report any adverse reaction to me immediately.    Drug Name: triamcinolone acetonide(kenalog)  Dose: 2mL of triamcinolone 10mg/mL, 20mg dose  Route administered: ID  NDC #: Kenalog-10 (7157-4250-74)  Amount of waste(mL):3ml  Reason for waste: Single use vial    LOT #: [id number]  SITE: see  providers notes   : Mc4  EXPIRATION DATE: apr 2023

## 2022-03-21 NOTE — LETTER
3/21/2022       RE: Ester Barba  [address] [place] [address]     Dear Colleague,    Thank you for referring your patient, Ester Barba, to the [facility] DERMATOLOGY [facility] [place] [facility]. Please see a copy of my visit note below.    [facility] Dermatology Note  Encounter Date: Mar 21, 2022  Office Visit     Dermatology Problem List:  1. Lichen planopilaris. Bx 8/31/21 revealed lichenoid keratosis of the vertex scalp.  - LLPPAI score on 2/1/22 of 1 for pruritus and perifollicular erythema and scale  - Current tx: ILK10 injections every 5 weeks (12/20/21, 2/1/22, 3/21/22), clobetasol propionate 0.05% shampoo alternating w/ zinc shampoo every other day, fluocinolone oil one weekly  - Previous tx: plaquenil w/ significant improvement; discontinued 12/2019 with concerns for retinopathy, however, this was later disproven.  2. 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 Skin infection, vertex scalp at prior site of MMS  - s/p doxycycline 100 mg BID and mupirocin ointment BID  4. Hx of NMSC  -BCC, forehead, s/p MMS 6/9/21  -SCCis, R vertex scalp, s/p MMS 3/2/21, bacterial cx obtained from site 3/23/21  -BCC on right nasal ala s/p Mohs 11/27/17  -Nodular BCC on the vertex scalp s/p Mohs 6/5/15  -SCC of scalp, s/p Mohs 4/18/2014  -Hx of 1-2 other NMSC on scalp (BCCs)  5. AK, bilateral temples  - Cryotherapy 9/24/21, 12/20/21 and 2/1/21   6. Inflamed SKs, R cheek and R temporal scalp  - Cryotherapy 9/24/21  7. NUB, frontal scalp  - s/p shave biopsy 3/21/22, pathology: pending  ____________________________________________    Assessment & Plan:      # Lichen planopilaris, improving  Hair loss is stable to the patient and improving on photodocumentation. However, she does note more scalp pruritus today, and would likely benefit from repeat ILK  injections.   - ILK injections today, see procedure note below  - Continue ILK q5-6 weeks  - Continue clobetasol 0.05% shampoo alternating with zinc shampoo every other day  - Continue fluocinolone oil 2-3x a week    # Skin infection, vertex scalp at prior site of MMS, resolved.   Most likely impetigo. Previous hx of vertex scalp wound infection post MMS. Initially treated with doxycycline with improvement.   - Continue to liberally apply Vaseline to the affected areas to help with residual crusting    # Neoplasm of uncertain behavior, frontal scalp  Present during prior visit as a red colored macule, but with new changes on exam today (shiny red-colored papule with arborizing vessels on dermascopy). Differential diagnosis includes BCC vs. benign nevus vs. other. Discussed with patient that given the changes, it would be best to biopsy the scalp to assess under the microscope. Patient amenable to this plan.   - Shave biopsy x 1, procedure note below  - Follow-up pending pathology results     # Conjunctival injection  Previously treated for both a possible eye infection (gentamycin) and rosacea (doxycycline). This does not appear to be related to rosacea as her symptoms were acute-onset, primarily unilateral, and she does not have significant erythema over the cheeks on exam today. Has follow-up with ophthalmology after this visit.   - Agree with ophthalmology appointment; continue with treatment per their recommendations    Procedures Performed:   - Shave biopsy procedure note, location(s): frontal scalp. After discussion of benefits and risks including but not limited to bleeding, infection, scar, incomplete removal, recurrence, and non-diagnostic biopsy, written consent and photographs were obtained. The area was cleaned with isopropyl alcohol. 0.5mL of 1% lidocaine with epinephrine was injected to obtain adequate anesthesia of lesion(s). Shave biopsy at site(s) performed. Hemostasis was achieved with aluminium  chloride. Petrolatum ointment and a sterile dressing were applied. The patient tolerated the procedure and no complications were noted. The patient was provided with verbal and written post care instructions.   - Intra-lesional triamcinolone procedure note. After positioning and cleansing with isopropyl alcohol, 1 total mL of triamcinolone 10 mg/mL was injected into 15 lesion(s) on the scalp. The patient tolerated the procedure well and left the dermatology clinic in good condition.}    Follow-up: pending pathology results    Staff, Scribe, and Medical Student:    Abdi Lin, MS3     Scribe Disclosure:  I, Mark Frost, am serving as a scribe to document services personally performed by Barbara Soto MD based on data collection and the provider's statements to me.     Provider Disclosure:   The documentation recorded by the scribe accurately reflects the services I personally performed and the decisions made by me.    Staff Physician:  I was present with the medical student who participated in the service and in the documentation of the note. I have verified the history and personally performed the physical exam and medical decision making. I agree with the assessment and plan of care as documented in the note.  I was present for the key portion of the shave biopsy procedure which was also done with the med derm resident, Dr. Rebel Soto MD  Professor and Chair  Department of Dermatology  [facility]: Phone: [phone], Fax:[phone]  [facility]: Phone: [phone], Fax: [phone]        ____________________________________________    CC: Derm Problem (Patient is here today for a 6 week follow up. )    HPI:  Ms. Ester Barba is a 76 year old female who presents as a return patient for follow-up of hair loss, diagnosed as lichen planopilaris.     - Last seen  in-clinic on 2/1/2022  - Shedding or thinning, or both: noticing more thinning since last visit  - Current tx: fluocinolone oil every two days, clobetasol shampoo alternating with DHS zinc shampoo every other day, ILK injections every 5-6 weeks  - Holding the tretinoin cream given recent vacation to [place]   - Scalp or hair care habits/products: no new changes    No Any new medications, supplements, or products? (please list below)     No Scalp pain   No Scalp burning   Yes Scalp itching    No Eyebrow changes    No Eyelash changes   N/A Beard changes    No Other body hair changes    Brittle Nail changes    No Additional symptoms? (please list below)     - Overall course: Patient reports that her scalp has been generally stable since her last visit, though she has been noticing a bit more thinning of the hair and continues to experience scalp pruritus. She continues on the same treatment regimen as stated above minus the tretinoin for the face (given her recent trip to [place], she wanted to avoid it in the sunlight). Of note, on her way to [place], she reports that she bumped her head on the airplane which caused her scalp to bruise.     - When she arrived to [place], she also noticed reddening of her eyes, tearing, and red cheeks. She was seen at an ED in [place] and started on eye drops (gentamycin) initially for a suspected eye infection. A few days later, she was started on doxycycline for suspected rosacea. She will see her eye doctor later today for follow-up.     - COVID status: No known COVID infection; has booster x2.     Patient is otherwise feeling well, in usual state of health, and has no additional skin concerns today.     Labs:  N/A    Physical Exam:  Vitals: /76 (BP Location: Right arm, Patient Position: Chair, Cuff Size: Adult Regular)   Pulse 66   GEN: Well developed, well-nourished, in no acute distress, in a pleasant mood.    EYES: Conjunctival injection bilaterally  SKIN: Focused examination  of the scalp was performed.  - Minimal perifollicular erythema and scale  - Low hair density over the mid scalp and vertex   - Mild skin atrophy of the mid-scalp with some overlying yellow crust and light purple discoloration  - 6 x 4 mm shiny red colored papule on the frontal scalp. On dermascopy, there is light coloration centrally with some arborizing vessels   - No evidence of erythema or telangiectasias on the cheeks  - Normal eyebrow and eyelash density  - No nail pitting or dystrophy  - In comparison to prior photodocumentation, examination appears improved in terms of hair loss   - No other lesions of concern on areas examined.       Medications:  Current Outpatient Medications   Medication     Biotin 10 MG TABS tablet     Calcium 1500 MG TABS     Cholecalciferol (VITAMIN D) 1000 UNIT capsule     clobetasol propionate (CLOBEX) 0.05 % external shampoo     COMPOUNDED NON-CONTROLLED SUBSTANCE (CMPD RX) - PHARMACY TO MIX COMPOUNDED MEDICATION     doxycycline monohydrate (MONODOX) 100 MG capsule     esomeprazole (NEXIUM) 40 MG DR capsule     Fluocinolone Acetonide Scalp (DERMA-SMOOTHE/FS SCALP) 0.01 % OIL oil     glucosamine-chondroitin 500-400 MG CAPS per capsule     Loratadine (CLARITIN PO)     mupirocin (BACTROBAN) 2 % external ointment     NEW MED     tretinoin (RETIN-A) 0.025 % external cream     albuterol (PROAIR HFA/PROVENTIL HFA/VENTOLIN HFA) 108 (90 Base) MCG/ACT inhaler     ibuprofen (ADVIL,MOTRIN) 800 MG tablet     triamcinolone acetonide (KENALOG) 10 MG/ML injection     Current Facility-Administered Medications   Medication     triamcinolone acetonide (KENALOG-10) injection 10 mg     triamcinolone acetonide (KENALOG-10) injection 10 mg      Past Medical History:   Patient Active Problem List   Diagnosis     Porokeratosis     Squamous cell carcinoma     Neoplasm of uncertain behavior     Lichen planopilaris     Dermatitis     Cicatricial alopecia     Keratosis, inflamed seborrheic     History of skin  cancer     Basal cell carcinoma of vertex scalp s/p mohs 6-5-15     Medication management     Actinic keratosis     Medication monitoring encounter     Dermatitis, seborrheic     Erosive pustular dermatosis     Tick bite, initial encounter     Vulvar atrophy     Factor V Leiden mutation (H)     Splenic artery aneurysm (H)     Post concussion syndrome     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      Again, thank you for allowing me to participate in the care of your patient.      Sincerely,    Barbara Soto MD

## 2022-03-21 NOTE — PATIENT INSTRUCTIONS

## 2022-03-21 NOTE — NURSING NOTE
Chief Complaint   Patient presents with     Derm Problem     Patient is here today for a 6 week follow up.      Joan GALLEGOS CMA

## 2022-03-21 NOTE — NURSING NOTE
Chief Complaints and History of Present Illnesses   Patient presents with     Eye Infection Right Eye     Chief Complaint(s) and History of Present Illness(es)     Eye Infection Right Eye     Laterality: right eye    Associated symptoms: tearing.  Negative for itching and eye pain    Pain scale: 0/10    Frequency: intermittently    Course: gradually improving              Comments     Ester is here today complaining of an eye infection RE. She says she was in [place] for over two weeks, and while there; her RE started to water then swell with redness. She went to an eye doctor and was given gentamycin. The drop did not help so she went to another eye doctor who gave her Maxitrol   Her eyes became better after first dose but the next couple of doses did not seem to do anything. She has also been using ice on RE that reduces swelling for a while. She started doxycycline and is still using it RE.     Silvino Stout COT 3:00 PM March 21, 2022

## 2022-03-21 NOTE — PROGRESS NOTES
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  - LLPPAI score on 2/1/22 of 1 for pruritus and perifollicular erythema and scale  - Current tx: ILK10 injections every 5 weeks (12/20/21, 2/1/22, 3/21/22), clobetasol propionate 0.05% shampoo alternating w/ zinc shampoo every other day, fluocinolone oil one weekly  - Previous tx: plaquenil w/ significant improvement; discontinued 12/2019 with concerns for retinopathy, however, this was later disproven.  2. 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 Skin infection, vertex scalp at prior site of MMS  - s/p doxycycline 100 mg BID and mupirocin ointment BID  4. Hx of NMSC  -BCC, forehead, s/p MMS 6/9/21  -SCCis, R vertex scalp, s/p MMS 3/2/21, bacterial cx obtained from site 3/23/21  -BCC on right nasal ala s/p Mohs 11/27/17  -Nodular BCC on the vertex scalp s/p Mohs 6/5/15  -SCC of scalp, s/p Mohs 4/18/2014  -Hx of 1-2 other NMSC on scalp (BCCs)  5. AK, bilateral temples  - Cryotherapy 9/24/21, 12/20/21 and 2/1/21   6. Inflamed SKs, R cheek and R temporal scalp  - Cryotherapy 9/24/21  7. NUB, frontal scalp  - s/p shave biopsy 3/21/22, pathology: pending  ____________________________________________    Assessment & Plan:      # Lichen planopilaris, improving  Hair loss is stable to the patient and improving on photodocumentation. However, she does note more scalp pruritus today, and would likely benefit from repeat ILK injections.   - ILK injections today, see procedure note below  - Continue ILK q5-6 weeks  - Continue clobetasol 0.05% shampoo alternating with zinc shampoo every other day  - Continue fluocinolone oil 2-3x a week    # Skin infection, vertex scalp at prior site of MMS, resolved.   Most likely impetigo. Previous hx of  vertex scalp wound infection post MMS. Initially treated with doxycycline with improvement.   - Continue to liberally apply Vaseline to the affected areas to help with residual crusting    # Neoplasm of uncertain behavior, frontal scalp  Present during prior visit as a red colored macule, but with new changes on exam today (shiny red-colored papule with arborizing vessels on dermascopy). Differential diagnosis includes BCC vs. benign nevus vs. other. Discussed with patient that given the changes, it would be best to biopsy the scalp to assess under the microscope. Patient amenable to this plan.   - Shave biopsy x 1, procedure note below  - Follow-up pending pathology results     # Conjunctival injection  Previously treated for both a possible eye infection (gentamycin) and rosacea (doxycycline). This does not appear to be related to rosacea as her symptoms were acute-onset, primarily unilateral, and she does not have significant erythema over the cheeks on exam today. Has follow-up with ophthalmology after this visit.   - Agree with ophthalmology appointment; continue with treatment per their recommendations    Procedures Performed:   - Shave biopsy procedure note, location(s): frontal scalp. After discussion of benefits and risks including but not limited to bleeding, infection, scar, incomplete removal, recurrence, and non-diagnostic biopsy, written consent and photographs were obtained. The area was cleaned with isopropyl alcohol. 0.5mL of 1% lidocaine with epinephrine was injected to obtain adequate anesthesia of lesion(s). Shave biopsy at site(s) performed. Hemostasis was achieved with aluminium chloride. Petrolatum ointment and a sterile dressing were applied. The patient tolerated the procedure and no complications were noted. The patient was provided with verbal and written post care instructions.   - Intra-lesional triamcinolone procedure note. After positioning and cleansing with isopropyl alcohol, 1 total  mL of triamcinolone 10 mg/mL was injected into 15 lesion(s) on the scalp. The patient tolerated the procedure well and left the dermatology clinic in good condition.}    Follow-up: pending pathology results    Staff, Scribe, and Medical Student:    Abdi Lin, MS3     Scribe Disclosure:  I, Mark Frost, am serving as a scribe to document services personally performed by Barbara Soto MD based on data collection and the provider's statements to me.     Provider Disclosure:   The documentation recorded by the scribe accurately reflects the services I personally performed and the decisions made by me.    Staff Physician:  I was present with the medical student who participated in the service and in the documentation of the note. I have verified the history and personally performed the physical exam and medical decision making. I agree with the assessment and plan of care as documented in the note.  I was present for the key portion of the shave biopsy procedure which was also done with the med derm resident, Dr. Rebel Soto MD  Professor and Chair  Department of Dermatology  [facility]: Phone: [phone], Fax:[phone]  [facility]: Phone: [phone], Fax: [phone]        ____________________________________________    CC: Derm Problem (Patient is here today for a 6 week follow up. )    HPI:  Ms. Ester Barba is a 76 year old female who presents as a return patient for follow-up of hair loss, diagnosed as lichen planopilaris.     - Last seen in-clinic on 2/1/2022  - Shedding or thinning, or both: noticing more thinning since last visit  - Current tx: fluocinolone oil every two days, clobetasol shampoo alternating with DHS zinc shampoo every other day, ILK injections every 5-6 weeks  - Holding the tretinoin cream given recent vacation to [place]   - Scalp or  hair care habits/products: no new changes    No Any new medications, supplements, or products? (please list below)     No Scalp pain   No Scalp burning   Yes Scalp itching    No Eyebrow changes    No Eyelash changes   N/A Beard changes    No Other body hair changes    Brittle Nail changes    No Additional symptoms? (please list below)     - Overall course: Patient reports that her scalp has been generally stable since her last visit, though she has been noticing a bit more thinning of the hair and continues to experience scalp pruritus. She continues on the same treatment regimen as stated above minus the tretinoin for the face (given her recent trip to [place], she wanted to avoid it in the sunlight). Of note, on her way to [place], she reports that she bumped her head on the airplane which caused her scalp to bruise.     - When she arrived to [place], she also noticed reddening of her eyes, tearing, and red cheeks. She was seen at an ED in [place] and started on eye drops (gentamycin) initially for a suspected eye infection. A few days later, she was started on doxycycline for suspected rosacea. She will see her eye doctor later today for follow-up.     - COVID status: No known COVID infection; has booster x2.     Patient is otherwise feeling well, in usual state of health, and has no additional skin concerns today.     Labs:  N/A    Physical Exam:  Vitals: /76 (BP Location: Right arm, Patient Position: Chair, Cuff Size: Adult Regular)   Pulse 66   GEN: Well developed, well-nourished, in no acute distress, in a pleasant mood.    EYES: Conjunctival injection bilaterally  SKIN: Focused examination of the scalp was performed.  - Minimal perifollicular erythema and scale  - Low hair density over the mid scalp and vertex   - Mild skin atrophy of the mid-scalp with some overlying yellow crust and light purple discoloration  - 6 x 4 mm shiny red colored papule on the frontal scalp. On dermascopy, there is light  coloration centrally with some arborizing vessels   - No evidence of erythema or telangiectasias on the cheeks  - Normal eyebrow and eyelash density  - No nail pitting or dystrophy  - In comparison to prior photodocumentation, examination appears improved in terms of hair loss   - No other lesions of concern on areas examined.       Medications:  Current Outpatient Medications   Medication     Biotin 10 MG TABS tablet     Calcium 1500 MG TABS     Cholecalciferol (VITAMIN D) 1000 UNIT capsule     clobetasol propionate (CLOBEX) 0.05 % external shampoo     COMPOUNDED NON-CONTROLLED SUBSTANCE (CMPD RX) - PHARMACY TO MIX COMPOUNDED MEDICATION     doxycycline monohydrate (MONODOX) 100 MG capsule     esomeprazole (NEXIUM) 40 MG DR capsule     Fluocinolone Acetonide Scalp (DERMA-SMOOTHE/FS SCALP) 0.01 % OIL oil     glucosamine-chondroitin 500-400 MG CAPS per capsule     Loratadine (CLARITIN PO)     mupirocin (BACTROBAN) 2 % external ointment     NEW MED     tretinoin (RETIN-A) 0.025 % external cream     albuterol (PROAIR HFA/PROVENTIL HFA/VENTOLIN HFA) 108 (90 Base) MCG/ACT inhaler     ibuprofen (ADVIL,MOTRIN) 800 MG tablet     triamcinolone acetonide (KENALOG) 10 MG/ML injection     Current Facility-Administered Medications   Medication     triamcinolone acetonide (KENALOG-10) injection 10 mg     triamcinolone acetonide (KENALOG-10) injection 10 mg      Past Medical History:   Patient Active Problem List   Diagnosis     Porokeratosis     Squamous cell carcinoma     Neoplasm of uncertain behavior     Lichen planopilaris     Dermatitis     Cicatricial alopecia     Keratosis, inflamed seborrheic     History of skin cancer     Basal cell carcinoma of vertex scalp s/p mohs 6-5-15     Medication management     Actinic keratosis     Medication monitoring encounter     Dermatitis, seborrheic     Erosive pustular dermatosis     Tick bite, initial encounter     Vulvar atrophy     Factor V Leiden mutation (H)     Splenic artery  aneurysm (H)     Post concussion syndrome     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

## 2022-03-21 NOTE — PROGRESS NOTES
"I have confirmed the patient's and reviewed Past Medical History, Past Surgical History, Social History, Family History, Problem List, Medication List and agree with Tech note.     CC - Plaquenil toxicity      INTERVAL HISTORY - Acute visit - for \"eye infection follow up\" during [place] vacation.     She had a bad eye infection in [place] (2/26-3/15). It started on 2/27 - the first day they were in [place].     She had swollen, painful eyes with red cheeks. She went to the ED in [place]. She was treated with ice packs and eye drops (gentamycin sulfate). She followed up with an ophthalmologist a few days later and was diagnosed with rosacea. She was treated with doxycycline 100mg BID and Maxitol ointment. This made a difference initially, but her eyes later worsened although not to the extent of the onset of symptoms. Things slowly resolved afterwards.      She had no other travel companions with similar symptoms, no fevers. No itching, no discharge.     Her rosacea and swelling have improved significantly, but she has persistent injection and FBS of both eyes. This is why she is here for follow up.     She also had a sinus infection at the beginning of February and was treated with She uses a steroid shampoo in her hair, but she knows it isn't supposed to go in her eyes.     She is concerned about using a steroid cream in her eyes.   cefuroxime for it.        [facility] - Ester Barba is a  76 year old patient presenting for follow up evaluation for plaquenil toxicity.     She was taking Plaquenil 400 mg daily for ~15 years but stopped 12/3/2019 after RPE changes were noted in OCT by her local ophthalmologist and retinologist in [facility]. she has since NOT used plaquenil      Indication for plaquenil: lichen plana polaris (scalp condition occurred after starting imuquimod).      PAST OCULAR SURGERY  None        RETINAL IMAGING:  OCT 1/16/2020  OD - minor RPE and IS/OS junctions changes   OS - minor RPE and IS/OS junctions " changes         FAF 1/16/2020  each eye dot like hypoAF spots at macula, not significant for AMD or plaquenil toxicity     Optos 1/16/2020  each eye dot like hypoAF spots at macula but midperiphery and periphery appear within normal limits    mferg:  No signs of plaquenil toxicity 1/2020.     ASSESSMENT & PLAN     1.  Conjunctivitis + Rosacea  03/21/22 - occurred on 2/27. Has been resolving with oral doxycycline and time. Pt still with significant irritation and FBS. No itching, no significant discharge. DDx includes toxic conjunctivitis, possibly allergic, possible infectious. This was likely a isolated insult to the ocular surface from which her eyes are recovering. Rosacea seems unlikely as this was acute onset and resolved quickly. She sees derm and does not have a hx of rosacea. Patient had reaction to preservatives in ab and other drops     Recommend stopping doxycycline after pills run out.     2.  Meibomian gland disease each eye    3.  Decreased corneal sensation, right eye    4.  Plaquenil use, possible hx of toxicity  STOPPED plaquenil in 2019  Local retina doc in [place] noted RPE changes  Plaquenil use 400 mg daily x ~15 years  Risk factors for plaquenil toxicity: high dose of plaquenil for long time and macular pathology; No other risk factors for plaquenil toxicity (no liver/kidney disease, lean person)    Asked patient to bring her OCT and VF records from her local doctor, there is a scanned letter from 2009.     RTC as scheduled in 6/20/2022 for DFE and Exam/OCT / fundus autofluorescence both eyes     Scotty Echeverria MD  Retina Fellow, PGY5    ATTESTATION:  I have seen and examined the patient with Dr. Echeverria and agree with the findings in this note, as well as the interpretations of the diagnostic tests.    Malathi Agustin MD PhD.  Professor & Chair    Malathi Agustin MD PhD.  Professor & Chair

## 2022-03-22 LAB
PATH REPORT.COMMENTS IMP SPEC: NORMAL
PATH REPORT.COMMENTS IMP SPEC: NORMAL
PATH REPORT.FINAL DX SPEC: NORMAL
PATH REPORT.GROSS SPEC: NORMAL
PATH REPORT.MICROSCOPIC SPEC OTHER STN: NORMAL
PATH REPORT.RELEVANT HX SPEC: NORMAL

## 2022-03-22 NOTE — RESULT ENCOUNTER NOTE
Reviewed results showing s+nBCC. Discussed these results with patient over MyChart on 3/22/22. Will send referral for MMS to site as disucssed in clinic    CC'ing Dr. Soto as FYI only

## 2022-03-23 ENCOUNTER — TELEPHONE (OUTPATIENT)
Dept: DERMATOLOGY | Facility: CLINIC | Age: 77
End: 2022-03-23
Payer: MEDICARE

## 2022-03-23 NOTE — TELEPHONE ENCOUNTER
Left a voicemail for Ester informing that we do not have any sooner appointment. Ludmila has her on a list in case of a cancellation.  Routing to  to see if they have a sooner appointment as that is why the call was routed to

## 2022-03-23 NOTE — LETTER
"March 25, 2022      Ester Barba  [address] [place] [address]              Dear Ester,    You have an upcoming appointment with Dr. Hubbard for Mohs surgery. It is scheduled for 03/30/2022 7:30 AM. Please check-in 15 minutes prior to your appointment at check-in desk \"D\" on the 2nd floor. Our address is [address] [place] [address].  Please review these important reminders:    1) Expect to be here the majority of the morning.  The Mohs surgical procedure can be an extensive surgery requiring multiple stages. Each stage may take 1-2 hours.     2) You may eat breakfast. You may bring snacks or beverages with you.  3) Take all normal medications morning of surgery -- unless otherwise indicated by your physician   If you have an artificial heart valve we will prescribe an antibiotic. Please take one hour prior to surgery.     4) You will need a  to be with you if your surgical site is close to your eyes. You can get swelling/bruising immediately after surgery and will go home with a big bulky bandage that could obstruct your view of driving safely.     5) Wear comfortable clothing -- preferably a button down shirt or a loose fitted shirt (to avoid pulling over pressure bandage off when you get home). If your surgery site is on your leg, try wearing loose fitted pants. If your surgery site is on your arm, try wearing a short-sleeve shirt.     6) Bring reading material or other items to help pass time. We do have internet access available if you own a laptop, iPad, etc.    7) Women - do NOT wear make-up. We will be washing it off anyone needing surgery on the face     8) Do NOT stop blood thinning medication unless instructed to do so by your surgeon. This will include: Warfarin, Coumadin, Eliquis, Jantoven, Aspirin, Plavix and Pradaxa. If you are taking Warfarin or Coumadin, please have your INR blood test results sent to our office no more than 7 days prior to your surgery.     9) Tylenol is a good " alternative to take for headaches and pain, and can be taken throughout your surgery and postoperative recovery.    If you need to reschedule or have any questions or concerns, please call me at [phone].          Sincerely,      Percy Hubbard MD

## 2022-03-23 NOTE — TELEPHONE ENCOUNTER
Left patient a voicemail to schedule MOHS for BCC on front vertex scalp with Dr. Warren. She is a return patient    Ludmila Otto, Procedure

## 2022-03-23 NOTE — TELEPHONE ENCOUNTER
Biopsy performed 3/21/22 by Dr. Soto.  Patient requesting sooner appointment then what is scheduled at [facility].  Forwarding to Dr. Hubbard to review and advise if he'd like a virtual consult prior to proceeding with Mohs, given the location.  Patient has had Mohs previously.  Photo in chart.    Final Diagnosis   A(1). Skin, front vertex scalp, shave:  - Basal cell carcinoma, superficial and nodular type, extending to the lateral margins      Sarai Luque RN

## 2022-03-24 NOTE — TELEPHONE ENCOUNTER
LM for pt to [facility]  Suzette De Los Santos RN

## 2022-03-24 NOTE — TELEPHONE ENCOUNTER
It looks ok to schedule Mohs after telephone screening unless she would prefer a virtual consult. Thank you.

## 2022-03-25 NOTE — TELEPHONE ENCOUNTER
The pt called back.  Did not have questions on the MOHS, as she has had several done in the past.  Will send MOHS letter and prepare packet.  Patient scheduled for earlier date and [facility] appt canceled.  Suzette De Los Santos RN    [facility] Dermatologic Surgery [facility] Pre-Surgery Screening Note     Pre-screening Information:    Medications/Allergies  Medications and allergies review with patient: Yes     [facility] Dermatologic Surgery [facility] Pre-Surgery Screening Note     Pre-screening Information:  Hx of Skin Cancer: Yes  Hx of Mohs Surgery: Yes  Transplant: No  Immunocompromised: No  Current Anticoagulant(s): None  Bleeding Disorder(s): No  Stent: No  Pacemaker: No  Defibrillator: No  Brain/Nerve Stimulator: No  Endocarditis/Rheumatic Fever Hx: No  Vascular graft: No  Prophylactic Antibiotic Needed: No  Congenital heart defect: No  Prosthetic Heart Valve: No  Prosthetic Joint : No  Diabetic: No  HIV/AIDS: No  Hepatitis: No  Pregnant: No  Prior Problem with Local Anesthesia: No  Patient wears CPAP mask: No  Currently Hypertensive: No  Current Tobacco Use: No  Current Alcohol Use: No  Extended Care Facility: No  Occupation: no   needed?: Yes  Do you have mobility issues?: No  Do you use any assistive devices/DME?: No  Do you have any issues with lying for long periods of time?: No      Medications/Allergies  Medications and allergies review with patient: Yes     Current Outpatient Medications   Medication Sig Dispense Refill     Biotin 10 MG TABS tablet        Calcium 1500 MG TABS        Cholecalciferol (VITAMIN D) 1000 UNIT capsule Take  by mouth. Total 2200 units daily.       clobetasol propionate (CLOBEX) 0.05 % external shampoo APPLY TO DRY SCALP EVERY OTHER DAY, LEAVE ON FOR 15 MINUTES, THEN LATHER AND RINSE 118 mL 1     COMPOUNDED NON-CONTROLLED SUBSTANCE (CMPD RX) - PHARMACY TO MIX COMPOUNDED MEDICATION Place 1 Application vaginally       doxycycline monohydrate  (MONODOX) 100 MG capsule Take 1 capsule (100 mg) by mouth 2 times daily 14 capsule 0     esomeprazole (NEXIUM) 40 MG DR capsule Take 40 mg by mouth every morning (before breakfast) Take 30-60 minutes before eating.       Fluocinolone Acetonide Scalp (DERMA-SMOOTHE/FS SCALP) 0.01 % OIL oil Apply once a week to scalp for lichen planopilaris 118 mL 11     glucosamine-chondroitin 500-400 MG CAPS per capsule Take 1 capsule by mouth       ibuprofen (ADVIL,MOTRIN) 800 MG tablet Take 800 mg by mouth every 8 hours as needed.       Loratadine (CLARITIN PO) Take  by mouth.       NEW MED Biest 1.5mg/gram cream(pg free)  Insert 1 gram vaginally twice weekly as directed 40 g 1     tretinoin (RETIN-A) 0.025 % external cream APPLY TO AFFECTED AREA(S) A PEA SIZE AMOUNT TO FACE EVERY OTHER NIGHT AS TOLERATED 45 g 1     Allergies   Allergen Reactions     Dust Mites Other (See Comments)     Sneezing, coughing. asthma  Itchy watery eyes, sneezing     Estrogens Itching     Other reaction(s): Hypersensitivity  Topical caused burning sensation of skin  Topical caused burning sensation of skin; Premarin cream only - possibly only an bi-ingredient     Imiquimod Other (See Comments)     Hair loss     Levaquin [Levofloxacin Hemihydrate] Other (See Comments)     Muscle weakness     Levofloxacin Other (See Comments)     MUSCLE WEAKNESS, ARTHRITIS LIKE SYMPTOMS.       Mold Other (See Comments)     Sneezing, asthma, weezing     Pollen Extract/Tree Extract Other (See Comments)     Sneezing, weezing     Sulfa Drugs Hives     Sulfamethoxazole-Trimethoprim Hives     Latex Rash     LATEX BANDAGES; tapes adhesive     Penicillin G Rash     Penicillins Rash       Pertinent Labs:  Last INR: No results found for: INR    Other Reminders:    Reminded patient to take any order prophylactic antibiotics 1 hour prior to appointment: N/A     Please be aware that this can be an all day procedure. Please bring your daily medications and food/cash. Encourage patient  to eat prior to procedure(s). After care instructions were reviewed with patient.    If any positives, send to RN to initiate antibiotic prophylaxis protocol and/or anticoagulation management protocol      Suzette De Los Santos RN        Current Outpatient Medications   Medication Sig Dispense Refill     Biotin 10 MG TABS tablet        Calcium 1500 MG TABS        Cholecalciferol (VITAMIN D) 1000 UNIT capsule Take  by mouth. Total 2200 units daily.       clobetasol propionate (CLOBEX) 0.05 % external shampoo APPLY TO DRY SCALP EVERY OTHER DAY, LEAVE ON FOR 15 MINUTES, THEN LATHER AND RINSE 118 mL 1     COMPOUNDED NON-CONTROLLED SUBSTANCE (CMPD RX) - PHARMACY TO MIX COMPOUNDED MEDICATION Place 1 Application vaginally       doxycycline monohydrate (MONODOX) 100 MG capsule Take 1 capsule (100 mg) by mouth 2 times daily 14 capsule 0     esomeprazole (NEXIUM) 40 MG DR capsule Take 40 mg by mouth every morning (before breakfast) Take 30-60 minutes before eating.       Fluocinolone Acetonide Scalp (DERMA-SMOOTHE/FS SCALP) 0.01 % OIL oil Apply once a week to scalp for lichen planopilaris 118 mL 11     glucosamine-chondroitin 500-400 MG CAPS per capsule Take 1 capsule by mouth       ibuprofen (ADVIL,MOTRIN) 800 MG tablet Take 800 mg by mouth every 8 hours as needed.       Loratadine (CLARITIN PO) Take  by mouth.       NEW MED Biest 1.5mg/gram cream(pg free)  Insert 1 gram vaginally twice weekly as directed 40 g 1     tretinoin (RETIN-A) 0.025 % external cream APPLY TO AFFECTED AREA(S) A PEA SIZE AMOUNT TO FACE EVERY OTHER NIGHT AS TOLERATED 45 g 1     Allergies   Allergen Reactions     Dust Mites Other (See Comments)     Sneezing, coughing. asthma  Itchy watery eyes, sneezing     Estrogens Itching     Other reaction(s): Hypersensitivity  Topical caused burning sensation of skin  Topical caused burning sensation of skin; Premarin cream only - possibly only an bi-ingredient     Imiquimod Other (See Comments)     Hair loss      Levaquin [Levofloxacin Hemihydrate] Other (See Comments)     Muscle weakness     Levofloxacin Other (See Comments)     MUSCLE WEAKNESS, ARTHRITIS LIKE SYMPTOMS.       Mold Other (See Comments)     Sneezing, asthma, weezing     Pollen Extract/Tree Extract Other (See Comments)     Sneezing, weezing     Sulfa Drugs Hives     Sulfamethoxazole-Trimethoprim Hives     Latex Rash     LATEX BANDAGES; tapes adhesive     Penicillin G Rash     Penicillins Rash       Pertinent Labs:  Last INR: No results found for: INR    Other Reminders:    Reminded patient to take any order prophylactic antibiotics 1 hour prior to appointment: N/A     Please be aware that this can be an all day procedure. Please bring your daily medications and food/cash. Encourage patient to eat prior to procedure(s). After care instructions were reviewed with patient.    If any positives, send to RN to initiate antibiotic prophylaxis protocol and/or anticoagulation management protocol      Suzette De Los Santos RN

## 2022-03-29 ENCOUNTER — TELEPHONE (OUTPATIENT)
Dept: DERMATOLOGY | Facility: CLINIC | Age: 77
End: 2022-03-29
Payer: MEDICARE

## 2022-03-29 NOTE — TELEPHONE ENCOUNTER
Health Call Center    Phone Message    May a detailed message be left on voicemail: no     Reason for Call: Other: Pt, Ester- Has procedure with Dr. Hubbard on 3/30 @ 7 am/ and wants to speak to Dr. Luque before the procedure.  Please call: [phone]      Action Taken: Message routed to:  [facility] DERM     Comments: Sending to Dr. Luque at [facility] DERM/ Procedure is in [place]/ not sending there.    Travel Screening: Not Applicable

## 2022-03-29 NOTE — TELEPHONE ENCOUNTER
Pt called again regarding below. Pt said to keep the appt for tomorrow because she will make it. Please do not cancel it. Thanks

## 2022-03-29 NOTE — TELEPHONE ENCOUNTER
The pt called stating the weather was going to bad tomorrow when her appt is. She said you had another 7:30 am appt and would like to change it. Please call the pt to discuss. Thanks.

## 2022-03-30 ENCOUNTER — OFFICE VISIT (OUTPATIENT)
Dept: DERMATOLOGY | Facility: CLINIC | Age: 77
End: 2022-03-30
Payer: MEDICARE

## 2022-03-30 VITALS — SYSTOLIC BLOOD PRESSURE: 164 MMHG | DIASTOLIC BLOOD PRESSURE: 63 MMHG | HEART RATE: 68 BPM

## 2022-03-30 DIAGNOSIS — C44.41 BASAL CELL CARCINOMA (BCC) OF SCALP: ICD-10-CM

## 2022-03-30 PROCEDURE — 12031 INTMD RPR S/A/T/EXT 2.5 CM/<: CPT | Performed by: DERMATOLOGY

## 2022-03-30 PROCEDURE — 17311 MOHS 1 STAGE H/N/HF/G: CPT | Performed by: DERMATOLOGY

## 2022-03-30 ASSESSMENT — PAIN SCALES - GENERAL: PAINLEVEL: MODERATE PAIN (5)

## 2022-03-30 NOTE — PATIENT INSTRUCTIONS
"Post-Operative Care for Granulating Site  After your surgery, a pressure bandage will be placed over the area. This will help prevent bleeding. Please follow these instructions as they will help you to prevent complications as your wound heals.  For the First 48 hours After Surgery:  1. Leave the pressure bandage on and keep it dry. If it should come loose, you may retape it, but do not take it off.  2. Relax and take it easy. Do not do any vigorous exercise, heavy lifting, or bending forward. This could cause the wound to bleed.  3. Post-operative pain is usually mild. You may take plain or extra strength Tylenol every 4 hours as needed. Do not take any medicine that contains aspirin, ibuprofen or motrin.  Avoid alcohol and vitamin E as these may increase your tendency to bleed.  4. You may put an ice pack around the bandaged area for 20 minutes every 2-3 hours. This may help reduce swelling, bruising, and pain. Make sure the ice pack is waterproof so that the pressure bandage does not get wet.   5. You may see a small amount of drainage or blood on your pressure bandage. This is normal. However, if drainage or bleeding continues or saturates the bandage, you will need to apply firm pressure over the bandage with a washcloth for 15 minutes. If bleeding continues after applying pressure for 15 minutes, apply an ice pack to the bandaged area for 15 minutes. If bleeding still continues, go to the nearest emergency room.  48 Hours After Surgery:  Carefully remove the bandage and start daily wound care and dressing changes. You may also now shower and get the wound wet. Use caution when washing the wound, be gentle.  Do not use a wash cloth on the wound.  Daily Wound Care:    Wash with mild soap and water every day until wound appears well-healed.  Rinse well and pat dry.    Apply Vaseline over site with a Q-tip and re-apply a dressing until wound appears well healed.  A well healed wound is \"pinked over\" with a shiny " "surface.  When area is \"pinked over\" it is no longer necessary to apply Vaseline.  Call Us If:    You have pain that is not controlled with Tylenol.    You have signs or symptoms of an infection, such as: fever over 100 degrees F, redness, warmth, or foul-smelling or yellow/creamy drainage from the wound.  Who should I call with questions?    [facility]: [phone]    [facility]: [phone]    For urgent needs outside of business hours call the [facility] [[email or web address]] at [phone] and ask for the dermatology resident on call      "

## 2022-03-30 NOTE — LETTER
3/30/2022         RE: Ester Barba  [address] [place] [address]        Dear Colleague,    Thank you for referring your patient, Ester Barba, to the [facility]. Please see a copy of my visit note below.    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  - LLPPAI score on 2/1/22 of 1 for pruritus and perifollicular erythema and scale  - Current tx: ILK10 injections every 5 weeks (12/20/21, 2/1/22, 3/21/22), clobetasol propionate 0.05% shampoo alternating w/ zinc shampoo every other day, fluocinolone oil one weekly  - Previous tx: plaquenil w/ significant improvement; discontinued 12/2019 with concerns for retinopathy, however, this was later disproven.  2. 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 Skin infection, vertex scalp at prior site of MMS  - s/p doxycycline 100 mg BID and mupirocin ointment BID  4. 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 Inflamed SKs, R cheek and R temporal scalp  - Cryotherapy 9/24/21  ____________________________________________      Date of Service:  Mar 30, 2022  Surgery: Mohs micrographic surgery    Case 1  Repair Type: intermediate (purse string)  Repair Size: 1.6x0.8 cm  Suture Material: 4-0 monocryl  Tumor Type: BCC - Basal cell carcinoma  Location: front vertex scalp  Derm-Path Accession #: [id number]  PreOp Size: 1.1x0.8  cm  PostOp Size: 1.9x1.8 cm  Mohs Accession #: [id number]  Level of Defect: muscle      Procedure:  We discussed the principles of treatment and most likely complications including scarring, bleeding, infection, swelling, pain, crusting, nerve damage, large wound,  incomplete excision, wound dehiscence,  nerve damage, recurrence, and a second procedure may be recommended to obtain the best cosmetic or functional result.    Informed consent was obtained and the patient underwent the procedure as follows:  The patient was placed supine on the operating table.  The cancer was identified, outlined with a marker, and verified by the patient.  The entire surgical field was prepped with Hibiclens.  The surgical site was anesthetized using Lidocaine 1% with epi 1:100,000.      The area of clinically apparent tumor was debulked. The layer of tissue was then surgically excised using a #15 blade and was then transferred onto a specimen sheet maintaining the orientation of the specimen. Hemostasis was obtained using bipolar electrocoagulation. The wound site was then covered with a dressing while the tissue samples were processed for examination.    The excised tissue was transported to the Mohs histology laboratory maintaining the tissue orientation.  The tissue specimen was relaxed so that the entire surgical margin was in a a single horizontal plane for sectioning and inked for precise mapping.  A precise reference map was drawn to reflect the sectioning of the specimen, colored inking of the margins, and orientation on the patient. The tissue was processed using horizontal sectioning of the base and continuous peripheral margins.  The histopathologic sections were reviewed in conjunction with the reference map.    Total blocks: 1    Total slides:  1    There were no cancer cells visualized on examination, therefore Mohs surgery was complete.      PURSESTRING REPAIR:   After Betasept prep and local anesthesia, using 4-0  monocryl, a suture was placed intradermally around the entire circumference of the wound and pulled to narrow the wound diameter. The residual defect measured 1.6x0.8cm (down from 1.9x1.8cm). Estimated blood loss, minimal; complications, none; wound care, routine. Patient was discharged in good condition and will return for assessment in 2 weeks.     Future procedures regarding scar revision will be scheduled as needed. Serial excision of hairless scar can be considered, but we may want to confirm it's potential effect on LPP nearby.     I personally performed the procedures today.    Percy Hubbard DO    Department of Dermatology  [facility]: Phone: [phone], Fax:[phone]  [facility]: Phone: [phone], Fax: [phone]    Care and Laboratory Testing Performed at:  [facility]   Dermatology Clinic  [address] [place] [address]            Again, thank you for allowing me to participate in the care of your patient.        Sincerely,        Percy Hubbard MD

## 2022-03-30 NOTE — NURSING NOTE
Ester JOSE Barba's goals for this visit include:   Chief Complaint   Patient presents with     Derm Problem     Ester is here today for mohs on vertex scalp due to bcc       She requests these members of her care team be copied on today's visit information:     PCP: Winnie Sepulveda    Referring Provider:  Barbara Soto MD  [address] [place] [address] [address]  [place] [address]    BP (!) 164/63   Pulse 68     Do you need any medication refills at today's visit? Priyanka Knight LPN

## 2022-03-30 NOTE — PROGRESS NOTES
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  - LLPPAI score on 2/1/22 of 1 for pruritus and perifollicular erythema and scale  - Current tx: ILK10 injections every 5 weeks (12/20/21, 2/1/22, 3/21/22), clobetasol propionate 0.05% shampoo alternating w/ zinc shampoo every other day, fluocinolone oil one weekly  - Previous tx: plaquenil w/ significant improvement; discontinued 12/2019 with concerns for retinopathy, however, this was later disproven.  2. 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 Skin infection, vertex scalp at prior site of MMS  - s/p doxycycline 100 mg BID and mupirocin ointment BID  4. 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 Inflamed SKs, R cheek and R temporal scalp  - Cryotherapy 9/24/21  ____________________________________________      Date of Service:  Mar 30, 2022  Surgery: Mohs micrographic surgery    Case 1  Repair Type: intermediate (purse string)  Repair Size: 1.6x0.8 cm  Suture Material: 4-0 monocryl  Tumor Type: BCC - Basal cell carcinoma  Location: front vertex scalp  Derm-Path Accession #: [id number]  PreOp Size: 1.1x0.8 cm  PostOp Size: 1.9x1.8 cm  Mohs Accession #: [id number]  Level of Defect: muscle      Procedure:  We discussed the principles of treatment and most likely complications including scarring, bleeding, infection, swelling, pain, crusting, nerve  damage, large wound,  incomplete excision, wound dehiscence,  nerve damage, recurrence, and a second procedure may be recommended to obtain the best cosmetic or functional result.    Informed consent was obtained and the patient underwent the procedure as follows:  The patient was placed supine on the operating table.  The cancer was identified, outlined with a marker, and verified by the patient.  The entire surgical field was prepped with Hibiclens.  The surgical site was anesthetized using Lidocaine 1% with epi 1:100,000.      The area of clinically apparent tumor was debulked. The layer of tissue was then surgically excised using a #15 blade and was then transferred onto a specimen sheet maintaining the orientation of the specimen. Hemostasis was obtained using bipolar electrocoagulation. The wound site was then covered with a dressing while the tissue samples were processed for examination.    The excised tissue was transported to the Mohs histology laboratory maintaining the tissue orientation.  The tissue specimen was relaxed so that the entire surgical margin was in a a single horizontal plane for sectioning and inked for precise mapping.  A precise reference map was drawn to reflect the sectioning of the specimen, colored inking of the margins, and orientation on the patient. The tissue was processed using horizontal sectioning of the base and continuous peripheral margins.  The histopathologic sections were reviewed in conjunction with the reference map.    Total blocks: 1    Total slides:  1    There were no cancer cells visualized on examination, therefore Mohs surgery was complete.      PURSESTRING REPAIR:   After Betasept prep and local anesthesia, using 4-0 monocryl, a suture was placed intradermally around the entire circumference of the wound and pulled to narrow the wound diameter. The residual defect measured 1.6x0.8cm (down from 1.9x1.8cm). Estimated blood loss, minimal; complications, none;  wound care, routine. Patient was discharged in good condition and will return for assessment in 2 weeks.     Future procedures regarding scar revision will be scheduled as needed. Serial excision of hairless scar can be considered, but we may want to confirm it's potential effect on LPP nearby.     I personally performed the procedures today.    Percy Hubbard DO    Department of Dermatology  [facility]: Phone: [phone], Fax:[phone]  [facility]: Phone: [phone], Fax: [phone]    Care and Laboratory Testing Performed at:  [facility]   Dermatology Clinic  [address] [place] [address]

## 2022-03-30 NOTE — NURSING NOTE
The following medication was given:     MEDICATION:  Lidocaine with epinephrine 1% 1:[id number]  ROUTE: SQ  SITE: see procedure note  DOSE: 15cc  LOT #: [id number]  : [employer]  EXPIRATION DATE: 06/22  NDC#: 2062-8178-22  Was there drug waste? 06/22  Multi-dose vial: Yes    Josee Knight LPN  March 30, 2022    Vaseline and pressure dressing applied to Mohs site on front vertex scalp which was then wrapped in coban and kerlix.  Wound care instructions reviewed with patient and AVS provided.  Patient verbalized understanding. No further questions or concerns at this time.

## 2022-04-13 ENCOUNTER — OFFICE VISIT (OUTPATIENT)
Dept: DERMATOLOGY | Facility: CLINIC | Age: 77
End: 2022-04-13
Payer: COMMERCIAL

## 2022-04-13 DIAGNOSIS — Z85.828 HISTORY OF BASAL CELL CARCINOMA OF SKIN: Primary | ICD-10-CM

## 2022-04-13 DIAGNOSIS — Z51.89 VISIT FOR WOUND CHECK: ICD-10-CM

## 2022-04-13 PROCEDURE — 99024 POSTOP FOLLOW-UP VISIT: CPT | Performed by: DERMATOLOGY

## 2022-04-13 RX ORDER — CEFDINIR 300 MG/1
300 CAPSULE ORAL 2 TIMES DAILY
COMMUNITY
Start: 2022-04-08 | End: 2022-10-15

## 2022-04-13 ASSESSMENT — PAIN SCALES - GENERAL: PAINLEVEL: MILD PAIN (2)

## 2022-04-13 NOTE — NURSING NOTE
Ester Barba's goals for this visit include:   Chief Complaint   Patient presents with     Wound Check     scalp       She requests these members of her care team be copied on today's visit information:     PCP: Winnie Sepulveda    Referring Provider:  No referring provider defined for this encounter.    There were no vitals taken for this visit.    Do you need any medication refills at today's visit? Priyanka Rush LPN

## 2022-04-13 NOTE — LETTER
4/13/2022         RE: Ester Barba  [address]  [place] [address]        Dear Colleague,    Thank you for referring your patient, Ester Barba, to the [facility]. Please see a copy of my visit note below.    [facility] Dermatology Note  Encounter Date: Apr 13, 2022  Office Visit     Dermatology Problem List:  1. Lichen planopilaris. Bx 8/31/21 revealed lichenoid keratosis of the vertex scalp.  - LLPPAI score on 2/1/22 of 1 for pruritus and perifollicular erythema and scale  - Current tx: ILK10 injections every 5 weeks (12/20/21, 2/1/22, 3/21/22), clobetasol propionate 0.05% shampoo alternating w/ zinc shampoo every other day, fluocinolone oil one weekly  - Previous tx: plaquenil w/ significant improvement; discontinued 12/2019 with concerns for retinopathy, however, this was later disproven.  2. 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 Skin infection, vertex scalp at prior site of MMS  - s/p doxycycline 100 mg BID and mupirocin ointment BID  4. 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 Inflamed SKs, R cheek and R temporal scalp  - Cryotherapy 9/24/21  ____________________________________________    Assessment & Plan:     # BCC, Front Vertex Scalp. S/P Mohs and purse string repair to narrow, but granulation to heal completely.   Granulation tissue present in the base of the surgical ulcer.   No clinical signs of infection.   Continue wound care until it is healed over completely.    Ok to resume scalp treatments, but avoid the ulcer.     Follow-up: Follow Up with Dr. Soto, derm surgery PRN.     Staff:     Percy Hubbard DO    Department of Dermatology  [facility]: Phone: [phone], Fax:[phone]  [facility]: Phone: [phone], Fax: [phone]      ____________________________________________    CC: Wound Check (scalp)    HPI:  Ms. Ester Barba is a(n) 76 year old female who presents today for follow-up  after Mohs and purse string with granulation wound repair. She has been doing wound care as directed. She notes some blood on her dressing, but no dripping. Denies weeping or worsening pain.     Patient is otherwise feeling well, without additional skin concerns.     Labs Reviewed:  N/A    Physical Exam:  Vitals: There were no vitals taken for this visit.  SKIN: Focused examination of scalp was performed.  - Superficial ulcer with granulation tissue and minimal hemorrhagic crust on the frontal vertex scalp.     - No other lesions of concern on areas examined.     Medications:  Current Outpatient Medications   Medication     Biotin 10 MG TABS tablet     Calcium 1500 MG TABS     cefdinir (OMNICEF) 300 MG capsule     Cholecalciferol (VITAMIN D) 1000 UNIT capsule     clobetasol propionate (CLOBEX) 0.05 % external shampoo     COMPOUNDED NON-CONTROLLED SUBSTANCE (CMPD RX) - PHARMACY TO MIX COMPOUNDED MEDICATION     esomeprazole (NEXIUM) 40 MG DR capsule     Fluocinolone Acetonide Scalp (DERMA-SMOOTHE/FS SCALP) 0.01 % OIL oil     glucosamine-chondroitin 500-400 MG CAPS per capsule     ibuprofen (ADVIL,MOTRIN) 800 MG tablet     Loratadine (CLARITIN PO)     NEW MED     tretinoin (RETIN-A) 0.025 % external cream     doxycycline monohydrate (MONODOX) 100 MG capsule     Current Facility-Administered Medications   Medication     lidocaine 1% with EPINEPHrine 1:100,000  injection 3 mL     triamcinolone acetonide (KENALOG-10) injection 10 mg     triamcinolone acetonide (KENALOG-10) injection 10 mg     triamcinolone acetonide (KENALOG-10) injection 10 mg     triamcinolone acetonide (KENALOG-10) injection 17 mg      Past Medical History:   Patient Active Problem List   Diagnosis     Porokeratosis     Squamous cell carcinoma     Neoplasm of uncertain behavior     Lichen planopilaris     Dermatitis     Cicatricial alopecia     Keratosis, inflamed seborrheic     History of skin cancer     Basal cell carcinoma of vertex scalp s/p mohs 6-5-15     Medication management     Actinic keratosis     Medication monitoring encounter     Dermatitis, seborrheic     Erosive pustular dermatosis     Tick bite, initial encounter     Vulvar atrophy     Factor V Leiden mutation (H)     Splenic artery aneurysm (H)     Post concussion syndrome     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        Sincerely,        Percy Hubbard MD

## 2022-05-10 ENCOUNTER — OFFICE VISIT (OUTPATIENT)
Dept: DERMATOLOGY | Facility: CLINIC | Age: 77
End: 2022-05-10
Payer: MEDICARE

## 2022-05-10 DIAGNOSIS — Z48.89 ENCOUNTER FOR POST SURGICAL WOUND CHECK: Primary | ICD-10-CM

## 2022-05-10 DIAGNOSIS — L57.0 ACTINIC KERATOSIS: ICD-10-CM

## 2022-05-10 DIAGNOSIS — L30.9 DERMATITIS: ICD-10-CM

## 2022-05-10 DIAGNOSIS — L66.10 LICHEN PLANOPILARIS: ICD-10-CM

## 2022-05-10 PROCEDURE — 99214 OFFICE O/P EST MOD 30 MIN: CPT | Mod: 25 | Performed by: DERMATOLOGY

## 2022-05-10 PROCEDURE — 17000 DESTRUCT PREMALG LESION: CPT | Mod: GC | Performed by: DERMATOLOGY

## 2022-05-10 PROCEDURE — 11901 INJECT SKIN LESIONS >7: CPT | Mod: XS | Performed by: DERMATOLOGY

## 2022-05-10 ASSESSMENT — PAIN SCALES - GENERAL: PAINLEVEL: MILD PAIN (2)

## 2022-05-10 NOTE — NURSING NOTE
Dermatology Rooming Note    Ester JOSE Barba's goals for this visit include:   Chief Complaint   Patient presents with     Derm Problem     Ester is here today for a 6 week follow up from MOHS procedure. She also states LPP is doing about the same.     Maritza Sadler, Facilitator

## 2022-05-10 NOTE — NURSING NOTE
Drug Administration Record    Prior to injection, verified patient identity using patient's name and date of birth.  Due to injection administration, patient instructed to remain in clinic for 15 minutes  afterwards, and to report any adverse reaction to me immediately.    Drug Name: triamcinolone acetonide(kenalog)  Dose: 2mL of triamcinolone 10mg/mL, 20mg dose  Route administered: ID  NDC #: Kenalog-10 (3862-3423-52)  Amount of waste(mL):3mL  Reason for waste: Multi dose vial    LOT #: [id number]  SITE: skin  : [id number]  EXPIRATION DATE: 9/2023    PIERCE Dacosta

## 2022-05-10 NOTE — PROGRESS NOTES
[facility] Dermatology Note  Encounter Date: May 10, 2022  Office Visit     Dermatology Problem List:  1. Lichen planopilaris. Bx 8/31/21 revealed lichenoid keratosis of the vertex scalp.  - LLPPAI score on 2/1/22 of 1 for pruritus and perifollicular erythema and scale  - Current tx: ILK10 injections every 5 weeks (12/20/21, 2/1/22, 3/21/22, 5/10/22), clobetasol propionate 0.05% shampoo alternating w/ zinc shampoo every other day, fluocinolone oil one weekly  - Previous tx: plaquenil w/ significant improvement; discontinued 12/2019 with concerns for retinopathy, however, this was later disproven.  2. 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 Skin infection, vertex scalp at prior site of MMS  - s/p doxycycline 100 mg BID and mupirocin ointment BID  4. Hx of NMSC  -BCC, front vertex scalp, s/p Mohs with purse string and granulation, 3/30/22  -BCC, forehead, s/p MMS 6/9/21  -SCCis, R vertex scalp, s/p MMS 3/2/21, bacterial cx obtained from site 3/23/21  -BCC on right nasal ala s/p Mohs 11/27/17  -Nodular BCC on the vertex scalp s/p Mohs 6/5/15  -SCC of scalp, s/p Mohs 4/18/2014  -Hx of 1-2 other NMSC on scalp (BCCs)  5. AK, bilateral temples, cheeks  - Cryotherapy 9/24/21, 12/20/21, 2/1/21, 5/10/22  6. Inflamed SKs, R cheek and R temporal scalp  - Cryotherapy 9/24/21  ____________________________________________    Assessment & Plan:     # Recent BCC, front vertex scalp, s/p MMS 3/30/22 with purse string closure and granulation  Surgical wound appears to be healing appropriately (significantly improved from photos 4/13/22). No obvious signs of infection; no significant erythema or purulent drainage. Patient does report significant scalp tenderness and sensitivity, but this has been a chronic issue likely exacerbated by the recent procedure.   - Continue gentle wound care    # Lichen planopilaris  Hair loss is  stable to the patient and improving on photodocumentation. Does report recent increased pruritus in the occipital region which is relatively new.   - ILK injections today, see procedure note below  - Continue ILK q5-6 weeks  - Continue clobetasol 0.05% shampoo alternating with zinc shampoo every other day  - Continue fluocinolone oil 2-3x a week    # AK, R cheek  Patient reports this area was previously treated with cryo but persisted. Will trial repeat cryotherapy.  - cryotherapy below If not absent at time of next visit, will dicuss biopsy.     Procedures Performed:   - Cryotherapy procedure note, location(s): R cheek. After verbal consent and discussion of risks and benefits including, but not limited to, dyspigmentation/scar, blister, and pain, 1 lesion(s) was(were) treated with 1-2 mm freeze border for 1-2 cycles with liquid nitrogen. Post cryotherapy instructions were provided.  - Intra-lesional triamcinolone procedure note. After positioning and cleansing with isopropyl alcohol, 1.7 total mL of triamcinolone 10 mg/mL was injected into 17 sites on the scalp. The patient tolerated the procedure well and left the dermatology clinic in good condition.    Follow-up: as previously scheduled 6/21/22    Staff and Resident:     Staffed with Dr. Charles Castillo MD (PGY-2)   Dermatology Resident    Patient was seen and examined with the dermatology resident. I agree with the history, review of systems, physical examination, assessments and plan. I was present for the entire cryotherapy procedure; ILK injections were done together.    Barbara Soto MD  Professor and  Chair  Department of Dermatology  [facility]    ____________________________________________    CC: Derm Problem (Ester is here today for a 6 week follow up from MOHS procedure. She also states LPP is doing about the same.)    HPI:  Ms. Ester Barba is a 76 year old female who presents as a return patient for wound check on recent  Mohs procedure & LPP.    - Last seen in hair clinic on 3/21/22 at which time active sites on the scalp were  treated with ILK.  Also had a lesion on frontal scalp biopsied showing BCC, since s/p MMS 3/30/22 with purse string closure and granulation of final residual defect (1.6 cm x 0.8 cm).   - Today, patient's chief complaint is regarding the Mohs site. She states the area is still very painful. Denies drainage. Does report significant scalp pain and sensitivity that has been ongoing for some time since prior Mohs  - LPP overall seems to be about stable. Does note some more itching in the posterior scalp which is somewhat new. Hair loss remains stable. Still using fluocinolone oil and clobetasol shampoo    Labs:  N/A    Physical Exam:  Vitals: There were no vitals taken for this visit.  GEN: Well developed, well-nourished, in no acute distress, in a pleasant mood.    SKIN: Focused examination of scalp and face  was performed.  - Frontal scalp surgical site appears to be healing appropriately with some central yellow debris but without significant erythema & purulent drainage.   - Mild skin atrophy of the mid-scalp with some overlying yellow crust  - Low hair density over the mid scalp and vertex   - Minimal perifollicular erythema and scale  - R cheek with scaly pink papule    Medications:  Current Outpatient Medications   Medication     Biotin 10 MG TABS tablet     Calcium 1500 MG TABS     cefdinir (OMNICEF) 300 MG capsule     Cholecalciferol (VITAMIN D) 1000 UNIT capsule     clobetasol propionate (CLOBEX) 0.05 % external shampoo     COMPOUNDED NON-CONTROLLED SUBSTANCE (CMPD RX) - PHARMACY TO MIX COMPOUNDED MEDICATION     esomeprazole (NEXIUM) 40 MG DR capsule     Fluocinolone Acetonide Scalp (DERMA-SMOOTHE/FS SCALP) 0.01 % OIL oil     glucosamine-chondroitin 500-400 MG CAPS per capsule     ibuprofen (ADVIL,MOTRIN) 800 MG tablet     Loratadine (CLARITIN PO)     NEW MED     tretinoin (RETIN-A) 0.025 % external  cream     doxycycline monohydrate (MONODOX) 100 MG capsule     Current Facility-Administered Medications   Medication     lidocaine 1% with EPINEPHrine 1:100,000 injection 3 mL     triamcinolone acetonide (KENALOG-10) injection 10 mg     triamcinolone acetonide (KENALOG-10) injection 10 mg     triamcinolone acetonide (KENALOG-10) injection 10 mg      Past Medical History:   Patient Active Problem List   Diagnosis     Porokeratosis     Squamous cell carcinoma     Neoplasm of uncertain behavior     Lichen planopilaris     Dermatitis     Cicatricial alopecia     Keratosis, inflamed seborrheic     History of skin cancer     Basal cell carcinoma of vertex scalp s/p mohs 6-5-15     Medication management     Actinic keratosis     Medication monitoring encounter     Dermatitis, seborrheic     Erosive pustular dermatosis     Tick bite, initial encounter     Vulvar atrophy     Factor V Leiden mutation (H)     Splenic artery aneurysm (H)     Post concussion syndrome     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

## 2022-05-11 ENCOUNTER — MYC MEDICAL ADVICE (OUTPATIENT)
Dept: DERMATOLOGY | Facility: CLINIC | Age: 77
End: 2022-05-11
Payer: MEDICARE

## 2022-05-12 ENCOUNTER — TELEPHONE (OUTPATIENT)
Dept: DERMATOLOGY | Facility: CLINIC | Age: 77
End: 2022-05-12
Payer: MEDICARE

## 2022-05-12 NOTE — TELEPHONE ENCOUNTER
Prior Authorization Retail Medication Request    Medication/Dose: clobetasol propionate (CLOBEX) 0.05 % external shampoo  ICD code (if different than what is on RX):  Lichen planopilaris [L66.1]   Dermatitis [L30.9]   Previously Tried and Failed:    Rationale:      Insurance Name:  [employer]  Insurance ID:  [id number]    Medicare [id number]

## 2022-05-12 NOTE — PROGRESS NOTES
[facility] Dermatology Note  Encounter Date: Apr 13, 2022  Office Visit     Dermatology Problem List:  1. Lichen planopilaris. Bx 8/31/21 revealed lichenoid keratosis of the vertex scalp.  - LLPPAI score on 2/1/22 of 1 for pruritus and perifollicular erythema and scale  - Current tx: ILK10 injections every 5 weeks (12/20/21, 2/1/22, 3/21/22), clobetasol propionate 0.05% shampoo alternating w/ zinc shampoo every other day, fluocinolone oil one weekly  - Previous tx: plaquenil w/ significant improvement; discontinued 12/2019 with concerns for retinopathy, however, this was later disproven.  2. 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 Skin infection, vertex scalp at prior site of MMS  - s/p doxycycline 100 mg BID and mupirocin ointment BID  4. 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 Inflamed SKs, R cheek and R temporal scalp  - Cryotherapy 9/24/21  ____________________________________________    Assessment & Plan:     # BCC, Front Vertex Scalp. S/P Mohs and purse string repair to narrow, but granulation to heal completely.   Granulation tissue present in the base of the surgical ulcer.   No clinical signs of infection.   Continue wound care until it is healed over completely.   Ok to resume scalp treatments, but avoid the ulcer.     Follow-up: Follow Up with Dr. Soto, derm surgery PRN.     Staff:     Percy Hubbard DO    Department of Dermatology  [facility][1]  [facility]: Phone: [phone], Fax:[phone]  [facility]: Phone: [phone], Fax: [phone]      ____________________________________________    CC: Wound Check (scalp)    HPI:  Ms. Ester Barba is a(n) 76 year old female who presents today for follow-up  after Mohs and purse string with granulation wound repair. She has been doing wound care as directed. She notes some blood on her dressing, but no dripping. Denies weeping or worsening pain.     Patient is otherwise feeling well, without additional skin concerns.     Labs Reviewed:  N/A    Physical Exam:  Vitals: There were no vitals taken for this visit.  SKIN: Focused examination of scalp was performed.  - Superficial ulcer with granulation tissue and minimal hemorrhagic crust on the frontal vertex scalp.     - No other lesions of concern on areas examined.     Medications:  Current Outpatient Medications   Medication     Biotin 10 MG TABS tablet     Calcium 1500 MG TABS     cefdinir (OMNICEF) 300 MG capsule     Cholecalciferol (VITAMIN D) 1000 UNIT capsule     clobetasol propionate (CLOBEX) 0.05 % external shampoo     COMPOUNDED NON-CONTROLLED SUBSTANCE (CMPD RX) - PHARMACY TO MIX COMPOUNDED MEDICATION     esomeprazole (NEXIUM) 40 MG DR capsule     Fluocinolone Acetonide Scalp (DERMA-SMOOTHE/FS SCALP) 0.01 % OIL oil     glucosamine-chondroitin 500-400 MG CAPS per capsule     ibuprofen (ADVIL,MOTRIN) 800 MG tablet     Loratadine (CLARITIN PO)     NEW MED     tretinoin (RETIN-A) 0.025 % external cream     doxycycline monohydrate (MONODOX) 100 MG capsule     Current Facility-Administered Medications   Medication     lidocaine 1% with EPINEPHrine 1:100,000 injection 3 mL     triamcinolone acetonide (KENALOG-10) injection 10 mg     triamcinolone acetonide (KENALOG-10) injection 10 mg     triamcinolone acetonide (KENALOG-10) injection 10 mg     triamcinolone acetonide (KENALOG-10) injection 17 mg       Past Medical History:   Patient Active Problem List   Diagnosis     Porokeratosis     Squamous cell carcinoma     Neoplasm of uncertain behavior     Lichen planopilaris     Dermatitis     Cicatricial alopecia     Keratosis, inflamed seborrheic     History of skin cancer     Basal cell carcinoma of vertex scalp s/p mohs 6-5-15     Medication management     Actinic keratosis     Medication monitoring encounter     Dermatitis, seborrheic     Erosive pustular dermatosis     Tick bite, initial encounter     Vulvar atrophy     Factor V Leiden mutation (H)     Splenic artery aneurysm (H)     Post concussion syndrome     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

## 2022-05-13 NOTE — TELEPHONE ENCOUNTER
PA Initiation    Medication: clobetasol propionate (CLOBEX) 0.05 % external shampoo   Insurance Company: Silver [employer] Part D - Phone [phone] Fax [phone]  Pharmacy Filling the Rx: [id number] - [place], [place] - [address] AT [address]  Filling Pharmacy Phone: [phone]  Filling Pharmacy Fax: [phone]  Start Date: 5/13/2022

## 2022-05-14 RX ORDER — KETOCONAZOLE 20 MG/G
CREAM TOPICAL
Qty: 60 G | Refills: 1 | Status: SHIPPED | OUTPATIENT
Start: 2022-05-14

## 2022-05-14 RX ORDER — DESONIDE 0.5 MG/G
CREAM TOPICAL
Qty: 60 G | Refills: 1 | Status: SHIPPED | OUTPATIENT
Start: 2022-05-14

## 2022-05-16 NOTE — TELEPHONE ENCOUNTER
Prior Authorization Approval    Authorization Effective Date: 1/1/2022  Authorization Expiration Date: 5/13/2023  Medication: clobetasol propionate (CLOBEX) 0.05 % external shampoo--APPROVED  Approved Dose/Quantity:   Reference #:     Insurance Company: [employer] Part D - Phone [phone] Fax [phone]  Expected CoPay:       CoPay Card Available:      Foundation Assistance Needed:    Which Pharmacy is filling the prescription (Not needed for infusion/clinic administered): [facility] #[id number] - [place], [place] - [address] AT [address]  Pharmacy Notified: Yes  Patient Notified: Yes **Instructed pharmacy to notify patient when script is ready to /ship.**

## 2022-05-17 ENCOUNTER — TELEPHONE (OUTPATIENT)
Dept: DERMATOLOGY | Facility: CLINIC | Age: 77
End: 2022-05-17
Payer: MEDICARE

## 2022-05-17 DIAGNOSIS — L30.9 DERMATITIS: Primary | ICD-10-CM

## 2022-05-17 NOTE — TELEPHONE ENCOUNTER
Prior Authorization Retail Medication Request    Medication/Dose: desonide (DESOWEN) 0.05 % external cream  ICD code (if different than what is on RX):  Dermatitis [L30.9]   Previously Tried and Failed:    Rationale:      Insurance Name:  [employer]  Insurance ID:  [id number]    Medicare [id number]

## 2022-05-17 NOTE — TELEPHONE ENCOUNTER
PA Initiation    Medication: desonide (DESOWEN) 0.05 % external cream   Insurance Company: [employer] Part D - Phone [phone] Fax [phone]  Pharmacy Filling the Rx: [id number] [place] [place] [place] [address] AT [address]  Filling Pharmacy Phone: [phone]  Filling Pharmacy Fax: [phone]  Start Date: 5/17/2022

## 2022-05-18 DIAGNOSIS — L66.10 LICHEN PLANOPILARIS: ICD-10-CM

## 2022-05-18 DIAGNOSIS — L30.9 DERMATITIS: ICD-10-CM

## 2022-05-18 NOTE — TELEPHONE ENCOUNTER
PRIOR AUTHORIZATION DENIED    Medication: desonide (DESOWEN) 0.05 % external cream--DENIED    Denial Date: 5/17/2022    Denial Rational: Patient needs to try and fail the preferred medications:         Appeal Information:

## 2022-05-20 RX ORDER — CLOBETASOL PROPIONATE 0.05 G/100ML
SHAMPOO TOPICAL
Qty: 118 ML | Refills: 0 | Status: SHIPPED | OUTPATIENT
Start: 2022-05-20 | End: 2022-08-01

## 2022-05-20 NOTE — TELEPHONE ENCOUNTER
clobetasol propionate (CLOBEX) 0.05 % external shampoo     APPLY TO DRY SCALP EVERY OTHER DAY, LEAVE ON FOR 15 MINUTES, THEN LATHER AND RINSE   Last Written Prescription Date:  11/18/21  Last Fill Quantity: 118 ml,   # refills: 1  Last Office Visit : 5/10/22  Future Office visit:  6/21/22    Process# 3    Refill to pharmacy.

## 2022-05-30 RX ORDER — HYDROCORTISONE 2.5 %
CREAM (GRAM) TOPICAL 2 TIMES DAILY
Status: SHIPPED | OUTPATIENT
Start: 2022-05-30

## 2022-05-31 RX ORDER — HYDROCORTISONE 2.5 %
CREAM (GRAM) TOPICAL
Qty: 30 G | Refills: 4 | Status: SHIPPED | OUTPATIENT
Start: 2022-05-31

## 2022-05-31 NOTE — TELEPHONE ENCOUNTER
Barbara yañez MD  You 11 hours ago (8:55 PM)         Please let Ms. Barba know I have substituted the desowen cream which her insurance is not covering with another topical steroid that is on the approved list noted in epic.   Thanks,       Message text

## 2022-06-14 DIAGNOSIS — Z79.899 LONG-TERM USE OF PLAQUENIL: Primary | ICD-10-CM

## 2022-06-20 ENCOUNTER — OFFICE VISIT (OUTPATIENT)
Dept: OPHTHALMOLOGY | Facility: CLINIC | Age: 77
End: 2022-06-20
Attending: OPHTHALMOLOGY
Payer: MEDICARE

## 2022-06-20 DIAGNOSIS — Z79.899 LONG-TERM USE OF PLAQUENIL: ICD-10-CM

## 2022-06-20 PROCEDURE — G0463 HOSPITAL OUTPT CLINIC VISIT: HCPCS | Mod: 25

## 2022-06-20 PROCEDURE — 92134 CPTRZ OPH DX IMG PST SGM RTA: CPT | Performed by: OPHTHALMOLOGY

## 2022-06-20 PROCEDURE — 99207 FUNDUS AUTOFLUORESCENCE IMAGE (FAF) OU (BOTH EYES): CPT | Performed by: OPHTHALMOLOGY

## 2022-06-20 PROCEDURE — 92250 FUNDUS PHOTOGRAPHY W/I&R: CPT | Performed by: OPHTHALMOLOGY

## 2022-06-20 PROCEDURE — 92134 CPTRZ OPH DX IMG PST SGM RTA: CPT | Mod: 26 | Performed by: OPHTHALMOLOGY

## 2022-06-20 PROCEDURE — 99213 OFFICE O/P EST LOW 20 MIN: CPT | Mod: GC | Performed by: OPHTHALMOLOGY

## 2022-06-20 ASSESSMENT — VISUAL ACUITY
OS_CC+: -1
METHOD: SNELLEN - LINEAR
OS_CC: 20/25
OD_CC: 20/25

## 2022-06-20 ASSESSMENT — EXTERNAL EXAM - LEFT EYE: OS_EXAM: NORMAL

## 2022-06-20 ASSESSMENT — TONOMETRY
OD_IOP_MMHG: 17
OS_IOP_MMHG: 17
IOP_METHOD: TONOPEN

## 2022-06-20 ASSESSMENT — CUP TO DISC RATIO
OS_RATIO: 0.3
OD_RATIO: 0.3

## 2022-06-20 ASSESSMENT — SLIT LAMP EXAM - LIDS
COMMENTS: NORMAL
COMMENTS: NORMAL

## 2022-06-20 ASSESSMENT — EXTERNAL EXAM - RIGHT EYE: OD_EXAM: NORMAL

## 2022-06-20 ASSESSMENT — CONF VISUAL FIELD
METHOD: COUNTING FINGERS
OS_NORMAL: 1
OD_NORMAL: 1

## 2022-06-20 NOTE — PROGRESS NOTES
I have confirmed the patient's and reviewed Past Medical History, Past Surgical History, Social History, Family History, Problem List, Medication List and agree with Tech note.     CC - Plaquenil toxicity      INTERVAL HISTORY - Follow up plaquenil toxicity screening.        HPI - Ester Barba is a  76 year old patient presenting for follow up evaluation for plaquenil toxicity.     She was taking Plaquenil 400 mg daily for ~15 years but stopped 12/3/2019 after RPE changes were noted in OCT by her local ophthalmologist and retinologist in [facility]. she has since NOT used plaquenil       Indication for plaquenil: lichen plana polaris (scalp condition occurred after starting imuquimod).      PAST OCULAR SURGERY  None        RETINAL IMAGING:  OCT 1/16/2020  OD - minor RPE and IS/OS junctions changes   OS - minor RPE and IS/OS junctions changes         FAF 1/16/2020  each eye dot like hypoAF spots at macula, not significant for AMD or plaquenil toxicity     Optos 1/16/2020  each eye dot like hypoAF spots at macula but midperiphery and periphery appear within normal limits    mferg:  No signs of plaquenil toxicity 1/2020.     ASSESSMENT & PLAN    #  Plaquenil use, possible hx of toxicity  STOPPED plaquenil in 2019  Local retina doc in [place] noted RPE changes  Plaquenil use 400 mg daily x ~15 years  Risk factors for plaquenil toxicity: high dose of plaquenil for long time and macular pathology; No other risk factors for plaquenil toxicity (no liver/kidney disease, lean person)    Asked patient to bring her OCT and VF records from her local doctor, there is a scanned letter from 2009.      #  Conjunctivitis + Rosacea  - resolved    #  Meibomian gland disease each eye    #  Decreased corneal sensation, right eye    RTC 1 year for DFE and Exam/OCT / 10-2 VF     Fabian Reed MD  Vitreoretinal Surgery Fellow  [facility]     ATTESTATION:  I have seen and examined the patient with Dr. Reed and agree with  the findings in this note, as well as the interpretations of the diagnostic tests.    Malathi Agustin MD PhD.  Professor & Chair

## 2022-06-20 NOTE — NURSING NOTE
Chief Complaints and History of Present Illnesses   Patient presents with     Follow Up     Conjunctivitis + Rosacea     Chief Complaint(s) and History of Present Illness(es)     Follow Up     Comments: Conjunctivitis + Rosacea              Comments     Pt states no change in VA since last visit  C/o dryness , using OTC drops  No flashes, floaters or eye pain    Apple Rodriguez COT 12:49 PM June 20, 2022

## 2022-06-21 ENCOUNTER — OFFICE VISIT (OUTPATIENT)
Dept: DERMATOLOGY | Facility: CLINIC | Age: 77
End: 2022-06-21
Payer: MEDICARE

## 2022-06-21 DIAGNOSIS — D48.9 NEOPLASM OF UNCERTAIN BEHAVIOR: Primary | ICD-10-CM

## 2022-06-21 DIAGNOSIS — Z85.828 HISTORY OF SKIN CANCER: ICD-10-CM

## 2022-06-21 DIAGNOSIS — L66.10 LICHEN PLANOPILARIS: ICD-10-CM

## 2022-06-21 PROCEDURE — 88305 TISSUE EXAM BY PATHOLOGIST: CPT | Mod: TC | Performed by: DERMATOLOGY

## 2022-06-21 PROCEDURE — 99213 OFFICE O/P EST LOW 20 MIN: CPT | Mod: 25 | Performed by: DERMATOLOGY

## 2022-06-21 PROCEDURE — 11102 TANGNTL BX SKIN SINGLE LES: CPT | Mod: GC | Performed by: DERMATOLOGY

## 2022-06-21 PROCEDURE — 88305 TISSUE EXAM BY PATHOLOGIST: CPT | Mod: 26 | Performed by: DERMATOLOGY

## 2022-06-21 ASSESSMENT — PAIN SCALES - GENERAL: PAINLEVEL: EXTREME PAIN (8)

## 2022-06-21 NOTE — NURSING NOTE
Dermatology Rooming Note    Ester Barba's goals for this visit include:   Chief Complaint   Patient presents with     Hair Loss     Ester is here for a hairloss follow-up. States her condition has remained the same since last seen.     Nathan Workman, EMT

## 2022-06-21 NOTE — LETTER
6/21/2022       RE: Ester Barba  [address] [place] [address]     Dear Colleague,    Thank you for referring your patient, Ester Barba, to the [facility]ERMATOLOG[facility] [place] [facility]. Please see a copy of my visit note below.    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  - Current tx: ILK10 injections  (12/20/21, 2/1/22, 3/21/22, 5/10/22), clobetasol propionate 0.05% shampoo alternating w/ zinc shampoo every other day, fluocinolone oil one weekly  - Previous tx: plaquenil w/ significant improvement; discontinued 12/2019 with concerns for retinopathy, however, this was later disproven.  # Facial papules related to FFA   - Current tx: tretinoin 0.025% cream every other day (holding currently due to vacationing)  # Skin infection, vertex scalp at prior site of MMS  - s/p doxycycline 100 mg BID and mupirocin ointment BID  # Hx of NMSC  - BCC, front vertex scalp, s/p Mohs with purse string and granulation, 3/30/22  - BCC, forehead, s/p MMS 6/9/21  - SCCis, R vertex scalp, s/p MMS 3/2/21, bacterial cx obtained from site 3/23/21  - BCC on right nasal ala s/p Mohs 11/27/17  - Nodular BCC on the vertex scalp s/p Mohs 6/5/15  - SCC of scalp, s/p Mohs 4/18/2014  - Hx of 1-2 other NMSC on scalp (BCCs)  # AK, bilateral temples, cheeks  - Cryotherapy 9/24/21, 12/20/21, 2/1/21, 5/10/22  # Inflamed SKs, R cheek and R temporal scalp  - Cryotherapy 9/24/21  ____________________________________________    Assessment & Plan:    # Recent BCC, front vertex scalp, s/p MMS 3/30/22 with purse string closure and granulation  Healed well. No additional treatment needed at this time.     # NUB, front vertex scalp, hyperkeratotic tender  papule within a background of EPD. Biopsy taken today to rule out SCC.   - Shave biopsy today, see procedure note below     # Lichen planopilaris  Hair loss is stable to improving, recommended tapering steroid injections and decreasing frequency of fluocinolone. Will skip injections today and re-evaluate at 5 week follow up.   - Decrease fluocinolone oil from 3 times per week to once per week  - Continue clobetasol 0.05% shampoo alternating with zinc shampoo every other day     Procedures Performed:   - Shave biopsy procedure note, location(s): front vertex scalp. After discussion of benefits and risks including but not limited to bleeding, infection, scar, incomplete removal, recurrence, and non-diagnostic biopsy, written consent and photographs were obtained. The area was cleaned with isopropyl alcohol. 0.5mL of 1% lidocaine with epinephrine was injected to obtain adequate anesthesia of lesion(s). Shave biopsy at site(s) performed. Hemostasis was achieved with aluminium chloride. Petrolatum ointment and a sterile dressing were applied. The patient tolerated the procedure and no complications were noted. The patient was provided with verbal and written post care instructions.     Follow-up: As scheduled on 8/1/22    Staff and Scribe:     Scribe Disclosure:  I, Brit Bates, am serving as a scribe to document services personally performed by Barbara Soto MD based on data collection and the provider's statements to me.     Alea Cedillo MD (PGY-4)  Dermatology Resident     Provider Disclosure:   The documentation recorded by the scribe accurately reflects the services I personally performed and the decisions made by me.    Patient was seen and examined with the dermatology resident. I agree with the history, review of systems, physical examination, assessments and plan. I was present for the key portion of the biopsy procedure.    Barbara Soto MD  Professor and  Chair  Department of  Dermatology  [facility]  ____________________________________________    CC: Hair Loss (Ester is here for a hairloss follow-up. States her condition has remained the same since last seen.)    HPI:  Ms. Ester Barba is a(n) 76 year old female who presents today as a return patient for hair loss. The patient was last seen in dermatology on 5/10/22 by myself at which time she was continued on clobetasol/zinc shampoo and fluocinolone oil for treatment of lichen planopilaris. Additionally, 1.7 total mL of triamcinolone 10 mg/mL was injected into 17 sites on the scalp.    - Current tx: washing hair every day to every other day, alternating clobetasol and ketoconazole shampoos and using DHS zinc every once in a while; applying derma smoothe oil 3 times a week to the entire scalp; also applies vaseline every day   - Also recently noticed a new bump next to her mohs surgical site, feels a little tender when pressed  - Scalp pain: stable, usually diffuse   - Scalp burning: denies  - Scalp itching: stable, usually at the back of the scalp   - Eyebrow or eyelash changes: denies  - Nail changes: stable  - Overall course: stable    Patient is otherwise feeling well, without additional skin concerns.    Labs Reviewed:  None    Physical Exam:  Vitals: There were no vitals taken for this visit.  SKIN: Focused examination of the scalp and face was performed.  -Vertex scalp with white scar-like plaque with overlying yellow papules, one papule is tender to the touch   -Immediately anterior to this there is a well healed pink scar from recent MMS  -Scalp with mild pink erythema and minimal perifollicular scale         Medications:  Current Outpatient Medications   Medication     Biotin 10 MG TABS tablet     Calcium 1500 MG TABS     Cholecalciferol (VITAMIN D) 1000 UNIT capsule     clobetasol propionate (CLOBEX) 0.05 % external shampoo     COMPOUNDED NON-CONTROLLED SUBSTANCE (CMPD RX) - PHARMACY TO MIX COMPOUNDED MEDICATION      desonide (DESOWEN) 0.05 % external cream     esomeprazole (NEXIUM) 40 MG DR capsule     Fluocinolone Acetonide Scalp (DERMA-SMOOTHE/FS SCALP) 0.01 % OIL oil     glucosamine-chondroitin 500-400 MG CAPS per capsule     hydrocortisone 2.5 % cream     ibuprofen (ADVIL,MOTRIN) 800 MG tablet     ketoconazole (NIZORAL) 2 % external cream     Loratadine (CLARITIN PO)     NEW MED     tretinoin (RETIN-A) 0.025 % external cream     cefdinir (OMNICEF) 300 MG capsule     doxycycline monohydrate (MONODOX) 100 MG capsule     Current Facility-Administered Medications   Medication     hydrocortisone 2.5 % cream     lidocaine 1% with EPINEPHrine 1:100,000 injection 3 mL     triamcinolone acetonide (KENALOG-10) injection 10 mg     triamcinolone acetonide (KENALOG-10) injection 10 mg     triamcinolone acetonide (KENALOG-10) injection 10 mg     triamcinolone acetonide (KENALOG-10) injection 17 mg      Past Medical History:   Patient Active Problem List   Diagnosis     Porokeratosis     Squamous cell carcinoma     Neoplasm of uncertain behavior     Lichen planopilaris     Dermatitis     Cicatricial alopecia     Keratosis, inflamed seborrheic     History of skin cancer     Basal cell carcinoma of vertex scalp s/p mohs 6-5-15     Medication management     Actinic keratosis     Medication monitoring encounter     Dermatitis, seborrheic     Erosive pustular dermatosis     Tick bite, initial encounter     Vulvar atrophy     Factor V Leiden mutation (H)     Splenic artery aneurysm (H)     Post concussion syndrome     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       Sincerely,    Barbara Soto MD

## 2022-06-21 NOTE — PATIENT INSTRUCTIONS
Take a break from the fluocinolone oil for now   Continue other hair care regimen     Wound Care After a Biopsy    What is a skin biopsy?  A skin biopsy allows the doctor to examine a very small piece of tissue under the microscope to determine the diagnosis and the best treatment for the skin condition. A local anesthetic (numbing medicine)  is injected with a very small needle into the skin area to be tested. A small piece of skin is taken from the area. Sometimes a suture (stitch) is used.     What are the risks of a skin biopsy?  I will experience scar, bleeding, swelling, pain, crusting and redness. I may experience incomplete removal or recurrence. Risks of this procedure are excessive bleeding, bruising, infection, nerve damage, numbness, thick (hypertrophic or keloidal) scar and non-diagnostic biopsy.    How should I care for my wound for the first 24 hours?  Keep the wound dry and covered for 24 hours  If it bleeds, hold direct pressure on the area for 15 minutes. If bleeding does not stop then go to the emergency room  Avoid strenuous exercise the first 1-2 days or as your doctor instructs you    How should I care for the wound after 24 hours?  After 24 hours, remove the bandage  You may bathe or shower as normal  If you had a scalp biopsy, you can shampoo as usual and can use shower water to clean the biopsy site daily  Clean the wound twice a day with gentle soap and water  Do not scrub, be gentle  Apply white petroleum/Vaseline after cleaning the wound with a cotton swab or a clean finger, and keep the site covered with a Bandaid /bandage. Bandages are not necessary with a scalp biopsy  If you are unable to cover the site with a Bandaid /bandage, re-apply ointment 2-3 times a day to keep the site moist. Moisture will help with healing  Avoid strenuous activity for first 1-2 days  Avoid lakes, rivers, pools, and oceans until the stitches are removed or the site is healed    How do I clean my wound?  Wash  hands thoroughly with soap or use hand  before all wound care  Clean the wound with gentle soap and water  Apply white petroleum/Vaseline  to wound after it is clean  Replace the Bandaid /bandage to keep the wound covered for the first few days or as instructed by your doctor  If you had a scalp biopsy, warm shower water to the area on a daily basis should suffice    What should I use to clean my wound?   Cotton-tipped applicators (Qtips )  White petroleum jelly (Vaseline ). Use a clean new container and use Q-tips to apply.  Bandaids   as needed  Gentle soap     How should I care for my wound long term?  Do not get your wound dirty  Keep up with wound care for one week or until the area is healed.  A small scab will form and fall off by itself when the area is completely healed. The area will be red and will become pink in color as it heals. Sun protection is very important for how your scar will turn out. Sunscreen with an SPF 30 or greater is recommended once the area is healed.  You should have some soreness but it should be mild and slowly go away over several days. Talk to your doctor about using tylenol for pain,    When should I call my doctor?  If you have increased:   Pain or swelling  Pus or drainage (clear or slightly yellow drainage is ok)  Temperature over 100F  Spreading redness or warmth around wound    When will I hear about my results?  The biopsy results can take 2 weeks to come back.  Your results will automatically release to [email or web address] before your provider has even reviewed them.  The clinic will call you with the results, send you a [employer] message, or have you schedule a follow-up clinic or phone time to discuss the results.  Contact our clinics if you do not hear from us in 2 weeks.    Who should I call with questions?  [facility]: [phone]  [facility]: [phone]  For urgent needs outside of business hours call  the [facility] at [phone] and ask for the dermatology resident on call

## 2022-06-21 NOTE — PROGRESS NOTES
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  - Current tx: ILK10 injections  (12/20/21, 2/1/22, 3/21/22, 5/10/22), clobetasol propionate 0.05% shampoo alternating w/ zinc shampoo every other day, fluocinolone oil one weekly  - Previous tx: plaquenil w/ significant improvement; discontinued 12/2019 with concerns for retinopathy, however, this was later disproven.  # Facial papules related to FFA   - Current tx: tretinoin 0.025% cream every other day (holding currently due to vacationing)  # Skin infection, vertex scalp at prior site of MMS  - s/p doxycycline 100 mg BID and mupirocin ointment BID  # Hx of NMSC  - BCC, front vertex scalp, s/p Mohs with purse string and granulation, 3/30/22  - BCC, forehead, s/p MMS 6/9/21  - SCCis, R vertex scalp, s/p MMS 3/2/21, bacterial cx obtained from site 3/23/21  - BCC on right nasal ala s/p Mohs 11/27/17  - Nodular BCC on the vertex scalp s/p Mohs 6/5/15  - SCC of scalp, s/p Mohs 4/18/2014  - Hx of 1-2 other NMSC on scalp (BCCs)  # AK, bilateral temples, cheeks  - Cryotherapy 9/24/21, 12/20/21, 2/1/21, 5/10/22  # Inflamed SKs, R cheek and R temporal scalp  - Cryotherapy 9/24/21  ____________________________________________    Assessment & Plan:    # Recent BCC, front vertex scalp, s/p MMS 3/30/22 with purse string closure and granulation  Healed well. No additional treatment needed at this time.     # NUB, front vertex scalp, hyperkeratotic tender papule within a background of EPD. Biopsy taken today to rule out SCC.   - Shave biopsy today, see procedure note below     # Lichen planopilaris  Hair loss is stable to improving, recommended tapering steroid injections and decreasing frequency of fluocinolone. Will skip injections today and re-evaluate at 5  week follow up.   - Decrease fluocinolone oil from 3 times per week to once per week  - Continue clobetasol 0.05% shampoo alternating with zinc shampoo every other day     Procedures Performed:   - Shave biopsy procedure note, location(s): front vertex scalp. After discussion of benefits and risks including but not limited to bleeding, infection, scar, incomplete removal, recurrence, and non-diagnostic biopsy, written consent and photographs were obtained. The area was cleaned with isopropyl alcohol. 0.5mL of 1% lidocaine with epinephrine was injected to obtain adequate anesthesia of lesion(s). Shave biopsy at site(s) performed. Hemostasis was achieved with aluminium chloride. Petrolatum ointment and a sterile dressing were applied. The patient tolerated the procedure and no complications were noted. The patient was provided with verbal and written post care instructions.     Follow-up: As scheduled on 8/1/22    Staff and Scribe:     Scribe Disclosure:  I, Brit Bates, am serving as a scribe to document services personally performed by Barbara Soto MD based on data collection and the provider's statements to me.     Alea Cedillo MD (PGY-4)  Dermatology Resident     Provider Disclosure:   The documentation recorded by the scribe accurately reflects the services I personally performed and the decisions made by me.    Patient was seen and examined with the dermatology resident. I agree with the history, review of systems, physical examination, assessments and plan. I was present for the key portion of the biopsy procedure.    Barbara Soto MD  Professor and  Chair  Department of Dermatology  [facility]  ____________________________________________    CC: Hair Loss (Ester is here for a hairloss follow-up. States her condition has remained the same since last seen.)    HPI:  Ms. Ester Barba is a(n) 76 year old female who presents today as a return patient for hair loss. The patient was  last seen in dermatology on 5/10/22 by myself at which time she was continued on clobetasol/zinc shampoo and fluocinolone oil for treatment of lichen planopilaris. Additionally, 1.7 total mL of triamcinolone 10 mg/mL was injected into 17 sites on the scalp.    - Current tx: washing hair every day to every other day, alternating clobetasol and ketoconazole shampoos and using DHS zinc every once in a while; applying derma smoothe oil 3 times a week to the entire scalp; also applies vaseline every day   - Also recently noticed a new bump next to her mohs surgical site, feels a little tender when pressed  - Scalp pain: stable, usually diffuse   - Scalp burning: denies  - Scalp itching: stable, usually at the back of the scalp   - Eyebrow or eyelash changes: denies  - Nail changes: stable  - Overall course: stable    Patient is otherwise feeling well, without additional skin concerns.    Labs Reviewed:  None    Physical Exam:  Vitals: There were no vitals taken for this visit.  SKIN: Focused examination of the scalp and face was performed.  -Vertex scalp with white scar-like plaque with overlying yellow papules, one papule is tender to the touch   -Immediately anterior to this there is a well healed pink scar from recent MMS  -Scalp with mild pink erythema and minimal perifollicular scale         Medications:  Current Outpatient Medications   Medication     Biotin 10 MG TABS tablet     Calcium 1500 MG TABS     Cholecalciferol (VITAMIN D) 1000 UNIT capsule     clobetasol propionate (CLOBEX) 0.05 % external shampoo     COMPOUNDED NON-CONTROLLED SUBSTANCE (CMPD RX) - PHARMACY TO MIX COMPOUNDED MEDICATION     desonide (DESOWEN) 0.05 % external cream     esomeprazole (NEXIUM) 40 MG DR capsule     Fluocinolone Acetonide Scalp (DERMA-SMOOTHE/FS SCALP) 0.01 % OIL oil     glucosamine-chondroitin 500-400 MG CAPS per capsule     hydrocortisone 2.5 % cream     ibuprofen (ADVIL,MOTRIN) 800 MG tablet     ketoconazole (NIZORAL) 2 %  external cream     Loratadine (CLARITIN PO)     NEW MED     tretinoin (RETIN-A) 0.025 % external cream     cefdinir (OMNICEF) 300 MG capsule     doxycycline monohydrate (MONODOX) 100 MG capsule     Current Facility-Administered Medications   Medication     hydrocortisone 2.5 % cream     lidocaine 1% with EPINEPHrine 1:100,000 injection 3 mL     triamcinolone acetonide (KENALOG-10) injection 10 mg     triamcinolone acetonide (KENALOG-10) injection 10 mg     triamcinolone acetonide (KENALOG-10) injection 10 mg     triamcinolone acetonide (KENALOG-10) injection 17 mg      Past Medical History:   Patient Active Problem List   Diagnosis     Porokeratosis     Squamous cell carcinoma     Neoplasm of uncertain behavior     Lichen planopilaris     Dermatitis     Cicatricial alopecia     Keratosis, inflamed seborrheic     History of skin cancer     Basal cell carcinoma of vertex scalp s/p mohs 6-5-15     Medication management     Actinic keratosis     Medication monitoring encounter     Dermatitis, seborrheic     Erosive pustular dermatosis     Tick bite, initial encounter     Vulvar atrophy     Factor V Leiden mutation (H)     Splenic artery aneurysm (H)     Post concussion syndrome     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

## 2022-06-21 NOTE — NURSING NOTE
Lidocaine-epinephrine 1-1:223522 % injection   1.5mL once for one use, starting 6/21/2022 ending 6/21/2022,  2mL disp, R-0, injection  Injected by Dr. Cedillo

## 2022-06-23 LAB
PATH REPORT.COMMENTS IMP SPEC: NORMAL
PATH REPORT.FINAL DX SPEC: NORMAL
PATH REPORT.GROSS SPEC: NORMAL
PATH REPORT.MICROSCOPIC SPEC OTHER STN: NORMAL
PATH REPORT.RELEVANT HX SPEC: NORMAL

## 2022-08-01 ENCOUNTER — OFFICE VISIT (OUTPATIENT)
Dept: DERMATOLOGY | Facility: CLINIC | Age: 77
End: 2022-08-01
Payer: MEDICARE

## 2022-08-01 DIAGNOSIS — L98.8 EROSIVE PUSTULAR DERMATOSIS: Primary | ICD-10-CM

## 2022-08-01 DIAGNOSIS — L57.0 ACTINIC KERATOSIS: ICD-10-CM

## 2022-08-01 DIAGNOSIS — L66.10 LICHEN PLANOPILARIS: ICD-10-CM

## 2022-08-01 DIAGNOSIS — T14.8XXA OPEN WOUND OF SKIN: ICD-10-CM

## 2022-08-01 DIAGNOSIS — L30.9 DERMATITIS: ICD-10-CM

## 2022-08-01 PROCEDURE — 17000 DESTRUCT PREMALG LESION: CPT | Mod: XS | Performed by: DERMATOLOGY

## 2022-08-01 PROCEDURE — 11901 INJECT SKIN LESIONS >7: CPT | Performed by: DERMATOLOGY

## 2022-08-01 PROCEDURE — 99213 OFFICE O/P EST LOW 20 MIN: CPT | Mod: 25 | Performed by: DERMATOLOGY

## 2022-08-01 RX ORDER — CLOBETASOL PROPIONATE 0.05 G/100ML
SHAMPOO TOPICAL
Qty: 118 ML | Refills: 0 | Status: SHIPPED | OUTPATIENT
Start: 2022-08-01 | End: 2022-08-31

## 2022-08-01 RX ORDER — MUPIROCIN 20 MG/G
OINTMENT TOPICAL
Qty: 30 G | Refills: 3 | Status: SHIPPED | OUTPATIENT
Start: 2022-08-01

## 2022-08-01 RX ORDER — CLOBETASOL PROPIONATE 0.5 MG/G
OINTMENT TOPICAL
Qty: 60 G | Refills: 1 | Status: SHIPPED | OUTPATIENT
Start: 2022-08-01

## 2022-08-01 ASSESSMENT — PAIN SCALES - GENERAL: PAINLEVEL: MODERATE PAIN (4)

## 2022-08-01 NOTE — NURSING NOTE
Drug Administration Record    verified patient identity using patient's name and date of birth.   patient instructed to remain in clinic for 15 minutes  afterwards, and to report any adverse reaction to me immediately.    Drug Name: Mupirocin oint. 2%    Route administered: topical  NDC #: 10836-943-97  Amount of waste(mL):0  Reason for waste: tube given to patient    LOT #: [id number]  SITE: scalp  : perrigo  EXPIRATION DATE: 03/2023     syncope Fall

## 2022-08-01 NOTE — NURSING NOTE
Chief Complaint   Patient presents with     Hair Loss     Patient is here today for follow up massiel.      Joan GALLEGOS CMA

## 2022-08-01 NOTE — PROGRESS NOTES
[facility] Dermatology Note  Encounter Date: Aug 1, 2022  Office Visit     Dermatology Problem List:  1. Lichen planopilaris. Bx 8/31/21 revealed lichenoid keratosis of the vertex scalp.  - Current tx: ILK10 injections  (12/20/21, 2/1/22, 3/21/22, 5/10/22, 8/1/22), clobetasol propionate 0.05% shampoo alternating w/ zinc shampoo every other day, fluocinolone oil once weekly  - Previous tx: plaquenil w/ significant improvement; discontinued 12/2019 with concerns for retinopathy, however, this was later disproven.  - LPPAI score: 0.67     2. Facial papules related to FFA   - Current tx: tretinoin 0.025% cream every other day     3. Erosive pustular dermatosis  - s/p shave biopsy 6/21/2022 favored to represent the patient's known diagnosis of erosive pustular dermatosis of the scalp.   - started mupirocin ointment twice daily alternating every other day with clobetasol ointment twice daily to the open wound on the vertex scalp.     4. Skin infection, vertex scalp at prior site of MMS - resolved  - s/p doxycycline 100 mg BID and mupirocin ointment BID     5. Hx of NMSC  - BCC, front vertex scalp, s/p Mohs with purse string and granulation, 3/30/22  - BCC, forehead, s/p MMS 6/9/21  - SCCis, R vertex scalp, s/p MMS 3/2/21, bacterial cx obtained from site 3/23/21  - BCC on right nasal ala s/p Mohs 11/27/17  - Nodular BCC on the vertex scalp s/p Mohs 6/5/15  - SCC of scalp, s/p Mohs 4/18/2014  - Hx of 1-2 other NMSC on scalp (BCCs)    6. AK, bilateral temples, cheeks  - Cryotherapy 9/24/21, 12/20/21, 2/1/21, 5/10/22, 8/1/22    7. Inflamed SKs, R cheek and R temporal scalp  - Cryotherapy 9/24/21  ____________________________________________    Assessment & Plan:     # Recent BCC, front vertex scalp, s/p MMS 3/30/22 with purse string closure and granulation  Healed well. No additional treatment needed at this time.      #Erosive pustular dermatoses,   - Shave biopsy of hyperkeratotic tender papule on front  vertex scalp within a background of EPD 6/21/22: Mixed orthokeratosis and parakeratosis with associated neutrophils favored to represent the patient's known diagnosis of erosive pustular dermatosis of the scalp.  There is focal mild lower epidermal keratinocyte atypia consistent with actinic keratosis, but no evidence of carcinoma in the planes examined   -  Biopsy site overall healing well with granulation tissue, peripheral rim of yellow crust no residual evidence of superficial skin infection or evidence of necrosis.   - Start mupirocin ointment twice daily alternating every other day with clobetasol ointment twice daily to the open wound on the vertex scalp.     # LPP  Hair loss is stable in the frontal anterior scalp with improvement on the vertex.    -Recommended intralesional triamcinolone today.  Please see procedure note below.  Re-evaluate at 5 week follow up.   - Decrease fluocinolone oil from 3 times per week to once per week  - Continue clobetasol 0.05% shampoo alternating with zinc shampoo every other day     # Actinic Keratosis, Right cheek   -Cryotherapy procedure today (please see below)    Procedures Performed:   - Cryotherapy procedure note, location(s): Right cheek. After verbal consent and discussion of risks and benefits including, but not limited to, dyspigmentation/scar, blister, and pain, 1 lesion(s) was(were) treated with 1-2 mm freeze border for 1-2 cycles with liquid nitrogen. Post cryotherapy instructions were provided.  - Intra-lesional triamcinolone procedure note. After verbal consent and review of risk of pain and skin thinning/atrophy, positioning and cleansing with isopropyl alcohol, two total mL of triamcinolone 10 mg/mL was injected into 20 lesion(s) on the mid scalp, vertex scalp, occipital scalp. The patient tolerated the procedure well and left the dermatology clinic in good condition.    Follow-up: Appointment scheduled in September    Staff and Scribe:  and Medical  Student:    Marianela Gannon, MS4  I was present with the medical student who participated in the service and in the documentation of the note. I have verified the history and personally performed physical exam and medical decision making. I agree with the assessment and plan of care as documented in the note.    Staff Physician:  I was present with the medical student who participated in the service and in the documentation of the note. I have verified the history and personally performed the physical exam and medical decision making. I agree with the assessment and plan of care as documented in the note.  ILK injections were primarily done by me.      Barbara Soto MD  Professor and Chair  Department of Dermatology  [facility]: Phone: [phone], Fax:[phone]  [facility]: Phone: [phone], Fax: [phone]     Scribe Disclosure:  I, Mark Frost, am serving as a scribe to document services personally performed by Barbara Soto MD based on data collection and the provider's statements to me.     Provider Disclosure:   The documentation recorded by the scribe accurately reflects the services I personally performed and the decisions made by me.    Barbara Soto MD  Professor and Chair  Department of Dermatology  [facility]: Phone: [phone], Fax:[phone]  [facility]: Phone: [phone], Fax: [phone]      ____________________________________________    CC: Hair Loss (Patient is here today for follow up ilk. )    HPI:  Ms. Ester Barba is a 76 year old female who presents as a return patient for follow-up of hair loss, diagnosed as lichen planopilaris.   - Last seen in-clinic on 6/21/2022.  She believes that there is increased shedding and thinning on the  vertex scalp.  - Shedding or thinning, or both: both on the vertex  - Current tx: ILK10 injections  (12/20/21, 2/1/22, 3/21/22, 5/10/22, 8/1/22), clobetasol propionate 0.05% shampoo alternating w/ zinc shampoo every other day, fluocinolone oil one weekly  - Scalp or hair care habits/products: as above   - itching on the occipital scalp but trying not to touch  - 4th of July in [place] she found three ticks and was put on doxycycline for two weeks for Lyme disease prophylaxis, has been off of it for weeks   -Using tretinoin 0.025% cream every other day for the facial papules  - She states the open wound at the previous biopsy site on her vertex scalp is not healing.  She is using Vaseline daily to the area.  She would like this evaluated.  -Scaly spot on the right cheek, previous cryotherapy at the site.  She believes she needs more cryotherapy today.    No Any new medications, supplements, or products? (please list below)     No Scalp pain   No Scalp burning   Yes Scalp itching - occipital   No Eyebrow changes    No Eyelash changes   No Beard changes    No Other body hair changes    No Nail changes    No Additional symptoms? (please list below)     - Overall course: improved on the frontal anterior, slightly worsened on the vertex scalp     Patient is otherwise feeling well, in usual state of health, and has no additional skin concerns today.     ROS: As per HPI    Labs:  None reviewed.    Physical Exam:  Vitals: There were no vitals taken for this visit.  GEN: Well developed, well-nourished, in no acute distress, in a pleasant mood.    SKIN: Focused examination of scalp and face was performed.  - Khalil type: 1  -Flowers type III  -Mild pink erythema  - Minimal perifollicular scale  - No perifollicular erythema  - In comparison to prior photographs, from 6/21/2022 there is improvement on the vertex and crown of the scalp.  - No other lesions of concern on areas examined.     If FFA:  - R lateral forehead vein:  Neutral  - Midline forehead vein: Neutral  - L ateral forehead vein: Neutral  -A few facial papules                         Medications:  Current Outpatient Medications   Medication     Biotin 10 MG TABS tablet     Calcium 1500 MG TABS     Cholecalciferol (VITAMIN D) 1000 UNIT capsule     clobetasol propionate (CLOBEX) 0.05 % external shampoo     COMPOUNDED NON-CONTROLLED SUBSTANCE (CMPD RX) - PHARMACY TO MIX COMPOUNDED MEDICATION     desonide (DESOWEN) 0.05 % external cream     esomeprazole (NEXIUM) 40 MG DR capsule     Fluocinolone Acetonide Scalp (DERMA-SMOOTHE/FS SCALP) 0.01 % OIL oil     glucosamine-chondroitin 500-400 MG CAPS per capsule     hydrocortisone 2.5 % cream     ibuprofen (ADVIL,MOTRIN) 800 MG tablet     ketoconazole (NIZORAL) 2 % external cream     Loratadine (CLARITIN PO)     NEW MED     tretinoin (RETIN-A) 0.025 % external cream     cefdinir (OMNICEF) 300 MG capsule     doxycycline monohydrate (MONODOX) 100 MG capsule     Current Facility-Administered Medications   Medication     hydrocortisone 2.5 % cream     lidocaine 1% with EPINEPHrine 1:100,000 injection 3 mL     triamcinolone acetonide (KENALOG-10) injection 10 mg     triamcinolone acetonide (KENALOG-10) injection 10 mg     triamcinolone acetonide (KENALOG-10) injection 10 mg     triamcinolone acetonide (KENALOG-10) injection 17 mg      Past Medical History:   Patient Active Problem List   Diagnosis     Porokeratosis     Squamous cell carcinoma     Neoplasm of uncertain behavior     Lichen planopilaris     Dermatitis     Cicatricial alopecia     Keratosis, inflamed seborrheic     History of skin cancer     Basal cell carcinoma of vertex scalp s/p mohs 6-5-15     Medication management     Actinic keratosis     Medication monitoring encounter     Dermatitis, seborrheic     Erosive pustular dermatosis     Tick bite, initial encounter     Vulvar atrophy     Factor V Leiden mutation (H)     Splenic artery aneurysm (H)     Post concussion syndrome      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

## 2022-08-01 NOTE — LETTER
8/1/2022       RE: Ester Barba  [address] [place] [address]     Dear Colleague,    Thank you for referring your patient, Ester Barba, to the [facility] DERMATOLOGY CLINIC [place] at [facility]. Please see a copy of my visit note below.    [facility] Dermatology Note  Encounter Date: Aug 1, 2022  Office Visit     Dermatology Problem List:  1. Lichen planopilaris. Bx 8/31/21 revealed lichenoid keratosis of the vertex scalp.  - Current tx: ILK10 injections  (12/20/21, 2/1/22, 3/21/22, 5/10/22, 8/1/22), clobetasol propionate 0.05% shampoo alternating w/ zinc shampoo every other day, fluocinolone oil once weekly  - Previous tx: plaquenil w/ significant improvement; discontinued 12/2019 with concerns for retinopathy, however, this was later disproven.  - LPPAI score: 0.67     2. Facial papules related to FFA   - Current tx: tretinoin 0.025% cream every other day     3. Erosive pustular dermatosis  - s/p shave biopsy 6/21/2022 favored to represent the patient's known diagnosis of erosive pustular dermatosis of the scalp.   - started mupirocin ointment twice daily alternating every other day with clobetasol ointment twice daily to the open wound on the vertex scalp.     4. Skin infection, vertex scalp at prior site of MMS - resolved  - s/p doxycycline 100 mg BID and mupirocin ointment BID     5. Hx of NMSC  - BCC, front vertex scalp, s/p Mohs with purse string and granulation, 3/30/22  - BCC, forehead, s/p MMS 6/9/21  - SCCis, R vertex scalp, s/p MMS 3/2/21, bacterial cx obtained from site 3/23/21  - BCC on right nasal ala s/p Mohs 11/27/17  - Nodular BCC on the vertex scalp s/p Mohs 6/5/15  - SCC of scalp, s/p Mohs 4/18/2014  - Hx of 1-2 other NMSC on scalp (BCCs)    6. AK, bilateral temples, cheeks  - Cryotherapy 9/24/21, 12/20/21, 2/1/21, 5/10/22, 8/1/22    7. Inflamed SKs, R cheek and R temporal scalp  - Cryotherapy  9/24/21  ____________________________________________    Assessment & Plan:     # Recent BCC, front vertex scalp, s/p MMS 3/30/22 with purse string closure and granulation  Healed well. No additional treatment needed at this time.      #Erosive pustular dermatoses,   - Shave biopsy of hyperkeratotic tender papule on front vertex scalp within a background of EPD 6/21/22: Mixed orthokeratosis and parakeratosis with associated neutrophils favored to represent the patient's known diagnosis of erosive pustular dermatosis of the scalp.  There is focal mild lower epidermal keratinocyte atypia consistent with actinic keratosis, but no evidence of carcinoma in the planes examined   -  Biopsy site overall healing well with granulation tissue, peripheral rim of yellow crust no residual evidence of superficial skin infection or evidence of necrosis.   - Start mupirocin ointment twice daily alternating every other day with clobetasol ointment twice daily to the open wound on the vertex scalp.     # LPP  Hair loss is stable in the frontal anterior scalp with improvement on the vertex.    -Recommended intralesional triamcinolone today.  Please see procedure note below.  Re-evaluate at 5 week follow up.   - Decrease fluocinolone oil from 3 times per week to once per week  - Continue clobetasol 0.05% shampoo alternating with zinc shampoo every other day     # Actinic Keratosis, Right cheek   -Cryotherapy procedure today (please see below)    Procedures Performed:   - Cryotherapy procedure note, location(s): Right cheek. After verbal consent and discussion of risks and benefits including, but not limited to, dyspigmentation/scar, blister, and pain, 1 lesion(s) was(were) treated with 1-2 mm freeze border for 1-2 cycles with liquid nitrogen. Post cryotherapy instructions were provided.  - Intra-lesional triamcinolone procedure note. After verbal consent and review of risk of pain and skin thinning/atrophy, positioning and cleansing  with isopropyl alcohol, two total mL of triamcinolone 10 mg/mL was injected into 20 lesion(s) on the mid scalp, vertex scalp, occipital scalp. The patient tolerated the procedure well and left the dermatology clinic in good condition.    Follow-up: Appointment scheduled in September    Staff and Scribe:  and Medical Student:    Marianela Gannon MS4  I was present with the medical student who participated in the service and in the documentation of the note. I have verified the history and personally performed physical exam and medical decision making. I agree with the assessment and plan of care as documented in the note.    Staff Physician:  I was present with the medical student who participated in the service and in the documentation of the note. I have verified the history and personally performed the physical exam and medical decision making. I agree with the assessment and plan of care as documented in the note.  ILK injections were primarily done by me.      Barbara Soto MD  Professor and Chair  Department of Dermatology  [facility]: Phone: [phone], Fax:[phone]  [facility]: Phone: [phone], Fax: [phone]     Scribe Disclosure:  I, Mark Frost, am serving as a scribe to document services personally performed by Barbara Soto MD based on data collection and the provider's statements to me.     Provider Disclosure:   The documentation recorded by the scribe accurately reflects the services I personally performed and the decisions made by me.    Barbara Soto MD  Professor and Chair  Department of Dermatology  [facility]: Phone: [phone], Fax:[phone]  [facility]: Phone: [phone], Fax:  [phone]      ____________________________________________    CC: Hair Loss (Patient is here today for follow up ilk. )    HPI:  Ms. Ester Barba is a 76 year old female who presents as a return patient for follow-up of hair loss, diagnosed as lichen planopilaris.   - Last seen in-clinic on 6/21/2022.  She believes that there is increased shedding and thinning on the vertex scalp.  - Shedding or thinning, or both: both on the vertex  - Current tx: ILK10 injections  (12/20/21, 2/1/22, 3/21/22, 5/10/22, 8/1/22), clobetasol propionate 0.05% shampoo alternating w/ zinc shampoo every other day, fluocinolone oil one weekly  - Scalp or hair care habits/products: as above   - itching on the occipital scalp but trying not to touch  - 4th of July in [place] she found three ticks and was put on doxycycline for two weeks for Lyme disease prophylaxis, has been off of it for weeks   -Using tretinoin 0.025% cream every other day for the facial papules  - She states the open wound at the previous biopsy site on her vertex scalp is not healing.  She is using Vaseline daily to the area.  She would like this evaluated.  -Scaly spot on the right cheek, previous cryotherapy at the site.  She believes she needs more cryotherapy today.    No Any new medications, supplements, or products? (please list below)     No Scalp pain   No Scalp burning   Yes Scalp itching - occipital   No Eyebrow changes    No Eyelash changes   No Beard changes    No Other body hair changes    No Nail changes    No Additional symptoms? (please list below)     - Overall course: improved on the frontal anterior, slightly worsened on the vertex scalp     Patient is otherwise feeling well, in usual state of health, and has no additional skin concerns today.     ROS: As per HPI    Labs:  None reviewed.    Physical Exam:  Vitals: There were no vitals taken for this visit.  GEN: Well developed, well-nourished, in no acute distress, in a pleasant mood.    SKIN: Focused  examination of scalp and face was performed.  - Khalil type: 1  -Flowers type III  -Mild pink erythema  - Minimal perifollicular scale  - No perifollicular erythema  - In comparison to prior photographs, from 6/21/2022 there is improvement on the vertex and crown of the scalp.  - No other lesions of concern on areas examined.     If FFA:  - R lateral forehead vein: Neutral  - Midline forehead vein: Neutral  - L ateral forehead vein: Neutral  -A few facial papules                         Medications:  Current Outpatient Medications   Medication     Biotin 10 MG TABS tablet     Calcium 1500 MG TABS     Cholecalciferol (VITAMIN D) 1000 UNIT capsule     clobetasol propionate (CLOBEX) 0.05 % external shampoo     COMPOUNDED NON-CONTROLLED SUBSTANCE (CMPD RX) - PHARMACY TO MIX COMPOUNDED MEDICATION     desonide (DESOWEN) 0.05 % external cream     esomeprazole (NEXIUM) 40 MG DR capsule     Fluocinolone Acetonide Scalp (DERMA-SMOOTHE/FS SCALP) 0.01 % OIL oil     glucosamine-chondroitin 500-400 MG CAPS per capsule     hydrocortisone 2.5 % cream     ibuprofen (ADVIL,MOTRIN) 800 MG tablet     ketoconazole (NIZORAL) 2 % external cream     Loratadine (CLARITIN PO)     NEW MED     tretinoin (RETIN-A) 0.025 % external cream     cefdinir (OMNICEF) 300 MG capsule     doxycycline monohydrate (MONODOX) 100 MG capsule     Current Facility-Administered Medications   Medication     hydrocortisone 2.5 % cream     lidocaine 1% with EPINEPHrine 1:100,000 injection 3 mL     triamcinolone acetonide (KENALOG-10) injection 10 mg     triamcinolone acetonide (KENALOG-10) injection 10 mg     triamcinolone acetonide (KENALOG-10) injection 10 mg     triamcinolone acetonide (KENALOG-10) injection 17 mg      Past Medical History:   Patient Active Problem List   Diagnosis     Porokeratosis     Squamous cell carcinoma     Neoplasm of uncertain behavior     Lichen planopilaris     Dermatitis     Cicatricial alopecia     Keratosis, inflamed  seborrheic     History of skin cancer     Basal cell carcinoma of vertex scalp s/p mohs 6-5-15     Medication management     Actinic keratosis     Medication monitoring encounter     Dermatitis, seborrheic     Erosive pustular dermatosis     Tick bite, initial encounter     Vulvar atrophy     Factor V Leiden mutation (H)     Splenic artery aneurysm (H)     Post concussion syndrome     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      Again, thank you for allowing me to participate in the care of your patient.      Sincerely,    Barbara Soto MD

## 2022-08-01 NOTE — PATIENT INSTRUCTIONS
Kenalog injected today    Continue using clobetasol shampoo alternating with zinc shampoo every other day.    Continue fluocinolone oil once weekly.    Please start mupirocin ointment and clobetasol ointment alternating every other day to the wound on the scalp.     We will see you for your follow-up appointment in September

## 2022-08-08 ENCOUNTER — TELEPHONE (OUTPATIENT)
Dept: DERMATOLOGY | Facility: CLINIC | Age: 77
End: 2022-08-08

## 2022-08-08 NOTE — TELEPHONE ENCOUNTER
Lvm informing pt that her appointments on 9/13, 10/18, and 11/29 has been cancelled due to  not being in the clinic. Sent her a [employer] message and sent out the [facility] reschedule letters. Pt will be call once [facility] location is open.

## 2022-08-10 ENCOUNTER — TELEPHONE (OUTPATIENT)
Dept: DERMATOLOGY | Facility: CLINIC | Age: 77
End: 2022-08-10

## 2022-08-10 NOTE — TELEPHONE ENCOUNTER
[facility] Call Center    Phone Message    May a detailed message be left on voicemail: yes     Reason for Call: Appointment Intake    Referring Provider Name: Barbara Soto   Diagnosis and/or Symptoms: ILK Injections  Pt Ester would like to reschedule the 3 ILK appts that were canceled. Please call. [phone]  If possible she would like it rescheduled for close to the previous times    Action Taken: Message routed to:  [facility] Derm    Travel Screening: Not Applicable

## 2022-08-16 NOTE — TELEPHONE ENCOUNTER
[facility] Call Center    Phone Message    May a detailed message be left on voicemail: yes     Reason for Call: Other: Pt states she is concerned because she hasn't heard back from anyone about rescheduling her cancelled ILK appointments.     Please call Ester back to reschedule. Thank you.     Action Taken: Message routed to:  [facility] Derm    Travel Screening: Not Applicable

## 2022-08-17 NOTE — TELEPHONE ENCOUNTER
Called and verified patient by last name and . Informed patient that Dr. Soto was booked out until next year. Informed that patient is on waitlist for [facility] and [facility]. Patient acknowledged. States she is very concerned as she has a wound on her scalp and needs monthly injections. States she would like a message sent to Dr. Soto to get overbooked.     Nathan Workman, EMT

## 2022-08-22 NOTE — TELEPHONE ENCOUNTER
Barbara Soto MD Flores, Marc; Nicki Cook RN; P [facility] Coordinators[facility]  Caller: Unspecified (1 week ago)  11:45 am on September 12th or at the [place] site on Tuesday - let's make this work!   We need to be watchful for patients who need to be seen on a frequent basis. They will flare otherwise and then require even more visits.     Janice, can you facilitate a visit to Rachana Moore on the 13th if the 12th does not work at the [facility]?     Regards,

## 2022-08-22 NOTE — TELEPHONE ENCOUNTER
Called and verified patient by last name and . Patient scheduled for 11:45am on  at [facility] location. Patient is unsure if she will be able to make appointment but requested to schedule appointment regardless and will call back later today to confirm or reschedule.     Nathan Workman, EMT

## 2022-08-23 NOTE — TELEPHONE ENCOUNTER
Patient called appointment line and requested to reschedule appointment as she is going to be out of town on SEP 12th. Call forwarded to Derm Nurse line. States she would be able to make SEP 13th at  site if that appointment is still available. She wishes to keep SEP 12th appointment until she is rescheduled. States she might be able to make it if no other options are available but she would prefer to reschedule.    Nathan Workman, EMT

## 2022-08-27 DIAGNOSIS — L30.9 DERMATITIS: ICD-10-CM

## 2022-08-27 DIAGNOSIS — L66.10 LICHEN PLANOPILARIS: ICD-10-CM

## 2022-08-31 RX ORDER — CLOBETASOL PROPIONATE 0.05 G/100ML
SHAMPOO TOPICAL
Qty: 118 ML | Refills: 1 | Status: SHIPPED | OUTPATIENT
Start: 2022-08-31 | End: 2023-01-16

## 2022-08-31 NOTE — PROGRESS NOTES
[facility] Dermatology Note  Encounter Date: Sep 13, 2022  Office Visit     Dermatology Problem List:  1. Lichen planopilaris. Bx 8/31/21 revealed lichenoid keratosis of the vertex scalp.  - Current tx: ILK10 injections  (12/20/21, 2/1/22, 3/21/22, 5/10/22, 8/1/22), clobetasol propionate 0.05% shampoo alternating w/ zinc shampoo every other day, fluocinolone oil once weekly  - Previous tx: plaquenil w/ significant improvement; discontinued 12/2019 with concerns for retinopathy, however, this was later disproven.  - LPPAI score: 0.67      2. Facial papules related to FFA   - Current tx: tretinoin 0.025% cream every other day      3. Erosive pustular dermatosis  - s/p shave biopsy 6/21/2022 favored to represent the patient's known diagnosis of erosive pustular dermatosis of the scalp.   - started mupirocin ointment twice daily alternating every other day with clobetasol ointment twice daily to the open wound on the vertex scalp.      4. Skin infection, vertex scalp at prior site of MMS - resolved  - s/p doxycycline 100 mg BID and mupirocin ointment BID      5. Hx of NMSC  - BCC, front vertex scalp, s/p Mohs with purse string and granulation, 3/30/22  - BCC, forehead, s/p MMS 6/9/21  - SCCis, R vertex scalp, s/p MMS 3/2/21, bacterial cx obtained from site 3/23/21  - BCC on right nasal ala s/p Mohs 11/27/17  - Nodular BCC on the vertex scalp s/p Mohs 6/5/15  - SCC of scalp, s/p Mohs 4/18/2014  - Hx of 1-2 other NMSC on scalp (BCCs)     6. AK, bilateral temples, cheeks  - Cryotherapy 9/24/21, 12/20/21, 2/1/21, 5/10/22, 8/1/22     7. Inflamed SKs, R cheek and R temporal scalp  - Cryotherapy 9/24/21  ____________________________________________     Assessment & Plan:      # LPP - there is crusting on the vertex scalp.   - See ILK procedure note.   - Apply fluocinolone oil once per week. Recommend spot treatment more frequently to the dry spots   - Continue clobetasol 0.05% shampoo alternating with zinc  shampoo every other day      # Facial papules related to FFA   - Continue tretinoin 0.025% cream every other day     # Actinic keratosis - right cheek  - See cryo note.     Procedures Performed:     Kenalog intralesional injection procedure note: After verbal consent and discussion of risks including but not limited to atrophy, pain, and bruising, time out was performed, the patient underwent positioning and the area was prepped with isopropyl alcohol, 1.2 total cc of Kenalog 10 mg/cc was injected into 12 sites on the mid scalp and lower posterior scalp (left and right).  The patient tolerated the procedure well and left the Dermatology clinic in good condition.      Cryotherapy procedure note: After verbal consent and discussion of risks and benefits including but no limited to dyspigmentation/scar, blister, and pain, 1 was(were) treated with 1-2mm freeze border for 2 cycles with liquid nitrogen. Post cryotherapy instructions were provided.       Follow-up: 5-6 weeks for hair loss.     Staff and Scribe:     Scribe Disclosure:   I, Rodrigo Castañeda, am serving as a scribe to document services personally performed by this physician, Dr. Barbara Soto, based on data collection and the provider's statements to me.       Provider Disclosure:   The documentation recorded by the scribe accurately reflects the services I personally performed and the decisions made by me.    Barbara Soto MD  Professor and Chair  Department of Dermatology  [facility]: Phone: [phone], Fax:[phone]  [facility]: Phone: [phone], Fax: [phone]      ____________________________________________    CC: Derm Problem (Lichen planopilaris - using clobetasol and mupirocin for scalp lesion )    HPI:  Ms. Ester Barba is a(n) 76 year old female who presents today as a return patient for hair loss.     Last seen on  8/1/22 for LPP. At that time, the scalp was injected with 2 ml of triamcinolone 10 mg/mL. Patient to continue dermasmooth three times weekly and clobetasol. Patient to apply mupirocin to the scalp for erosive pustular dermatosis. AK on the right cheek was treated with cryo.     Today, patient notes no new supplements. Currently using the clobetasol shampoo every 2-3 times per week.     Patient notes pain to palpation. Also notes burning and itching on the lower scalp.  Currently using moisturizer. Not using aspirin.     Patient is otherwise feeling well, without additional skin concerns.    Labs Reviewed:  N/A    Physical Exam:  Vitals: There were no vitals taken for this visit.  SKIN: Focused examination of scalp and face was performed.  - no diffused erythema  - no spreading  - perifollicular scale  - there are focal areas of crust on the mid scalp - in area which is sensitive to palpation  - pull test is negative   - there is no evidence for an infection today  - There is a erythematous macule with overlying adherent scale on the right cheek.   - No other lesions of concern on areas examined.     Medications:  Current Outpatient Medications   Medication     Biotin 10 MG TABS tablet     Calcium 1500 MG TABS     cefdinir (OMNICEF) 300 MG capsule     Cholecalciferol (VITAMIN D) 1000 UNIT capsule     clobetasol (TEMOVATE) 0.05 % external ointment     clobetasol propionate (CLOBEX) 0.05 % external shampoo     COMPOUNDED NON-CONTROLLED SUBSTANCE (CMPD RX) - PHARMACY TO MIX COMPOUNDED MEDICATION     desonide (DESOWEN) 0.05 % external cream     doxycycline monohydrate (MONODOX) 100 MG capsule     esomeprazole (NEXIUM) 40 MG DR capsule     Fluocinolone Acetonide Scalp (DERMA-SMOOTHE/FS SCALP) 0.01 % OIL oil     glucosamine-chondroitin 500-400 MG CAPS per capsule     hydrocortisone 2.5 % cream     ibuprofen (ADVIL,MOTRIN) 800 MG tablet     ketoconazole (NIZORAL) 2 % external cream     Loratadine (CLARITIN PO)     mupirocin  (BACTROBAN) 2 % external ointment     NEW MED     tretinoin (RETIN-A) 0.025 % external cream     Current Facility-Administered Medications   Medication     hydrocortisone 2.5 % cream     lidocaine 1% with EPINEPHrine 1:100,000 injection 3 mL     triamcinolone acetonide (KENALOG-10) injection 10 mg     triamcinolone acetonide (KENALOG-10) injection 10 mg     triamcinolone acetonide (KENALOG-10) injection 10 mg     triamcinolone acetonide (KENALOG-10) injection 17 mg      Past Medical History:   Patient Active Problem List   Diagnosis     Porokeratosis     Squamous cell carcinoma     Neoplasm of uncertain behavior     Lichen planopilaris     Dermatitis     Cicatricial alopecia     Keratosis, inflamed seborrheic     History of skin cancer     Basal cell carcinoma of vertex scalp s/p mohs 6-5-15     Medication management     Actinic keratosis     Medication monitoring encounter     Dermatitis, seborrheic     Erosive pustular dermatosis     Tick bite, initial encounter     Vulvar atrophy     Factor V Leiden mutation (H)     Splenic artery aneurysm (H)     Post concussion syndrome     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 on close of this encounter.

## 2022-09-13 ENCOUNTER — OFFICE VISIT (OUTPATIENT)
Dept: DERMATOLOGY | Facility: CLINIC | Age: 77
End: 2022-09-13
Payer: MEDICARE

## 2022-09-13 ENCOUNTER — TELEPHONE (OUTPATIENT)
Dept: OBGYN | Facility: CLINIC | Age: 77
End: 2022-09-13

## 2022-09-13 DIAGNOSIS — L65.9 HAIR LOSS: Primary | ICD-10-CM

## 2022-09-13 DIAGNOSIS — L66.10 LICHEN PLANOPILARIS: ICD-10-CM

## 2022-09-13 DIAGNOSIS — N90.5 VULVAR ATROPHY: ICD-10-CM

## 2022-09-13 DIAGNOSIS — L57.0 ACTINIC KERATOSIS: ICD-10-CM

## 2022-09-13 DIAGNOSIS — L82.0 INFLAMED SEBORRHEIC KERATOSIS: ICD-10-CM

## 2022-09-13 PROCEDURE — 17000 DESTRUCT PREMALG LESION: CPT | Mod: XS | Performed by: DERMATOLOGY

## 2022-09-13 PROCEDURE — 99213 OFFICE O/P EST LOW 20 MIN: CPT | Mod: 25 | Performed by: DERMATOLOGY

## 2022-09-13 PROCEDURE — 17110 DESTRUCTION B9 LES UP TO 14: CPT | Performed by: DERMATOLOGY

## 2022-09-13 PROCEDURE — 11901 INJECT SKIN LESIONS >7: CPT | Mod: XS | Performed by: DERMATOLOGY

## 2022-09-13 ASSESSMENT — PAIN SCALES - GENERAL: PAINLEVEL: EXTREME PAIN (8)

## 2022-09-13 NOTE — PATIENT INSTRUCTIONS
Intralesional Kenalog (ILK) Injections    Intralesional steroid injection involves a corticosteroid, such as triamcinolone acetonide (brand name Kenalog), which is injected directly into a lesion on or immediately below the skin. Intralesional kenalog may be used to treat the following skin conditions:    Alopecia areata  Discoid lupus erythematosus  Keloids/hypertrophic scars  Granuloma annulare and other granulomatous disorders  Hypertrophic lichen planus  Lichen simplex chronicus (neurodermatitis)  Localised psoriasis  Necrobiosis lipoidica  Acne cysts (nodulocystic acne)  Small infantile hemangiomas    Possible side-effects of intralesional Kenalog (ILK) injections include bleeding, pain, infection, contact dermatitis, nerve damage, scar formation and need for a repeat procedure.    Some people may experience delayed side-effects including:  Lipoatrophy, appearing as skin indentations or dimples around the injection sites a few weeks after treatment; these may be permanent.  White marks (leukoderma) or brown marks (postinflammatory pigmentation) at the site of injection or spreading from the site of injection - these may resolve or persist long term.  Telangiectasia, or small dilated blood vessels at the site of injection.   Increased hair growth at the site of injection - this resolves eventually.  Steroid acne: steroids increase growth hormone, leading to increased sebum (oil) production by the sebaceous glands. Steroid acne generally improves once the steroid has been stopped.      Who should I call with questions?  [facility]: [phone]   [facility]: [phone]  For urgent needs outside of business hours call the [facility] at [phone] and ask for the dermatology resident on call        Cryotherapy    What is it?  Use of a very cold liquid, such as liquid nitrogen, to freeze and destroy abnormal skin cells that need to be  removed    What should I expect?  Tenderness and redness  A small blister that might grow and fill with dark purple blood. There may be crusting.  More than one treatment may be needed if the lesions do not go away.    How do I care for the treated area?  Gently wash the area with your hands when bathing.  Use a thin layer of Vaseline to help with healing. You may use a Band-Aid.   The area should heal within 7-10 days and may leave behind a pink or lighter color.   Do not use an antibiotic or Neosporin ointment.   You may take acetaminophen (Tylenol) for pain.     Call your doctor if you have:  Severe pain  Signs of infection (warmth, redness, cloudy yellow drainage, and or a bad smell)  Questions or concerns    Who should I call with questions?      [facility]: [phone]      [facility]: [phone]      For urgent needs outside of business hours call the [facility] at [phone] and ask for the dermatology resident on call

## 2022-09-13 NOTE — LETTER
9/13/2022         RE: Ester Barba  [address] [place] [address]        Dear Colleague,    Thank you for referring your patient, Ester Barba, to the [facility]. Please see a copy of my visit note below.    [facility] Dermatology Note  Encounter Date: Sep 13, 2022  Office Visit     Dermatology Problem List:  1. Lichen planopilaris. Bx 8/31/21 revealed lichenoid keratosis of the vertex scalp.  - Current tx: ILK10 injections  (12/20/21, 2/1/22, 3/21/22, 5/10/22, 8/1/22), clobetasol propionate 0.05% shampoo alternating w/ zinc shampoo every other day, fluocinolone oil once weekly  - Previous tx: plaquenil w/ significant improvement; discontinued 12/2019 with concerns for retinopathy, however, this was later disproven.  - LPPAI score: 0.67      2. Facial papules related to FFA   - Current tx: tretinoin 0.025% cream every other day      3. Erosive pustular dermatosis  - s/p shave biopsy 6/21/2022 favored to represent the patient's known diagnosis of erosive pustular dermatosis of the scalp.   - started mupirocin ointment twice daily alternating every other day with clobetasol ointment twice daily to the open wound on the vertex scalp.      4. Skin infection, vertex scalp at prior site of MMS - resolved  - s/p doxycycline 100 mg BID and mupirocin ointment BID      5. Hx of NMSC  - BCC, front vertex scalp, s/p Mohs with purse string and granulation, 3/30/22  - BCC, forehead, s/p MMS 6/9/21  - SCCis, R vertex scalp, s/p MMS 3/2/21, bacterial cx obtained from site 3/23/21  - BCC on right nasal ala s/p Mohs 11/27/17  - Nodular BCC on the vertex scalp s/p Mohs 6/5/15  - SCC of scalp, s/p Mohs 4/18/2014  - Hx of 1-2 other NMSC on scalp (BCCs)     6. AK, bilateral temples, cheeks  - Cryotherapy 9/24/21, 12/20/21, 2/1/21, 5/10/22, 8/1/22     7. Inflamed SKs, R cheek and R temporal scalp  - Cryotherapy 9/24/21  ____________________________________________     Assessment &  Plan:      # LPP - there is crusting on the vertex scalp.   - See ILK procedure note.   - Apply fluocinolone oil once per week. Recommend spot treatment more frequently to the dry spots   - Continue clobetasol 0.05% shampoo alternating with zinc shampoo every other day      # Facial papules related to FFA   - Continue tretinoin 0.025% cream every other day     # Actinic keratosis - right cheek  - See cryo note.     Procedures Performed:     Kenalog intralesional injection procedure note: After verbal consent and discussion of risks including but not limited to atrophy, pain, and bruising, time out was performed, the patient underwent positioning and the area was prepped with isopropyl alcohol, 1.2 total cc of Kenalog 10 mg/cc was injected into 12 sites on the mid scalp and lower posterior scalp (left and right).  The patient tolerated the procedure well and left the Dermatology clinic in good condition.      Cryotherapy procedure note: After verbal consent and discussion of risks and benefits including but no limited to dyspigmentation/scar, blister, and pain, 1 was(were) treated with 1-2mm freeze border for 2 cycles with liquid nitrogen. Post cryotherapy instructions were provided.       Follow-up: 5-6 weeks for hair loss.     Staff and Scribe:     Scribe Disclosure:   Rodrigo SALDIVAR, am serving as a scribe to document services personally performed by this physician, Dr. Barbara Soto, based on data collection and the provider's statements to me.       Provider Disclosure:   The documentation recorded by the scribe accurately reflects the services I personally performed and the decisions made by me.    Barbara Soto MD  Professor and Chair  Department of Dermatology  [facility]: Phone: [phone], Fax:[phone]  [facility]: Phone: [phone], Fax:  [phone]      ____________________________________________    CC: Derm Problem (Lichen planopilaris - using clobetasol and mupirocin for scalp lesion )    HPI:  Ms. Ester Barba is a(n) 76 year old female who presents today as a return patient for hair loss.     Last seen on 8/1/22 for LPP. At that time, the scalp was injected with 2 ml of triamcinolone 10 mg/mL. Patient to continue dermasmooth three times weekly and clobetasol. Patient to apply mupirocin to the scalp for erosive pustular dermatosis. AK on the right cheek was treated with cryo.     Today, patient notes no new supplements. Currently using the clobetasol shampoo every 2-3 times per week.     Patient notes pain to palpation. Also notes burning and itching on the lower scalp.  Currently using moisturizer. Not using aspirin.     Patient is otherwise feeling well, without additional skin concerns.    Labs Reviewed:  N/A    Physical Exam:  Vitals: There were no vitals taken for this visit.  SKIN: Focused examination of scalp and face was performed.  - no diffused erythema  - no spreading  - perifollicular scale  - there are focal areas of crust on the mid scalp - in area which is sensitive to palpation  - pull test is negative   - there is no evidence for an infection today  - There is a erythematous macule with overlying adherent scale on the right cheek.   - No other lesions of concern on areas examined.     Medications:  Current Outpatient Medications   Medication     Biotin 10 MG TABS tablet     Calcium 1500 MG TABS     cefdinir (OMNICEF) 300 MG capsule     Cholecalciferol (VITAMIN D) 1000 UNIT capsule     clobetasol (TEMOVATE) 0.05 % external ointment     clobetasol propionate (CLOBEX) 0.05 % external shampoo     COMPOUNDED NON-CONTROLLED SUBSTANCE (CMPD RX) - PHARMACY TO MIX COMPOUNDED MEDICATION     desonide (DESOWEN) 0.05 % external cream     doxycycline monohydrate (MONODOX) 100 MG capsule     esomeprazole (NEXIUM) 40 MG DR capsule      Fluocinolone Acetonide Scalp (DERMA-SMOOTHE/FS SCALP) 0.01 % OIL oil     glucosamine-chondroitin 500-400 MG CAPS per capsule     hydrocortisone 2.5 % cream     ibuprofen (ADVIL,MOTRIN) 800 MG tablet     ketoconazole (NIZORAL) 2 % external cream     Loratadine (CLARITIN PO)     mupirocin (BACTROBAN) 2 % external ointment     NEW MED     tretinoin (RETIN-A) 0.025 % external cream     Current Facility-Administered Medications   Medication     hydrocortisone 2.5 % cream     lidocaine 1% with EPINEPHrine 1:100,000 injection 3 mL     triamcinolone acetonide (KENALOG-10) injection 10 mg     triamcinolone acetonide (KENALOG-10) injection 10 mg     triamcinolone acetonide (KENALOG-10) injection 10 mg     triamcinolone acetonide (KENALOG-10) injection 17 mg      Past Medical History:   Patient Active Problem List   Diagnosis     Porokeratosis     Squamous cell carcinoma     Neoplasm of uncertain behavior     Lichen planopilaris     Dermatitis     Cicatricial alopecia     Keratosis, inflamed seborrheic     History of skin cancer     Basal cell carcinoma of vertex scalp s/p mohs 6-5-15     Medication management     Actinic keratosis     Medication monitoring encounter     Dermatitis, seborrheic     Erosive pustular dermatosis     Tick bite, initial encounter     Vulvar atrophy     Factor V Leiden mutation (H)     Splenic artery aneurysm (H)     Post concussion syndrome     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 on close of this encounter.      Again, thank you for allowing me to participate in the care of your patient.        Sincerely,        Barbara Soto MD

## 2022-09-13 NOTE — NURSING NOTE
Ester Barba's chief complaint for this visit includes:  Chief Complaint   Patient presents with     Derm Problem     Lichen planopilaris - using clobetasol and mupirocin for scalp lesion      PCP: Winnie Sepulveda    Referring Provider:  No referring provider defined for this encounter.    There were no vitals taken for this visit.  Extreme Pain (8)        Allergies   Allergen Reactions     Dust Mites Other (See Comments)     Sneezing, coughing. asthma  Itchy watery eyes, sneezing     Estrogens Itching     Other reaction(s): Hypersensitivity  Topical caused burning sensation of skin  Topical caused burning sensation of skin; Premarin cream only - possibly only an bi-ingredient     Imiquimod Other (See Comments)     Hair loss     Levaquin [Levofloxacin Hemihydrate] Other (See Comments)     Muscle weakness     Levofloxacin Other (See Comments)     MUSCLE WEAKNESS, ARTHRITIS LIKE SYMPTOMS.       Mold Other (See Comments)     Sneezing, asthma, weezing     Pollen Extract/Tree Extract Other (See Comments)     Sneezing, weezing     Sulfa Drugs Hives     Sulfamethoxazole-Trimethoprim Hives     Latex Rash     LATEX BANDAGES; tapes adhesive     Penicillin G Rash     Penicillins Rash         Do you need any medication refills at today's visit? No    Miroslava Matute, [facility]

## 2022-09-13 NOTE — TELEPHONE ENCOUNTER
Refill request for biestrogen cream, last visit 5/2021. Due for visit, will sent reminder to schedule and short term marlene supply for refill per protocol.  Unable to Escribe, called [facility] and spoke to pharmacist, verbal order for refill and note to schedule annual.

## 2022-09-15 NOTE — TELEPHONE ENCOUNTER
[facility] Call Center    Phone Message    May a detailed message be left on voicemail: yes     Reason for Call: Pt called stating that she is wanting Dr. Mayfield to confirm if it is necessary that she be seen in order to refill her medication - Biest 1.5mg/gram cream(pg free) - and if so, if there is any chance that she can be seen soon. Writer found soonest availability to be 1/11/23. Please reach out to the pt to further discuss. Thank you    Action Taken: Message routed to:  Other: [facility]    Travel Screening: Not Applicable

## 2022-09-16 DIAGNOSIS — L98.8 EROSIVE PUSTULAR DERMATOSIS: ICD-10-CM

## 2022-09-22 ENCOUNTER — TELEPHONE (OUTPATIENT)
Dept: OBGYN | Facility: CLINIC | Age: 77
End: 2022-09-22

## 2022-10-04 ENCOUNTER — MYC MEDICAL ADVICE (OUTPATIENT)
Dept: OPHTHALMOLOGY | Facility: CLINIC | Age: 77
End: 2022-10-04

## 2022-10-04 ENCOUNTER — TELEPHONE (OUTPATIENT)
Dept: DERMATOLOGY | Facility: CLINIC | Age: 77
End: 2022-10-04

## 2022-10-04 ENCOUNTER — TELEPHONE (OUTPATIENT)
Dept: OPHTHALMOLOGY | Facility: CLINIC | Age: 77
End: 2022-10-04

## 2022-10-04 NOTE — TELEPHONE ENCOUNTER
I called the patient back and mentioned that we would recommend she continue to use the AREDS2 vitamins. We discussed that the original AREDS formula had concerns for increasing the risk of cancer in patients who used tobacco products, however, the AREDS2 formula studies did not reveal this risk. We discussed that [facility] & [facility] previously had a voluntary recall of their AREDS2 vitamins in 2020 due to concern for the pills causing difficulty with swallowing.

## 2022-10-04 NOTE — TELEPHONE ENCOUNTER
[facility] Call Center    Phone Message    May a detailed message be left on voicemail: yes     Reason for Call: Other: Patient had an appointment scheduled for 10/11/2022 which she cancelled online but now wants to reinstate. Writer unable to reschedule appt.   Please call back to let patient know if she can get her appointment back.    Thank you    Action Taken: Message routed to:  Adult Clinics: Dermatology p [phone]    Travel Screening: Not Applicable

## 2022-10-05 NOTE — PROGRESS NOTES
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  - Current tx: ILK10 injections  (12/20/21, 2/1/22, 3/21/22, 5/10/22, 8/1/22, 10/11/2022), clobetasol propionate 0.05% shampoo alternating w/ zinc shampoo every other day, fluocinolone oil once weekly  - Previous tx: plaquenil w/ significant improvement; discontinued 12/2019 with concerns for retinopathy, however, this was later disproven.  - LPPAI score: 0.67   2. 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 Skin infection, vertex scalp at prior site of MMS - resolved  - s/p doxycycline 100 mg BID and mupirocin ointment BID   4. Hx of NMSC  - BCC, front vertex scalp, s/p Mohs with purse string and granulation, 3/30/22  - BCC, forehead, s/p MMS 6/9/21  - SCCis, R vertex scalp, s/p MMS 3/2/21, bacterial cx obtained from site 3/23/21  - BCC on right nasal ala s/p Mohs 11/27/17  - Nodular BCC on the vertex scalp s/p Mohs 6/5/15  - SCC of scalp, s/p Mohs 4/18/2014  - Hx of 1-2 other NMSC on scalp (BCCs)  5. AK, bilateral temples, cheeks  - Cryotherapy 9/24/21, 12/20/21, 2/1/21, 5/10/22, 8/1/22  6.. Inflamed SKs, R cheek and R temporal scalp  - Cryotherapy 9/24/21  ____________________________________________     Assessment & Plan:      # Lichen planopilaris (LPP) - crusting on the scalp anteriorly. Activity is minimal today.   - See ILK procedure note.   - Apply fluocinolone oil once per week . Recommend applying more frequently as needed.  - Continue clobetasol 0.05% shampoo alternating with zinc shampoo every other day   - Photograph was obtained for clinical monitoring and inclusion in medical record.      # Facial papules related to FFA   - Continue tretinoin 0.025% cream every other day     # Three pink patches on the right cheek   - Will recheck at next visit for  resolution.   - Photograph was obtained for clinical monitoring and inclusion in medical record.     # 4 x 4 mm healing erosion on the crown   - Continue to apply the antibiotics (mupirocin) to the affected areas     Procedures Performed:   Kenalog intralesional injection procedure note: After verbal consent and discussion of risks including but not limited to atrophy, pain, and bruising, time out was performed, the patient underwent positioning and the area was prepped with isopropyl alcohol, 1.7 total cc of Kenalog 10 mg/cc was injected into 17 sites on the mid frontal scalp, vertex, lower occipital, and upper parietal scalp.  The patient tolerated the procedure well and left the Dermatology clinic in good condition.        Follow-up: 4 weeks hair loss.     Staff and Scribe:     Scribe Disclosure:   I, Rodrigo Castañeda, am serving as a scribe to document services personally performed by this physician, Dr. Barbara Soto, based on data collection and the provider's statements to me.     Provider Disclosure:   The documentation recorded by the scribe accurately reflects the services I personally performed and the decisions made by me. ILK injections were done by me.    Barbara Soto MD  Professor and Chair  Department of Dermatology  [facility]: Phone: [phone], Fax:[phone]  [facility]: Phone: [phone], Fax: [phone]      ____________________________________________    CC: Hair Loss (Following up after 4 weeks)    HPI:  Ms. Ester Barba is a 76 year old female who presents as a return patient for follow-up of hair loss, diagnosed as Lichen planopilaris..   - Last seen in-clinic on 9/13/22  - Shedding or thinning, or both: none   - yellow crusting on the anterior scalp   - Current tx:  fluocinolone oil, ILK, clobetasol 0.05% shampoo, zinc shampoo   No Any new medications,  supplements, or products? (please list below)     No Scalp pain   Mild Scalp burning   Mild Scalp itching    No Eyebrow changes    No Eyelash changes   No Beard changes    No Other body hair changes    No Nail changes    No Additional symptoms? (please list below)     - Overall course: patient notes some burning of the scalp after she bumped her head previously while in  [place]. States it stings when she touches it. Notes mild itching on the occipital scalp. Notes concern on the right temple.     Patient is otherwise feeling well, in usual state of health, and has no additional skin concerns today.       Labs:  None reviewed.    Physical Exam:  Vitals: There were no vitals taken for this visit.  GEN: Well developed, well-nourished, in no acute distress, in a pleasant mood.    SKIN: Focused examination of scalp and face was performed.  - Three pink patches on the right cheek    - 4 x 4 mm healing erosion on the crown  - perifollicular erythema  - perifollicular scale   - No other lesions of concern on areas examined.       Medications:  Current Outpatient Medications   Medication     Biotin 10 MG TABS tablet     Calcium 1500 MG TABS     cefdinir (OMNICEF) 300 MG capsule     Cholecalciferol (VITAMIN D) 1000 UNIT capsule     clobetasol (TEMOVATE) 0.05 % external ointment     clobetasol propionate (CLOBEX) 0.05 % external shampoo     COMPOUNDED NON-CONTROLLED SUBSTANCE (CMPD RX) - PHARMACY TO MIX COMPOUNDED MEDICATION     desonide (DESOWEN) 0.05 % external cream     doxycycline monohydrate (MONODOX) 100 MG capsule     esomeprazole (NEXIUM) 40 MG DR capsule     Fluocinolone Acetonide Scalp (DERMA-SMOOTHE/FS SCALP) 0.01 % OIL oil     glucosamine-chondroitin 500-400 MG CAPS per capsule     hydrocortisone 2.5 % cream     ibuprofen (ADVIL,MOTRIN) 800 MG tablet     ketoconazole (NIZORAL) 2 % external cream     Loratadine (CLARITIN PO)     mupirocin (BACTROBAN) 2 % external ointment     NEW MED     tretinoin (RETIN-A)  0.025 % external cream     Current Facility-Administered Medications   Medication     hydrocortisone 2.5 % cream     lidocaine 1% with EPINEPHrine 1:100,000 injection 3 mL     triamcinolone acetonide (KENALOG-10) injection 10 mg     triamcinolone acetonide (KENALOG-10) injection 10 mg     triamcinolone acetonide (KENALOG-10) injection 10 mg     triamcinolone acetonide (KENALOG-10) injection 17 mg      Past Medical History:   Patient Active Problem List   Diagnosis     Porokeratosis     Squamous cell carcinoma     Neoplasm of uncertain behavior     Lichen planopilaris     Dermatitis     Cicatricial alopecia     Keratosis, inflamed seborrheic     History of skin cancer     Basal cell carcinoma of vertex scalp s/p mohs 6-5-15     Medication management     Actinic keratosis     Medication monitoring encounter     Dermatitis, seborrheic     Erosive pustular dermatosis     Tick bite, initial encounter     Vulvar atrophy     Factor V Leiden mutation (H)     Splenic artery aneurysm (H)     Post concussion syndrome     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 on close of this encounter.

## 2022-10-11 ENCOUNTER — OFFICE VISIT (OUTPATIENT)
Dept: DERMATOLOGY | Facility: CLINIC | Age: 77
End: 2022-10-11
Payer: MEDICARE

## 2022-10-11 VITALS — SYSTOLIC BLOOD PRESSURE: 126 MMHG | HEART RATE: 68 BPM | OXYGEN SATURATION: 97 % | DIASTOLIC BLOOD PRESSURE: 81 MMHG

## 2022-10-11 DIAGNOSIS — L66.10 LICHEN PLANOPILARIS: Primary | ICD-10-CM

## 2022-10-11 DIAGNOSIS — T14.90XA: ICD-10-CM

## 2022-10-11 DIAGNOSIS — L30.9 DERMATITIS: ICD-10-CM

## 2022-10-11 PROCEDURE — 99214 OFFICE O/P EST MOD 30 MIN: CPT | Mod: 25 | Performed by: DERMATOLOGY

## 2022-10-11 PROCEDURE — 11901 INJECT SKIN LESIONS >7: CPT | Performed by: DERMATOLOGY

## 2022-10-11 ASSESSMENT — PAIN SCALES - GENERAL: PAINLEVEL: EXTREME PAIN (8)

## 2022-10-11 NOTE — NURSING NOTE
Ester Barba's chief complaint for this visit includes:  Chief Complaint   Patient presents with     Hair Loss     Following up after 4 weeks     PCP: Winnie Sepulveda    Referring Provider:  No referring provider defined for this encounter.    There were no vitals taken for this visit.  Extreme Pain (8)        Allergies   Allergen Reactions     Dust Mites Other (See Comments)     Sneezing, coughing. asthma  Itchy watery eyes, sneezing     Estrogens Itching     Other reaction(s): Hypersensitivity  Topical caused burning sensation of skin  Topical caused burning sensation of skin; Premarin cream only - possibly only an bi-ingredient     Imiquimod Other (See Comments)     Hair loss     Levaquin [Levofloxacin Hemihydrate] Other (See Comments)     Muscle weakness     Levofloxacin Other (See Comments)     MUSCLE WEAKNESS, ARTHRITIS LIKE SYMPTOMS.       Mold Other (See Comments)     Sneezing, asthma, weezing     Pollen Extract/Tree Extract Other (See Comments)     Sneezing, weezing     Sulfa Drugs Hives     Sulfamethoxazole-Trimethoprim Hives     Latex Rash     LATEX BANDAGES; tapes adhesive     Penicillin G Rash     Penicillins Rash         Do you need any medication refills at today's visit? No  Miroslava Matute, [facility]

## 2022-10-11 NOTE — PATIENT INSTRUCTIONS
Intralesional Kenalog (ILK) Injections    Intralesional steroid injection involves a corticosteroid, such as triamcinolone acetonide (brand name Kenalog), which is injected directly into a lesion on or immediately below the skin. Intralesional kenalog may be used to treat the following skin conditions:    Alopecia areata  Discoid lupus erythematosus  Keloids/hypertrophic scars  Granuloma annulare and other granulomatous disorders  Hypertrophic lichen planus  Lichen simplex chronicus (neurodermatitis)  Localised psoriasis  Necrobiosis lipoidica  Acne cysts (nodulocystic acne)  Small infantile hemangiomas    Possible side-effects of intralesional Kenalog (ILK) injections include bleeding, pain, infection, contact dermatitis, nerve damage, scar formation and need for a repeat procedure.    Some people may experience delayed side-effects including:  Lipoatrophy, appearing as skin indentations or dimples around the injection sites a few weeks after treatment; these may be permanent.  White marks (leukoderma) or brown marks (postinflammatory pigmentation) at the site of injection or spreading from the site of injection - these may resolve or persist long term.  Telangiectasia, or small dilated blood vessels at the site of injection.   Increased hair growth at the site of injection - this resolves eventually.  Steroid acne: steroids increase growth hormone, leading to increased sebum (oil) production by the sebaceous glands. Steroid acne generally improves once the steroid has been stopped.      Who should I call with questions?  [facility]: [phone]   [facility]: [phone]  For urgent needs outside of business hours call the [facility] at [phone] and ask for the dermatology resident on call

## 2022-10-11 NOTE — LETTER
10/11/2022         RE: Ester Barba  [address] [place] [address]        Dear Colleague,    Thank you for referring your patient, Ester Barba, to the [facility]. Please see a copy of my visit note below.    [facility] Dermatology Note  Encounter Date: Oct 11, 2022  Office Visit     Dermatology Problem List:  1. Lichen planopilaris. Bx 8/31/21 revealed lichenoid keratosis of the vertex scalp.  - Current tx: ILK10 injections  (12/20/21, 2/1/22, 3/21/22, 5/10/22, 8/1/22, 10/11/2022), clobetasol propionate 0.05% shampoo alternating w/ zinc shampoo every other day, fluocinolone oil once weekly  - Previous tx: plaquenil w/ significant improvement; discontinued 12/2019 with concerns for retinopathy, however, this was later disproven.  - LPPAI score: 0.67   2. 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 Skin infection, vertex scalp at prior site of MMS - resolved  - s/p doxycycline 100 mg BID and mupirocin ointment BID   4. Hx of NMSC  - BCC, front vertex scalp, s/p Mohs with purse string and granulation, 3/30/22  - BCC, forehead, s/p MMS 6/9/21  - SCCis, R vertex scalp, s/p MMS 3/2/21, bacterial cx obtained from site 3/23/21  - BCC on right nasal ala s/p Mohs 11/27/17  - Nodular BCC on the vertex scalp s/p Mohs 6/5/15  - SCC of scalp, s/p Mohs 4/18/2014  - Hx of 1-2 other NMSC on scalp (BCCs)  5. AK, bilateral temples, cheeks  - Cryotherapy 9/24/21, 12/20/21, 2/1/21, 5/10/22, 8/1/22  6.. Inflamed SKs, R cheek and R temporal scalp  - Cryotherapy 9/24/21  ____________________________________________     Assessment & Plan:      # Lichen planopilaris (LPP) - crusting on the scalp anteriorly. Activity is minimal today.   - See ILK procedure note.   - Apply fluocinolone oil once per week . Recommend applying more frequently as needed.  - Continue clobetasol 0.05% shampoo alternating with zinc shampoo every other day   -  Photograph was obtained for clinical monitoring and inclusion in medical record.      # Facial papules related to FFA   - Continue tretinoin 0.025% cream every other day     # Three pink patches on the right cheek   - Will recheck at next visit for resolution.   - Photograph was obtained for clinical monitoring and inclusion in medical record.     # 4 x 4 mm healing erosion on the crown   - Continue to apply the antibiotics (mupirocin) to the affected areas     Procedures Performed:   Kenalog intralesional injection procedure note: After verbal consent and discussion of risks including but not limited to atrophy, pain, and bruising, time out was performed, the patient underwent positioning and the area was prepped with isopropyl alcohol, 1.7 total cc of Kenalog 10 mg/cc was injected into 17 sites on the mid frontal scalp, vertex, lower occipital, and upper parietal scalp.  The patient tolerated the procedure well and left the Dermatology clinic in good condition.        Follow-up: 4 weeks hair loss.     Staff and Scribe:     Scribe Disclosure:   I, Rodrigo Castañeda, am serving as a scribe to document services personally performed by this physician, Dr. Barbara Soto, based on data collection and the provider's statements to me.     Provider Disclosure:   The documentation recorded by the scribe accurately reflects the services I personally performed and the decisions made by me. ILK injections were done by me.    Barbara Soto MD  Professor and Chair  Department of Dermatology  [facility]: Phone: [phone], Fax:[phone]  [facility]: Phone: [phone], Fax: [phone]      ____________________________________________    CC: Hair Loss (Following up after 4 weeks)    HPI:  Ms. Ester Barba is a 76 year old female who presents as a return patient for follow-up of hair loss, diagnosed as  Lichen planopilaris..   - Last seen in-clinic on 9/13/22  - Shedding or thinning, or both: none   - yellow crusting on the anterior scalp   - Current tx:  fluocinolone oil, ILK, clobetasol 0.05% shampoo, zinc shampoo   No Any new medications, supplements, or products? (please list below)     No Scalp pain   Mild Scalp burning   Mild Scalp itching    No Eyebrow changes    No Eyelash changes   No Beard changes    No Other body hair changes    No Nail changes    No Additional symptoms? (please list below)     - Overall course: patient notes some burning of the scalp after she bumped her head previously while in  [place]. States it stings when she touches it. Notes mild itching on the occipital scalp. Notes concern on the right temple.     Patient is otherwise feeling well, in usual state of health, and has no additional skin concerns today.       Labs:  None reviewed.    Physical Exam:  Vitals: There were no vitals taken for this visit.  GEN: Well developed, well-nourished, in no acute distress, in a pleasant mood.    SKIN: Focused examination of scalp and face was performed.  - Three pink patches on the right cheek    - 4 x 4 mm healing erosion on the crown  - perifollicular erythema  - perifollicular scale   - No other lesions of concern on areas examined.       Medications:  Current Outpatient Medications   Medication     Biotin 10 MG TABS tablet     Calcium 1500 MG TABS     cefdinir (OMNICEF) 300 MG capsule     Cholecalciferol (VITAMIN D) 1000 UNIT capsule     clobetasol (TEMOVATE) 0.05 % external ointment     clobetasol propionate (CLOBEX) 0.05 % external shampoo     COMPOUNDED NON-CONTROLLED SUBSTANCE (CMPD RX) - PHARMACY TO MIX COMPOUNDED MEDICATION     desonide (DESOWEN) 0.05 % external cream     doxycycline monohydrate (MONODOX) 100 MG capsule     esomeprazole (NEXIUM) 40 MG DR capsule     Fluocinolone Acetonide Scalp (DERMA-SMOOTHE/FS SCALP) 0.01 % OIL oil     glucosamine-chondroitin 500-400 MG CAPS  per capsule     hydrocortisone 2.5 % cream     ibuprofen (ADVIL,MOTRIN) 800 MG tablet     ketoconazole (NIZORAL) 2 % external cream     Loratadine (CLARITIN PO)     mupirocin (BACTROBAN) 2 % external ointment     NEW MED     tretinoin (RETIN-A) 0.025 % external cream     Current Facility-Administered Medications   Medication     hydrocortisone 2.5 % cream     lidocaine 1% with EPINEPHrine 1:100,000 injection 3 mL     triamcinolone acetonide (KENALOG-10) injection 10 mg     triamcinolone acetonide (KENALOG-10) injection 10 mg     triamcinolone acetonide (KENALOG-10) injection 10 mg     triamcinolone acetonide (KENALOG-10) injection 17 mg      Past Medical History:   Patient Active Problem List   Diagnosis     Porokeratosis     Squamous cell carcinoma     Neoplasm of uncertain behavior     Lichen planopilaris     Dermatitis     Cicatricial alopecia     Keratosis, inflamed seborrheic     History of skin cancer     Basal cell carcinoma of vertex scalp s/p mohs 6-5-15     Medication management     Actinic keratosis     Medication monitoring encounter     Dermatitis, seborrheic     Erosive pustular dermatosis     Tick bite, initial encounter     Vulvar atrophy     Factor V Leiden mutation (H)     Splenic artery aneurysm (H)     Post concussion syndrome     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 on close of this encounter.      Again, thank you for allowing me to participate in the care of your patient.        Sincerely,        Barbara Soto MD

## 2022-10-16 ENCOUNTER — HEALTH MAINTENANCE LETTER (OUTPATIENT)
Age: 77
End: 2022-10-16

## 2022-10-26 ENCOUNTER — TELEPHONE (OUTPATIENT)
Dept: OPHTHALMOLOGY | Facility: CLINIC | Age: 77
End: 2022-10-26

## 2022-10-26 NOTE — TELEPHONE ENCOUNTER
Called patient. She has heard of a law firm that is investigating cases of copper deficiency associated with AREDS2 vitamin use. I explained that copper deficiency is possible with zinc supplements. AREDS2 formulation vitamins have a copper supplement to address this. If she has further questions about this issue, please bring any additional information into the clinic for further discussion.    Scotty Echeverria MD  V[employer]reoret[employer] Surgical Fellow, PGY6  [facility]

## 2022-11-08 ENCOUNTER — OFFICE VISIT (OUTPATIENT)
Dept: DERMATOLOGY | Facility: CLINIC | Age: 77
End: 2022-11-08
Payer: MEDICARE

## 2022-11-08 DIAGNOSIS — L98.9 SKIN LESION: ICD-10-CM

## 2022-11-08 DIAGNOSIS — L82.0 INFLAMED SEBORRHEIC KERATOSIS: ICD-10-CM

## 2022-11-08 DIAGNOSIS — L66.10 LICHEN PLANOPILARIS: Primary | ICD-10-CM

## 2022-11-08 PROCEDURE — 11901 INJECT SKIN LESIONS >7: CPT | Mod: XS | Performed by: DERMATOLOGY

## 2022-11-08 PROCEDURE — 99213 OFFICE O/P EST LOW 20 MIN: CPT | Mod: 25 | Performed by: DERMATOLOGY

## 2022-11-08 PROCEDURE — 17110 DESTRUCTION B9 LES UP TO 14: CPT | Performed by: DERMATOLOGY

## 2022-11-08 NOTE — LETTER
11/8/2022         RE: Ester Barba  [address] [place] [address]        Dear Colleague,    Thank you for referring your patient, Ester Barba, to the [facility]. Please see a copy of my visit note below.    [facility] Dermatology Note  Encounter Date: Nov 8, 2022  Office Visit     Dermatology Problem List:  1. 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 Bx 8/31/21 revealed lichenoid keratosis of the vertex scalp.  - Current tx: ILK10 injections  (12/20/21, 2/1/22, 3/21/22, 5/10/22, 8/1/22, 10/11/2022 and today 11/8/22) ), clobetasol propionate 0.05% shampoo alternating w/ zinc shampoo every other day, fluocinolone oil once weekly  - Previous tx: plaquenil w/ significant improvement; discontinued 12/2019 with concerns for retinopathy, however, this was later disproven.  - LPPAI score: 0.67     2. Facial papules related to FFA   - Current tx: tretinoin 0.025% cream every other day     3. Skin infection, vertex scalp at prior site of MMS - resolved  - s/p doxycycline 100 mg BID and mupirocin ointment BID     4. Hx of NMSC  - BCC, front vertex scalp, s/p Mohs with purse string and granulation, 3/30/22  - BCC, forehead, s/p MMS 6/9/21  - SCCis, R vertex scalp, s/p MMS 3/2/21, bacterial cx obtained from site 3/23/21  - BCC on right nasal ala s/p Mohs 11/27/17  - Nodular BCC on the vertex scalp s/p Mohs 6/5/15  - SCC of scalp, s/p Mohs 4/18/2014  - Hx of 1-2 other NMSC on scalp (BCCs)    5. AK, bilateral temples, cheeks  - Cryotherapy 9/24/21, 12/20/21, 2/1/21, 5/10/22, 8/1/22    6.. Inflamed SKs, R cheek and R temporal scalp  - Cryotherapy 9/24/21  ____________________________________________     Assessment & Plan:      # Lichen planopilaris (LPP) - crusting on the anterior and mid scalp, dry scalp diffusely.  - See ILK procedure.   - Continue fluocinolone oil once weekly.  - Continue clobetasol 0.05% shampoo alternating with zinc shampoo  every other day   - Photograph was obtained for clinical monitoring and inclusion in medical record.      # Long pink patch on the right cheek - appears consistent with resolving inflammed SK. Previously noted as 3 separate spots, appears as 1 linear lesion today. Will further treat with cryotherapy.  - See cryo procedure.  - Will recheck at next visit for resolution.   - Photograph obtained for reference.     # 4 x 4 mm healing erosion on the crown   - Continue mupirocin to the affected areas.  - Recommended Tegaderm dressing. Dressing was applied in clinic today, handout provided for purchasing in the future.    Procedures Performed:   - Intra-lesional triamcinolone procedure note. After verbal consent and review of risk of pain and skin thinning/atrophy, positioning and cleansing with isopropyl alcohol, 1.4 total mL of triamcinolone 10 mg/mL was injected into 14 sites on the scalp. The patient tolerated the procedure well and left the dermatology clinic in good condition.    - Cryotherapy procedure note, location(s): right cheek. After verbal consent and discussion of risks and benefits including, but not limited to, dyspigmentation/scar, blister, and pain, 1 lesion(s) was(were) treated with 1-2 mm freeze border for 1-2 cycles with liquid nitrogen. Post cryotherapy instructions were provided.     Follow-up: 12/6/22 for follow up    Staff and Scribe:     Scribe Disclosure:   I, Herve Lieberman, am serving as a scribe to document services personally performed by this physician, Dr. Barbara Soto, based on data collection and the provider's statements to me.      Provider Disclosure:   The documentation recorded by the scribe accurately reflects the services I personally performed and the decisions made by me. ILK and cryotherapy procedures were done by me.    Barbara Soto MD  Professor and Chair  Department of Dermatology  [facility]:  Phone: [phone], Fax:[phone]  [facility]: Phone: [phone], Fax: [phone]      ____________________________________________    CC: Derm Problem (Hair loss, previous ILK injections, patient report hair loss is stable)    HPI:  Ms. Ester Barba is a 77 year old female who presents as a return patient for follow-up of hair loss, diagnosed as LPP.   - Last seen in-clinic on 10/11/22  - Current tx: clobetasol 0.05% shampoo, fluocinolone oil, regular ILK  No Any new medications, supplements, or products? (please list below)     Mild Scalp pain   No Scalp burning   Very mild Scalp itching    No Eyebrow changes    No Eyelash changes   No Other body hair changes    No Nail changes    No Additional symptoms? (please list below)     - Overall course: she notes she has ongoing erosison on the scalp that have not been healing as well as expected. These are in the area of the previous biopsy. She has been using mupirocin and keeps it covered with a bandage.     Patient is otherwise feeling well, in usual state of health, and has no additional skin concerns today.     Labs:  None reviewed.    Physical Exam:  Vitals: There were no vitals taken for this visit.  GEN: Well developed, well-nourished, in no acute distress, in a pleasant mood.    SKIN: Focused examination of scalp and face was performed.   - Mild perifollicular erythema, concentrated on the right vertex.  - Mild perifollicular scale, concentrated on the right vertex  - normal eyelash density  - normal eyebrow density  - no nail pitting or dystrophy   - negative hair pull tests  - on the right cheek there is a linear pink slightly raised area  - No other lesions of concern on areas examined.     Medications:  Current Outpatient Medications   Medication     Biotin 10 MG TABS tablet     Calcium 1500 MG TABS     Cholecalciferol (VITAMIN D) 1000 UNIT capsule     clobetasol (TEMOVATE) 0.05 % external ointment      clobetasol propionate (CLOBEX) 0.05 % external shampoo     COMPOUNDED NON-CONTROLLED SUBSTANCE (CMPD RX) - PHARMACY TO MIX COMPOUNDED MEDICATION     desonide (DESOWEN) 0.05 % external cream     esomeprazole (NEXIUM) 40 MG DR capsule     Fluocinolone Acetonide Scalp (DERMA-SMOOTHE/FS SCALP) 0.01 % OIL oil     glucosamine-chondroitin 500-400 MG CAPS per capsule     hydrocortisone 2.5 % cream     ibuprofen (ADVIL,MOTRIN) 800 MG tablet     ketoconazole (NIZORAL) 2 % external cream     Loratadine (CLARITIN PO)     mupirocin (BACTROBAN) 2 % external ointment     NEW MED     tretinoin (RETIN-A) 0.025 % external cream     Current Facility-Administered Medications   Medication     hydrocortisone 2.5 % cream     lidocaine 1% with EPINEPHrine 1:100,000 injection 3 mL     triamcinolone acetonide (KENALOG-10) injection 10 mg     triamcinolone acetonide (KENALOG-10) injection 10 mg     triamcinolone acetonide (KENALOG-10) injection 10 mg     triamcinolone acetonide (KENALOG-10) injection 17 mg     triamcinolone acetonide (KENALOG-10) injection 17 mg      Past Medical History:   Patient Active Problem List   Diagnosis     Porokeratosis     Squamous cell carcinoma     Neoplasm of uncertain behavior     Lichen planopilaris     Dermatitis     Cicatricial alopecia     Keratosis, inflamed seborrheic     History of skin cancer     Basal cell carcinoma of vertex scalp s/p mohs 6-5-15     Medication management     Actinic keratosis     Medication monitoring encounter     Dermatitis, seborrheic     Erosive pustular dermatosis     Tick bite, initial encounter     Vulvar atrophy     Factor V Leiden mutation (H)     Splenic artery aneurysm (H)     Post concussion syndrome     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 on close of this encounter.      Again, thank you for allowing me to participate in the care of your patient.        Sincerely,        Barbara Soto MD

## 2022-11-08 NOTE — PATIENT INSTRUCTIONS
I recommend purchasing Tegaderm, available at most major pharmacies, and applying it to the spot on your scalp that is not healing fully.           Intralesional Kenalog (ILK) Injections    Intralesional steroid injection involves a corticosteroid, such as triamcinolone acetonide (brand name Kenalog), which is injected directly into a lesion on or immediately below the skin. Intralesional kenalog may be used to treat the following skin conditions:    Alopecia areata  Discoid lupus erythematosus  Keloids/hypertrophic scars  Granuloma annulare and other granulomatous disorders  Hypertrophic lichen planus  Lichen simplex chronicus (neurodermatitis)  Localised psoriasis  Necrobiosis lipoidica  Acne cysts (nodulocystic acne)  Small infantile hemangiomas    Possible side-effects of intralesional Kenalog (ILK) injections include bleeding, pain, infection, contact dermatitis, nerve damage, scar formation and need for a repeat procedure.    Some people may experience delayed side-effects including:  Lipoatrophy, appearing as skin indentations or dimples around the injection sites a few weeks after treatment; these may be permanent.  White marks (leukoderma) or brown marks (postinflammatory pigmentation) at the site of injection or spreading from the site of injection - these may resolve or persist long term.  Telangiectasia, or small dilated blood vessels at the site of injection.   Increased hair growth at the site of injection - this resolves eventually.  Steroid acne: steroids increase growth hormone, leading to increased sebum (oil) production by the sebaceous glands. Steroid acne generally improves once the steroid has been stopped.      Who should I call with questions?  [facility]: [phone]   [facility]: [phone]  For urgent needs outside of business hours call the [facility] at [phone] and ask for the dermatology resident on call

## 2022-11-08 NOTE — PROGRESS NOTES
[facility] Dermatology Note  Encounter Date: Nov 8, 2022  Office Visit     Dermatology Problem List:  1. 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 Bx 8/31/21 revealed lichenoid keratosis of the vertex scalp.  - Current tx: ILK10 injections  (12/20/21, 2/1/22, 3/21/22, 5/10/22, 8/1/22, 10/11/2022 and today 11/8/22) ), clobetasol propionate 0.05% shampoo alternating w/ zinc shampoo every other day, fluocinolone oil once weekly  - Previous tx: plaquenil w/ significant improvement; discontinued 12/2019 with concerns for retinopathy, however, this was later disproven.  - LPPAI score: 0.67     2. Facial papules related to FFA   - Current tx: tretinoin 0.025% cream every other day     3. Skin infection, vertex scalp at prior site of MMS - resolved  - s/p doxycycline 100 mg BID and mupirocin ointment BID     4. Hx of NMSC  - BCC, front vertex scalp, s/p Mohs with purse string and granulation, 3/30/22  - BCC, forehead, s/p MMS 6/9/21  - SCCis, R vertex scalp, s/p MMS 3/2/21, bacterial cx obtained from site 3/23/21  - BCC on right nasal ala s/p Mohs 11/27/17  - Nodular BCC on the vertex scalp s/p Mohs 6/5/15  - SCC of scalp, s/p Mohs 4/18/2014  - Hx of 1-2 other NMSC on scalp (BCCs)    5. AK, bilateral temples, cheeks  - Cryotherapy 9/24/21, 12/20/21, 2/1/21, 5/10/22, 8/1/22    6.. Inflamed SKs, R cheek and R temporal scalp  - Cryotherapy 9/24/21  ____________________________________________     Assessment & Plan:      # Lichen planopilaris (LPP) - crusting on the anterior and mid scalp, dry scalp diffusely.  - See ILK procedure.   - Continue fluocinolone oil once weekly.  - Continue clobetasol 0.05% shampoo alternating with zinc shampoo every other day   - Photograph was obtained for clinical monitoring and inclusion in medical record.      # Long pink patch on the right cheek - appears consistent with resolving inflammed SK. Previously noted as 3 separate spots, appears as 1  linear lesion today. Will further treat with cryotherapy.  - See cryo procedure.  - Will recheck at next visit for resolution.   - Photograph obtained for reference.     # 4 x 4 mm healing erosion on the crown   - Continue mupirocin to the affected areas.  - Recommended Tegaderm dressing. Dressing was applied in clinic today, handout provided for purchasing in the future.    Procedures Performed:   - Intra-lesional triamcinolone procedure note. After verbal consent and review of risk of pain and skin thinning/atrophy, positioning and cleansing with isopropyl alcohol, 1.4 total mL of triamcinolone 10 mg/mL was injected into 14 sites on the scalp. The patient tolerated the procedure well and left the dermatology clinic in good condition.    - Cryotherapy procedure note, location(s): right cheek. After verbal consent and discussion of risks and benefits including, but not limited to, dyspigmentation/scar, blister, and pain, 1 lesion(s) was(were) treated with 1-2 mm freeze border for 1-2 cycles with liquid nitrogen. Post cryotherapy instructions were provided.     Follow-up: 12/6/22 for follow up    Staff and Scribe:     Scribe Disclosure:   I, Herve Lieberman, am serving as a scribe to document services personally performed by this physician, Dr. Barbara Soto, based on data collection and the provider's statements to me.      Provider Disclosure:   The documentation recorded by the scribe accurately reflects the services I personally performed and the decisions made by me. ILK and cryotherapy procedures were done by me.    Barbara Soto MD  Professor and Chair  Department of Dermatology  [facility]: Phone: [phone], Fax:[phone]  [facility]: Phone: [phone], Fax: [phone]      ____________________________________________    CC: Derm Problem (Hair loss, previous ILK injections,  patient report hair loss is stable)    HPI:  Ms. Ester Barba is a 77 year old female who presents as a return patient for follow-up of hair loss, diagnosed as LPP.   - Last seen in-clinic on 10/11/22  - Current tx: clobetasol 0.05% shampoo, fluocinolone oil, regular ILK  No Any new medications, supplements, or products? (please list below)     Mild Scalp pain   No Scalp burning   Very mild Scalp itching    No Eyebrow changes    No Eyelash changes   No Other body hair changes    No Nail changes    No Additional symptoms? (please list below)     - Overall course: she notes she has ongoing erosison on the scalp that have not been healing as well as expected. These are in the area of the previous biopsy. She has been using mupirocin and keeps it covered with a bandage.     Patient is otherwise feeling well, in usual state of health, and has no additional skin concerns today.     Labs:  None reviewed.    Physical Exam:  Vitals: There were no vitals taken for this visit.  GEN: Well developed, well-nourished, in no acute distress, in a pleasant mood.    SKIN: Focused examination of scalp and face was performed.   - Mild perifollicular erythema, concentrated on the right vertex.  - Mild perifollicular scale, concentrated on the right vertex  - normal eyelash density  - normal eyebrow density  - no nail pitting or dystrophy   - negative hair pull tests  - on the right cheek there is a linear pink slightly raised area  - No other lesions of concern on areas examined.     Medications:  Current Outpatient Medications   Medication     Biotin 10 MG TABS tablet     Calcium 1500 MG TABS     Cholecalciferol (VITAMIN D) 1000 UNIT capsule     clobetasol (TEMOVATE) 0.05 % external ointment     clobetasol propionate (CLOBEX) 0.05 % external shampoo     COMPOUNDED NON-CONTROLLED SUBSTANCE (CMPD RX) - PHARMACY TO MIX COMPOUNDED MEDICATION     desonide (DESOWEN) 0.05 % external cream     esomeprazole (NEXIUM) 40 MG DR capsule      Fluocinolone Acetonide Scalp (DERMA-SMOOTHE/FS SCALP) 0.01 % OIL oil     glucosamine-chondroitin 500-400 MG CAPS per capsule     hydrocortisone 2.5 % cream     ibuprofen (ADVIL,MOTRIN) 800 MG tablet     ketoconazole (NIZORAL) 2 % external cream     Loratadine (CLARITIN PO)     mupirocin (BACTROBAN) 2 % external ointment     NEW MED     tretinoin (RETIN-A) 0.025 % external cream     Current Facility-Administered Medications   Medication     hydrocortisone 2.5 % cream     lidocaine 1% with EPINEPHrine 1:100,000 injection 3 mL     triamcinolone acetonide (KENALOG-10) injection 10 mg     triamcinolone acetonide (KENALOG-10) injection 10 mg     triamcinolone acetonide (KENALOG-10) injection 10 mg     triamcinolone acetonide (KENALOG-10) injection 17 mg     triamcinolone acetonide (KENALOG-10) injection 17 mg      Past Medical History:   Patient Active Problem List   Diagnosis     Porokeratosis     Squamous cell carcinoma     Neoplasm of uncertain behavior     Lichen planopilaris     Dermatitis     Cicatricial alopecia     Keratosis, inflamed seborrheic     History of skin cancer     Basal cell carcinoma of vertex scalp s/p mohs 6-5-15     Medication management     Actinic keratosis     Medication monitoring encounter     Dermatitis, seborrheic     Erosive pustular dermatosis     Tick bite, initial encounter     Vulvar atrophy     Factor V Leiden mutation (H)     Splenic artery aneurysm (H)     Post concussion syndrome     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 on close of this encounter.

## 2022-11-08 NOTE — NURSING NOTE
Ester Barba's goals for this visit include:   Chief Complaint   Patient presents with     Derm Problem     Hair loss, previous ILK injections, patient report hair loss is stable       She requests these members of her care team be copied on today's visit information: no    PCP: Winnie Sepulveda    Referring Provider:  No referring provider defined for this encounter.    There were no vitals taken for this visit.    Do you need any medication refills at today's visit? No    Sayra Merchant LPN

## 2022-12-06 ENCOUNTER — OFFICE VISIT (OUTPATIENT)
Dept: DERMATOLOGY | Facility: CLINIC | Age: 77
End: 2022-12-06
Payer: MEDICARE

## 2022-12-06 VITALS — DIASTOLIC BLOOD PRESSURE: 78 MMHG | OXYGEN SATURATION: 96 % | HEART RATE: 85 BPM | SYSTOLIC BLOOD PRESSURE: 135 MMHG

## 2022-12-06 DIAGNOSIS — L66.10 LICHEN PLANOPILARIS: Primary | ICD-10-CM

## 2022-12-06 DIAGNOSIS — M89.9 BONE DISORDER: ICD-10-CM

## 2022-12-06 DIAGNOSIS — L98.8 EROSIVE PUSTULAR DERMATOSIS: ICD-10-CM

## 2022-12-06 PROCEDURE — 11901 INJECT SKIN LESIONS >7: CPT | Performed by: DERMATOLOGY

## 2022-12-06 PROCEDURE — 99213 OFFICE O/P EST LOW 20 MIN: CPT | Mod: 25 | Performed by: DERMATOLOGY

## 2022-12-06 ASSESSMENT — PAIN SCALES - GENERAL
PAINLEVEL: MILD PAIN (3)
PAINLEVEL: MODERATE PAIN (5)

## 2022-12-06 NOTE — PROGRESS NOTES
[facility] Dermatology Note  Encounter Date: Dec 6, 2022  Office Visit     Dermatology Problem List:  1. 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 Bx 8/31/21 revealed lichenoid keratosis of the vertex scalp.  - Current tx: ILK10 injections  (12/20/21, 2/1/22, 3/21/22, 5/10/22, 8/1/22, 10/11/2022, 11/8/22, 12/6/22), clobetasol propionate 0.05% shampoo alternating w/ zinc shampoo every other day, fluocinolone oil once weekly  - Previous tx: plaquenil w/ significant improvement; discontinued 12/2019 with concerns for retinopathy, however, this was later disproven.  - Most recent LPPAI score: 1     2. Facial papules related to FFA   - Current tx: tretinoin 0.025% cream every other day      3. Skin infection, vertex scalp at prior site of MMS - resolved  - s/p doxycycline 100 mg BID and mupirocin ointment BID      4. Hx of NMSC  - BCC, front vertex scalp, s/p Mohs with purse string and granulation, 3/30/22  - BCC, forehead, s/p MMS 6/9/21  - SCCis, R vertex scalp, s/p MMS 3/2/21, bacterial cx obtained from site 3/23/21  - BCC on right nasal ala s/p Mohs 11/27/17  - Nodular BCC on the vertex scalp s/p Mohs 6/5/15  - SCC of scalp, s/p Mohs 4/18/2014  - Hx of 1-2 other NMSC on scalp (BCCs)     5. AK, bilateral temples, cheeks  - Cryotherapy 9/24/21, 12/20/21, 2/1/21, 5/10/22, 8/1/22     6.. Inflamed SKs, R cheek and R temporal scalp  - Cryotherapy 9/24/21  ____________________________________________     Assessment & Plan:      # Lichen planopilaris (LPP) - crusting on the anterior and mid scalp, dry scalp diffusely. ILK is warranted to reduce erythema and dermatitis, will continue with current treatment regiment.   - See ILK procedure note below.   - Continue fluocinolone oil 1-2x weekly.  - Continue clobetasol 0.05% shampoo alternating with zinc shampoo every other day      # Long pink patch on the right cheek - appears consistent with resolving inflammed SK. No treatment  necessary at this time, will monitor for changes and resolution on follow up.  - Photograph obtained today.  - Will recheck at next visit for resolution.      # 4 x 4 mm healing erosion on the crown. Well healing on exam today.   - Continue regular wound care.    #Long history of joint and skin steroid  Injections with no recent assessment of bone health. Recommend referral to endocrinology to assess bone health.    Procedures Performed:   - Intra-lesional triamcinolone procedure note. After verbal consent and review of risk of pain and skin thinning/atrophy, positioning and cleansing with isopropyl alcohol, 1.75 total mL of triamcinolone 10 mg/mL was injected into 20 lesion(s) on the crown of scalp, bitemporal scalp. The patient tolerated the procedure well and left the dermatology clinic in good condition.    Follow-up: 6 week(s) in-person for follow up, or earlier for new or changing lesions    Staff and Scribe:     Scribe Disclosure:   I, Herve Lieberman, am serving as a scribe to document services personally performed by this physician, Dr. Barbara Soto, based on data collection and the provider's statements to me.      Provider Disclosure:   The documentation recorded by the scribe accurately reflects the services I personally performed and the decisions made by me. ILK injections were done by me.    Barbara Soto MD  Professor and Chair  Department of Dermatology  [facility]: Phone: [phone], Fax:[phone]  [facility]: Phone: [phone], Fax: [phone]      ____________________________________________    CC: No chief complaint on file.    HPI:  Ms. Ester Barba is a 77 year old female who presents as a return patient for follow-up of hair loss, diagnosed as LPP.   - Last seen in-clinic on 11/8/22  - Current tx: fluocinolone oil weekly, clobetasol 0.05% shampoo alternating  with zinc shampoo  No Any new medications, supplements, or products? (please list below)     No Scalp pain   No Scalp burning   Mild Scalp itching    No Eyebrow changes    No Eyelash changes   No Other body hair changes    No Nail changes    No Additional symptoms? (please list below)     - Overall course: she reports she has been getting cortisone shots for arthritis, most recently right shoulder few weeks ago and left shoulder 2 months ago.     Patient is otherwise feeling well, in usual state of health, and has no additional skin concerns today.     Labs:  None reviewed.    Physical Exam:  Vitals: There were no vitals taken for this visit.  GEN: Well developed, well-nourished, in no acute distress, in a pleasant mood.    SKIN: Focused examination of scalp and face was performed.  - diffuse erythema   - mild perifollicular erythema  - mild perifollicular scale    - normal eyelash density  - normal eyebrow density  - no nail pitting or dystrophy    - With  touch, right vertex scalp experienced pain.   - Crusting on the crown from healing wound status post Mohs.  - No other lesions of concern on areas examined.     Medications:  Current Outpatient Medications   Medication     Biotin 10 MG TABS tablet     Calcium 1500 MG TABS     Cholecalciferol (VITAMIN D) 1000 UNIT capsule     clobetasol (TEMOVATE) 0.05 % external ointment     clobetasol propionate (CLOBEX) 0.05 % external shampoo     COMPOUNDED NON-CONTROLLED SUBSTANCE (CMPD RX) - PHARMACY TO MIX COMPOUNDED MEDICATION     desonide (DESOWEN) 0.05 % external cream     esomeprazole (NEXIUM) 40 MG DR capsule     Fluocinolone Acetonide Scalp (DERMA-SMOOTHE/FS SCALP) 0.01 % OIL oil     glucosamine-chondroitin 500-400 MG CAPS per capsule     hydrocortisone 2.5 % cream     ibuprofen (ADVIL,MOTRIN) 800 MG tablet     ketoconazole (NIZORAL) 2 % external cream     Loratadine (CLARITIN PO)     mupirocin (BACTROBAN) 2 % external ointment     NEW MED     tretinoin (RETIN-A)  0.025 % external cream     Current Facility-Administered Medications   Medication     hydrocortisone 2.5 % cream     lidocaine 1% with EPINEPHrine 1:100,000 injection 3 mL     triamcinolone acetonide (KENALOG-10) injection 10 mg     triamcinolone acetonide (KENALOG-10) injection 10 mg     triamcinolone acetonide (KENALOG-10) injection 10 mg     triamcinolone acetonide (KENALOG-10) injection 17 mg     triamcinolone acetonide (KENALOG-10) injection 17 mg      Past Medical History:   Patient Active Problem List   Diagnosis     Porokeratosis     Squamous cell carcinoma     Neoplasm of uncertain behavior     Lichen planopilaris     Dermatitis     Cicatricial alopecia     Keratosis, inflamed seborrheic     History of skin cancer     Basal cell carcinoma of vertex scalp s/p mohs 6-5-15     Medication management     Actinic keratosis     Medication monitoring encounter     Dermatitis, seborrheic     Erosive pustular dermatosis     Tick bite, initial encounter     Vulvar atrophy     Factor V Leiden mutation (H)     Splenic artery aneurysm (H)     Post concussion syndrome     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 on close of this encounter.

## 2022-12-06 NOTE — LETTER
12/6/2022         RE: Ester Barba  [address] [place] [address]        Dear Colleague,    Thank you for referring your patient, Ester Barba, to the [facility]. Please see a copy of my visit note below.    [facility] Dermatology Note  Encounter Date: Dec 6, 2022  Office Visit     Dermatology Problem List:  1. 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 Bx 8/31/21 revealed lichenoid keratosis of the vertex scalp.  - Current tx: ILK10 injections  (12/20/21, 2/1/22, 3/21/22, 5/10/22, 8/1/22, 10/11/2022 and today 11/8/22) ), clobetasol propionate 0.05% shampoo alternating w/ zinc shampoo every other day, fluocinolone oil once weekly  - Previous tx: plaquenil w/ significant improvement; discontinued 12/2019 with concerns for retinopathy, however, this was later disproven.  - LPPAI score: 0.67      2. Facial papules related to FFA   - Current tx: tretinoin 0.025% cream every other day      3. Skin infection, vertex scalp at prior site of MMS - resolved  - s/p doxycycline 100 mg BID and mupirocin ointment BID      4. Hx of NMSC  - BCC, front vertex scalp, s/p Mohs with purse string and granulation, 3/30/22  - BCC, forehead, s/p MMS 6/9/21  - SCCis, R vertex scalp, s/p MMS 3/2/21, bacterial cx obtained from site 3/23/21  - BCC on right nasal ala s/p Mohs 11/27/17  - Nodular BCC on the vertex scalp s/p Mohs 6/5/15  - SCC of scalp, s/p Mohs 4/18/2014  - Hx of 1-2 other NMSC on scalp (BCCs)     5. AK, bilateral temples, cheeks  - Cryotherapy 9/24/21, 12/20/21, 2/1/21, 5/10/22, 8/1/22     6.. Inflamed SKs, R cheek and R temporal scalp  - Cryotherapy 9/24/21  ____________________________________________     Assessment & Plan:      # Lichen planopilaris (LPP) - crusting on the anterior and mid scalp, dry scalp diffusely.  - See ILK procedure.   - Continue fluocinolone oil once weekly.  - Continue clobetasol 0.05% shampoo alternating with zinc  shampoo every other day   - Photograph was obtained for clinical monitoring and inclusion in medical record.      # Long pink patch on the right cheek - appears consistent with resolving inflammed SK. Previously noted as 3 separate spots, appears as 1 linear lesion today. Will further treat with cryotherapy.  - See cryo procedure.  - Will recheck at next visit for resolution.   - Photograph obtained for reference.     # 4 x 4 mm healing erosion on the crown   - Continue mupirocin to the affected areas.  - Recommended Tegaderm dressing. Dressing was applied in clinic today, handout provided for purchasing in the future.    # {DermDiagnoses:411846}.  {TreatmentPlans:799752}   - ***     Procedures Performed:   {kkprocedurenotes:486071}    Follow-up: {kkfollowup:861230}    Staff and Scribe:     Scribe Disclosure:   IHerve, am serving as a scribe to document services personally performed by this physician, Dr. Barbara Soto, based on data collection and the provider's statements to me.      ***  ____________________________________________    CC: No chief complaint on file.    HPI:  Ms. Ester Barba is a 77 year old female who presents as a return patient for follow-up of hair loss, diagnosed as LPP.   - Last seen in-clinic on 11/8/22  - Shedding or thinning, or both: ***  - Current tx: fluocinolone oil weekly, clobetasol 0.05% shampoo alternating with zinc shampoo  - If using Rogaine, 1 cannister lasts how long: ***   - Scalp or hair care habits/products: ***  *** Any new medications, supplements, or products? (please list below)     *** Scalp pain   *** Scalp burning   *** Scalp itching    *** Eyebrow changes    *** Eyelash changes   *** Beard changes    *** Other body hair changes    *** Nail changes    *** Additional symptoms? (please list below)     - Overall course: ***    Patient is otherwise feeling well, in usual state of health, and has no additional skin concerns today.      Labs:  {kkreviewedlabs:154218} reviewed.    Physical Exam:  Vitals: There were no vitals taken for this visit.  GEN: Well developed, well-nourished, in no acute distress, in a pleasant mood.    SKIN: Focused examination of scalp was performed.  - Khalil type: ***  - Noe part width of ***  - The layers of hair regrowth layers were noted to be  - *** cm for the first  - *** cm at the second  - *** cm at the third   - *** cm at the fourth   - *** diffuse erythema   - *** perifollicular erythema  - *** perifollicular scale   - *** scaling of the scalp   - *** negative hair pull test   - *** normal eyelash density  - *** normal eyebrow density  - *** no nail pitting or dystrophy   - *** scalp folliculitis/pustules   - In comparison to prior photographs, ***  - {Skin Exam Derm:549874}.   - No other lesions of concern on areas examined.     ***If FFA:  - R lateral forehead vein: elevated***/neutral***/depressed***  - Midline forehead vein: elevated***/neutral***/depressed***  - L ateral forehead vein: elevated***/neutral***/depressed***  - *** facial papules   - *** measurement of lateral canthus to lateral hairline   - *** measurement nasal bridge to anterior hairline    Medications:  Current Outpatient Medications   Medication     Biotin 10 MG TABS tablet     Calcium 1500 MG TABS     Cholecalciferol (VITAMIN D) 1000 UNIT capsule     clobetasol (TEMOVATE) 0.05 % external ointment     clobetasol propionate (CLOBEX) 0.05 % external shampoo     COMPOUNDED NON-CONTROLLED SUBSTANCE (CMPD RX) - PHARMACY TO MIX COMPOUNDED MEDICATION     desonide (DESOWEN) 0.05 % external cream     esomeprazole (NEXIUM) 40 MG DR capsule     Fluocinolone Acetonide Scalp (DERMA-SMOOTHE/FS SCALP) 0.01 % OIL oil     glucosamine-chondroitin 500-400 MG CAPS per capsule     hydrocortisone 2.5 % cream     ibuprofen (ADVIL,MOTRIN) 800 MG tablet     ketoconazole (NIZORAL) 2 % external cream     Loratadine (CLARITIN PO)     mupirocin (BACTROBAN)  2 % external ointment     NEW MED     tretinoin (RETIN-A) 0.025 % external cream     Current Facility-Administered Medications   Medication     hydrocortisone 2.5 % cream     lidocaine 1% with EPINEPHrine 1:100,000 injection 3 mL     triamcinolone acetonide (KENALOG-10) injection 10 mg     triamcinolone acetonide (KENALOG-10) injection 10 mg     triamcinolone acetonide (KENALOG-10) injection 10 mg     triamcinolone acetonide (KENALOG-10) injection 17 mg     triamcinolone acetonide (KENALOG-10) injection 17 mg      Past Medical History:   Patient Active Problem List   Diagnosis     Porokeratosis     Squamous cell carcinoma     Neoplasm of uncertain behavior     Lichen planopilaris     Dermatitis     Cicatricial alopecia     Keratosis, inflamed seborrheic     History of skin cancer     Basal cell carcinoma of vertex scalp s/p mohs 6-5-15     Medication management     Actinic keratosis     Medication monitoring encounter     Dermatitis, seborrheic     Erosive pustular dermatosis     Tick bite, initial encounter     Vulvar atrophy     Factor V Leiden mutation (H)     Splenic artery aneurysm (H)     Post concussion syndrome     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 on close of this encounter.      Again, thank you for allowing me to participate in the care of your patient.        Sincerely,        Barbara Soto MD

## 2022-12-06 NOTE — NURSING NOTE
Ester Barba's chief complaint for this visit includes:  Chief Complaint   Patient presents with     Hair Loss     LPP - hairloss is stable - fluocinolone scalp oil, clobetasol ointment and clobetasol shampoo.     PCP: Winnie Sepulveda    Referring Provider:  No referring provider defined for this encounter.    There were no vitals taken for this visit.  Data Unavailable        Allergies   Allergen Reactions     Dust Mites Other (See Comments)     Sneezing, coughing. asthma  Itchy watery eyes, sneezing     Estrogens Itching     Other reaction(s): Hypersensitivity  Topical caused burning sensation of skin  Topical caused burning sensation of skin; Premarin cream only - possibly only an bi-ingredient     Imiquimod Other (See Comments)     Hair loss     Levaquin [Levofloxacin Hemihydrate] Other (See Comments)     Muscle weakness     Levofloxacin Other (See Comments)     MUSCLE WEAKNESS, ARTHRITIS LIKE SYMPTOMS.       Mold Other (See Comments)     Sneezing, asthma, weezing     Pollen Extract/Tree Extract Other (See Comments)     Sneezing, weezing     Sulfa Drugs Hives     Sulfamethoxazole-Trimethoprim Hives     Latex Rash     LATEX BANDAGES; tapes adhesive     Penicillin G Rash     Penicillins Rash         Do you need any medication refills at today's visit? No    Miroslava Matute, CMA

## 2023-01-10 DIAGNOSIS — L66.10 LICHEN PLANOPILARIS: ICD-10-CM

## 2023-01-12 DIAGNOSIS — L66.10 LICHEN PLANOPILARIS: ICD-10-CM

## 2023-01-12 DIAGNOSIS — L30.9 DERMATITIS: ICD-10-CM

## 2023-01-13 RX ORDER — FLUOCINOLONE ACETONIDE 0.11 MG/ML
OIL TOPICAL
Qty: 118.28 ML | Refills: 3 | Status: SHIPPED | OUTPATIENT
Start: 2023-01-13 | End: 2024-03-25

## 2023-01-16 RX ORDER — CLOBETASOL PROPIONATE 0.05 G/100ML
SHAMPOO TOPICAL
Qty: 118 ML | Refills: 0 | Status: SHIPPED | OUTPATIENT
Start: 2023-01-16 | End: 2023-07-05

## 2023-01-16 NOTE — PROGRESS NOTES
[facility] Dermatology Note  Encounter Date: Jan 17, 2023  Office Visit     Dermatology Problem List:  1. 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 Bx 8/31/21 revealed lichenoid keratosis of the vertex scalp.  - Current tx: ILK10 injections  (12/20/21, 2/1/22, 3/21/22, 5/10/22, 8/1/22, 10/11/2022, 11/8/22, 1/17/23), clobetasol propionate 0.05% shampoo alternating w/ zinc shampoo every other day, fluocinolone oil once weekly  - Previous tx: plaquenil w/ significant improvement; discontinued 12/2019 with concerns for retinopathy, however, this was later disproven.  - LPPAI score: 0.67   2. 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 Skin infection, vertex scalp at prior site of MMS - resolved  - s/p doxycycline 100 mg BID and mupirocin ointment BID   4. History of NMSC  - BCC - front vertex scalp, s/p Mohs with purse string and granulation, 3/30/22  - BCC - forehead, s/p MMS 6/9/21  - SCCis - R vertex scalp, s/p MMS 3/2/21, bacterial cx obtained from site 3/23/21  - BCC - right nasal ala s/p Mohs 11/27/17  - Nodular BCC - vertex scalp s/p Mohs 6/5/15  - SCC - scalp, s/p Mohs 4/18/2014  - Hx of 1-2 other NMSC on scalp (BCCs)  5. AKs - bilateral temples, cheeks  - Cryotherapy 9/24/21, 12/20/21, 2/1/21, 5/10/22, 8/1/22, 1/17/23  6. Inflamed SKs, R cheek and R temporal scalp  - Cryotherapy 9/24/21  ____________________________________________     Assessment & Plan:      # Lichen planopilaris (LPP) - LPPAI score 0.67 today. Mild perifollicular scale noted on exam today, otherwise improved from prior. Will further treat with ILK and continue with topicals. Reassured patient she can increase fluocinolone oil use for scale.  - See ILK procedure below.   - Continue fluocinolone oil up to 3x weekly.  - Continue alternating clobetasol 0.05% shampoo with zinc shampoo every other day   - Photograph was obtained.     # Actinic keratosis -  right temple x 2. Based on the location and chronic irritation to this lesion, treatment with cryotherapy is warranted.   - See cryo procedure note.      # 4 x 4 mm healing erosion with crusting present on the crown   - Continue diligent Vaseline application.    Procedures Performed:   - Intra-lesional triamcinolone procedure note. After verbal consent and review of risk of pain and skin thinning/atrophy, positioning and cleansing with isopropyl alcohol, 1 total mL of triamcinolone 10 mg/mL was injected into approximately 10 lesion(s) in a grid-like pattern on the scalp. The patient tolerated the procedure well and left the dermatology clinic in good condition.     - Cryotherapy procedure note, location(s): right temple. After verbal consent and discussion of risks and benefits including, but not limited to, dyspigmentation/scar, blister, and pain, 2 AK(s) was(were) treated with 1-2 mm freeze border for 1-2 cycles with liquid nitrogen. Post cryotherapy instructions were provided.     Follow-up: 8 week(s) in-person, or earlier for new or changing lesions    Staff and Scribe:     Scribe Disclosure:   I, Herve Lieberman, am serving as a scribe to document services personally performed by this physician, Dr. Barbara Soto, based on data collection and the provider's statements to me.      Provider Disclosure:   The documentation recorded by the scribe accurately reflects the services I personally performed and the decisions made by me. ILK injections were done by me as was the cryotherapy procedure.    Barbara Soto MD  Professor and Chair  Department of Dermatology  [facility]: Phone: [phone], Fax:[phone]  [facility]: Phone: [phone], Fax: [phone]      ____________________________________________    CC: Hair Loss (Hair loss is stable, tends to be shedding a little bit lately.  )    HPI:  Ms. Ester Barba is a 77 year old female who presents as a return patient for follow-up of hair loss, diagnosed as LPP.   - Last seen on 11/8/22 in clinic  - Shedding or thinning, or both: shedding  - Current tx: fluocinolone oil 1x weekly, clobetasol 0.05% shampoo alternating with zinc shampoo.  - Scalp or hair care habits/products: fluocinolone oil weekly, shampoo 3x weekly.  no Any new medications, supplements, or products? (please list below)     no Scalp pain   no Scalp burning   mild Scalp itching    no Eyebrow changes    no Eyelash changes   no Other body hair changes    no Nail changes    no Additional symptoms? (please list below)     - Overall course: she reports her hair loss is stable, but has been shedding recently.     Patient is otherwise feeling well, in usual state of health, and has no additional skin concerns today.     Labs:  CBC  and CMP  reviewed.    Physical Exam:  Vitals: BP (!) 145/69   Pulse 76   SpO2 93%   GEN: Well developed, well-nourished, in no acute distress, in a pleasant mood.    SKIN: Focused examination of scalp and face was performed.  - Crusting on the crown of scalp  - no diffuse erythema   - no perifollicular erythema  - mild perifollicular scale   - no scaling of the scalp   - negative hair pull test   - Scattered eyebrow fibers on the left, some thinning on the right.  - normal eyelash density  - no nail pitting or dystrophy   - no scalp folliculitis/pustules   - R lateral forehead vein: elevated  - Midline forehead vein: elevated  - L ateral forehead vein: neutral  - In comparison to prior photographs, right scalp is stable.   - There are erythematous macules with overyling adherent scale on the right temple x 2.    - No other lesions of concern on areas examined.       Medications:  Current Outpatient Medications   Medication     acetaminophen (TYLENOL) 500 MG tablet     Biotin 10 MG TABS tablet     Calcium 1500 MG TABS     Cholecalciferol (VITAMIN D) 1000 UNIT  capsule     clobetasol (TEMOVATE) 0.05 % external ointment     clobetasol propionate (CLOBEX) 0.05 % external shampoo     COMPOUNDED NON-CONTROLLED SUBSTANCE (CMPD RX) - PHARMACY TO MIX COMPOUNDED MEDICATION     desonide (DESOWEN) 0.05 % external cream     esomeprazole (NEXIUM) 40 MG DR capsule     Fluocinolone Acetonide Scalp 0.01 % OIL oil     glucosamine-chondroitin 500-400 MG CAPS per capsule     hydrocortisone 2.5 % cream     ketoconazole (NIZORAL) 2 % external cream     Loratadine (CLARITIN PO)     NEW MED     tretinoin (RETIN-A) 0.025 % external cream     ibuprofen (ADVIL,MOTRIN) 800 MG tablet     mupirocin (BACTROBAN) 2 % external ointment     Current Facility-Administered Medications   Medication     hydrocortisone 2.5 % cream     lidocaine 1% with EPINEPHrine 1:100,000 injection 3 mL     triamcinolone acetonide (KENALOG-10) injection 10 mg     triamcinolone acetonide (KENALOG-10) injection 10 mg     triamcinolone acetonide (KENALOG-10) injection 10 mg     triamcinolone acetonide (KENALOG-10) injection 17 mg     triamcinolone acetonide (KENALOG-10) injection 17 mg      Past Medical History:   Patient Active Problem List   Diagnosis     Porokeratosis     Squamous cell carcinoma     Neoplasm of uncertain behavior     Lichen planopilaris     Dermatitis     Cicatricial alopecia     Keratosis, inflamed seborrheic     History of skin cancer     Basal cell carcinoma of vertex scalp s/p mohs 6-5-15     Medication management     Actinic keratosis     Medication monitoring encounter     Dermatitis, seborrheic     Erosive pustular dermatosis     Tick bite, initial encounter     Vulvar atrophy     Factor V Leiden mutation (H)     Splenic artery aneurysm (H)     Post concussion syndrome     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 on close of this encounter.

## 2023-01-17 ENCOUNTER — OFFICE VISIT (OUTPATIENT)
Dept: DERMATOLOGY | Facility: CLINIC | Age: 78
End: 2023-01-17
Payer: MEDICARE

## 2023-01-17 VITALS — DIASTOLIC BLOOD PRESSURE: 69 MMHG | SYSTOLIC BLOOD PRESSURE: 145 MMHG | OXYGEN SATURATION: 93 % | HEART RATE: 76 BPM

## 2023-01-17 DIAGNOSIS — L66.10 LICHEN PLANOPILARIS: Primary | ICD-10-CM

## 2023-01-17 DIAGNOSIS — L30.9 DERMATITIS: ICD-10-CM

## 2023-01-17 DIAGNOSIS — L57.0 ACTINIC KERATOSES: ICD-10-CM

## 2023-01-17 PROCEDURE — 17000 DESTRUCT PREMALG LESION: CPT | Performed by: DERMATOLOGY

## 2023-01-17 PROCEDURE — 99207 PR NO CHARGE LOS: CPT | Performed by: DERMATOLOGY

## 2023-01-17 PROCEDURE — 99213 OFFICE O/P EST LOW 20 MIN: CPT | Mod: 25 | Performed by: DERMATOLOGY

## 2023-01-17 PROCEDURE — 17003 DESTRUCT PREMALG LES 2-14: CPT | Performed by: DERMATOLOGY

## 2023-01-17 RX ORDER — ACETAMINOPHEN 500 MG
500-1000 TABLET ORAL EVERY 6 HOURS PRN
COMMUNITY

## 2023-01-17 ASSESSMENT — PAIN SCALES - GENERAL: PAINLEVEL: MILD PAIN (3)

## 2023-01-17 NOTE — PATIENT INSTRUCTIONS
Intralesional Kenalog (ILK) Injections    Intralesional steroid injection involves a corticosteroid, such as triamcinolone acetonide (brand name Kenalog), which is injected directly into a lesion on or immediately below the skin. Intralesional kenalog may be used to treat the following skin conditions:    Alopecia areata  Discoid lupus erythematosus  Keloids/hypertrophic scars  Granuloma annulare and other granulomatous disorders  Hypertrophic lichen planus  Lichen simplex chronicus (neurodermatitis)  Localised psoriasis  Necrobiosis lipoidica  Acne cysts (nodulocystic acne)  Small infantile hemangiomas    Possible side-effects of intralesional Kenalog (ILK) injections include bleeding, pain, infection, contact dermatitis, nerve damage, scar formation and need for a repeat procedure.    Some people may experience delayed side-effects including:  Lipoatrophy, appearing as skin indentations or dimples around the injection sites a few weeks after treatment; these may be permanent.  White marks (leukoderma) or brown marks (postinflammatory pigmentation) at the site of injection or spreading from the site of injection - these may resolve or persist long term.  Telangiectasia, or small dilated blood vessels at the site of injection.   Increased hair growth at the site of injection - this resolves eventually.  Steroid acne: steroids increase growth hormone, leading to increased sebum (oil) production by the sebaceous glands. Steroid acne generally improves once the steroid has been stopped.      Who should I call with questions?  [facility]: [phone]   [facility]: [phone]  For urgent needs outside of business hours call the [facility] at [phone] and ask for the dermatology resident on call     Cryotherapy    What is it?  Use of a very cold liquid, such as liquid nitrogen, to freeze and destroy abnormal skin cells that need to be  removed    What should I expect?  Tenderness and redness  A small blister that might grow and fill with dark purple blood. There may be crusting.  More than one treatment may be needed if the lesions do not go away.    How do I care for the treated area?  Gently wash the area with your hands when bathing.  Use a thin layer of Vaseline to help with healing. You may use a Band-Aid.   The area should heal within 7-10 days and may leave behind a pink or lighter color.   Do not use an antibiotic or Neosporin ointment.   You may take acetaminophen (Tylenol) for pain.     Call your doctor if you have:  Severe pain  Signs of infection (warmth, redness, cloudy yellow drainage, and or a bad smell)  Questions or concerns    Who should I call with questions?      [facility]: [phone]      [facility]: [phone]      For urgent needs outside of business hours call the [facility] at [phone] and ask for the dermatology resident on call

## 2023-01-17 NOTE — LETTER
1/17/2023         RE: Ester Barba  [address] [place] [address]        Dear Colleague,    Thank you for referring your patient, Ester Barba, to the [facility]. Please see a copy of my visit note below.    [facility] Dermatology Note  Encounter Date: Jan 17, 2023  Office Visit     Dermatology Problem List:  1. 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 Bx 8/31/21 revealed lichenoid keratosis of the vertex scalp.  - Current tx: ILK10 injections  (12/20/21, 2/1/22, 3/21/22, 5/10/22, 8/1/22, 10/11/2022, 11/8/22, 1/17/23), clobetasol propionate 0.05% shampoo alternating w/ zinc shampoo every other day, fluocinolone oil once weekly  - Previous tx: plaquenil w/ significant improvement; discontinued 12/2019 with concerns for retinopathy, however, this was later disproven.  - LPPAI score: 0.67   2. 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 Skin infection, vertex scalp at prior site of MMS - resolved  - s/p doxycycline 100 mg BID and mupirocin ointment BID   4. History of NMSC  - BCC - front vertex scalp, s/p Mohs with purse string and granulation, 3/30/22  - BCC - forehead, s/p MMS 6/9/21  - SCCis - R vertex scalp, s/p MMS 3/2/21, bacterial cx obtained from site 3/23/21  - BCC - right nasal ala s/p Mohs 11/27/17  - Nodular BCC - vertex scalp s/p Mohs 6/5/15  - SCC - scalp, s/p Mohs 4/18/2014  - Hx of 1-2 other NMSC on scalp (BCCs)  5. AKs - bilateral temples, cheeks  - Cryotherapy 9/24/21, 12/20/21, 2/1/21, 5/10/22, 8/1/22, 1/17/23  6. Inflamed SKs, R cheek and R temporal scalp  - Cryotherapy 9/24/21  ____________________________________________     Assessment & Plan:      # Lichen planopilaris (LPP) - LPPAI score 0.67 today. Mild perifollicular scale noted on exam today, otherwise improved from prior. Will further treat with ILK and continue with topicals. Reassured patient she can increase  fluocinolone oil use for scale.  - See ILK procedure below.   - Continue fluocinolone oil up to 3x weekly.  - Continue alternating clobetasol 0.05% shampoo with zinc shampoo every other day   - Photograph was obtained.     # Actinic keratosis - right temple x 2. Based on the location and chronic irritation to this lesion, treatment with cryotherapy is warranted.   - See cryo procedure note.      # 4 x 4 mm healing erosion with crusting present on the crown   - Continue diligent Vaseline application.    Procedures Performed:   - Intra-lesional triamcinolone procedure note. After verbal consent and review of risk of pain and skin thinning/atrophy, positioning and cleansing with isopropyl alcohol, 1 total mL of triamcinolone 10 mg/mL was injected into approximately 10 lesion(s) in a grid-like pattern on the scalp. The patient tolerated the procedure well and left the dermatology clinic in good condition.     - Cryotherapy procedure note, location(s): right temple. After verbal consent and discussion of risks and benefits including, but not limited to, dyspigmentation/scar, blister, and pain, 2 AK(s) was(were) treated with 1-2 mm freeze border for 1-2 cycles with liquid nitrogen. Post cryotherapy instructions were provided.     Follow-up: 8 week(s) in-person, or earlier for new or changing lesions    Staff and Scribe:     Scribe Disclosure:   I, Herve Lieberman, am serving as a scribe to document services personally performed by this physician, Dr. Barbara Soto, based on data collection and the provider's statements to me.      Provider Disclosure:   The documentation recorded by the scribe accurately reflects the services I personally performed and the decisions made by me. ILK injections were done by me as was the cryotherapy procedure.    Barbara Soto MD  Professor and Chair  Department of Dermatology  [facility]: Phone: [phone],  Fax:[phone]  [facility]: Phone: [phone], Fax: [phone]      ____________________________________________    CC: Hair Loss (Hair loss is stable, tends to be shedding a little bit lately. )    HPI:  Ms. Ester Barba is a 77 year old female who presents as a return patient for follow-up of hair loss, diagnosed as LPP.   - Last seen on 11/8/22 in clinic  - Shedding or thinning, or both: shedding  - Current tx: fluocinolone oil 1x weekly, clobetasol 0.05% shampoo alternating with zinc shampoo.  - Scalp or hair care habits/products: fluocinolone oil weekly, shampoo 3x weekly.  no Any new medications, supplements, or products? (please list below)     no Scalp pain   no Scalp burning   mild Scalp itching    no Eyebrow changes    no Eyelash changes   no Other body hair changes    no Nail changes    no Additional symptoms? (please list below)     - Overall course: she reports her hair loss is stable, but has been shedding recently.     Patient is otherwise feeling well, in usual state of health, and has no additional skin concerns today.     Labs:  CBC  and CMP  reviewed.    Physical Exam:  Vitals: BP (!) 145/69   Pulse 76   SpO2 93%   GEN: Well developed, well-nourished, in no acute distress, in a pleasant mood.    SKIN: Focused examination of scalp and face was performed.  - Crusting on the crown of scalp  - no diffuse erythema   - no perifollicular erythema  - mild perifollicular scale   - no scaling of the scalp   - negative hair pull test   - Scattered eyebrow fibers on the left, some thinning on the right.  - normal eyelash density  - no nail pitting or dystrophy   - no scalp folliculitis/pustules   - R lateral forehead vein: elevated  - Midline forehead vein: elevated  - L ateral forehead vein: neutral  - In comparison to prior photographs, right scalp is stable.   - There are erythematous macules with overyling adherent scale on the right temple x 2.    -  No other lesions of concern on areas examined.       Medications:  Current Outpatient Medications   Medication     acetaminophen (TYLENOL) 500 MG tablet     Biotin 10 MG TABS tablet     Calcium 1500 MG TABS     Cholecalciferol (VITAMIN D) 1000 UNIT capsule     clobetasol (TEMOVATE) 0.05 % external ointment     clobetasol propionate (CLOBEX) 0.05 % external shampoo     COMPOUNDED NON-CONTROLLED SUBSTANCE (CMPD RX) - PHARMACY TO MIX COMPOUNDED MEDICATION     desonide (DESOWEN) 0.05 % external cream     esomeprazole (NEXIUM) 40 MG DR capsule     Fluocinolone Acetonide Scalp 0.01 % OIL oil     glucosamine-chondroitin 500-400 MG CAPS per capsule     hydrocortisone 2.5 % cream     ketoconazole (NIZORAL) 2 % external cream     Loratadine (CLARITIN PO)     NEW MED     tretinoin (RETIN-A) 0.025 % external cream     ibuprofen (ADVIL,MOTRIN) 800 MG tablet     mupirocin (BACTROBAN) 2 % external ointment     Current Facility-Administered Medications   Medication     hydrocortisone 2.5 % cream     lidocaine 1% with EPINEPHrine 1:100,000 injection 3 mL     triamcinolone acetonide (KENALOG-10) injection 10 mg     triamcinolone acetonide (KENALOG-10) injection 10 mg     triamcinolone acetonide (KENALOG-10) injection 10 mg     triamcinolone acetonide (KENALOG-10) injection 17 mg     triamcinolone acetonide (KENALOG-10) injection 17 mg      Past Medical History:   Patient Active Problem List   Diagnosis     Porokeratosis     Squamous cell carcinoma     Neoplasm of uncertain behavior     Lichen planopilaris     Dermatitis     Cicatricial alopecia     Keratosis, inflamed seborrheic     History of skin cancer     Basal cell carcinoma of vertex scalp s/p mohs 6-5-15     Medication management     Actinic keratosis     Medication monitoring encounter     Dermatitis, seborrheic     Erosive pustular dermatosis     Tick bite, initial encounter     Vulvar atrophy     Factor V Leiden mutation (H)     Splenic artery aneurysm (H)     Post  concussion syndrome     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 on close of this encounter.      Again, thank you for allowing me to participate in the care of your patient.        Sincerely,        Barbara Soto MD

## 2023-01-20 ENCOUNTER — TELEPHONE (OUTPATIENT)
Dept: DERMATOLOGY | Facility: CLINIC | Age: 78
End: 2023-01-20
Payer: MEDICARE

## 2023-01-20 NOTE — TELEPHONE ENCOUNTER
"1/20 Called patient and left voicemail. Provided her with update from [facility] [place] do not have earlier availability as well. Provided her with [phone] to call if she would like to get on the wait list at either locations if an appointment becomes available.     Chantell avendano Procedure   Dermatology, Surgery, Urology  [facility]    ----- Message from Joelle Campos CMA sent at 1/20/2023 10:36 AM CST -----  Routing back to [place].  See the message below.   ----- Message -----  From: Lillian Church  Sent: 1/20/2023  10:15 AM CST  To: Joelle Campos CMA, #    Hello,     We are booked out as well until November 2023. We do not have anything sooner than the patient would like. Best I can do is add her name to our wait list and if there are any cancellations between now and her scheduled appointment in March we can contact her from the wait list.       Hope this helps,    Lillian, Derm    ----- Message -----  From: Joelle Campos CMA  Sent: 1/20/2023   9:37 AM CST  [facility]      ----- Message -----  From: Chantell Canas  Sent: 1/20/2023   8:42 AM CST  To: [facility]    Good morning!     I have the following as a check out comment from this patients visit on 1/17/2023:     \"Patient is hoping to get in to see Dr. Soto to repeat ILK injections in the beginning of March. Patient was double booked on 1/28/2023 here in [place] but is hoping to be seen sooner in [facility] if there is anything available. Can you please take a look and let her know if there is an opportunity to be seen sooner at the [facility]?\"      She is scheduled here in [place] on 3/28/2023 currently.     Not sure what your availability is down there.     Thank you,     Chantell avendano Procedure   Dermatology, Surgery, Urology  [facility]                 "

## 2023-03-22 ENCOUNTER — OFFICE VISIT (OUTPATIENT)
Dept: OBGYN | Facility: CLINIC | Age: 78
End: 2023-03-22
Attending: OBSTETRICS & GYNECOLOGY
Payer: MEDICARE

## 2023-03-22 VITALS
HEIGHT: 66 IN | SYSTOLIC BLOOD PRESSURE: 146 MMHG | HEART RATE: 87 BPM | DIASTOLIC BLOOD PRESSURE: 82 MMHG | BODY MASS INDEX: 21.48 KG/M2

## 2023-03-22 DIAGNOSIS — R92.30 DENSE BREAST: Primary | ICD-10-CM

## 2023-03-22 PROCEDURE — G0463 HOSPITAL OUTPT CLINIC VISIT: HCPCS | Performed by: OBSTETRICS & GYNECOLOGY

## 2023-03-22 PROCEDURE — 99213 OFFICE O/P EST LOW 20 MIN: CPT | Performed by: OBSTETRICS & GYNECOLOGY

## 2023-03-22 ASSESSMENT — ANXIETY QUESTIONNAIRES
GAD7 TOTAL SCORE: 0
1. FEELING NERVOUS, ANXIOUS, OR ON EDGE: NOT AT ALL
5. BEING SO RESTLESS THAT IT IS HARD TO SIT STILL: NOT AT ALL
2. NOT BEING ABLE TO STOP OR CONTROL WORRYING: NOT AT ALL
GAD7 TOTAL SCORE: 0
6. BECOMING EASILY ANNOYED OR IRRITABLE: NOT AT ALL
7. FEELING AFRAID AS IF SOMETHING AWFUL MIGHT HAPPEN: NOT AT ALL
3. WORRYING TOO MUCH ABOUT DIFFERENT THINGS: NOT AT ALL

## 2023-03-22 ASSESSMENT — PATIENT HEALTH QUESTIONNAIRE - PHQ9
5. POOR APPETITE OR OVEREATING: NOT AT ALL
SUM OF ALL RESPONSES TO PHQ QUESTIONS 1-9: 0

## 2023-03-22 NOTE — PROGRESS NOTES
CC/HPI:   Ester Barba is a 77 year old female  who presents today for refill of vaginal estrogen.   Doing well, still some orange discharge assoc with atrophic vaginitis    She was scheduled for breast MRI in [place] but the machine malfunctioned and she requests one here.     HISTORIES:  Patient Active Problem List   Diagnosis     Porokeratosis     Squamous cell carcinoma     Neoplasm of uncertain behavior     Lichen planopilaris     Dermatitis     Cicatricial alopecia     Keratosis, inflamed seborrheic     History of skin cancer     Basal cell carcinoma of vertex scalp s/p mohs 6-5-15     Medication management     Actinic keratosis     Medication monitoring encounter     Dermatitis, seborrheic     Erosive pustular dermatosis     Tick bite, initial encounter     Vulvar atrophy     Factor V Leiden mutation (H)     Splenic artery aneurysm (H)     Post concussion syndrome     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      Past Surgical History:   Procedure Laterality Date     BIOPSY OF SKIN LESION       BIOPSY VAGINAL N/A 7/14/2021    Procedure: Excision of vaginal Polyp, Dilation and Currettage and Cystoscopy;  Surgeon: Sandy Quintero MD;  [facility]     CATARACT IOL, RT/LT Bilateral 2018     CYSTOSCOPY N/A 7/14/2021    Procedure: CYSTOSCOPY;  Surgeon: Sandy Quintero MD;  [facility]     DILATION AND CURETTAGE N/A 7/14/2021    Procedure: Dilation and curettage;  Surgeon: Sandy Quintero MD;  [facility]     MOHS MICROGRAPHIC PROCEDURE       NO HISTORY OF SURGERY  02/17/2014    derm     SIGMOIDECTOMY  2015    for diverticulitis     Current Outpatient Medications   Medication Sig Dispense Refill     acetaminophen  (TYLENOL) 500 MG tablet Take 500-1,000 mg by mouth every 6 hours as needed for mild pain       Biotin 10 MG TABS tablet        Calcium 1500 MG TABS        Cholecalciferol (VITAMIN D) 1000 UNIT capsule Total 2200 units daily.       clobetasol (TEMOVATE) 0.05 % external ointment Apply twice daily every other day alternating with the mupirocin ointment to affected area on the scalp 60 g 1     clobetasol propionate (CLOBEX) 0.05 % external shampoo APPLY TO TO DRY SCALP EVERY OTHER DAY, LEAVE ON FOR 15 MIN.,THEN LATHER AND RINSE. 118 mL 0     COMPOUNDED NON-CONTROLLED SUBSTANCE (CMPD RX) - PHARMACY TO MIX COMPOUNDED MEDICATION Place 1 Application vaginally Compounded with estrogen       desonide (DESOWEN) 0.05 % external cream Mix half and half with ketoconazole cream and apply twice daily as needed 60 g 1     esomeprazole (NEXIUM) 40 MG DR capsule Take 20 mg by mouth every morning (before breakfast) Take 30-60 minutes before eating.       Fluocinolone Acetonide Scalp 0.01 % OIL oil APPLY ONCE A WEEK TO SCALP FOR LICHEN PLANOPILARIS 118.28 mL 3     glucosamine-chondroitin 500-400 MG CAPS per capsule Take 1 capsule by mouth       hydrocortisone 2.5 % cream Use twice daily as needed on involved areas 30 g 4     ibuprofen (ADVIL,MOTRIN) 800 MG tablet Take 800 mg by mouth every 8 hours as needed. (Patient not taking: Reported on 1/17/2023)       ketoconazole (NIZORAL) 2 % external cream Mix half and half with desonide cream and apply twice a day as needed 60 g 1     Loratadine (CLARITIN PO) Take  by mouth.       mupirocin (BACTROBAN) 2 % external ointment Use 2 times a day every other day alternating with clobetasol ointment to affected area. (Patient not taking: Reported on 1/17/2023) 30 g 3     NEW MED Biest 1.5mg/gram cream(pg free)  Insert 1 gram vaginally twice weekly as directed 40 g 1     tretinoin (RETIN-A) 0.025 % external cream APPLY TO AFFECTED AREA(S) A PEA SIZE AMOUNT TO FACE EVERY OTHER NIGHT AS TOLERATED 45 g  1     Allergies   Allergen Reactions     Dust Mites Other (See Comments)     Sneezing, coughing. asthma  Itchy watery eyes, sneezing     Estrogens Itching     Other reaction(s): Hypersensitivity  Topical caused burning sensation of skin  Topical caused burning sensation of skin; Premarin cream only - possibly only an bi-ingredient     Imiquimod Other (See Comments)     Hair loss     Levaquin [Levofloxacin Hemihydrate] Other (See Comments)     Muscle weakness     Levofloxacin Other (See Comments)     MUSCLE WEAKNESS, ARTHRITIS LIKE SYMPTOMS.       Mold Other (See Comments)     Sneezing, asthma, weezing     Pollen Extract/Tree Extract Other (See Comments)     Sneezing, weezing     Sulfa Drugs Hives     Sulfamethoxazole-Trimethoprim Hives     Latex Rash     LATEX BANDAGES; tapes adhesive     Penicillin G Rash     Penicillins Rash     Social History     Socioeconomic History     Marital status:      Spouse name: Not on file     Number of children: Not on file     Years of education: Not on file     Highest education level: Not on file   Occupational History     Not on file   Tobacco Use     Smoking status: Former     Types: Cigarettes     Quit date: 1965     Years since quittin.2     Smokeless tobacco: Never   Substance and Sexual Activity     Alcohol use: Yes     Alcohol/week: 0.0 standard drinks     Comment: glass of wine     Drug use: Never     Sexual activity: Not Currently     Birth control/protection: Post-menopausal   Other Topics Concern     Parent/sibling w/ CABG, MI or angioplasty before 65F 55M? Not Asked   Social History Narrative    How much exercise per week? 2 days a week    How much calcium per day? Everyday supplements and milk and cheese       How much caffeine per day? 2-3 cups    How much vitamin D per day? supplment    Do you/your family wear seatbelts?  Yes    Do you/your family use safety helmets? Yes    Do you/your family use sunscreen? Yes    Do you/your family keep firearms in  "the home? Yes    Do you/your family have a smoke detector(s)? Yes        Do you feel safe in your home? Yes    Has anyone ever touched you in an unwanted manner? No     Explain      Social Determinants of Health     Financial Resource Strain: Not on file   Food Insecurity: Not on file   Transportation Needs: Not on file   Physical Activity: Not on file   Stress: Not on file   Social Connections: Not on file   Intimate Partner Violence: Not on file   Housing Stability: Not on file     Family History   Problem Relation Age of Onset     Skin Cancer Sister         basal cell carcinoma     Melanoma No family hx of      Glaucoma No family hx of      Macular Degeneration No family hx of           Gyn Hx:   No LMP recorded. Patient is postmenopausal.  Menses:       Review Of Systems:  CONSTITUTIONAL: NEGATIVE for fever, chills  EYES: NEGATIVE for vision changes   RESP: NEGATIVE for significant cough or SOB  CV: NEGATIVE for chest pain, palpitations   GI: NEGATIVE for nausea, abdominal pain, heartburn, or change in bowel habits  : NEGATIVE for frequency, dysuria, or hematuria  MUSCULOSKELETAL: NEGATIVE for significant arthralgias or myalgia  NEURO: NEGATIVE for weakness, dizziness or paresthesias or headache    EXAM:  BP (!) 146/82   Pulse 87   Ht 1.676 m (5' 5.98\")   BMI 21.48 kg/m    Body mass index is 21.48 kg/m .    General - pleasant female in no acute distress.  Skin - no suspicious lesions or rashes  Lungs - clear to auscultation bilaterally.  Heart - regular rate and rhythm without murmur.  Breasts- symmetrical . No dominant fixed or suspicious masses noted.  No skin or nipple changes or axillary nodes.   Abdomen - soft, nontender, nondistended, no masses or organomegaly noted.  Musculoskeletal - no gross deformities.  Neurological - normal strength, sensation, and mental status.  Pelvic - declined    ASSESSMENT/PLAN    Vulvovaginal atrophy-- continue topical E2    (R92.2) Dense breast  (primary encounter " diagnosis)  Comment:   Plan: MR Breast Bilateral w/o & w Contrast            Janeth Mayfield MD

## 2023-03-22 NOTE — LETTER
3/22/2023       RE: Ester Barba  [address] [place] [address]     Dear Colleague,    Thank you for referring your patient, Ester Barba, to the [facility] [place] at [facility]. Please see a copy of my visit note below.    CC/HPI:   Ester Barba is a 77 year old female  who presents today for refill of vaginal estrogen.   Doing well, still some orange discharge assoc with atrophic vaginitis    She was scheduled for breast MRI in [place] but the machine malfunctioned and she requests one here.     HISTORIES:  Patient Active Problem List   Diagnosis     Porokeratosis     Squamous cell carcinoma     Neoplasm of uncertain behavior     Lichen planopilaris     Dermatitis     Cicatricial alopecia     Keratosis, inflamed seborrheic     History of skin cancer     Basal cell carcinoma of vertex scalp s/p mohs 6-5-15     Medication management     Actinic keratosis     Medication monitoring encounter     Dermatitis, seborrheic     Erosive pustular dermatosis     Tick bite, initial encounter     Vulvar atrophy     Factor V Leiden mutation (H)     Splenic artery aneurysm (H)     Post concussion syndrome     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      Past Surgical History:   Procedure Laterality Date     BIOPSY OF SKIN LESION       BIOPSY VAGINAL N/A 7/14/2021    Procedure: Excision of vaginal Polyp, Dilation and Currettage and Cystoscopy;  Surgeon: Sandy Quintero MD;  [facility]     CATARACT IOL, RT/LT Bilateral 2018     CYSTOSCOPY N/A 7/14/2021    Procedure: CYSTOSCOPY;  Surgeon: Sandy Quintero MD;  [facility]     DILATION AND CURETTAGE N/A  7/14/2021    Procedure: Dilation and curettage;  Surgeon: Sandy Quintero MD;  [facility]     MOHS MICROGRAPHIC PROCEDURE       NO HISTORY OF SURGERY  02/17/2014    derm     SIGMOIDECTOMY  2015    for diverticulitis     Current Outpatient Medications   Medication Sig Dispense Refill     acetaminophen (TYLENOL) 500 MG tablet Take 500-1,000 mg by mouth every 6 hours as needed for mild pain       Biotin 10 MG TABS tablet        Calcium 1500 MG TABS        Cholecalciferol (VITAMIN D) 1000 UNIT capsule Total 2200 units daily.       clobetasol (TEMOVATE) 0.05 % external ointment Apply twice daily every other day alternating with the mupirocin ointment to affected area on the scalp 60 g 1     clobetasol propionate (CLOBEX) 0.05 % external shampoo APPLY TO TO DRY SCALP EVERY OTHER DAY, LEAVE ON FOR 15 MIN.,THEN LATHER AND RINSE. 118 mL 0     COMPOUNDED NON-CONTROLLED SUBSTANCE (CMPD RX) - PHARMACY TO MIX COMPOUNDED MEDICATION Place 1 Application vaginally Compounded with estrogen       desonide (DESOWEN) 0.05 % external cream Mix half and half with ketoconazole cream and apply twice daily as needed 60 g 1     esomeprazole (NEXIUM) 40 MG DR capsule Take 20 mg by mouth every morning (before breakfast) Take 30-60 minutes before eating.       Fluocinolone Acetonide Scalp 0.01 % OIL oil APPLY ONCE A WEEK TO SCALP FOR LICHEN PLANOPILARIS 118.28 mL 3     glucosamine-chondroitin 500-400 MG CAPS per capsule Take 1 capsule by mouth       hydrocortisone 2.5 % cream Use twice daily as needed on involved areas 30 g 4     ibuprofen (ADVIL,MOTRIN) 800 MG tablet Take 800 mg by mouth every 8 hours as needed. (Patient not taking: Reported on 1/17/2023)       ketoconazole (NIZORAL) 2 % external cream Mix half and half with desonide cream and apply twice a day as needed 60 g 1     Loratadine (CLARITIN PO) Take  by mouth.       mupirocin (BACTROBAN) 2 % external ointment Use 2 times a day every other day alternating with clobetasol ointment  to affected area. (Patient not taking: Reported on 2023) 30 g 3     NEW MED Biest 1.5mg/gram cream(pg free)  Insert 1 gram vaginally twice weekly as directed 40 g 1     tretinoin (RETIN-A) 0.025 % external cream APPLY TO AFFECTED AREA(S) A PEA SIZE AMOUNT TO FACE EVERY OTHER NIGHT AS TOLERATED 45 g 1     Allergies   Allergen Reactions     Dust Mites Other (See Comments)     Sneezing, coughing. asthma  Itchy watery eyes, sneezing     Estrogens Itching     Other reaction(s): Hypersensitivity  Topical caused burning sensation of skin  Topical caused burning sensation of skin; Premarin cream only - possibly only an bi-ingredient     Imiquimod Other (See Comments)     Hair loss     Levaquin [Levofloxacin Hemihydrate] Other (See Comments)     Muscle weakness     Levofloxacin Other (See Comments)     MUSCLE WEAKNESS, ARTHRITIS LIKE SYMPTOMS.       Mold Other (See Comments)     Sneezing, asthma, weezing     Pollen Extract/Tree Extract Other (See Comments)     Sneezing, weezing     Sulfa Drugs Hives     Sulfamethoxazole-Trimethoprim Hives     Latex Rash     LATEX BANDAGES; tapes adhesive     Penicillin G Rash     Penicillins Rash     Social History     Socioeconomic History     Marital status:      Spouse name: Not on file     Number of children: Not on file     Years of education: Not on file     Highest education level: Not on file   Occupational History     Not on file   Tobacco Use     Smoking status: Former     Types: Cigarettes     Quit date: 1965     Years since quittin.2     Smokeless tobacco: Never   Substance and Sexual Activity     Alcohol use: Yes     Alcohol/week: 0.0 standard drinks     Comment: glass of wine     Drug use: Never     Sexual activity: Not Currently     Birth control/protection: Post-menopausal   Other Topics Concern     Parent/sibling w/ CABG, MI or angioplasty before 65F 55M? Not Asked   Social History Narrative    How much exercise per week? 2 days a week    How much  "calcium per day? Everyday supplements and milk and cheese       How much caffeine per day? 2-3 cups    How much vitamin D per day? supplment    Do you/your family wear seatbelts?  Yes    Do you/your family use safety helmets? Yes    Do you/your family use sunscreen? Yes    Do you/your family keep firearms in the home? Yes    Do you/your family have a smoke detector(s)? Yes        Do you feel safe in your home? Yes    Has anyone ever touched you in an unwanted manner? No     Explain      Social Determinants of Health     Financial Resource Strain: Not on file   Food Insecurity: Not on file   Transportation Needs: Not on file   Physical Activity: Not on file   Stress: Not on file   Social Connections: Not on file   Intimate Partner Violence: Not on file   Housing Stability: Not on file     Family History   Problem Relation Age of Onset     Skin Cancer Sister         basal cell carcinoma     Melanoma No family hx of      Glaucoma No family hx of      Macular Degeneration No family hx of           Gyn Hx:   No LMP recorded. Patient is postmenopausal.  Menses:       Review Of Systems:  CONSTITUTIONAL: NEGATIVE for fever, chills  EYES: NEGATIVE for vision changes   RESP: NEGATIVE for significant cough or SOB  CV: NEGATIVE for chest pain, palpitations   GI: NEGATIVE for nausea, abdominal pain, heartburn, or change in bowel habits  : NEGATIVE for frequency, dysuria, or hematuria  MUSCULOSKELETAL: NEGATIVE for significant arthralgias or myalgia  NEURO: NEGATIVE for weakness, dizziness or paresthesias or headache    EXAM:  BP (!) 146/82   Pulse 87   Ht 1.676 m (5' 5.98\")   BMI 21.48 kg/m    Body mass index is 21.48 kg/m .    General - pleasant female in no acute distress.  Skin - no suspicious lesions or rashes  Lungs - clear to auscultation bilaterally.  Heart - regular rate and rhythm without murmur.  Breasts- symmetrical . No dominant fixed or suspicious masses noted.  No skin or nipple changes or axillary nodes. "   Abdomen - soft, nontender, nondistended, no masses or organomegaly noted.  Musculoskeletal - no gross deformities.  Neurological - normal strength, sensation, and mental status.  Pelvic - declined    ASSESSMENT/PLAN    Vulvovaginal atrophy-- continue topical E2    (R92.2) Dense breast  (primary encounter diagnosis)  Comment:   Plan: MR Breast Bilateral w/o & w Contrast            Janeth Mayfield MD

## 2023-03-22 NOTE — PATIENT INSTRUCTIONS
Thank you for trusting us with your care!     If you need to contact us for questions about:  Symptoms, Scheduling & Medical Questions; Non-urgent (2-3 day response) Leigha message, Urgent (needing response today) [phone] (if after 3:30pm next day response)   Prescriptions: Please call your Pharmacy   Billing: [place] [phone] or [facility]:[phone]

## 2023-03-28 ENCOUNTER — OFFICE VISIT (OUTPATIENT)
Dept: DERMATOLOGY | Facility: CLINIC | Age: 78
End: 2023-03-28
Payer: MEDICARE

## 2023-03-28 VITALS — DIASTOLIC BLOOD PRESSURE: 81 MMHG | OXYGEN SATURATION: 97 % | HEART RATE: 72 BPM | SYSTOLIC BLOOD PRESSURE: 132 MMHG

## 2023-03-28 DIAGNOSIS — L30.9 DERMATITIS: ICD-10-CM

## 2023-03-28 DIAGNOSIS — L66.10 LICHEN PLANOPILARIS: Primary | ICD-10-CM

## 2023-03-28 DIAGNOSIS — L98.8 EROSIVE PUSTULAR DERMATOSIS: ICD-10-CM

## 2023-03-28 PROCEDURE — 99207 PR NO CHARGE LOS: CPT | Performed by: DERMATOLOGY

## 2023-03-28 NOTE — PROGRESS NOTES
[facility] Dermatology Note  Encounter Date: Mar 28, 2023  Office Visit     Dermatology Problem List:  1. 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 Bx 8/31/21 revealed lichenoid keratosis of the vertex scalp.  - Current tx: ILK10 injections  (12/20/21, 2/1/22, 3/21/22, 5/10/22, 8/1/22, 10/11/2022, 11/8/22, 1/17/23, 3/28/23), clobetasol propionate 0.05% shampoo alternating w/ zinc shampoo every other day, fluocinolone oil once weekly  - Previous tx: plaquenil w/ significant improvement; discontinued 12/2019 with concerns for retinopathy, however, this was later disproven.  - LPPAI score: 3.83 on 3/28/2023  2. 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 Skin infection, vertex scalp at prior site of MMS - resolved  - s/p doxycycline 100 mg BID and mupirocin ointment BID   4. History of NMSC  - BCC - front vertex scalp, s/p Mohs with purse string and granulation, 3/30/22  - BCC - forehead, s/p MMS 6/9/21  - SCCis - R vertex scalp, s/p MMS 3/2/21, bacterial cx obtained from site 3/23/21  - BCC - right nasal ala s/p Mohs 11/27/17  - Nodular BCC - vertex scalp s/p Mohs 6/5/15  - SCC - scalp, s/p Mohs 4/18/2014  - Hx of 1-2 other NMSC on scalp (BCCs)  5. AKs - bilateral temples, cheeks  - Cryotherapy 9/24/21, 12/20/21, 2/1/21, 5/10/22, 8/1/22, 1/17/23  6. Inflamed SKs, R cheek and R temporal scalp  - Cryotherapy 9/24/21  ____________________________________________     Assessment & Plan:      # Lichen planopilaris (LPP) - LPPAI score 3.83 today. Mild perifollicular scale, diffuse erythema and perifollicular erythema noted on exam today. Will further treat with ILK and continue with topicals.   - See ILK procedure below.   - Continue fluocinolone oil up to 3x weekly.  - Continue alternating clobetasol 0.05% shampoo with zinc shampoo every other day   - Photographs were obtained.      # Actinic keratosis - right temple x 2.   - No  cryo today     # 4 x 4 mm healing erosion with yellow crusting present on the crown   - Continue diligent Vaseline application.    Procedures Performed:     Kenalog intralesional injection procedure note: After verbal consent and discussion of risks including but not limited to atrophy, pain, and bruising, time out was performed, the patient underwent positioning, 2 total cc of Kenalog 10 mg/cc was injected into 20 sites on the scalp.  The patient tolerated the procedure well and left the Dermatology clinic in good condition.        Follow-up: 4-6 month(s) in-person, or earlier for new or changing lesions    Staff and Scribe:     Scribe Disclosure:   I, Herve Lieberman, am serving as a scribe to document services personally performed by this physician, Dr. Barbara Soto, based on data collection and the provider's statements to me.      I, Selina Osman, am serving as a scribe to document services personally performed by Barbara Soto MD based on data collection and the provider's statements to me.     ***  ____________________________________________    CC: No chief complaint on file.    HPI:  Ms. Ester Barba is a 77 year old female who presents as a return patient for follow-up of hair loss, diagnosed as LPP.   - Last seen on 1/17/23 in clinic  - Shedding or thinning, or both: yes  - Current tx: fluocinolone oil 3x weekly, alternating clobetasol 0.05% shampoo with zinc shampoo  - Scalp or hair care habits/products: No  No Any new medications, supplements, or products? (please list below)     Yes, moderate Scalp pain   No Scalp burning   Yes, moderate Scalp itching    No Eyebrow changes    No Eyelash changes   N/a Beard changes    No Other body hair changes    No Nail changes    No Additional symptoms? (please list below)       Patient is otherwise feeling well, in usual state of health, and has no additional skin concerns today.     Labs:  {[id number]} reviewed.    Physical Exam:  Vitals:  There were no vitals taken for this visit.  GEN: Well developed, well-nourished, in no acute distress, in a pleasant mood.    SKIN: Focused examination of scalp was performed.  SCALP -  - area of scarring alopecia  - superficial excoriation, yellow crust  - Yes- diffuse erythema   - Yes perifollicular erythema  - Yes perifollicular scale   - Yes scaling of the scalp   - Negative hair pull test   - No scalp folliculitis/pustules       FACE -  - Skinceutical moisturizer, continuing tretinoin  - benign keratosis bilaterally  -  normal eyelash density  -  normal eyebrow density  HANDS -  -  no nail pitting or dystrophy     BACK:  - benign keratosis, telangiectasias  - No other lesions of concern on areas examined.     R forehead veins: depressed  Midline: neutral  L forehead veins: neutral 50%, depressed 50%     Medications:  Current Outpatient Medications   Medication     acetaminophen (TYLENOL) 500 MG tablet     Biotin 10 MG TABS tablet     Calcium 1500 MG TABS     Cholecalciferol (VITAMIN D) 1000 UNIT capsule     clobetasol (TEMOVATE) 0.05 % external ointment     clobetasol propionate (CLOBEX) 0.05 % external shampoo     COMPOUNDED NON-CONTROLLED SUBSTANCE (CMPD RX) - PHARMACY TO MIX COMPOUNDED MEDICATION     desonide (DESOWEN) 0.05 % external cream     esomeprazole (NEXIUM) 40 MG DR capsule     Fluocinolone Acetonide Scalp 0.01 % OIL oil     glucosamine-chondroitin 500-400 MG CAPS per capsule     hydrocortisone 2.5 % cream     ibuprofen (ADVIL,MOTRIN) 800 MG tablet     ketoconazole (NIZORAL) 2 % external cream     Loratadine (CLARITIN PO)     mupirocin (BACTROBAN) 2 % external ointment     NEW MED     tretinoin (RETIN-A) 0.025 % external cream     Current Facility-Administered Medications   Medication     hydrocortisone 2.5 % cream     lidocaine 1% with EPINEPHrine 1:100,000 injection 3 mL     triamcinolone acetonide (KENALOG-10) injection 10 mg     triamcinolone acetonide (KENALOG-10) injection 10 mg      triamcinolone acetonide (KENALOG-10) injection 10 mg     triamcinolone acetonide (KENALOG-10) injection 17 mg     triamcinolone acetonide (KENALOG-10) injection 17 mg      Past Medical History:   Patient Active Problem List   Diagnosis     Porokeratosis     Squamous cell carcinoma     Neoplasm of uncertain behavior     Lichen planopilaris     Dermatitis     Cicatricial alopecia     Keratosis, inflamed seborrheic     History of skin cancer     Basal cell carcinoma of vertex scalp s/p mohs 6-5-15     Medication management     Actinic keratosis     Medication monitoring encounter     Dermatitis, seborrheic     Erosive pustular dermatosis     Tick bite, initial encounter     Vulvar atrophy     Factor V Leiden mutation (H)     Splenic artery aneurysm (H)     Post concussion syndrome     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 on close of this encounter.

## 2023-03-28 NOTE — NURSING NOTE
Ester Barba's goals for this visit include:   Chief Complaint   Patient presents with     Hair Loss     Patient is here for hair loss, hair loss is losing, areas of concern top of the head it is also itchy.        She requests these members of her care team be copied on today's visit information:     PCP: Winnie Sepulveda    Referring Provider:  No referring provider defined for this encounter.    /81   Pulse 72   SpO2 97%     Do you need any medication refills at today's visit? No         Amber Baird EMT

## 2023-05-02 ENCOUNTER — OFFICE VISIT (OUTPATIENT)
Dept: DERMATOLOGY | Facility: CLINIC | Age: 78
End: 2023-05-02
Payer: MEDICARE

## 2023-05-02 VITALS — DIASTOLIC BLOOD PRESSURE: 77 MMHG | HEART RATE: 89 BPM | SYSTOLIC BLOOD PRESSURE: 127 MMHG

## 2023-05-02 DIAGNOSIS — L66.10 LICHEN PLANOPILARIS: Primary | ICD-10-CM

## 2023-05-02 DIAGNOSIS — R06.09 OTHER FORM OF DYSPNEA: ICD-10-CM

## 2023-05-02 DIAGNOSIS — L98.8 EROSIVE PUSTULAR DERMATOSIS OF SCALP: ICD-10-CM

## 2023-05-02 DIAGNOSIS — L82.0 INFLAMED SEBORRHEIC KERATOSIS: ICD-10-CM

## 2023-05-02 DIAGNOSIS — L82.1 SEBORRHEIC KERATOSIS: ICD-10-CM

## 2023-05-02 PROCEDURE — 99213 OFFICE O/P EST LOW 20 MIN: CPT | Mod: 25 | Performed by: DERMATOLOGY

## 2023-05-02 PROCEDURE — 17110 DESTRUCTION B9 LES UP TO 14: CPT | Mod: GC | Performed by: DERMATOLOGY

## 2023-05-02 PROCEDURE — 11900 INJECT SKIN LESIONS </W 7: CPT | Mod: XS | Performed by: DERMATOLOGY

## 2023-05-02 NOTE — LETTER
5/2/2023       RE: Ester Barba  [address]  [facility] [address]     Dear Colleague,    Thank you for referring your patient, Ester Barba, to the [facility]. Please see a copy of my visit note below.    No notes on file    Again, thank you for allowing me to participate in the care of your patient.      Sincerely,    Barbara Soto MD

## 2023-05-02 NOTE — NURSING NOTE
"Dermatology Rooming Note    Ester Barba's goals for this visit include:   Chief Complaint   Patient presents with     Hair Loss     HL follow-up:reports overall condition as \"the same\", top of scalp is still \"irritated\"     Pascale Dinh LPN    "

## 2023-05-02 NOTE — PROGRESS NOTES
[facility] Dermatology Note  Encounter Date: May 2, 2023  Office Visit     Dermatology Problem List:  1. 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 Bx 8/31/21 revealed lichenoid keratosis of the vertex scalp.  - Current tx: ILK10 injections, clobetasol propionate 0.05% shampoo alternating w/ zinc shampoo every other day, fluocinolone oil once weekly  - Previous tx: plaquenil w/ significant improvement (~15 yrs; discontinued 12/2019 with concerns for retinopathy by local doc in [facility]; now followed by Dr. Radford here without signs of Plaquenil toxicity 1/2020).  - LPPAI score: 3.83 on 3/28/2023, 1 on 5/2/23  2. 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 Skin infection, vertex scalp at prior site of MMS - resolved  - s/p doxycycline 100 mg BID and mupirocin ointment BID   4. History of NMSC  - BCC - front vertex scalp, s/p Mohs with purse string and granulation, 3/30/22  - BCC - forehead, s/p MMS 6/9/21  - SCCis - R vertex scalp, s/p MMS 3/2/21, bacterial cx obtained from site 3/23/21  - BCC - right nasal ala s/p Mohs 11/27/17  - Nodular BCC - vertex scalp s/p Mohs 6/5/15  - SCC - scalp, s/p Mohs 4/18/2014  - Hx of 1-2 other NMSC on scalp (BCCs)  5. AKs - bilateral temples, cheeks  6. Inflamed SKs  - s/p cryotherapy    ____________________________________________    Assessment & Plan:     # Lichen planopilaris  Overall stable/improved on exam today. LPPAI score 1 today (down from 3.83 last visit 3/28/23). Less scale and erythema compared to last visit 5 weeks ago. Will continue ILK and topical regimen. Pt interested in restarting Plaquenil given that she feels like her LPP is still active and slowly worsening, but previously had discontinued due to concern for retinopathy in 2019.  - See ILK procedure below.   - Continue fluocinolone oil up to 3x weekly.  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 Central area of tenderness near the vertex.   - Repeat ILK and continue topical steroids as above    # Seborrheic keratoses, forehead & R forearm  Discussed the natural history and benign nature of these lesions. Pt reports irritation and elects for cryotherapy.   - Cryotherapy today    # Dyspnea and abnormal CTPA  Outside CTPA 4/20/23 negative for PE but showed some scarring and air trapping. Pt states her [facility] doctor mentioned possible COPD but she would like further evaluation here at [facility].  - Pulm referral per patient request for further evaluation and work-up     Procedures Performed:   Kenalog intralesional injection procedure note: After verbal consent and discussion of risks including but not limited to atrophy, pain, and bruising, time out was performed, the patient underwent positioning, 1 total cc of Kenalog 10 mg/cc was injected into 10 sites on the scalp.  The patient tolerated the procedure well and left the Dermatology clinic in good condition.      - Cryotherapy procedure note, location(s): forehead, R forearm. After verbal consent and discussion of risks and benefits including, but not limited to, dyspigmentation/scar, blister, and pain, 5 lesion(s) was(were) treated with 1-2 mm freeze border for 1-2 cycles with liquid nitrogen. Post cryotherapy instructions were provided.    Follow-up: 4-6 week(s) in-person, or earlier for new or changing lesions    Staff and Resident:     Vince Castillo MD  Dermatology Resident (PGY-3    Patient was seen and examined with the dermatology resident. I agree with the history, review of systems, physical examination, assessments and plan. I was present for the entire cryotherapy procedure. ILK injections were done together.     Barbara Soto MD  Professor and   "Chair  Department of Dermatology  [facility]     ____________________________________________    CC: Hair Loss (HL follow-up:reports overall condition as \"the same\", top of scalp is still \"irritated\")    HPI:  Ms. Ester Barba is a 77 year old female who presents as a return patient for follow-up of hair loss, diagnosed as LPP.   - Last seen 3/28/23 in-clinic and had repeat ILK  - Hair and scalp relatively stable since 5 weeks ago. However feels like disease is still active. ILK helps  - Central tender area near the top of her erosive pustular dermatosis plaque  - Reports bumpy spots on her forehead. Using tretinoin without much change.  - Current tx: fluocinolone oil 3x weekly, alternating clobetasol 0.05% shampoo with zinc shampoo      Yes Cefdinir (finished 3 days ago for sinus infection)     Yes Scalp pain   No Scalp burning   Yes Scalp itching    No Eyebrow changes    No Eyelash changes   No Beard changes    No Other body hair changes    No Nail changes    Yes Additional symptoms? (please list below)     - Overall course: stable  - Patient reports recent medical issues including sinus infection requiring cefdinir & dyspnea with elevated D-dimer, prompting a CTPA that was negative for PE 4/20/23 but showed potential scarring and air trapping. Patient requesting pulmonary referral today for further evaluation and work-up.    Patient is otherwise feeling well, in usual state of health, and has no additional skin concerns today.       Labs:  CBC, CMP, D-dimer reviewed from 4/20/23    Physical Exam:  Vitals: There were no vitals taken for this visit.  GEN: Well developed, well-nourished, in no acute distress, in a pleasant mood.    SKIN: Focused examination of scalp and face was performed.  - mild diffuse erythema   - no significant perifollicular erythema  - mild perifollicular scale   - no diffuse scaling of the scalp   - Vertex scalp with 5 cm x 2.5 cm yellow crusted plaque with peripheral atrophy   - " Forehead and R forearm with multiple tan waxy stuck-on papules    Medications:  Current Outpatient Medications   Medication    acetaminophen (TYLENOL) 500 MG tablet    Biotin 10 MG TABS tablet    Calcium 1500 MG TABS    Cholecalciferol (VITAMIN D) 1000 UNIT capsule    clobetasol propionate (CLOBEX) 0.05 % external shampoo    COMPOUNDED NON-CONTROLLED SUBSTANCE (CMPD RX) - PHARMACY TO MIX COMPOUNDED MEDICATION    desonide (DESOWEN) 0.05 % external cream    esomeprazole (NEXIUM) 40 MG DR capsule    Fluocinolone Acetonide Scalp 0.01 % OIL oil    glucosamine-chondroitin 500-400 MG CAPS per capsule    hydrocortisone 2.5 % cream    ibuprofen (ADVIL,MOTRIN) 800 MG tablet    ketoconazole (NIZORAL) 2 % external cream    Loratadine (CLARITIN PO)    mupirocin (BACTROBAN) 2 % external ointment    NEW MED    tretinoin (RETIN-A) 0.025 % external cream    clobetasol (TEMOVATE) 0.05 % external ointment     Current Facility-Administered Medications   Medication    hydrocortisone 2.5 % cream    lidocaine 1% with EPINEPHrine 1:100,000 injection 3 mL    triamcinolone acetonide (KENALOG-10) injection 10 mg    triamcinolone acetonide (KENALOG-10) injection 10 mg    triamcinolone acetonide (KENALOG-10) injection 10 mg    triamcinolone acetonide (KENALOG-10) injection 17 mg    triamcinolone acetonide (KENALOG-10) injection 17 mg      Past Medical History:   Patient Active Problem List   Diagnosis    Porokeratosis    Squamous cell carcinoma    Neoplasm of uncertain behavior    Lichen planopilaris    Dermatitis    Cicatricial alopecia    Keratosis, inflamed seborrheic    History of skin cancer    Basal cell carcinoma of vertex scalp s/p mohs 6-5-15    Medication management    Actinic keratosis    Medication monitoring encounter    Dermatitis, seborrheic    Erosive pustular dermatosis    Tick bite, initial encounter    Vulvar atrophy    Factor V Leiden mutation (H)    Splenic artery aneurysm (H)    Post concussion syndrome    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 on close of this encounter.

## 2023-05-02 NOTE — NURSING NOTE
Drug Administration Record    Prior to injection, verified patient identity using patient's name and date of birth.  Due to injection administration, patient instructed to remain in clinic for 15 minutes  afterwards, and to report any adverse reaction to me immediately.    Drug Name: triamcinolone acetonide(kenalog)  Dose: 1mL of triamcinolone 10mg/mL, 10mg dose  Route administered: ID  NDC #: Kenalog-10 (4829-2802-11)  Amount of waste(mL):4mL  Reason for waste: Multi dose vial    LOT #: [id number]  SITE: See provider's notes  : [employer]  EXPIRATION DATE: 08/31/2024

## 2023-05-03 DIAGNOSIS — R06.00 DYSPNEA: Primary | ICD-10-CM

## 2023-05-10 ENCOUNTER — TELEPHONE (OUTPATIENT)
Dept: PULMONOLOGY | Facility: CLINIC | Age: 78
End: 2023-05-10
Payer: MEDICARE

## 2023-05-10 NOTE — TELEPHONE ENCOUNTER
Patient Contacted    Appointment type: NEW  Provider: BARB  Return date: 7/31/23  Specialty phone number: [phone]  Additional appointment(s) needed: FPFT, CXR  Additonal Notes: Pt declined mailed itinerary.

## 2023-06-13 DIAGNOSIS — Z79.899 LONG-TERM USE OF PLAQUENIL: Primary | ICD-10-CM

## 2023-06-21 ENCOUNTER — ANCILLARY PROCEDURE (OUTPATIENT)
Dept: MRI IMAGING | Facility: CLINIC | Age: 78
End: 2023-06-21
Attending: OBSTETRICS & GYNECOLOGY
Payer: MEDICARE

## 2023-06-21 DIAGNOSIS — R92.30 DENSE BREAST: ICD-10-CM

## 2023-06-21 PROCEDURE — A9585 GADOBUTROL INJECTION: HCPCS | Mod: JZ | Performed by: RADIOLOGY

## 2023-06-21 PROCEDURE — G1010 CDSM STANSON: HCPCS | Performed by: RADIOLOGY

## 2023-06-21 PROCEDURE — 77049 MRI BREAST C-+ W/CAD BI: CPT | Mod: MG | Performed by: RADIOLOGY

## 2023-06-21 RX ORDER — GADOBUTROL 604.72 MG/ML
7.5 INJECTION INTRAVENOUS ONCE
Status: COMPLETED | OUTPATIENT
Start: 2023-06-21 | End: 2023-06-21

## 2023-06-21 RX ADMIN — GADOBUTROL 6 ML: 604.72 INJECTION INTRAVENOUS at 10:21

## 2023-06-22 ENCOUNTER — OFFICE VISIT (OUTPATIENT)
Dept: OPHTHALMOLOGY | Facility: CLINIC | Age: 78
End: 2023-06-22
Attending: OPHTHALMOLOGY
Payer: MEDICARE

## 2023-06-22 DIAGNOSIS — Z79.899 LONG-TERM USE OF PLAQUENIL: ICD-10-CM

## 2023-06-22 DIAGNOSIS — H26.491 PCO (POSTERIOR CAPSULAR OPACIFICATION), RIGHT: Primary | ICD-10-CM

## 2023-06-22 DIAGNOSIS — Z96.1 PSEUDOPHAKIA OF BOTH EYES: ICD-10-CM

## 2023-06-22 PROCEDURE — 92134 CPTRZ OPH DX IMG PST SGM RTA: CPT | Performed by: OPHTHALMOLOGY

## 2023-06-22 PROCEDURE — G0463 HOSPITAL OUTPT CLINIC VISIT: HCPCS | Performed by: OPHTHALMOLOGY

## 2023-06-22 PROCEDURE — 99214 OFFICE O/P EST MOD 30 MIN: CPT | Mod: GC | Performed by: OPHTHALMOLOGY

## 2023-06-22 PROCEDURE — 92082 INTERMEDIATE VISUAL FIELD XM: CPT | Performed by: OPHTHALMOLOGY

## 2023-06-22 ASSESSMENT — REFRACTION_WEARINGRX
OS_ADD: +2.75
OS_AXIS: 137
OD_SPHERE: -0.25
OS_SPHERE: -1.25
SPECS_TYPE: PAL
OD_AXIS: 025
OD_CYLINDER: +0.75
OS_CYLINDER: +1.50
OD_ADD: +2.75

## 2023-06-22 ASSESSMENT — TONOMETRY
OD_IOP_MMHG: 13
IOP_METHOD: TONOPEN
OS_IOP_MMHG: 14

## 2023-06-22 ASSESSMENT — VISUAL ACUITY
OS_CC: 20/25
OS_CC+: -1
METHOD: SNELLEN - LINEAR
OD_CC+: -1
METHOD_MR: DIAGNOSTIC MR
CORRECTION_TYPE: GLASSES
OD_CC: 20/30

## 2023-06-22 ASSESSMENT — REFRACTION_MANIFEST
OD_AXIS: 010
OD_SPHERE: -0.50
OS_AXIS: 130
OS_CYLINDER: +1.50
OD_CYLINDER: +0.75
OS_SPHERE: -1.25

## 2023-06-22 ASSESSMENT — SLIT LAMP EXAM - LIDS
COMMENTS: NORMAL
COMMENTS: NORMAL

## 2023-06-22 ASSESSMENT — CUP TO DISC RATIO
OD_RATIO: 0.3
OS_RATIO: 0.3

## 2023-06-22 ASSESSMENT — EXTERNAL EXAM - LEFT EYE: OS_EXAM: NORMAL

## 2023-06-22 ASSESSMENT — CONF VISUAL FIELD: COMMENTS: VF TODAY

## 2023-06-22 ASSESSMENT — EXTERNAL EXAM - RIGHT EYE: OD_EXAM: NORMAL

## 2023-06-22 NOTE — NURSING NOTE
Chief Complaints and History of Present Illnesses   Patient presents with     Follow Up     Long-term use of Plaquenil,~15 years but stopped 12/3/2019     Chief Complaint(s) and History of Present Illness(es)     Follow Up            Comments: Long-term use of Plaquenil,~15 years but stopped 12/3/2019          Comments    Pt states she would like a new RX for glasses  Occ floaters not new  No flashes, eye pain or tearing    Apple Rodriguez COT 12:47 PM June 22, 2023

## 2023-06-22 NOTE — PROGRESS NOTES
I have confirmed the patient's and reviewed Past Medical History, Past Surgical History, Social History, Family History, Problem List, Medication List and agree with Tech note.     CC - Plaquenil toxicity      INTERVAL HISTORY - Follow up plaquenil toxicity screening. She feels that she has a blind spot in her left eye because she has trouble finding a phone on a table immediately and then eventually finds it. Her light sensitivity feels slightly worse. Occ floaters not new. No flashes, eye pain or tearing. She feels she needs an updated glasses Rx.        HPI - Ester Barba is a  77 year old patient presenting for follow up evaluation for plaquenil toxicity.     She was taking Plaquenil 400 mg daily for ~15 years but stopped 12/3/2019 after RPE changes were noted in OCT by her local ophthalmologist and retinologist in [facility]. she has since NOT used plaquenil       Indication for plaquenil: lichen plana polaris (scalp condition occurred after starting imuquimod).      PAST OCULAR SURGERY  None        RETINAL IMAGING:  OCT 06/22/23  OD - minor RPE and IS/OS junctions changes, stable.   OS - minor RPE and IS/OS junctions changes, stable.     Visual Field 06/22/23  OD - rare single point of depression, overall normal.   OS - rare single point of depression, overall normal.           FAF 1/16/2020  each eye dot like hypoAF spots at macula, not significant for AMD or plaquenil toxicity     Optos 1/16/2020  each eye dot like hypoAF spots at macula but midperiphery and periphery appear within normal limits    mferg:  No signs of plaquenil toxicity 1/2020.     ASSESSMENT & PLAN    1.  Plaquenil use, possible hx of toxicity  STOPPED plaquenil in 2019  Local retina doc in [place] noted RPE changes  Plaquenil use 400 mg daily x ~15 years  Risk factors for plaquenil toxicity: high dose of plaquenil for long time and macular pathology; No other risk factors for plaquenil toxicity (no liver/kidney disease, lean person)  Asked  patient to bring her OCT and VF records from her local doctor, there is a scanned letter from 2009.  06/22/23 OCT macula is stable compared to last year and visual fields are unremarkable.   Plan to continue monitoring.     2.  Conjunctivitis + Rosacea  - resolved    3.  Meibomian gland disease each eye  - Continue eyelid scrubs.   - Continue lubricating drops at least 2-3 times per day.     4.  Posterior capsular opacification, right eye  - Mild but possibly mildly visually significant.   - Discuss possibility of YAG cap.     5.   Decreased corneal sensation, right eye          RTC 1 year for DFE and Exam/OCT / 10-2 VF           Demetrius Wagner MD  Ophthalmology, PGY-3      ATTESTATION:  I have seen and examined the patient with Dr. Reed and agree with the findings in this note, as well as the interpretations of the diagnostic tests.    Malathi Agustin MD PhD.  Professor & Chair

## 2023-06-26 ENCOUNTER — OFFICE VISIT (OUTPATIENT)
Dept: ENDOCRINOLOGY | Facility: CLINIC | Age: 78
End: 2023-06-26
Attending: DERMATOLOGY
Payer: MEDICARE

## 2023-06-26 VITALS
WEIGHT: 140.2 LBS | OXYGEN SATURATION: 97 % | BODY MASS INDEX: 22.64 KG/M2 | HEART RATE: 69 BPM | SYSTOLIC BLOOD PRESSURE: 146 MMHG | DIASTOLIC BLOOD PRESSURE: 76 MMHG

## 2023-06-26 DIAGNOSIS — M89.9 BONE DISORDER: ICD-10-CM

## 2023-06-26 PROCEDURE — 99205 OFFICE O/P NEW HI 60 MIN: CPT | Mod: GC | Performed by: INTERNAL MEDICINE

## 2023-06-26 NOTE — PROGRESS NOTES
[facility] Dermatology Note  Encounter Date: Jun 27, 2023  Office Visit     Dermatology Problem List:  1. 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 Bx 8/31/21 revealed lichenoid keratosis of the vertex scalp.  - Current tx: ILK10 injections, clobetasol propionate 0.05% shampoo alternating w/ zinc shampoo every other day, fluocinolone oil once weekly  - Previous tx: plaquenil w/ significant improvement (~15 yrs; discontinued 12/2019 with concerns for retinopathy by local doc in [facility]; now followed by Dr. Radford here without signs of Plaquenil toxicity 1/2020).  - LPPAI score: 3.83 on 3/28/2023, 1 on 5/2/23, LPPAI score 1.00.today  2. 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 Skin infection, vertex scalp at prior site of MMS - resolved  - s/p doxycycline 100 mg BID and mupirocin ointment BID   4. History of NMSC  - BCC - front vertex scalp, s/p Mohs with purse string and granulation, 3/30/22  - BCC - forehead, s/p MMS 6/9/21  - SCCis - R vertex scalp, s/p MMS 3/2/21, bacterial cx obtained from site 3/23/21  - BCC - right nasal ala s/p Mohs 11/27/17  - Nodular BCC - vertex scalp s/p Mohs 6/5/15  - SCC - scalp, s/p Mohs 4/18/2014  - Hx of 1-2 other NMSC on scalp (BCCs)  5. AKs - bilateral temples, cheeks  6. Inflamed SKs  - s/p cryotherapy     ____________________________________________     Assessment & Plan:      # Lichen planopilaris  Overall stable/improved but central area still with significant crust on exam today. LPPAI score 2.67.  Pt's BP is elevated today, recommend rechecking blood pressure after visit. Will continue ILK and topical regimen. Pt reports ophthalmologist did not recommend Will place duoderm on site, change every other day, Hold on Plaquenil.     #Erosive pustular dermatosis, 5cm x2.5cm crusted plaque. No open erosions today. Pt reports using Vaseline and a bandage on the scalp. Discussed  treatment options such as applying Duaderm. Change every other day.   -Recommend applying DuaDerm.        - See ILK procedure below.   - Continue fluocinolone oil up to 3x weekly as tolerated  - Continue alternating clobetasol 0.05% shampoo with zinc shampoo every other day         # Seborrheic keratosis, non irritated on the trunk and extremities. Explained to patient benign nature of lesion. No treatment is necessary at this time unless the lesion changes or becomes symptomatic.     - Monitor for changes.         Procedures Performed:     - Intra-lesional triamcinolone procedure note. After verbal consent and review of risk of pain and skin thinning/atrophy, positioning and cleansing with isopropyl alcohol, 1 total mL of triamcinolone 10 mg/mL was injected into 10 site(s) in a grid-like pattern on the scalp. The patient tolerated the procedure well and left the dermatology clinic in good condition.    - Cryotherapy procedure note, location(s): right neck x1, right forearm x1. After verbal consent and discussion of risks and benefits including, but not limited to, dyspigmentation/scar, blister, and pain, 2 ISK(s) was(were) treated with 1-2 mm freeze border for 1-2 cycles with liquid nitrogen. Post cryotherapy instructions were provided.       Follow-up: 3 week(s) virtually (telephone with photos), or earlier for new or changing lesions    Staff and Scribe:     Scribe Disclosure:   I, Ashleigh Meehan, am serving as a scribe to document services personally performed by this physician, Dr. Barbara Soto, based on data collection and the provider's statements to me.      Provider Disclosure:   The documentation recorded by the scribe accurately reflects the services I personally performed and the decisions made by me. ILK injections and cryotherapy procedure were done by me.    Barbara Soto MD  Professor and Chair  Department of Dermatology  [facility]  [facility]: Phone: [phone], Fax:[phone]  [facility]: Phone: [phone], Fax: [phone]      ____________________________________________    CC: Hair Loss (Hair loss follow up. Patient states no change from last office visit. )    HPI:  Ms. Ester Barba is a 77 year old female who presents as a return patient for follow-up of hair loss, diagnosed as LPP. Pt reports seeing an endocrinologist recently.  - Last seen on 5/2/23  - Current tx: ILK10 injections, clobetasol propionate 0.05% shampoo alternating w/ zinc shampoo every other day, fluocinolone oil once weekly  - Scalp or hair care habits/products: clobetasol propionate 0.05% shampoo alternating w/ zinc shampoo every other day, fluocinolone weekly  no Any new medications, supplements, or products? (please list below)     Moderate-sever on the top, mild on the sides Scalp pain   With manipulation- severe, without manipulation no burning  Scalp burning   no Scalp itching    no Eyebrow changes    no Eyelash changes   no Other body hair changes    no Nail changes    no Additional symptoms? (please list below)     - Overall course: localized flare    Patient is otherwise feeling well, in usual state of health, and has no additional skin concerns today.     Labs:  None.     Physical Exam:  Vitals: BP (!) 162/69 (BP Location: Left arm, Cuff Size: Adult Regular)   Pulse 63   SpO2 97%   GEN: Well developed, well-nourished, in no acute distress, in a pleasant mood.    SKIN: Focused examination of scalp  and face was performed.  SCALP -  - mild diffuse erythema   - mild perifollicular erythema  - mild perifollicular scale in the vertex scalp  - minimal scaling of the scalp   -  negative hair pull test   - no scalp folliculitis/pustules   - In comparison to prior photographs, increased crust at the site of erosive pustular dermatosis.     FACE -  -  normal eyelash density  -  normal eyebrow density  -flesh  colored papules     HANDS -  -  no nail pitting or dystrophy   - No other lesions of concern on areas examined.     UPPER EXTREMITIES  - There is a tan to brown waxy stuck on papules with surrounding erythema on the right forearm.       LOWER EXTREMITIES   - There are waxy stuck on tan to brown papules on the trunk and extremities.        Medications:  Current Outpatient Medications   Medication    acetaminophen (TYLENOL) 500 MG tablet    Biotin 10 MG TABS tablet    Calcium 1500 MG TABS    Cholecalciferol (VITAMIN D) 1000 UNIT capsule    clobetasol propionate (CLOBEX) 0.05 % external shampoo    COMPOUNDED NON-CONTROLLED SUBSTANCE (CMPD RX) - PHARMACY TO MIX COMPOUNDED MEDICATION    desonide (DESOWEN) 0.05 % external cream    esomeprazole (NEXIUM) 20 MG packet    Fluocinolone Acetonide Scalp 0.01 % OIL oil    glucosamine-chondroitin 500-400 MG CAPS per capsule    hydrocortisone 2.5 % cream    ibuprofen (ADVIL,MOTRIN) 800 MG tablet    ketoconazole (NIZORAL) 2 % external cream    Loratadine (CLARITIN PO)    NEW MED    tretinoin (RETIN-A) 0.025 % external cream    clobetasol (TEMOVATE) 0.05 % external ointment    mupirocin (BACTROBAN) 2 % external ointment     Current Facility-Administered Medications   Medication    hydrocortisone 2.5 % cream    lidocaine 1% with EPINEPHrine 1:100,000 injection 3 mL    triamcinolone acetonide (KENALOG-10) injection 10 mg    triamcinolone acetonide (KENALOG-10) injection 10 mg    triamcinolone acetonide (KENALOG-10) injection 10 mg    triamcinolone acetonide (KENALOG-10) injection 17 mg    triamcinolone acetonide (KENALOG-10) injection 17 mg      Past Medical History:   Patient Active Problem List   Diagnosis    Porokeratosis    Squamous cell carcinoma    Neoplasm of uncertain behavior    Lichen planopilaris    Dermatitis    Cicatricial alopecia    Keratosis, inflamed seborrheic    History of skin cancer    Basal cell carcinoma of vertex scalp s/p mohs 6-5-15    Medication management     Actinic keratosis    Medication monitoring encounter    Dermatitis, seborrheic    Erosive pustular dermatosis    Tick bite, initial encounter    Vulvar atrophy    Factor V Leiden mutation (H)    Splenic artery aneurysm (H)    Post concussion syndrome    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 on close of this encounter.

## 2023-06-26 NOTE — PATIENT INSTRUCTIONS
Lab today  Consider Reclast infusion    If you have any questions, please do not hesitate to call [facility] at [phone] and ask for Endocrinology clinic.    Sincerely,    Alisha Mejia MD  Endocrinology

## 2023-06-26 NOTE — PROGRESS NOTES
Chief Complaint           Chief Complaint   Patient presents with     Consult       Osteopenia Management       HPI  Ester Barba is a 77 year old year old female who is seen in clinic for management of osteopenia.         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 Additionally she has been receiving steroid injection to the right shoulder and right knee for pain relief with her last injection being done in February. She also has a history of GERD with usage of PPIs.    Mrs. Barba was referred to clinic by her dermatologist as she has been receiving long term injections of steroids in her scalps. She had a DXA scan on 01/05/2023 that showed osteopenia with a T-score of -1.7 of the hip, -1.7 at the femoral neck, -1.2 at the lumbar spine and -2.4 at the wrist. There has been decreased in BMD at the femoral neck. She has currently been experiencing pain in her lower back area.     Bone Health  Family osteoporosis -- Mother and Grandmother  Family hx of fracture -- Grandmother with spinal compression fractions  Personal hx of fracture -- None  Menopause -- At age 51 took estrogen, vaginal estrogen cream 2017  Steroid -- injections to R. Knee, R. Shoulder last injection in February, Scalp injections  Weight --  140 lb stable  Exercise --Currently not excercising  Calcium -- 1000 IU calcium and vitamin D3   Dairy intake --Milk daily, yogurt sometimes, cheese sometimes             Significant Problems:     Patient Active Problem List   Diagnosis     Porokeratosis     Squamous cell carcinoma     Neoplasm of uncertain behavior     Lichen planopilaris     Dermatitis     Cicatricial alopecia     Keratosis, inflamed seborrheic     History of skin cancer     Basal cell carcinoma of vertex scalp s/p mohs 6-5-15     Medication management     Actinic keratosis     Medication monitoring encounter      Dermatitis, seborrheic     Erosive pustular dermatosis     Tick bite, initial encounter     Vulvar atrophy     Factor V Leiden mutation (H)     Splenic artery aneurysm (H)     Post concussion syndrome     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     Vaginal polyp     Urinary urgency     Urge incontinence     Urinary frequency        Past Surgical History:   Procedure Laterality Date     BIOPSY OF SKIN LESION       BIOPSY VAGINAL N/A 7/14/2021    Procedure: Excision of vaginal Polyp, Dilation and Currettage and Cystoscopy;  Surgeon: Sandy Quintero MD;  [facility]     CATARACT IOL, RT/LT Bilateral 2018     CYSTOSCOPY N/A 7/14/2021    Procedure: CYSTOSCOPY;  Surgeon: Sandy Quintero MD;  [facility]     DILATION AND CURETTAGE N/A 7/14/2021    Procedure: Dilation and curettage;  Surgeon: Sandy Quintero MD;  [facility]     MOHS MICROGRAPHIC PROCEDURE       NO HISTORY OF SURGERY  02/17/2014    derm     SIGMOIDECTOMY  2015    for diverticulitis          Review of Systems:     The Review of Systems is negative other than noted in the HPI           Medications:     Current Outpatient Medications   Medication Sig Dispense Refill     acetaminophen (TYLENOL) 500 MG tablet Take 500-1,000 mg by mouth every 6 hours as needed for mild pain       Biotin 10 MG TABS tablet        Calcium 1500 MG TABS        Cholecalciferol (VITAMIN D) 1000 UNIT capsule Total 2200 units daily.       clobetasol propionate (CLOBEX) 0.05 % external shampoo APPLY TO TO DRY SCALP EVERY OTHER DAY, LEAVE ON FOR 15 MIN.,THEN LATHER AND RINSE. 118 mL 0     COMPOUNDED NON-CONTROLLED SUBSTANCE (CMPD RX) - PHARMACY TO MIX COMPOUNDED MEDICATION Place 1 Application vaginally Compounded with estrogen       desonide (DESOWEN) 0.05 % external cream Mix half and half with ketoconazole cream and apply twice daily as needed 60 g 1     esomeprazole (NEXIUM) 20 MG packet Take 20 mg by mouth every morning (before breakfast) Take 30-60 minutes before  eating.       Fluocinolone Acetonide Scalp 0.01 % OIL oil APPLY ONCE A WEEK TO SCALP FOR LICHEN PLANOPILARIS 118.28 mL 3     glucosamine-chondroitin 500-400 MG CAPS per capsule Take 1 capsule by mouth       hydrocortisone 2.5 % cream Use twice daily as needed on involved areas 30 g 4     ibuprofen (ADVIL,MOTRIN) 800 MG tablet Take 800 mg by mouth every 8 hours as needed       ketoconazole (NIZORAL) 2 % external cream Mix half and half with desonide cream and apply twice a day as needed 60 g 1     Loratadine (CLARITIN PO) Take  by mouth.       NEW MED Biest 1.5mg/gram cream(pg free)  Insert 1 gram vaginally twice weekly as directed 40 g 1     tretinoin (RETIN-A) 0.025 % external cream APPLY TO AFFECTED AREA(S) A PEA SIZE AMOUNT TO FACE EVERY OTHER NIGHT AS TOLERATED 45 g 1     clobetasol (TEMOVATE) 0.05 % external ointment Apply twice daily every other day alternating with the mupirocin ointment to affected area on the scalp (Patient not taking: Reported on 3/28/2023) 60 g 1     mupirocin (BACTROBAN) 2 % external ointment Use 2 times a day every other day alternating with clobetasol ointment to affected area. (Patient not taking: Reported on 6/26/2023) 30 g 3     Allergies:   Allergies   Allergen Reactions     Dust Mites Other (See Comments)     Sneezing, coughing. asthma  Itchy watery eyes, sneezing     Estrogens Itching     Other reaction(s): Hypersensitivity  Topical caused burning sensation of skin  Topical caused burning sensation of skin; Premarin cream only - possibly only an bi-ingredient     Imiquimod Other (See Comments)     Hair loss     Levaquin [Levofloxacin Hemihydrate] Other (See Comments)     Muscle weakness     Levofloxacin Other (See Comments)     MUSCLE WEAKNESS, ARTHRITIS LIKE SYMPTOMS.       Mold Other (See Comments)     Sneezing, asthma, weezing     Pollen Extract/Tree Extract Other (See Comments)     Sneezing, weezing     Sulfa Antibiotics Hives     Sulfamethoxazole-Trimethoprim Hives     Latex  "Rash     LATEX BANDAGES; tapes adhesive     Penicillin G Rash     Penicillins Rash       Social History     Tobacco Use     Smoking status: Former     Types: Cigarettes     Quit date: 1965     Years since quittin.4     Smokeless tobacco: Never   Substance Use Topics     Alcohol use: Yes     Alcohol/week: 0.0 standard drinks of alcohol     Comment: glass of wine       Physical Exam:  Vital signs:      BP: (!) 146/76 Pulse: 69     SpO2: 97 %       Weight: 63.6 kg (140 lb 3.2 oz)  Estimated body mass index is 22.64 kg/m  as calculated from the following:    Height as of 3/22/23: 1.676 m (5' 5.98\").    Weight as of this encounter: 63.6 kg (140 lb 3.2 oz).   Constitution: Awake, Alert, and No acute distress  MSK: Tenderness on palpation of spine  Neuro: 2+ reflexes bilateral knees, Normal Gait     Labs/Imaging      DXA 2023    INTERPRETING PROVIDER:   Thea Choi MD   BONE DENSITOMETRY:     Patient reported pertinent medications: Calcium, vitamin D, Biest cream   Diagnosis: Estrogen deficiency     Total hip:   T-score= -1.7   BMD(g/cm2)= 0.739     % change from baseline(2-10-97) = -26.2*   % change from previous(18) = -2.0   ______________________________________________________________   Femoral neck:   T-score= -1.4   BMD(g/cm2)= 0.690     % change from baseline(2-10-97) = -20.5*   % change from previous(18) = -6.2* ____________________________________________________________   Lumbar spine:   T-score= -1.2   BMD(g/cm2)= 0.886     % change from baseline(2-10-97) = -9.9*   % change from previous(18) = +0.7   _____________________________________________________________   Forearm 1/3 region:   T-score=  -2.4   BMD(g/cm2)= 0.549     % change from baseline(NA)   % change from previous(NA)     Conclusion: According to WHO criteria, has osteopenia.  Forearm is done due to degenerative changes in the spine.  Positioning is appropriate and similar to previous studies.     *Asterisks " denote statistically  significant changes.  Clinical risk factors include (but are not limited to): Postmenopausal women >65 yrs., osteopenia on x-ray. Postmenopausal women <65 yrs. if  or , weight <127 lbs, surgical or natural menopause before 40, tobacco use, alcohol >2 drinks/day, first degree relative with a fracture after 45 yrs.  Other: Premenopausal amenorrhea > 1 yr., RA, Crohn's, Prednisone>7.5 mg qd 3 months or longer, transplant pt.       Impression / Plan     Ester Barba is a 77 year old year old female with a medical history of lichen planopilaris and erosive pustular dermatosis that have been treated by long term Kenalog injections of the scalp, multiple steroid injection in the shoulder and knee, GERD who is seen in clinic for management of osteopenia.     Osteopenia: DXA scan images were reviewed.      Given the T-score of the forearm region of -2.4 we need to r/o primary hyperparathyroidism. The Calculated FRAX score was 11% for major osteoporotic fracture and 2.2% for Hip Fracture. She is at moderate risk of fracture.     To evaluate for appropriate treatment options, we will check the following labs.     Serum calcium (with albumin) and phosphorus    25 hydroxy vitamin D level, intact PTH    Creatinine     Other issues in fracture prevention:    Weight bearing and strengthening exercises    Fall prevention/precaution     - Patient will review medications while we await lab results  - Patient will follow up once they reach a decision    Alexandria Davidson  PGY-1 Internal Medicine    I was present with the fellow who participated in the service and in the documentation of the note. I have verified the history and personally performed the physical exam and medical decision making. I agree with the assessment and plan of care as documented in the note.     External notes/medical records independently reviewed, labs and imaging independently reviewed, medical management and tests to be  discussed/communicated to patient.    Time: I spent 76 minutes spent on the date of the encounter preparing to see patient (including chart review and preparation), obtaining and or reviewing additional medical history, performing a physical exam and evaluation, documenting clinical information in the electronic health record, independently interpreting results, communicating results to the patient and coordinating care.    Alisha Mejia MD  Division of Diabetes and Endocrinology  Department of Medicine

## 2023-06-26 NOTE — LETTER
6/26/2023         RE: Ester Barba  [address] [place] [facility] [facility] [address]        Dear Colleague,    Thank you for referring your patient, Ester Barba, to the [facility]. Please see a copy of my visit note below.    Chief Complaint           Chief Complaint   Patient presents with    Consult       Osteopenia Management       HPI  Ester Barba is a 77 year old year old female who is seen in clinic for management of osteopenia.         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 Additionally she has been receiving steroid injection to the right shoulder and right knee for pain relief with her last injection being done in February. She also has a history of GERD with usage of PPIs.    Mrs. Barba was referred to clinic by her dermatologist as she has been receiving long term injections of steroids. She had a DXA scan on 01/05/2023 that showed osteopenia with a T-score of -1.7 of the hip, -1.7 at the femoral neck, -1.2 at the lumbar spine and -2.4 at the wrist. There has been decreased in BMD at the femoral neck. She has currently been experiencing pain in her lower back area.     Bone Health  Family osteoporosis -- Mother and Grandmother  Family hx of fracture -- Grandmother with spinal compression fractions  Personal hx of fracture -- None  Menopause -- At age 51 took estrogen, vaginal estrogen cream 2017  Steroid -- injections to R. Knee, R. Shoulder last injection in February, Scalp injections  Weight --  140 lb stable  Exercise --Currently not excercising  Calcium -- 1000 IU calcium and vitamin D3   Dairy intake --Milk daily, yogurt sometimes, cheese sometimes             Significant Problems:     Patient Active Problem List   Diagnosis    Porokeratosis    Squamous cell carcinoma    Neoplasm of uncertain behavior    Lichen planopilaris    Dermatitis     Cicatricial alopecia    Keratosis, inflamed seborrheic    History of skin cancer    Basal cell carcinoma of vertex scalp s/p mohs 6-5-15    Medication management    Actinic keratosis    Medication monitoring encounter    Dermatitis, seborrheic    Erosive pustular dermatosis    Tick bite, initial encounter    Vulvar atrophy    Factor V Leiden mutation (H)    Splenic artery aneurysm (H)    Post concussion syndrome    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    Vaginal polyp    Urinary urgency    Urge incontinence    Urinary frequency        Past Surgical History:   Procedure Laterality Date    BIOPSY OF SKIN LESION      BIOPSY VAGINAL N/A 7/14/2021    Procedure: Excision of vaginal Polyp, Dilation and Currettage and Cystoscopy;  Surgeon: Sandy Quintero MD;  [facility]    CATARACT IOL, RT/LT Bilateral 2018    CYSTOSCOPY N/A 7/14/2021    Procedure: CYSTOSCOPY;  Surgeon: Sandy Quintero MD;  [facility]    DILATION AND CURETTAGE N/A 7/14/2021    Procedure: Dilation and curettage;  Surgeon: Sandy Quintero MD;  [facility]    MOHS MICROGRAPHIC PROCEDURE      NO HISTORY OF SURGERY  02/17/2014    derm    SIGMOIDECTOMY  2015    for diverticulitis          Review of Systems:     The Review of Systems is negative other than noted in the HPI           Medications:     Current Outpatient Medications   Medication Sig Dispense Refill    acetaminophen (TYLENOL) 500 MG tablet Take 500-1,000 mg by mouth every 6 hours as needed for mild pain      Biotin 10 MG TABS tablet       Calcium 1500 MG TABS       Cholecalciferol (VITAMIN D) 1000 UNIT capsule Total 2200 units daily.      clobetasol propionate (CLOBEX) 0.05 % external shampoo APPLY TO TO DRY SCALP EVERY OTHER DAY, LEAVE ON FOR 15 MIN.,THEN LATHER AND RINSE. 118 mL 0    COMPOUNDED NON-CONTROLLED SUBSTANCE (CMPD RX) - PHARMACY TO MIX COMPOUNDED MEDICATION Place 1 Application vaginally Compounded with estrogen      desonide (DESOWEN) 0.05 % external cream Mix  half and half with ketoconazole cream and apply twice daily as needed 60 g 1    esomeprazole (NEXIUM) 20 MG packet Take 20 mg by mouth every morning (before breakfast) Take 30-60 minutes before eating.      Fluocinolone Acetonide Scalp 0.01 % OIL oil APPLY ONCE A WEEK TO SCALP FOR LICHEN PLANOPILARIS 118.28 mL 3    glucosamine-chondroitin 500-400 MG CAPS per capsule Take 1 capsule by mouth      hydrocortisone 2.5 % cream Use twice daily as needed on involved areas 30 g 4    ibuprofen (ADVIL,MOTRIN) 800 MG tablet Take 800 mg by mouth every 8 hours as needed      ketoconazole (NIZORAL) 2 % external cream Mix half and half with desonide cream and apply twice a day as needed 60 g 1    Loratadine (CLARITIN PO) Take  by mouth.      NEW MED Biest 1.5mg/gram cream(pg free)  Insert 1 gram vaginally twice weekly as directed 40 g 1    tretinoin (RETIN-A) 0.025 % external cream APPLY TO AFFECTED AREA(S) A PEA SIZE AMOUNT TO FACE EVERY OTHER NIGHT AS TOLERATED 45 g 1    clobetasol (TEMOVATE) 0.05 % external ointment Apply twice daily every other day alternating with the mupirocin ointment to affected area on the scalp (Patient not taking: Reported on 3/28/2023) 60 g 1    mupirocin (BACTROBAN) 2 % external ointment Use 2 times a day every other day alternating with clobetasol ointment to affected area. (Patient not taking: Reported on 6/26/2023) 30 g 3     Allergies:   Allergies   Allergen Reactions    Dust Mites Other (See Comments)     Sneezing, coughing. asthma  Itchy watery eyes, sneezing    Estrogens Itching     Other reaction(s): Hypersensitivity  Topical caused burning sensation of skin  Topical caused burning sensation of skin; Premarin cream only - possibly only an bi-ingredient    Imiquimod Other (See Comments)     Hair loss    Levaquin [Levofloxacin Hemihydrate] Other (See Comments)     Muscle weakness    Levofloxacin Other (See Comments)     MUSCLE WEAKNESS, ARTHRITIS LIKE SYMPTOMS.      Mold Other (See Comments)      "Sneezing, asthma, weezing    Pollen Extract/Tree Extract Other (See Comments)     Sneezing, weezing    Sulfa Antibiotics Hives    Sulfamethoxazole-Trimethoprim Hives    Latex Rash     LATEX BANDAGES; tapes adhesive    Penicillin G Rash    Penicillins Rash       Social History     Tobacco Use    Smoking status: Former     Types: Cigarettes     Quit date: 1965     Years since quittin.4    Smokeless tobacco: Never   Substance Use Topics    Alcohol use: Yes     Alcohol/week: 0.0 standard drinks of alcohol     Comment: glass of wine       Physical Exam:  Vital signs:      BP: (!) 146/76 Pulse: 69     SpO2: 97 %       Weight: 63.6 kg (140 lb 3.2 oz)  Estimated body mass index is 22.64 kg/m  as calculated from the following:    Height as of 3/22/23: 1.676 m (5' 5.98\").    Weight as of this encounter: 63.6 kg (140 lb 3.2 oz).   Constitution: Awake, Alert, and No acute distress  MSK: Tenderness on palpation of spine  Neuro: 2+ reflexes bilateral knees, Normal Gait     Labs/Imaging      DXA 2023    INTERPRETING PROVIDER:   Thea Choi MD   BONE DENSITOMETRY:     Patient reported pertinent medications: Calcium, vitamin D, Biest cream   Diagnosis: Estrogen deficiency     Total hip:   T-score= -1.7   BMD(g/cm2)= 0.739     % change from baseline(2-10-97) = -26.2*   % change from previous(18) = -2.0   ______________________________________________________________   Femoral neck:   T-score= -1.4   BMD(g/cm2)= 0.690     % change from baseline(2-10-97) = -20.5*   % change from previous(18) = -6.2* ____________________________________________________________   Lumbar spine:   T-score= -1.2   BMD(g/cm2)= 0.886     % change from baseline(2-10-97) = -9.9*   % change from previous(18) = +0.7   _____________________________________________________________   Forearm 1/3 region:   T-score=  -2.4   BMD(g/cm2)= 0.549     % change from baseline(NA)   % change from previous(NA)     Conclusion: According to " WHO criteria, has osteopenia.  Forearm is done due to degenerative changes in the spine.  Positioning is appropriate and similar to previous studies.     *Asterisks denote statistically  significant changes.  Clinical risk factors include (but are not limited to): Postmenopausal women >65 yrs., osteopenia on x-ray. Postmenopausal women <65 yrs. if  or , weight <127 lbs, surgical or natural menopause before 40, tobacco use, alcohol >2 drinks/day, first degree relative with a fracture after 45 yrs.  Other: Premenopausal amenorrhea > 1 yr., RA, Crohn's, Prednisone>7.5 mg qd 3 months or longer, transplant pt.       Impression / Plan     Ester Barba is a 77 year old year old female with a medical history of lichen planopilaris and erosive pustular dermatosis that have been treated by long term Kenalog injections of the scalp, multiple steroid injection in the shoulder and knee, GERD who is seen in clinic for management of osteopenia.     Osteopenia: DXA scan images were reviewed.      In general, a secondary cause can be identified in up to half of patients with osteoporosis or low bone density. Examples include: vitamin D deficiency (risk increased in liver/kidney disease), hypercalciuria, hypercalcemia or primary hyperparathyroidism, malabsorption, endocrine disorders, such as hyperthyroidism, Cushing s syndrome, and in males, hypogonadism, rheumatologic conditions (most commonly RA), some malignancies (such as multiple myeloma). Medications that are known to reduce bone density and/or increase fracture risk include that the patient is on: glucocorticoids, aromatase inhibitors, Depo-Provera, some antiepileptics, TZDs, and PPI s.      Given the patients long term use of steroids it would be reasonable to consider treatment in addition to her current Vitamin D and Calcium supplementation.Given the T-score of the forearm region of -2.4 we need to r/o primary hyperparathyroidism. The Calculated Frax score was  11% for major osteoporotic fracture and 2.2% for Hip Fracture.      To evaluate for appropriate treatment options, we will check the following labs.   Serum calcium (with albumin) and phosphorus  25 hydroxy vitamin D level, intact PTH  Creatinine     Other issues in fracture prevention:  Weight bearing and strengthening exercises  Fall prevention/precaution     We discussed potential treatment options with patient including possibly starting Reclast infusions as a potential treatment given her history of GERD.     - Patient will review medications while we await lab results  - Patient will follow up once they reach a decision    Alexandria Davidson  PGY-1 Internal Medicine    I was present with the fellow who participated in the service and in the documentation of the note. I have verified the history and personally performed the physical exam and medical decision making. I agree with the assessment and plan of care as documented in the note.     External notes/medical records independently reviewed, labs and imaging independently reviewed, medical management and tests to be discussed/communicated to patient.    Time: I spent 76 minutes spent on the date of the encounter preparing to see patient (including chart review and preparation), obtaining and or reviewing additional medical history, performing a physical exam and evaluation, documenting clinical information in the electronic health record, independently interpreting results, communicating results to the patient and coordinating care.    Alisha Mejia MD  Division of Diabetes and Endocrinology  Department of Medicine

## 2023-06-26 NOTE — NURSING NOTE
Ester Barba's goals for this visit include:   Chief Complaint   Patient presents with     Consult     Endocrine Problem     Osteopenia and receiving multiple cortisone injections     She requests these members of her care team be copied on today's visit information: Yes    PCP: Winnie Sepulveda    Referring Provider:  Barbara Soto MD  [address] [place] [address] [address]  [place] [address]    BP (!) 146/76 (BP Location: Left arm, Patient Position: Sitting, Cuff Size: Adult Regular)   Pulse 69   Wt 63.6 kg (140 lb 3.2 oz)   SpO2 97%   BMI 22.64 kg/m      Do you need any medication refills at today's visit? No

## 2023-06-27 ENCOUNTER — OFFICE VISIT (OUTPATIENT)
Dept: DERMATOLOGY | Facility: CLINIC | Age: 78
End: 2023-06-27
Payer: MEDICARE

## 2023-06-27 ENCOUNTER — LAB (OUTPATIENT)
Dept: LAB | Facility: CLINIC | Age: 78
End: 2023-06-27
Payer: MEDICARE

## 2023-06-27 VITALS — DIASTOLIC BLOOD PRESSURE: 69 MMHG | SYSTOLIC BLOOD PRESSURE: 162 MMHG | OXYGEN SATURATION: 97 % | HEART RATE: 63 BPM

## 2023-06-27 DIAGNOSIS — L82.1 VERRUCOUS KERATOSIS: ICD-10-CM

## 2023-06-27 DIAGNOSIS — L82.0 INFLAMED SEBORRHEIC KERATOSIS: ICD-10-CM

## 2023-06-27 DIAGNOSIS — M89.9 BONE DISORDER: ICD-10-CM

## 2023-06-27 DIAGNOSIS — L66.10 LICHEN PLANOPILARIS: Primary | ICD-10-CM

## 2023-06-27 PROCEDURE — 82565 ASSAY OF CREATININE: CPT

## 2023-06-27 PROCEDURE — 11901 INJECT SKIN LESIONS >7: CPT | Mod: 59 | Performed by: DERMATOLOGY

## 2023-06-27 PROCEDURE — 82040 ASSAY OF SERUM ALBUMIN: CPT

## 2023-06-27 PROCEDURE — 17110 DESTRUCTION B9 LES UP TO 14: CPT | Performed by: DERMATOLOGY

## 2023-06-27 PROCEDURE — 82310 ASSAY OF CALCIUM: CPT

## 2023-06-27 PROCEDURE — 82306 VITAMIN D 25 HYDROXY: CPT

## 2023-06-27 PROCEDURE — 84100 ASSAY OF PHOSPHORUS: CPT

## 2023-06-27 PROCEDURE — 83970 ASSAY OF PARATHORMONE: CPT

## 2023-06-27 PROCEDURE — 99213 OFFICE O/P EST LOW 20 MIN: CPT | Mod: 25 | Performed by: DERMATOLOGY

## 2023-06-27 PROCEDURE — 36415 COLL VENOUS BLD VENIPUNCTURE: CPT

## 2023-06-27 NOTE — NURSING NOTE
Ester Barba's goals for this visit include:   Chief Complaint   Patient presents with     Hair Loss     Hair loss follow up. Patient states no change from last office visit.        She requests these members of her care team be copied on today's visit information:       PCP: Winnie Sepulveda    Referring Provider:  No referring provider defined for this encounter.    BP (!) 162/69 (BP Location: Left arm, Cuff Size: Adult Regular)   Pulse 63   SpO2 97%     Do you need any medication refills at today's visit? No    Apple Vilchis CMA on 6/27/2023 at 4:02 PM

## 2023-06-27 NOTE — LETTER
6/27/2023         RE: Ester Barba  [address] [place] [address]        Dear Colleague,    Thank you for referring your patient, Ester Barba, to the [facility]. Please see a copy of my visit note below.    [facility] Dermatology Note  Encounter Date: Jun 27, 2023  Office Visit     Dermatology Problem List:  1. 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 Bx 8/31/21 revealed lichenoid keratosis of the vertex scalp.  - Current tx: ILK10 injections, clobetasol propionate 0.05% shampoo alternating w/ zinc shampoo every other day, fluocinolone oil once weekly  - Previous tx: plaquenil w/ significant improvement (~15 yrs; discontinued 12/2019 with concerns for retinopathy by local doc in [facility]; now followed by Dr. Radford here without signs of Plaquenil toxicity 1/2020).  - LPPAI score: 3.83 on 3/28/2023, 1 on 5/2/23, LPPAI score 1.00.today  2. 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 Skin infection, vertex scalp at prior site of MMS - resolved  - s/p doxycycline 100 mg BID and mupirocin ointment BID   4. History of NMSC  - BCC - front vertex scalp, s/p Mohs with purse string and granulation, 3/30/22  - BCC - forehead, s/p MMS 6/9/21  - SCCis - R vertex scalp, s/p MMS 3/2/21, bacterial cx obtained from site 3/23/21  - BCC - right nasal ala s/p Mohs 11/27/17  - Nodular BCC - vertex scalp s/p Mohs 6/5/15  - SCC - scalp, s/p Mohs 4/18/2014  - Hx of 1-2 other NMSC on scalp (BCCs)  5. AKs - bilateral temples, cheeks  6. Inflamed SKs  - s/p cryotherapy     ____________________________________________     Assessment & Plan:      # Lichen planopilaris  Overall stable/improved but central area still with significant crust on exam today. LPPAI score 2.67.  Pt's BP is elevated today, recommend rechecking blood pressure after visit. Will continue ILK and topical regimen. Pt reports ophthalmologist  did not recommend Will place duoderm on site, change every other day, Hold on Plaquenil.     #Erosive pustular dermatosis, 5cm x2.5cm crusted plaque. No open erosions today. Pt reports using Vaseline and a bandage on the scalp. Discussed treatment options such as applying Duaderm. Change every other day.   -Recommend applying DuaDerm.        - See ILK procedure below.   - Continue fluocinolone oil up to 3x weekly as tolerated  - Continue alternating clobetasol 0.05% shampoo with zinc shampoo every other day         # Seborrheic keratosis, non irritated on the trunk and extremities. Explained to patient benign nature of lesion. No treatment is necessary at this time unless the lesion changes or becomes symptomatic.     - Monitor for changes.         Procedures Performed:     - Intra-lesional triamcinolone procedure note. After verbal consent and review of risk of pain and skin thinning/atrophy, positioning and cleansing with isopropyl alcohol, 1 total mL of triamcinolone 10 mg/mL was injected into 10 site(s) in a grid-like pattern on the scalp. The patient tolerated the procedure well and left the dermatology clinic in good condition.    - Cryotherapy procedure note, location(s): right neck x1, right forearm x1. After verbal consent and discussion of risks and benefits including, but not limited to, dyspigmentation/scar, blister, and pain, 2 ISK(s) was(were) treated with 1-2 mm freeze border for 1-2 cycles with liquid nitrogen. Post cryotherapy instructions were provided.       Follow-up: 3 week(s) virtually (telephone with photos), or earlier for new or changing lesions    Staff and Scribe:     Scribe Disclosure:   I, Ashleigh Meehan, am serving as a scribe to document services personally performed by this physician, Dr. Barbara Soto, based on data collection and the provider's statements to me.      Provider Disclosure:   The documentation recorded by the scribe accurately reflects the services I  personally performed and the decisions made by me. ILK injections and cryotherapy procedure were done by me.    Barbara Soto MD  Professor and Chair  Department of Dermatology  [facility]: Phone: [phone], Fax:[phone]  [facility]: Phone: [phone], Fax: [phone]      ____________________________________________    CC: Hair Loss (Hair loss follow up. Patient states no change from last office visit. )    HPI:  Ms. Ester Barba is a 77 year old female who presents as a return patient for follow-up of hair loss, diagnosed as LPP. Pt reports seeing an endocrinologist recently.  - Last seen on 5/2/23  - Current tx: ILK10 injections, clobetasol propionate 0.05% shampoo alternating w/ zinc shampoo every other day, fluocinolone oil once weekly  - Scalp or hair care habits/products: clobetasol propionate 0.05% shampoo alternating w/ zinc shampoo every other day, fluocinolone weekly  no Any new medications, supplements, or products? (please list below)     Moderate-sever on the top, mild on the sides Scalp pain   With manipulation- severe, without manipulation no burning  Scalp burning   no Scalp itching    no Eyebrow changes    no Eyelash changes   no Other body hair changes    no Nail changes    no Additional symptoms? (please list below)     - Overall course: localized flare    Patient is otherwise feeling well, in usual state of health, and has no additional skin concerns today.     Labs:  None.     Physical Exam:  Vitals: BP (!) 162/69 (BP Location: Left arm, Cuff Size: Adult Regular)   Pulse 63   SpO2 97%   GEN: Well developed, well-nourished, in no acute distress, in a pleasant mood.    SKIN: Focused examination of scalp  and face was performed.  SCALP -  - mild diffuse erythema   - mild perifollicular erythema  - mild perifollicular scale in the vertex scalp  - minimal scaling of the scalp    -  negative hair pull test   - no scalp folliculitis/pustules   - In comparison to prior photographs, increased crust at the site of erosive pustular dermatosis.     FACE -  -  normal eyelash density  -  normal eyebrow density  -flesh colored papules     HANDS -  -  no nail pitting or dystrophy   - No other lesions of concern on areas examined.     UPPER EXTREMITIES  - There is a tan to brown waxy stuck on papules with surrounding erythema on the right forearm.       LOWER EXTREMITIES   - There are waxy stuck on tan to brown papules on the trunk and extremities.        Medications:  Current Outpatient Medications   Medication    acetaminophen (TYLENOL) 500 MG tablet    Biotin 10 MG TABS tablet    Calcium 1500 MG TABS    Cholecalciferol (VITAMIN D) 1000 UNIT capsule    clobetasol propionate (CLOBEX) 0.05 % external shampoo    COMPOUNDED NON-CONTROLLED SUBSTANCE (CMPD RX) - PHARMACY TO MIX COMPOUNDED MEDICATION    desonide (DESOWEN) 0.05 % external cream    esomeprazole (NEXIUM) 20 MG packet    Fluocinolone Acetonide Scalp 0.01 % OIL oil    glucosamine-chondroitin 500-400 MG CAPS per capsule    hydrocortisone 2.5 % cream    ibuprofen (ADVIL,MOTRIN) 800 MG tablet    ketoconazole (NIZORAL) 2 % external cream    Loratadine (CLARITIN PO)    NEW MED    tretinoin (RETIN-A) 0.025 % external cream    clobetasol (TEMOVATE) 0.05 % external ointment    mupirocin (BACTROBAN) 2 % external ointment     Current Facility-Administered Medications   Medication    hydrocortisone 2.5 % cream    lidocaine 1% with EPINEPHrine 1:100,000 injection 3 mL    triamcinolone acetonide (KENALOG-10) injection 10 mg    triamcinolone acetonide (KENALOG-10) injection 10 mg    triamcinolone acetonide (KENALOG-10) injection 10 mg    triamcinolone acetonide (KENALOG-10) injection 17 mg    triamcinolone acetonide (KENALOG-10) injection 17 mg      Past Medical History:   Patient Active Problem List   Diagnosis    Porokeratosis    Squamous cell carcinoma     Neoplasm of uncertain behavior    Lichen planopilaris    Dermatitis    Cicatricial alopecia    Keratosis, inflamed seborrheic    History of skin cancer    Basal cell carcinoma of vertex scalp s/p mohs 6-5-15    Medication management    Actinic keratosis    Medication monitoring encounter    Dermatitis, seborrheic    Erosive pustular dermatosis    Tick bite, initial encounter    Vulvar atrophy    Factor V Leiden mutation (H)    Splenic artery aneurysm (H)    Post concussion syndrome    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 on close of this encounter.      Again, thank you for allowing me to participate in the care of your patient.        Sincerely,        Barbara Soto MD

## 2023-06-27 NOTE — PATIENT INSTRUCTIONS
Cryotherapy    What is it?  Use of a very cold liquid, such as liquid nitrogen, to freeze and destroy abnormal skin cells that need to be removed    What should I expect?  Tenderness and redness  A small blister that might grow and fill with dark purple blood. There may be crusting.  More than one treatment may be needed if the lesions do not go away.    How do I care for the treated area?  Gently wash the area with your hands when bathing.  Use a thin layer of Vaseline to help with healing. You may use a Band-Aid.   The area should heal within 7-10 days and may leave behind a pink or lighter color.   Do not use an antibiotic or Neosporin ointment.   You may take acetaminophen (Tylenol) for pain.     Call your doctor if you have:  Severe pain  Signs of infection (warmth, redness, cloudy yellow drainage, and or a bad smell)  Questions or concerns    Who should I call with questions?      [facility]: [phone]      [facility]: [phone]      For urgent needs outside of business hours call the [facility] at [phone] and ask for the dermatology resident on call     Intralesional Kenalog (ILK) Injections    Intralesional steroid injection involves a corticosteroid, such as triamcinolone acetonide (brand name Kenalog), which is injected directly into a lesion on or immediately below the skin. Intralesional kenalog may be used to treat the following skin conditions:    Alopecia areata  Discoid lupus erythematosus  Keloids/hypertrophic scars  Granuloma annulare and other granulomatous disorders  Hypertrophic lichen planus  Lichen simplex chronicus (neurodermatitis)  Localised psoriasis  Necrobiosis lipoidica  Acne cysts (nodulocystic acne)  Small infantile hemangiomas    Possible side-effects of intralesional Kenalog (ILK) injections include bleeding, pain, infection, contact dermatitis, nerve damage, scar formation and need for a repeat  procedure.    Some people may experience delayed side-effects including:  Lipoatrophy, appearing as skin indentations or dimples around the injection sites a few weeks after treatment; these may be permanent.  White marks (leukoderma) or brown marks (postinflammatory pigmentation) at the site of injection or spreading from the site of injection - these may resolve or persist long term.  Telangiectasia, or small dilated blood vessels at the site of injection.   Increased hair growth at the site of injection - this resolves eventually.  Steroid acne: steroids increase growth hormone, leading to increased sebum (oil) production by the sebaceous glands. Steroid acne generally improves once the steroid has been stopped.      Who should I call with questions?  [facility]: [phone]   [facility]: [phone]  For urgent needs outside of business hours call the [facility] at [phone] and ask for the dermatology resident on call

## 2023-06-28 LAB
ALBUMIN SERPL BCG-MCNC: 4.5 G/DL (ref 3.5–5.2)
CALCIUM SERPL-MCNC: 9.7 MG/DL (ref 8.8–10.2)
CREAT SERPL-MCNC: 0.58 MG/DL (ref 0.51–0.95)
GFR SERPL CREATININE-BSD FRML MDRD: >90 ML/MIN/1.73M2
PHOSPHATE SERPL-MCNC: 4.2 MG/DL (ref 2.5–4.5)
PTH-INTACT SERPL-MCNC: 22 PG/ML (ref 15–65)

## 2023-06-29 LAB — DEPRECATED CALCIDIOL+CALCIFEROL SERPL-MC: 40 UG/L (ref 20–75)

## 2023-07-03 DIAGNOSIS — L30.9 DERMATITIS: ICD-10-CM

## 2023-07-03 DIAGNOSIS — L66.10 LICHEN PLANOPILARIS: ICD-10-CM

## 2023-07-05 RX ORDER — CLOBETASOL PROPIONATE 0.05 G/100ML
SHAMPOO TOPICAL
Qty: 118 ML | Refills: 0 | Status: SHIPPED | OUTPATIENT
Start: 2023-07-05 | End: 2023-09-19

## 2023-07-05 NOTE — TELEPHONE ENCOUNTER
clobetasol propionate (CLOBEX) 0.05 % external shampoo  Last Written Prescription Date:   1/16/2023  Last Fill Quantity: 118,   # refills: 0  Last Office Visit :  6/27/2023  Future Office visit:   8/15/2023  118 mL,  Sent to florentino White RN  Central Triage Red Flags/Med Refills

## 2023-07-20 NOTE — CONFIDENTIAL NOTE
RECORDS RECEIVED FROM: internal     DATE RECEIVED: 7.31.23     NOTES STATUS DETAILS   OFFICE NOTE from referring provider internal  LINDSAY SMITH   OFFICE NOTE from other specialist       DISCHARGE SUMMARY from hospital       DISCHARGE REPORT from the ER       MEDICATION LIST internal      IMAGING  (NEED IMAGES AND REPORTS)       CT SCAN       CHEST XRAY (CXR) internal  [facility].23      [facility]   [facility]       PULMONARY FUNCTION TESTING (PFT) internal  Scheduled 7.31.23        Action 5.23.23 sv    Action Taken Image request sent to [facility] for   CXR-- 10.14.21--received ---

## 2023-07-30 ASSESSMENT — ENCOUNTER SYMPTOMS
SPEECH CHANGE: 0
POSTURAL DYSPNEA: 0
MUSCLE WEAKNESS: 1
ORTHOPNEA: 0
DISTURBANCES IN COORDINATION: 0
LOSS OF CONSCIOUSNESS: 0
COUGH DISTURBING SLEEP: 1
COUGH: 1
EYE REDNESS: 0
MYALGIAS: 1
FLANK PAIN: 0
WHEEZING: 0
SEIZURES: 0
STIFFNESS: 1
LEG PAIN: 1
EYE IRRITATION: 1
SPUTUM PRODUCTION: 0
WEAKNESS: 1
EYE WATERING: 0
LIGHT-HEADEDNESS: 0
EYE PAIN: 0
PALPITATIONS: 1
DIFFICULTY URINATING: 0
NAIL CHANGES: 0
HEADACHES: 0
HYPERTENSION: 0
DYSURIA: 0
PARALYSIS: 0
TREMORS: 0
JOINT SWELLING: 1
NECK PAIN: 1
EXERCISE INTOLERANCE: 1
POOR WOUND HEALING: 1
NUMBNESS: 0
HEMATURIA: 0
TINGLING: 0
SLEEP DISTURBANCES DUE TO BREATHING: 0
BACK PAIN: 1
SKIN CHANGES: 0
DIZZINESS: 1
DECREASED LIBIDO: 0
HEMOPTYSIS: 0
MEMORY LOSS: 0
SYNCOPE: 0
ARTHRALGIAS: 1
HYPOTENSION: 0
SHORTNESS OF BREATH: 1
SNORES LOUDLY: 1
DYSPNEA ON EXERTION: 1

## 2023-07-31 ENCOUNTER — OFFICE VISIT (OUTPATIENT)
Dept: PULMONOLOGY | Facility: CLINIC | Age: 78
End: 2023-07-31
Attending: DERMATOLOGY
Payer: MEDICARE

## 2023-07-31 ENCOUNTER — ANCILLARY PROCEDURE (OUTPATIENT)
Dept: GENERAL RADIOLOGY | Facility: CLINIC | Age: 78
End: 2023-07-31
Attending: STUDENT IN AN ORGANIZED HEALTH CARE EDUCATION/TRAINING PROGRAM
Payer: MEDICARE

## 2023-07-31 ENCOUNTER — PRE VISIT (OUTPATIENT)
Dept: PULMONOLOGY | Facility: CLINIC | Age: 78
End: 2023-07-31

## 2023-07-31 VITALS — HEART RATE: 59 BPM | DIASTOLIC BLOOD PRESSURE: 75 MMHG | OXYGEN SATURATION: 99 % | SYSTOLIC BLOOD PRESSURE: 130 MMHG

## 2023-07-31 DIAGNOSIS — R06.00 DYSPNEA: ICD-10-CM

## 2023-07-31 DIAGNOSIS — R06.09 OTHER FORM OF DYSPNEA: ICD-10-CM

## 2023-07-31 PROCEDURE — 99205 OFFICE O/P NEW HI 60 MIN: CPT | Mod: 25 | Performed by: STUDENT IN AN ORGANIZED HEALTH CARE EDUCATION/TRAINING PROGRAM

## 2023-07-31 PROCEDURE — G0463 HOSPITAL OUTPT CLINIC VISIT: HCPCS | Performed by: STUDENT IN AN ORGANIZED HEALTH CARE EDUCATION/TRAINING PROGRAM

## 2023-07-31 PROCEDURE — 94729 DIFFUSING CAPACITY: CPT | Performed by: INTERNAL MEDICINE

## 2023-07-31 PROCEDURE — 94375 RESPIRATORY FLOW VOLUME LOOP: CPT | Performed by: INTERNAL MEDICINE

## 2023-07-31 PROCEDURE — 71046 X-RAY EXAM CHEST 2 VIEWS: CPT | Performed by: RADIOLOGY

## 2023-07-31 NOTE — NURSING NOTE
Chief Complaint   Patient presents with    New Patient     Dyspnea     Vitals were taken and medications were reconciled.     PIERCE Lara

## 2023-07-31 NOTE — PATIENT INSTRUCTIONS
Will get you scheduled for echocardiogram (ultrasound of the heart) to make sure your heart shape and function looks normal.     Will do repeat CT of the lungs to look for scarring, trapping of air, or other abnormalities.    Depending on test results may consider having you do a stress test of your heart since symptoms are with exertion.    In the meantime try to use your albuterol regularly to see if gives any benefit to your breathing. Can try to use 10-15 minutes before activity or if develop shortness of breath.

## 2023-07-31 NOTE — PROGRESS NOTES
[facility]    Pulmonary, Allergy, Critical Care and Sleep Medicine    Initial Clinic Visit  7/31/23    Ester Barba MRN# [id number]   Age: 77 year old YOB: 1945     Primary care provider: Winnie Sepulveda     Assessment and Recommendations:    Ester Barba is a 77 year old female with a history of arthritis and possible exercise induced asthma who presents for evaluation of exertional dyspnea.     Exertional Dyspnea  First onset of symptoms about 20 years ago, given albuterol inhaler but has treated symptoms with rest rather than inhaler. Does have remote smoking history. Denies dizziness, chest pain or pressure, but does always rest before symptoms have chance to get too severe. No personal cardiac history and CT PE without signs of coronary calcifications. Will get Echo with bubble for additional cardiac evaluation. Spirometry and DLCO were normal today. Given potential history of asthma, finding of mosaic attenuation on CT earlier this year will repeat CT and get with expiratory views to assess for air trapping.   - Use of albuterol to see if helps with symptoms  - Echo with bubble study  - CT Chest with expiratory views  - In future may consider sending for stress test   - Up to date with PNA shots and COVID-19 series through [facility]    Follow up in 3 months    La Nena Sanders MD PhD  Pulmonary and Critical Care   Pager [phone]    60 minutes spent on the date of the encounter doing chart review, history and exam, documentation and further activities per the note.    Addendum: Echo with bubble negative for shunt, no WMA or signs of heart failure. CT with chest stable from April but without evidence of mosaic attenuation, trace air trapping in right base. Findings discussed with patient who reported some racing heart when has used albuterol. Will trial Xopenex as alternative.      History of Present Illness:    HPI:   In April reported dyspnea and pounding heart  vs palpitations with exertion. Was in the midst of cefdinir from ENT for sinus infection and a month prior had traveled to [place] and [place]. Also was having fatigue and some chills and night sweats. Seen in clinic where D-Dimer was positive. CT PE was negative for PE, did show possible scarring of lung apices with mosaic attenuation. At Dermatology appointment in early May asked about further evaluation for possible COPD and referred to Pulmonary. In June reported tick bite and empirically treated with doxy for 10 days. In days following had fatigue and morning stiffness.     Today reports that walking up and down stairs would get out of breath and feel heart pound so that would need to sit down. Similar symptoms about 20 years ago when was on a trip to [facility]. At the time diagnosed with exercise induced asthma. Given albuterol which never really used, would just stop and rest. Thinks able to do less and less over the years. Would have to take a break after bending down and/or lifting. Out of breath and tired more that used to, will take about 10 minutes to recover which has been stable but thinks is overall doing less and going slower. Denies ever feeling dizzy or lightheaded, in part because would stop before could ever get to that point. Can still feel heart pound, no chest pain or pressure, can't tell if heart skips beats. Denies lower extremity edema. No breathing issues on airplanes. Will sometimes feel affected by air quality, does feel like needs to get out of house and get fresh air.     Occasional dry cough, intermittent, 1-2 times per day at most. Does have a lot of allergies and uses Claritin. Has history of sinus infections but cough not significantly worse with these. Does have acid reflux, takes 20 mg of Nexium daily. Denies change in cough with eat or drink. Thinks maybe snores, denies shortness of breath at night.     Family history significant for a father with lung cancer (was a  "smoker), mother also smoked, no auto-immune disease, brothers with eczema, no asthma as a kid. Smoked in college a little, socially on the weekends. No other inhaled drugs. Was around a lot of SHS from parents, friends. Use to burn wood and stopped. 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 No pets, no bird exposures. [place] needing [place] years ago. Might have been exposed to asbestos from tiles in hospital.     Additional data from chart review  Primary Care and Dermatology notes reviewed and summarized as above.    Procedures  PFTs :Personally reviewed,    Imaging  CXR 10/2021: Per report \"Underlying COPD. Chronic biapical pleural thickening.\"    CT AP 12/2021: Per report lung bases clear    CT Chest 4/20/23: Personally reviewed, bilateral apical parenchymal scarring and mosaic attenuation, no coronary calcifications    CT Chest 8/15/23: Continued bi-apical scarring, no mosaic attenuation    Review of Systems:  Complete 12 point ROS negative unless mentioned in HPI    Histories, Medications, Allergies:    Past Medical History:  Past Medical History:   Diagnosis Date     Arthritis     osteoarthritis     Basal cell carcinoma      Bilateral occipital neuralgia 01/21/2019     Concussion 01/21/2019     Squamous cell carcinoma      Past Surgical History:  Past Surgical History:   Procedure Laterality Date     BIOPSY OF SKIN LESION       BIOPSY VAGINAL N/A 7/14/2021    Procedure: Excision of vaginal Polyp, Dilation and Currettage and Cystoscopy;  Surgeon: Sandy Quintero MD;  [facility]: [facility]     CATARACT IOL, RT/LT Bilateral 2018     CYSTOSCOPY N/A 7/14/2021    Procedure: CYSTOSCOPY;  Surgeon: Sandy Quintero MD;  [facility]     DILATION AND CURETTAGE N/A 7/14/2021    Procedure: Dilation and curettage;  Surgeon: Sandy Quintero MD;  [facility]     MOHS MICROGRAPHIC PROCEDURE   "     NO HISTORY OF SURGERY  2014    derm     SIGMOIDECTOMY      for diverticulitis     Past Social History:  Social History     Socioeconomic History     Marital status:      Spouse name: Not on file     Number of children: Not on file     Years of education: Not on file     Highest education level: Not on file   Occupational History     Not on file   Tobacco Use     Smoking status: Former     Types: Cigarettes     Quit date: 1965     Years since quittin.5     Smokeless tobacco: Never   Vaping Use     Vaping Use: Never used   Substance and Sexual Activity     Alcohol use: Yes     Alcohol/week: 0.0 standard drinks of alcohol     Comment: glass of wine     Drug use: Never     Sexual activity: Not Currently     Birth control/protection: Post-menopausal   Other Topics Concern     Parent/sibling w/ CABG, MI or angioplasty before 65F 55M? Not Asked   Social History Narrative    How much exercise per week? 2 days a week    How much calcium per day? Everyday supplements and milk and cheese       How much caffeine per day? 2-3 cups    How much vitamin D per day? supplment    Do you/your family wear seatbelts?  Yes    Do you/your family use safety helmets? Yes    Do you/your family use sunscreen? Yes    Do you/your family keep firearms in the home? Yes    Do you/your family have a smoke detector(s)? Yes        Do you feel safe in your home? Yes    Has anyone ever touched you in an unwanted manner? No     Explain      Social Determinants of Health     Financial Resource Strain: Not on file   Food Insecurity: Not on file   Transportation Needs: Not on file   Physical Activity: Not on file   Stress: Not on file   Social Connections: Not on file   Intimate Partner Violence: Not on file   Housing Stability: Not on file     Family History:  Family History   Problem Relation Age of Onset     Skin Cancer Sister         basal cell carcinoma     Melanoma No family hx of      Glaucoma No family hx of       Macular Degeneration No family hx of      Medications:  Current Outpatient Medications   Medication     acetaminophen (TYLENOL) 500 MG tablet     Biotin 10 MG TABS tablet     Calcium 1500 MG TABS     Cholecalciferol (VITAMIN D) 1000 UNIT capsule     clobetasol (TEMOVATE) 0.05 % external ointment     clobetasol propionate (CLOBEX) 0.05 % external shampoo     COMPOUNDED NON-CONTROLLED SUBSTANCE (CMPD RX) - PHARMACY TO MIX COMPOUNDED MEDICATION     desonide (DESOWEN) 0.05 % external cream     esomeprazole (NEXIUM) 20 MG packet     Fluocinolone Acetonide Scalp 0.01 % OIL oil     glucosamine-chondroitin 500-400 MG CAPS per capsule     hydrocortisone 2.5 % cream     ibuprofen (ADVIL,MOTRIN) 800 MG tablet     ketoconazole (NIZORAL) 2 % external cream     Loratadine (CLARITIN PO)     mupirocin (BACTROBAN) 2 % external ointment     NEW MED     tretinoin (RETIN-A) 0.025 % external cream     Current Facility-Administered Medications   Medication     hydrocortisone 2.5 % cream     lidocaine 1% with EPINEPHrine 1:100,000 injection 3 mL     triamcinolone acetonide (KENALOG-10) injection 10 mg     triamcinolone acetonide (KENALOG-10) injection 10 mg     triamcinolone acetonide (KENALOG-10) injection 10 mg     triamcinolone acetonide (KENALOG-10) injection 10 mg     triamcinolone acetonide (KENALOG-10) injection 17 mg     triamcinolone acetonide (KENALOG-10) injection 17 mg       hydrocortisone   Topical BID     lidocaine 1% with EPINEPHrine 1:100,000  3 mL Intradermal Once     triamcinolone acetonide  10 mg Intra-Lesional Once     triamcinolone acetonide  10 mg Intra-Lesional Once     triamcinolone acetonide  10 mg Intra-Lesional Once     triamcinolone acetonide  10 mg Intra-Lesional Once     triamcinolone acetonide  17 mg Intra-Lesional Once     triamcinolone acetonide  17 mg Intra-Lesional Once       Allergies:     Allergies   Allergen Reactions     Dust Mites Other (See Comments)     Sneezing, coughing. asthma  Itchy watery  eyes, sneezing     Estrogens Itching     Other reaction(s): Hypersensitivity  Topical caused burning sensation of skin  Topical caused burning sensation of skin; Premarin cream only - possibly only an bi-ingredient     Imiquimod Other (See Comments)     Hair loss     Levaquin [Levofloxacin Hemihydrate] Other (See Comments)     Muscle weakness     Levofloxacin Other (See Comments)     MUSCLE WEAKNESS, ARTHRITIS LIKE SYMPTOMS.       Mold Other (See Comments)     Sneezing, asthma, weezing     Pollen Extract/Tree Extract Other (See Comments)     Sneezing, weezing     Sulfa Antibiotics Hives     Sulfamethoxazole-Trimethoprim Hives     Latex Rash     LATEX BANDAGES; tapes adhesive     Penicillin G Rash     Penicillins Rash     Physical Exam:    /75   Pulse 59   SpO2 99%     General: Sitting up in NAD  HEENT: anicteric, moist mucosa  Neck: no palpable lymphadenopathy  Chest: CTAB, no wheezing or crackles  Cardiac: RRR no murmurs, radial pulses intact  Abdomen: Soft, non tender, active BS  Extremities: No LE Edema  Neuro: A&Ox3, no focal deficits   Skin: no rash noted on limited exam  Psych: Appropriate  Laboratory, imaging, and microbiologic data:    All laboratory and imaging data reviewed, pertinent results discussed above.

## 2023-07-31 NOTE — LETTER
7/31/2023         RE: Ester Barba  [address] [place] [address]        Dear Colleague,    Thank you for referring your patient, Ester Barba, to the [facility] FOR LUNG SCIENCE AND [facility]. Please see a copy of my visit note below.    [facility]    Pulmonary, Allergy, Critical Care and Sleep Medicine    Initial Clinic Visit  7/31/23    Ester Barba MRN# [id number]   Age: 77 year old YOB: 1945     Primary care provider: Winnie Sepulveda     Assessment and Recommendations:    Ester Barba is a 77 year old female with a history of arthritis and possible exercise induced asthma who presents for evaluation of exertional dyspnea.     Exertional Dyspnea  First onset of symptoms about 20 years ago, given albuterol inhaler but has treated symptoms with rest rather than inhaler. Does have remote smoking history. Denies dizziness, chest pain or pressure, but does always rest before symptoms have chance to get too severe. No personal cardiac history and CT PE without signs of coronary calcifications. Will get Echo with bubble for additional cardiac evaluation. Spirometry and DLCO were normal today. Given potential history of asthma, finding of mosaic attenuation on CT earlier this year will repeat CT and get with expiratory views to assess for air trapping.   - Use of albuterol to see if helps with symptoms  - Echo with bubble study  - CT Chest with expiratory views  - In future may consider sending for stress test   - Up to date with PNA shots and COVID-19 series through [facility]    Follow up in 3 months    La Nena Sanders MD PhD  Pulmonary and Critical Care   Pager [phone]    60 minutes spent on the date of the encounter doing chart review, history and exam, documentation and further activities per the note.    Addendum: Echo with bubble negative for shunt, no WMA or signs of heart failure. CT with chest stable from April but  without evidence of mosaic attenuation, trace air trapping in right base. Findings discussed with patient who reported some racing heart when has used albuterol. Will trial Xopenex as alternative.      History of Present Illness:    HPI:   In April reported dyspnea and pounding heart vs palpitations with exertion. Was in the midst of cefdinir from ENT for sinus infection and a month prior had traveled to [place] and [place]. Also was having fatigue and some chills and night sweats. Seen in clinic where D-Dimer was positive. CT PE was negative for PE, did show possible scarring of lung apices with mosaic attenuation. At Dermatology appointment in early May asked about further evaluation for possible COPD and referred to Pulmonary. In June reported tick bite and empirically treated with doxy for 10 days. In days following had fatigue and morning stiffness.     Today reports that walking up and down stairs would get out of breath and feel heart pound so that would need to sit down. Similar symptoms about 20 years ago when was on a trip to [facility]. At the time diagnosed with exercise induced asthma. Given albuterol which never really used, would just stop and rest. Thinks able to do less and less over the years. Would have to take a break after bending down and/or lifting. Out of breath and tired more that used to, will take about 10 minutes to recover which has been stable but thinks is overall doing less and going slower. Denies ever feeling dizzy or lightheaded, in part because would stop before could ever get to that point. Can still feel heart pound, no chest pain or pressure, can't tell if heart skips beats. Denies lower extremity edema. No breathing issues on airplanes. Will sometimes feel affected by air quality, does feel like needs to get out of house and get fresh air.     Occasional dry cough, intermittent, 1-2 times per day at most. Does have a lot of allergies and uses Claritin. Has history of  "sinus infections but cough not significantly worse with these. Does have acid reflux, takes 20 mg of Nexium daily. Denies change in cough with eat or drink. Thinks maybe snores, denies shortness of breath at night.     Family history significant for a father with lung cancer (was a smoker), mother also smoked, no auto-immune disease, brothers with eczema, no asthma as a kid. Smoked in college a little, socially on the weekends. No other inhaled drugs. Was around a lot of SHS from parents, friends. Use to burn wood and stopped. 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 No pets, no bird exposures. [place] years ago. Might have been exposed to asbestos from tiles in hospital.     Additional data from chart review  Primary Care and Dermatology notes reviewed and summarized as above.    Procedures  PFTs :Personally reviewed,    Imaging  CXR 10/2021: Per report \"Underlying COPD. Chronic biapical pleural thickening.\"    CT AP 12/2021: Per report lung bases clear    CT Chest 4/20/23: Personally reviewed, bilateral apical parenchymal scarring and mosaic attenuation, no coronary calcifications    CT Chest 8/15/23: Continued bi-apical scarring, no mosaic attenuation    Review of Systems:  Complete 12 point ROS negative unless mentioned in HPI    Histories, Medications, Allergies:    Past Medical History:  Past Medical History:   Diagnosis Date    Arthritis     osteoarthritis    Basal cell carcinoma     Bilateral occipital neuralgia 01/21/2019    Concussion 01/21/2019    Squamous cell carcinoma      Past Surgical History:  Past Surgical History:   Procedure Laterality Date    BIOPSY OF SKIN LESION      BIOPSY VAGINAL N/A 7/14/2021    Procedure: Excision of vaginal Polyp, Dilation and Currettage and Cystoscopy;  Surgeon: Sandy Quintero MD;  [facility]    CATARACT IOL, RT/LT " Bilateral 2018    CYSTOSCOPY N/A 2021    Procedure: CYSTOSCOPY;  Surgeon: Sandy Quintero MD;  [facility]    DILATION AND CURETTAGE N/A 2021    Procedure: Dilation and curettage;  Surgeon: Sandy Quintero MD;  [facility]    MOHS MICROGRAPHIC PROCEDURE      NO HISTORY OF SURGERY  2014    derm    SIGMOIDECTOMY      for diverticulitis     Past Social History:  Social History     Socioeconomic History    Marital status:      Spouse name: Not on file    Number of children: Not on file    Years of education: Not on file    Highest education level: Not on file   Occupational History    Not on file   Tobacco Use    Smoking status: Former     Types: Cigarettes     Quit date: 1965     Years since quittin.5    Smokeless tobacco: Never   Vaping Use    Vaping Use: Never used   Substance and Sexual Activity    Alcohol use: Yes     Alcohol/week: 0.0 standard drinks of alcohol     Comment: glass of wine    Drug use: Never    Sexual activity: Not Currently     Birth control/protection: Post-menopausal   Other Topics Concern    Parent/sibling w/ CABG, MI or angioplasty before 65F 55M? Not Asked   Social History Narrative    How much exercise per week? 2 days a week    How much calcium per day? Everyday supplements and milk and cheese       How much caffeine per day? 2-3 cups    How much vitamin D per day? supplment    Do you/your family wear seatbelts?  Yes    Do you/your family use safety helmets? Yes    Do you/your family use sunscreen? Yes    Do you/your family keep firearms in the home? Yes    Do you/your family have a smoke detector(s)? Yes        Do you feel safe in your home? Yes    Has anyone ever touched you in an unwanted manner? No     Explain      Social Determinants of Health     Financial Resource Strain: Not on file   Food Insecurity: Not on file   Transportation Needs: Not on file   Physical Activity: Not on file   Stress: Not on file   Social Connections: Not on file    Intimate Partner Violence: Not on file   Housing Stability: Not on file     Family History:  Family History   Problem Relation Age of Onset    Skin Cancer Sister         basal cell carcinoma    Melanoma No family hx of     Glaucoma No family hx of     Macular Degeneration No family hx of      Medications:  Current Outpatient Medications   Medication    acetaminophen (TYLENOL) 500 MG tablet    Biotin 10 MG TABS tablet    Calcium 1500 MG TABS    Cholecalciferol (VITAMIN D) 1000 UNIT capsule    clobetasol (TEMOVATE) 0.05 % external ointment    clobetasol propionate (CLOBEX) 0.05 % external shampoo    COMPOUNDED NON-CONTROLLED SUBSTANCE (CMPD RX) - PHARMACY TO MIX COMPOUNDED MEDICATION    desonide (DESOWEN) 0.05 % external cream    esomeprazole (NEXIUM) 20 MG packet    Fluocinolone Acetonide Scalp 0.01 % OIL oil    glucosamine-chondroitin 500-400 MG CAPS per capsule    hydrocortisone 2.5 % cream    ibuprofen (ADVIL,MOTRIN) 800 MG tablet    ketoconazole (NIZORAL) 2 % external cream    Loratadine (CLARITIN PO)    mupirocin (BACTROBAN) 2 % external ointment    NEW MED    tretinoin (RETIN-A) 0.025 % external cream     Current Facility-Administered Medications   Medication    hydrocortisone 2.5 % cream    lidocaine 1% with EPINEPHrine 1:100,000 injection 3 mL    triamcinolone acetonide (KENALOG-10) injection 10 mg    triamcinolone acetonide (KENALOG-10) injection 10 mg    triamcinolone acetonide (KENALOG-10) injection 10 mg    triamcinolone acetonide (KENALOG-10) injection 10 mg    triamcinolone acetonide (KENALOG-10) injection 17 mg    triamcinolone acetonide (KENALOG-10) injection 17 mg      hydrocortisone   Topical BID    lidocaine 1% with EPINEPHrine 1:100,000  3 mL Intradermal Once    triamcinolone acetonide  10 mg Intra-Lesional Once    triamcinolone acetonide  10 mg Intra-Lesional Once    triamcinolone acetonide  10 mg Intra-Lesional Once    triamcinolone acetonide  10 mg Intra-Lesional Once    triamcinolone  acetonide  17 mg Intra-Lesional Once    triamcinolone acetonide  17 mg Intra-Lesional Once       Allergies:     Allergies   Allergen Reactions    Dust Mites Other (See Comments)     Sneezing, coughing. asthma  Itchy watery eyes, sneezing    Estrogens Itching     Other reaction(s): Hypersensitivity  Topical caused burning sensation of skin  Topical caused burning sensation of skin; Premarin cream only - possibly only an bi-ingredient    Imiquimod Other (See Comments)     Hair loss    Levaquin [Levofloxacin Hemihydrate] Other (See Comments)     Muscle weakness    Levofloxacin Other (See Comments)     MUSCLE WEAKNESS, ARTHRITIS LIKE SYMPTOMS.      Mold Other (See Comments)     Sneezing, asthma, weezing    Pollen Extract/Tree Extract Other (See Comments)     Sneezing, weezing    Sulfa Antibiotics Hives    Sulfamethoxazole-Trimethoprim Hives    Latex Rash     LATEX BANDAGES; tapes adhesive    Penicillin G Rash    Penicillins Rash     Physical Exam:    /75   Pulse 59   SpO2 99%     General: Sitting up in NAD  HEENT: anicteric, moist mucosa  Neck: no palpable lymphadenopathy  Chest: CTAB, no wheezing or crackles  Cardiac: RRR no murmurs, radial pulses intact  Abdomen: Soft, non tender, active BS  Extremities: No LE Edema  Neuro: A&Ox3, no focal deficits   Skin: no rash noted on limited exam  Psych: Appropriate  Laboratory, imaging, and microbiologic data:    All laboratory and imaging data reviewed, pertinent results discussed above.        Again, thank you for allowing me to participate in the care of your patient.        Sincerely,        La Nena Sanders MD

## 2023-08-01 LAB
DLCOUNC-%PRED-PRE: 76 %
DLCOUNC-PRE: 14.77 ML/MIN/MMHG
DLCOUNC-PRED: 19.28 ML/MIN/MMHG
ERV-%PRED-PRE: 124 %
ERV-PRE: 0.89 L
ERV-PRED: 0.72 L
EXPTIME-PRE: 8.87 SEC
FEF2575-%PRED-PRE: 103 %
FEF2575-PRE: 1.7 L/SEC
FEF2575-PRED: 1.64 L/SEC
FEFMAX-%PRED-PRE: 115 %
FEFMAX-PRE: 6.21 L/SEC
FEFMAX-PRED: 5.39 L/SEC
FEV1-%PRED-PRE: 103 %
FEV1-PRE: 2.07 L
FEV1FEV6-PRE: 77 %
FEV1FEV6-PRED: 78 %
FEV1FVC-PRE: 76 %
FEV1FVC-PRED: 78 %
FEV1SVC-PRE: 77 %
FEV1SVC-PRED: 69 %
FIFMAX-PRE: 2.79 L/SEC
FVC-%PRED-PRE: 105 %
FVC-PRE: 2.71 L
FVC-PRED: 2.58 L
IC-%PRED-PRE: 84 %
IC-PRE: 1.81 L
IC-PRED: 2.13 L
VA-%PRED-PRE: 83 %
VA-PRE: 3.96 L
VC-%PRED-PRE: 93 %
VC-PRE: 2.7 L
VC-PRED: 2.89 L

## 2023-08-15 ENCOUNTER — ANCILLARY PROCEDURE (OUTPATIENT)
Dept: CT IMAGING | Facility: CLINIC | Age: 78
End: 2023-08-15
Attending: STUDENT IN AN ORGANIZED HEALTH CARE EDUCATION/TRAINING PROGRAM
Payer: MEDICARE

## 2023-08-15 ENCOUNTER — OFFICE VISIT (OUTPATIENT)
Dept: DERMATOLOGY | Facility: CLINIC | Age: 78
End: 2023-08-15
Payer: MEDICARE

## 2023-08-15 ENCOUNTER — ANCILLARY PROCEDURE (OUTPATIENT)
Dept: CARDIOLOGY | Facility: CLINIC | Age: 78
End: 2023-08-15
Attending: STUDENT IN AN ORGANIZED HEALTH CARE EDUCATION/TRAINING PROGRAM
Payer: MEDICARE

## 2023-08-15 VITALS — HEART RATE: 64 BPM | DIASTOLIC BLOOD PRESSURE: 67 MMHG | SYSTOLIC BLOOD PRESSURE: 123 MMHG

## 2023-08-15 DIAGNOSIS — L66.10 LICHEN PLANOPILARIS: ICD-10-CM

## 2023-08-15 DIAGNOSIS — R23.8 PAPULE: ICD-10-CM

## 2023-08-15 DIAGNOSIS — R06.09 OTHER FORM OF DYSPNEA: ICD-10-CM

## 2023-08-15 LAB — LVEF ECHO: NORMAL

## 2023-08-15 PROCEDURE — G1010 CDSM STANSON: HCPCS | Mod: GC | Performed by: RADIOLOGY

## 2023-08-15 PROCEDURE — 93306 TTE W/DOPPLER COMPLETE: CPT | Mod: GC | Performed by: INTERNAL MEDICINE

## 2023-08-15 PROCEDURE — 71250 CT THORAX DX C-: CPT | Mod: MG | Performed by: RADIOLOGY

## 2023-08-15 RX ORDER — TRETINOIN 0.25 MG/G
CREAM TOPICAL
Qty: 45 G | Refills: 1 | Status: CANCELLED | OUTPATIENT
Start: 2023-08-15

## 2023-08-15 NOTE — Clinical Note
8/15/2023       RE: Ester Barba  [address] [place] [address]     Dear Colleague,    Thank you for referring your patient, Ester Barba, to the [facility] DERMATOLOGY CLINIC [place] at [facility]. Please see a copy of my visit note below.    [facility] Dermatology Note  Encounter Date: Aug 15, 2023  Office Visit     Dermatology Problem List:  1. 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 Bx 8/31/21 revealed lichenoid keratosis of the vertex scalp.  - Current tx: ILK10 injections, clobetasol propionate 0.05% shampoo alternating w/ zinc shampoo every other day, fluocinolone oil once weekly  - Previous tx: plaquenil w/ significant improvement (~15 yrs; discontinued 12/2019 with concerns for retinopathy by local doc in [facility]; now followed by Dr. Radford here without signs of Plaquenil toxicity 1/2020).  - LPPAI score: 3.83 on 3/28/2023, 1 on 5/2/23  2. 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 Skin infection, vertex scalp at prior site of MMS - resolved  - s/p doxycycline 100 mg BID and mupirocin ointment BID   4. History of NMSC  - BCC - front vertex scalp, s/p Mohs with purse string and granulation, 3/30/22  - BCC - forehead, s/p MMS 6/9/21  - SCCis - R vertex scalp, s/p MMS 3/2/21, bacterial cx obtained from site 3/23/21  - BCC - right nasal ala s/p Mohs 11/27/17  - Nodular BCC - vertex scalp s/p Mohs 6/5/15  - SCC - scalp, s/p Mohs 4/18/2014  - Hx of 1-2 other NMSC on scalp (BCCs)  5. AKs - bilateral temples, cheeks  6. Inflamed SKs  ____________________________________________    Assessment & Plan:   # Lichen planopilaris  Overall stable/improved on exam today. Less scale and erythema compared to last visit 5 weeks ago. Will continue ILK and topical regimen. Pt interested in restarting Plaquenil given that she feels like her LPP is still active and  "slowly worsening, but previously had discontinued due to concern for retinopathy in 2019.  - Continue fluocinolone oil up to 3x weekly.  - Continue alternating clobetasol 0.05% shampoo with zinc shampoo every other day        # Erosive pustular dermatosis, 5 cm x 2.5 cm crusted plaque. Improved.  No open erosions or crusting today. Central area of tenderness near the vertex.   - Continue topical steroids and Duoderm-CGF bandage     # Seborrheic keratoses, inflammed, right cheek, left forearm  Discussed the natural history and benign nature of these lesions. Pt reports irritation and elects for cryotherapy.   - Cryotherapy today    Procedures Performed:   - Cryotherapy procedure note, location(s): right cheek, left forearm. After verbal consent and discussion of risks and benefits including, but not limited to, dyspigmentation/scar, blister, and pain, 2 lesion(s) was(were) treated with 1-2 mm freeze border for 1-2 cycles with liquid nitrogen. Post cryotherapy instructions were provided.  - Procedure(s) performed by faculty.     Follow-up: 1 month in-person, or earlier for new or changing lesions    Staff and Medical Student:   Joan Truong, MS3, seen and staffed with Dr. Soto  Staff Physician:  I was present with the medical student who participated in the service and in the documentation of the note. I have verified the history and personally performed the physical exam and medical decision making. I agree with the assessment and plan of care as documented in the note.           Barbara Soto MD  Professor and Chair  Department of Dermatology  [facility]: Phone: [phone], Fax:[phone]  [facility]: Phone: [phone], Fax: [phone]      ____________________________________________    CC: Hair Loss (Reports overall condition as \"about the same\", needs a refill of " tretinoin)    HPI:  Ms. Ester Barba is a 77 year old female who presents as a return patient for follow-up of hair loss, diagnosed as lichen planoplanaris.   - Last seen in-clinic on 5/2/23 and had repeat ILK  - Shedding or thinning, or both: shedding; clumps of hair in her brush  - Current tx: fluocinolone oil once per week, clobetasol shampoo every other day, ILK injections.  - Scalp or hair care habits/products: No  - Using DuoDERM-CGF bandage, switching the bandage every 3 days on area of tender wound on vertex of scalp  - Spoke with ophthalmology who discouraged plaquenil use.    No Any new medications, supplements, or products? (please list below)     No Scalp pain   No Scalp burning   Yes, itching in occiput Scalp itching    Not hair, but papules coming down into brows Eyebrow changes    No Eyelash changes   N/A Beard changes    No Other body hair changes    Chronically, dry/cracked Nail changes    Yes, scalp scab painful to touch Additional symptoms? (please list below)     - Overall course: Stable  - COVID status: No    Recent diagnosis of COPD; notices SOB and tachycardia. EKG and CT today scheudled for furher evaluation.    Patient is otherwise feeling well, has no additional skin concerns today.     ROS: As per HPI    Labs:  N/A    Physical Exam:  Vitals: /67 (BP Location: Right arm, Patient Position: Sitting, Cuff Size: Adult Regular)   Pulse 64   GEN: Well developed, well-nourished, in no acute distress, in a pleasant mood.    SKIN: Focused examination face, scalp and arms was performed.  - Khalil type: II  - mild diffuse erythema on scalp  - mild perifollicular scale  - Vertex scalp with 5 cm x 2.5 cm plaque with atrophy, fibrosis and telangiectasias***  - There is a waxy stuck on skin colored right cheek, left forearm.   - No other lesions of concern on areas examined.       Medications:  Current Outpatient Medications   Medication    acetaminophen (TYLENOL) 500 MG tablet    Biotin 10 MG  TABS tablet    Calcium 1500 MG TABS    Cholecalciferol (VITAMIN D) 1000 UNIT capsule    clobetasol propionate (CLOBEX) 0.05 % external shampoo    COMPOUNDED NON-CONTROLLED SUBSTANCE (CMPD RX) - PHARMACY TO MIX COMPOUNDED MEDICATION    desonide (DESOWEN) 0.05 % external cream    esomeprazole (NEXIUM) 20 MG packet    Fluocinolone Acetonide Scalp 0.01 % OIL oil    glucosamine-chondroitin 500-400 MG CAPS per capsule    hydrocortisone 2.5 % cream    ibuprofen (ADVIL,MOTRIN) 800 MG tablet    ketoconazole (NIZORAL) 2 % external cream    Loratadine (CLARITIN PO)    NEW MED    tretinoin (RETIN-A) 0.025 % external cream    clobetasol (TEMOVATE) 0.05 % external ointment    mupirocin (BACTROBAN) 2 % external ointment     Current Facility-Administered Medications   Medication    hydrocortisone 2.5 % cream    lidocaine 1% with EPINEPHrine 1:100,000 injection 3 mL    triamcinolone acetonide (KENALOG-10) injection 10 mg    triamcinolone acetonide (KENALOG-10) injection 10 mg    triamcinolone acetonide (KENALOG-10) injection 10 mg    triamcinolone acetonide (KENALOG-10) injection 10 mg    triamcinolone acetonide (KENALOG-10) injection 17 mg    triamcinolone acetonide (KENALOG-10) injection 17 mg      Past Medical History:   Patient Active Problem List   Diagnosis    Porokeratosis    Squamous cell carcinoma    Neoplasm of uncertain behavior    Lichen planopilaris    Dermatitis    Cicatricial alopecia    Keratosis, inflamed seborrheic    History of skin cancer    Basal cell carcinoma of vertex scalp s/p mohs 6-5-15    Medication management    Actinic keratosis    Medication monitoring encounter    Dermatitis, seborrheic    Erosive pustular dermatosis    Tick bite, initial encounter    Vulvar atrophy    Factor V Leiden mutation (H)    Splenic artery aneurysm (H)    Post concussion syndrome    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 on close of this encounter.        Again, thank you for allowing me to participate in the care of your patient.      Sincerely,    Barbara Soto MD

## 2023-08-15 NOTE — NURSING NOTE
"Dermatology Rooming Note    Ester Barba's goals for this visit include:   Chief Complaint   Patient presents with    Hair Loss     Reports overall condition as \"about the same\", needs a refill of tretinoin     Pascale Dinh LPN    "

## 2023-08-15 NOTE — PROGRESS NOTES
[facility] Dermatology Note  Encounter Date: Aug 15, 2023  Office Visit     Dermatology Problem List:  1. 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 Bx 8/31/21 revealed lichenoid keratosis of the vertex scalp.  - Current tx: ILK10 injections, clobetasol propionate 0.05% shampoo alternating w/ zinc shampoo every other day, fluocinolone oil once weekly  - Previous tx: plaquenil w/ significant improvement (~15 yrs; discontinued 12/2019 with concerns for retinopathy by local doc in [facility]; now followed by Dr. Radford here without signs of Plaquenil toxicity 1/2020).  - LPPAI score: 3.83 on 3/28/2023, 1 on 5/2/23  2. 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 Skin infection, vertex scalp at prior site of MMS - resolved  - s/p doxycycline 100 mg BID and mupirocin ointment BID   4. History of NMSC  - BCC - front vertex scalp, s/p Mohs with purse string and granulation, 3/30/22  - BCC - forehead, s/p MMS 6/9/21  - SCCis - R vertex scalp, s/p MMS 3/2/21, bacterial cx obtained from site 3/23/21  - BCC - right nasal ala s/p Mohs 11/27/17  - Nodular BCC - vertex scalp s/p Mohs 6/5/15  - SCC - scalp, s/p Mohs 4/18/2014  - Hx of 1-2 other NMSC on scalp (BCCs)  5. AKs - bilateral temples, cheeks  6. Inflamed SKs  ____________________________________________    Assessment & Plan:   # Lichen planopilaris  Overall stable/improved on exam today. Less scale and erythema compared to last visit 5 weeks ago. Will continue ILK and topical regimen. Pt interested in restarting Plaquenil given that she feels like her LPP is still active and slowly worsening, but previously had discontinued due to concern for retinopathy in 2019.  - Continue fluocinolone oil up to 3x weekly.  - Continue alternating clobetasol 0.05% shampoo with zinc shampoo every other day        # Erosive pustular dermatosis, 5 cm x 2.5 cm crusted plaque. Improved.  No open  "erosions or crusting today. Central area of tenderness near the vertex.   - Continue topical steroids and Duoderm-CGF bandage     # Seborrheic keratoses, inflammed, right cheek, left forearm  Discussed the natural history and benign nature of these lesions. Pt reports irritation and elects for cryotherapy.   - Cryotherapy today    Procedures Performed:   - Cryotherapy procedure note, location(s): right cheek, left forearm. After verbal consent and discussion of risks and benefits including, but not limited to, dyspigmentation/scar, blister, and pain, 2 lesion(s) was(were) treated with 1-2 mm freeze border for 1-2 cycles with liquid nitrogen. Post cryotherapy instructions were provided.  - Procedure(s) performed by faculty.     Follow-up: 1 month in-person, or earlier for new or changing lesions    Staff and Medical Student:   Joan Truong, MS3, seen and staffed with Dr. Soto  Staff Physician:  I was present with the medical student who participated in the service and in the documentation of the note. I have verified the history and personally performed the physical exam and medical decision making. I agree with the assessment and plan of care as documented in the note.           Barbara Soto MD  Professor and Chair  Department of Dermatology  [facility]: Phone: [phone], Fax:[phone]  [facility]: Phone: [phone], Fax: [phone]      ____________________________________________    CC: Hair Loss (Reports overall condition as \"about the same\", needs a refill of tretinoin)    HPI:  Ms. Ester Barba is a 77 year old female who presents as a return patient for follow-up of hair loss, diagnosed as lichen planoplanaris.   - Last seen in-clinic on 5/2/23 and had repeat ILK  - Shedding or thinning, or both: shedding; clumps of hair in her brush  - Current tx: fluocinolone oil once per week, " clobetasol shampoo every other day, ILK injections.  - Scalp or hair care habits/products: No  - Using DuoDERM-CGF bandage, switching the bandage every 3 days on area of tender wound on vertex of scalp  - Spoke with ophthalmology who discouraged plaquenil use.    No Any new medications, supplements, or products? (please list below)     No Scalp pain   No Scalp burning   Yes, itching in occiput Scalp itching    Not hair, but papules coming down into brows Eyebrow changes    No Eyelash changes   N/A Beard changes    No Other body hair changes    Chronically, dry/cracked Nail changes    Yes, scalp scab painful to touch Additional symptoms? (please list below)     - Overall course: Stable  - COVID status: No    Recent diagnosis of COPD; notices SOB and tachycardia. EKG and CT today scheudled for furher evaluation.    Patient is otherwise feeling well, has no additional skin concerns today.     ROS: As per HPI    Labs:  N/A    Physical Exam:  Vitals: /67 (BP Location: Right arm, Patient Position: Sitting, Cuff Size: Adult Regular)   Pulse 64   GEN: Well developed, well-nourished, in no acute distress, in a pleasant mood.    SKIN: Focused examination face, scalp and arms was performed.  - Khalil type: II  - mild diffuse erythema on scalp  - mild perifollicular scale  - Vertex scalp with 5 cm x 2.5 cm plaque with atrophy, fibrosis and telangiectasias***  - There is a waxy stuck on skin colored right cheek, left forearm.   - No other lesions of concern on areas examined.       Medications:  Current Outpatient Medications   Medication    acetaminophen (TYLENOL) 500 MG tablet    Biotin 10 MG TABS tablet    Calcium 1500 MG TABS    Cholecalciferol (VITAMIN D) 1000 UNIT capsule    clobetasol propionate (CLOBEX) 0.05 % external shampoo    COMPOUNDED NON-CONTROLLED SUBSTANCE (CMPD RX) - PHARMACY TO MIX COMPOUNDED MEDICATION    desonide (DESOWEN) 0.05 % external cream    esomeprazole (NEXIUM) 20 MG packet     Fluocinolone Acetonide Scalp 0.01 % OIL oil    glucosamine-chondroitin 500-400 MG CAPS per capsule    hydrocortisone 2.5 % cream    ibuprofen (ADVIL,MOTRIN) 800 MG tablet    ketoconazole (NIZORAL) 2 % external cream    Loratadine (CLARITIN PO)    NEW MED    tretinoin (RETIN-A) 0.025 % external cream    clobetasol (TEMOVATE) 0.05 % external ointment    mupirocin (BACTROBAN) 2 % external ointment     Current Facility-Administered Medications   Medication    hydrocortisone 2.5 % cream    lidocaine 1% with EPINEPHrine 1:100,000 injection 3 mL    triamcinolone acetonide (KENALOG-10) injection 10 mg    triamcinolone acetonide (KENALOG-10) injection 10 mg    triamcinolone acetonide (KENALOG-10) injection 10 mg    triamcinolone acetonide (KENALOG-10) injection 10 mg    triamcinolone acetonide (KENALOG-10) injection 17 mg    triamcinolone acetonide (KENALOG-10) injection 17 mg      Past Medical History:   Patient Active Problem List   Diagnosis    Porokeratosis    Squamous cell carcinoma    Neoplasm of uncertain behavior    Lichen planopilaris    Dermatitis    Cicatricial alopecia    Keratosis, inflamed seborrheic    History of skin cancer    Basal cell carcinoma of vertex scalp s/p mohs 6-5-15    Medication management    Actinic keratosis    Medication monitoring encounter    Dermatitis, seborrheic    Erosive pustular dermatosis    Tick bite, initial encounter    Vulvar atrophy    Factor V Leiden mutation (H)    Splenic artery aneurysm (H)    Post concussion syndrome    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 on close of this encounter.

## 2023-08-17 ENCOUNTER — MYC MEDICAL ADVICE (OUTPATIENT)
Dept: DERMATOLOGY | Facility: CLINIC | Age: 78
End: 2023-08-17
Payer: MEDICARE

## 2023-08-17 DIAGNOSIS — L66.10 LICHEN PLANOPILARIS: ICD-10-CM

## 2023-08-17 DIAGNOSIS — R23.8 PAPULE: ICD-10-CM

## 2023-08-17 RX ORDER — TRETINOIN 0.25 MG/G
CREAM TOPICAL
Qty: 45 G | Refills: 0
Start: 2023-08-17

## 2023-08-17 NOTE — TELEPHONE ENCOUNTER
tretinoin (RETIN-A) 0.025 % external cream      Last Written Prescription Date:  11-18-21  Last Fill Quantity: 45 g,   # refills: 1  Last Office Visit : 8-15-23  Future Office visit:  9-19-23  Derm process 1    Routing refill request to provider for review/approval because:  Gap in RF

## 2023-08-18 RX ORDER — TRETINOIN 0.25 MG/G
CREAM TOPICAL
Qty: 45 G | Refills: 1 | Status: SHIPPED | OUTPATIENT
Start: 2023-08-18 | End: 2024-09-05

## 2023-08-21 RX ORDER — LEVALBUTEROL TARTRATE 45 UG/1
2 AEROSOL, METERED ORAL EVERY 4 HOURS PRN
Qty: 15 G | Refills: 4 | Status: SHIPPED | OUTPATIENT
Start: 2023-08-21 | End: 2024-05-13

## 2023-08-22 DIAGNOSIS — N63.10 MASS OF RIGHT BREAST, UNSPECIFIED QUADRANT: Primary | ICD-10-CM

## 2023-08-22 DIAGNOSIS — N90.5 VULVAR ATROPHY: Primary | ICD-10-CM

## 2023-08-22 NOTE — TELEPHONE ENCOUNTER
"Last office visit with Dr. Mayfield on 03/22/2023, \"Vulvovaginal atrophy-- continue topical E2\". Rx refilled per clinic protocol.   "

## 2023-08-23 ENCOUNTER — PATIENT OUTREACH (OUTPATIENT)
Dept: ONCOLOGY | Facility: CLINIC | Age: 78
End: 2023-08-23
Payer: MEDICARE

## 2023-08-23 NOTE — PROGRESS NOTES
New Patient Oncology Nurse Navigator Note     Referring provider: Destiney Navas MD      Referring Clinic/Organization: [facility]     Referred to (specialty:) Breast Provider Referral      Date Referral Received: August 23, 2023     Evaluation for:  N63.10 (ICD-10-CM) - Mass of right breast, unspecified quadran     Clinical History (per Nurse review of records provided):       She had a benign mammogram in December 2020 but breasts were noted to be dense and at her request she underwent breast MRI but the study was essentially incomplete and unsuccessful and not read out. Dr. Janeth Mayfield (OB/Gyn) of [facility] ordered another MRI in June 2023 and this was performed through  [address] [place].     1/24/23 - Bilateral screening mammograms at [facility]   FINDINGS: Benign appearing calcifications are present bilaterally. No suspicious   masses, calcifications, or other findings seen in either breast.   IMPRESSION: No mammographic evidence of malignancy.  Recommend annual screening mammography.   BIRADS 2: Benign    6/21/23 - Bilateral breast MRI  FINDINGS:  Breast composition: Heterogeneous fibroglandular tissue  Background parenchymal enhancement: Mild  There is no suspicious enhancement or lymphadenopathy.  IMPRESSION: BI-RADS CATEGORY: 1 -  Negative.  RECOMMENDED FOLLOW-UP: Annual Mammography.    8/15/23 - CT Chest w/o Contrast  CHEST WALL: Possible asymmetric density laterally in the right breast.  Please correlate with dedicated breast imaging.    Ester has been on vaginal estrogen creams prescribed by Dr. Janeth Mayfield (OB/Gyn) of [facility] for over a decade per navigator chart review.     She did meet with a plastic surgeon to discuss breast reduction in 2021 but did not proceed with surgery.     Ester reports no history of breast cancer in her family.    Records Location: Care Everywhere, Media, and See Bookmarked material     Records Needed:   Imaging for past 5 years     8/23  10:45 - Telephoned and left voice message for Ester requesting call back to discuss her breast health and Dr. Navas's referral.    8/24 8:02 - Ester telephoned and we review her history. Writer received referral, reviewed for appropriate plan, and call transferred to New Patient Scheduling for completion.    9/26 15:15 - Ester telephoned and asked for 10/4 consult with Brandy Peng with imaging to follow to be changed to another date. Writer received referral, reviewed for appropriate plan, and call warm transferred to New Patient Scheduling for completion.

## 2023-08-30 NOTE — TELEPHONE ENCOUNTER
RECORDS STATUS - ALL OTHER DIAGNOSIS    Mass of right breast, unspecified quadrant.   RECORDS RECEIVED FROM:    Appt Date: 10/4/2023 with Dr. Brandy Peng    Action    Action Taken 8/30/2023 1:59pm MANASA SALDIVAR called pt Ester - unavailable.       NOTES STATUS DETAILS   OFFICE NOTE from referring provider Epic Dr. Destiney Navas   MEDICATION LIST Epic    IMAGING (NEED IMAGES & REPORT)     MRI PACS 06/21/23: MR Breast   MAMMO PACS 01/24/23, 01/21/22, 12/17/20

## 2023-09-19 ENCOUNTER — OFFICE VISIT (OUTPATIENT)
Dept: DERMATOLOGY | Facility: CLINIC | Age: 78
End: 2023-09-19
Payer: MEDICARE

## 2023-09-19 VITALS — DIASTOLIC BLOOD PRESSURE: 62 MMHG | SYSTOLIC BLOOD PRESSURE: 144 MMHG | OXYGEN SATURATION: 96 % | HEART RATE: 65 BPM

## 2023-09-19 DIAGNOSIS — L98.8 EROSIVE PUSTULAR DERMATOSIS: ICD-10-CM

## 2023-09-19 DIAGNOSIS — L66.10 LICHEN PLANOPILARIS: ICD-10-CM

## 2023-09-19 DIAGNOSIS — L30.9 DERMATITIS: ICD-10-CM

## 2023-09-19 DIAGNOSIS — L98.8 EROSIVE PUSTULAR DERMATOSIS OF SCALP: Primary | ICD-10-CM

## 2023-09-19 PROCEDURE — 99214 OFFICE O/P EST MOD 30 MIN: CPT | Performed by: DERMATOLOGY

## 2023-09-19 RX ORDER — CLOBETASOL PROPIONATE 0.05 G/100ML
SHAMPOO TOPICAL
Qty: 118 ML | Refills: 0 | Status: SHIPPED | OUTPATIENT
Start: 2023-09-19 | End: 2024-01-08

## 2023-09-19 NOTE — LETTER
9/19/2023         RE: Ester Barba  [address] [place]  [place] [address]        Dear Colleague,    Thank you for referring your patient, Ester Barba, to the [facility]. Please see a copy of my visit note below.    No notes on file    Again, thank you for allowing me to participate in the care of your patient.        Sincerely,        Barbara Soto MD

## 2023-09-19 NOTE — NURSING NOTE
Ester Barba's goals for this visit include:   Chief Complaint   Patient presents with    Hair Loss     Patient is here for hair loss, hair loss is losing, areas of concern top     She requests these members of her care team be copied on today's visit information:     PCP: Winnie Sepulveda    Referring Provider:  No referring provider defined for this encounter.    BP (!) 144/62   Pulse 65   SpO2 96%     Do you need any medication refills at today's visit?       Amber Baird EMT

## 2023-10-04 ENCOUNTER — PRE VISIT (OUTPATIENT)
Dept: ONCOLOGY | Facility: CLINIC | Age: 78
End: 2023-10-04

## 2023-10-09 NOTE — PROGRESS NOTES
NEW CONSULTATION   Oct 11, 2023     Ester Barba is a 77 year old woman who presents with a right breast mass on recent chest CT. She was referred by Dr. Navas.    HPI:    She had a chest CT 8/15/23 for dyspnea and tachycardia that demonstrated a right breast lateral asymmetric density. She presents today for further work up of this. She denies any breast mass, skin change, nipple inversion or nipple discharge. She does have chronic bilateral breast pain and heaviness in the inferior portion of both breasts.     BREAST-SPECIFIC HISTORY:    Previous breast imaging: Yes  - 11 Smammo BI-RADS 2  - 12/10/12 Smammo BI-RADS 2  - 13 Smammo BI-RADS 2  - 14 Smammo BI-RADS 2  - 8/14/15 b/l breast ultrasound for pain BI-RADS 2  - 8/15/16 Smammo BI-RADS 2  - 10/16/17 Smammo BI-RADS 2  18 Smammo BI-RADS 2  - 19 Smammo BI-RADS 2  - 20 BI-RADS 2  - 22 Smammo BI-RADS 2  - 23 Smammo BI-RADS 2  - 23 breast MRI for breast density BI-RADS 2  - 8/15/23 CT chest without contrast for dyspnea and tachycardia: possible asymmetric density laterally in the right breast, not significantly change from aprl.     Prior breast biopsies/surgeries: No    Prior history of breast cancer or DCIS: No  Prior radiation history: No   Self breast exams: Yes  Breast density: heterogeneously dense    GYN HISTORY:  . Age at 1st pregnancy: 24. Breastfeeding history: No.   Age at menarche: 13  Menopausal: 51   Menopausal hormone replacement therapy: Yes   - HRT started several years after menopausal and was on it 4-5 years.   Vaginal estrogen cream     RISK ASSESSMENT: < 3% 5 year risk by Kira,  <20% lifetime risk by SAVANAH  Kira:1.6% 5 year risk   SAVANAH/Suorav: 3.6% lifetime risk    FAMILY HISTORY:  Breast ca: No  Ovarian ca: No  Pancreatic ca: No  Prostate: No  Gastric ca: Yes  - brother  Melanoma: No  Colon ca: No  Other cancer: Yes  - father with lung cancer  - sister with basal cell carcinoma  Other  genetic, testing, syndromes, or clotting disorders: no     PAST MEDICAL HISTORY  Past Medical History:   Diagnosis Date    Arthritis     osteoarthritis    Basal cell carcinoma     Bilateral occipital neuralgia 01/21/2019    Concussion 01/21/2019    Squamous cell carcinoma    Recent diagnosis of COPD  Rapid heart rate - following up with Cardiology    PAST SURGICAL HISTORY   Past Surgical History:   Procedure Laterality Date    BIOPSY OF SKIN LESION      BIOPSY VAGINAL N/A 7/14/2021    Procedure: Excision of vaginal Polyp, Dilation and Currettage and Cystoscopy;  Surgeon: Sandy Quintero MD;  [facility]    CATARACT IOL, RT/LT Bilateral 2018    CYSTOSCOPY N/A 7/14/2021    Procedure: CYSTOSCOPY;  Surgeon: Sandy Quintero MD;  [facility]    DILATION AND CURETTAGE N/A 7/14/2021    Procedure: Dilation and curettage;  Surgeon: Sandy Quintero MD;  [facility]    MOHS MICROGRAPHIC PROCEDURE      NO HISTORY OF SURGERY  02/17/2014    derm    SIGMOIDECTOMY  2015    for diverticulitis     MEDICATIONS  Current Outpatient Medications   Medication Sig Dispense Refill    acetaminophen (TYLENOL) 500 MG tablet Take 500-1,000 mg by mouth every 6 hours as needed for mild pain      Biotin 10 MG TABS tablet       Calcium 1500 MG TABS       Cholecalciferol (VITAMIN D) 1000 UNIT capsule Total 2200 units daily.      clobetasol (TEMOVATE) 0.05 % external ointment Apply twice daily every other day alternating with the mupirocin ointment to affected area on the scalp 60 g 1    clobetasol propionate (CLOBEX) 0.05 % external shampoo APPLY TO TO DRY SCALP EVERY OTHER DAY, LEAVE ON FOR 15 MIN.,THEN LATHER AND RINSE. 118 mL 0    COMPOUNDED NON-CONTROLLED SUBSTANCE (CMPD RX) - PHARMACY TO MIX COMPOUNDED MEDICATION Place 1 Application. vaginally twice a week Compounded with estrogen 1 g 3    desonide (DESOWEN) 0.05 % external cream Mix half and half with ketoconazole cream and apply twice daily as needed 60 g 1    esomeprazole (NEXIUM) 20  MG packet Take 20 mg by mouth every morning (before breakfast) Take 30-60 minutes before eating.      Fluocinolone Acetonide Scalp 0.01 % OIL oil APPLY ONCE A WEEK TO SCALP FOR LICHEN PLANOPILARIS 118.28 mL 3    glucosamine-chondroitin 500-400 MG CAPS per capsule Take 1 capsule by mouth      hydrocortisone 2.5 % cream Use twice daily as needed on involved areas 30 g 4    ibuprofen (ADVIL,MOTRIN) 800 MG tablet Take 800 mg by mouth every 8 hours as needed      ketoconazole (NIZORAL) 2 % external cream Mix half and half with desonide cream and apply twice a day as needed 60 g 1    levalbuterol (XOPENEX HFA) 45 MCG/ACT inhaler Inhale 2 puffs into the lungs every 4 hours as needed for shortness of breath or wheezing 15 g 4    Loratadine (CLARITIN PO) Take  by mouth.      mupirocin (BACTROBAN) 2 % external ointment Use 2 times a day every other day alternating with clobetasol ointment to affected area. 30 g 3    NEW MED Biest 1.5mg/gram cream(pg free)  Insert 1 gram vaginally twice weekly as directed 40 g 1    tretinoin (RETIN-A) 0.025 % external cream APPLY TO AFFECTED AREA(S) A PEA SIZE AMOUNT TO FACE EVERY OTHER NIGHT AS TOLERATED 45 g 1     ALLERGIES  Allergies   Allergen Reactions    Dust Mites Other (See Comments)     Sneezing, coughing. asthma  Itchy watery eyes, sneezing    Estrogens Itching     Other reaction(s): Hypersensitivity  Topical caused burning sensation of skin  Topical caused burning sensation of skin; Premarin cream only - possibly only an bi-ingredient    Imiquimod Other (See Comments)     Hair loss    Levaquin [Levofloxacin Hemihydrate] Other (See Comments)     Muscle weakness    Levofloxacin Other (See Comments)     MUSCLE WEAKNESS, ARTHRITIS LIKE SYMPTOMS.      Mold Other (See Comments)     Sneezing, asthma, weezing    Pollen Extract/Tree Extract Other (See Comments)     Sneezing, weezing    Sulfa Antibiotics Hives    Sulfamethoxazole-Trimethoprim Hives    Latex Rash     LATEX BANDAGES; tapes  "adhesive    Penicillin G Rash    Penicillins Rash      SOCIAL HISTORY:  Smokes: No, former  EtOH: No  Exercise: difficult do to joint paints.      ROS:   Change in vision No  Headaches: no  Respiratory: No shortness of breath, dyspnea on exertion, cough, or hemoptysis   Cardiovascular: negative   Gastrointestinal: negative Abdominal pain: no  Breast: negative  Musculoskeletal: chronic joint pains  Psychiatric: negative  Hematologic/Lymphatic/Immunologic: negative  Endocrine: negative    EXAM  BP (!) 154/73 (BP Location: Right arm, Patient Position: Sitting, Cuff Size: Adult Regular)   Pulse 63   Temp 97.5  F (36.4  C) (Oral)   Ht 1.7 m (5' 6.93\")   Wt 64 kg (141 lb 3.2 oz)   SpO2 98%   BMI 22.16 kg/m     PHYSICAL EXAM  Respiratory: breathing non labored.   Breasts: Examination was done in both the upright and supine positions.  Breasts are symmetrical . No dominant fixed or suspicious masses noted. No skin or nipple changes. No nipple discharge. Right breast generally more sensitive to palpation compared to the left, no focal pain.   No clavicular, cervical, or axillary lymphadenopathy.     INVESTIGATIONS:    10/11/23 right diagnostic mammogram and right breast ultrasound per Radiology no concerning findings, final report pending.     ASSESSMENT/PLAN:    Ester Barba is a 77 year old woman who had a recent chest CT that demonstrated right lateral breast asymmetric density. Right diagnostic mammogram and right breast ultrasound demonstrated no concerning findings. Continue yearly screening mammogram while in good health. Be familiar with your breast and how they normally feel and appear. Promptly report any changes to your healthcare provider.   - Screening mammogram due: 10/12/24    Brandy Peng PA-C    30 minutes spent on the date of the encounter doing chart review, review of test results, interpretation of tests, patient visit and documentation.    "

## 2023-10-11 ENCOUNTER — ANCILLARY PROCEDURE (OUTPATIENT)
Dept: MAMMOGRAPHY | Facility: CLINIC | Age: 78
End: 2023-10-11
Attending: PHYSICIAN ASSISTANT
Payer: MEDICARE

## 2023-10-11 ENCOUNTER — ONCOLOGY VISIT (OUTPATIENT)
Dept: SURGERY | Facility: CLINIC | Age: 78
End: 2023-10-11
Attending: INTERNAL MEDICINE
Payer: MEDICARE

## 2023-10-11 VITALS
BODY MASS INDEX: 22.16 KG/M2 | HEART RATE: 63 BPM | WEIGHT: 141.2 LBS | DIASTOLIC BLOOD PRESSURE: 73 MMHG | SYSTOLIC BLOOD PRESSURE: 154 MMHG | OXYGEN SATURATION: 98 % | HEIGHT: 67 IN | TEMPERATURE: 97.5 F

## 2023-10-11 DIAGNOSIS — R93.89 ABNORMAL FINDING ON CT SCAN: ICD-10-CM

## 2023-10-11 DIAGNOSIS — N63.10 MASS OF RIGHT BREAST, UNSPECIFIED QUADRANT: ICD-10-CM

## 2023-10-11 DIAGNOSIS — R92.8 OTHER ABNORMAL AND INCONCLUSIVE FINDINGS ON DIAGNOSTIC IMAGING OF BREAST: ICD-10-CM

## 2023-10-11 PROCEDURE — 77066 DX MAMMO INCL CAD BI: CPT | Performed by: RADIOLOGY

## 2023-10-11 PROCEDURE — G0463 HOSPITAL OUTPT CLINIC VISIT: HCPCS | Performed by: PHYSICIAN ASSISTANT

## 2023-10-11 PROCEDURE — 76642 ULTRASOUND BREAST LIMITED: CPT | Mod: RT | Performed by: RADIOLOGY

## 2023-10-11 PROCEDURE — 99214 OFFICE O/P EST MOD 30 MIN: CPT | Performed by: PHYSICIAN ASSISTANT

## 2023-10-11 PROCEDURE — G0279 TOMOSYNTHESIS, MAMMO: HCPCS | Performed by: RADIOLOGY

## 2023-10-11 ASSESSMENT — PAIN SCALES - GENERAL: PAINLEVEL: NO PAIN (0)

## 2023-10-11 NOTE — LETTER
10/11/2023         RE: Ester Barba  [address] [place] [address]        Dear Colleague,    Thank you for referring your patient, Ester Barba, to the [facility]. Please see a copy of my visit note below.    NEW CONSULTATION   Oct 11, 2023     Ester Barba is a 77 year old woman who presents with a right breast mass on recent chest CT. She was referred by Dr. Navas.    HPI:    She had a chest CT 8/15/23 for dyspnea and tachycardia that demonstrated a right breast lateral asymmetric density. She presents today for further work up of this. She denies any breast mass, skin change, nipple inversion or nipple discharge. She does have chronic bilateral breast pain and heaviness in the inferior portion of both breasts.     BREAST-SPECIFIC HISTORY:    Previous breast imaging: Yes  - 11 Smammo BI-RADS 2  - 12/10/12 Smammo BI-RADS 2  - 13 Smammo BI-RADS 2  - 14 Smammo BI-RADS 2  - 8/14/15 b/l breast ultrasound for pain BI-RADS 2  - 8/15/16 Smammo BI-RADS 2  - 10/16/17 Smammo BI-RADS 2  / Smammo BI-RADS 2  - 19 Smammo BI-RADS 2  - 20 BI-RADS 2  - 22 Smammo BI-RADS 2  - 23 Smammo BI-RADS 2  - 23 breast MRI for breast density BI-RADS 2  - 8/15/23 CT chest without contrast for dyspnea and tachycardia: possible asymmetric density laterally in the right breast, not significantly change from aprl.     Prior breast biopsies/surgeries: No    Prior history of breast cancer or DCIS: No  Prior radiation history: No   Self breast exams: Yes  Breast density: heterogeneously dense    GYN HISTORY:  . Age at 1st pregnancy: 24. Breastfeeding history: No.   Age at menarche: 13  Menopausal: 51   Menopausal hormone replacement therapy: Yes   - HRT started several years after menopausal and was on it 4-5 years.   Vaginal estrogen cream     RISK ASSESSMENT: < 3% 5 year risk by Kira,  <20% lifetime risk by SAVANAH  Kira:1.6% 5 year risk   SAVANAH/Sourav:  3.6% lifetime risk    FAMILY HISTORY:  Breast ca: No  Ovarian ca: No  Pancreatic ca: No  Prostate: No  Gastric ca: Yes  - brother  Melanoma: No  Colon ca: No  Other cancer: Yes  - father with lung cancer  - sister with basal cell carcinoma  Other genetic, testing, syndromes, or clotting disorders: no     PAST MEDICAL HISTORY  Past Medical History:   Diagnosis Date    Arthritis     osteoarthritis    Basal cell carcinoma     Bilateral occipital neuralgia 01/21/2019    Concussion 01/21/2019    Squamous cell carcinoma    Recent diagnosis of COPD  Rapid heart rate - following up with Cardiology    PAST SURGICAL HISTORY   Past Surgical History:   Procedure Laterality Date    BIOPSY OF SKIN LESION      BIOPSY VAGINAL N/A 7/14/2021    Procedure: Excision of vaginal Polyp, Dilation and Currettage and Cystoscopy;  Surgeon: Sandy Quintero MD;  [facility]    CATARACT IOL, RT/LT Bilateral 2018    CYSTOSCOPY N/A 7/14/2021    Procedure: CYSTOSCOPY;  Surgeon: Sandy Quintero MD;  [facility]    DILATION AND CURETTAGE N/A 7/14/2021    Procedure: Dilation and curettage;  Surgeon: Sandy Quintero MD;  [facility]    MOHS MICROGRAPHIC PROCEDURE      NO HISTORY OF SURGERY  02/17/2014    derm    SIGMOIDECTOMY  2015    for diverticulitis     MEDICATIONS  Current Outpatient Medications   Medication Sig Dispense Refill    acetaminophen (TYLENOL) 500 MG tablet Take 500-1,000 mg by mouth every 6 hours as needed for mild pain      Biotin 10 MG TABS tablet       Calcium 1500 MG TABS       Cholecalciferol (VITAMIN D) 1000 UNIT capsule Total 2200 units daily.      clobetasol (TEMOVATE) 0.05 % external ointment Apply twice daily every other day alternating with the mupirocin ointment to affected area on the scalp 60 g 1    clobetasol propionate (CLOBEX) 0.05 % external shampoo APPLY TO TO DRY SCALP EVERY OTHER DAY, LEAVE ON FOR 15 MIN.,THEN LATHER AND RINSE. 118 mL 0    COMPOUNDED NON-CONTROLLED SUBSTANCE (CMPD RX) - PHARMACY TO MIX  COMPOUNDED MEDICATION Place 1 Application. vaginally twice a week Compounded with estrogen 1 g 3    desonide (DESOWEN) 0.05 % external cream Mix half and half with ketoconazole cream and apply twice daily as needed 60 g 1    esomeprazole (NEXIUM) 20 MG packet Take 20 mg by mouth every morning (before breakfast) Take 30-60 minutes before eating.      Fluocinolone Acetonide Scalp 0.01 % OIL oil APPLY ONCE A WEEK TO SCALP FOR LICHEN PLANOPILARIS 118.28 mL 3    glucosamine-chondroitin 500-400 MG CAPS per capsule Take 1 capsule by mouth      hydrocortisone 2.5 % cream Use twice daily as needed on involved areas 30 g 4    ibuprofen (ADVIL,MOTRIN) 800 MG tablet Take 800 mg by mouth every 8 hours as needed      ketoconazole (NIZORAL) 2 % external cream Mix half and half with desonide cream and apply twice a day as needed 60 g 1    levalbuterol (XOPENEX HFA) 45 MCG/ACT inhaler Inhale 2 puffs into the lungs every 4 hours as needed for shortness of breath or wheezing 15 g 4    Loratadine (CLARITIN PO) Take  by mouth.      mupirocin (BACTROBAN) 2 % external ointment Use 2 times a day every other day alternating with clobetasol ointment to affected area. 30 g 3    NEW MED Biest 1.5mg/gram cream(pg free)  Insert 1 gram vaginally twice weekly as directed 40 g 1    tretinoin (RETIN-A) 0.025 % external cream APPLY TO AFFECTED AREA(S) A PEA SIZE AMOUNT TO FACE EVERY OTHER NIGHT AS TOLERATED 45 g 1     ALLERGIES  Allergies   Allergen Reactions    Dust Mites Other (See Comments)     Sneezing, coughing. asthma  Itchy watery eyes, sneezing    Estrogens Itching     Other reaction(s): Hypersensitivity  Topical caused burning sensation of skin  Topical caused burning sensation of skin; Premarin cream only - possibly only an bi-ingredient    Imiquimod Other (See Comments)     Hair loss    Levaquin [Levofloxacin Hemihydrate] Other (See Comments)     Muscle weakness    Levofloxacin Other (See Comments)     MUSCLE WEAKNESS, ARTHRITIS LIKE  "SYMPTOMS.      Mold Other (See Comments)     Sneezing, asthma, weezing    Pollen Extract/Tree Extract Other (See Comments)     Sneezing, weezing    Sulfa Antibiotics Hives    Sulfamethoxazole-Trimethoprim Hives    Latex Rash     LATEX BANDAGES; tapes adhesive    Penicillin G Rash    Penicillins Rash      SOCIAL HISTORY:  Smokes: No, former  EtOH: No  Exercise: difficult do to joint paints.      ROS:   Change in vision No  Headaches: no  Respiratory: No shortness of breath, dyspnea on exertion, cough, or hemoptysis   Cardiovascular: negative   Gastrointestinal: negative Abdominal pain: no  Breast: negative  Musculoskeletal: chronic joint pains  Psychiatric: negative  Hematologic/Lymphatic/Immunologic: negative  Endocrine: negative    EXAM  BP (!) 154/73 (BP Location: Right arm, Patient Position: Sitting, Cuff Size: Adult Regular)   Pulse 63   Temp 97.5  F (36.4  C) (Oral)   Ht 1.7 m (5' 6.93\")   Wt 64 kg (141 lb 3.2 oz)   SpO2 98%   BMI 22.16 kg/m     PHYSICAL EXAM  Respiratory: breathing non labored.   Breasts: Examination was done in both the upright and supine positions.  Breasts are symmetrical . No dominant fixed or suspicious masses noted. No skin or nipple changes. No nipple discharge. Right breast generally more sensitive to palpation compared to the left, no focal pain.   No clavicular, cervical, or axillary lymphadenopathy.     INVESTIGATIONS:    10/11/23 right diagnostic mammogram and right breast ultrasound per Radiology no concerning findings, final report pending.     ASSESSMENT/PLAN:    Ester Barba is a 77 year old woman who had a recent chest CT that demonstrated right lateral breast asymmetric density. Right diagnostic mammogram and right breast ultrasound demonstrated no concerning findings. Continue yearly screening mammogram while in [facility]. Be familiar with your breast and how they normally feel and appear. Promptly report any changes to your healthcare provider.   - Screening mammogram " due: 10/12/24    Brandy Peng PA-C    30 minutes spent on the date of the encounter doing chart review, review of test results, interpretation of tests, patient visit and documentation.

## 2023-10-11 NOTE — NURSING NOTE
"Oncology Rooming Note    October 11, 2023 12:05 PM   Ester Barba is a 77 year old female who presents for:    Chief Complaint   Patient presents with    Oncology Clinic Visit     Mass of right breast      Initial Vitals: BP (!) 154/73 (BP Location: Right arm, Patient Position: Sitting, Cuff Size: Adult Regular)   Pulse 63   Temp 97.5  F (36.4  C) (Oral)   Ht 1.7 m (5' 6.93\")   Wt 64 kg (141 lb 3.2 oz)   SpO2 98%   BMI 22.16 kg/m   Estimated body mass index is 22.16 kg/m  as calculated from the following:    Height as of this encounter: 1.7 m (5' 6.93\").    Weight as of this encounter: 64 kg (141 lb 3.2 oz). Body surface area is 1.74 meters squared.  No Pain (0) Comment: Data Unavailable   No LMP recorded. Patient is postmenopausal.  Allergies reviewed: Yes  Medications reviewed: Yes    Medications: Medication refills not needed today.  Pharmacy name entered into [facility]:    [facility] - [place], [place] - [address] [facility] - [place], [place] - [id number] - [place] - [address] [facility] [address] [facility] [address] [id number] - [place], [place] - [address] [place] [address] DRUG STORE #[id number] - [place], [place] - [address] [facility] AT [address] [place] [address] & [facility]    Clinical concerns:        Bruna Byrnes              "

## 2023-10-11 NOTE — PATIENT INSTRUCTIONS
Ester Barba is a 77 year old woman who had a recent chest CT that demonstrated right lateral breast asymmetric density. Right diagnostic mammogram and right breast ultrasound demonstrated no concerning findings. Continue yearly screening mammogram while in good health. Be familiar with your breast and how they normally feel and appear. Promptly report any changes to your healthcare provider.   - Screening mammogram due: 10/12/24

## 2023-11-07 ENCOUNTER — LAB (OUTPATIENT)
Dept: LAB | Facility: CLINIC | Age: 78
End: 2023-11-07
Payer: MEDICARE

## 2023-11-07 ENCOUNTER — OFFICE VISIT (OUTPATIENT)
Dept: DERMATOLOGY | Facility: CLINIC | Age: 78
End: 2023-11-07
Payer: MEDICARE

## 2023-11-07 VITALS — DIASTOLIC BLOOD PRESSURE: 65 MMHG | SYSTOLIC BLOOD PRESSURE: 134 MMHG | HEART RATE: 79 BPM

## 2023-11-07 DIAGNOSIS — M19.90 ARTHRITIS: Primary | ICD-10-CM

## 2023-11-07 DIAGNOSIS — L66.10 LICHEN PLANOPILARIS: ICD-10-CM

## 2023-11-07 DIAGNOSIS — L30.9 DERMATITIS: ICD-10-CM

## 2023-11-07 DIAGNOSIS — M19.90 ARTHRITIS: ICD-10-CM

## 2023-11-07 LAB
ALBUMIN SERPL BCG-MCNC: 4.3 G/DL (ref 3.5–5.2)
ALP SERPL-CCNC: 118 U/L (ref 35–104)
ALT SERPL W P-5'-P-CCNC: 14 U/L (ref 0–50)
ANION GAP SERPL CALCULATED.3IONS-SCNC: 8 MMOL/L (ref 7–15)
AST SERPL W P-5'-P-CCNC: 22 U/L (ref 0–45)
BASOPHILS # BLD AUTO: 0.1 10E3/UL (ref 0–0.2)
BASOPHILS NFR BLD AUTO: 1 %
BILIRUB SERPL-MCNC: 0.5 MG/DL
BUN SERPL-MCNC: 15.1 MG/DL (ref 8–23)
CALCIUM SERPL-MCNC: 9.6 MG/DL (ref 8.8–10.2)
CHLORIDE SERPL-SCNC: 104 MMOL/L (ref 98–107)
CREAT SERPL-MCNC: 0.6 MG/DL (ref 0.51–0.95)
CRP SERPL-MCNC: <3 MG/L
DEPRECATED HCO3 PLAS-SCNC: 29 MMOL/L (ref 22–29)
EGFRCR SERPLBLD CKD-EPI 2021: >90 ML/MIN/1.73M2
EOSINOPHIL # BLD AUTO: 0.2 10E3/UL (ref 0–0.7)
EOSINOPHIL NFR BLD AUTO: 3 %
ERYTHROCYTE [DISTWIDTH] IN BLOOD BY AUTOMATED COUNT: 13.9 % (ref 10–15)
ERYTHROCYTE [SEDIMENTATION RATE] IN BLOOD BY WESTERGREN METHOD: 10 MM/HR (ref 0–30)
GLUCOSE SERPL-MCNC: 98 MG/DL (ref 70–99)
HCT VFR BLD AUTO: 38.2 % (ref 35–47)
HGB BLD-MCNC: 12.9 G/DL (ref 11.7–15.7)
IMM GRANULOCYTES # BLD: 0 10E3/UL
IMM GRANULOCYTES NFR BLD: 0 %
LYMPHOCYTES # BLD AUTO: 2.9 10E3/UL (ref 0.8–5.3)
LYMPHOCYTES NFR BLD AUTO: 45 %
MCH RBC QN AUTO: 32.2 PG (ref 26.5–33)
MCHC RBC AUTO-ENTMCNC: 33.8 G/DL (ref 31.5–36.5)
MCV RBC AUTO: 95 FL (ref 78–100)
MONOCYTES # BLD AUTO: 0.6 10E3/UL (ref 0–1.3)
MONOCYTES NFR BLD AUTO: 10 %
NEUTROPHILS # BLD AUTO: 2.6 10E3/UL (ref 1.6–8.3)
NEUTROPHILS NFR BLD AUTO: 41 %
NRBC # BLD AUTO: 0 10E3/UL
NRBC BLD AUTO-RTO: 0 /100
PLATELET # BLD AUTO: 161 10E3/UL (ref 150–450)
POTASSIUM SERPL-SCNC: 4.1 MMOL/L (ref 3.4–5.3)
PROT SERPL-MCNC: 6.9 G/DL (ref 6.4–8.3)
RBC # BLD AUTO: 4.01 10E6/UL (ref 3.8–5.2)
SODIUM SERPL-SCNC: 141 MMOL/L (ref 135–145)
VIT D+METAB SERPL-MCNC: 52 NG/ML (ref 20–50)
WBC # BLD AUTO: 6.3 10E3/UL (ref 4–11)

## 2023-11-07 PROCEDURE — 36415 COLL VENOUS BLD VENIPUNCTURE: CPT | Performed by: PATHOLOGY

## 2023-11-07 PROCEDURE — 11901 INJECT SKIN LESIONS >7: CPT | Performed by: DERMATOLOGY

## 2023-11-07 PROCEDURE — 99000 SPECIMEN HANDLING OFFICE-LAB: CPT | Performed by: PATHOLOGY

## 2023-11-07 PROCEDURE — 82306 VITAMIN D 25 HYDROXY: CPT | Performed by: DERMATOLOGY

## 2023-11-07 PROCEDURE — 86140 C-REACTIVE PROTEIN: CPT | Performed by: PATHOLOGY

## 2023-11-07 PROCEDURE — 80053 COMPREHEN METABOLIC PANEL: CPT | Performed by: PATHOLOGY

## 2023-11-07 PROCEDURE — 85652 RBC SED RATE AUTOMATED: CPT | Performed by: PATHOLOGY

## 2023-11-07 PROCEDURE — 85025 COMPLETE CBC W/AUTO DIFF WBC: CPT | Performed by: PATHOLOGY

## 2023-11-07 PROCEDURE — 99213 OFFICE O/P EST LOW 20 MIN: CPT | Mod: 25 | Performed by: DERMATOLOGY

## 2023-11-07 ASSESSMENT — PAIN SCALES - GENERAL: PAINLEVEL: NO PAIN (0)

## 2023-11-07 NOTE — PROGRESS NOTES
[facility] Dermatology Note  Encounter Date: Nov 7, 2023  Office Visit     Dermatology Problem List:  1. 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 Bx 8/31/21 revealed lichenoid keratosis of the vertex scalp.  - Current tx: ILK10 injections, clobetasol propionate 0.05% shampoo alternating w/ zinc shampoo every other day, fluocinolone oil once weekly  - Previous tx: plaquenil w/ significant improvement (~15 yrs; discontinued 12/2019 with concerns for retinopathy by local doc in [facility]; now followed by Dr. Radford here without signs of Plaquenil toxicity 1/2020).  - LPPAI score: 3.83 on 3/28/2023, 1 on 5/2/23, 1.33 on 11/07/2023  2. 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 Skin infection, vertex scalp at prior site of MMS - resolved  - s/p doxycycline 100 mg BID and mupirocin ointment BID   4. History of NMSC  - BCC - front vertex scalp, s/p Mohs with purse string and granulation, 3/30/22  - BCC - forehead, s/p MMS 6/9/21  - SCCis - R vertex scalp, s/p MMS 3/2/21, bacterial cx obtained from site 3/23/21  - BCC - right nasal ala s/p Mohs 11/27/17  - Nodular BCC - vertex scalp s/p Mohs 6/5/15  - SCC - scalp, s/p Mohs 4/18/2014  - Hx of 1-2 other NMSC on scalp (BCCs)  5. AKs - bilateral temples, cheeks  6. Inflamed SKs  ____________________________________________    Assessment & Plan:     # Lichen planopilaris  Overall stable/improved on exam today. Less scale and erythema compared to prior visits. Will proceed with ILK injections since patient is experiencing symptoms of itching, burning and pain on lateral aspect of R/L scalp.   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 Improved.  No  open erosions or crusting today. Central area of tenderness near the vertex.   - Can discontinue use of Duoderm-CGF bandage. Resume use if symptoms worsen.  - Apply emollient prn     Procedures Performed:      Kenalog intralesional injection procedure note: After verbal consent and discussion of risks including but not limited to atrophy, pain, and bruising, time out was performed, the patient underwent positioning, 0.8 total cc of Kenalog 10 mg/cc was injected into 10 sites on the lateral portion of the right and left scalp.  The patient tolerated the procedure well and left the Dermatology clinic in good condition.      Follow-up: Has appointment scheduled 12/19/2023. Schedule additional appointment in 01/2024.    Staff and Medical Student:     Jessica Estrada, MS3  Medical Student    Staff Physician:  I was present with the medical student who participated in the service and in the documentation of the note. I have verified the history and personally performed the physical exam and medical decision making. I agree with the assessment and plan of care as documented in the note.  ILK injections were primarily done by me.      Barbara Soto MD  Professor and Chair  Department of Dermatology  [facility]: Phone: [phone], Fax:[phone]  [facility]: Phone: [phone], Fax: [phone]      ____________________________________________    CC: Hair Loss (Patient is here for 6 week follow up regarding HL. She states things are stable. )    HPI:  Ms. Ester Barba is a 78 year old female who presents as a return patient for follow-up of hair loss, diagnosed as LPP.  - Last seen in-clinic on 09/19/2023.  - Shedding or thinning, or both: Mild  - Current tx: clobetasol propionate 0.05% shampoo alternating w/ zinc shampoo every other day, fluocinolone oil once weekly   - If using Rogaine, 1 cannister lasts how  long: N/A  No Any new medications, supplements, or products? (please list below)     Yes Scalp pain   Yes Scalp burning   No Scalp itching    No Eyebrow changes    No Eyelash changes   No Beard changes    No Other body hair changes    No Nail changes    Yes Additional symptoms? (please list below)  -Leg stiffness at night (see details in HPI below)     - Overall course: Improved. Current regimen of clobetasol shampoo every 3 days and fluocinolone oil once weekly has significantly reduced hair fallout. Experiencing mild pruritus on the posterior aspect of her scalp. Endorses some pain/burning of lateral scalp when she touches the area.    She has been consistently using Duoderm for lesion on vertex of scalp, but she is unsure whether or not the lesion has improved.     The papules on her forehead are still present. Endorses mild improvement with use of tretinoin.     Patient endorse new onset of nighttime leg stiffness and pain bilaterally the past 2 weeks. Patient has hx of osteoarthritis, and has experienced a couple falls in the last month. She is wondering if new onset leg pain could be related to LPP. We discussed that this was unlikely.    Patient is otherwise feeling well, in usual state of health, and has no additional skin concerns today.     ROS: As per HPI    Labs:  - Labs ordered: CBC, CMP, Vit D, CRP, ESR    Physical Exam:  Vitals: /65 (BP Location: Left arm, Patient Position: Chair)   Pulse 79   GEN: Well developed, well-nourished, in no acute distress, in a pleasant mood.    SKIN: Focused examination of scalp and face was performed.  - No diffuse erythema   - No perifollicular erythema  - Mild perifollicular scale   - No diffuse scaling of the scalp   - negative hair pull test   - normal eyelash density  - normal eyebrow density  - no nail pitting or dystrophy   - No scalp folliculitis/pustules   - In comparison to prior photographs, less scaling, erythema and crusting on vertex of scalp and  increased overall density.  - No other lesions of concern on areas examined.       Medications:  Current Outpatient Medications   Medication    acetaminophen (TYLENOL) 500 MG tablet    Biotin 10 MG TABS tablet    Calcium 1500 MG TABS    Cholecalciferol (VITAMIN D) 1000 UNIT capsule    clobetasol (TEMOVATE) 0.05 % external ointment    clobetasol propionate (CLOBEX) 0.05 % external shampoo    COMPOUNDED NON-CONTROLLED SUBSTANCE (CMPD RX) - PHARMACY TO MIX COMPOUNDED MEDICATION    desonide (DESOWEN) 0.05 % external cream    esomeprazole (NEXIUM) 20 MG packet    Fluocinolone Acetonide Scalp 0.01 % OIL oil    glucosamine-chondroitin 500-400 MG CAPS per capsule    hydrocortisone 2.5 % cream    ibuprofen (ADVIL,MOTRIN) 800 MG tablet    ketoconazole (NIZORAL) 2 % external cream    levalbuterol (XOPENEX HFA) 45 MCG/ACT inhaler    Loratadine (CLARITIN PO)    mupirocin (BACTROBAN) 2 % external ointment    NEW MED    tretinoin (RETIN-A) 0.025 % external cream     Current Facility-Administered Medications   Medication    hydrocortisone 2.5 % cream    lidocaine 1% with EPINEPHrine 1:100,000 injection 3 mL    triamcinolone acetonide (KENALOG-10) injection 10 mg    triamcinolone acetonide (KENALOG-10) injection 10 mg    triamcinolone acetonide (KENALOG-10) injection 10 mg    triamcinolone acetonide (KENALOG-10) injection 10 mg    triamcinolone acetonide (KENALOG-10) injection 17 mg    triamcinolone acetonide (KENALOG-10) injection 17 mg      Past Medical History:   Patient Active Problem List   Diagnosis    Porokeratosis    Squamous cell carcinoma    Neoplasm of uncertain behavior    Lichen planopilaris    Dermatitis    Cicatricial alopecia    Keratosis, inflamed seborrheic    History of skin cancer    Basal cell carcinoma of vertex scalp s/p mohs 6-5-15    Medication management    Actinic keratosis    Medication monitoring encounter    Dermatitis, seborrheic    Erosive pustular dermatosis    Tick bite, initial encounter    Vulvar  atrophy    Factor V Leiden mutation (H24)    Splenic artery aneurysm (H24)    Post concussion syndrome    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 on close of this encounter.

## 2023-11-07 NOTE — LETTER
11/7/2023       RE: Ester Barba  [address] [place] [address]     Dear Colleague,    Thank you for referring your patient, Ester Barba, to the [facility] [place] [facility]. Please see a copy of my visit note below.    Drug Administration Record    Prior to injection, verified patient identity using patient's name and date of birth.  Due to injection administration, patient instructed to remain in clinic for 15 minutes  afterwards, and to report any adverse reaction to me immediately.    Drug Name: triamcinolone acetonide(kenalog)  Dose: 0.8mL of triamcinolone 10mg/mL, 8mg dose  Route administered: ID  NDC #: Kenalog-10 (5655-8698-90)  Amount of waste(mL):4.2mL  Reason for waste: Multi dose vial    LOT #: [id number]  SITE: see provider note  : [employer]  EXPIRATION DATE: DEC 2025    [facility] Dermatology Note  Encounter Date: Nov 7, 2023  Office Visit     Dermatology Problem List:  1. 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 Bx 8/31/21 revealed lichenoid keratosis of the vertex scalp.  - Current tx: ILK10 injections, clobetasol propionate 0.05% shampoo alternating w/ zinc shampoo every other day, fluocinolone oil once weekly  - Previous tx: plaquenil w/ significant improvement (~15 yrs; discontinued 12/2019 with concerns for retinopathy by local doc in [facility]; now followed by Dr. Radford here without signs of Plaquenil toxicity 1/2020).  - LPPAI score: 3.83 on 3/28/2023, 1 on 5/2/23, 1.33 on 11/07/2023  2. 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 Skin infection, vertex scalp at prior site of MMS - resolved  - s/p doxycycline 100 mg BID and mupirocin ointment BID   4. History of NMSC  - BCC - front vertex scalp, s/p Mohs with purse string and granulation, 3/30/22  - BCC - forehead, s/p MMS 6/9/21  - SCCis - R  vertex scalp, s/p MMS 3/2/21, bacterial cx obtained from site 3/23/21  - BCC - right nasal ala s/p Mohs 11/27/17  - Nodular BCC - vertex scalp s/p Mohs 6/5/15  - SCC - scalp, s/p Mohs 4/18/2014  - Hx of 1-2 other NMSC on scalp (BCCs)  5. AKs - bilateral temples, cheeks  6. Inflamed SKs  ____________________________________________    Assessment & Plan:     # Lichen planopilaris  Overall stable/improved on exam today. Less scale and erythema compared to prior visits. Will proceed with ILK injections since patient is experiencing symptoms of itching, burning and pain on lateral aspect of R/L scalp.   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 Improved.  No open erosions or crusting today. Central area of tenderness near the vertex.   - Can discontinue use of Duoderm-CGF bandage. Resume use if symptoms worsen.  - Apply emollient prn     Procedures Performed:      Kenalog intralesional injection procedure note: After verbal consent and discussion of risks including but not limited to atrophy, pain, and bruising, time out was performed, the patient underwent positioning, 0.8 total cc of Kenalog 10 mg/cc was injected into 10 sites on the lateral portion of the right and left scalp.  The patient tolerated the procedure well and left the Dermatology clinic in good condition.      Follow-up: Has appointment scheduled 12/19/2023. Schedule additional appointment in 01/2024.    Staff and Medical Student:     Jessica Estrada, MS3  Medical Student    Staff Physician:  I was present with the medical student who participated in the service and in the documentation of the note. I have verified the history and personally performed the physical exam and medical decision making. I agree with the  assessment and plan of care as documented in the note.  ILK injections were primarily done by me.      Barbara Soto MD  Professor and Chair  Department of Dermatology  [facility]: Phone: [phone], Fax:[phone]  [facility]: Phone: [phone], Fax: [phone]      ____________________________________________    CC: Hair Loss (Patient is here for 6 week follow up regarding HL. She states things are stable. )    HPI:  Ms. Ester Barba is a 78 year old female who presents as a return patient for follow-up of hair loss, diagnosed as LPP.  - Last seen in-clinic on 09/19/2023.  - Shedding or thinning, or both: Mild  - Current tx: clobetasol propionate 0.05% shampoo alternating w/ zinc shampoo every other day, fluocinolone oil once weekly   - If using Rogaine, 1 cannister lasts how long: N/A  No Any new medications, supplements, or products? (please list below)     Yes Scalp pain   Yes Scalp burning   No Scalp itching    No Eyebrow changes    No Eyelash changes   No Beard changes    No Other body hair changes    No Nail changes    Yes Additional symptoms? (please list below)  -Leg stiffness at night (see details in HPI below)     - Overall course: Improved. Current regimen of clobetasol shampoo every 3 days and fluocinolone oil once weekly has significantly reduced hair fallout. Experiencing mild pruritus on the posterior aspect of her scalp. Endorses some pain/burning of lateral scalp when she touches the area.    She has been consistently using Duoderm for lesion on vertex of scalp, but she is unsure whether or not the lesion has improved.     The papules on her forehead are still present. Endorses mild improvement with use of tretinoin.     Patient endorse new onset of nighttime leg stiffness and pain bilaterally the past 2 weeks. Patient has hx of osteoarthritis, and has experienced a couple falls  in the last month. She is wondering if new onset leg pain could be related to LPP. We discussed that this was unlikely.    Patient is otherwise feeling well, in usual state of health, and has no additional skin concerns today.     ROS: As per HPI    Labs:  - Labs ordered: CBC, CMP, Vit D, CRP, ESR    Physical Exam:  Vitals: /65 (BP Location: Left arm, Patient Position: Chair)   Pulse 79   GEN: Well developed, well-nourished, in no acute distress, in a pleasant mood.    SKIN: Focused examination of scalp and face was performed.  - No diffuse erythema   - No perifollicular erythema  - Mild perifollicular scale   - No diffuse scaling of the scalp   - negative hair pull test   - normal eyelash density  - normal eyebrow density  - no nail pitting or dystrophy   - No scalp folliculitis/pustules   - In comparison to prior photographs, less scaling, erythema and crusting on vertex of scalp and increased overall density.  - No other lesions of concern on areas examined.       Medications:  Current Outpatient Medications   Medication     acetaminophen (TYLENOL) 500 MG tablet     Biotin 10 MG TABS tablet     Calcium 1500 MG TABS     Cholecalciferol (VITAMIN D) 1000 UNIT capsule     clobetasol (TEMOVATE) 0.05 % external ointment     clobetasol propionate (CLOBEX) 0.05 % external shampoo     COMPOUNDED NON-CONTROLLED SUBSTANCE (CMPD RX) - PHARMACY TO MIX COMPOUNDED MEDICATION     desonide (DESOWEN) 0.05 % external cream     esomeprazole (NEXIUM) 20 MG packet     Fluocinolone Acetonide Scalp 0.01 % OIL oil     glucosamine-chondroitin 500-400 MG CAPS per capsule     hydrocortisone 2.5 % cream     ibuprofen (ADVIL,MOTRIN) 800 MG tablet     ketoconazole (NIZORAL) 2 % external cream     levalbuterol (XOPENEX HFA) 45 MCG/ACT inhaler     Loratadine (CLARITIN PO)     mupirocin (BACTROBAN) 2 % external ointment     NEW MED     tretinoin (RETIN-A) 0.025 % external cream     Current Facility-Administered Medications   Medication      hydrocortisone 2.5 % cream     lidocaine 1% with EPINEPHrine 1:100,000 injection 3 mL     triamcinolone acetonide (KENALOG-10) injection 10 mg     triamcinolone acetonide (KENALOG-10) injection 10 mg     triamcinolone acetonide (KENALOG-10) injection 10 mg     triamcinolone acetonide (KENALOG-10) injection 10 mg     triamcinolone acetonide (KENALOG-10) injection 17 mg     triamcinolone acetonide (KENALOG-10) injection 17 mg      Past Medical History:   Patient Active Problem List   Diagnosis     Porokeratosis     Squamous cell carcinoma     Neoplasm of uncertain behavior     Lichen planopilaris     Dermatitis     Cicatricial alopecia     Keratosis, inflamed seborrheic     History of skin cancer     Basal cell carcinoma of vertex scalp s/p mohs 6-5-15     Medication management     Actinic keratosis     Medication monitoring encounter     Dermatitis, seborrheic     Erosive pustular dermatosis     Tick bite, initial encounter     Vulvar atrophy     Factor V Leiden mutation (H24)     Splenic artery aneurysm (H24)     Post concussion syndrome     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 on close of this encounter.       Again, thank you for allowing me to participate in the care of your patient.      Sincerely,    Barbara Soto MD

## 2023-11-07 NOTE — NURSING NOTE
Dermatology Rooming Note    Ester Barba's goals for this visit include:   Chief Complaint   Patient presents with    Hair Loss     Patient is here for 6 week follow up regarding HL. She states things are stable.      Hailee Holder, visit facilitator

## 2023-11-07 NOTE — PROGRESS NOTES
Drug Administration Record    Prior to injection, verified patient identity using patient's name and date of birth.  Due to injection administration, patient instructed to remain in clinic for 15 minutes  afterwards, and to report any adverse reaction to me immediately.    Drug Name: triamcinolone acetonide(kenalog)  Dose: 0.8mL of triamcinolone 10mg/mL, 8mg dose  Route administered: ID  NDC #: Kenalog-10 (0932-4633-65)  Amount of waste(mL):4.2mL  Reason for waste: Multi dose vial    LOT #: [id number]  SITE: see provider note  : triptap  EXPIRATION DATE: DEC 2025

## 2023-11-20 ENCOUNTER — OFFICE VISIT (OUTPATIENT)
Dept: PULMONOLOGY | Facility: CLINIC | Age: 78
End: 2023-11-20
Attending: STUDENT IN AN ORGANIZED HEALTH CARE EDUCATION/TRAINING PROGRAM
Payer: MEDICARE

## 2023-11-20 VITALS
WEIGHT: 135 LBS | RESPIRATION RATE: 17 BRPM | HEART RATE: 69 BPM | OXYGEN SATURATION: 96 % | HEIGHT: 67 IN | DIASTOLIC BLOOD PRESSURE: 76 MMHG | BODY MASS INDEX: 21.19 KG/M2 | SYSTOLIC BLOOD PRESSURE: 154 MMHG

## 2023-11-20 DIAGNOSIS — R06.09 DYSPNEA ON EXERTION: Primary | ICD-10-CM

## 2023-11-20 PROCEDURE — G0463 HOSPITAL OUTPT CLINIC VISIT: HCPCS | Performed by: STUDENT IN AN ORGANIZED HEALTH CARE EDUCATION/TRAINING PROGRAM

## 2023-11-20 PROCEDURE — 99215 OFFICE O/P EST HI 40 MIN: CPT | Performed by: STUDENT IN AN ORGANIZED HEALTH CARE EDUCATION/TRAINING PROGRAM

## 2023-11-20 ASSESSMENT — PAIN SCALES - GENERAL: PAINLEVEL: NO PAIN (0)

## 2023-11-20 NOTE — NURSING NOTE
Chief Complaint   Patient presents with    RECHECK     Return Pulmonary- 3 month follow up    Medications reviewed and vital signs taken.   Cleveland Gomez, CMA

## 2023-11-20 NOTE — LETTER
11/20/2023         RE: Ester Barba  [address] [place] [address]        Dear Colleague,    Thank you for referring your patient, Ester Barba, to the [facility] FOR LUNG SCIENCE AND [facility] [place]. Please see a copy of my visit note below.    [facility]    Pulmonary, Allergy, Critical Care and Sleep Medicine    Clinic Progress Visit  11/20/23    Ester Barba MRN# [id number]   Age: 77 year old YOB: 1945     Primary care provider: Winnie Sepulveda     Assessment and Recommendations:    Ester Barba is a 78 year old female with a history of arthritis and possible exercise induced asthma who presents for follow up of exertional dyspnea.     Exertional Dyspnea  First onset of symptoms about 20 years ago, given albuterol inhaler but has treated symptoms with rest rather than inhaler. Since last visit has intermittently used Xopenex without obviously clear relief and at this point primary symptom is pounding heart more than dyspnea. Breathing recovery faster than heart rate. Denies any chest pain/pressure or concerning associated symptoms. Does not think heart beats feel irregular. Echo with bubble in August that negative for WMA or reduced EF, did have some LVH. Given continued exertional tachycardia that limiting activity do think it would be reasonable to do a stress test, especially as patient planning a high activity trip next year to [place]. Suspect some element of deconditioning and having knee problems so may not be able to do exercise to a degree to get target heart rate and would need chemical stress test. Will send a message to patient's PCP to discuss at their visit next month. In the meantime discussed trying to increase activity as able, and pending stress test would plan to refer to Pulmonary Rehab.   - Discuss cardiac stress test with patient's PCP  - Agree with doing some increased exercises at home  - Plan for Pulmonary Rehab  next year, timing dependent on stress test and if undergoes knee surgery  - Continue to use Xopenex as needed  - Has completed COVID and Flu shots, recommended RSV shot  - Patient with questions about air quality precautions which reviewed    Follow up in 6 months prior to trip    La Nena Sanders MD PhD  Pulmonary and Critical Care   Pager [phone]    50 minutes spent on the date of the encounter doing chart review, history and exam, documentation and further activities per the note.    Interval History:    April 2023 reported dyspnea and pounding heart vs palpitations with exertion. Was in the midst of antibiotics for sinus infection and a month prior had traveled to [place] and [place]. Also was having fatigue and some chills and night sweats. Seen in clinic where D-Dimer was positive. CT PE was negative for PE, did show possible scarring of lung apices with mosaic attenuation. Referred to Pulmonary at personal request for further evaluation. Symptomatic after tick bite in June and treated with doxy.     At initial Pulmonary visit in July reported exertional dyspnea and heart pounding with stairs. Similar symptoms about 20 years ago when was on a trip to [facility]. At the time diagnosed with exercise induced asthma. Given albuterol which never really used, would just stop and rest. Thinks able to do less and less over the years, out of breath with bending and lifting. Will take about 10 minutes to recover which has been stable. Never feels dizzy or lightheaded, in part because stops. Can still feel heart pound, no chest pain or pressure, can't tell if heart skips beats. No lower extremity edema. No breathing issues on airplanes. Will sometimes feel affected by air quality. Occasional dry cough. History of sinus infections and lots of allergies and uses Claritin. Acid reflux, takes PPI daily.    Since last visit underwent evaluation for a right breast mass. Diagnostic mammogram and breast ultrasound  without concerning findings.     Today reports that started to use Xopenex as needed after symptoms. Leg does limit how fast can go. Mostly notices that heart is beating fast more than that breathing is heavy. Heart feels regular but fast. Breathing settles down faster than heart. No pain or pressure in chest. Once had a sharp pain in the middle of back, was while walking around and went away, a few weeks ago. No dizziness or lightheadedness. No pass out feelings. No numbness or tingling.     Exposure History:  Family history significant for a father with lung cancer (was a smoker), mother also smoked, no auto-immune disease, brothers with eczema, no asthma as a kid. Smoked in college a little, socially on the weekends. No other inhaled drugs. Was around a lot of SHS from parents, friends. Use to burn wood and stopped. 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 No pets, no bird exposures. [place] needing [place] years ago. Might have been exposed to asbestos from tiles in hospital.     Additional data from chart review  Primary Care and Dermatology notes reviewed and summarized as above.    Procedures  PFTs 7/2023: Personally reviewed, normal spirometry and diffusing capacity    Echo 8/2023: LVEF 55-60%, global RV function normal, normal bubble, PASP normal, no valvular abnormalities     Imaging  CT Chest 4/20/23: Personally reviewed, bilateral apical parenchymal scarring and mosaic attenuation, no coronary calcifications    CT Chest 8/15/23: Continued bi-apical scarring, no mosaic attenuation    Review of Systems:  Complete 12 point ROS negative unless mentioned in HPI    Medications, Allergies:    Medications:  Current Outpatient Medications   Medication    acetaminophen (TYLENOL) 500 MG tablet    Biotin 10 MG TABS tablet    Calcium 1500 MG TABS    Cholecalciferol (VITAMIN D) 1000  UNIT capsule    clobetasol (TEMOVATE) 0.05 % external ointment    clobetasol propionate (CLOBEX) 0.05 % external shampoo    COMPOUNDED NON-CONTROLLED SUBSTANCE (CMPD RX) - PHARMACY TO MIX COMPOUNDED MEDICATION    desonide (DESOWEN) 0.05 % external cream    esomeprazole (NEXIUM) 20 MG packet    Fluocinolone Acetonide Scalp 0.01 % OIL oil    glucosamine-chondroitin 500-400 MG CAPS per capsule    hydrocortisone 2.5 % cream    ibuprofen (ADVIL,MOTRIN) 800 MG tablet    ketoconazole (NIZORAL) 2 % external cream    levalbuterol (XOPENEX HFA) 45 MCG/ACT inhaler    Loratadine (CLARITIN PO)    mupirocin (BACTROBAN) 2 % external ointment    NEW MED    tretinoin (RETIN-A) 0.025 % external cream     Current Facility-Administered Medications   Medication    hydrocortisone 2.5 % cream    lidocaine 1% with EPINEPHrine 1:100,000 injection 3 mL    triamcinolone acetonide (KENALOG-10) injection 10 mg    triamcinolone acetonide (KENALOG-10) injection 10 mg    triamcinolone acetonide (KENALOG-10) injection 10 mg    triamcinolone acetonide (KENALOG-10) injection 10 mg    triamcinolone acetonide (KENALOG-10) injection 17 mg    triamcinolone acetonide (KENALOG-10) injection 17 mg      hydrocortisone   Topical BID    lidocaine 1% with EPINEPHrine 1:100,000  3 mL Intradermal Once    triamcinolone acetonide  10 mg Intra-Lesional Once    triamcinolone acetonide  10 mg Intra-Lesional Once    triamcinolone acetonide  10 mg Intra-Lesional Once    triamcinolone acetonide  10 mg Intra-Lesional Once    triamcinolone acetonide  17 mg Intra-Lesional Once    triamcinolone acetonide  17 mg Intra-Lesional Once     Allergies:  Allergies   Allergen Reactions    Dust Mites Other (See Comments)     Sneezing, coughing. asthma  Itchy watery eyes, sneezing    Estrogens Itching     Other reaction(s): Hypersensitivity  Topical caused burning sensation of skin  Topical caused burning sensation of skin; Premarin cream only - possibly only an bi-ingredient     "Imiquimod Other (See Comments)     Hair loss    Levaquin [Levofloxacin Hemihydrate] Other (See Comments)     Muscle weakness    Levofloxacin Other (See Comments)     MUSCLE WEAKNESS, ARTHRITIS LIKE SYMPTOMS.      Mold Other (See Comments)     Sneezing, asthma, weezing    Pollen Extract/Tree Extract Other (See Comments)     Sneezing, weezing    Sulfa Antibiotics Hives    Sulfamethoxazole-Trimethoprim Hives    Latex Rash     LATEX BANDAGES; tapes adhesive    Penicillin G Rash    Penicillins Rash     Physical Exam:    BP (!) 154/76   Pulse 69   Resp 17   Ht 1.7 m (5' 6.93\")   Wt 61.2 kg (135 lb)   SpO2 96%   BMI 21.19 kg/m      General: Sitting up in NAD  HEENT: anicteric, moist mucosa  Neck: no palpable lymphadenopathy  Chest: CTAB, no wheezing or crackles  Cardiac: RRR no murmurs, radial pulses intact  Abdomen: Soft, non tender, active BS  Extremities: No LE Edema  Neuro: A&Ox3, no focal deficits   Skin: no rash noted on limited exam  Psych: Appropriate  Laboratory, imaging, and microbiologic data:    All laboratory and imaging data reviewed, pertinent results discussed above.      Again, thank you for allowing me to participate in the care of your patient.        Sincerely,        La Nena Sanders MD    "

## 2023-11-20 NOTE — PROGRESS NOTES
[facility]    Pulmonary, Allergy, Critical Care and Sleep Medicine    Clinic Progress Visit  11/20/23    Ester Barba MRN# [id number]   Age: 77 year old YOB: 1945     Primary care provider: Winnie Sepulveda     Assessment and Recommendations:    Ester Barba is a 78 year old female with a history of arthritis and possible exercise induced asthma who presents for follow up of exertional dyspnea.     Exertional Dyspnea  First onset of symptoms about 20 years ago, given albuterol inhaler but has treated symptoms with rest rather than inhaler. Since last visit has intermittently used Xopenex without obviously clear relief and at this point primary symptom is pounding heart more than dyspnea. Breathing recovery faster than heart rate. Denies any chest pain/pressure or concerning associated symptoms. Does not think heart beats feel irregular. Echo with bubble in August that negative for WMA or reduced EF, did have some LVH. Given continued exertional tachycardia that limiting activity do think it would be reasonable to do a stress test, especially as patient planning a high activity trip next year to [facility]. Suspect some element of deconditioning and having knee problems so may not be able to do exercise to a degree to get target heart rate and would need chemical stress test. Will send a message to patient's PCP to discuss at their visit next month. In the meantime discussed trying to increase activity as able, and pending stress test would plan to refer to Pulmonary Rehab.   - Discuss cardiac stress test with patient's PCP  - Agree with doing some increased exercises at home  - Plan for Pulmonary Rehab next year, timing dependent on stress test and if undergoes knee surgery  - Continue to use Xopenex as needed  - Has completed COVID and Flu shots, recommended RSV shot  - Patient with questions about air quality precautions which reviewed    Follow up in 6 months prior to  trip    La Nena Sanders MD PhD  Pulmonary and Critical Care   Pager [phone]    50 minutes spent on the date of the encounter doing chart review, history and exam, documentation and further activities per the note.    Interval History:    April 2023 reported dyspnea and pounding heart vs palpitations with exertion. Was in the midst of antibiotics for sinus infection and a month prior had traveled to [place] and [place]. Also was having fatigue and some chills and night sweats. Seen in clinic where D-Dimer was positive. CT PE was negative for PE, did show possible scarring of lung apices with mosaic attenuation. Referred to Pulmonary at personal request for further evaluation. Symptomatic after tick bite in June and treated with doxy.     At initial Pulmonary visit in July reported exertional dyspnea and heart pounding with stairs. Similar symptoms about 20 years ago when was on a trip to [facility]. At the time diagnosed with exercise induced asthma. Given albuterol which never really used, would just stop and rest. Thinks able to do less and less over the years, out of breath with bending and lifting. Will take about 10 minutes to recover which has been stable. Never feels dizzy or lightheaded, in part because stops. Can still feel heart pound, no chest pain or pressure, can't tell if heart skips beats. No lower extremity edema. No breathing issues on airplanes. Will sometimes feel affected by air quality. Occasional dry cough. History of sinus infections and lots of allergies and uses Claritin. Acid reflux, takes PPI daily.    Since last visit underwent evaluation for a right breast mass. Diagnostic mammogram and breast ultrasound without concerning findings.     Today reports that started to use Xopenex as needed after symptoms. Leg does limit how fast can go. Mostly notices that heart is beating fast more than that breathing is heavy. Heart feels regular but fast. Breathing settles down faster  than heart. No pain or pressure in chest. Once had a sharp pain in the middle of back, was while walking around and went away, a few weeks ago. No dizziness or lightheadedness. No pass out feelings. No numbness or tingling.     Exposure History:  Family history significant for a father with lung cancer (was a smoker), mother also smoked, no auto-immune disease, brothers with eczema, no asthma as a kid. Smoked in college a little, socially on the weekends. No other inhaled drugs. Was around a lot of SHS from parents, friends. Use to burn wood and stopped. 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 No pets, no bird exposures. [place] needing fixing years ago. Might have been exposed to asbestos from tiles in hospital.     Additional data from chart review  Primary Care and Dermatology notes reviewed and summarized as above.    Procedures  PFTs 7/2023: Personally reviewed, normal spirometry and diffusing capacity    Echo 8/2023: LVEF 55-60%, global RV function normal, normal bubble, PASP normal, no valvular abnormalities     Imaging  CT Chest 4/20/23: Personally reviewed, bilateral apical parenchymal scarring and mosaic attenuation, no coronary calcifications    CT Chest 8/15/23: Continued bi-apical scarring, no mosaic attenuation    Review of Systems:  Complete 12 point ROS negative unless mentioned in HPI    Medications, Allergies:    Medications:  Current Outpatient Medications   Medication     acetaminophen (TYLENOL) 500 MG tablet     Biotin 10 MG TABS tablet     Calcium 1500 MG TABS     Cholecalciferol (VITAMIN D) 1000 UNIT capsule     clobetasol (TEMOVATE) 0.05 % external ointment     clobetasol propionate (CLOBEX) 0.05 % external shampoo     COMPOUNDED NON-CONTROLLED SUBSTANCE (CMPD RX) - PHARMACY TO MIX COMPOUNDED MEDICATION     desonide (DESOWEN) 0.05 % external cream      esomeprazole (NEXIUM) 20 MG packet     Fluocinolone Acetonide Scalp 0.01 % OIL oil     glucosamine-chondroitin 500-400 MG CAPS per capsule     hydrocortisone 2.5 % cream     ibuprofen (ADVIL,MOTRIN) 800 MG tablet     ketoconazole (NIZORAL) 2 % external cream     levalbuterol (XOPENEX HFA) 45 MCG/ACT inhaler     Loratadine (CLARITIN PO)     mupirocin (BACTROBAN) 2 % external ointment     NEW MED     tretinoin (RETIN-A) 0.025 % external cream     Current Facility-Administered Medications   Medication     hydrocortisone 2.5 % cream     lidocaine 1% with EPINEPHrine 1:100,000 injection 3 mL     triamcinolone acetonide (KENALOG-10) injection 10 mg     triamcinolone acetonide (KENALOG-10) injection 10 mg     triamcinolone acetonide (KENALOG-10) injection 10 mg     triamcinolone acetonide (KENALOG-10) injection 10 mg     triamcinolone acetonide (KENALOG-10) injection 17 mg     triamcinolone acetonide (KENALOG-10) injection 17 mg       hydrocortisone   Topical BID     lidocaine 1% with EPINEPHrine 1:100,000  3 mL Intradermal Once     triamcinolone acetonide  10 mg Intra-Lesional Once     triamcinolone acetonide  10 mg Intra-Lesional Once     triamcinolone acetonide  10 mg Intra-Lesional Once     triamcinolone acetonide  10 mg Intra-Lesional Once     triamcinolone acetonide  17 mg Intra-Lesional Once     triamcinolone acetonide  17 mg Intra-Lesional Once     Allergies:  Allergies   Allergen Reactions     Dust Mites Other (See Comments)     Sneezing, coughing. asthma  Itchy watery eyes, sneezing     Estrogens Itching     Other reaction(s): Hypersensitivity  Topical caused burning sensation of skin  Topical caused burning sensation of skin; Premarin cream only - possibly only an bi-ingredient     Imiquimod Other (See Comments)     Hair loss     Levaquin [Levofloxacin Hemihydrate] Other (See Comments)     Muscle weakness     Levofloxacin Other (See Comments)     MUSCLE WEAKNESS, ARTHRITIS LIKE SYMPTOMS.       Mold Other (See  "Comments)     Sneezing, asthma, weezing     Pollen Extract/Tree Extract Other (See Comments)     Sneezing, weezing     Sulfa Antibiotics Hives     Sulfamethoxazole-Trimethoprim Hives     Latex Rash     LATEX BANDAGES; tapes adhesive     Penicillin G Rash     Penicillins Rash     Physical Exam:    BP (!) 154/76   Pulse 69   Resp 17   Ht 1.7 m (5' 6.93\")   Wt 61.2 kg (135 lb)   SpO2 96%   BMI 21.19 kg/m      General: Sitting up in NAD  HEENT: anicteric, moist mucosa  Neck: no palpable lymphadenopathy  Chest: CTAB, no wheezing or crackles  Cardiac: RRR no murmurs, radial pulses intact  Abdomen: Soft, non tender, active BS  Extremities: No LE Edema  Neuro: A&Ox3, no focal deficits   Skin: no rash noted on limited exam  Psych: Appropriate  Laboratory, imaging, and microbiologic data:    All laboratory and imaging data reviewed, pertinent results discussed above.    "

## 2023-11-20 NOTE — PATIENT INSTRUCTIONS
I will send a message to your Primary doctor about doing a stress test of the heart in the coming months to finish off our evaluation of your heart.     Plan for pulmonary rehab next year, pending stress test results and scheduling of knee surgery. Rehab referral is in and we can set that up whenever ready. In the meantime I agree with trying to do more exercises at home.    Can continue to use the Xopenex inhaler as needed for symptoms, let me know if starting to feel reliant on inhaler throughout the day.    Good job getting flu and COVID shots, get the RSV shot.

## 2023-12-19 ENCOUNTER — OFFICE VISIT (OUTPATIENT)
Dept: DERMATOLOGY | Facility: CLINIC | Age: 78
End: 2023-12-19
Payer: MEDICARE

## 2023-12-19 VITALS — DIASTOLIC BLOOD PRESSURE: 69 MMHG | HEART RATE: 74 BPM | SYSTOLIC BLOOD PRESSURE: 123 MMHG

## 2023-12-19 DIAGNOSIS — R23.8 PAPULE: ICD-10-CM

## 2023-12-19 DIAGNOSIS — L66.10 LICHEN PLANOPILARIS: Primary | ICD-10-CM

## 2023-12-19 DIAGNOSIS — L82.0 INFLAMED SEBORRHEIC KERATOSIS: ICD-10-CM

## 2023-12-19 DIAGNOSIS — L30.9 DERMATITIS: ICD-10-CM

## 2023-12-19 PROCEDURE — 17110 DESTRUCTION B9 LES UP TO 14: CPT | Mod: GC | Performed by: DERMATOLOGY

## 2023-12-19 PROCEDURE — 99213 OFFICE O/P EST LOW 20 MIN: CPT | Mod: 25 | Performed by: DERMATOLOGY

## 2023-12-19 ASSESSMENT — PAIN SCALES - GENERAL: PAINLEVEL: NO PAIN (0)

## 2023-12-19 NOTE — NURSING NOTE
Dermatology Rooming Note    Ester Barba's goals for this visit include:   Chief Complaint   Patient presents with    Hair Loss     No changes since last visit per patient     Pascale Dinh LPN

## 2023-12-19 NOTE — PROGRESS NOTES
[facility] D[facility]atology Note  Encounter Date: Dec 19, 2023  Office Visit     Dermatology Problem List:  1. 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 Bx 8/31/21 revealed lichenoid keratosis of the vertex scalp.  - Current tx: ILK10 injections, clobetasol propionate 0.05% shampoo alternating w/ zinc shampoo every other day, fluocinolone oil once weekly  - Previous tx: plaquenil w/ significant improvement (~15 yrs; discontinued 12/2019 with concerns for retinopathy by local doc in [facility]; now followed by Dr. Radford here without signs of Plaquenil toxicity 1/2020).  - LPPAI score: 3.83 on 3/28/2023, 1 on 5/2/23, 1.33 on 11/07/2023  2. 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 Skin infection, vertex scalp at prior site of MMS - resolved  - s/p doxycycline 100 mg BID and mupirocin ointment BID   4. History of NMSC  - BCC - front vertex scalp, s/p Mohs with purse string and granulation, 3/30/22  - BCC - forehead, s/p MMS 6/9/21  - SCCis - R vertex scalp, s/p MMS 3/2/21, bacterial cx obtained from site 3/23/21  - BCC - right nasal ala s/p Mohs 11/27/17  - Nodular BCC - vertex scalp s/p Mohs 6/5/15  - SCC - scalp, s/p Mohs 4/18/2014  - Hx of 1-2 other NMSC on scalp (BCCs)  5. AKs - bilateral temples, cheeks  6. Inflamed SKs    ____________________________________________    Assessment & Plan:    # Lichen planopilaris  Overall stable/improved on exam today. Less scale and erythema compared to prior visits. Will proceed with ILK injections since patient is experiencing symptoms of itching, burning and pain on lateral aspect of R/L scalp.   - CHANGE use of clobetasol shampoo from 3X/week to once per week   - CHANGE use of fluocinolone oil from once weekly to twice weekly  - use zinc shampoo in the interim  -  hold on ILK today as is doing well overall today and skin needs a break from steroids   # SK, inflammed, forehead  - 3  lesions on forehead treated with cryotherapy as below    Procedures Performed:   - Cryotherapy procedure note, location(s): left forehead x3. After verbal consent and discussion of risks and benefits including, but not limited to, dyspigmentation/scar, blister, and pain, 3 lesion(s) was(were) treated with 1-2 mm freeze border for 1-2 cycles with liquid nitrogen. Post cryotherapy instructions were provided.    Follow-up: 2/6/2023 (appt scheduled)    Staff and Resident:     Patient seen and staffed with Dr. Charles Gonzalez MD  IM PGY3    Patient was seen and examined with the plastic surgery resident. I agree with the history, review of systems, physical examination, assessments and plan. The cryotherapy procedure was done together.    Barbara Soto MD  Professor and  Chair  Department of Dermatology  [facility]   ____________________________________________    CC: No chief complaint on file.    HPI:  Ms. Ester Barba is a(n) 78 year old female who presents today as a return patient for hair loss. She was last seen in derm clinic 11/7/2023 for LPP  and had ILK10 injections to R/L lateral scalp. She reports that the injections help with the itchiness/inflammation.     She has been using clobetasol shampoo every 2-3 days, but she notes that it is drying.     She has been using zinc shampoo once per week after using fluocinolone oil.    She stopped using DuoDerm to the site on the frontal scalp. She has been using Vaseline and gauze on her scalp instead.    She continues to have small papules on her forehead.     Patient is otherwise feeling well, without additional skin concerns.    Labs Reviewed:  N/A    Physical Exam:  SKIN: Focused examination of scalp, face was performed.  - scalp lesion with fibrotic tissue, mild crusting, tenderness (see pictures in media tab)  - face- small scattered papules on forehead consistent with SK, some with erythema  - forehead with neutral veins on right and  left sides, depressed veins mid forehead  - No other lesions of concern on areas examined.     Medications:  Current Outpatient Medications   Medication    acetaminophen (TYLENOL) 500 MG tablet    Biotin 10 MG TABS tablet    Calcium 1500 MG TABS    Cholecalciferol (VITAMIN D) 1000 UNIT capsule    clobetasol (TEMOVATE) 0.05 % external ointment    clobetasol propionate (CLOBEX) 0.05 % external shampoo    COMPOUNDED NON-CONTROLLED SUBSTANCE (CMPD RX) - PHARMACY TO MIX COMPOUNDED MEDICATION    desonide (DESOWEN) 0.05 % external cream    esomeprazole (NEXIUM) 20 MG packet    Fluocinolone Acetonide Scalp 0.01 % OIL oil    glucosamine-chondroitin 500-400 MG CAPS per capsule    hydrocortisone 2.5 % cream    ibuprofen (ADVIL,MOTRIN) 800 MG tablet    ketoconazole (NIZORAL) 2 % external cream    levalbuterol (XOPENEX HFA) 45 MCG/ACT inhaler    Loratadine (CLARITIN PO)    mupirocin (BACTROBAN) 2 % external ointment    NEW MED    tretinoin (RETIN-A) 0.025 % external cream     Current Facility-Administered Medications   Medication    hydrocortisone 2.5 % cream    lidocaine 1% with EPINEPHrine 1:100,000 injection 3 mL    triamcinolone acetonide (KENALOG-10) injection 10 mg    triamcinolone acetonide (KENALOG-10) injection 10 mg    triamcinolone acetonide (KENALOG-10) injection 10 mg    triamcinolone acetonide (KENALOG-10) injection 10 mg    triamcinolone acetonide (KENALOG-10) injection 17 mg    triamcinolone acetonide (KENALOG-10) injection 17 mg      Past Medical History:   Patient Active Problem List   Diagnosis    Porokeratosis    Squamous cell carcinoma    Neoplasm of uncertain behavior    Lichen planopilaris    Dermatitis    Cicatricial alopecia    Keratosis, inflamed seborrheic    History of skin cancer    Basal cell carcinoma of vertex scalp s/p mohs 6-5-15    Medication management    Actinic keratosis    Medication monitoring encounter    Dermatitis, seborrheic    Erosive pustular dermatosis    Tick bite, initial  encounter    Vulvar atrophy    Factor V Leiden mutation (H24)    Splenic artery aneurysm (H24)    Post concussion syndrome    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 on close of this encounter.

## 2023-12-19 NOTE — LETTER
12/19/2023       RE: Ester Barba  [address] [place] [address]     Dear Colleague,    Thank you for referring your patient, Ester Barba, to the [facility] DERMATOLOGY [facility] [place] at [facility]. Please see a copy of my visit note below.    [facility] Dermatology Note  Encounter Date: Dec 19, 2023  Office Visit     Dermatology Problem List:  1. 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 Bx 8/31/21 revealed lichenoid keratosis of the vertex scalp.  - Current tx: ILK10 injections, clobetasol propionate 0.05% shampoo alternating w/ zinc shampoo every other day, fluocinolone oil once weekly  - Previous tx: plaquenil w/ significant improvement (~15 yrs; discontinued 12/2019 with concerns for retinopathy by local doc in [facility]; now followed by Dr. Radford here without signs of Plaquenil toxicity 1/2020).  - LPPAI score: 3.83 on 3/28/2023, 1 on 5/2/23, 1.33 on 11/07/2023  2. 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 Skin infection, vertex scalp at prior site of MMS - resolved  - s/p doxycycline 100 mg BID and mupirocin ointment BID   4. History of NMSC  - BCC - front vertex scalp, s/p Mohs with purse string and granulation, 3/30/22  - BCC - forehead, s/p MMS 6/9/21  - SCCis - R vertex scalp, s/p MMS 3/2/21, bacterial cx obtained from site 3/23/21  - BCC - right nasal ala s/p Mohs 11/27/17  - Nodular BCC - vertex scalp s/p Mohs 6/5/15  - SCC - scalp, s/p Mohs 4/18/2014  - Hx of 1-2 other NMSC on scalp (BCCs)  5. AKs - bilateral temples, cheeks  6. Inflamed SKs    ____________________________________________    Assessment & Plan:    # Lichen planopilaris  Overall stable/improved on exam today. Less scale and erythema compared to prior visits. Will proceed with ILK injections since patient is experiencing symptoms of itching, burning and pain on lateral aspect  of R/L scalp.   - CHANGE use of clobetasol shampoo from 3X/week to once per week   - CHANGE use of fluocinolone oil from once weekly to twice weekly  - use zinc shampoo in the interim  -  hold on ILK today as is doing well overall today and skin needs a break from steroids   # SK, inflammed, forehead  - 3 lesions on forehead treated with cryotherapy as below    Procedures Performed:   - Cryotherapy procedure note, location(s): left forehead x3. After verbal consent and discussion of risks and benefits including, but not limited to, dyspigmentation/scar, blister, and pain, 3 lesion(s) was(were) treated with 1-2 mm freeze border for 1-2 cycles with liquid nitrogen. Post cryotherapy instructions were provided.    Follow-up: 2/6/2023 (appt scheduled)    Staff and Resident:     Patient seen and staffed with Dr. Charles Gonzalez MD  IM PGY3    Patient was seen and examined with the plastic surgery resident. I agree with the history, review of systems, physical examination, assessments and plan. The cryotherapy procedure was done together.    Barbara Soto MD  Professor and  Chair  Department of Dermatology  [facility]   ____________________________________________    CC: No chief complaint on file.    HPI:  Ms. Ester Barba is a(n) 78 year old female who presents today as a return patient for hair loss. She was last seen in derm clinic 11/7/2023 for LPP  and had ILK10 injections to R/L lateral scalp. She reports that the injections help with the itchiness/inflammation.     She has been using clobetasol shampoo every 2-3 days, but she notes that it is drying.     She has been using zinc shampoo once per week after using fluocinolone oil.    She stopped using DuoDerm to the site on the frontal scalp. She has been using Vaseline and gauze on her scalp instead.    She continues to have small papules on her forehead.     Patient is otherwise feeling well, without additional skin concerns.    Labs  Reviewed:  N/A    Physical Exam:  SKIN: Focused examination of scalp, face was performed.  - scalp lesion with fibrotic tissue, mild crusting, tenderness (see pictures in media tab)  - face- small scattered papules on forehead consistent with SK, some with erythema  - forehead with neutral veins on right and left sides, depressed veins mid forehead  - No other lesions of concern on areas examined.     Medications:  Current Outpatient Medications   Medication     acetaminophen (TYLENOL) 500 MG tablet     Biotin 10 MG TABS tablet     Calcium 1500 MG TABS     Cholecalciferol (VITAMIN D) 1000 UNIT capsule     clobetasol (TEMOVATE) 0.05 % external ointment     clobetasol propionate (CLOBEX) 0.05 % external shampoo     COMPOUNDED NON-CONTROLLED SUBSTANCE (CMPD RX) - PHARMACY TO MIX COMPOUNDED MEDICATION     desonide (DESOWEN) 0.05 % external cream     esomeprazole (NEXIUM) 20 MG packet     Fluocinolone Acetonide Scalp 0.01 % OIL oil     glucosamine-chondroitin 500-400 MG CAPS per capsule     hydrocortisone 2.5 % cream     ibuprofen (ADVIL,MOTRIN) 800 MG tablet     ketoconazole (NIZORAL) 2 % external cream     levalbuterol (XOPENEX HFA) 45 MCG/ACT inhaler     Loratadine (CLARITIN PO)     mupirocin (BACTROBAN) 2 % external ointment     NEW MED     tretinoin (RETIN-A) 0.025 % external cream     Current Facility-Administered Medications   Medication     hydrocortisone 2.5 % cream     lidocaine 1% with EPINEPHrine 1:100,000 injection 3 mL     triamcinolone acetonide (KENALOG-10) injection 10 mg     triamcinolone acetonide (KENALOG-10) injection 10 mg     triamcinolone acetonide (KENALOG-10) injection 10 mg     triamcinolone acetonide (KENALOG-10) injection 10 mg     triamcinolone acetonide (KENALOG-10) injection 17 mg     triamcinolone acetonide (KENALOG-10) injection 17 mg      Past Medical History:   Patient Active Problem List   Diagnosis     Porokeratosis     Squamous cell carcinoma     Neoplasm of uncertain behavior      Lichen planopilaris     Dermatitis     Cicatricial alopecia     Keratosis, inflamed seborrheic     History of skin cancer     Basal cell carcinoma of vertex scalp s/p mohs 6-5-15     Medication management     Actinic keratosis     Medication monitoring encounter     Dermatitis, seborrheic     Erosive pustular dermatosis     Tick bite, initial encounter     Vulvar atrophy     Factor V Leiden mutation (H24)     Splenic artery aneurysm (H24)     Post concussion syndrome     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 on close of this encounter.      Again, thank you for allowing me to participate in the care of your patient.      Sincerely,    Barbara Soto MD

## 2024-01-01 NOTE — PROGRESS NOTES
[facility] Dermatology Note  Encounter Date: Sep 19, 2023  Office Visit     Dermatology Problem List:  1. 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 Bx 8/31/21 revealed lichenoid keratosis of the vertex scalp.  - Current tx: ILK10 injections, clobetasol propionate 0.05% shampoo alternating w/ zinc shampoo every other day, fluocinolone oil once weekly  - Previous tx: plaquenil w/ significant improvement (~15 yrs; discontinued 12/2019 with concerns for retinopathy by local doc in [facility]; now followed by Dr. Radford here without signs of Plaquenil toxicity 1/2020).  - LPPAI score: 3.83 on 3/28/2023, 1 on 5/2/23, 1 today  2. 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 Skin infection, vertex scalp at prior site of MMS - resolved  - s/p doxycycline 100 mg BID and mupirocin ointment BID   4. History of NMSC  - BCC - front vertex scalp, s/p Mohs with purse string and granulation, 3/30/22  - BCC - forehead, s/p MMS 6/9/21  - SCCis - R vertex scalp, s/p MMS 3/2/21, bacterial cx obtained from site 3/23/21  - BCC - right nasal ala s/p Mohs 11/27/17  - Nodular BCC - vertex scalp s/p Mohs 6/5/15  - SCC - scalp, s/p Mohs 4/18/2014  - Hx of 1-2 other NMSC on scalp (BCCs)  5. AKs - bilateral temples, cheeks  6. Inflamed SKs    ____________________________________________    Assessment & Plan:   # Lichen planopilaris  Overall stable and improved  in some areas on exam today. Less scale and erythema compared to last visit.. Will hold ILK today.   - Continue fluocinolone oil up to 3x weekly.  - Continue alternating clobetasol 0.05% shampoo with zinc shampoo every other day      # Erosive pustular dermatosis, 5 cm x 2.5 cm crusted plaque. Continues to improve.  No open erosions or crusting today. Central area of tenderness near the vertex.   - Continue topical steroids and Duoderm-CGF bandage      # Seborrheic keratoses, right cheek, left  forearm  Discussed the natural history and benign nature of these lesions. Pt reports irritation and elects for cryotherapy.   - cryotherapy at last visit        Follow-up: 1 month in-person, or earlier for new or changing lesions    Follow-up: 2 months    Staff:   Barbara Soto MD  Professor and Chair  Department of Dermatology  [facility]      ____________________________________________    CC: Hair Loss (Patient is here for hair loss, hair loss is losing, areas of concern top)    HPI:  Ms. Ester Barba is a(n) 78 year old female who presents today as a return patient for LPP.- Last seen in-clinic on 5/2/23 and had repeat ILK  - Shedding or thinning, or both: shedding persists   - Using DuoDERM-CGF bandage, switching the bandage every 3 days on area of tender wound on vertex of scalp  - Spoke with ophthalmology who discouraged plaquenil use.    Patient is otherwise feeling well, without additional skin concerns.     Labs Reviewed:  N/A    Physical Exam:  Vitals: BP (!) 144/62   Pulse 65   SpO2 96%   SKIN:   - no diffuse scalp scale  - mild perifollicular erythema and scale along the periphery of the previous area of erosive pustular dermatosis  - .no folliculitis  - .area of ulceration is healing over nicely with no discharge  - FFA is stable with persistent frontal and temporal recession  - No other lesions of concern on areas examined.     Medications:  Current Outpatient Medications   Medication    acetaminophen (TYLENOL) 500 MG tablet    Biotin 10 MG TABS tablet    Calcium 1500 MG TABS    Cholecalciferol (VITAMIN D) 1000 UNIT capsule    clobetasol (TEMOVATE) 0.05 % external ointment    clobetasol propionate (CLOBEX) 0.05 % external shampoo    COMPOUNDED NON-CONTROLLED SUBSTANCE (CMPD RX) - PHARMACY TO MIX COMPOUNDED MEDICATION    desonide (DESOWEN) 0.05 % external cream    esomeprazole (NEXIUM) 20 MG packet    Fluocinolone Acetonide Scalp 0.01 % OIL oil    glucosamine-chondroitin 500-400 MG  CAPS per capsule    hydrocortisone 2.5 % cream    ibuprofen (ADVIL,MOTRIN) 800 MG tablet    ketoconazole (NIZORAL) 2 % external cream    levalbuterol (XOPENEX HFA) 45 MCG/ACT inhaler    Loratadine (CLARITIN PO)    mupirocin (BACTROBAN) 2 % external ointment    NEW MED    tretinoin (RETIN-A) 0.025 % external cream     Current Facility-Administered Medications   Medication    hydrocortisone 2.5 % cream    lidocaine 1% with EPINEPHrine 1:100,000 injection 3 mL    triamcinolone acetonide (KENALOG-10) injection 10 mg    triamcinolone acetonide (KENALOG-10) injection 10 mg    triamcinolone acetonide (KENALOG-10) injection 10 mg    triamcinolone acetonide (KENALOG-10) injection 10 mg    triamcinolone acetonide (KENALOG-10) injection 17 mg    triamcinolone acetonide (KENALOG-10) injection 17 mg      Past Medical History:   Patient Active Problem List   Diagnosis    Porokeratosis    Squamous cell carcinoma    Neoplasm of uncertain behavior    Lichen planopilaris    Dermatitis    Cicatricial alopecia    Keratosis, inflamed seborrheic    History of skin cancer    Basal cell carcinoma of vertex scalp s/p mohs 6-5-15    Medication management    Actinic keratosis    Medication monitoring encounter    Dermatitis, seborrheic    Erosive pustular dermatosis    Tick bite, initial encounter    Vulvar atrophy    Factor V Leiden mutation (H24)    Splenic artery aneurysm (H24)    Post concussion syndrome    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 on close of this encounter.

## 2024-01-03 DIAGNOSIS — L66.10 LICHEN PLANOPILARIS: ICD-10-CM

## 2024-01-03 DIAGNOSIS — L30.9 DERMATITIS: ICD-10-CM

## 2024-01-08 RX ORDER — CLOBETASOL PROPIONATE 0.05 G/100ML
SHAMPOO TOPICAL
Qty: 118 ML | Refills: 1 | Status: SHIPPED | OUTPATIENT
Start: 2024-01-08 | End: 2024-05-10

## 2024-01-08 NOTE — TELEPHONE ENCOUNTER
Last Clinic Visit: 12/19/2023  [facility] [place]    Process#3   follow-up 2/6/24

## 2024-01-15 DIAGNOSIS — R06.09 DYSPNEA ON EXERTION: Primary | ICD-10-CM

## 2024-01-23 ENCOUNTER — TELEPHONE (OUTPATIENT)
Dept: DERMATOLOGY | Facility: CLINIC | Age: 79
End: 2024-01-23
Payer: MEDICARE

## 2024-02-06 ENCOUNTER — OFFICE VISIT (OUTPATIENT)
Dept: DERMATOLOGY | Facility: CLINIC | Age: 79
End: 2024-02-06
Payer: MEDICARE

## 2024-02-06 VITALS — DIASTOLIC BLOOD PRESSURE: 77 MMHG | HEART RATE: 76 BPM | SYSTOLIC BLOOD PRESSURE: 129 MMHG

## 2024-02-06 DIAGNOSIS — L30.9 DERMATITIS: ICD-10-CM

## 2024-02-06 DIAGNOSIS — L98.8 EROSIVE PUSTULAR DERMATOSIS OF SCALP: ICD-10-CM

## 2024-02-06 DIAGNOSIS — L66.10 LICHEN PLANOPILARIS: Primary | ICD-10-CM

## 2024-02-06 PROCEDURE — 11901 INJECT SKIN LESIONS >7: CPT | Performed by: DERMATOLOGY

## 2024-02-06 ASSESSMENT — PAIN SCALES - GENERAL: PAINLEVEL: NO PAIN (0)

## 2024-02-06 NOTE — NURSING NOTE
Drug Administration Record    Prior to injection, verified patient identity using patient's name and date of birth.  Due to injection administration, patient instructed to remain in clinic for 15 minutes  afterwards, and to report any adverse reaction to me immediately.    Drug Name: triamcinolone acetonide(kenalog)  Dose: 2 mL  Route administered: ID  NDC #: 4285-2602-56  Amount of waste(mL): 3 mL  Reason for waste: Single use vial    LOT #: [id number]  SITE: See Chart  : [employer]  EXPIRATION DATE: 12/2025

## 2024-02-06 NOTE — LETTER
2/6/2024       RE: Ester Barba  [address] [place] [address]     Dear Colleague,    Thank you for referring your patient, Ester Barba, to the [facility] DERMATOLOGY CLINIC [place] at [facility]. Please see a copy of my visit note below.    [facility] Dermatology Note  Encounter Date: Feb 6, 2024  Office Visit     Dermatology Problem List:  1. 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 Bx 8/31/21 revealed lichenoid keratosis of the vertex scalp.  - Current tx: ILK10 injections, clobetasol propionate 0.05% shampoo alternating w/ zinc shampoo every other day, fluocinolone oil once weekly  - Previous tx: plaquenil w/ significant improvement (~15 yrs; discontinued 12/2019 with concerns for retinopathy by local doc in [facility]; now followed by Dr. Radford here without signs of Plaquenil toxicity 1/2020).  - LPPAI score: 3.83 on 3/28/2023, 1 on 5/2/23, 1.33 on 11/07/2023  2. 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 Skin infection, vertex scalp at prior site of MMS - resolved  - s/p doxycycline 100 mg BID and mupirocin ointment BID   4. History of NMSC  - BCC - front vertex scalp, s/p Mohs with purse string and granulation, 3/30/22  - BCC - forehead, s/p MMS 6/9/21  - SCCis - R vertex scalp, s/p MMS 3/2/21, bacterial cx obtained from site 3/23/21  - BCC - right nasal ala s/p Mohs 11/27/17  - Nodular BCC - vertex scalp s/p Mohs 6/5/15  - SCC - scalp, s/p Mohs 4/18/2014  - Hx of 1-2 other NMSC on scalp (BCCs)  5. AKs - bilateral temples, cheeks  6. Inflamed SKs     ____________________________________________     Assessment & Plan:     # Lichen planopilaris  Overall stable/improved on exam today. Less scale and erythema compared to prior visits. Will proceed with ILK injections since patient is experiencing symptoms of itching, burning and pain on lateral aspect  of R/L scalp.   - at last visit, CHANGED use of clobetasol shampoo from 3X/week to once per week   - at last visit, CHANGED use of fluocinolone oil from once weekly to twice weekly  - use zinc shampoo in the interim  -  held on ILK at last visit but scalp is symptomatic today so will proceed with ILK injections  # SK, inflammed, forehead - treated at last visit       ____________________________________________    Procedures Performed:   Kenalog intralesional injection procedure note: After verbal consent and discussion of risks including but not limited to atrophy, pain, and bruising, time out was performed, the patient underwent positioning, 2 total cc of Kenalog 10 mg/cc was injected into 20 sites on the scalp.  The patient tolerated the procedure well and left the Dermatology clinic in good condition.         Staff:     Barbara Soto MD  Professor and Chair  Department of Dermatology  [facility]    ____________________________________________    CC: Derm Problem (Patient reports increased thinning of the hair since their visit. The patient reports increased tenderness of the scalp. )    HPI:  Ms. Ester Barba is a(n) 78 year old female who presents today as a return patient for LPP. Today she notes her scalp is more tender.     Patient is otherwise feeling well, without additional skin concerns.     Labs Reviewed:  N/A    Physical Exam:  Vitals: /77 (BP Location: Right arm, Patient Position: Sitting)   Pulse 76   SKIN: Exam of scalp, face and hands completed  - mild perifollicular scale and erythema are noted along the area of erosive pustular dermatoses/treated skin cancer sites, as well as in the vertex region  - . Negative hair pull tests  - . Central area on mid scalp is smaller in size with some crust present  - No other lesions of concern on areas examined.     Medications:  Current Outpatient Medications   Medication     acetaminophen (TYLENOL) 500 MG tablet     Biotin 10 MG TABS  tablet     Calcium 1500 MG TABS     Cholecalciferol (VITAMIN D) 1000 UNIT capsule     clobetasol (TEMOVATE) 0.05 % external ointment     clobetasol propionate (CLOBEX) 0.05 % external shampoo     COMPOUNDED NON-CONTROLLED SUBSTANCE (CMPD RX) - PHARMACY TO MIX COMPOUNDED MEDICATION     desonide (DESOWEN) 0.05 % external cream     esomeprazole (NEXIUM) 20 MG packet     Fluocinolone Acetonide Scalp 0.01 % OIL oil     glucosamine-chondroitin 500-400 MG CAPS per capsule     hydrocortisone 2.5 % cream     ibuprofen (ADVIL,MOTRIN) 800 MG tablet     ketoconazole (NIZORAL) 2 % external cream     levalbuterol (XOPENEX HFA) 45 MCG/ACT inhaler     Loratadine (CLARITIN PO)     mupirocin (BACTROBAN) 2 % external ointment     NEW MED     tretinoin (RETIN-A) 0.025 % external cream     Current Facility-Administered Medications   Medication     hydrocortisone 2.5 % cream     lidocaine 1% with EPINEPHrine 1:100,000 injection 3 mL     triamcinolone acetonide (KENALOG-10) injection 10 mg     triamcinolone acetonide (KENALOG-10) injection 10 mg     triamcinolone acetonide (KENALOG-10) injection 10 mg     triamcinolone acetonide (KENALOG-10) injection 10 mg     triamcinolone acetonide (KENALOG-10) injection 17 mg     triamcinolone acetonide (KENALOG-10) injection 17 mg      Past Medical History:   Patient Active Problem List   Diagnosis     Porokeratosis     Squamous cell carcinoma     Neoplasm of uncertain behavior     Lichen planopilaris     Dermatitis     Cicatricial alopecia     Keratosis, inflamed seborrheic     History of skin cancer     Basal cell carcinoma of vertex scalp s/p mohs 6-5-15     Medication management     Actinic keratosis     Medication monitoring encounter     Dermatitis, seborrheic     Erosive pustular dermatosis     Tick bite, initial encounter     Vulvar atrophy     Factor V Leiden mutation (H24)     Splenic artery aneurysm (H24)     Post concussion syndrome     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 on close of this encounter.       Again, thank you for allowing me to participate in the care of your patient.      Sincerely,    Barbara Soto MD

## 2024-02-19 NOTE — PROGRESS NOTES
[facility] Dermatology Note  Encounter Date: Feb 6, 2024  Office Visit     Dermatology Problem List:  1. 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 Bx 8/31/21 revealed lichenoid keratosis of the vertex scalp.  - Current tx: ILK10 injections, clobetasol propionate 0.05% shampoo alternating w/ zinc shampoo every other day, fluocinolone oil once weekly  - Previous tx: plaquenil w/ significant improvement (~15 yrs; discontinued 12/2019 with concerns for retinopathy by local doc in [facility]; now followed by Dr. Radford here without signs of Plaquenil toxicity 1/2020).  - LPPAI score: 3.83 on 3/28/2023, 1 on 5/2/23, 1.33 on 11/07/2023  2. 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 Skin infection, vertex scalp at prior site of MMS - resolved  - s/p doxycycline 100 mg BID and mupirocin ointment BID   4. History of NMSC  - BCC - front vertex scalp, s/p Mohs with purse string and granulation, 3/30/22  - BCC - forehead, s/p MMS 6/9/21  - SCCis - R vertex scalp, s/p MMS 3/2/21, bacterial cx obtained from site 3/23/21  - BCC - right nasal ala s/p Mohs 11/27/17  - Nodular BCC - vertex scalp s/p Mohs 6/5/15  - SCC - scalp, s/p Mohs 4/18/2014  - Hx of 1-2 other NMSC on scalp (BCCs)  5. AKs - bilateral temples, cheeks  6. Inflamed SKs     ____________________________________________     Assessment & Plan:     # Lichen planopilaris  Overall stable/improved on exam today. Less scale and erythema compared to prior visits. Will proceed with ILK injections since patient is experiencing symptoms of itching, burning and pain on lateral aspect of R/L scalp.   - at last visit, CHANGED use of clobetasol shampoo from 3X/week to once per week   - at last visit, CHANGED use of fluocinolone oil from once weekly to twice weekly  - use zinc shampoo in the interim  -  held on ILK at last visit but scalp is symptomatic today so will proceed with ILK  injections  # SK, inflammed, forehead - treated at last visit       ____________________________________________    Procedures Performed:   Kenalog intralesional injection procedure note: After verbal consent and discussion of risks including but not limited to atrophy, pain, and bruising, time out was performed, the patient underwent positioning, 2 total cc of Kenalog 10 mg/cc was injected into 20 sites on the scalp.  The patient tolerated the procedure well and left the Dermatology clinic in good condition.         Staff:     Barbara Soto MD  Professor and Chair  Department of Dermatology  [facility]    ____________________________________________    CC: Derm Problem (Patient reports increased thinning of the hair since their visit. The patient reports increased tenderness of the scalp. )    HPI:  Ms. Ester Barba is a(n) 78 year old female who presents today as a return patient for LPP. Today she notes her scalp is more tender.     Patient is otherwise feeling well, without additional skin concerns.     Labs Reviewed:  N/A    Physical Exam:  Vitals: /77 (BP Location: Right arm, Patient Position: Sitting)   Pulse 76   SKIN: Exam of scalp, face and hands completed  - mild perifollicular scale and erythema are noted along the area of erosive pustular dermatoses/treated skin cancer sites, as well as in the vertex region  - . Negative hair pull tests  - . Central area on mid scalp is smaller in size with some crust present  - No other lesions of concern on areas examined.     Medications:  Current Outpatient Medications   Medication    acetaminophen (TYLENOL) 500 MG tablet    Biotin 10 MG TABS tablet    Calcium 1500 MG TABS    Cholecalciferol (VITAMIN D) 1000 UNIT capsule    clobetasol (TEMOVATE) 0.05 % external ointment    clobetasol propionate (CLOBEX) 0.05 % external shampoo    COMPOUNDED NON-CONTROLLED SUBSTANCE (CMPD RX) - PHARMACY TO MIX COMPOUNDED MEDICATION    desonide (DESOWEN) 0.05 %  external cream    esomeprazole (NEXIUM) 20 MG packet    Fluocinolone Acetonide Scalp 0.01 % OIL oil    glucosamine-chondroitin 500-400 MG CAPS per capsule    hydrocortisone 2.5 % cream    ibuprofen (ADVIL,MOTRIN) 800 MG tablet    ketoconazole (NIZORAL) 2 % external cream    levalbuterol (XOPENEX HFA) 45 MCG/ACT inhaler    Loratadine (CLARITIN PO)    mupirocin (BACTROBAN) 2 % external ointment    NEW MED    tretinoin (RETIN-A) 0.025 % external cream     Current Facility-Administered Medications   Medication    hydrocortisone 2.5 % cream    lidocaine 1% with EPINEPHrine 1:100,000 injection 3 mL    triamcinolone acetonide (KENALOG-10) injection 10 mg    triamcinolone acetonide (KENALOG-10) injection 10 mg    triamcinolone acetonide (KENALOG-10) injection 10 mg    triamcinolone acetonide (KENALOG-10) injection 10 mg    triamcinolone acetonide (KENALOG-10) injection 17 mg    triamcinolone acetonide (KENALOG-10) injection 17 mg      Past Medical History:   Patient Active Problem List   Diagnosis    Porokeratosis    Squamous cell carcinoma    Neoplasm of uncertain behavior    Lichen planopilaris    Dermatitis    Cicatricial alopecia    Keratosis, inflamed seborrheic    History of skin cancer    Basal cell carcinoma of vertex scalp s/p mohs 6-5-15    Medication management    Actinic keratosis    Medication monitoring encounter    Dermatitis, seborrheic    Erosive pustular dermatosis    Tick bite, initial encounter    Vulvar atrophy    Factor V Leiden mutation (H24)    Splenic artery aneurysm (H24)    Post concussion syndrome    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 on close of this  encounter.

## 2024-03-08 ENCOUNTER — MYC MEDICAL ADVICE (OUTPATIENT)
Dept: OBGYN | Facility: CLINIC | Age: 79
End: 2024-03-08
Payer: MEDICARE

## 2024-03-08 DIAGNOSIS — N39.3 SUI (STRESS URINARY INCONTINENCE, FEMALE): ICD-10-CM

## 2024-03-08 DIAGNOSIS — R35.0 URINARY FREQUENCY: ICD-10-CM

## 2024-03-08 DIAGNOSIS — R32 URINARY INCONTINENCE: Primary | ICD-10-CM

## 2024-03-18 DIAGNOSIS — L66.10 LICHEN PLANOPILARIS: ICD-10-CM

## 2024-03-25 RX ORDER — FLUOCINOLONE ACETONIDE 0.11 MG/ML
OIL TOPICAL
Qty: 118.28 ML | Refills: 1 | Status: SHIPPED | OUTPATIENT
Start: 2024-03-25 | End: 2024-09-24

## 2024-03-29 ENCOUNTER — OFFICE VISIT (OUTPATIENT)
Dept: DERMATOLOGY | Facility: CLINIC | Age: 79
End: 2024-03-29
Payer: MEDICARE

## 2024-03-29 VITALS — DIASTOLIC BLOOD PRESSURE: 54 MMHG | HEART RATE: 68 BPM | SYSTOLIC BLOOD PRESSURE: 122 MMHG | OXYGEN SATURATION: 97 %

## 2024-03-29 DIAGNOSIS — L98.8 EROSIVE PUSTULAR DERMATOSIS: ICD-10-CM

## 2024-03-29 DIAGNOSIS — L82.1 SEBORRHEIC KERATOSIS: ICD-10-CM

## 2024-03-29 DIAGNOSIS — L66.10 LICHEN PLANOPILARIS: Primary | ICD-10-CM

## 2024-03-29 PROCEDURE — 11901 INJECT SKIN LESIONS >7: CPT | Mod: GC | Performed by: DERMATOLOGY

## 2024-03-29 PROCEDURE — 99214 OFFICE O/P EST MOD 30 MIN: CPT | Mod: 25 | Performed by: DERMATOLOGY

## 2024-03-29 ASSESSMENT — PAIN SCALES - GENERAL: PAINLEVEL: MILD PAIN (3)

## 2024-03-29 NOTE — PROGRESS NOTES
Drug Administration Record    Prior to injection, verified patient identity using patient's name and date of birth.  Due to injection administration, patient instructed to remain in clinic for 15 minutes  afterwards, and to report any adverse reaction to me immediately.    Drug Name: triamcinolone acetonide(kenalog)  Dose: 2mL of triamcinolone 10mg/mL, 20mg dose  Route administered: ID  NDC #: 0747-5547-79  Amount of waste(mL):3mL  Reason for waste: Single use vial    LOT #: [id number]  SITE: see provider note  : [employer]  EXPIRATION DATE: DEC 2025

## 2024-03-29 NOTE — NURSING NOTE
Dermatology Rooming Note    Ester Barba's goals for this visit include:   Chief Complaint   Patient presents with    Derm Problem     ILK injections- HL about the same. Feeling some burning intermittently.      Kayce Vazquez, EMT

## 2024-03-29 NOTE — LETTER
3/29/2024       RE: Ester Barba  [address] [place] [address]     Dear Colleague,    Thank you for referring your patient, Ester Barba, to the [facility] DERMATOLOGY CLINIC [place] at [facility]. Please see a copy of my visit note below.    [facility] Dermatology Note  Encounter Date: Mar 29, 2024  Office Visit     Dermatology Problem List:  1. 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 Bx 8/31/21 revealed lichenoid keratosis of the vertex scalp.  - Current tx: ILK10 injections, clobetasol propionate 0.05% shampoo alternating w/ zinc shampoo every other day, fluocinolone oil once weekly,Duoderm to vertex plaque  - Previous tx: plaquenil w/ significant improvement (~15 yrs; discontinued 12/2019 with concerns for retinopathy by local doc in [facility]; now followed by Dr. Radford here without signs of Plaquenil toxicity 1/2020).  - LPPAI score: 3.83 on 3/28/2023, 1 on 5/2/23, 1.33 on 11/07/2023 1.33 3/29/24  2. 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 Skin infection, vertex scalp at prior site of MMS - resolved  - s/p doxycycline 100 mg BID and mupirocin ointment BID   4. History of NMSC  - BCC - front vertex scalp, s/p Mohs with purse string and granulation, 3/30/22  - BCC - forehead, s/p MMS 6/9/21  - SCCis - R vertex scalp, s/p MMS 3/2/21, bacterial cx obtained from site 3/23/21  - BCC - right nasal ala s/p Mohs 11/27/17  - Nodular BCC - vertex scalp s/p Mohs 6/5/15  - SCC - scalp, s/p Mohs 4/18/2014  - Hx of 1-2 other NMSC on scalp (BCCs)  5. AKs - bilateral temples, cheeks  6. Inflamed SKs  ____________________________________________    Assessment & Plan:     # Lichen planopilaris, stable clinically but worsening symptoms wise  # Erosive pustular dermatosis, 5 cm x 2.5 cm crusted plaque. Improved.  Overall stable/improved on exam today in terms of hair  loss and central plaque scalp appears improved and less crusted compared to prior. Her scalp has less scale and erythema compared to prior visits overall but there is some adherent scale on the front scalp as well as an increase in itching and soreness. Will proceed with ILK injections. Did a higher volume than usual (usually 1 mL, today 1.8).  - LPPAI score today: 1.33 (same as last visit)  - Continue clobetasol shampoo but increase to 2x/weekly  - Continue fluocinolone oil 1x weekly  - Fine to continue DuoDerm 2-3 days on 2-3 days off, Vaseline    # Seborrheic keratoses  Benign nature reviewed. None are particularly bothersome but some with an erythematous base. Several on the forehead, photographed today.    Procedures Performed:   - Intra-lesional triamcinolone procedure note. After verbal consent and review of risk of pain and skin thinning/atrophy, positioning and cleansing with isopropyl alcohol, 1.8 total mL of triamcinolone 10 mg/mL was injected into 18 lesion(s) on the scalp. The patient tolerated the procedure well and left the dermatology clinic in good condition.    Follow-up: as scheduled in May, sooner PRN.    Staff and Resident:     Katerine Singh MD  Dermatology Resident PGY4    Patient was seen and examined with the dermatology resident. I agree with the history, review of systems, physical examination, assessments and plan. ILK injections were done together.    Barbara Soto MD  Professor   Department of Dermatology  [facility]   ____________________________________________    CC: Derm Problem (ILK injections- HL about the same. Feeling some burning intermittently. )    HPI:  Ms. Ester Barba is a 78 year old female who presents as a return patient for follow-up of hair loss, diagnosed as LPP and EPD.   - Last seen in-clinic 6 weeks ago at which time she is unsure whether or not we did ILK (not clear in the note). Since this visit, her symptoms have worsened.  - Shedding or  thinning, or both: both  - Current tx: ILK, dermasmoothe oil, clobetasol shampoo  - If using Rogaine, 1 cannister lasts how long: N/A   - Scalp or hair care habits/products: shampooing about 3x weekly. Clobetasol shampoo 1x weekly otherwise using zinc shampoo. Dermasmoothe oil 3x weekly. Duoderm to the vertex 1 day on, 1 day off  No Any new medications, supplements, or products? (please list below)     Yes Scalp pain   Yes Scalp burning   Yes Scalp itching    No Eyebrow changes    No Eyelash changes   No Beard changes    No Other body hair changes    No Nail changes    No Additional symptoms? (please list below)     - Overall course: she feels like things are slightly worse/more symptomatic     Patient is otherwise feeling well, in usual state of health, and has no additional skin concerns today.       Labs:  CBC, CMP, vit D, inflammatory markers from 11/2023 unremarkable    Physical Exam:  Vitals: There were no vitals taken for this visit.  GEN: Well developed, well-nourished, in no acute distress, in a pleasant mood.    SKIN: Focused examination of scalp and face was performed.  - Very mild diffuse erythema and in some areas very mild perifollicular scale  - There is an area of adherent scale over the central frontal hairline  - No diffuse scaling of the scalp   - negative hair pull test   - normal eyelash density  - normal eyebrow density  - No scalp folliculitis/pustules   - Plaque on the central vertex scalp appears less inflamed and crusted compared to prior. Tiny small area of crusting on the L posterior aspect  - Waxy stuck on papule on the R postauricular scalp and similar lesions scattered across the forehead  - In comparison to prior photographs, less scaling, erythema and crusting on vertex of scalp and increased overall density.    Medications:  Current Outpatient Medications   Medication     acetaminophen (TYLENOL) 500 MG tablet     Biotin 10 MG TABS tablet     Calcium 1500 MG TABS     Cholecalciferol  (VITAMIN D) 1000 UNIT capsule     clobetasol (TEMOVATE) 0.05 % external ointment     clobetasol propionate (CLOBEX) 0.05 % external shampoo     COMPOUNDED NON-CONTROLLED SUBSTANCE (CMPD RX) - PHARMACY TO MIX COMPOUNDED MEDICATION     desonide (DESOWEN) 0.05 % external cream     esomeprazole (NEXIUM) 20 MG packet     Fluocinolone Acetonide Scalp 0.01 % OIL oil     glucosamine-chondroitin 500-400 MG CAPS per capsule     hydrocortisone 2.5 % cream     ibuprofen (ADVIL,MOTRIN) 800 MG tablet     ketoconazole (NIZORAL) 2 % external cream     levalbuterol (XOPENEX HFA) 45 MCG/ACT inhaler     Loratadine (CLARITIN PO)     mupirocin (BACTROBAN) 2 % external ointment     NEW MED     tretinoin (RETIN-A) 0.025 % external cream     Current Facility-Administered Medications   Medication     hydrocortisone 2.5 % cream     lidocaine 1% with EPINEPHrine 1:100,000 injection 3 mL     triamcinolone acetonide (KENALOG-10) injection 10 mg     triamcinolone acetonide (KENALOG-10) injection 10 mg     triamcinolone acetonide (KENALOG-10) injection 10 mg     triamcinolone acetonide (KENALOG-10) injection 10 mg     triamcinolone acetonide (KENALOG-10) injection 17 mg     triamcinolone acetonide (KENALOG-10) injection 17 mg      Past Medical History:   Patient Active Problem List   Diagnosis     Porokeratosis     Squamous cell carcinoma     Neoplasm of uncertain behavior     Lichen planopilaris     Dermatitis     Cicatricial alopecia     Keratosis, inflamed seborrheic     History of skin cancer     Basal cell carcinoma of vertex scalp s/p mohs 6-5-15     Medication management     Actinic keratosis     Medication monitoring encounter     Dermatitis, seborrheic     Erosive pustular dermatosis     Tick bite, initial encounter     Vulvar atrophy     Factor V Leiden mutation (H24)     Splenic artery aneurysm (H24)     Post concussion syndrome     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 on close of this encounter.      Drug Administration Record    Prior to injection, verified patient identity using patient's name and date of birth.  Due to injection administration, patient instructed to remain in clinic for 15 minutes  afterwards, and to report any adverse reaction to me immediately.    Drug Name: triamcinolone acetonide(kenalog)  Dose: 2mL of triamcinolone 10mg/mL, 20mg dose  Route administered: ID  NDC #: 5963-2844-44  Amount of waste(mL):3mL  Reason for waste: Single use vial    LOT #: [id number]  SITE: see provider note  : [employer]  EXPIRATION DATE: DEC 2025      Again, thank you for allowing me to participate in the care of your patient.      Sincerely,    Barbara Soto MD

## 2024-04-03 ENCOUNTER — TELEPHONE (OUTPATIENT)
Dept: DERMATOLOGY | Facility: CLINIC | Age: 79
End: 2024-04-03
Payer: MEDICARE

## 2024-04-03 NOTE — TELEPHONE ENCOUNTER
Scheduled patient for 7/12/24 at 9:30 am, left patient a voicemail with the details. Asked her to call me back if this didn't work    Pascale Dinh LPN

## 2024-04-08 ENCOUNTER — THERAPY VISIT (OUTPATIENT)
Dept: PHYSICAL THERAPY | Facility: CLINIC | Age: 79
End: 2024-04-08
Attending: OBSTETRICS & GYNECOLOGY
Payer: MEDICARE

## 2024-04-08 DIAGNOSIS — N39.3 SUI (STRESS URINARY INCONTINENCE, FEMALE): Primary | ICD-10-CM

## 2024-04-08 DIAGNOSIS — M62.89 PELVIC FLOOR DYSFUNCTION: ICD-10-CM

## 2024-04-08 PROCEDURE — 97140 MANUAL THERAPY 1/> REGIONS: CPT | Mod: GP | Performed by: PHYSICAL THERAPIST

## 2024-04-08 PROCEDURE — 97530 THERAPEUTIC ACTIVITIES: CPT | Mod: GP | Performed by: PHYSICAL THERAPIST

## 2024-04-08 PROCEDURE — 97162 PT EVAL MOD COMPLEX 30 MIN: CPT | Mod: GP | Performed by: PHYSICAL THERAPIST

## 2024-04-08 PROCEDURE — 97112 NEUROMUSCULAR REEDUCATION: CPT | Mod: GP | Performed by: PHYSICAL THERAPIST

## 2024-04-08 PROCEDURE — 97110 THERAPEUTIC EXERCISES: CPT | Mod: GP | Performed by: PHYSICAL THERAPIST

## 2024-04-08 NOTE — PROGRESS NOTES
PHYSICAL THERAPY EVALUATION  Type of Visit: Evaluation    See electronic medical record for Abuse and Falls Screening details.    Subjective       Presenting condition or subjective complaint: urgency ,incontinence; started noticing this over past 6th months. Does have sx of frequency/urgency which she previously saw PT for. Was not having sx until about 6 months ago. Incontinence is new.  Date of onset: 03/15/24 (date of referral)    Relevant medical history: Arthritis; Asthma; Bladder or bowel problems; Concussions; Incontinence; Kidney disease; Osteoarthritis; Pain at night or rest; Vision problems ; recently had L hip replacement recommended  Dates & types of surgery: sigmoidectomy, 2015    Prior diagnostic imaging/testing results:       Prior therapy history for the same diagnosis, illness or injury:    Yes, 2021    Prior Level of Function  Transfers: Independent  Ambulation: Independent  ADL: Independent  IADL: Independent    Living Environment  Social support: With a significant other or spouse   Type of home: House; Multi-level   Stairs to enter the home: No       Ramp: No   Stairs inside the home: Yes 3 Is there a railing: Yes   Help at home: Home and Yard maintenance tasks  Equipment owned:       Employment: No    Hobbies/Interests: gardening,boating,traceling,grandchildren    Patient goals for therapy: no incontinence    Pain assessment: Pain denied     Objective      PELVIC EVALUATION  ADDITIONAL HISTORY:  Sex assigned at birth:    Gender identity: Female    Pronouns:        Bladder History:  Feels bladder filling: No  Triggers for feeling of inability to wait to go to the bathroom: Yes cold, running water  How long can you wait to urinate:    Gets up at night to urinate: Yes 2  Can stop the flow of urine when urinating: Sometimes  Volume of urine usually released:     Other issues:    Number of bladder infections in last 12 months:    Fluid intake per day: 32oz water, 12oz caffeine    Medications taken  for bladder: No     Activities causing urine leak: Hurrying to the bathroom due to a strong urge to urinate (pee)    Amount of urine typically leaked: 4oz  Pads used to help with leaking: Yes        Bowel History:  Frequency of bowel movement: 2-3 times a day  Consistency of stool: Soft-formed    Ignores the urge to defecate: No  Other bowel issues:    Length of time spent trying to have a bowel movement:      Sexual Function History:  Sexual orientation: Straight    Sexually active: Yes  Lubrication used:      Pelvic pain: Initial penetration (rectal or vaginal); Deep penetration (rectal or vaginal)    Pain or difficulty with orgasms/erection/ejaculation:      State of menopause:    Hormone medications:        Are you currently pregnant: No, Number of previous pregnancies: 2, Number of deliveries: 2,   If you have delivered before, did you have any of these issues during delivery: Vaginal delivery,   Have you been diagnosed with pelvic prolapse or abdominal separation: No,   Have you tried pelvic floor strengthening exercises for 4 weeks: Yes,   Do you have any history of trauma that is relevant to your care that you d like to share: No    Discussed reason for referral regarding pelvic health needs and external/internal pelvic floor muscle examination with patient/guardian.  Opportunity provided to ask questions and verbal consent for assessment and intervention was given.    PAIN: Pt notes she does not have pelvic or vaginal pain, but does have hip and low back pain. 5-8/10 depending on activity (sometimes w/ transfer, stiffest first thing in AM)  POSTURE: Standing Posture: Increased trunk flex in standing  LUMBAR SCREEN: Lumbar AROM: Flex to ankles, Ext to upright, SB L to mid thigh ++R, R to mid thigh, Mod rotation B;    HIP SCREEN:  Strength:  Hip flex 5/5 B, Abd 4+/5 (overuse of TFL, difficult to perform glute med MMT due to lacking of hip ext)  Hip AROM: limited ER, IR, ext B; Limited knee flex R>L  (+) HEATHER  L>R, (+) FADIR L>R  Functional Strength Testing:  Independent but slow in transfers sit<>stand, sit<>supine, and bed mobility)    PELVIC/SI SCREEN:  ASIS leve, medial malleoli level, (-) Active SLR, (?) HEATHER (limited hip ROM L>R)     Palpation: Significant tenderness B TFL, glute med, and piriformis B; pain in low back/glute region when lying supine; better after MT to mobilize soft tissue over gltue/ SI region.  PAIN PROVOCATION TEST:   PELVIS/SI SPECIAL TESTS:   BREATHING SYMMETRY:  Difficulty expanding abdomen with deep breathing; unable to coordinate exhale and pelvic floor contraction when pelvic floor being assessed internally    PELVIC EXAM  External Visual Inspection:  At rest: Elevated perineal body  With voluntary pelvic floor contraction: Absent  Relaxation of PFM: Yes  With intra-abdominal pressure: Bearing down as defecation: Perineal descent    Integumentary:   Introitus: Discolored, Scar tissue/episiotomy     External Digital Palpation per Perineum:   Mild tenderness noted at perineum    Scar:   Location/Type: center and to L of perineal body  Mobility: WNL    Internal Digital Palpation:  Per Vagina:  Tenderness  Myofascial Resistance to Palpation: Soft, after cuing for relaxation  Digital Muscle Performance: P (Power): 3/5  E (Endurance): 6  R (Repetitions): 10  F (Fast Twitch): 10  Compensations: Abdominals, Adductors, Gluts, Breath holding  Relaxation Post-Contraction: Normal     Pelvic Organ Prolapse:   Posterior: At the level of the hymen    ABDOMINAL ASSESSMENT  Diastasis Rectus Abdominis (GODWIN):  Not tested    BIOFEEDBACK:  Position: Supine  Surface Electrodes: Perineal    Perianals:   BIOFEEDBACK (supine w/ knees over bolster):   Baseline  Consistently <1, so not recorded  Endurance Holds    Quick Flicks        Assessment & Plan   CLINICAL IMPRESSIONS  Medical Diagnosis: TRAVIS (stress urinary incontinence, female)    Treatment Diagnosis: TRAVIS (stress urinary incontinence, female); Pelvic floor  dysfunction, muscle weakess   Impression/Assessment: Patient is a 78 year old female with pelvic floor complaints.  The following significant findings have been identified: Pain, Decreased ROM/flexibility, Decreased strength, Impaired muscle performance, Decreased activity tolerance, and Impaired posture. These impairments interfere with their ability to perform self care tasks, recreational activities, and household chores as compared to previous level of function.     Clinical Decision Making (Complexity):  Clinical Presentation: Evolving/Changing  Clinical Presentation Rationale: based on medical and personal factors listed in PT evaluation  Clinical Decision Making (Complexity): Moderate complexity    PLAN OF CARE  Treatment Interventions:  Interventions: Manual Therapy, Neuromuscular Re-education, Therapeutic Activity, Therapeutic Exercise, Self-Care/Home Management    Long Term Goals     PT Goal 1  Goal Identifier: Incontinence  Goal Description: Pt will experience 1 epsiode of mild(drops) incontinence/day  Rationale: to maximize safety and independence with performance of ADLs and functional tasks;to maximize safety and independence with self cares (to decrease impact of incontinence on daily activities)  Target Date: 07/01/24      Frequency of Treatment: 1x/week  Duration of Treatment: x12 weeks    Recommended Referrals to Other Professionals:  none  Education Assessment:   Learner/Method: Patient;Listening;Reading;Demonstration;Pictures/Video;No Barriers to Learning    Risks and benefits of evaluation/treatment have been explained.   Patient/Family/caregiver agrees with Plan of Care.     Evaluation Time:     PT Eval, Moderate Complexity Minutes (38420): 33       Signing Clinician: Amanda Hilligoss, PT      [facility]                                                                                   OUTPATIENT PHYSICAL THERAPY      PLAN OF TREATMENT FOR OUTPATIENT REHABILITATION    Patient's Last Name, First Name, Ester Black YOB: 1945   Provider's Name   [facility]   Medical Record No.  [id number]     Onset Date: 03/15/24 (date of referral)  Start of Care Date: 04/08/24     Medical Diagnosis:  TRAVIS (stress urinary incontinence, female)      PT Treatment Diagnosis:  TRAVIS (stress urinary incontinence, female); Pelvic floor dysfunction, muscle weakess Plan of Treatment  Frequency/Duration: 1x/week/ x12 weeks    Certification date from 04/08/24 to 07/06/24         See note for plan of treatment details and functional goals     Amanda Hilligoss, PT                         I CERTIFY THE NEED FOR THESE SERVICES FURNISHED UNDER        THIS PLAN OF TREATMENT AND WHILE UNDER MY CARE     (Physician attestation of this document indicates review and certification of the therapy plan).              Referring Provider:  Janeth Mayfield    Initial Assessment  See Epic Evaluation- Start of Care Date: 04/08/24

## 2024-04-15 ENCOUNTER — THERAPY VISIT (OUTPATIENT)
Dept: PHYSICAL THERAPY | Facility: CLINIC | Age: 79
End: 2024-04-15
Payer: MEDICARE

## 2024-04-15 DIAGNOSIS — N39.3 SUI (STRESS URINARY INCONTINENCE, FEMALE): ICD-10-CM

## 2024-04-15 DIAGNOSIS — M62.89 PELVIC FLOOR DYSFUNCTION: Primary | ICD-10-CM

## 2024-04-15 PROCEDURE — 97140 MANUAL THERAPY 1/> REGIONS: CPT | Mod: GP | Performed by: PHYSICAL THERAPIST

## 2024-04-15 PROCEDURE — 97110 THERAPEUTIC EXERCISES: CPT | Mod: GP | Performed by: PHYSICAL THERAPIST

## 2024-04-15 PROCEDURE — 97530 THERAPEUTIC ACTIVITIES: CPT | Mod: GP | Performed by: PHYSICAL THERAPIST

## 2024-04-22 ENCOUNTER — THERAPY VISIT (OUTPATIENT)
Dept: PHYSICAL THERAPY | Facility: CLINIC | Age: 79
End: 2024-04-22
Attending: OBSTETRICS & GYNECOLOGY
Payer: MEDICARE

## 2024-04-22 DIAGNOSIS — M62.89 PELVIC FLOOR DYSFUNCTION: Primary | ICD-10-CM

## 2024-04-22 DIAGNOSIS — N39.3 SUI (STRESS URINARY INCONTINENCE, FEMALE): ICD-10-CM

## 2024-04-22 PROCEDURE — 97110 THERAPEUTIC EXERCISES: CPT | Mod: GP | Performed by: PHYSICAL THERAPIST

## 2024-04-22 PROCEDURE — 97140 MANUAL THERAPY 1/> REGIONS: CPT | Mod: GP | Performed by: PHYSICAL THERAPIST

## 2024-04-29 ENCOUNTER — THERAPY VISIT (OUTPATIENT)
Dept: PHYSICAL THERAPY | Facility: CLINIC | Age: 79
End: 2024-04-29
Payer: MEDICARE

## 2024-04-29 DIAGNOSIS — N39.3 SUI (STRESS URINARY INCONTINENCE, FEMALE): ICD-10-CM

## 2024-04-29 DIAGNOSIS — M62.89 PELVIC FLOOR DYSFUNCTION: Primary | ICD-10-CM

## 2024-04-29 PROCEDURE — 97112 NEUROMUSCULAR REEDUCATION: CPT | Mod: GP | Performed by: PHYSICAL THERAPIST

## 2024-04-29 PROCEDURE — 97110 THERAPEUTIC EXERCISES: CPT | Mod: GP | Performed by: PHYSICAL THERAPIST

## 2024-04-29 PROCEDURE — 97140 MANUAL THERAPY 1/> REGIONS: CPT | Mod: GP | Performed by: PHYSICAL THERAPIST

## 2024-04-30 DIAGNOSIS — Z79.899 LONG-TERM USE OF PLAQUENIL: Primary | ICD-10-CM

## 2024-05-01 DIAGNOSIS — L66.10 LICHEN PLANOPILARIS: ICD-10-CM

## 2024-05-01 DIAGNOSIS — L30.9 DERMATITIS: ICD-10-CM

## 2024-05-09 ENCOUNTER — THERAPY VISIT (OUTPATIENT)
Dept: PHYSICAL THERAPY | Facility: CLINIC | Age: 79
End: 2024-05-09
Payer: MEDICARE

## 2024-05-09 DIAGNOSIS — N39.3 SUI (STRESS URINARY INCONTINENCE, FEMALE): ICD-10-CM

## 2024-05-09 DIAGNOSIS — M62.89 PELVIC FLOOR DYSFUNCTION: Primary | ICD-10-CM

## 2024-05-09 PROCEDURE — 97112 NEUROMUSCULAR REEDUCATION: CPT | Mod: GP | Performed by: PHYSICAL THERAPIST

## 2024-05-09 PROCEDURE — 97530 THERAPEUTIC ACTIVITIES: CPT | Mod: GP | Performed by: PHYSICAL THERAPIST

## 2024-05-09 PROCEDURE — 97140 MANUAL THERAPY 1/> REGIONS: CPT | Mod: GP | Performed by: PHYSICAL THERAPIST

## 2024-05-10 ENCOUNTER — OFFICE VISIT (OUTPATIENT)
Dept: DERMATOLOGY | Facility: CLINIC | Age: 79
End: 2024-05-10
Payer: MEDICARE

## 2024-05-10 DIAGNOSIS — L30.9 DERMATITIS: ICD-10-CM

## 2024-05-10 DIAGNOSIS — L98.8 EROSIVE PUSTULAR DERMATOSIS: ICD-10-CM

## 2024-05-10 DIAGNOSIS — L66.10 LICHEN PLANOPILARIS: Primary | ICD-10-CM

## 2024-05-10 PROCEDURE — 99213 OFFICE O/P EST LOW 20 MIN: CPT | Mod: 25 | Performed by: DERMATOLOGY

## 2024-05-10 PROCEDURE — 11901 INJECT SKIN LESIONS >7: CPT | Performed by: DERMATOLOGY

## 2024-05-10 RX ORDER — CLOBETASOL PROPIONATE 0.05 G/100ML
SHAMPOO TOPICAL
Qty: 118 ML | Refills: 2 | Status: SHIPPED | OUTPATIENT
Start: 2024-05-10 | End: 2024-05-28

## 2024-05-10 ASSESSMENT — PAIN SCALES - GENERAL: PAINLEVEL: MODERATE PAIN (4)

## 2024-05-10 NOTE — NURSING NOTE
Dermatology Rooming Note    Ester Barba's goals for this visit include:   Chief Complaint   Patient presents with    Hair Loss     Ester is here for a follow-up and states it has remained the same since last seen.     Nathan Workman, EMT     Lovenox, Dulcolax (PRN),

## 2024-05-10 NOTE — LETTER
5/10/2024       RE: Ester Barba  [address] [place] [address]     Dear Colleague,    Thank you for referring your patient, Ester Barba, to the [facility] [place] at [facility]. Please see a copy of my visit note below.    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 Bx 8/31/21 revealed lichenoid keratosis of the vertex scalp.  - Current tx: ILK10 injections, clobetasol propionate 0.05% shampoo alternating w/ zinc shampoo every other day, fluocinolone oil once weekly,Duoderm to vertex plaque  - Previous tx: plaquenil w/ significant improvement (~15 yrs; discontinued 12/2019 with concerns for retinopathy by local doc in [facility]; now followed by Dr. Radford here without signs of Plaquenil toxicity 1/2020).  - LPPAI score: 3.83 on 3/28/2023, 1 on 5/2/23, 1.33 on 11/07/2023 1.33 3/29/24, 1 on 5/10/24  2. 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 Skin infection, vertex scalp at prior site of MMS - resolved  - s/p doxycycline 100 mg BID and mupirocin ointment BID   4. History of NMSC  - BCC - front vertex scalp, s/p Mohs with purse string and granulation, 3/30/22  - BCC - forehead, s/p MMS 6/9/21  - SCCis - R vertex scalp, s/p MMS 3/2/21, bacterial cx obtained from site 3/23/21  - BCC - right nasal ala s/p Mohs 11/27/17  - Nodular BCC - vertex scalp s/p Mohs 6/5/15  - SCC - scalp, s/p Mohs 4/18/2014  - Hx of 1-2 other NMSC on scalp (BCCs)  5. AKs - bilateral temples, cheeks  6. Inflamed SKs    ____________________________________________    Assessment & Plan:     # Lichen planopilaris, stable clinically   # Erosive pustular dermatosis,  crusted plaque. Improved.  Overall stable/improved on exam today in terms of hair loss and central plaque  scalp appears improved and less crusted compared to prior. Her scalp has less scale and erythema compared to prior visits overall but there is some adherent scale on the front scalp as well as an increase in itching and soreness. Will proceed with ILK injections. Did a higher volume than usual (usually 1 mL, today 1.8).  - LPPAI score today: 1.  - Continue clobetasol shampoo but increase to 2x/weekly  - Continue fluocinolone oil 1x weekly  - Continue DuoDerm 2-3 days on 2-3 days off, Vaseline     # Seborrheic keratoses  Benign nature reviewed. None are particularly bothersome but some with an erythematous base. Several on the forehead, photographed today.    Procedures Performed:     Kenalog intralesional injection procedure note: After verbal consent and discussion of risks including but not limited to atrophy, pain, and bruising, time out was performed, the patient underwent positioning, 2 total cc of Kenalog 10 mg/cc was injected into 20 sites on the scalp.  The patient tolerated the procedure well and left the Dermatology clinic in good condition.          Staff:     Barbara Soto MD  Professor   Department of Dermatology  [facility]    ____________________________________________    CC: Hair Loss (Ester is here for a follow-up and states it has remained the same since last seen.)    HPI:  Ms. Ester Barba is a(n) 78 year old female who presents today as a return patient for her LPP and FFA. The duoderm dressing was removed this morning. Scalp is tender but there is not burning or pruritus. Shampooed a couple of days ago. Other health issues include persistent knee and pain and more recently hip pain. Has had joint steroid injections, including a spinal block.   Patient is otherwise feeling well, without additional skin concerns.       Labs Reviewed:  N/A    Physical Exam:  Vitals: There were no vitals taken for this visit.  SKIN:   Perifollicular scalp scale and erythema that is mild along the  frontal hair line, scale and mild diffuse erythema in the vertex region  - . Negative hair pull tests  - . Open central area of crusting and erosion is improving in size   - - No other lesions of concern on areas examined.     Medications:  Current Outpatient Medications   Medication Sig Dispense Refill     acetaminophen (TYLENOL) 500 MG tablet Take 500-1,000 mg by mouth every 6 hours as needed for mild pain       Biotin 10 MG TABS tablet        Calcium 1500 MG TABS        Cholecalciferol (VITAMIN D) 1000 UNIT capsule Total 2200 units daily.       clobetasol (TEMOVATE) 0.05 % external ointment Apply twice daily every other day alternating with the mupirocin ointment to affected area on the scalp 60 g 1     clobetasol propionate (CLOBEX) 0.05 % external shampoo APPLY TO DRY SCALP EVERY OTHER DAY. LEAVE ON FOR 15MINS, THEN LATHER AND RINSE 118 mL 1     COMPOUNDED NON-CONTROLLED SUBSTANCE (CMPD RX) - PHARMACY TO MIX COMPOUNDED MEDICATION Place 1 Application. vaginally twice a week Compounded with estrogen 1 g 3     desonide (DESOWEN) 0.05 % external cream Mix half and half with ketoconazole cream and apply twice daily as needed 60 g 1     esomeprazole (NEXIUM) 20 MG packet Take 20 mg by mouth every morning (before breakfast) Take 30-60 minutes before eating.       Fluocinolone Acetonide Scalp 0.01 % OIL oil APPLY ONCE A WEEK TO SCALP FOR LICHEN PLANOPILARIS 118.28 mL 1     glucosamine-chondroitin 500-400 MG CAPS per capsule Take 1 capsule by mouth       hydrocortisone 2.5 % cream Use twice daily as needed on involved areas 30 g 4     ketoconazole (NIZORAL) 2 % external cream Mix half and half with desonide cream and apply twice a day as needed 60 g 1     levalbuterol (XOPENEX HFA) 45 MCG/ACT inhaler Inhale 2 puffs into the lungs every 4 hours as needed for shortness of breath or wheezing 15 g 4     Loratadine (CLARITIN PO) Take  by mouth.       mupirocin (BACTROBAN) 2 % external ointment Use 2 times a day every other  day alternating with clobetasol ointment to affected area. 30 g 3     NEW MED Biest 1.5mg/gram cream(pg free)  Insert 1 gram vaginally twice weekly as directed 40 g 1     tretinoin (RETIN-A) 0.025 % external cream APPLY TO AFFECTED AREA(S) A PEA SIZE AMOUNT TO FACE EVERY OTHER NIGHT AS TOLERATED 45 g 1     ibuprofen (ADVIL,MOTRIN) 800 MG tablet Take 800 mg by mouth every 8 hours as needed       Current Facility-Administered Medications   Medication Dose Route Frequency Provider Last Rate Last Admin     hydrocortisone 2.5 % cream   Topical BID Barbara Soto MD         lidocaine 1% with EPINEPHrine 1:100,000 injection 3 mL  3 mL Intradermal Once Barbara Soto MD         triamcinolone acetonide (KENALOG-10) injection 10 mg  10 mg Intra-Lesional Once Barbara Soto MD         triamcinolone acetonide (KENALOG-10) injection 10 mg  10 mg Intra-Lesional Once Barbara Soto MD         triamcinolone acetonide (KENALOG-10) injection 10 mg  10 mg Intra-Lesional Once Barbara Soto MD         triamcinolone acetonide (KENALOG-10) injection 10 mg  10 mg Intra-Lesional Once Barbara Soto MD         triamcinolone acetonide (KENALOG-10) injection 17 mg  17 mg Intra-Lesional Once Barbara Soto MD         triamcinolone acetonide (KENALOG-10) injection 17 mg  17 mg Intra-Lesional Once Barbara Soto MD         triamcinolone acetonide (KENALOG-10) injection 18 mg  18 mg Intra-Lesional Once Barbara Soto MD          Past Medical History:   Patient Active Problem List   Diagnosis     Porokeratosis     Squamous cell carcinoma     Neoplasm of uncertain behavior     Lichen planopilaris     Dermatitis     Cicatricial alopecia     Keratosis, inflamed seborrheic     History of skin cancer     Basal cell carcinoma of vertex scalp s/p mohs 6-5-15     Medication management     Actinic keratosis     Medication monitoring  encounter     Dermatitis, seborrheic     Erosive pustular dermatosis     Tick bite, initial encounter     Vulvar atrophy     Factor V Leiden mutation (H24)     Splenic artery aneurysm (H24)     Post concussion syndrome     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       Again, thank you for allowing me to participate in the care of your patient.      Sincerely,    Barbara Soto MD

## 2024-05-10 NOTE — TELEPHONE ENCOUNTER
clobetasol propionate (CLOBEX) 0.05 % external shampoo 118 mL 1 1/8/2024       Last Office Visit: 5/10/24  Future Office visit:   7/12/24    Refill protocol # 3 passed    Linda Kelly RN  P Red Flag Triage/MRT

## 2024-05-10 NOTE — NURSING NOTE
----- Message from Lexii Blanc sent at 11/2/Jesus HOLBROOK  [phone]; OK to leave message on voicemail  ---------------------------------------------------------------------------  --------------  SCRIPT ANSWERS  Relationship to Patient? Parent  Representative Name? Florentino Lowery - Mom  Patient is under 25 and the Parent has custody? Yes  Additional information verified (besides Name and Date of Birth)?  Phone   Number Drug Administration Record    Prior to injection, verified patient identity using patient's name and date of birth.  Due to injection administration, patient instructed to remain in clinic for 15 minutes  afterwards, and to report any adverse reaction to me immediately.    Drug Name: triamcinolone acetonide(kenalog)  Dose: 2mL of triamcinolone 10mg/mL, 20mg dose  Route administered: IM  NDC #: 7966-3699-46  Amount of waste(mL):3  Reason for waste: Multi dose vial    LOT #: [id number]  SITE: see provider note  : Oxynade  EXPIRATION DATE: [id number]

## 2024-05-10 NOTE — PROGRESS NOTES
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 Bx 8/31/21 revealed lichenoid keratosis of the vertex scalp.  - Current tx: ILK10 injections, clobetasol propionate 0.05% shampoo alternating w/ zinc shampoo every other day, fluocinolone oil once weekly,Duoderm to vertex plaque  - Previous tx: plaquenil w/ significant improvement (~15 yrs; discontinued 12/2019 with concerns for retinopathy by local doc in [facility]; now followed by Dr. Radford here without signs of Plaquenil toxicity 1/2020).  - LPPAI score: 3.83 on 3/28/2023, 1 on 5/2/23, 1.33 on 11/07/2023 1.33 3/29/24, 1 on 5/10/24  2. 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 Skin infection, vertex scalp at prior site of MMS - resolved  - s/p doxycycline 100 mg BID and mupirocin ointment BID   4. History of NMSC  - BCC - front vertex scalp, s/p Mohs with purse string and granulation, 3/30/22  - BCC - forehead, s/p MMS 6/9/21  - SCCis - R vertex scalp, s/p MMS 3/2/21, bacterial cx obtained from site 3/23/21  - BCC - right nasal ala s/p Mohs 11/27/17  - Nodular BCC - vertex scalp s/p Mohs 6/5/15  - SCC - scalp, s/p Mohs 4/18/2014  - Hx of 1-2 other NMSC on scalp (BCCs)  5. AKs - bilateral temples, cheeks  6. Inflamed SKs    ____________________________________________    Assessment & Plan:     # Lichen planopilaris, stable clinically   # Erosive pustular dermatosis,  crusted plaque. Improved.  Overall stable/improved on exam today in terms of hair loss and central plaque scalp appears improved and less crusted compared to prior. Her scalp has less scale and erythema compared to prior visits overall but there is some adherent scale on the front scalp as well as an increase in itching and soreness. Will proceed with ILK injections. Did a higher volume than usual (usually 1  mL, today 1.8).  - LPPAI score today: 1.  - Continue clobetasol shampoo but increase to 2x/weekly  - Continue fluocinolone oil 1x weekly  - Continue DuoDerm 2-3 days on 2-3 days off, Vaseline     # Seborrheic keratoses  Benign nature reviewed. None are particularly bothersome but some with an erythematous base. Several on the forehead, photographed today.    Procedures Performed:     Kenalog intralesional injection procedure note: After verbal consent and discussion of risks including but not limited to atrophy, pain, and bruising, time out was performed, the patient underwent positioning, 2 total cc of Kenalog 10 mg/cc was injected into 20 sites on the scalp.  The patient tolerated the procedure well and left the Dermatology clinic in good condition.          Staff:     Barbara Soto MD  Professor   Department of Dermatology  [facility]    ____________________________________________    CC: Hair Loss (Ester is here for a follow-up and states it has remained the same since last seen.)    HPI:  Ms. Ester Barba is a(n) 78 year old female who presents today as a return patient for her LPP and FFA. The duoderm dressing was removed this morning. Scalp is tender but there is not burning or pruritus. Shampooed a couple of days ago. Other health issues include persistent knee and pain and more recently hip pain. Has had joint steroid injections, including a spinal block.   Patient is otherwise feeling well, without additional skin concerns.       Labs Reviewed:  N/A    Physical Exam:  Vitals: There were no vitals taken for this visit.  SKIN:   Perifollicular scalp scale and erythema that is mild along the frontal hair line, scale and mild diffuse erythema in the vertex region  - . Negative hair pull tests  - . Open central area of crusting and erosion is improving in size   - - No other lesions of concern on areas examined.     Medications:  Current Outpatient Medications   Medication Sig Dispense Refill     acetaminophen (TYLENOL) 500 MG tablet Take 500-1,000 mg by mouth every 6 hours as needed for mild pain      Biotin 10 MG TABS tablet       Calcium 1500 MG TABS       Cholecalciferol (VITAMIN D) 1000 UNIT capsule Total 2200 units daily.      clobetasol (TEMOVATE) 0.05 % external ointment Apply twice daily every other day alternating with the mupirocin ointment to affected area on the scalp 60 g 1    clobetasol propionate (CLOBEX) 0.05 % external shampoo APPLY TO DRY SCALP EVERY OTHER DAY. LEAVE ON FOR 15MINS, THEN LATHER AND RINSE 118 mL 1    COMPOUNDED NON-CONTROLLED SUBSTANCE (CMPD RX) - PHARMACY TO MIX COMPOUNDED MEDICATION Place 1 Application. vaginally twice a week Compounded with estrogen 1 g 3    desonide (DESOWEN) 0.05 % external cream Mix half and half with ketoconazole cream and apply twice daily as needed 60 g 1    esomeprazole (NEXIUM) 20 MG packet Take 20 mg by mouth every morning (before breakfast) Take 30-60 minutes before eating.      Fluocinolone Acetonide Scalp 0.01 % OIL oil APPLY ONCE A WEEK TO SCALP FOR LICHEN PLANOPILARIS 118.28 mL 1    glucosamine-chondroitin 500-400 MG CAPS per capsule Take 1 capsule by mouth      hydrocortisone 2.5 % cream Use twice daily as needed on involved areas 30 g 4    ketoconazole (NIZORAL) 2 % external cream Mix half and half with desonide cream and apply twice a day as needed 60 g 1    levalbuterol (XOPENEX HFA) 45 MCG/ACT inhaler Inhale 2 puffs into the lungs every 4 hours as needed for shortness of breath or wheezing 15 g 4    Loratadine (CLARITIN PO) Take  by mouth.      mupirocin (BACTROBAN) 2 % external ointment Use 2 times a day every other day alternating with clobetasol ointment to affected area. 30 g 3    NEW MED Biest 1.5mg/gram cream(pg free)  Insert 1 gram vaginally twice weekly as directed 40 g 1    tretinoin (RETIN-A) 0.025 % external cream APPLY TO AFFECTED AREA(S) A PEA SIZE AMOUNT TO FACE EVERY OTHER NIGHT AS TOLERATED 45 g 1    ibuprofen  (ADVIL,MOTRIN) 800 MG tablet Take 800 mg by mouth every 8 hours as needed       Current Facility-Administered Medications   Medication Dose Route Frequency Provider Last Rate Last Admin    hydrocortisone 2.5 % cream   Topical BID Barbara Soto MD        lidocaine 1% with EPINEPHrine 1:100,000 injection 3 mL  3 mL Intradermal Once Barbara Soto MD        triamcinolone acetonide (KENALOG-10) injection 10 mg  10 mg Intra-Lesional Once Barbara Soto MD        triamcinolone acetonide (KENALOG-10) injection 10 mg  10 mg Intra-Lesional Once Barbara Soto MD        triamcinolone acetonide (KENALOG-10) injection 10 mg  10 mg Intra-Lesional Once Barbara Soto MD        triamcinolone acetonide (KENALOG-10) injection 10 mg  10 mg Intra-Lesional Once Barbara Soto MD        triamcinolone acetonide (KENALOG-10) injection 17 mg  17 mg Intra-Lesional Once Barbara Soto MD        triamcinolone acetonide (KENALOG-10) injection 17 mg  17 mg Intra-Lesional Once Barbara Soto MD        triamcinolone acetonide (KENALOG-10) injection 18 mg  18 mg Intra-Lesional Once Barbara Soto MD          Past Medical History:   Patient Active Problem List   Diagnosis    Porokeratosis    Squamous cell carcinoma    Neoplasm of uncertain behavior    Lichen planopilaris    Dermatitis    Cicatricial alopecia    Keratosis, inflamed seborrheic    History of skin cancer    Basal cell carcinoma of vertex scalp s/p mohs 6-5-15    Medication management    Actinic keratosis    Medication monitoring encounter    Dermatitis, seborrheic    Erosive pustular dermatosis    Tick bite, initial encounter    Vulvar atrophy    Factor V Leiden mutation (H24)    Splenic artery aneurysm (H24)    Post concussion syndrome    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

## 2024-05-13 ENCOUNTER — OFFICE VISIT (OUTPATIENT)
Dept: PULMONOLOGY | Facility: CLINIC | Age: 79
End: 2024-05-13
Attending: STUDENT IN AN ORGANIZED HEALTH CARE EDUCATION/TRAINING PROGRAM
Payer: MEDICARE

## 2024-05-13 VITALS — DIASTOLIC BLOOD PRESSURE: 73 MMHG | SYSTOLIC BLOOD PRESSURE: 143 MMHG | OXYGEN SATURATION: 96 % | HEART RATE: 68 BPM

## 2024-05-13 DIAGNOSIS — R06.09 OTHER FORM OF DYSPNEA: ICD-10-CM

## 2024-05-13 DIAGNOSIS — R06.09 DYSPNEA ON EXERTION: Primary | ICD-10-CM

## 2024-05-13 PROCEDURE — G0463 HOSPITAL OUTPT CLINIC VISIT: HCPCS | Performed by: STUDENT IN AN ORGANIZED HEALTH CARE EDUCATION/TRAINING PROGRAM

## 2024-05-13 PROCEDURE — 99213 OFFICE O/P EST LOW 20 MIN: CPT | Mod: GC | Performed by: STUDENT IN AN ORGANIZED HEALTH CARE EDUCATION/TRAINING PROGRAM

## 2024-05-13 RX ORDER — LEVALBUTEROL TARTRATE 45 UG/1
2 AEROSOL, METERED ORAL EVERY 4 HOURS PRN
Qty: 15 G | Refills: 4 | Status: SHIPPED | OUTPATIENT
Start: 2024-05-13

## 2024-05-13 NOTE — PATIENT INSTRUCTIONS
It was a pleasure to see you at the [facility]. You were seen for follow up of shortness of breath with exercise. You have been doing better.   -We have renewed your Xopenex prescription. Please take it with you to your trip, along with masks.   -Complete stress test. We will communicate with your PCP regarding this and results of test.  -We will communicate with you regarding follow up of stress test.  -We will see you back in Pulmonary Clinic in 6 months.

## 2024-05-13 NOTE — PROGRESS NOTES
[facility]    Pulmonary, Allergy, Critical Care and Sleep Medicine    Clinic Progress Visit  5//13/24    Ester Barba MRN# [id number]    YOB: 1945     Primary care provider: Winnie Sepulveda     Assessment and Recommendations:    Ester Barba is a 78 year old female with a history of arthritis and possible exercise induced asthma who presents for follow up of exertional dyspnea.     Exertional Dyspnea  First onset of symptoms about 20 years ago, given albuterol inhaler but has treated symptoms with rest rather than inhaler. Recently using Xopenex intermittently without obviously clear relief at first, but now has had much improvement of symptoms. She has also regained some function with PT and has had modified her activity due to recent known joint disease. From a cardiac symptom perspective, she has also been doing better, with less palpitations. Her dyspnea and RIOS has also now improvement to near resolution. Concern for cardiac involvement remains, for which she requires stress test still. Echo with bubble in August that negative for WMA or reduced EF, did have some LVH. Also suspect some element of deconditioning and having knee problems.  -Continue to use Xopenex as needed  -Complete Stress Test after returns from trip to [place], would like to try the exercise stress test      Follow up in clinic in  6 months     Seen and discussed with Attending Pulmonologist Dr. Sanders.    Germán L. Vélez Reyes, MD, PhD  Internal Medicine Resident     I personally spent a total of 25  minutes including chart review, patient care, and documentation on 5/13/24 excluding teaching time with the resident.     The patient was seen and personally examined by me with the pulmonary fellow/resident, I have personally reviewed the imaging studies, lab and culture results.  The note reflects our joint findings, assessment and plan on 5/13/24.     La Nena Sanders MD PhD  Pulmonary Critical Care      Respiratory symptoms improved from prior, possibly due to less ability to do strenuous exertion. Will plan to do stress test in future but patient prefers to do after trip. Thinks could do the exercise stress and wants to try before resorting to nuclear stress test. Will continue to use inhalers as needed.       Interval History:    Comes in today for interval follow up. Say that hat she has been feeling much better now. She has not been needing to use Xopenex. She has been paying better attention the the air quality and avoiding exposures as much as possible. She has also tried not to exert herself as much as she needs bilateral knees and left hip replacement, as well has been having some lower back and neck pain. She has been completing PT for this. She only went to one Pulmonary Rehab session.     She say that palpitations are resolved now. She denies chest pain or discomfort.    Did not complete chemical stress test as did not want to be exposed to radiation before her trip to [facility]. Will complete exercise stress test after her trip, in August/September.     She feels like during COVID she was not as active and that resulted in some deconditioning during that time.       Procedures  PFTs 7/2023: Personally reviewed, normal spirometry and diffusing capacity    Echo 8/2023: LVEF 55-60%, global RV function normal, normal bubble, PASP normal, no valvular abnormalities     Imaging  CT Chest 4/20/23: Personally reviewed, bilateral apical parenchymal scarring and mosaic attenuation, no coronary calcifications    CT Chest 8/15/23: Continued bi-apical scarring, no mosaic attenuation    Review of Systems:  Complete 12 point ROS negative unless mentioned in HPI    Medications, Allergies:    Medications:  Current Outpatient Medications   Medication Sig Dispense Refill     acetaminophen (TYLENOL) 500 MG tablet Take 500-1,000 mg by mouth every 6 hours as needed for mild pain       Biotin 10 MG TABS tablet        Calcium  1500 MG TABS        Cholecalciferol (VITAMIN D) 1000 UNIT capsule Total 2200 units daily.       clobetasol (TEMOVATE) 0.05 % external ointment Apply twice daily every other day alternating with the mupirocin ointment to affected area on the scalp 60 g 1     clobetasol propionate (CLOBEX) 0.05 % external shampoo APPLY TO TO DRY SCALP EVERY OTHER DAY, LEAVE ON FOR 15 MIN.,THEN LATHER AND RINSE. 118 mL 2     COMPOUNDED NON-CONTROLLED SUBSTANCE (CMPD RX) - PHARMACY TO MIX COMPOUNDED MEDICATION Place 1 Application. vaginally twice a week Compounded with estrogen 1 g 3     desonide (DESOWEN) 0.05 % external cream Mix half and half with ketoconazole cream and apply twice daily as needed 60 g 1     esomeprazole (NEXIUM) 20 MG packet Take 20 mg by mouth every morning (before breakfast) Take 30-60 minutes before eating.       Fluocinolone Acetonide Scalp 0.01 % OIL oil APPLY ONCE A WEEK TO SCALP FOR LICHEN PLANOPILARIS 118.28 mL 1     glucosamine-chondroitin 500-400 MG CAPS per capsule Take 1 capsule by mouth       hydrocortisone 2.5 % cream Use twice daily as needed on involved areas 30 g 4     ketoconazole (NIZORAL) 2 % external cream Mix half and half with desonide cream and apply twice a day as needed 60 g 1     levalbuterol (XOPENEX HFA) 45 MCG/ACT inhaler Inhale 2 puffs into the lungs every 4 hours as needed for shortness of breath or wheezing 15 g 4     Loratadine (CLARITIN PO) Take  by mouth.       mupirocin (BACTROBAN) 2 % external ointment Use 2 times a day every other day alternating with clobetasol ointment to affected area. 30 g 3     NEW MED Biest 1.5mg/gram cream(pg free)  Insert 1 gram vaginally twice weekly as directed 40 g 1     tretinoin (RETIN-A) 0.025 % external cream APPLY TO AFFECTED AREA(S) A PEA SIZE AMOUNT TO FACE EVERY OTHER NIGHT AS TOLERATED 45 g 1     ibuprofen (ADVIL,MOTRIN) 800 MG tablet Take 800 mg by mouth every 8 hours as needed       Current Facility-Administered Medications   Medication  Dose Route Frequency Provider Last Rate Last Admin     hydrocortisone 2.5 % cream   Topical BID Barbara Soto MD         lidocaine 1% with EPINEPHrine 1:100,000 injection 3 mL  3 mL Intradermal Once Barbara Soto MD         triamcinolone acetonide (KENALOG-10) injection 10 mg  10 mg Intra-Lesional Once Barbara Soto MD         triamcinolone acetonide (KENALOG-10) injection 10 mg  10 mg Intra-Lesional Once Barbara Soto MD         triamcinolone acetonide (KENALOG-10) injection 10 mg  10 mg Intra-Lesional Once Barbara Soto MD         triamcinolone acetonide (KENALOG-10) injection 10 mg  10 mg Intra-Lesional Once Barbara Soto MD         triamcinolone acetonide (KENALOG-10) injection 17 mg  17 mg Intra-Lesional Once Barbara Soto MD         triamcinolone acetonide (KENALOG-10) injection 17 mg  17 mg Intra-Lesional Once Barbara Soto MD         triamcinolone acetonide (KENALOG-10) injection 18 mg  18 mg Intra-Lesional Once Barbara Soto MD         Current Facility-Administered Medications   Medication Dose Route Frequency Provider Last Rate Last Admin     hydrocortisone 2.5 % cream   Topical BID Barbara Soto MD         lidocaine 1% with EPINEPHrine 1:100,000 injection 3 mL  3 mL Intradermal Once Barbara Soto MD         triamcinolone acetonide (KENALOG-10) injection 10 mg  10 mg Intra-Lesional Once Barbara Soto MD         triamcinolone acetonide (KENALOG-10) injection 10 mg  10 mg Intra-Lesional Once Barbara Soto MD         triamcinolone acetonide (KENALOG-10) injection 10 mg  10 mg Intra-Lesional Once Barbara Soto MD         triamcinolone acetonide (KENALOG-10) injection 10 mg  10 mg Intra-Lesional Once Barbara Soto MD         triamcinolone acetonide (KENALOG-10) injection 17 mg  17 mg  Intra-Lesional Once Barbara Soto MD         triamcinolone acetonide (KENALOG-10) injection 17 mg  17 mg Intra-Lesional Once Barbara Soto MD         triamcinolone acetonide (KENALOG-10) injection 18 mg  18 mg Intra-Lesional Once Barbara Soto MD         Allergies:  Allergies   Allergen Reactions     Dust Mites Other (See Comments)     Sneezing, coughing. asthma  Itchy watery eyes, sneezing     Estrogens Itching     Other reaction(s): Hypersensitivity  Topical caused burning sensation of skin  Topical caused burning sensation of skin; Premarin cream only - possibly only an bi-ingredient     Imiquimod Other (See Comments)     Hair loss     Levaquin [Levofloxacin Hemihydrate] Other (See Comments)     Muscle weakness     Levofloxacin Other (See Comments)     MUSCLE WEAKNESS, ARTHRITIS LIKE SYMPTOMS.       Mold Other (See Comments)     Sneezing, asthma, weezing     Pollen Extract/Tree Extract Other (See Comments)     Sneezing, weezing     Sulfa Antibiotics Hives     Sulfamethoxazole-Trimethoprim Hives     Latex Rash     LATEX BANDAGES; tapes adhesive     Penicillin G Rash     Penicillins Rash     Physical Exam:    BP (!) 143/73   Pulse 68   SpO2 96%     General: Sitting up in NAD  HEENT: anicteric, moist mucosa  Neck: no palpable lymphadenopathy, atraumatic   Chest: CTAB, no wheezing, rhonchi, or crackles  Cardiac: RRR no murmurs, radial pulses intact  Abdomen: Soft, non tender, active BS  Extremities: No LE Edema  Neuro: A&Ox3, no focal deficits   Skin: no rash noted on limited exam  Psych: Appropriate  Laboratory, imaging, and microbiologic data:    All laboratory and imaging data reviewed, pertinent results discussed above.

## 2024-05-13 NOTE — LETTER
5/13/2024         RE: Ester Barba  [address] [address]        Dear Colleague,    Thank you for referring your patient, Ester Barba, to the [facility] FOR [facility] AND [facility]. Please see a copy of my visit note below.    [facility]    Pulmonary, Allergy, Critical Care and Sleep Medicine    Clinic Progress Visit  5//13/24    Ester Barba MRN# [id number]    YOB: 1945     Primary care provider: Winnie Sepulveda     Assessment and Recommendations:    Ester Barba is a 78 year old female with a history of arthritis and possible exercise induced asthma who presents for follow up of exertional dyspnea.     Exertional Dyspnea  First onset of symptoms about 20 years ago, given albuterol inhaler but has treated symptoms with rest rather than inhaler. Recently using Xopenex intermittently without obviously clear relief at first, but now has had much improvement of symptoms. She has also regained some function with PT and has had modified her activity due to recent known joint disease. From a cardiac symptom perspective, she has also been doing better, with less palpitations. Her dyspnea and RIOS has also now improvement to near resolution. Concern for cardiac involvement remains, for which she requires stress test still. Echo with bubble in August that negative for WMA or reduced EF, did have some LVH. Also suspect some element of deconditioning and having knee problems.  -Continue to use Xopenex as needed  -Complete Stress Test after returns from trip to [place], would like to try the exercise stress test      Follow up in clinic in  6 months     Seen and discussed with Attending Pulmonologist Dr. Sanders.    Germán L. Vélez Reyes, MD, PhD  Internal Medicine Resident     I personally spent a total of 25  minutes including chart review, patient care, and documentation on 5/13/24 excluding teaching time with the resident.     The  patient was seen and personally examined by me with the pulmonary fellow/resident, I have personally reviewed the imaging studies, lab and culture results.  The note reflects our joint findings, assessment and plan on 5/13/24.     La Nena Sanders MD PhD  Pulmonary Critical Care     Respiratory symptoms improved from prior, possibly due to less ability to do strenuous exertion. Will plan to do stress test in future but patient prefers to do after trip. Thinks could do the exercise stress and wants to try before resorting to nuclear stress test. Will continue to use inhalers as needed.       Interval History:    Comes in today for interval follow up. Say that hat she has been feeling much better now. She has not been needing to use Xopenex. She has been paying better attention the the air quality and avoiding exposures as much as possible. She has also tried not to exert herself as much as she needs bilateral knees and left hip replacement, as well has been having some lower back and neck pain. She has been completing PT for this. She only went to one Pulmonary Rehab session.     She say that palpitations are resolved now. She denies chest pain or discomfort.    Did not complete chemical stress test as did not want to be exposed to radiation before her trip to [facility]. Will complete exercise stress test after her trip, in August/September.     She feels like during COVID she was not as active and that resulted in some deconditioning during that time.       Procedures  PFTs 7/2023: Personally reviewed, normal spirometry and diffusing capacity    Echo 8/2023: LVEF 55-60%, global RV function normal, normal bubble, PASP normal, no valvular abnormalities     Imaging  CT Chest 4/20/23: Personally reviewed, bilateral apical parenchymal scarring and mosaic attenuation, no coronary calcifications    CT Chest 8/15/23: Continued bi-apical scarring, no mosaic attenuation    Review of Systems:  Complete 12 point ROS negative  unless mentioned in HPI    Medications, Allergies:    Medications:  Current Outpatient Medications   Medication Sig Dispense Refill     acetaminophen (TYLENOL) 500 MG tablet Take 500-1,000 mg by mouth every 6 hours as needed for mild pain       Biotin 10 MG TABS tablet        Calcium 1500 MG TABS        Cholecalciferol (VITAMIN D) 1000 UNIT capsule Total 2200 units daily.       clobetasol (TEMOVATE) 0.05 % external ointment Apply twice daily every other day alternating with the mupirocin ointment to affected area on the scalp 60 g 1     clobetasol propionate (CLOBEX) 0.05 % external shampoo APPLY TO TO DRY SCALP EVERY OTHER DAY, LEAVE ON FOR 15 MIN.,THEN LATHER AND RINSE. 118 mL 2     COMPOUNDED NON-CONTROLLED SUBSTANCE (CMPD RX) - PHARMACY TO MIX COMPOUNDED MEDICATION Place 1 Application. vaginally twice a week Compounded with estrogen 1 g 3     desonide (DESOWEN) 0.05 % external cream Mix half and half with ketoconazole cream and apply twice daily as needed 60 g 1     esomeprazole (NEXIUM) 20 MG packet Take 20 mg by mouth every morning (before breakfast) Take 30-60 minutes before eating.       Fluocinolone Acetonide Scalp 0.01 % OIL oil APPLY ONCE A WEEK TO SCALP FOR LICHEN PLANOPILARIS 118.28 mL 1     glucosamine-chondroitin 500-400 MG CAPS per capsule Take 1 capsule by mouth       hydrocortisone 2.5 % cream Use twice daily as needed on involved areas 30 g 4     ketoconazole (NIZORAL) 2 % external cream Mix half and half with desonide cream and apply twice a day as needed 60 g 1     levalbuterol (XOPENEX HFA) 45 MCG/ACT inhaler Inhale 2 puffs into the lungs every 4 hours as needed for shortness of breath or wheezing 15 g 4     Loratadine (CLARITIN PO) Take  by mouth.       mupirocin (BACTROBAN) 2 % external ointment Use 2 times a day every other day alternating with clobetasol ointment to affected area. 30 g 3     NEW MED Biest 1.5mg/gram cream(pg free)  Insert 1 gram vaginally twice weekly as directed 40 g 1      tretinoin (RETIN-A) 0.025 % external cream APPLY TO AFFECTED AREA(S) A PEA SIZE AMOUNT TO FACE EVERY OTHER NIGHT AS TOLERATED 45 g 1     ibuprofen (ADVIL,MOTRIN) 800 MG tablet Take 800 mg by mouth every 8 hours as needed       Current Facility-Administered Medications   Medication Dose Route Frequency Provider Last Rate Last Admin     hydrocortisone 2.5 % cream   Topical BID Barbara Soto MD         lidocaine 1% with EPINEPHrine 1:100,000 injection 3 mL  3 mL Intradermal Once Barbara Soto MD         triamcinolone acetonide (KENALOG-10) injection 10 mg  10 mg Intra-Lesional Once Barbara Soto MD         triamcinolone acetonide (KENALOG-10) injection 10 mg  10 mg Intra-Lesional Once Barbara Soto MD         triamcinolone acetonide (KENALOG-10) injection 10 mg  10 mg Intra-Lesional Once Barbara Stoo MD         triamcinolone acetonide (KENALOG-10) injection 10 mg  10 mg Intra-Lesional Once Barbara Soto MD         triamcinolone acetonide (KENALOG-10) injection 17 mg  17 mg Intra-Lesional Once Barbara Soto MD         triamcinolone acetonide (KENALOG-10) injection 17 mg  17 mg Intra-Lesional Once Barbara Soto MD         triamcinolone acetonide (KENALOG-10) injection 18 mg  18 mg Intra-Lesional Once Barbara Soto MD         Current Facility-Administered Medications   Medication Dose Route Frequency Provider Last Rate Last Admin     hydrocortisone 2.5 % cream   Topical BID Barbara Soto MD         lidocaine 1% with EPINEPHrine 1:100,000 injection 3 mL  3 mL Intradermal Once Barbara Soto MD         triamcinolone acetonide (KENALOG-10) injection 10 mg  10 mg Intra-Lesional Once Barbara Soto MD         triamcinolone acetonide (KENALOG-10) injection 10 mg  10 mg Intra-Lesional Once Barbara Soto MD         triamcinolone acetonide  (KENALOG-10) injection 10 mg  10 mg Intra-Lesional Once Barbara Soto MD         triamcinolone acetonide (KENALOG-10) injection 10 mg  10 mg Intra-Lesional Once Barbara Soto MD         triamcinolone acetonide (KENALOG-10) injection 17 mg  17 mg Intra-Lesional Once Barbara Soto MD         triamcinolone acetonide (KENALOG-10) injection 17 mg  17 mg Intra-Lesional Once Barbara Soto MD         triamcinolone acetonide (KENALOG-10) injection 18 mg  18 mg Intra-Lesional Once Barbara Soto MD         Allergies:  Allergies   Allergen Reactions     Dust Mites Other (See Comments)     Sneezing, coughing. asthma  Itchy watery eyes, sneezing     Estrogens Itching     Other reaction(s): Hypersensitivity  Topical caused burning sensation of skin  Topical caused burning sensation of skin; Premarin cream only - possibly only an bi-ingredient     Imiquimod Other (See Comments)     Hair loss     Levaquin [Levofloxacin Hemihydrate] Other (See Comments)     Muscle weakness     Levofloxacin Other (See Comments)     MUSCLE WEAKNESS, ARTHRITIS LIKE SYMPTOMS.       Mold Other (See Comments)     Sneezing, asthma, weezing     Pollen Extract/Tree Extract Other (See Comments)     Sneezing, weezing     Sulfa Antibiotics Hives     Sulfamethoxazole-Trimethoprim Hives     Latex Rash     LATEX BANDAGES; tapes adhesive     Penicillin G Rash     Penicillins Rash     Physical Exam:    BP (!) 143/73   Pulse 68   SpO2 96%     General: Sitting up in NAD  HEENT: anicteric, moist mucosa  Neck: no palpable lymphadenopathy, atraumatic   Chest: CTAB, no wheezing, rhonchi, or crackles  Cardiac: RRR no murmurs, radial pulses intact  Abdomen: Soft, non tender, active BS  Extremities: No LE Edema  Neuro: A&Ox3, no focal deficits   Skin: no rash noted on limited exam  Psych: Appropriate  Laboratory, imaging, and microbiologic data:    All laboratory and imaging data reviewed, pertinent  results discussed above.      Again, thank you for allowing me to participate in the care of your patient.        Sincerely,        La Nena Sanders MD

## 2024-05-15 ENCOUNTER — THERAPY VISIT (OUTPATIENT)
Dept: PHYSICAL THERAPY | Facility: CLINIC | Age: 79
End: 2024-05-15
Payer: MEDICARE

## 2024-05-15 DIAGNOSIS — N39.3 SUI (STRESS URINARY INCONTINENCE, FEMALE): ICD-10-CM

## 2024-05-15 DIAGNOSIS — M62.89 PELVIC FLOOR DYSFUNCTION: Primary | ICD-10-CM

## 2024-05-15 PROCEDURE — 97110 THERAPEUTIC EXERCISES: CPT | Mod: GP | Performed by: PHYSICAL THERAPIST

## 2024-05-15 PROCEDURE — 97112 NEUROMUSCULAR REEDUCATION: CPT | Mod: GP | Performed by: PHYSICAL THERAPIST

## 2024-05-15 PROCEDURE — 97140 MANUAL THERAPY 1/> REGIONS: CPT | Mod: GP | Performed by: PHYSICAL THERAPIST

## 2024-05-15 NOTE — PROGRESS NOTES
DISCHARGE  Reason for Discharge: Patient has met all goals.    Equipment Issued: none    Discharge Plan: Patient to continue home program.    Referring Provider:  Janeth Mayfield         05/15/24 0500   Appointment Info   Signing clinician's name / credentials Amanda Hilligoss, DPT   Total/Authorized Visits 12 (E&T)   Visits Used 6   Medical Diagnosis TRAVIS (stress urinary incontinence, female)   PT Tx Diagnosis TRAVIS (stress urinary incontinence, female); Pelvic floor dysfunction, muscle weakess   Quick Adds Certification   Progress Note/Certification   Start of Care Date 04/08/24   Onset of illness/injury or Date of Surgery 03/15/24  (date of referral)   Therapy Frequency 1x/week   Predicted Duration x12 weeks   Certification date from 04/08/24   Certification date to 07/06/24   Progress Note Due Date 06/06/24   Progress Note Completed Date 04/08/24   PT Goal 1   Goal Identifier Incontinence   Goal Description Pt will experience 1 epsiode of mild(drops) incontinence/day   Rationale to maximize safety and independence with performance of ADLs and functional tasks;to maximize safety and independence with self cares  (to decrease impact of incontinence on daily activities)   Goal Progress no episodes of incontinence over past week   Target Date 07/01/24   Date Met 05/15/24   Subjective Report   Subjective Report Pt notes that she has not had any episodes of incontinence over past week. Feels urgency and frequency are more manageable, still sometimes limiting water if she has appointments. Otherwise better at increasing water intake. Will be out of the country [place] 3 [place], so will trial independent management of sx during that time. Is also dealing more w/ low back issues. Had MRI 5/10/24, so may discuss additional PT for low back with her MD after her trip.   Objective Measures   Objective Measures Objective Measure 1;Objective Measure 2;Objective Measure 3;Objective Measure 4   Objective Measure 1   Objective  Measure Tenderness   Details B glute med, piriformis, TFL   Objective Measure 2   Objective Measure Diaphragmatic breathing   Details DIfficulty coordinating full inhale w/ abdominal expansion and TA activation and pelvic floor w/ exhale (pushes stomach out vs abdominal expansion w/ deep breath)   Objective Measure 3   Objective Measure Pelvic floor   Details declines internal reassessment as sx are improving and she had muscle awareness on eval   Objective Measure 4   Objective Measure PFDI   Details REBECCA Subscore: 8.33 (improved from initial score of 50)   Treatment Interventions (PT)   Interventions Therapeutic Procedure/Exercise;Therapeutic Activity;Neuromuscular Re-education;Manual Therapy   Therapeutic Procedure/Exercise   Therapeutic Procedures: strength, endurance, ROM, flexibility minutes (32210) 10   Ther Proc 1 Lumbar rotation in hooklying   Ther Proc 1 - Details through painfree ROM x 5 B alt; attempted w/ abdominal activation and had more pain w/ this so discontinued   Ther Proc 2 Pelvic tilt   Ther Proc 2 - Details x10 from hooklying through tolerable ROM, core stabilization w/o increasing pressure on pelvic floor   Ther Proc 3 Quick Flicks   Ther Proc 3 - Details reviewed for HEP, perform daily   Ther Proc 4 Reviewd and updated PTRx with transition to independent HEP   Skilled Intervention verbal cuing for motion   Patient Response/Progress pain w/ posterior pelvic tilt initially, improves after   Neuromuscular Re-education   Neuromuscular re-ed of mvmt, balance, coord, kinesthetic sense, posture, proprioception minutes (34229) 10   Neuro Re-ed 1 Relaxation position in sitting   Neuro Re-ed 1 - Details reviewed for HEP   Neuro Re-ed 2 Diaphragm breathing   Neuro Re-ed 2 - Details in SL w/ hand on abdomen x 6 min   Neuro Re-ed 3 reviewd relaxation focus for next few weeks   Skilled Intervention edu + manual and visual cuing   Patient Response/Progress feels more supported w/ lumbar roll   Manual Therapy    Manual Therapy: Mobilization, MFR, MLD, friction massage minutes (02439) 20   Manual Therapy 1 STM for low back, working on decreasing muscle guarding to decrease tension through pelvic girdle and decrease urgency sx   Manual Therapy 1 - Details fascial pull R iliac crest into QL region in L SL x 15 min; fascial pull R lateral trunk into ribs for rib mobility and improved diaphragm excursion x 5 min   Skilled Intervention adjustment of technique, intensity, and location based on patient tolerance and tissue response   Patient Response/Progress decreased pain and tension low back and pelvic girdle after MT   Education   Learner/Method Patient;Listening;Reading;Demonstration;Pictures/Video;No Barriers to Learning   Plan   Home program Pelvic floor awareness in sitting; diaphragm breathing; toileting position and proper defecation techniques; hip ER/IR and pelvic tilts   Plan for next session DC today   Total Session Time   Timed Code Treatment Minutes 40   Total Treatment Time (sum of timed and untimed services) 40

## 2024-05-16 ENCOUNTER — OFFICE VISIT (OUTPATIENT)
Dept: OPHTHALMOLOGY | Facility: CLINIC | Age: 79
End: 2024-05-16
Payer: MEDICARE

## 2024-05-16 DIAGNOSIS — H04.123 DRY EYE SYNDROME OF BOTH EYES: Primary | ICD-10-CM

## 2024-05-16 DIAGNOSIS — Z79.899 LONG-TERM USE OF PLAQUENIL: ICD-10-CM

## 2024-05-16 PROCEDURE — 99214 OFFICE O/P EST MOD 30 MIN: CPT | Mod: GC

## 2024-05-16 PROCEDURE — 92082 INTERMEDIATE VISUAL FIELD XM: CPT

## 2024-05-16 PROCEDURE — G0463 HOSPITAL OUTPT CLINIC VISIT: HCPCS

## 2024-05-16 PROCEDURE — 92250 FUNDUS PHOTOGRAPHY W/I&R: CPT | Mod: 59

## 2024-05-16 PROCEDURE — 99207 FUNDUS AUTOFLUORESCENCE IMAGE (FAF) OU (BOTH EYES): CPT | Mod: 26

## 2024-05-16 PROCEDURE — 92134 CPTRZ OPH DX IMG PST SGM RTA: CPT

## 2024-05-16 ASSESSMENT — REFRACTION_WEARINGRX
OD_SPHERE: -0.25
OS_CYLINDER: +1.50
OD_AXIS: 025
OD_ADD: +2.75
OS_ADD: +2.75
OS_AXIS: 137
OS_SPHERE: -1.25
OD_CYLINDER: +0.75
SPECS_TYPE: PAL

## 2024-05-16 ASSESSMENT — EXTERNAL EXAM - RIGHT EYE: OD_EXAM: NORMAL

## 2024-05-16 ASSESSMENT — TONOMETRY
OS_IOP_MMHG: 13
IOP_METHOD: TONOPEN
OD_IOP_MMHG: 14

## 2024-05-16 ASSESSMENT — VISUAL ACUITY
OS_CC+: -1
CORRECTION_TYPE: GLASSES
METHOD: SNELLEN - LINEAR
OS_CC: 20/25
OD_CC+: -1
OD_CC: 20/30

## 2024-05-16 ASSESSMENT — CUP TO DISC RATIO
OS_RATIO: 0.3
OD_RATIO: 0.3

## 2024-05-16 ASSESSMENT — SLIT LAMP EXAM - LIDS
COMMENTS: NORMAL
COMMENTS: NORMAL

## 2024-05-16 ASSESSMENT — EXTERNAL EXAM - LEFT EYE: OS_EXAM: NORMAL

## 2024-05-16 ASSESSMENT — CONF VISUAL FIELD: COMMENTS: VF TODAY

## 2024-05-16 NOTE — NURSING NOTE
Chief Complaints and History of Present Illnesses   Patient presents with    Follow Up     Plaquenil toxicity      Chief Complaint(s) and History of Present Illness(es)       Follow Up              Comments: Plaquenil toxicity               Comments    Pt states no change in VA since last visit  Floaters same as last visit   No flashes , eye pain or redness    Apple Rodriguez COT 2:41 PM May 16, 2024

## 2024-05-19 DIAGNOSIS — L30.9 DERMATITIS: ICD-10-CM

## 2024-05-19 DIAGNOSIS — L66.10 LICHEN PLANOPILARIS: ICD-10-CM

## 2024-05-24 NOTE — TELEPHONE ENCOUNTER
clobetasol propionate       Last Written Prescription Date:  5/10/2024  Last Fill Quantity: 118 ml,   # refills: 2  Last Office Visit: 5/10/2024  Future Office visit:  7/12/2024     Routing refill request to provider for review/approval because:  Unable to refill due to RN protocol.  Cecelia Garcia RN on 5/24/2024 at 3:56 PM

## 2024-05-28 RX ORDER — CLOBETASOL PROPIONATE 0.05 G/100ML
SHAMPOO TOPICAL
Qty: 118 ML | Refills: 2 | Status: SHIPPED | OUTPATIENT
Start: 2024-05-28

## 2024-06-17 ENCOUNTER — TELEPHONE (OUTPATIENT)
Dept: PULMONOLOGY | Facility: CLINIC | Age: 79
End: 2024-06-17
Payer: MEDICARE

## 2024-06-17 NOTE — TELEPHONE ENCOUNTER
Left Voicemail (1st Attempt) and Sent Mychart (1st Attempt) for the patient to call back and schedule the following:    Appointment type: Reschedule Return Pulm  Provider: Tommy  Return date: 11/11/2024  Specialty phone number: [phone]  Additional appointment(s) needed: na  Additonal Notes: provider unavailable

## 2024-06-19 NOTE — TELEPHONE ENCOUNTER
Left Voicemail (2nd Attempt) for the patient to call back and schedule the following:    Appointment type: BO  Provider: BARB  Return date: 11/2024  Specialty phone number: [phone]  Additional appointment(s) needed: N/A  Additonal Notes: N/A

## 2024-07-12 ENCOUNTER — OFFICE VISIT (OUTPATIENT)
Dept: DERMATOLOGY | Facility: CLINIC | Age: 79
End: 2024-07-12
Payer: MEDICARE

## 2024-07-12 DIAGNOSIS — L66.10 LICHEN PLANOPILARIS: ICD-10-CM

## 2024-07-12 DIAGNOSIS — L30.9 DERMATITIS: Primary | ICD-10-CM

## 2024-07-12 DIAGNOSIS — D49.2 NEOPLASM OF UNSPECIFIED BEHAVIOR OF BONE, SOFT TISSUE, AND SKIN: ICD-10-CM

## 2024-07-12 DIAGNOSIS — L98.8 EROSIVE PUSTULAR DERMATOSIS: ICD-10-CM

## 2024-07-12 PROCEDURE — 99213 OFFICE O/P EST LOW 20 MIN: CPT | Mod: 25 | Performed by: DERMATOLOGY

## 2024-07-12 PROCEDURE — 88305 TISSUE EXAM BY PATHOLOGIST: CPT | Mod: TC | Performed by: DERMATOLOGY

## 2024-07-12 PROCEDURE — 88305 TISSUE EXAM BY PATHOLOGIST: CPT | Mod: 26 | Performed by: DERMATOLOGY

## 2024-07-12 PROCEDURE — 11102 TANGNTL BX SKIN SINGLE LES: CPT | Performed by: DERMATOLOGY

## 2024-07-12 PROCEDURE — 11901 INJECT SKIN LESIONS >7: CPT | Mod: XS | Performed by: DERMATOLOGY

## 2024-07-12 RX ORDER — CEFDINIR 300 MG/1
CAPSULE ORAL
COMMUNITY
Start: 2024-07-08

## 2024-07-12 NOTE — PATIENT INSTRUCTIONS
Triamcinolone Injection (TRIAMCINOLONE - INJECTION)  This medicine is used for the following purposes:  allergic reaction  arthritis  autoimmune disorder  blood disorder  endocrine disorder  inflammation of the eyes  eye  inflammatory disease  immune suppression  cancer  eye surgery  Brand Name(s): Kenalog  Generic Name: Triamcinolone Acetonide  Instructions  This medicine is given as an injection into a muscle.  Drug interactions can change how medicines work or increase risk for side effects. Tell your health care providers about all medicines taken. Include prescription and over-the-counter medicines, vitamins, and herbal medicines. Speak with your doctor or pharmacist before starting or stopping any medicine.  Tell your doctor if symptoms do not get better or if they get worse.  Talk to your doctor before taking other medicines, including aspirins and ibuprofen containing products. Speak to your doctor about which medicines are safe to use while you are on this medicine.  If you need to stop this medicine, your doctor may wish to gradually reduce the dosage before stopping.  This medicine may affect your blood sugar levels. If you have diabetes, talk to your doctor before changing the dose of your diabetes medicine.  This medicine may affect the strength of your bones. If you have or are at increased risk for osteoporosis (weakening of the bones), your doctor may recommend foods with calcium and vitamin D.  You may need vitamin and mineral supplements while on this medicine. Please speak with your doctor or pharmacist.  Keep all appointments for medical exams and tests while on this medicine.  Cautions  During pregnancy, this medicine should be used only when clearly needed. Talk to your doctor about the risks and benefits.  Tell your doctor and pharmacist if you ever had an allergic reaction to a medicine.  Speak with your doctor before taking any medicine with aspirin.  Please check with your doctor before  drinking alcohol while on this medicine.  Watch for excessive bruising or bleeding. Contact your doctor if you notice any.  This medicine may reduce your body's ability to fight infections. Avoid contact with people with colds, flu or other infections. Contact your doctor if you develop fever, cough, sore throat, or chills.  Speak with your health care provider before receiving any vaccinations.  It is unknown if this medicine passes into breast milk. Ask your doctor before breastfeeding.  Side Effects  The following is a list of some common side effects from this medicine. Please speak with your doctor about what you should do if you experience these or other side effects.  agitated feeling or trouble sleeping  increased appetite  dizziness  headaches  high blood sugar  pain, redness, swelling near injection  menstruation changes (missed or fewer periods)  stomach upset or abdominal pain  weight gain  Call your doctor or get medical help right away if you notice any of these more serious side effects:  bleeding or bruising  bone pain  coughing up blood or vomit that looks like coffee grounds  swelling of the legs, feet, and hands  fever or chills  hair growth  fast or irregular heart beats  mood changes  muscle pain or weakness  seizures  shortness of breath  thinning of the skin  sore throat  severe stomach or bowel pain  dark, tarry stool  persistent or unusual thirst  yeast infection of mouth  unusual or unexplained tiredness or weakness  increased urinary frequency  difficulty or discomfort urinating  vaginal itching or yeast infection  blurring or changes of vision  sudden or unexplained weight gain  slow wound healing  A few people may have an allergic reaction to this medicine. Symptoms can include difficulty breathing, skin rash, itching, swelling, or severe dizziness. If you notice any of these symptoms, seek medical help quickly.  Please speak with your doctor, nurse, or pharmacist if you have any  questions about this medicine.  IMPORTANT NOTE: This document tells you briefly how to take your medicine, but it does not tell you all there is to know about it. Your doctor or pharmacist may give you other documents about your medicine. Please talk to them if you have any questions. Always follow their advice.  There is a more complete description of this medicine available in English. Scan this code on your smartphone or tablet or use the web address below. You can also ask your pharmacist for a printout. If you have any questions, please ask your pharmacist.  The display and use of this drug information is subject to Terms of Use.  More information about TRIAMCINOLONE - INJECTION      Copyright(c) 2023 [employer]  Selected from data included with permission and copyright by [employer]. This copyrighted material has been downloaded from a licensed data provider and is not for distribution, except as may be authorized by the applicable terms of use.  Conditions of Use: The information in this database is intended to supplement, not substitute for the expertise and judgment of healthcare professionals. The information is not intended to cover all possible uses, directions, precautions, drug interactions or adverse effects nor should it be construed to indicate that use of a particular drug is safe, appropriate or effective for you or anyone else. A healthcare professional should be consulted before taking any drug, changing any diet or commencing or discontinuing any course of treatment. The display and use of this drug information is subject to express Terms of Use.  Care instructions adapted under license by your healthcare professional. If you have questions about a medical condition or this instruction, always ask your healthcare professional. [employer], [employer] disclaims any warranty or liability for your use of this information.    Checking for Skin Cancer  You can find cancer early  by checking your skin each month. There are 3 kinds of skin cancer. They are melanoma, basal cell carcinoma, and squamous cell carcinoma. Doing monthly skin checks is the best way to find new marks or skin changes. Follow the instructions below for checking your skin.     The ABCDEs of checking moles for melanoma  Check your moles or growths for signs of melanoma using ABCDE:  Asymmetry: the sides of the mole or growth don t match  Border: the edges are ragged, notched, or blurred  Color: the color within the mole or growth varies  Diameter: the mole or growth is larger than 6 mm (size of a pencil eraser)  Evolving: the size, shape, or color of the mole or growth is changing       Checking for other types of skin cancer  Basal cell carcinoma or squamous cell carcinoma have symptoms such as:  A spot or mole that looks different from all other marks on your skin  Changes in how an area feels, such as itching, tenderness, or pain  Changes in the skin's surface, such as oozing, bleeding, or scaliness  A sore that does not heal  New swelling or redness beyond the border of a mole     Who s at risk?  Anyone can get skin cancer. But you are at greater risk if you have:  Fair skin, light-colored hair, or light-colored eyes  Many moles or abnormal moles on your skin  A history of sunburns from sunlight or tanning beds  A family history of skin cancer  A history of exposure to radiation or chemicals  A weakened immune system  If you have had skin cancer in the past, you are at risk for recurring skin cancer.     How to check your skin  Do your monthly skin checkups in front of a full-length mirror. Check all parts of your body, including your:  Head (ears, face, neck, and scalp)  Torso (front, back, and sides)  Arms (tops, undersides, upper, and lower armpits)  Hands (palms, backs, and fingers, including under the nails)  Buttocks and genitals  Legs (front, back, and sides)  Feet (tops, soles, toes, including under the nails,  and between toes)  If you have a lot of moles, take digital photos of them each month. Make sure to take photos both up close and from a distance. These can help you see if any moles change over time.  Most skin changes are not cancer. But if you see any changes in your skin, call your doctor right away. Only he or she can diagnose a problem. If you have skin cancer, seeing your doctor can be the first step toward getting the treatment that could save your life.     Use sunscreen of SPF 30 or greater. Apply liberally.  Relaxing in the sun may feel good. But it isn t good for your skin. In fact, the sun s harmful rays are the major cause of skin cancer. This is a serious disease that can be life-threatening. People of all ages, races, and backgrounds are at risk.  Skin cancer is the most common cancer in the [place] But in most cases, it can be prevented.     Your role in prevention  You can act today to help prevent skin cancer. Start by avoiding the sun s UV (ultraviolet) rays. And don t use tanning beds or lamps. They are no safer than the sun. Taking these steps can help keep you from getting skin cancer. It can also help prevent wrinkles and other aging effects caused by the sun. Make sure your children also follow these safeguards. Now is the time to start taking steps to prevent skin cancer.     When you are outdoors  Protect your skin when you go out during the day. Take safety steps whenever you go out to eat, run errands by car or on foot, or do any outdoor activity. There isn t just one easy way to protect your skin. It s best to follow all of these steps:  Wear tightly woven clothing that covers your skin. Put on a wide-brimmed hat to protect your face, ears, and scalp.  Watch the clock. Try to stay out of the sun between 10 a.m. and 4 p.m. That's when the sun's rays are strongest.  Head for the shade or create your own. Use an umbrella when sitting or strolling.  Know that the sun s rays can reflect off  "sand, water, and snow. This can harm your skin. Take extra care when you are near reflective surfaces.  Keep in mind that even when the weather is hazy or cloudy, your skin can be exposed to strong UV rays.  Shield your skin with sunscreen. Also use sunscreen on your children s skin. Keep babies younger than 6 months old out of the sun.     Tips for using sunscreen  To help prevent skin cancer, choose the right sunscreen and use it correctly. Try these tips:  Choose a sunscreen that has an SPF (sun protection factor) of at least 30. Also choose a sunscreen labeled \"broad spectrum.  This will protect you from both UVA and UVB (ultraviolet A and B) rays.  If one brand irritates your skin, try another, such as one without fragrance.  Use a water-resistant sunscreen if you swim or sweat.  Use at least 1 ounce of sunscreen to cover exposed areas. This is enough to fill a shot glass. You might need to adjust the amount depending on your body size.  Put the sunscreen on dry skin about 15 minutes before going outdoors. This gives it time to soak in.  Reapply sunscreen every 2 hours. If you re active, do this more often.  Cover any sun-exposed skin, from your face to your feet. Don t forget your scalp, ears, and lips.  Know that while sunscreen helps protect you, it isn t enough. Sunscreens extend the length of time you can be outdoors before your skin starts to get red. But they don't give you total protection. Using sunscreen doesn't mean you can stay out in the sun for an unlimited time. Your skin cells are still being damaged. You should also wear protective clothing. And try to stay out of the sun as much as you can, especially from 10 a.m. to 4 p.m.    "

## 2024-07-12 NOTE — LETTER
"7/12/2024       RE: Ester Barba  [address] [place] [address]     Dear Colleague,    Thank you for referring your patient, Ester Barba, to the [facility] [place] at [facility]. Please see a copy of my visit note below.    Dermatology Rooming Note    Ester Barba's goals for this visit include:   Chief Complaint   Patient presents with     Derm Problem     Ester is here today to follow up for hair loss. 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 Bx 8/31/21 revealed lichenoid keratosis of the vertex scalp.  - Current tx: ILK10 injections, clobetasol propionate 0.05% shampoo alternating w/ zinc shampoo every other day, fluocinolone oil once weekly,Duoderm to vertex plaque  - Previous tx: plaquenil w/ significant improvement (~15 yrs; discontinued 12/2019 with concerns for retinopathy by local doc in [facility]; now followed by Dr. Radford here without signs of Plaquenil toxicity 1/2020).  - LPPAI score: 3.83 on 3/28/2023, 1 on 5/2/23, 1.33 on 11/07/2023 1.33 3/29/24, 1 on 5/10/24, .67 on 7/12/24  2. 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 Skin infection, vertex scalp at prior site of MMS - resolved  - s/p doxycycline 100 mg BID and mupirocin ointment BID   4. History of NMSC  - BCC - front vertex scalp, s/p Mohs with purse string and granulation, 3/30/22  - BCC - forehead, s/p MMS 6/9/21  - SCCis - R vertex scalp, s/p MMS 3/2/21, bacterial cx obtained from site 3/23/21  - BCC - right nasal ala s/p Mohs 11/27/17  - Nodular BCC - vertex scalp s/p Mohs 6/5/15  - SCC - scalp, s/p Mohs 4/18/2014  - Hx of 1-2 other NMSC on scalp (BCCs)  - NUB: shave bx'd 7/12/24  5. AKs - bilateral temples, cheeks  6. Inflamed " SKs  ____________________________________________    Assessment & Plan:     # Lichen planopilaris, stable/improved, LPPAI score 0.67 today (previously was 1).   # Erosive pustular dermatosis, crusted plaque. Improved.  Overall stable/improved on exam today in terms of hair loss and central plaque scalp appears improved and less crusted compared to prior. Her scalp has less scale and erythema compared to prior visits overall but there is mild diffuse erythema and perifollicular scale.  - IL-K injections performed today (see procedure note(s) below).  - Continue clobetasol shampoo with increased frequency to twice weekly  - OK to pause fluocinolone oil 1x weekly during summer time; resume in the Fall  - Continue DuoDerm 2-3 days on 2-3 days off, Vaseline    # Neoplasm of unspecified behavior of the skin (D49.2) on the right forehead. The differential diagnosis includes BCC vs SCC vs irritated SK.   - Shave biopsy performed today (see procedure note(s) below).  - After instructions provided.  - Counseled SPF30+ sunscreen, UPF clothing, sun avoidance, tanning bed avoidance.  - Counseled ABCDEs of melanoma: Asymmetry, Border (irregularity), Color (not uniform, changes in color), Diameter (greater than 6 mm which is about the size of a pencil eraser), and Evolving (any changes in preexisting moles). First degree relatives are recommended oral exams along with regular genital, dental and eye exams.     # Benign lesions: solar lentigines, benign nevi, seborrheic keratoses.   - Reassured patient of benign nature. No further treatment necessary.      Procedures Performed:   - Intra-lesional triamcinolone procedure note. After verbal consent and review of risk of pain and skin thinning/atrophy, positioning and cleansing with isopropyl alcohol, 1.7 total mL of triamcinolone 10 mg/mL was injected into 20 lesion(s) on the scalp. The patient tolerated the procedure well and left the dermatology clinic in good condition.    - Shave  "biopsy procedure note, location(s): right forehead. After discussion of benefits and risks including but not limited to bleeding, infection, scar, incomplete removal, recurrence, and non-diagnostic biopsy, verbal consent and photographs were obtained. The area was cleaned with isopropyl alcohol. 0.5mL of 1% lidocaine with epinephrine was injected to obtain adequate anesthesia of lesion(s). Shave biopsy at site(s) performed. Hemostasis was achieved with aluminium chloride. Petrolatum ointment and a sterile dressing were applied. The patient tolerated the procedure and no complications were noted. The patient was provided with verbal and written post care instructions.     Follow-up: 4-6 weeks, prn in-person    Staff and Medical Student:     Barry Fuller on 7/12/2024 at 12:10 PM    Staffed with Dr. Barbara Soto MD    Staff Physician:  I was present with the medical student who participated in the service and in the documentation of the note. I have verified the history and personally performed the physical exam and medical decision making. I agree with the assessment and plan of care as documented in the note.  The shave biopsy was done with the dermatology resident, Dr. Fan. I was present for the key portion of the procedure. The ILK procedure was done by me.          Barbraa Soto MD  Professor  Department of Dermatology  [facility]: Phone: [phone], Fax:[phone]  [facility]: Phone: [phone], Fax: [phone]      ____________________________________________    CC: Derm Problem (Ester is here today to follow up for hair loss. Things are \"same\")    HPI:  Ms. Ester Barba is a 78 year old female who presents as a return patient for follow-up of hair loss, diagnosed as LPP.  Recently returned from a wonderful trip to [place] [facility]  Clobetasol every 3-4 day " shampoo.  Fluocinolone paused.  Acute sinus infection 5 days ago cefdinir for one more week.    - Last seen in-clinic on 5/19/24  - Thinning around front to mid scalp region  - Current tx: clobetasol shampoo 1-2x per week  N Any new medications, supplements, or products? (please list below)     N Scalp pain   N Scalp burning   Y Scalp itching    N Eyebrow changes    N Eyelash changes   N Beard changes    N Other body hair changes    N Nail changes    N Additional symptoms? (please list below)     - Overall course: stable  - COVID status: no    Patient is otherwise feeling well, in usual state of health, and has no additional skin concerns today.     Labs:  TSH and Vitamin D reviewed.    Physical Exam:  GEN: Well developed, well-nourished, in no acute distress, in a pleasant mood.    SKIN: Focused examination of head/scalp was performed.  - right forehead with 1cm papule that has central erosion and slightly raised borders  - Perifollicular scalp scale and erythema that is mild along the frontal hair line, scale and mild diffuse erythema in the vertex region  - Central area of crusting and erosion  - when compared to photos, this has improved significantly  - several flesh colored scaly macules and papules on exposed scalp  - No other lesions of concern on areas examined.     Medications:  Current Outpatient Medications   Medication Sig Dispense Refill     acetaminophen (TYLENOL) 500 MG tablet Take 500-1,000 mg by mouth every 6 hours as needed for mild pain       Biotin 10 MG TABS tablet        Calcium 1500 MG TABS        cefdinir (OMNICEF) 300 MG capsule        Cholecalciferol (VITAMIN D) 1000 UNIT capsule Total 2200 units daily.       clobetasol (TEMOVATE) 0.05 % external ointment Apply twice daily every other day alternating with the mupirocin ointment to affected area on the scalp 60 g 1     clobetasol propionate (CLOBEX) 0.05 % external shampoo APPLY TO DRY SCALP EVERY OTHER DAY. LEAVE ON FOR 15 MINUTES, THEN  LATHER AND RINSE. 118 mL 2     COMPOUNDED NON-CONTROLLED SUBSTANCE (CMPD RX) - PHARMACY TO MIX COMPOUNDED MEDICATION Place 1 Application. vaginally twice a week Compounded with estrogen 1 g 3     desonide (DESOWEN) 0.05 % external cream Mix half and half with ketoconazole cream and apply twice daily as needed 60 g 1     esomeprazole (NEXIUM) 20 MG packet Take 20 mg by mouth every morning (before breakfast) Take 30-60 minutes before eating.       Fluocinolone Acetonide Scalp 0.01 % OIL oil APPLY ONCE A WEEK TO SCALP FOR LICHEN PLANOPILARIS 118.28 mL 1     glucosamine-chondroitin 500-400 MG CAPS per capsule Take 1 capsule by mouth       hydrocortisone 2.5 % cream Use twice daily as needed on involved areas 30 g 4     ketoconazole (NIZORAL) 2 % external cream Mix half and half with desonide cream and apply twice a day as needed 60 g 1     levalbuterol (XOPENEX HFA) 45 MCG/ACT inhaler Inhale 2 puffs into the lungs every 4 hours as needed for shortness of breath or wheezing 15 g 4     Loratadine (CLARITIN PO) Take  by mouth.       mupirocin (BACTROBAN) 2 % external ointment Use 2 times a day every other day alternating with clobetasol ointment to affected area. 30 g 3     NEW MED Biest 1.5mg/gram cream(pg free)  Insert 1 gram vaginally twice weekly as directed 40 g 1     tretinoin (RETIN-A) 0.025 % external cream APPLY TO AFFECTED AREA(S) A PEA SIZE AMOUNT TO FACE EVERY OTHER NIGHT AS TOLERATED 45 g 1     ibuprofen (ADVIL,MOTRIN) 800 MG tablet Take 800 mg by mouth every 8 hours as needed       Current Facility-Administered Medications   Medication Dose Route Frequency Provider Last Rate Last Admin     hydrocortisone 2.5 % cream   Topical BID Barbara Soto MD         lidocaine 1% with EPINEPHrine 1:100,000 injection 3 mL  3 mL Intradermal Once Barbara Soto MD         triamcinolone acetonide (KENALOG-10) injection 10 mg  10 mg Intra-Lesional Once          triamcinolone acetonide (KENALOG-10)  injection 10 mg  10 mg Intra-Lesional Once Barbara Soto MD         triamcinolone acetonide (KENALOG-10) injection 10 mg  10 mg Intra-Lesional Once Barbara Soto MD         triamcinolone acetonide (KENALOG-10) injection 10 mg  10 mg Intra-Lesional Once Barbara Soto MD         triamcinolone acetonide (KENALOG-10) injection 10 mg  10 mg Intra-Lesional Once Barbara Soto MD         triamcinolone acetonide (KENALOG-10) injection 17 mg  17 mg Intra-Lesional Once Barbara Soto MD         triamcinolone acetonide (KENALOG-10) injection 17 mg  17 mg Intra-Lesional Once Barbara Soto MD         triamcinolone acetonide (KENALOG-10) injection 18 mg  18 mg Intra-Lesional Once Barbara Soto MD         triamcinolone acetonide (KENALOG-10) injection 20 mg  20 mg Intra-Lesional Once           Past Medical History:   Patient Active Problem List   Diagnosis     Porokeratosis     Squamous cell carcinoma     Neoplasm of uncertain behavior     Lichen planopilaris     Dermatitis     Cicatricial alopecia     Keratosis, inflamed seborrheic     History of skin cancer     Basal cell carcinoma of vertex scalp s/p mohs 6-5-15     Medication management     Actinic keratosis     Medication monitoring encounter     Dermatitis, seborrheic     Erosive pustular dermatosis     Tick bite, initial encounter     Vulvar atrophy     Factor V Leiden mutation (H24)     Splenic artery aneurysm (H24)     Post concussion syndrome     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       Again, thank  you for allowing me to participate in the care of your patient.      Sincerely,    Barbara Soto MD

## 2024-07-12 NOTE — PROGRESS NOTES
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 Bx 8/31/21 revealed lichenoid keratosis of the vertex scalp.  - Current tx: ILK10 injections, clobetasol propionate 0.05% shampoo alternating w/ zinc shampoo every other day, fluocinolone oil once weekly,Duoderm to vertex plaque  - Previous tx: plaquenil w/ significant improvement (~15 yrs; discontinued 12/2019 with concerns for retinopathy by local doc in [facility]; now followed by Dr. Radford here without signs of Plaquenil toxicity 1/2020).  - LPPAI score: 3.83 on 3/28/2023, 1 on 5/2/23, 1.33 on 11/07/2023 1.33 3/29/24, 1 on 5/10/24, .67 on 7/12/24  2. 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 Skin infection, vertex scalp at prior site of MMS - resolved  - s/p doxycycline 100 mg BID and mupirocin ointment BID   4. History of NMSC  - BCC - front vertex scalp, s/p Mohs with purse string and granulation, 3/30/22  - BCC - forehead, s/p MMS 6/9/21  - SCCis - R vertex scalp, s/p MMS 3/2/21, bacterial cx obtained from site 3/23/21  - BCC - right nasal ala s/p Mohs 11/27/17  - Nodular BCC - vertex scalp s/p Mohs 6/5/15  - SCC - scalp, s/p Mohs 4/18/2014  - Hx of 1-2 other NMSC on scalp (BCCs)  - NUB: shave bx'd 7/12/24  5. AKs - bilateral temples, cheeks  6. Inflamed SKs  ____________________________________________    Assessment & Plan:     # Lichen planopilaris, stable/improved, LPPAI score 0.67 today (previously was 1).   # Erosive pustular dermatosis, crusted plaque. Improved.  Overall stable/improved on exam today in terms of hair loss and central plaque scalp appears improved and less crusted compared to prior. Her scalp has less scale and erythema compared to prior visits overall but there is mild diffuse erythema and perifollicular scale.  - IL-K injections performed today  (see procedure note(s) below).  - Continue clobetasol shampoo with increased frequency to twice weekly  - OK to pause fluocinolone oil 1x weekly during summer time; resume in the Fall  - Continue DuoDerm 2-3 days on 2-3 days off, Vaseline    # Neoplasm of unspecified behavior of the skin (D49.2) on the right forehead. The differential diagnosis includes BCC vs SCC vs irritated SK.   - Shave biopsy performed today (see procedure note(s) below).  - After instructions provided.  - Counseled SPF30+ sunscreen, UPF clothing, sun avoidance, tanning bed avoidance.  - Counseled ABCDEs of melanoma: Asymmetry, Border (irregularity), Color (not uniform, changes in color), Diameter (greater than 6 mm which is about the size of a pencil eraser), and Evolving (any changes in preexisting moles). First degree relatives are recommended oral exams along with regular genital, dental and eye exams.     # Benign lesions: solar lentigines, benign nevi, seborrheic keratoses.   - Reassured patient of benign nature. No further treatment necessary.      Procedures Performed:   - Intra-lesional triamcinolone procedure note. After verbal consent and review of risk of pain and skin thinning/atrophy, positioning and cleansing with isopropyl alcohol, 1.7 total mL of triamcinolone 10 mg/mL was injected into 20 lesion(s) on the scalp. The patient tolerated the procedure well and left the dermatology clinic in good condition.    - Shave biopsy procedure note, location(s): right forehead. After discussion of benefits and risks including but not limited to bleeding, infection, scar, incomplete removal, recurrence, and non-diagnostic biopsy, verbal consent and photographs were obtained. The area was cleaned with isopropyl alcohol. 0.5mL of 1% lidocaine with epinephrine was injected to obtain adequate anesthesia of lesion(s). Shave biopsy at site(s) performed. Hemostasis was achieved with aluminium chloride. Petrolatum ointment and a sterile dressing  "were applied. The patient tolerated the procedure and no complications were noted. The patient was provided with verbal and written post care instructions.     Follow-up: 4-6 weeks, prn in-person    Staff and Medical Student:     Elanlucia ROSY Silvajefferson on 7/12/2024 at 12:10 PM    Staffed with Dr. Barbara Soto MD    Staff Physician:  I was present with the medical student who participated in the service and in the documentation of the note. I have verified the history and personally performed the physical exam and medical decision making. I agree with the assessment and plan of care as documented in the note.  The shave biopsy was done with the dermatology resident, Dr. Fan. I was present for the key portion of the procedure. The ILK procedure was done by me.          Barbara Soto MD  Professor  Department of Dermatology  [facility]: Phone: [phone], Fax:[phone]  [facility]: Phone: [phone], Fax: [phone]      ____________________________________________    CC: Derm Problem (Ester is here today to follow up for hair loss. Things are \"same\")    HPI:  Ms. Ester Barba is a 78 year old female who presents as a return patient for follow-up of hair loss, diagnosed as LPP.  Recently returned from a wonderful trip to [facility] and [place]  Clobetasol every 3-4 day shampoo.  Fluocinolone paused.  Acute sinus infection 5 days ago cefdinir for one more week.    - Last seen in-clinic on 5/19/24  - Thinning around front to mid scalp region  - Current tx: clobetasol shampoo 1-2x per week  N Any new medications, supplements, or products? (please list below)     N Scalp pain   N Scalp burning   Y Scalp itching    N Eyebrow changes    N Eyelash changes   N Beard changes    N Other body hair changes    N Nail changes    N Additional symptoms? (please list below)     - Overall course: " stable  - COVID status: no    Patient is otherwise feeling well, in usual state of health, and has no additional skin concerns today.     Labs:  TSH and Vitamin D reviewed.    Physical Exam:  GEN: Well developed, well-nourished, in no acute distress, in a pleasant mood.    SKIN: Focused examination of head/scalp was performed.  - right forehead with 1cm papule that has central erosion and slightly raised borders  - Perifollicular scalp scale and erythema that is mild along the frontal hair line, scale and mild diffuse erythema in the vertex region  - Central area of crusting and erosion  - when compared to photos, this has improved significantly  - several flesh colored scaly macules and papules on exposed scalp  - No other lesions of concern on areas examined.     Medications:  Current Outpatient Medications   Medication Sig Dispense Refill    acetaminophen (TYLENOL) 500 MG tablet Take 500-1,000 mg by mouth every 6 hours as needed for mild pain      Biotin 10 MG TABS tablet       Calcium 1500 MG TABS       cefdinir (OMNICEF) 300 MG capsule       Cholecalciferol (VITAMIN D) 1000 UNIT capsule Total 2200 units daily.      clobetasol (TEMOVATE) 0.05 % external ointment Apply twice daily every other day alternating with the mupirocin ointment to affected area on the scalp 60 g 1    clobetasol propionate (CLOBEX) 0.05 % external shampoo APPLY TO DRY SCALP EVERY OTHER DAY. LEAVE ON FOR 15 MINUTES, THEN LATHER AND RINSE. 118 mL 2    COMPOUNDED NON-CONTROLLED SUBSTANCE (CMPD RX) - PHARMACY TO MIX COMPOUNDED MEDICATION Place 1 Application. vaginally twice a week Compounded with estrogen 1 g 3    desonide (DESOWEN) 0.05 % external cream Mix half and half with ketoconazole cream and apply twice daily as needed 60 g 1    esomeprazole (NEXIUM) 20 MG packet Take 20 mg by mouth every morning (before breakfast) Take 30-60 minutes before eating.      Fluocinolone Acetonide Scalp 0.01 % OIL oil APPLY ONCE A WEEK TO SCALP FOR LICHEN  PLANOPILARIS 118.28 mL 1    glucosamine-chondroitin 500-400 MG CAPS per capsule Take 1 capsule by mouth      hydrocortisone 2.5 % cream Use twice daily as needed on involved areas 30 g 4    ketoconazole (NIZORAL) 2 % external cream Mix half and half with desonide cream and apply twice a day as needed 60 g 1    levalbuterol (XOPENEX HFA) 45 MCG/ACT inhaler Inhale 2 puffs into the lungs every 4 hours as needed for shortness of breath or wheezing 15 g 4    Loratadine (CLARITIN PO) Take  by mouth.      mupirocin (BACTROBAN) 2 % external ointment Use 2 times a day every other day alternating with clobetasol ointment to affected area. 30 g 3    NEW MED Biest 1.5mg/gram cream(pg free)  Insert 1 gram vaginally twice weekly as directed 40 g 1    tretinoin (RETIN-A) 0.025 % external cream APPLY TO AFFECTED AREA(S) A PEA SIZE AMOUNT TO FACE EVERY OTHER NIGHT AS TOLERATED 45 g 1    ibuprofen (ADVIL,MOTRIN) 800 MG tablet Take 800 mg by mouth every 8 hours as needed       Current Facility-Administered Medications   Medication Dose Route Frequency Provider Last Rate Last Admin    hydrocortisone 2.5 % cream   Topical BID Barbara Soto MD        lidocaine 1% with EPINEPHrine 1:100,000 injection 3 mL  3 mL Intradermal Once Barbara Soto MD        triamcinolone acetonide (KENALOG-10) injection 10 mg  10 mg Intra-Lesional Once         triamcinolone acetonide (KENALOG-10) injection 10 mg  10 mg Intra-Lesional Once Barbara Soto MD        triamcinolone acetonide (KENALOG-10) injection 10 mg  10 mg Intra-Lesional Once Barbara Soto MD        triamcinolone acetonide (KENALOG-10) injection 10 mg  10 mg Intra-Lesional Once Barbara Soto MD        triamcinolone acetonide (KENALOG-10) injection 10 mg  10 mg Intra-Lesional Once Barbara Soto MD        triamcinolone acetonide (KENALOG-10) injection 17 mg  17 mg Intra-Lesional Once Barbara Soto  MD René        triamcinolone acetonide (KENALOG-10) injection 17 mg  17 mg Intra-Lesional Once Barbara Soto MD        triamcinolone acetonide (KENALOG-10) injection 18 mg  18 mg Intra-Lesional Once Barbara Soto MD        triamcinolone acetonide (KENALOG-10) injection 20 mg  20 mg Intra-Lesional Once           Past Medical History:   Patient Active Problem List   Diagnosis    Porokeratosis    Squamous cell carcinoma    Neoplasm of uncertain behavior    Lichen planopilaris    Dermatitis    Cicatricial alopecia    Keratosis, inflamed seborrheic    History of skin cancer    Basal cell carcinoma of vertex scalp s/p mohs 6-5-15    Medication management    Actinic keratosis    Medication monitoring encounter    Dermatitis, seborrheic    Erosive pustular dermatosis    Tick bite, initial encounter    Vulvar atrophy    Factor V Leiden mutation (H24)    Splenic artery aneurysm (H24)    Post concussion syndrome    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

## 2024-07-12 NOTE — PROGRESS NOTES
"Dermatology Rooming Note    Ester Barba's goals for this visit include:   Chief Complaint   Patient presents with    Derm Problem     Ester is here today to follow up for hair loss. Things are \"same\"             "

## 2024-07-15 LAB
PATH REPORT.COMMENTS IMP SPEC: ABNORMAL
PATH REPORT.COMMENTS IMP SPEC: ABNORMAL
PATH REPORT.COMMENTS IMP SPEC: YES
PATH REPORT.FINAL DX SPEC: ABNORMAL
PATH REPORT.GROSS SPEC: ABNORMAL
PATH REPORT.MICROSCOPIC SPEC OTHER STN: ABNORMAL
PATH REPORT.RELEVANT HX SPEC: ABNORMAL

## 2024-07-17 ENCOUNTER — TELEPHONE (OUTPATIENT)
Dept: DERMATOLOGY | Facility: CLINIC | Age: 79
End: 2024-07-17
Payer: MEDICARE

## 2024-07-17 DIAGNOSIS — C44.91 BCC (BASAL CELL CARCINOMA): Primary | ICD-10-CM

## 2024-07-17 NOTE — TELEPHONE ENCOUNTER
Called patient to schedule surgery with Dr. Hubbard    Date of Surgery: 08/26    Surgery type: Mohs    Consult scheduled: No    Has patient had mohs with us before? Yes    Additional comments: wil Beebe on 7/17/2024 at 12:11 PM

## 2024-07-17 NOTE — TELEPHONE ENCOUNTER
[facility] Call Center    Phone Message    May a detailed message be left on voicemail: no     Reason for Call: Other: Pt, Ester calling about the results and advised to call Clinic.  Please reach out to her for next steps: [phone] Ester     Action Taken: Message routed to:  Adult Clinics: Dermatology p [id number]    Travel Screening: Not Applicable     Date of Service:

## 2024-07-17 NOTE — TELEPHONE ENCOUNTER
"7/12/2024 result note:  \"    Barbara Soto MD  7/17/2024  7:25 AM CDT Back to Top      Please let Ms. Barba the biopsy we did showed a skin cancer and we should schedule her for another Mohs procedure. Please let me know if you have any questions.  Barbara Soto MD   \"  "

## 2024-07-17 NOTE — TELEPHONE ENCOUNTER
Called and verified patient by last name and . Informed patient of results from 2024 dermatopathology result note. Patient had no questions or concerns.    Nathan Workman, EMT

## 2024-08-02 ENCOUNTER — TELEPHONE (OUTPATIENT)
Dept: PULMONOLOGY | Facility: CLINIC | Age: 79
End: 2024-08-02
Payer: MEDICARE

## 2024-08-02 NOTE — TELEPHONE ENCOUNTER
Left Voicemail (1st Attempt) and Sent Mychart (1st Attempt) for the patient to call back and schedule the following:    Appointment type: Reschedule Return Pulm  Provider: Tommy  Return date: 10/7/2024  Specialty phone number: [phone]  Additional appointment(s) needed: na  Additonal Notes: na

## 2024-08-08 NOTE — TELEPHONE ENCOUNTER
Patient Contacted for the patient to call back and schedule the following:    Appointment type: Reschedule Return Pulm  Provider: Tommy  Return date: 10/7/2024  Specialty phone number: [phone]  Additional appointment(s) needed: na  Additonal Notes: na

## 2024-08-16 ENCOUNTER — TELEPHONE (OUTPATIENT)
Dept: DERMATOLOGY | Facility: CLINIC | Age: 79
End: 2024-08-16
Payer: MEDICARE

## 2024-08-16 NOTE — TELEPHONE ENCOUNTER
I left a message for patient to call [facility].    She is scheduled for Mohs on 8/26/24 with Dr. Hubbard.  Please complete checklist.  Sarai Luque RN

## 2024-08-22 NOTE — TELEPHONE ENCOUNTER
3rd attempt to reach pt, no answer. [facility] for her to return our call at [phone].    Dari Salinas RN on 8/22/2024 at 8:50 AM

## 2024-08-26 ENCOUNTER — OFFICE VISIT (OUTPATIENT)
Dept: DERMATOLOGY | Facility: CLINIC | Age: 79
End: 2024-08-26
Payer: MEDICARE

## 2024-08-26 VITALS — OXYGEN SATURATION: 97 % | SYSTOLIC BLOOD PRESSURE: 123 MMHG | HEART RATE: 65 BPM | DIASTOLIC BLOOD PRESSURE: 73 MMHG

## 2024-08-26 DIAGNOSIS — C44.319 BASAL CELL CARCINOMA (BCC) OF RIGHT FOREHEAD: Primary | ICD-10-CM

## 2024-08-26 PROCEDURE — 17311 MOHS 1 STAGE H/N/HF/G: CPT | Performed by: DERMATOLOGY

## 2024-08-26 PROCEDURE — 12052 INTMD RPR FACE/MM 2.6-5.0 CM: CPT | Performed by: DERMATOLOGY

## 2024-08-26 ASSESSMENT — PAIN SCALES - GENERAL: PAINLEVEL: EXTREME PAIN (9)

## 2024-08-26 NOTE — PROGRESS NOTES
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 Bx 8/31/21 revealed lichenoid keratosis of the vertex scalp.  - Current tx: ILK10 injections, clobetasol propionate 0.05% shampoo alternating w/ zinc shampoo every other day, fluocinolone oil once weekly,Duoderm to vertex plaque  - Previous tx: plaquenil w/ significant improvement (~15 yrs; discontinued 12/2019 with concerns for retinopathy by local doc in [facility]; now followed by Dr. Radford here without signs of Plaquenil toxicity 1/2020).  - LPPAI score: 3.83 on 3/28/2023, 1 on 5/2/23, 1.33 on 11/07/2023 1.33 3/29/24, 1 on 5/10/24, .67 on 7/12/24    2. History of NMSC  - nBCC, R forehead, s/p Mohs 8/26/24  - BCC, front vertex scalp, s/p Mohs: purse string + granulation 3/30/22  - BCC - forehead, s/p MMS 6/9/21  - SCCIs, R vertex scalp, s/p MMS 3/2/21, s/p bacterial cx 3/23/21  - BCC, R nasal ala, s/p Mohs 11/27/17  - nBCC, vertex scalp, s/p Mohs 6/5/15  - SCC, scalp, s/p Mohs 4/18/14  - Hx of 1-2 other NMSC on scalp (BCCs)  3. Skin infection, vertex scalp at prior site of MMS - resolved  - s/p doxycycline 100 mg BID and mupirocin ointment BID   4. 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 AKs - bilateral temples, cheeks  6. Inflamed SKs  ____________________________________________    Date of Service:  Aug 26, 2024  Surgery: Mohs micrographic surgery    Case 1  Repair Type: intermediate  Repair Size: 2.8 cm  Suture Material: 4-0 Monocryl, 5-0 monocryl, Fast Absorbing Gut 5-0  Tumor Type: BCC - Basal cell carcinoma (nodular)  Location: right forehead  Derm-Path Accession #: [id number]  PreOp Size: 0.7x0.5 cm  PostOp Size: 1.4x1.2 cm  Mohs Accession #: [id number]  Level of Defect: fascia      Procedure:  We discussed the principles of treatment and most likely complications including scarring,  bleeding, infection, swelling, pain, crusting, nerve damage, large wound,  incomplete excision, wound dehiscence,  nerve damage, recurrence, and a second procedure may be recommended to obtain the best cosmetic or functional result.    Informed consent was obtained and the patient underwent the procedure as follows:  The patient was placed supine on the operating table.  The cancer was identified, outlined with a marker, and verified by the patient.  The entire surgical field was prepped with ChoraPrep.  The surgical site was anesthetized using Lidocaine 1% with epi 1:100,000.      The area of clinically apparent tumor was debulked. The layer of tissue was then surgically excised using a #15 blade and was then transferred onto a specimen sheet maintaining the orientation of the specimen. Hemostasis was obtained using bipolar electrocoagulation. The wound site was then covered with a dressing while the tissue samples were processed for examination.    The excised tissue was transported to the Mohs histology laboratory maintaining the tissue orientation.  The tissue specimen was relaxed so that the entire surgical margin was in a a single horizontal plane for sectioning and inked for precise mapping.  A precise reference map was drawn to reflect the sectioning of the specimen, colored inking of the margins, and orientation on the patient. The tissue was processed using horizontal sectioning of the base and continuous peripheral margins.  The histopathologic sections were reviewed in conjunction with the reference map.    Total blocks: 1    Total slides:  2    There were no cancer cells visualized on examination, therefore Mohs surgery was complete.    Reconstruction: Intermediate Linear Closure      The patient was taken to the operative suite and placed supine on the operating room table. The defect was identified.  Appropriate markings were made with a marking pen to plan the repair. The area was infiltrated with  Lidocaine 1% with epi 1:100,000 and prepped with ChoraPrep and draped with sterile towels.     The wound was debeveled and undermined widely. Cones were excised within relaxed skin tension lines on both sides of the defect. Hemostasis was obtained using bipolar electrocoagulation. The deeper layers of subcutaneous and superficial (nonmuscle) fascia tissues were then approximated using monocryl 4-0;monocryl 5-0 buried vertical mattress sutures (deep layer suturing). The wound edges were then approximated additional  buried sutures were placed in a similar fashion where needed. Fast Absorbing Gut 5-0 simple running sutures (superficial layer suturing) were carefully placed for maximum eversion and meticulous approximation.    Repair Size: 2.8 cm    The wound was cleansed with saline and ointment was applied along the wound surface.     A sterile pressure dressing was applied.  Wound care instructions were given verbally and in writing.  The patient left the operating suite in stable condition.  Patient was informed that additional refinement of the resulting surgical scar may be used as a second stage of this reconstruction.     The attending surgeon was present for entire minor procedure and examination.    Scribe Disclosure:   I, Joan Andrea, am serving as a scribe; to document services personally performed by Dr. Percy Hubbard - -based on data collection and the provider's statements to me.     Provider Disclosure:   The documentation recorded by the scribe accurately reflects the services I personally performed and the decisions made by me. I personally performed the procedures today.    Percy Hubbard DO    Department of Dermatology  [facility]: Phone: [phone], Fax:[phone]  [facility]: Phone: [phone], Fax: [phone]    Care and Laboratory Testing Performed at:  [facility]  [facility]   Dermatology Clinic  [address] [place] [address]

## 2024-08-26 NOTE — NURSING NOTE
Vaseline and pressure dressing applied to Mohs site on right forehead.  Wound care instructions reviewed with patient and AVS provided.  Patient verbalized understanding.  Patient will follow up for suture removal: N/A.  No further questions or concerns at this time.      Joan Moralez RN on 8/26/2024 at 11:45 AM     The following medication was given:     MEDICATION:  Lidocaine with epinephrine 1% [id number]  ROUTE: SQ  SITE: see procedure note  DOSE: 3 mL  LOT #: [id number]  : [employer]  EXPIRATION DATE: 05-  NDC#: 94264-708-81  Was there drug waste? No   Multi-dose vial: Yes    Joan Moralez RN  August 26, 2024  mL

## 2024-08-26 NOTE — LETTER
8/26/2024      Ester Barba  [address] [place] [address]      Dear Colleague,    Thank you for referring your patient, Ester Barba, to the [facility]. Please see a copy of my visit note below.    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 Bx 8/31/21 revealed lichenoid keratosis of the vertex scalp.  - Current tx: ILK10 injections, clobetasol propionate 0.05% shampoo alternating w/ zinc shampoo every other day, fluocinolone oil once weekly,Duoderm to vertex plaque  - Previous tx: plaquenil w/ significant improvement (~15 yrs; discontinued 12/2019 with concerns for retinopathy by local doc in [facility]; now followed by Dr. Radford here without signs of Plaquenil toxicity 1/2020).  - LPPAI score: 3.83 on 3/28/2023, 1 on 5/2/23, 1.33 on 11/07/2023 1.33 3/29/24, 1 on 5/10/24, .67 on 7/12/24    2. History of NMSC  - nBCC, R forehead, s/p Mohs 8/26/24  - BCC, front vertex scalp, s/p Mohs: purse string + granulation 3/30/22  - BCC - forehead, s/p MMS 6/9/21  - SCCIs, R vertex scalp, s/p MMS 3/2/21, s/p bacterial cx 3/23/21  - BCC, R nasal ala, s/p Mohs 11/27/17  - nBCC, vertex scalp, s/p Mohs 6/5/15  - SCC, scalp, s/p Mohs 4/18/14  - Hx of 1-2 other NMSC on scalp (BCCs)  3. Skin infection, vertex scalp at prior site of MMS - resolved  - s/p doxycycline 100 mg BID and mupirocin ointment BID   4. 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 AKs - bilateral temples, cheeks  6. Inflamed SKs  ____________________________________________    Date of Service:  Aug 26, 2024  Surgery: Mohs micrographic surgery    Case 1  Repair Type: intermediate  Repair Size: 2.8 cm  Suture Material: 4-0 Monocryl, 5-0 monocryl, Fast Absorbing Gut 5-0  Tumor Type: BCC - Basal cell carcinoma (nodular)  Location: right forehead  Derm-Path  Accession #: [id number]  PreOp Size: 0.7x0.5 cm  PostOp Size: 1.4x1.2 cm  Mohs Accession #: [id number]  Level of Defect: fascia      Procedure:  We discussed the principles of treatment and most likely complications including scarring, bleeding, infection, swelling, pain, crusting, nerve damage, large wound,  incomplete excision, wound dehiscence,  nerve damage, recurrence, and a second procedure may be recommended to obtain the best cosmetic or functional result.    Informed consent was obtained and the patient underwent the procedure as follows:  The patient was placed supine on the operating table.  The cancer was identified, outlined with a marker, and verified by the patient.  The entire surgical field was prepped with ChoraPrep.  The surgical site was anesthetized using Lidocaine 1% with epi 1:100,000.      The area of clinically apparent tumor was debulked. The layer of tissue was then surgically excised using a #15 blade and was then transferred onto a specimen sheet maintaining the orientation of the specimen. Hemostasis was obtained using bipolar electrocoagulation. The wound site was then covered with a dressing while the tissue samples were processed for examination.    The excised tissue was transported to the Mohs histology laboratory maintaining the tissue orientation.  The tissue specimen was relaxed so that the entire surgical margin was in a a single horizontal plane for sectioning and inked for precise mapping.  A precise reference map was drawn to reflect the sectioning of the specimen, colored inking of the margins, and orientation on the patient. The tissue was processed using horizontal sectioning of the base and continuous peripheral margins.  The histopathologic sections were reviewed in conjunction with the reference map.    Total blocks: 1    Total slides:  2    There were no cancer cells visualized on examination, therefore Mohs surgery was complete.    Reconstruction: Intermediate Linear  Closure      The patient was taken to the operative suite and placed supine on the operating room table. The defect was identified.  Appropriate markings were made with a marking pen to plan the repair. The area was infiltrated with Lidocaine 1% with epi 1:100,000 and prepped with ChoraPrep and draped with sterile towels.     The wound was debeveled and undermined widely. Cones were excised within relaxed skin tension lines on both sides of the defect. Hemostasis was obtained using bipolar electrocoagulation. The deeper layers of subcutaneous and superficial (nonmuscle) fascia tissues were then approximated using monocryl 4-0;monocryl 5-0 buried vertical mattress sutures (deep layer suturing). The wound edges were then approximated additional  buried sutures were placed in a similar fashion where needed. Fast Absorbing Gut 5-0 simple running sutures (superficial layer suturing) were carefully placed for maximum eversion and meticulous approximation.    Repair Size: 2.8 cm    The wound was cleansed with saline and ointment was applied along the wound surface.     A sterile pressure dressing was applied.  Wound care instructions were given verbally and in writing.  The patient left the operating suite in stable condition.  Patient was informed that additional refinement of the resulting surgical scar may be used as a second stage of this reconstruction.     The attending surgeon was present for entire minor procedure and examination.    Scribe Disclosure:   I, Joan Andrea, am serving as a scribe; to document services personally performed by Dr. Percy Hubbard - -based on data collection and the provider's statements to me.     Provider Disclosure:   The documentation recorded by the scribe accurately reflects the services I personally performed and the decisions made by me. I personally performed the procedures today.    Percy Hubbard DO    Department of Dermatology  [facility]  [facility]: Phone: [phone], Fax:[phone]  [facility]: Phone: [phone], Fax: [phone]    Care and Laboratory Testing Performed at:  [facility]   Dermatology Clinic  [address] [place] [address]      Again, thank you for allowing me to participate in the care of your patient.        Sincerely,        Percy Hubbard MD

## 2024-08-26 NOTE — NURSING NOTE
Ester Barba's chief complaint for this visit includes:  Chief Complaint   Patient presents with    Procedure     Mohs- right forehead, BCC      PCP: Winnie Sepulveda    Referring Provider:  Barbara Soto MD  [address]  [place] [address]    There were no vitals taken for this visit.  Extreme Pain (9)        Allergies   Allergen Reactions    Dust Mites Other (See Comments)     Sneezing, coughing. asthma  Itchy watery eyes, sneezing    Estrogens Itching     Other reaction(s): Hypersensitivity  Topical caused burning sensation of skin  Topical caused burning sensation of skin; Premarin cream only - possibly only an bi-ingredient    Imiquimod Other (See Comments)     Hair loss    Levaquin [Levofloxacin Hemihydrate] Other (See Comments)     Muscle weakness    Levofloxacin Other (See Comments)     MUSCLE WEAKNESS, ARTHRITIS LIKE SYMPTOMS.      Mold Other (See Comments)     Sneezing, asthma, weezing    Pollen Extract/Tree Extract Other (See Comments)     Sneezing, weezing    Sulfa Antibiotics Hives    Sulfamethoxazole-Trimethoprim Hives    Latex Rash     LATEX BANDAGES; tapes adhesive    Penicillin G Rash    Penicillins Rash         Do you need any medication refills at today's visit?   No.       Joan Moralez RN on 8/26/2024 at 8:08 AM

## 2024-08-26 NOTE — NURSING NOTE
Excision/Mohs previsit information                                                    Diagnosis: basal cell carcinoma  Site(s): right     Is the surgical site below the waist?  NO  If yes, instruct the patient to purchase over the counter chlorhexidine surgical soap and wash all skin below the belly button twice before surgery    Allergies   Allergen Reactions    Dust Mites Other (See Comments)     Sneezing, coughing. asthma  Itchy watery eyes, sneezing    Estrogens Itching     Other reaction(s): Hypersensitivity  Topical caused burning sensation of skin  Topical caused burning sensation of skin; Premarin cream only - possibly only an bi-ingredient    Imiquimod Other (See Comments)     Hair loss    Levaquin [Levofloxacin Hemihydrate] Other (See Comments)     Muscle weakness    Levofloxacin Other (See Comments)     MUSCLE WEAKNESS, ARTHRITIS LIKE SYMPTOMS.      Mold Other (See Comments)     Sneezing, asthma, weezing    Pollen Extract/Tree Extract Other (See Comments)     Sneezing, weezing    Sulfa Antibiotics Hives    Sulfamethoxazole-Trimethoprim Hives    Latex Rash     LATEX BANDAGES; tapes adhesive    Penicillin G Rash    Penicillins Rash       Review and update allergy and medication list    Do you take the following medications:  Coumadin, Eliquis, Pradaxa, Xarelto:  NO.  If on Coumadin, INR should be checked within 7 days of surgery.  Range should be 3.5 or less or within therapeutic range.    Do you currently or have you previously had any of the following conditions:  Hepatitis:  NO  HIV/AIDS:  NO  Prolonged bleeding or bleeding disorder:  NO  Pacemaker: NO  Defibrillator:  NO  History of artificial or heart valve replacement:  NO  Endocarditis (inflammation of the inner lining of the heart's chambers and valves):  NO  Have you ever had a prosthetic joint infection:  NO  Pregnant or Breastfeeding:  NO  Mobility device (wheelchair, transfer difficulty): NO    Important Reminders:                                                       -Ok to take all of their medications as prescribed  -Patients can eat, no need to be fasting  -If face is being treated, please come with a make-up free face  -If scalp is being treated, please come with clean hair free from product  -Patient will not be able to get the site wet for 48 hrs  -No submerging wound in standing water (lake, pool, bathtub, hot tub) for 2 weeks  -No physical activity for 48 hrs (further restrictions will be discussed by MD at time of visit)    If any positives, send to RN for further review  Joan Moralez RN

## 2024-08-26 NOTE — PATIENT INSTRUCTIONS
Caring for your skin after surgery    After your surgery, a pressure bandage will be placed over the area. This will prevent bleeding. Please follow these instructions over the next 1 to 2 weeks. Following this regimen will help to prevent complications as your wound heals.     For the first 48 hours after your surgery:    Leave the pressure dressing on and keep it dry. If it should come loose, you may re-tape it, but do not take it off.  Relax and take it easy. Do not do any vigorous exercise, heavy lifting or bending forward. This could cause the wound to bleed.  Post-operative pain is usually mild. You may alternate between 1000 mg of Tylenol (acetaminophen) and 400 mg of Ibuprofen every 4 hours.  Do not take more than 4,000 mg of acetaminophen in a 24-hour period or 3200 mg of Ibuprofen in a 24-hr period.  Avoid alcohol and vitamin E as these may increase your tendency to bleed.  You may put an ice pack around the bandaged area for 20 minutes at a time as needed. This may help reduce swelling, bruising, and pain. Make sure the ice pack is waterproof so that the pressure bandage doesn't get wet.  You may see a small amount of drainage or blood on your pressure bandage. This is normal. However:  If drainage or bleeding continues or saturates the bandage, you will need to apply firm pressure over the bandage with a clean washcloth for 15 minutes.  If bleeding continues after applying pressure for 15 minutes, apply an ice pack with gentle pressure to the bandaged area for another 15 minutes.  If bleeding still continues, call our office or go to the nearest emergency room.    48 Hours After Surgery:  Carefully remove the pressure bandage. If it seems sticky or too difficult to get off, you may need to soak it off in the shower.  Wash wound with a mild soap and water.  Use caution when washing the wound, be gentle and do not let the forceful shower stream hit the wound directly.  Pat dry.  Apply Vaseline (from a new  container or tube) over the suture line with a Q-tip.  Cover the site with a bandage.  Do this daily until the sutures have dissolved.      What to expect:    The first couple of days your wound may be tender and may bleed slightly when doing wound care.  There may be swelling and bruising around the wound, especially if it is near the eyes. For your comfort, you may apply ice or cold compresses to the area.  The area around your wound may be numb for several weeks or even months.  You may experience periodic sharp pain or mild itching around the wound as it heals.   The suture line will look dark pink at first and the edges of the wound will be reddened. This will lighten up each day.    Call Us If:    You have bleeding that will not stop after applying pressure and ice.  You have pain that is not controlled with Tylenol and Ibuprofen.  You have signs or symptoms of an infection such as fever over 100 degrees Fahrenheit, redness, warmth, or swelling spreading from the wound, or foul-smelling drainage from the wound.  .  [facility]: [phone]   [facility]: [phone]  For urgent needs outside of business hours call the [facility] at [phone] and ask to speak with the dermatology resident on call

## 2024-08-27 ENCOUNTER — TELEPHONE (OUTPATIENT)
Dept: DERMATOLOGY | Facility: CLINIC | Age: 79
End: 2024-08-27
Payer: MEDICARE

## 2024-08-27 NOTE — TELEPHONE ENCOUNTER
"Ester is 1 day s/p Mohs on right forehead       What are you doing to manage your pain?  Tylenol and ice    Is it helping?  Yes    Are you applying ice? Yes.  Patient expressed concern about the amount of swelling.  She has applied a \"cold compress\" twice since surgery. I advised her to ice for 10 min every hour while awake.    Have you had any noticeable bleeding through the bandage?  No, dressing is dry and intact.    Do you have any concerns?  No     Wound care directions reviewed.  Patient declined a post op appointment.  Next skin check has been scheduled.     Sarai Luque RN    "

## 2024-09-05 DIAGNOSIS — L66.10 LICHEN PLANOPILARIS: ICD-10-CM

## 2024-09-05 DIAGNOSIS — R23.8 PAPULE: ICD-10-CM

## 2024-09-05 RX ORDER — TRETINOIN 0.25 MG/G
CREAM TOPICAL
Qty: 45 G | Refills: 11 | Status: SHIPPED | OUTPATIENT
Start: 2024-09-05

## 2024-09-05 NOTE — TELEPHONE ENCOUNTER
LVD:  7/12/2024  [facility] Barbara Fink MD  Dermatology     Refilled per protocol #1 tretinoin 0.05%.

## 2024-09-13 DIAGNOSIS — N90.5 VULVAR ATROPHY: Primary | ICD-10-CM

## 2024-09-13 NOTE — TELEPHONE ENCOUNTER
BIESTROGEN (8.2) VB (R) 1.5 MG/GM CREAM  QTY: 40    LAST FILLED: 8/13/24  APPLY 1GM VAGINALLY 2 NIGHTS PER WEEK     *I SEE THE MED ON THE LIST BUT NOT AN OPTION TO SELECT UNDER ADMINISTERED MEDICATIONS

## 2024-09-24 ENCOUNTER — OFFICE VISIT (OUTPATIENT)
Dept: DERMATOLOGY | Facility: CLINIC | Age: 79
End: 2024-09-24
Payer: MEDICARE

## 2024-09-24 VITALS — OXYGEN SATURATION: 97 % | DIASTOLIC BLOOD PRESSURE: 73 MMHG | SYSTOLIC BLOOD PRESSURE: 136 MMHG | HEART RATE: 60 BPM

## 2024-09-24 DIAGNOSIS — L30.9 DERMATITIS: ICD-10-CM

## 2024-09-24 DIAGNOSIS — L98.8 EROSIVE PUSTULAR DERMATOSIS: ICD-10-CM

## 2024-09-24 DIAGNOSIS — L66.10 LICHEN PLANOPILARIS: Primary | ICD-10-CM

## 2024-09-24 PROCEDURE — 11901 INJECT SKIN LESIONS >7: CPT | Performed by: DERMATOLOGY

## 2024-09-24 PROCEDURE — 99213 OFFICE O/P EST LOW 20 MIN: CPT | Mod: 25 | Performed by: DERMATOLOGY

## 2024-09-24 RX ORDER — FLUOCINOLONE ACETONIDE 0.11 MG/ML
OIL TOPICAL
Qty: 118.28 ML | Refills: 1 | Status: SHIPPED | OUTPATIENT
Start: 2024-09-24

## 2024-09-24 NOTE — LETTER
9/24/2024      Ester Barba  [address] [place] [address]      Dear Colleague,    Thank you for referring your patient, Ester Barba, to the [facility]. Please see a copy of my visit note below.    [facility] Dermatology Note  Encounter Date: Sep 24, 2024  Office Visit     Dermatology Problem List:  1. 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 Bx 8/31/21 revealed lichenoid keratosis of the vertex scalp.  - Current tx: ILK10 injections, clobetasol propionate 0.05% shampoo alternating w/ zinc shampoo every other day, fluocinolone oil once weekly,Duoderm to vertex plaque  - Previous tx: plaquenil w/ significant improvement (~15 yrs; discontinued 12/2019 with concerns for retinopathy by local doc in [facility]; now followed by Dr. Radford here without signs of Plaquenil toxicity 1/2020).  - LPPAI score: 3.83 on 3/28/2023, 1 on 5/2/23, 1.33 on 11/07/2023 1.33 3/29/24, 1 on 5/10/24, .67 on 7/12/24; 0.67 (9/24/24)  2. 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 Skin infection, vertex scalp at prior site of MMS - resolved  - s/p doxycycline 100 mg BID and mupirocin ointment BID   4. History of NMSC  - BCC: right forehead:  s/p shave bx'd 7/12/24  - BCC - front vertex scalp, s/p Mohs with purse string and granulation, 3/30/22  - BCC - forehead, s/p MMS 6/9/21  - SCCis - R vertex scalp, s/p MMS 3/2/21, bacterial cx obtained from site 3/23/21  - BCC - right nasal ala s/p Mohs 11/27/17  - Nodular BCC - vertex scalp s/p Mohs 6/5/15  - SCC - scalp, s/p Mohs 4/18/2014  - Hx of 1-2 other NMSC on scalp (BCCs)  5. AKs - bilateral temples, cheeks  6. Inflamed SKs  ____________________________________________    Assessment & Plan:     # Lichen planopilaris, stable/improved, LPPAI score 0.67 today (previously was 1).   # Erosive pustular dermatosis, crusted plaque. Improved.  Overall stable/improved on exam  today in terms of hair loss and central plaque scalp appears improved and less crusted compared to prior. Her scalp has less scale and erythema compared to prior visits overall but there is mild diffuse erythema and perifollicular scale.  - IL-K injections performed today (see procedure note(s) below).  - Continue clobetasol shampoo with increased frequency to twice weekly  - restart fluocinolone oil daily  - Continue DuoDerm 2-3 days on 2-3 days off, Vaseline  - LPPAI Score: 0.67 (0.67 on 7/12/241)    # History of BCC-right forehead.  - Well  healed scar  - recommend yearly skin exam    Procedures Performed:   - Intra-lesional triamcinolone procedure note. After verbal consent and review of risk of pain and skin thinning/atrophy, positioning and cleansing with isopropyl alcohol, 1.5 total mL of triamcinolone 10 mg/mL was injected into  15 sites on the scalp. The patient tolerated the procedure well and left the dermatology clinic in good condition.      Follow-up: scheduled for 11/26/24-hair loss, also needs to be seen in October.    Staff and Scribe:     Scribe Disclosure:   I, Kelle Mosquera, am serving as a scribe; to document services personally performed by Barbara Soto MD -based on data collection and the provider's statements to me.     Provider Disclosure:  I agree with above History, Review of Systems, Physical exam and Plan.  I have reviewed the content of the documentation and have edited it as needed. I have personally performed the services documented here and the documentation accurately represents those services and the decisions I have made.      Electronically signed by:  Barbara Soto MD  Professor   Department of Dermatology  [facility]     ____________________________________________    CC: Hair Loss (HL, 6 week follow up, AOC-whole scalp)    HPI:  Ms. Ester Barba is a 78 year old female who presents as a return patient for follow-up of hair loss, diagnosed as  Lichen planopilaris following experiencing erosive pustular dermatosis of the scalp   - Last seen in-clinic on 7/12/24  - Shedding or thinning, or both: shedding  - Current tx: clobetasol shampoo    no Any new medications, supplements, or products? (please list below)     no Scalp pain   no Scalp burning   mild Scalp itching    no Eyebrow changes    no Eyelash changes   no Beard changes    no Other body hair changes    no Nail changes    no Additional symptoms? (please list below)     - Overall course: Hair is shedding. Using clobetasol shampoo twice a week. She feels that the ILK injections are helpful  She is having knee surgery/replacement planned in the near future.      Patient is otherwise feeling well, in usual state of health, and has no additional skin concerns today.       Labs:  None reviewed.    Physical Exam:  Vitals: /73   Pulse 60   SpO2 97%   GEN: Well developed, well-nourished, in no acute distress, in a pleasant mood.    SKIN: Focused examination of face, scalp was performed.    - no diffuse erythema   - no perifollicular erythema  - mild perifollicular scale   - no scaling of the scalp   -  negative hair pull test   -  normal eyelash density  -  normal eyebrow density  -  no nail pitting or dystrophy   - no scalp folliculitis/pustules   - In comparison to prior photographs, none  - There are waxy stuck on tan to brown papules on the forehead..   - No other lesions of concern on areas examined.         Medications:  Current Outpatient Medications   Medication Sig Dispense Refill     acetaminophen (TYLENOL) 500 MG tablet Take 500-1,000 mg by mouth every 6 hours as needed for mild pain       Biotin 10 MG TABS tablet        Calcium 1500 MG TABS        Cholecalciferol (VITAMIN D) 1000 UNIT capsule Total 2200 units daily.       clobetasol (TEMOVATE) 0.05 % external ointment Apply twice daily every other day alternating with the mupirocin ointment to affected area on the scalp 60 g 1      clobetasol propionate (CLOBEX) 0.05 % external shampoo APPLY TO DRY SCALP EVERY OTHER DAY. LEAVE ON FOR 15 MINUTES, THEN LATHER AND RINSE. 118 mL 2     desonide (DESOWEN) 0.05 % external cream Mix half and half with ketoconazole cream and apply twice daily as needed 60 g 1     esomeprazole (NEXIUM) 20 MG packet Take 20 mg by mouth every morning (before breakfast) Take 30-60 minutes before eating.       Fluocinolone Acetonide Scalp 0.01 % OIL oil APPLY ONCE A WEEK TO SCALP FOR LICHEN PLANOPILARIS 118.28 mL 1     glucosamine-chondroitin 500-400 MG CAPS per capsule Take 1 capsule by mouth       hydrocortisone 2.5 % cream Use twice daily as needed on involved areas 30 g 4     ketoconazole (NIZORAL) 2 % external cream Mix half and half with desonide cream and apply twice a day as needed 60 g 1     levalbuterol (XOPENEX HFA) 45 MCG/ACT inhaler Inhale 2 puffs into the lungs every 4 hours as needed for shortness of breath or wheezing 15 g 4     Loratadine (CLARITIN PO) Take  by mouth.       mupirocin (BACTROBAN) 2 % external ointment Use 2 times a day every other day alternating with clobetasol ointment to affected area. 30 g 3     NEW MED Biest 1.5mg/gram cream(pg free)  Insert 1 gram vaginally twice weekly as directed 40 g 1     tretinoin (RETIN-A) 0.025 % external cream APPLY PEA-SIZED AMOUNT TO AFFECTED AREA(S) ON FACE EVERY OTHER NIGHT AS TOLERATED 45 g 11     BIESTROGEN,20-80, 2.5 MG/GM CREAM Apply 0.5 g topically 2 times daily. 60 g 11     cefdinir (OMNICEF) 300 MG capsule        COMPOUNDED NON-CONTROLLED SUBSTANCE (CMPD RX) - PHARMACY TO MIX COMPOUNDED MEDICATION Place 1 Application. vaginally twice a week Compounded with estrogen 1 g 3     ibuprofen (ADVIL,MOTRIN) 800 MG tablet Take 800 mg by mouth every 8 hours as needed       Current Facility-Administered Medications   Medication Dose Route Frequency Provider Last Rate Last Admin     hydrocortisone 2.5 % cream   Topical BID Barbara Soto MD          lidocaine 1% with EPINEPHrine 1:100,000 injection 3 mL  3 mL Intradermal Once Barbara Soto MD         triamcinolone acetonide (KENALOG-10) injection 10 mg  10 mg Intra-Lesional Once          triamcinolone acetonide (KENALOG-10) injection 10 mg  10 mg Intra-Lesional Once Barbara Soto MD         triamcinolone acetonide (KENALOG-10) injection 10 mg  10 mg Intra-Lesional Once Barbara Soto MD         triamcinolone acetonide (KENALOG-10) injection 10 mg  10 mg Intra-Lesional Once Barbara Soto MD         triamcinolone acetonide (KENALOG-10) injection 10 mg  10 mg Intra-Lesional Once Barbara Soto MD         triamcinolone acetonide (KENALOG-10) injection 17 mg  17 mg Intra-Lesional Once          triamcinolone acetonide (KENALOG-10) injection 17 mg  17 mg Intra-Lesional Once Barbara Soto MD         triamcinolone acetonide (KENALOG-10) injection 17 mg  17 mg Intra-Lesional Once Barbara Soto MD         triamcinolone acetonide (KENALOG-10) injection 18 mg  18 mg Intra-Lesional Once Barbara Soto MD         triamcinolone acetonide (KENALOG-10) injection 20 mg  20 mg Intra-Lesional Once           Past Medical History:   Patient Active Problem List   Diagnosis     Porokeratosis     Squamous cell carcinoma     Neoplasm of uncertain behavior     Lichen planopilaris     Dermatitis     Cicatricial alopecia     Keratosis, inflamed seborrheic     History of skin cancer     Basal cell carcinoma of vertex scalp s/p mohs 6-5-15     Medication management     Actinic keratosis     Medication monitoring encounter     Dermatitis, seborrheic     Erosive pustular dermatosis     Tick bite, initial encounter     Vulvar atrophy     Factor V Leiden mutation (H24)     Splenic artery aneurysm (H24)     Post concussion syndrome     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       Again, thank you for allowing me to participate in the care of your patient.        Sincerely,        Barbara Soto MD

## 2024-09-24 NOTE — PROGRESS NOTES
[facility] Dermatology Note  Encounter Date: Sep 24, 2024  Office Visit     Dermatology Problem List:  1. 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 Bx 8/31/21 revealed lichenoid keratosis of the vertex scalp.  - Current tx: ILK10 injections, clobetasol propionate 0.05% shampoo alternating w/ zinc shampoo every other day, fluocinolone oil once weekly,Duoderm to vertex plaque  - Previous tx: plaquenil w/ significant improvement (~15 yrs; discontinued 12/2019 with concerns for retinopathy by local doc in [facility]; now followed by Dr. Radford here without signs of Plaquenil toxicity 1/2020).  - LPPAI score: 3.83 on 3/28/2023, 1 on 5/2/23, 1.33 on 11/07/2023 1.33 3/29/24, 1 on 5/10/24, .67 on 7/12/24; 0.67 (9/24/24)  2. 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 Skin infection, vertex scalp at prior site of MMS - resolved  - s/p doxycycline 100 mg BID and mupirocin ointment BID   4. History of NMSC  - BCC: right forehead:  s/p shave bx'd 7/12/24  - BCC - front vertex scalp, s/p Mohs with purse string and granulation, 3/30/22  - BCC - forehead, s/p MMS 6/9/21  - SCCis - R vertex scalp, s/p MMS 3/2/21, bacterial cx obtained from site 3/23/21  - BCC - right nasal ala s/p Mohs 11/27/17  - Nodular BCC - vertex scalp s/p Mohs 6/5/15  - SCC - scalp, s/p Mohs 4/18/2014  - Hx of 1-2 other NMSC on scalp (BCCs)  5. AKs - bilateral temples, cheeks  6. Inflamed SKs  ____________________________________________    Assessment & Plan:     # Lichen planopilaris, stable/improved, LPPAI score 0.67 today (previously was 1).   # Erosive pustular dermatosis, crusted plaque. Improved.  Overall stable/improved on exam today in terms of hair loss and central plaque scalp appears improved and less crusted compared to prior. Her scalp has less scale and erythema compared to prior visits overall but there is mild diffuse erythema and perifollicular  scale.  - IL-K injections performed today (see procedure note(s) below).  - Continue clobetasol shampoo with increased frequency to twice weekly  - restart fluocinolone oil daily  - Continue DuoDerm 2-3 days on 2-3 days off, Vaseline  - LPPAI Score: 0.67 (0.67 on 7/12/241)    # History of BCC-right forehead.  - Well  healed scar  - recommend yearly skin exam    Procedures Performed:   - Intra-lesional triamcinolone procedure note. After verbal consent and review of risk of pain and skin thinning/atrophy, positioning and cleansing with isopropyl alcohol, 1.5 total mL of triamcinolone 10 mg/mL was injected into  lesion(s) on the scalp. The patient tolerated the procedure well and left the dermatology clinic in good condition.      Follow-up: 2 week(s) in-person, or earlier for new or changing lesions (skin exam)  Follow-up: scheduled for 11/26/24-hair loss    Staff and Scribe:     Scribe Disclosure:   I, Kelle Mosquera, am serving as a scribe; to document services personally performed by Barbara Soto MD -based on data collection and the provider's statements to me.     Provider Disclosure:  I agree with above History, Review of Systems, Physical exam and Plan.  I have reviewed the content of the documentation and have edited it as needed. I have personally performed the services documented here and the documentation accurately represents those services and the decisions I have made.      Electronically signed by:  ***   ____________________________________________    CC: Hair Loss (HL, 6 week follow up, AOC-whole scalp)    HPI:  Ms. Ester Barba is a 78 year old female who presents as a return patient for follow-up of hair loss, diagnosed as Lichen planopilaris .   - Last seen in-clinic on 7/12/24  - Shedding or thinning, or both: shedding  - Current tx: clobetasol shampoo    no Any new medications, supplements, or products? (please list below)     no Scalp pain   no Scalp burning   mild Scalp itching     no Eyebrow changes    no Eyelash changes   no Beard changes    no Other body hair changes    no Nail changes    no Additional symptoms? (please list below)     - Overall course: Hair is shedding. Using clobetasol shampoo twice a week. She feels that the ILK injections are helpful  She is having knee surgery       Patient is otherwise feeling well, in usual state of health, and has no additional skin concerns today.       Labs:  None reviewed.    Physical Exam:  Vitals: /73   Pulse 60   SpO2 97%   GEN: Well developed, well-nourished, in no acute distress, in a pleasant mood.    SKIN: Focused examination of face, scalp was performed.    - no diffuse erythema   - no perifollicular erythema  - mild perifollicular scale   - no scaling of the scalp   -  negative hair pull test   -  normal eyelash density  -  normal eyebrow density  -  no nail pitting or dystrophy   - no scalp folliculitis/pustules   - In comparison to prior photographs, none  - There are waxy stuck on tan to brown papules on the forehead..   - No other lesions of concern on areas examined.         Medications:  Current Outpatient Medications   Medication Sig Dispense Refill    acetaminophen (TYLENOL) 500 MG tablet Take 500-1,000 mg by mouth every 6 hours as needed for mild pain      Biotin 10 MG TABS tablet       Calcium 1500 MG TABS       Cholecalciferol (VITAMIN D) 1000 UNIT capsule Total 2200 units daily.      clobetasol (TEMOVATE) 0.05 % external ointment Apply twice daily every other day alternating with the mupirocin ointment to affected area on the scalp 60 g 1    clobetasol propionate (CLOBEX) 0.05 % external shampoo APPLY TO DRY SCALP EVERY OTHER DAY. LEAVE ON FOR 15 MINUTES, THEN LATHER AND RINSE. 118 mL 2    desonide (DESOWEN) 0.05 % external cream Mix half and half with ketoconazole cream and apply twice daily as needed 60 g 1    esomeprazole (NEXIUM) 20 MG packet Take 20 mg by mouth every morning (before breakfast) Take 30-60  minutes before eating.      Fluocinolone Acetonide Scalp 0.01 % OIL oil APPLY ONCE A WEEK TO SCALP FOR LICHEN PLANOPILARIS 118.28 mL 1    glucosamine-chondroitin 500-400 MG CAPS per capsule Take 1 capsule by mouth      hydrocortisone 2.5 % cream Use twice daily as needed on involved areas 30 g 4    ketoconazole (NIZORAL) 2 % external cream Mix half and half with desonide cream and apply twice a day as needed 60 g 1    levalbuterol (XOPENEX HFA) 45 MCG/ACT inhaler Inhale 2 puffs into the lungs every 4 hours as needed for shortness of breath or wheezing 15 g 4    Loratadine (CLARITIN PO) Take  by mouth.      mupirocin (BACTROBAN) 2 % external ointment Use 2 times a day every other day alternating with clobetasol ointment to affected area. 30 g 3    NEW MED Biest 1.5mg/gram cream(pg free)  Insert 1 gram vaginally twice weekly as directed 40 g 1    tretinoin (RETIN-A) 0.025 % external cream APPLY PEA-SIZED AMOUNT TO AFFECTED AREA(S) ON FACE EVERY OTHER NIGHT AS TOLERATED 45 g 11    BIESTROGEN,20-80, 2.5 MG/GM CREAM Apply 0.5 g topically 2 times daily. 60 g 11    cefdinir (OMNICEF) 300 MG capsule       COMPOUNDED NON-CONTROLLED SUBSTANCE (CMPD RX) - PHARMACY TO MIX COMPOUNDED MEDICATION Place 1 Application. vaginally twice a week Compounded with estrogen 1 g 3    ibuprofen (ADVIL,MOTRIN) 800 MG tablet Take 800 mg by mouth every 8 hours as needed       Current Facility-Administered Medications   Medication Dose Route Frequency Provider Last Rate Last Admin    hydrocortisone 2.5 % cream   Topical BID Barbara Soto MD        lidocaine 1% with EPINEPHrine 1:100,000 injection 3 mL  3 mL Intradermal Once Barbara Soto MD        triamcinolone acetonide (KENALOG-10) injection 10 mg  10 mg Intra-Lesional Once         triamcinolone acetonide (KENALOG-10) injection 10 mg  10 mg Intra-Lesional Once Barbara Soto MD        triamcinolone acetonide (KENALOG-10) injection 10 mg  10 mg  Intra-Lesional Once Barbara Soto MD        triamcinolone acetonide (KENALOG-10) injection 10 mg  10 mg Intra-Lesional Once Barbara Soto MD        triamcinolone acetonide (KENALOG-10) injection 10 mg  10 mg Intra-Lesional Once Barbara Soto MD        triamcinolone acetonide (KENALOG-10) injection 17 mg  17 mg Intra-Lesional Once         triamcinolone acetonide (KENALOG-10) injection 17 mg  17 mg Intra-Lesional Once Barbara Soto MD        triamcinolone acetonide (KENALOG-10) injection 17 mg  17 mg Intra-Lesional Once Barbara Soto MD        triamcinolone acetonide (KENALOG-10) injection 18 mg  18 mg Intra-Lesional Once Barbara Soto MD        triamcinolone acetonide (KENALOG-10) injection 20 mg  20 mg Intra-Lesional Once           Past Medical History:   Patient Active Problem List   Diagnosis    Porokeratosis    Squamous cell carcinoma    Neoplasm of uncertain behavior    Lichen planopilaris    Dermatitis    Cicatricial alopecia    Keratosis, inflamed seborrheic    History of skin cancer    Basal cell carcinoma of vertex scalp s/p mohs 6-5-15    Medication management    Actinic keratosis    Medication monitoring encounter    Dermatitis, seborrheic    Erosive pustular dermatosis    Tick bite, initial encounter    Vulvar atrophy    Factor V Leiden mutation (H24)    Splenic artery aneurysm (H24)    Post concussion syndrome    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    encounter.

## 2024-09-25 ENCOUNTER — TELEPHONE (OUTPATIENT)
Dept: DERMATOLOGY | Facility: CLINIC | Age: 79
End: 2024-09-25
Payer: MEDICARE

## 2024-09-25 NOTE — TELEPHONE ENCOUNTER
10/8 appointment is in [facility] for skin check per Charles's 9/24/24 visit note    Writer rescheduled 11/26/24 visit to 11/5/24 at [facility] (patient requested q 6 wk visits with Charles)    *No availability for December 2024 visits*at [facility] advised patient to check with [facility]    Pascale Dinh LPN

## 2024-09-25 NOTE — TELEPHONE ENCOUNTER
[facility] Call Center    Phone Message    May a detailed message be left on voicemail: no     Reason for Call: Other: Ester Jin calling about 10/8 appt- Call [phone]     Action Taken: Message routed to:  [facility]    Travel Screening: Not Applicable     Date of Service:

## 2024-09-25 NOTE — TELEPHONE ENCOUNTER
[facility] does not have any availability in December. Pt will need to wait until her January appointment or she can schedule a follow up with a PA.

## 2024-09-25 NOTE — TELEPHONE ENCOUNTER
Called the patient and told her that [facility] and the [facility] doesn't have any openings in Dec. Patient said ok and then hung up.       Amber EMT

## 2024-09-25 NOTE — TELEPHONE ENCOUNTER
[facility] Call Center    Phone Message    May a detailed message be left on voicemail: yes     Reason for Call: Appointment Intake    Referring Provider Name: Dr. Soto  Diagnosis and/or Symptoms: 6 week recurring follow up. Pt states she needs appts thi 6 weeks at [place] [facility] [place]   . Pt needs a December visit.     Please call back to discuss/schedule.     Thank you.     Action Taken: Message routed to:  [facility] ([facility] Derm    Travel Screening: Not Applicable     Date of Service:

## 2024-09-25 NOTE — TELEPHONE ENCOUNTER
[facility] Call Center    Phone Message    May a detailed message be left on voicemail: yes     Reason for Call: Other: Patient noticed her 6 week follow up is in 2 weeks. So 10/8 instead of the last week of October. Patient is wondering if there is anyway that her 10/8 appt. Can be changed so it is 6 weeks from her last appointment which was 9/24. Please call patient back to discuss. Thanks.      Action Taken: te sent    Travel Screening: Not Applicable     Date of Service:

## 2024-09-25 NOTE — TELEPHONE ENCOUNTER
I called and spoke to Ester. I advised that since she pre-scheduled her appointments last December if we move her October appointment then it would affect the time interval between her following appointments. I also informed patient that Dr. Soto is not in clinic on 10/22 or 10/29. Pt will call the [facility] to try to find an alternate appointment. Patient would like to keep her 10/8 appointment at this time.     Meagan Soto RN on 9/25/2024 at 9:28 AM

## 2024-09-26 DIAGNOSIS — Z12.31 ENCOUNTER FOR SCREENING MAMMOGRAM FOR BREAST CANCER: Primary | ICD-10-CM

## 2024-10-07 NOTE — PROGRESS NOTES
[facility] Dermatology Note  Encounter Date: Oct 8, 2024  Office Visit     Dermatology Problem List:  Last FSE: 10/8/24  1. 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 Bx 8/31/21 revealed lichenoid keratosis of the vertex scalp.  - Current tx: ILK10 injections, clobetasol propionate 0.05% shampoo alternating w/ zinc shampoo every other day, fluocinolone oil once weekly,Duoderm to vertex plaque  - Previous tx: plaquenil w/ significant improvement (~15 yrs; discontinued 12/2019 with concerns for retinopathy by local doc in [facility]; now followed by Dr. Radford here without signs of Plaquenil toxicity 1/2020).  - LPPAI score: 3.83 on 3/28/2023, 1 on 5/2/23, 1.33 on 11/07/2023 1.33 3/29/24, 1 on 5/10/24, .67 on 7/12/24; 0.67 (9/24/24)  2. 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 Skin infection, vertex scalp at prior site of MMS - resolved  - s/p doxycycline 100 mg BID and mupirocin ointment BID   4. History of NMSC  - BCC: right forehead:  s/p shave bx'd 7/12/24  - BCC - front vertex scalp, s/p Mohs with purse string and granulation, 3/30/22  - BCC - forehead, s/p MMS 6/9/21  - SCCis - R vertex scalp, s/p MMS 3/2/21, bacterial cx obtained from site 3/23/21  - BCC - right nasal ala s/p Mohs 11/27/17  - Nodular BCC - vertex scalp s/p Mohs 6/5/15  - SCC - scalp, s/p Mohs 4/18/2014  - Hx of 1-2 other NMSC on scalp (BCCs)  5. AKs - bilateral temples, cheeks  6. Inflamed Sks  - s/p cryo  ____________________________________________    Assessment & Plan:     # Lichen planopilaris, stable/improved, LPPAI score 0.67 today (previously was 1).   # Erosive pustular dermatosis, crusted plaque. Improved.  Overall stable/improved on exam today in terms of hair loss and central plaque on scalp appears improved and less crusted compared to prior. Her scalp has less scale and erythema compared to prior visits overall but there is mild  diffuse erythema and perifollicular scale.  - IL-K injections performed today (see procedure note(s) below).  - Continue clobetasol shampoo with increased frequency to twice weekly  - continue fluocinolone oil daily  - Continue DuoDerm 2-3 days on 2-3 days off, Vaseline       # History of BCC-right forehead.  - Well  healed scar  - recommend yearly skin exam    # Venous Stasis-lower legs.  # Xerosis  -  Moisturize: Recommend good OTC moisturizer to full body, such as Eucerin cream. Advised best to apply after bathing to lock in moisture.  - recommend ammonia lactate cream       # ISK x4.   - Cryotherapy performed today (see procedure note(s) below).         Procedures Performed:   - Cryotherapy procedure note, location(s): right upper back and left shoulder, mid back. After verbal consent and discussion of risks and benefits including, but not limited to, dyspigmentation/scar, blister, and pain, 4 lesion(s) was(were) treated with 1-2 mm freeze border for 1-2 cycles with liquid nitrogen. Post cryotherapy instructions were provided.    - Intra-lesional triamcinolone procedure note. After verbal consent and review of risk of pain and skin thinning/atrophy, positioning and cleansing with isopropyl alcohol, 0.85 total mL of triamcinolone 10 mg/mL was injected into 10  lesion(s) on the scalp on the left and right vertex region. The patient tolerated the procedure well and left the dermatology clinic in good condition.    Follow-up: 1 month(s) in-person, or earlier for new or changing lesions    Staff and Scribe:     Scribe Disclosure:   I, Kelle Mosquera, am serving as a scribe; to document services personally performed by Barbara Soto MD -based on data collection and the provider's statements to me.     Provider Disclosure:  I agree with above History, Review of Systems, Physical exam and Plan.  I have reviewed the content of the documentation and have edited it as needed. I have personally performed the  services documented here and the documentation accurately represents those services and the decisions I have made.      Electronically signed by:  Barbara Soto MD  Professor   Department of Dermatology  [facility]    ____________________________________________    CC: Skin Check (Patient is here for a skin check, areas of concern none, HX of SCC)    HPI:  Ms. Ester Barba is a 78 year old female who presents as a return patient for follow-up of hair loss, diagnosed as Lichen planopilaris .   - Last seen in-clinic on 9/24/24  - Shedding or thinning, or both: none  - Current tx: clobetasol shampoo 2-3 times a week, fluocinolone oil once a day, duoderm daily with vaseline,     no Any new medications, supplements, or products? (please list below)     no Scalp pain   no Scalp burning   no Scalp itching    no Eyebrow changes    no Eyelash changes   no Beard changes    no Other body hair changes    no Nail changes    no Additional symptoms? (please list below)     - Overall course:   She does have some spots on her back that are  pruritic and bothersome. She is using a daily moisturizer to her body.        Patient is otherwise feeling well, in usual state of health, and has no additional skin concerns today.     Labs:  None reviewed.    Physical Exam:  GEN: Well developed, well-nourished, in no acute distress, in a pleasant mood.    SKIN: Focused examination of face and scalp was performed.  Examination of the face, scalp, ears, eyelids, lips, neck, chest, back, abdomen, hands, feet, upper and lower extremities was performed. - decreased crusting seen today  - no diffuse erythema   - mild perifollicular erythema  - mild perifollicular scale   - no diffuse scaling of the scalp   - negative hair pull test   - normal eyelash density  - normal eyebrow density  - no nail pitting or dystrophy   - no scalp folliculitis/pustules   - Mutiple regular brown pigmented macules and papules are identified on the  head/neck, trunk, extremities.   - Scattered brown macules on sun exposed areas.  - There are waxy stuck on pigmented and non-pigmented  papules on the head/neck, trunk, extremities. Left shoulder, left mid back, right   - Large area of scarring hair loss on the mid scalp with scattered areas of crust; perifollicular erythema and scale at the periphery  - vascular malformation on l  - verrucous keratosis on left knee   - No other lesions of concern on areas examined.       Medications:  Current Outpatient Medications   Medication Sig Dispense Refill    acetaminophen (TYLENOL) 500 MG tablet Take 500-1,000 mg by mouth every 6 hours as needed for mild pain      Biotin 10 MG TABS tablet       Calcium 1500 MG TABS       Cholecalciferol (VITAMIN D) 1000 UNIT capsule Total 2200 units daily.      clobetasol (TEMOVATE) 0.05 % external ointment Apply twice daily every other day alternating with the mupirocin ointment to affected area on the scalp 60 g 1    clobetasol propionate (CLOBEX) 0.05 % external shampoo APPLY TO DRY SCALP EVERY OTHER DAY. LEAVE ON FOR 15 MINUTES, THEN LATHER AND RINSE. 118 mL 2    desonide (DESOWEN) 0.05 % external cream Mix half and half with ketoconazole cream and apply twice daily as needed 60 g 1    esomeprazole (NEXIUM) 20 MG packet Take 20 mg by mouth every morning (before breakfast) Take 30-60 minutes before eating.      Fluocinolone Acetonide Scalp 0.01 % OIL oil Apply once a week to scalp for lichen planopilaris 118.28 mL 1    glucosamine-chondroitin 500-400 MG CAPS per capsule Take 1 capsule by mouth      hydrocortisone 2.5 % cream Use twice daily as needed on involved areas 30 g 4    ketoconazole (NIZORAL) 2 % external cream Mix half and half with desonide cream and apply twice a day as needed 60 g 1    levalbuterol (XOPENEX HFA) 45 MCG/ACT inhaler Inhale 2 puffs into the lungs every 4 hours as needed for shortness of breath or wheezing 15 g 4    Loratadine (CLARITIN PO) Take  by mouth.       mupirocin (BACTROBAN) 2 % external ointment Use 2 times a day every other day alternating with clobetasol ointment to affected area. 30 g 3    NEW MED Biest 1.5mg/gram cream(pg free)  Insert 1 gram vaginally twice weekly as directed 40 g 1    tretinoin (RETIN-A) 0.025 % external cream APPLY PEA-SIZED AMOUNT TO AFFECTED AREA(S) ON FACE EVERY OTHER NIGHT AS TOLERATED 45 g 11    BIESTROGEN,20-80, 2.5 MG/GM CREAM Apply 0.5 g topically 2 times daily. 60 g 11    cefdinir (OMNICEF) 300 MG capsule       COMPOUNDED NON-CONTROLLED SUBSTANCE (CMPD RX) - PHARMACY TO MIX COMPOUNDED MEDICATION Place 1 Application. vaginally twice a week Compounded with estrogen 1 g 3    ibuprofen (ADVIL,MOTRIN) 800 MG tablet Take 800 mg by mouth every 8 hours as needed       Current Facility-Administered Medications   Medication Dose Route Frequency Provider Last Rate Last Admin    hydrocortisone 2.5 % cream   Topical BID Barbara Soto MD        lidocaine 1% with EPINEPHrine 1:100,000 injection 3 mL  3 mL Intradermal Once Barbara Soto MD        triamcinolone acetonide (KENALOG-10) injection 10 mg  10 mg Intra-Lesional Once         triamcinolone acetonide (KENALOG-10) injection 10 mg  10 mg Intra-Lesional Once Barbara Soto MD        triamcinolone acetonide (KENALOG-10) injection 10 mg  10 mg Intra-Lesional Once Barbara Soto MD        triamcinolone acetonide (KENALOG-10) injection 10 mg  10 mg Intra-Lesional Once Barbara Soto MD        triamcinolone acetonide (KENALOG-10) injection 10 mg  10 mg Intra-Lesional Once Barbara Soto MD        triamcinolone acetonide (KENALOG-10) injection 17 mg  17 mg Intra-Lesional Once         triamcinolone acetonide (KENALOG-10) injection 17 mg  17 mg Intra-Lesional Once Barbara Soto MD        triamcinolone acetonide (KENALOG-10) injection 17 mg  17 mg Intra-Lesional Once Barbara Soto MD         triamcinolone acetonide (KENALOG-10) injection 18 mg  18 mg Intra-Lesional Once Barbara Soto MD        triamcinolone acetonide (KENALOG-10) injection 20 mg  20 mg Intra-Lesional Once           Past Medical History:   Patient Active Problem List   Diagnosis    Porokeratosis    Squamous cell carcinoma    Neoplasm of uncertain behavior    Lichen planopilaris    Dermatitis    Cicatricial alopecia    Keratosis, inflamed seborrheic    History of skin cancer    Basal cell carcinoma of vertex scalp s/p mohs 6-5-15    Medication management    Actinic keratosis    Medication monitoring encounter    Dermatitis, seborrheic    Erosive pustular dermatosis    Tick bite, initial encounter    Vulvar atrophy    Factor V Leiden mutation (H)    Splenic artery aneurysm (H)    Post concussion syndrome    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

## 2024-10-08 ENCOUNTER — OFFICE VISIT (OUTPATIENT)
Dept: DERMATOLOGY | Facility: CLINIC | Age: 79
End: 2024-10-08
Payer: MEDICARE

## 2024-10-08 DIAGNOSIS — I87.2 VENOUS INSUFFICIENCY OF LOWER EXTREMITY: ICD-10-CM

## 2024-10-08 DIAGNOSIS — L98.8 EROSIVE PUSTULAR DERMATOSIS: ICD-10-CM

## 2024-10-08 DIAGNOSIS — L82.1 SEBORRHEIC KERATOSES: ICD-10-CM

## 2024-10-08 DIAGNOSIS — L66.10 LICHEN PLANOPILARIS: Primary | ICD-10-CM

## 2024-10-08 DIAGNOSIS — L85.3 XEROSIS OF SKIN: ICD-10-CM

## 2024-10-08 DIAGNOSIS — Z85.828 HISTORY OF BASAL CELL CARCINOMA: ICD-10-CM

## 2024-10-08 DIAGNOSIS — L82.0 INFLAMED SEBORRHEIC KERATOSIS: ICD-10-CM

## 2024-10-08 PROCEDURE — 17110 DESTRUCTION B9 LES UP TO 14: CPT | Performed by: DERMATOLOGY

## 2024-10-08 PROCEDURE — 99213 OFFICE O/P EST LOW 20 MIN: CPT | Mod: 25 | Performed by: DERMATOLOGY

## 2024-10-08 PROCEDURE — 11901 INJECT SKIN LESIONS >7: CPT | Mod: XS | Performed by: DERMATOLOGY

## 2024-10-08 RX ORDER — AMMONIUM LACTATE 12 G/100G
CREAM TOPICAL
Qty: 385 G | Refills: 1 | Status: SHIPPED | OUTPATIENT
Start: 2024-10-08

## 2024-10-08 NOTE — NURSING NOTE
Ester Barba's goals for this visit include:   Chief Complaint   Patient presents with    Skin Check     Patient is here for a skin check, areas of concern none, HX of SCC       She requests these members of her care team be copied on today's visit information:     PCP: Winnie Sepulveda    Referring Provider:  Referred Self, MD  No address on file    There were no vitals taken for this visit.    Do you need any medication refills at today's visit?       Amber Baird EMT

## 2024-10-08 NOTE — LETTER
10/8/2024      Ester Barba  [address] [place] [address]      Dear Colleague,    Thank you for referring your patient, Ester Barba, to the [facility]. Please see a copy of my visit note below.    [facility] Dermatology Note  Encounter Date: Oct 8, 2024  Office Visit     Dermatology Problem List:  Last FSE: 10/8/24  1. 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 Bx 8/31/21 revealed lichenoid keratosis of the vertex scalp.  - Current tx: ILK10 injections, clobetasol propionate 0.05% shampoo alternating w/ zinc shampoo every other day, fluocinolone oil once weekly,Duoderm to vertex plaque  - Previous tx: plaquenil w/ significant improvement (~15 yrs; discontinued 12/2019 with concerns for retinopathy by local doc in [facility]; now followed by Dr. Radford here without signs of Plaquenil toxicity 1/2020).  - LPPAI score: 3.83 on 3/28/2023, 1 on 5/2/23, 1.33 on 11/07/2023 1.33 3/29/24, 1 on 5/10/24, .67 on 7/12/24; 0.67 (9/24/24)  2. 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 Skin infection, vertex scalp at prior site of MMS - resolved  - s/p doxycycline 100 mg BID and mupirocin ointment BID   4. History of NMSC  - BCC: right forehead:  s/p shave bx'd 7/12/24  - BCC - front vertex scalp, s/p Mohs with purse string and granulation, 3/30/22  - BCC - forehead, s/p MMS 6/9/21  - SCCis - R vertex scalp, s/p MMS 3/2/21, bacterial cx obtained from site 3/23/21  - BCC - right nasal ala s/p Mohs 11/27/17  - Nodular BCC - vertex scalp s/p Mohs 6/5/15  - SCC - scalp, s/p Mohs 4/18/2014  - Hx of 1-2 other NMSC on scalp (BCCs)  5. AKs - bilateral temples, cheeks  6. Inflamed Sks  - s/p cryo  ____________________________________________    Assessment & Plan:     # Lichen planopilaris, stable/improved, LPPAI score 0.67 today (previously was 1).   # Erosive pustular dermatosis, crusted plaque.  Improved.  Overall stable/improved on exam today in terms of hair loss and central plaque on scalp appears improved and less crusted compared to prior. Her scalp has less scale and erythema compared to prior visits overall but there is mild diffuse erythema and perifollicular scale.  - IL-K injections performed today (see procedure note(s) below).  - Continue clobetasol shampoo with increased frequency to twice weekly  - continue fluocinolone oil daily  - Continue DuoDerm 2-3 days on 2-3 days off, Vaseline       # History of BCC-right forehead.  - Well  healed scar  - recommend yearly skin exam    # Venous Stasis-lower legs.  # Xerosis  -  Moisturize: Recommend good OTC moisturizer to full body, such as Eucerin cream. Advised best to apply after bathing to lock in moisture.  - recommend ammonia lactate cream       # ISK x4.   - Cryotherapy performed today (see procedure note(s) below).         Procedures Performed:   - Cryotherapy procedure note, location(s): right upper back and left shoulder, mid back. After verbal consent and discussion of risks and benefits including, but not limited to, dyspigmentation/scar, blister, and pain, 4 lesion(s) was(were) treated with 1-2 mm freeze border for 1-2 cycles with liquid nitrogen. Post cryotherapy instructions were provided.    - Intra-lesional triamcinolone procedure note. After verbal consent and review of risk of pain and skin thinning/atrophy, positioning and cleansing with isopropyl alcohol, 0.85 total mL of triamcinolone 10 mg/mL was injected into 10  lesion(s) on the scalp on the left and right vertex region. The patient tolerated the procedure well and left the dermatology clinic in good condition.    Follow-up: 1 month(s) in-person, or earlier for new or changing lesions    Staff and Scribe:     Scribe Disclosure:   Kelle SALDIVAR, am serving as a scribe; to document services personally performed by Barbara Soto MD -based on data collection and the  provider's statements to me.     Provider Disclosure:  I agree with above History, Review of Systems, Physical exam and Plan.  I have reviewed the content of the documentation and have edited it as needed. I have personally performed the services documented here and the documentation accurately represents those services and the decisions I have made.      Electronically signed by:  Barbara Soto MD  Professor   Department of Dermatology  [facility]    ____________________________________________    CC: Skin Check (Patient is here for a skin check, areas of concern none, HX of SCC)    HPI:  Ms. Ester Barba is a 78 year old female who presents as a return patient for follow-up of hair loss, diagnosed as Lichen planopilaris .   - Last seen in-clinic on 9/24/24  - Shedding or thinning, or both: none  - Current tx: clobetasol shampoo 2-3 times a week, fluocinolone oil once a day, duoderm daily with vaseline,     no Any new medications, supplements, or products? (please list below)     no Scalp pain   no Scalp burning   no Scalp itching    no Eyebrow changes    no Eyelash changes   no Beard changes    no Other body hair changes    no Nail changes    no Additional symptoms? (please list below)     - Overall course:   She does have some spots on her back that are  pruritic and bothersome. She is using a daily moisturizer to her body.        Patient is otherwise feeling well, in usual state of health, and has no additional skin concerns today.     Labs:  None reviewed.    Physical Exam:  GEN: Well developed, well-nourished, in no acute distress, in a pleasant mood.    SKIN: Focused examination of face and scalp was performed.  Examination of the face, scalp, ears, eyelids, lips, neck, chest, back, abdomen, hands, feet, upper and lower extremities was performed. - decreased crusting seen today  - no diffuse erythema   - mild perifollicular erythema  - mild perifollicular scale   - no diffuse scaling of the  scalp   - negative hair pull test   - normal eyelash density  - normal eyebrow density  - no nail pitting or dystrophy   - no scalp folliculitis/pustules   - Mutiple regular brown pigmented macules and papules are identified on the head/neck, trunk, extremities.   - Scattered brown macules on sun exposed areas.  - There are waxy stuck on pigmented and non-pigmented  papules on the head/neck, trunk, extremities. Left shoulder, left mid back, right   - Large area of scarring hair loss on the mid scalp with scattered areas of crust; perifollicular erythema and scale at the periphery  - vascular malformation on l  - verrucous keratosis on left knee   - No other lesions of concern on areas examined.       Medications:  Current Outpatient Medications   Medication Sig Dispense Refill    acetaminophen (TYLENOL) 500 MG tablet Take 500-1,000 mg by mouth every 6 hours as needed for mild pain      Biotin 10 MG TABS tablet       Calcium 1500 MG TABS       Cholecalciferol (VITAMIN D) 1000 UNIT capsule Total 2200 units daily.      clobetasol (TEMOVATE) 0.05 % external ointment Apply twice daily every other day alternating with the mupirocin ointment to affected area on the scalp 60 g 1    clobetasol propionate (CLOBEX) 0.05 % external shampoo APPLY TO DRY SCALP EVERY OTHER DAY. LEAVE ON FOR 15 MINUTES, THEN LATHER AND RINSE. 118 mL 2    desonide (DESOWEN) 0.05 % external cream Mix half and half with ketoconazole cream and apply twice daily as needed 60 g 1    esomeprazole (NEXIUM) 20 MG packet Take 20 mg by mouth every morning (before breakfast) Take 30-60 minutes before eating.      Fluocinolone Acetonide Scalp 0.01 % OIL oil Apply once a week to scalp for lichen planopilaris 118.28 mL 1    glucosamine-chondroitin 500-400 MG CAPS per capsule Take 1 capsule by mouth      hydrocortisone 2.5 % cream Use twice daily as needed on involved areas 30 g 4    ketoconazole (NIZORAL) 2 % external cream Mix half and half with desonide cream  and apply twice a day as needed 60 g 1    levalbuterol (XOPENEX HFA) 45 MCG/ACT inhaler Inhale 2 puffs into the lungs every 4 hours as needed for shortness of breath or wheezing 15 g 4    Loratadine (CLARITIN PO) Take  by mouth.      mupirocin (BACTROBAN) 2 % external ointment Use 2 times a day every other day alternating with clobetasol ointment to affected area. 30 g 3    NEW MED Biest 1.5mg/gram cream(pg free)  Insert 1 gram vaginally twice weekly as directed 40 g 1    tretinoin (RETIN-A) 0.025 % external cream APPLY PEA-SIZED AMOUNT TO AFFECTED AREA(S) ON FACE EVERY OTHER NIGHT AS TOLERATED 45 g 11    BIESTROGEN,20-80, 2.5 MG/GM CREAM Apply 0.5 g topically 2 times daily. 60 g 11    cefdinir (OMNICEF) 300 MG capsule       COMPOUNDED NON-CONTROLLED SUBSTANCE (CMPD RX) - PHARMACY TO MIX COMPOUNDED MEDICATION Place 1 Application. vaginally twice a week Compounded with estrogen 1 g 3    ibuprofen (ADVIL,MOTRIN) 800 MG tablet Take 800 mg by mouth every 8 hours as needed       Current Facility-Administered Medications   Medication Dose Route Frequency Provider Last Rate Last Admin    hydrocortisone 2.5 % cream   Topical BID Barbara Soto MD        lidocaine 1% with EPINEPHrine 1:100,000 injection 3 mL  3 mL Intradermal Once Barbara Soto MD        triamcinolone acetonide (KENALOG-10) injection 10 mg  10 mg Intra-Lesional Once         triamcinolone acetonide (KENALOG-10) injection 10 mg  10 mg Intra-Lesional Once Barbara Soto MD        triamcinolone acetonide (KENALOG-10) injection 10 mg  10 mg Intra-Lesional Once Barbara Soto MD        triamcinolone acetonide (KENALOG-10) injection 10 mg  10 mg Intra-Lesional Once Barbara Soto MD        triamcinolone acetonide (KENALOG-10) injection 10 mg  10 mg Intra-Lesional Once Barbara Soto MD        triamcinolone acetonide (KENALOG-10) injection 17 mg  17 mg Intra-Lesional Once          triamcinolone acetonide (KENALOG-10) injection 17 mg  17 mg Intra-Lesional Once Barbara Soto MD        triamcinolone acetonide (KENALOG-10) injection 17 mg  17 mg Intra-Lesional Once Barbara Soto MD        triamcinolone acetonide (KENALOG-10) injection 18 mg  18 mg Intra-Lesional Once Barbara Soto MD        triamcinolone acetonide (KENALOG-10) injection 20 mg  20 mg Intra-Lesional Once           Past Medical History:   Patient Active Problem List   Diagnosis    Porokeratosis    Squamous cell carcinoma    Neoplasm of uncertain behavior    Lichen planopilaris    Dermatitis    Cicatricial alopecia    Keratosis, inflamed seborrheic    History of skin cancer    Basal cell carcinoma of vertex scalp s/p mohs 6-5-15    Medication management    Actinic keratosis    Medication monitoring encounter    Dermatitis, seborrheic    Erosive pustular dermatosis    Tick bite, initial encounter    Vulvar atrophy    Factor V Leiden mutation (H)    Splenic artery aneurysm (H)    Post concussion syndrome    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      Sincerely,    Barbara Soto MD

## 2024-10-11 NOTE — PROGRESS NOTES
[facility] Dermatology Note  Encounter Date: Oct 14, 2024  Store-and-Forward and Telephone ([phone]). Location of teledermatologist: [facility].  Start time: 11:59am. End time: 12:07pm.    Dermatology Problem List:  Last FSE: 10/8/24  1. 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 Bx 8/31/21 revealed lichenoid keratosis of the vertex scalp.  - Current tx: ILK10 injections, clobetasol propionate 0.05% shampoo alternating w/ zinc shampoo every other day, fluocinolone oil once weekly,Duoderm to vertex plaque  - Previous tx: plaquenil w/ significant improvement (~15 yrs; discontinued 12/2019 with concerns for retinopathy by local doc in [facility]; now followed by Dr. Radford here without signs of Plaquenil toxicity 1/2020).  - LPPAI score: 3.83 on 3/28/2023, 1 on 5/2/23, 1.33 on 11/07/2023 1.33 3/29/24, 1 on 5/10/24, .67 on 7/12/24; 0.67 (9/24/24)  2. 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 Skin infection, vertex scalp at prior site of MMS - resolved  - s/p doxycycline 100 mg BID and mupirocin ointment BID   4. History of NMSC  - BCC: right forehead:  s/p shave bx'd 7/12/24, s/p MOHS 8/26/24  - BCC - front vertex scalp, s/p Mohs with purse string and granulation, 3/30/22  - BCC - forehead, s/p MMS 6/9/21  - SCCis - R vertex scalp, s/p MMS 3/2/21, bacterial cx obtained from site 3/23/21  - BCC - right nasal ala s/p Mohs 11/27/17  - Nodular BCC - vertex scalp s/p Mohs 6/5/15  - SCC - scalp, s/p Mohs 4/18/2014  - Hx of 1-2 other NMSC on scalp (BCCs)  5. AKs - bilateral temples, cheeks  6. Inflamed Sks  - s/p cryo    ____________________________________________    Assessment & Plan:     # Under eye swelling, possibly tissue  -referral to oculoplastics         Procedures Performed:    None    Follow-up: Dr. Temo Levine    Staff and Scribe:   Tristanibjefferson Disclosure:   IEsperanza, am serving as  a scribe to document services personally performed by Fern Perez MD based on data collection and the provider's statements to me.     Provider Disclosure:   The documentation recorded by the scribe accurately reflects the services I personally performed and the decisions made by me.    Fern Perez MD    Department of Dermatology  [facility]: Phone: [phone], Fax:[phone]  [facility]: Phone: [phone], Fax: [phone]   ____________________________________________    CC: Derm Problem (White bumps after Mohs surgery. Large bags under her eyes, she reports she's had them over the last year. )    HPI:  Ms. Ester Barba is a(n) 78 year old female who presents today as a new patient for cosmetic consult     Referred by Dr Soto for Under eye swelling and Facial papules in the setting of FFA    Pt reports swelling under right eyelid for the last year    Not itchyy. It is there all the time, worse in the am    Using retina    Has some swelling of right knee    Patient is otherwise feeling well, without additional skin concerns.    Labs Reviewed:  BMP and CMP    Physical Exam:  Vitals: There were no vitals taken for this visit.  SKIN: Teledermatology photos were reviewed; image quality and interpretability:  acceptable. Image date: 10/8/2024  - swelling under the eyes, unclear if fluid or tissue  - No other lesions of concern on areas examined.     Medications:  Current Outpatient Medications   Medication Sig Dispense Refill    acetaminophen (TYLENOL) 500 MG tablet Take 500-1,000 mg by mouth every 6 hours as needed for mild pain      ammonium lactate (AMLACTIN) 12 % external cream Apply to legs twice daily 385 g 1    Biotin 10 MG TABS tablet       Calcium 1500 MG TABS       Cholecalciferol (VITAMIN D) 1000 UNIT capsule Total 2200 units daily.      clobetasol (TEMOVATE) 0.05 %  external ointment Apply twice daily every other day alternating with the mupirocin ointment to affected area on the scalp 60 g 1    clobetasol propionate (CLOBEX) 0.05 % external shampoo APPLY TO DRY SCALP EVERY OTHER DAY. LEAVE ON FOR 15 MINUTES, THEN LATHER AND RINSE. 118 mL 2    desonide (DESOWEN) 0.05 % external cream Mix half and half with ketoconazole cream and apply twice daily as needed 60 g 1    esomeprazole (NEXIUM) 20 MG packet Take 20 mg by mouth every morning (before breakfast) Take 30-60 minutes before eating.      Fluocinolone Acetonide Scalp 0.01 % OIL oil Apply once a week to scalp for lichen planopilaris 118.28 mL 1    glucosamine-chondroitin 500-400 MG CAPS per capsule Take 1 capsule by mouth      hydrocortisone 2.5 % cream Use twice daily as needed on involved areas 30 g 4    ketoconazole (NIZORAL) 2 % external cream Mix half and half with desonide cream and apply twice a day as needed 60 g 1    levalbuterol (XOPENEX HFA) 45 MCG/ACT inhaler Inhale 2 puffs into the lungs every 4 hours as needed for shortness of breath or wheezing 15 g 4    Loratadine (CLARITIN PO) Take  by mouth.      mupirocin (BACTROBAN) 2 % external ointment Use 2 times a day every other day alternating with clobetasol ointment to affected area. 30 g 3    NEW MED Biest 1.5mg/gram cream(pg free)  Insert 1 gram vaginally twice weekly as directed 40 g 1    tretinoin (RETIN-A) 0.025 % external cream APPLY PEA-SIZED AMOUNT TO AFFECTED AREA(S) ON FACE EVERY OTHER NIGHT AS TOLERATED 45 g 11    BIESTROGEN,20-80, 2.5 MG/GM CREAM Apply 0.5 g topically 2 times daily. 60 g 11    cefdinir (OMNICEF) 300 MG capsule       COMPOUNDED NON-CONTROLLED SUBSTANCE (CMPD RX) - PHARMACY TO MIX COMPOUNDED MEDICATION Place 1 Application. vaginally twice a week Compounded with estrogen 1 g 3    ibuprofen (ADVIL,MOTRIN) 800 MG tablet Take 800 mg by mouth every 8 hours as needed       Current Facility-Administered Medications   Medication Dose Route  Frequency Provider Last Rate Last Admin    hydrocortisone 2.5 % cream   Topical BID Barbara Soto MD        lidocaine 1% with EPINEPHrine 1:100,000 injection 3 mL  3 mL Intradermal Once Barbara Soto MD        triamcinolone acetonide (KENALOG-10) injection 10 mg  10 mg Intra-Lesional Once         triamcinolone acetonide (KENALOG-10) injection 10 mg  10 mg Intra-Lesional Once Barbara Soto MD        triamcinolone acetonide (KENALOG-10) injection 10 mg  10 mg Intra-Lesional Once Barbara Soto MD        triamcinolone acetonide (KENALOG-10) injection 10 mg  10 mg Intra-Lesional Once Barbara Soto MD        triamcinolone acetonide (KENALOG-10) injection 10 mg  10 mg Intra-Lesional Once Barbara Soto MD        triamcinolone acetonide (KENALOG-10) injection 17 mg  17 mg Intra-Lesional Once         triamcinolone acetonide (KENALOG-10) injection 17 mg  17 mg Intra-Lesional Once Barbara Soto MD        triamcinolone acetonide (KENALOG-10) injection 17 mg  17 mg Intra-Lesional Once Barbara Soto MD        triamcinolone acetonide (KENALOG-10) injection 18 mg  18 mg Intra-Lesional Once Barbara Soto MD        triamcinolone acetonide (KENALOG-10) injection 20 mg  20 mg Intra-Lesional Once           Past Medical/Surgical History:   Patient Active Problem List   Diagnosis    Porokeratosis    Squamous cell carcinoma    Neoplasm of uncertain behavior    Lichen planopilaris    Dermatitis    Cicatricial alopecia    Keratosis, inflamed seborrheic    History of skin cancer    Basal cell carcinoma of vertex scalp s/p mohs 6-5-15    Medication management    Actinic keratosis    Medication monitoring encounter    Dermatitis, seborrheic    Erosive pustular dermatosis    Tick bite, initial encounter    Vulvar atrophy    Factor V Leiden mutation (H)    Splenic artery aneurysm (H)    Post concussion syndrome     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

## 2024-10-14 ENCOUNTER — VIRTUAL VISIT (OUTPATIENT)
Dept: DERMATOLOGY | Facility: CLINIC | Age: 79
End: 2024-10-14

## 2024-10-14 DIAGNOSIS — H02.849 SWELLING OF EYELID, UNSPECIFIED LATERALITY: Primary | ICD-10-CM

## 2024-10-14 PROCEDURE — 99441 PR PHYSICIAN TELEPHONE EVALUATION 5-10 MIN: CPT | Mod: 93 | Performed by: DERMATOLOGY

## 2024-10-14 ASSESSMENT — PAIN SCALES - GENERAL: PAINLEVEL: NO PAIN (0)

## 2024-10-14 NOTE — PROGRESS NOTES
Teledermatology Nurse Call Patients:     Are you in the [facility] at the time of the encounter? yes    Today's visit will be billed to you and your insurance.    A teledermatology visit is not as thorough as an in-person visit and the quality of the photograph sent may not be of the same quality as that taken by the dermatology clinic.  Enedelia Bray LPN

## 2024-10-14 NOTE — PATIENT INSTRUCTIONS
"    Board Certifications    Dr. Temo Levine    American Board of Ophthalmology  Fellowships  [facility] [place], [facility], [place], [place]  Residency  [employer] [facility]  Medical School  [employer] [facility] [employer]  Recognition  [place] [place] \"Top Doctor - Rising Star\" (2016, 2017, 2018)  [facility] [place]  Top Doctors  ([id number])  Guide to [place] Top Ophthalmologists  [facility] Department of Ophthalmology Fellow Research Award  Vinicio Ferris Award for Clinical Skills, Compassion, and Patient Care  [facility]   For Appointments [phone] at [place]    Desk B ENT to schedule at [place]   "

## 2024-10-14 NOTE — LETTER
10/14/2024      Ester Barba  [address] [place] [address]      Dear Colleague,    Thank you for referring your patient, Ester Barba, to the [facility]. Please see a copy of my visit note below.      [facility] Dermatology Note  Encounter Date: Oct 14, 2024  Store-and-Forward and Telephone ([phone]). Location of teledermatologist: [facility].  Start time: 11:59am. End time: 12:07pm.    Dermatology Problem List:  Last FSE: 10/8/24  1. 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 Bx 8/31/21 revealed lichenoid keratosis of the vertex scalp.  - Current tx: ILK10 injections, clobetasol propionate 0.05% shampoo alternating w/ zinc shampoo every other day, fluocinolone oil once weekly,Duoderm to vertex plaque  - Previous tx: plaquenil w/ significant improvement (~15 yrs; discontinued 12/2019 with concerns for retinopathy by local doc in [facility]; now followed by Dr. Radford here without signs of Plaquenil toxicity 1/2020).  - LPPAI score: 3.83 on 3/28/2023, 1 on 5/2/23, 1.33 on 11/07/2023 1.33 3/29/24, 1 on 5/10/24, .67 on 7/12/24; 0.67 (9/24/24)  2. 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 Skin infection, vertex scalp at prior site of MMS - resolved  - s/p doxycycline 100 mg BID and mupirocin ointment BID   4. History of NMSC  - BCC: right forehead:  s/p shave bx'd 7/12/24, s/p MOHS 8/26/24  - BCC - front vertex scalp, s/p Mohs with purse string and granulation, 3/30/22  - BCC - forehead, s/p MMS 6/9/21  - SCCis - R vertex scalp, s/p MMS 3/2/21, bacterial cx obtained from site 3/23/21  - BCC - right nasal ala s/p Mohs 11/27/17  - Nodular BCC - vertex scalp s/p Mohs 6/5/15  - SCC - scalp, s/p Mohs 4/18/2014  - Hx of 1-2 other NMSC on scalp (BCCs)  5. AKs - bilateral temples, cheeks  6. Inflamed Sks  - s/p  cryo    ____________________________________________    Assessment & Plan:     # Under eye swelling, possibly tissue  -referral to oculoplastics         Procedures Performed:    None    Follow-up: Dr. Temo Levine    Staff and Scribe:   Scribe Disclosure:   I, Esperanza Gera, am serving as a scribe to document services personally performed by Fern Perez MD based on data collection and the provider's statements to me.     Provider Disclosure:   The documentation recorded by the scribe accurately reflects the services I personally performed and the decisions made by me.    Fern Perez MD    Department of Dermatology  [facility]: Phone: [phone], Fax:[phone]  [facility]: Phone: [phone], Fax: [phone]   ____________________________________________    CC: Derm Problem (White bumps after Mohs surgery. Large bags under her eyes, she reports she's had them over the last year. )    HPI:  Ms. Ester Barba is a(n) 78 year old female who presents today as a new patient for cosmetic consult     Referred by Dr Soto for Under eye swelling and Facial papules in the setting of FFA    Pt reports swelling under right eyelid for the last year    Not itchyy. It is there all the time, worse in the am    Using retina    Has some swelling of right knee    Patient is otherwise feeling well, without additional skin concerns.    Labs Reviewed:  BMP and CMP    Physical Exam:  Vitals: There were no vitals taken for this visit.  SKIN: Teledermatology photos were reviewed; image quality and interpretability:  acceptable. Image date: 10/8/2024  - swelling under the eyes, unclear if fluid or tissue  - No other lesions of concern on areas examined.     Medications:  Current Outpatient Medications   Medication Sig Dispense Refill     acetaminophen (TYLENOL) 500 MG tablet Take 500-1,000 mg by mouth  every 6 hours as needed for mild pain       ammonium lactate (AMLACTIN) 12 % external cream Apply to legs twice daily 385 g 1     Biotin 10 MG TABS tablet        Calcium 1500 MG TABS        Cholecalciferol (VITAMIN D) 1000 UNIT capsule Total 2200 units daily.       clobetasol (TEMOVATE) 0.05 % external ointment Apply twice daily every other day alternating with the mupirocin ointment to affected area on the scalp 60 g 1     clobetasol propionate (CLOBEX) 0.05 % external shampoo APPLY TO DRY SCALP EVERY OTHER DAY. LEAVE ON FOR 15 MINUTES, THEN LATHER AND RINSE. 118 mL 2     desonide (DESOWEN) 0.05 % external cream Mix half and half with ketoconazole cream and apply twice daily as needed 60 g 1     esomeprazole (NEXIUM) 20 MG packet Take 20 mg by mouth every morning (before breakfast) Take 30-60 minutes before eating.       Fluocinolone Acetonide Scalp 0.01 % OIL oil Apply once a week to scalp for lichen planopilaris 118.28 mL 1     glucosamine-chondroitin 500-400 MG CAPS per capsule Take 1 capsule by mouth       hydrocortisone 2.5 % cream Use twice daily as needed on involved areas 30 g 4     ketoconazole (NIZORAL) 2 % external cream Mix half and half with desonide cream and apply twice a day as needed 60 g 1     levalbuterol (XOPENEX HFA) 45 MCG/ACT inhaler Inhale 2 puffs into the lungs every 4 hours as needed for shortness of breath or wheezing 15 g 4     Loratadine (CLARITIN PO) Take  by mouth.       mupirocin (BACTROBAN) 2 % external ointment Use 2 times a day every other day alternating with clobetasol ointment to affected area. 30 g 3     NEW MED Biest 1.5mg/gram cream(pg free)  Insert 1 gram vaginally twice weekly as directed 40 g 1     tretinoin (RETIN-A) 0.025 % external cream APPLY PEA-SIZED AMOUNT TO AFFECTED AREA(S) ON FACE EVERY OTHER NIGHT AS TOLERATED 45 g 11     BIESTROGEN,20-80, 2.5 MG/GM CREAM Apply 0.5 g topically 2 times daily. 60 g 11     cefdinir (OMNICEF) 300 MG capsule        COMPOUNDED  NON-CONTROLLED SUBSTANCE (CMPD RX) - PHARMACY TO MIX COMPOUNDED MEDICATION Place 1 Application. vaginally twice a week Compounded with estrogen 1 g 3     ibuprofen (ADVIL,MOTRIN) 800 MG tablet Take 800 mg by mouth every 8 hours as needed       Current Facility-Administered Medications   Medication Dose Route Frequency Provider Last Rate Last Admin     hydrocortisone 2.5 % cream   Topical BID Barbara Soto MD         lidocaine 1% with EPINEPHrine 1:100,000 injection 3 mL  3 mL Intradermal Once Barbara Soto MD         triamcinolone acetonide (KENALOG-10) injection 10 mg  10 mg Intra-Lesional Once          triamcinolone acetonide (KENALOG-10) injection 10 mg  10 mg Intra-Lesional Once Barbara Soto MD         triamcinolone acetonide (KENALOG-10) injection 10 mg  10 mg Intra-Lesional Once Barbara Soto MD         triamcinolone acetonide (KENALOG-10) injection 10 mg  10 mg Intra-Lesional Once Barbara Soto MD         triamcinolone acetonide (KENALOG-10) injection 10 mg  10 mg Intra-Lesional Once Barbara Soto MD         triamcinolone acetonide (KENALOG-10) injection 17 mg  17 mg Intra-Lesional Once          triamcinolone acetonide (KENALOG-10) injection 17 mg  17 mg Intra-Lesional Once Barbara Soto MD         triamcinolone acetonide (KENALOG-10) injection 17 mg  17 mg Intra-Lesional Once Barbara Soto MD         triamcinolone acetonide (KENALOG-10) injection 18 mg  18 mg Intra-Lesional Once Barbara Soto MD         triamcinolone acetonide (KENALOG-10) injection 20 mg  20 mg Intra-Lesional Once           Past Medical/Surgical History:   Patient Active Problem List   Diagnosis     Porokeratosis     Squamous cell carcinoma     Neoplasm of uncertain behavior     Lichen planopilaris     Dermatitis     Cicatricial alopecia     Keratosis, inflamed seborrheic     History of skin cancer      Basal cell carcinoma of vertex scalp s/p mohs 6-5-15     Medication management     Actinic keratosis     Medication monitoring encounter     Dermatitis, seborrheic     Erosive pustular dermatosis     Tick bite, initial encounter     Vulvar atrophy     Factor V Leiden mutation (H)     Splenic artery aneurysm (H)     Post concussion syndrome     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      Teledermatology Nurse Call Patients:     Are you in the [facility] at the time of the encounter? yes    Today's visit will be billed to you and your insurance.    A teledermatology visit is not as thorough as an in-person visit and the quality of the photograph sent may not be of the same quality as that taken by the dermatology clinic.  Enedelia Bray LPN      Again, thank you for allowing me to participate in the care of your patient.        Sincerely,        Fern Perez MD

## 2024-10-15 ENCOUNTER — ANCILLARY PROCEDURE (OUTPATIENT)
Dept: MAMMOGRAPHY | Facility: CLINIC | Age: 79
End: 2024-10-15
Attending: PHYSICIAN ASSISTANT
Payer: MEDICARE

## 2024-10-15 ENCOUNTER — TELEPHONE (OUTPATIENT)
Dept: PULMONOLOGY | Facility: CLINIC | Age: 79
End: 2024-10-15

## 2024-10-15 DIAGNOSIS — Z12.31 ENCOUNTER FOR SCREENING MAMMOGRAM FOR BREAST CANCER: ICD-10-CM

## 2024-10-15 PROCEDURE — 77067 SCR MAMMO BI INCL CAD: CPT | Mod: GC

## 2024-10-15 PROCEDURE — 77063 BREAST TOMOSYNTHESIS BI: CPT | Mod: GC

## 2024-10-15 NOTE — TELEPHONE ENCOUNTER
Patient Contacted for the patient to call back and schedule the following:    Appointment type: Exercise Stress Test  Provider: BARB  Return date: NEXT AVAILABLE  Specialty phone number: [phone]  Additional appointment(s) needed: N/A  Additonal Notes: Patient stated she has injured knee and is unable to complete exercise stress test- pt says she will talk with Dr. Sanders about it at upcoming follow-up on 10/28.

## 2024-10-22 NOTE — TELEPHONE ENCOUNTER
FUTURE VISIT INFORMATION      FUTURE VISIT INFORMATION:  Date: 1/21/25  Time: 12:15pm  Location: [facility] [place]  REFERRAL INFORMATION:  Referring provider:  Fern Perez MD   Referring providers clinic:  [facility]  Reason for visit/diagnosis  Swelling of eyelid, unspecified laterality [H02.849]. excessive tissue under eye    RECORDS REQUESTED FROM:       Clinic name Comments Records Status Imaging Status   [facility]13[facility] epic

## 2024-10-28 ENCOUNTER — OFFICE VISIT (OUTPATIENT)
Dept: PULMONOLOGY | Facility: CLINIC | Age: 79
End: 2024-10-28
Attending: STUDENT IN AN ORGANIZED HEALTH CARE EDUCATION/TRAINING PROGRAM
Payer: MEDICARE

## 2024-10-28 VITALS
HEIGHT: 66 IN | OXYGEN SATURATION: 98 % | DIASTOLIC BLOOD PRESSURE: 74 MMHG | HEART RATE: 81 BPM | RESPIRATION RATE: 18 BRPM | SYSTOLIC BLOOD PRESSURE: 137 MMHG | WEIGHT: 132 LBS | BODY MASS INDEX: 21.21 KG/M2

## 2024-10-28 DIAGNOSIS — R06.09 DYSPNEA ON EXERTION: Primary | ICD-10-CM

## 2024-10-28 PROCEDURE — G0463 HOSPITAL OUTPT CLINIC VISIT: HCPCS | Performed by: STUDENT IN AN ORGANIZED HEALTH CARE EDUCATION/TRAINING PROGRAM

## 2024-10-28 PROCEDURE — 99214 OFFICE O/P EST MOD 30 MIN: CPT | Performed by: STUDENT IN AN ORGANIZED HEALTH CARE EDUCATION/TRAINING PROGRAM

## 2024-10-28 ASSESSMENT — PAIN SCALES - GENERAL: PAINLEVEL_OUTOF10: NO PAIN (0)

## 2024-10-28 NOTE — PROGRESS NOTES
[facility]    Pulmonary, Allergy, Critical Care and Sleep Medicine    Clinic Progress Visit  10/28/24    Ester Barba MRN# [id number]    YOB: 1945     Primary care provider: Winnie Sepulveda     Assessment and Recommendations:    Ester Barba is a 79 year old female with a history of arthritis and possible exercise induced asthma who presents for follow up of exertional dyspnea.     Exertional Dyspnea  First onset of symptoms about 20 years ago, given albuterol inhaler but has treated symptoms with rest rather than inhaler. Previously using Xopenex intermittently without clear relief. Spirometry and DLCO normal 7/2023. Overall symptoms have improved in recent months but likely this is due to not being able to walk as fast as she previously did (chronic knee and back issues). Is still able to be active, went on safari in [facility] this summer. Not having chest pain or other concerning symptoms. Had normal echo in 2023, previously were planning for stress test but does not want to do nuclear stress and suspect may not be able to achieve target heart rate with exercise stress due to MSK issues. Feels comfortable with holding off on stress test for now, will alert the clinic if having progressive dyspnea, chest pain, or other new symptoms.   - Xopenex as needed  - Up to date with flu and COVID shots, recommended getting RSV  - Continue to stay active, discussed would rather she able to walk regularly than be able to walk super fast  - Call clinic with any new chest or breathing symptoms    Follow up in one year    La Nena Sanders MD PhD   of Medicine  Pulmonary Critical Care   [facility]    30 minutes spent on the date of the encounter doing chart review, history and exam, documentation and further activities per the note.     Interval History:    Went to [facility], seen in Primary Care in July after return for cough. Treated for sinus infection with  antibiotics. Today reports breathing has been fine. Thinks in part because knee, back, and neck are bad and not walking or doing stairs fast. Is doing a physical therapy program, able to do exercise bike without getting short of breath. Cough a little bit. Has Xopenex but has not needed it.     Prior history  Last visit 5/2024: Feeling much better. Not needing to use Xopenex. She has been paying better attention the the air quality and avoiding exposures as much as possible. She has also tried not to exert herself as much as she needs bilateral knees and left hip replacement, as well has been having some lower back and neck pain. She has been completing PT for this. She only went to one Pulmonary Rehab session. Palpitations are resolved. Denies chest pain or discomfort. Did not want to do nuclear stress before trip and thinks can do exercise stress, will plan to do on return. Feels like during COVID was not as active and had deconditioning during that time.     Procedures  PFTs 7/2023: Personally reviewed, normal spirometry and diffusing capacity    Echo 8/2023: LVEF 55-60%, global RV function normal, normal bubble, PASP normal, no valvular abnormalities     Imaging  CT Chest 4/20/23: Personally reviewed, bilateral apical parenchymal scarring and mosaic attenuation, no coronary calcifications    CT Chest 8/15/23: Continued bi-apical scarring, no mosaic attenuation    Review of Systems:  Complete 12 point ROS negative unless mentioned in HPI    Medications, Allergies:    Medications:  Current Outpatient Medications   Medication Sig Dispense Refill    acetaminophen (TYLENOL) 500 MG tablet Take 500-1,000 mg by mouth every 6 hours as needed for mild pain      ammonium lactate (AMLACTIN) 12 % external cream Apply to legs twice daily 385 g 1    BIESTROGEN,20-80, 2.5 MG/GM CREAM Apply 0.5 g topically 2 times daily. 60 g 11    Biotin 10 MG TABS tablet       Calcium 1500 MG TABS       cefdinir (OMNICEF) 300 MG capsule        Cholecalciferol (VITAMIN D) 1000 UNIT capsule Total 2200 units daily.      clobetasol (TEMOVATE) 0.05 % external ointment Apply twice daily every other day alternating with the mupirocin ointment to affected area on the scalp 60 g 1    clobetasol propionate (CLOBEX) 0.05 % external shampoo APPLY TO DRY SCALP EVERY OTHER DAY. LEAVE ON FOR 15 MINUTES, THEN LATHER AND RINSE. 118 mL 2    COMPOUNDED NON-CONTROLLED SUBSTANCE (CMPD RX) - PHARMACY TO MIX COMPOUNDED MEDICATION Place 1 Application. vaginally twice a week Compounded with estrogen 1 g 3    desonide (DESOWEN) 0.05 % external cream Mix half and half with ketoconazole cream and apply twice daily as needed 60 g 1    esomeprazole (NEXIUM) 20 MG packet Take 20 mg by mouth every morning (before breakfast) Take 30-60 minutes before eating.      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      mupirocin (BACTROBAN) 2 % external ointment Use 2 times a day every other day alternating with clobetasol ointment to affected area. 30 g 3    NEW MED Biest 1.5mg/gram cream(pg free)  Insert 1 gram vaginally twice weekly as directed 40 g 1    tretinoin (RETIN-A) 0.025 % external cream APPLY PEA-SIZED AMOUNT TO AFFECTED AREA(S) ON FACE EVERY OTHER NIGHT AS TOLERATED 45 g 11     Current Facility-Administered Medications   Medication Dose Route Frequency Provider Last  Rate Last Admin    hydrocortisone 2.5 % cream   Topical BID Barbara Soto MD        lidocaine 1% with EPINEPHrine 1:100,000 injection 3 mL  3 mL Intradermal Once Barbara Soto MD        triamcinolone acetonide (KENALOG-10) injection 10 mg  10 mg Intra-Lesional Once         triamcinolone acetonide (KENALOG-10) injection 10 mg  10 mg Intra-Lesional Once Barbara Soto MD        triamcinolone acetonide (KENALOG-10) injection 10 mg  10 mg Intra-Lesional Once Barbara Soto MD        triamcinolone acetonide (KENALOG-10) injection 10 mg  10 mg Intra-Lesional Once Barbara Soto MD        triamcinolone acetonide (KENALOG-10) injection 10 mg  10 mg Intra-Lesional Once Barbara Soto MD        triamcinolone acetonide (KENALOG-10) injection 17 mg  17 mg Intra-Lesional Once         triamcinolone acetonide (KENALOG-10) injection 17 mg  17 mg Intra-Lesional Once Barbara Soto MD        triamcinolone acetonide (KENALOG-10) injection 17 mg  17 mg Intra-Lesional Once Barbara Soto MD        triamcinolone acetonide (KENALOG-10) injection 18 mg  18 mg Intra-Lesional Once Barbara Soto MD        triamcinolone acetonide (KENALOG-10) injection 20 mg  20 mg Intra-Lesional Once          Current Facility-Administered Medications   Medication Dose Route Frequency Provider Last Rate Last Admin    hydrocortisone 2.5 % cream   Topical BID Barbara Soto MD        lidocaine 1% with EPINEPHrine 1:100,000 injection 3 mL  3 mL Intradermal Once Barbara Soto MD        triamcinolone acetonide (KENALOG-10) injection 10 mg  10 mg Intra-Lesional Once         triamcinolone acetonide (KENALOG-10) injection 10 mg  10 mg Intra-Lesional Once Barbara Soto MD        triamcinolone acetonide (KENALOG-10) injection 10 mg  10 mg Intra-Lesional Once Barbara Soto MD         "triamcinolone acetonide (KENALOG-10) injection 10 mg  10 mg Intra-Lesional Once Barbara Soto MD        triamcinolone acetonide (KENALOG-10) injection 10 mg  10 mg Intra-Lesional Once Barbara Soto MD        triamcinolone acetonide (KENALOG-10) injection 17 mg  17 mg Intra-Lesional Once         triamcinolone acetonide (KENALOG-10) injection 17 mg  17 mg Intra-Lesional Once Barbara Soto MD        triamcinolone acetonide (KENALOG-10) injection 17 mg  17 mg Intra-Lesional Once Barbara Soto MD        triamcinolone acetonide (KENALOG-10) injection 18 mg  18 mg Intra-Lesional Once Barbara Soto MD        triamcinolone acetonide (KENALOG-10) injection 20 mg  20 mg Intra-Lesional Once          Allergies:  Allergies   Allergen Reactions    Dust Mites Other (See Comments)     Sneezing, coughing. asthma  Itchy watery eyes, sneezing    Estrogens Itching     Other reaction(s): Hypersensitivity  Topical caused burning sensation of skin  Topical caused burning sensation of skin; Premarin cream only - possibly only an bi-ingredient    Imiquimod Other (See Comments)     Hair loss    Levaquin [Levofloxacin Hemihydrate] Other (See Comments)     Muscle weakness    Levofloxacin Other (See Comments)     MUSCLE WEAKNESS, ARTHRITIS LIKE SYMPTOMS.      Mold Other (See Comments)     Sneezing, asthma, weezing    Pollen Extract/Tree Extract Other (See Comments)     Sneezing, weezing    Sulfa Antibiotics Hives    Sulfamethoxazole-Trimethoprim Hives    Latex Rash     LATEX BANDAGES; tapes adhesive    Penicillin G Rash    Penicillins Rash     Physical Exam:    /74   Pulse 81   Resp 18   Ht 1.683 m (5' 6.25\")   Wt 59.9 kg (132 lb)   SpO2 98%   BMI 21.14 kg/m      General: Sitting up in NAD  HEENT: anicteric, moist mucosa  Neck: no palpable lymphadenopathy  Chest: CTAB, no wheezing, rhonchi, or crackles  Cardiac: RRR no murmurs, radial pulses intact  Abdomen: " Soft, non tender, active BS  Extremities: No LE Edema  Neuro: A&Ox3, no focal deficits   Skin: no rash noted on limited exam  Psych: Appropriate  Laboratory, imaging, and microbiologic data:    All laboratory and imaging data reviewed, pertinent results discussed above.

## 2024-10-28 NOTE — PATIENT INSTRUCTIONS
Get the RSV shot    Can follow up in one year, let us know if worsening breathing or new chest symptoms in the meantime.    Can use Xopenex if needed.    Agree with trying to stay active.

## 2024-10-28 NOTE — LETTER
10/28/2024      Ester Barba  [address] [place] [address]      Dear Colleague,    Thank you for referring your patient, Ester Barba, to the [facility] [place]. Please see a copy of my visit note below.    [facility]    Pulmonary, Allergy, Critical Care and Sleep Medicine    Clinic Progress Visit  10/28/24    Ester Barba MRN# [id number]    YOB: 1945     Primary care provider: Winnie Sepulveda     Assessment and Recommendations:    Ester Barba is a 79 year old female with a history of arthritis and possible exercise induced asthma who presents for follow up of exertional dyspnea.     Exertional Dyspnea  First onset of symptoms about 20 years ago, given albuterol inhaler but has treated symptoms with rest rather than inhaler. Previously using Xopenex intermittently without clear relief. Spirometry and DLCO normal 7/2023. Overall symptoms have improved in recent months but likely this is due to not being able to walk as fast as she previously did (chronic knee and back issues). Is still able to be active, went on safari in [place] this summer. Not having chest pain or other concerning symptoms. Had normal echo in 2023, previously were planning for stress test but does not want to do nuclear stress and suspect may not be able to achieve target heart rate with exercise stress due to MSK issues. Feels comfortable with holding off on stress test for now, will alert the clinic if having progressive dyspnea, chest pain, or other new symptoms.   - Xopenex as needed  - Up to date with flu and COVID shots, recommended getting RSV  - Continue to stay active, discussed would rather she able to walk regularly than be able to walk super fast  - Call clinic with any new chest or breathing symptoms    Follow up in one year    La Nena Sanders MD PhD   of Medicine  Pulmonary Critical Care   [facility]  [place]    30 minutes spent on the date of the encounter doing chart review, history and exam, documentation and further activities per the note.     Interval History:    Went to [facility], seen in Primary Care in July after return for cough. Treated for sinus infection with antibiotics. Today reports breathing has been fine. Thinks in part because knee, back, and neck are bad and not walking or doing stairs fast. Is doing a physical therapy program, able to do exercise bike without getting short of breath. Cough a little bit. Has Xopenex but has not needed it.     Prior history  Last visit 5/2024: Feeling much better. Not needing to use Xopenex. She has been paying better attention the the air quality and avoiding exposures as much as possible. She has also tried not to exert herself as much as she needs bilateral knees and left hip replacement, as well has been having some lower back and neck pain. She has been completing PT for this. She only went to one Pulmonary Rehab session. Palpitations are resolved. Denies chest pain or discomfort. Did not want to do nuclear stress before trip and thinks can do exercise stress, will plan to do on return. Feels like during COVID was not as active and had deconditioning during that time.     Procedures  PFTs 7/2023: Personally reviewed, normal spirometry and diffusing capacity    Echo 8/2023: LVEF 55-60%, global RV function normal, normal bubble, PASP normal, no valvular abnormalities     Imaging  CT Chest 4/20/23: Personally reviewed, bilateral apical parenchymal scarring and mosaic attenuation, no coronary calcifications    CT Chest 8/15/23: Continued bi-apical scarring, no mosaic attenuation    Review of Systems:  Complete 12 point ROS negative unless mentioned in HPI    Medications, Allergies:    Medications:  Current Outpatient Medications   Medication Sig Dispense Refill     acetaminophen (TYLENOL) 500 MG tablet Take 500-1,000 mg by mouth every 6 hours as needed for mild  pain       ammonium lactate (AMLACTIN) 12 % external cream Apply to legs twice daily 385 g 1     BIESTROGEN,20-80, 2.5 MG/GM CREAM Apply 0.5 g topically 2 times daily. 60 g 11     Biotin 10 MG TABS tablet        Calcium 1500 MG TABS        cefdinir (OMNICEF) 300 MG capsule        Cholecalciferol (VITAMIN D) 1000 UNIT capsule Total 2200 units daily.       clobetasol (TEMOVATE) 0.05 % external ointment Apply twice daily every other day alternating with the mupirocin ointment to affected area on the scalp 60 g 1     clobetasol propionate (CLOBEX) 0.05 % external shampoo APPLY TO DRY SCALP EVERY OTHER DAY. LEAVE ON FOR 15 MINUTES, THEN LATHER AND RINSE. 118 mL 2     COMPOUNDED NON-CONTROLLED SUBSTANCE (CMPD RX) - PHARMACY TO MIX COMPOUNDED MEDICATION Place 1 Application. vaginally twice a week Compounded with estrogen 1 g 3     desonide (DESOWEN) 0.05 % external cream Mix half and half with ketoconazole cream and apply twice daily as needed 60 g 1     esomeprazole (NEXIUM) 20 MG packet Take 20 mg by mouth every morning (before breakfast) Take 30-60 minutes before eating.       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       mupirocin (BACTROBAN) 2 % external ointment Use 2 times a day every other day alternating with clobetasol ointment to affected area. 30 g 3     NEW MED Biest 1.5mg/gram cream(pg  free)  Insert 1 gram vaginally twice weekly as directed 40 g 1     tretinoin (RETIN-A) 0.025 % external cream APPLY PEA-SIZED AMOUNT TO AFFECTED AREA(S) ON FACE EVERY OTHER NIGHT AS TOLERATED 45 g 11     Current Facility-Administered Medications   Medication Dose Route Frequency Provider Last Rate Last Admin     hydrocortisone 2.5 % cream   Topical BID Barbara Soto MD         lidocaine 1% with EPINEPHrine 1:100,000 injection 3 mL  3 mL Intradermal Once Barbara Soto MD         triamcinolone acetonide (KENALOG-10) injection 10 mg  10 mg Intra-Lesional Once          triamcinolone acetonide (KENALOG-10) injection 10 mg  10 mg Intra-Lesional Once Barbara Soto MD         triamcinolone acetonide (KENALOG-10) injection 10 mg  10 mg Intra-Lesional Once Barbara Soto MD         triamcinolone acetonide (KENALOG-10) injection 10 mg  10 mg Intra-Lesional Once Barbara Soto MD         triamcinolone acetonide (KENALOG-10) injection 10 mg  10 mg Intra-Lesional Once Barbara Soto MD         triamcinolone acetonide (KENALOG-10) injection 17 mg  17 mg Intra-Lesional Once          triamcinolone acetonide (KENALOG-10) injection 17 mg  17 mg Intra-Lesional Once Barbara Soto MD         triamcinolone acetonide (KENALOG-10) injection 17 mg  17 mg Intra-Lesional Once Barbara Soto MD         triamcinolone acetonide (KENALOG-10) injection 18 mg  18 mg Intra-Lesional Once Barbara Soto MD         triamcinolone acetonide (KENALOG-10) injection 20 mg  20 mg Intra-Lesional Once          Current Facility-Administered Medications   Medication Dose Route Frequency Provider Last Rate Last Admin     hydrocortisone 2.5 % cream   Topical BID Barbara Soto MD         lidocaine 1% with EPINEPHrine 1:100,000 injection 3 mL  3 mL Intradermal Once Barbara Soto MD         triamcinolone  acetonide (KENALOG-10) injection 10 mg  10 mg Intra-Lesional Once          triamcinolone acetonide (KENALOG-10) injection 10 mg  10 mg Intra-Lesional Once Barbara Soto MD         triamcinolone acetonide (KENALOG-10) injection 10 mg  10 mg Intra-Lesional Once Barbara Soto MD         triamcinolone acetonide (KENALOG-10) injection 10 mg  10 mg Intra-Lesional Once Barbara Soto MD         triamcinolone acetonide (KENALOG-10) injection 10 mg  10 mg Intra-Lesional Once Barbara Soto MD         triamcinolone acetonide (KENALOG-10) injection 17 mg  17 mg Intra-Lesional Once          triamcinolone acetonide (KENALOG-10) injection 17 mg  17 mg Intra-Lesional Once Barbara Soto MD         triamcinolone acetonide (KENALOG-10) injection 17 mg  17 mg Intra-Lesional Once Barbara Soto MD         triamcinolone acetonide (KENALOG-10) injection 18 mg  18 mg Intra-Lesional Once Barbara Soto MD         triamcinolone acetonide (KENALOG-10) injection 20 mg  20 mg Intra-Lesional Once          Allergies:  Allergies   Allergen Reactions     Dust Mites Other (See Comments)     Sneezing, coughing. asthma  Itchy watery eyes, sneezing     Estrogens Itching     Other reaction(s): Hypersensitivity  Topical caused burning sensation of skin  Topical caused burning sensation of skin; Premarin cream only - possibly only an bi-ingredient     Imiquimod Other (See Comments)     Hair loss     Levaquin [Levofloxacin Hemihydrate] Other (See Comments)     Muscle weakness     Levofloxacin Other (See Comments)     MUSCLE WEAKNESS, ARTHRITIS LIKE SYMPTOMS.       Mold Other (See Comments)     Sneezing, asthma, weezing     Pollen Extract/Tree Extract Other (See Comments)     Sneezing, weezing     Sulfa Antibiotics Hives     Sulfamethoxazole-Trimethoprim Hives     Latex Rash     LATEX BANDAGES; tapes adhesive     Penicillin G Rash     Penicillins Rash  "    Physical Exam:    /74   Pulse 81   Resp 18   Ht 1.683 m (5' 6.25\")   Wt 59.9 kg (132 lb)   SpO2 98%   BMI 21.14 kg/m      General: Sitting up in NAD  HEENT: anicteric, moist mucosa  Neck: no palpable lymphadenopathy  Chest: CTAB, no wheezing, rhonchi, or crackles  Cardiac: RRR no murmurs, radial pulses intact  Abdomen: Soft, non tender, active BS  Extremities: No LE Edema  Neuro: A&Ox3, no focal deficits   Skin: no rash noted on limited exam  Psych: Appropriate  Laboratory, imaging, and microbiologic data:    All laboratory and imaging data reviewed, pertinent results discussed above.      Again, thank you for allowing me to participate in the care of your patient.        Sincerely,        La Nena Sanders MD  "

## 2024-10-28 NOTE — NURSING NOTE
Chief Complaint   Patient presents with    RECHECK     Follow up visit     Wilner Quigley, CMA CMA at 4:16 PM on 10/28/2024

## 2024-11-05 ENCOUNTER — OFFICE VISIT (OUTPATIENT)
Dept: DERMATOLOGY | Facility: CLINIC | Age: 79
End: 2024-11-05
Payer: MEDICARE

## 2024-11-05 VITALS — SYSTOLIC BLOOD PRESSURE: 130 MMHG | HEART RATE: 76 BPM | OXYGEN SATURATION: 98 % | DIASTOLIC BLOOD PRESSURE: 77 MMHG

## 2024-11-05 DIAGNOSIS — L57.0 ACTINIC KERATOSES: ICD-10-CM

## 2024-11-05 DIAGNOSIS — L66.10 LICHEN PLANOPILARIS: Primary | ICD-10-CM

## 2024-11-05 DIAGNOSIS — L98.8 EROSIVE PUSTULAR DERMATOSIS: ICD-10-CM

## 2024-11-05 DIAGNOSIS — L82.0 INFLAMED SEBORRHEIC KERATOSIS: ICD-10-CM

## 2024-11-05 PROCEDURE — 11901 INJECT SKIN LESIONS >7: CPT | Mod: XS | Performed by: DERMATOLOGY

## 2024-11-05 PROCEDURE — 17110 DESTRUCTION B9 LES UP TO 14: CPT | Mod: GC | Performed by: DERMATOLOGY

## 2024-11-05 PROCEDURE — 17000 DESTRUCT PREMALG LESION: CPT | Mod: XS | Performed by: DERMATOLOGY

## 2024-11-05 PROCEDURE — 17003 DESTRUCT PREMALG LES 2-14: CPT | Mod: XS | Performed by: DERMATOLOGY

## 2024-11-05 PROCEDURE — 99213 OFFICE O/P EST LOW 20 MIN: CPT | Mod: 25 | Performed by: DERMATOLOGY

## 2024-11-05 ASSESSMENT — PAIN SCALES - GENERAL: PAINLEVEL_OUTOF10: NO PAIN (0)

## 2024-11-05 NOTE — NURSING NOTE
Drug Administration Record    Prior to injection, verified patient identity using patient's name and date of birth.  Due to injection administration, patient instructed to remain in clinic for 15 minutes  afterwards, and to report any adverse reaction to me immediately.    Drug Name: triamcinolone acetonide(kenalog)  Dose: 2 mL of triamcinolone 10mg/mL, 20 mg dose  Route administered: ID  NDC #: 7608-1399-72  Amount of waste(mL):3 mL  Reason for waste: Multi dose vial    LOT #: [id number]  SITE: See provider note  : [employer]  EXPIRATION DATE: APR 2026    Sen Crook, EMT  Clinic Support  [facility]     [phone]    Employed by [facility]

## 2024-11-05 NOTE — LETTER
11/5/2024       RE: Ester Barba  [address] [place] [address]       Dear Colleague,      Thank you for referring your patient, Ester Barba, to the [facility] DERMATOLOGY CLINIC [place] at [facility]. Please see a copy of my visit note below.    [facility] Dermatology Note  Encounter Date: Nov 5, 2024  Office Visit     Dermatology Problem List:  Last FSE: 10/8/24  1. 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  - Current tx: ILK10 injections, clobetasol propionate 0.05% shampoo twice weekly, fluocinolone oil every other day, Duoderm to vertex plaque  - Previous tx: plaquenil w/ significant improvement (~15 yrs; discontinued 12/2019 with concerns for retinopathy by local doc in [facility]; now followed by Dr. Radford here without signs of Plaquenil toxicity 1/2020).   - LPPAI score: 3.83 on 3/28/2023, 1 on 5/2/23, 1.33 on 11/07/2023 1.33 3/29/24, 1 on 5/10/24, .67 on 7/12/24; 0.67 (9/24/24), .67 11/5/24  2. 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 Skin infection, vertex scalp at prior site of MMS - resolved  - s/p doxycycline 100 mg BID and mupirocin ointment BID   4. History of NMSC  - BCC: right forehead:  s/p shave bx'd 7/12/24, s/p MOHS 8/26/24  - BCC - front vertex scalp, s/p Mohs with purse string and granulation, 3/30/22  - BCC - forehead, s/p MMS 6/9/21  - SCCis - R vertex scalp, s/p MMS 3/2/21, bacterial cx obtained from site 3/23/21  - BCC - right nasal ala s/p Mohs 11/27/17  - Nodular BCC - vertex scalp s/p Mohs 6/5/15  - SCC - scalp, s/p Mohs 4/18/2014  - Hx of 1-2 other NMSC on scalp (BCCs)  5. AKs - bilateral temples, cheeks  6. Inflamed Sks  - s/p cryo       ____________________________________________    Assessment & Plan:     # Lichen planopilaris, stable/improved  # Erosive pustular dermatosis, improved  Overall stable/improved on exam today  in terms of her LPP. EPD also looks improved w/ less erythema and crusting compared to prior. Reports mild itching over bilateral parietal scalp and has some scalp erythema and mild perifollicular scale in these sites.   - IL-K injections performed today (see procedure note(s) below).  - Continue clobetasol shampoo twice weekly  - Continue fluocinolone oil every other day  - Continue DuoDerm 2-3 days on 2-3 days off, Vaseline    # Inflammed seborrheic keratoses x3 (right neck, left forehead, left back)  - See cryo note below    # AK x2, right lateral brow, right malar cheek  - See cryo note below    Procedures Performed:   - Cryotherapy procedure note, location(s): right neck, left forehead, left back, right lateral brow. After verbal consent and discussion of risks and benefits including, but not limited to, dyspigmentation/scar, blister, and pain, 5 lesion(s) was(were) treated with 1-2 mm freeze border for 1-2 cycles with liquid nitrogen. Post cryotherapy instructions were provided.    - Intra-lesional triamcinolone procedure note. After verbal consent and review of risk of pain and skin thinning/atrophy, positioning and cleansing with isopropyl alcohol, 2 total mL of triamcinolone 10 mg/mL was injected into 20 lesion(s) on the scalp. The patient tolerated the procedure well and left the dermatology clinic in good condition.    Follow-up: as scheduled 11/26    Staff and Resident:  I, Bandar Pitts MD, discussed and evaluated the patient with Dr. Soto.    Patient was seen and examined with the dermatology resident. I agree with the history, review of systems, physical examination, assessments and plan. I was present for the entirety of the cryotherapy procedure and the ILK injections were done together.   ____________________________________________    CC: Hair Loss (Feels like things have been okay. A sight improvement. )    HPI:  Ms. Ester Barba is a(n) 79 year old female who presents today as a return  patient for hairloss related to erosive pustular dermatosis and LPP.  Last seen in derm clinic 10/8/24 at which time she had ILK and was told to increase clobetasol shampoo to twice weekly and to continue fluocinolone oil daily. She was also instructed to continue to apply duoderm to the top of the scalp.     Patient is otherwise feeling well, without additional skin concerns.    Labs Reviewed:  N/A    Physical Exam:  SKIN: Focused examination of face, scalp, left back was performed.  - On the right lateral neck, left forehead there are stuck on white papules  - Left back there is a ~2 cm brown waxy stuck on plaque in an area that is pruritic  - Right lateral brow gritty pink papule  - posterior frontal scalp with scaring and yellow-brown crusts  - Decreased hair density on central region of frontal, parietal and vertex scalp  - Patchy erythema and mild perifollicular scale and erythema in the bilateral parietal scalp    Medications:  Current Outpatient Medications   Medication Sig Dispense Refill    acetaminophen (TYLENOL) 500 MG tablet Take 500-1,000 mg by mouth every 6 hours as needed for mild pain      ammonium lactate (AMLACTIN) 12 % external cream Apply to legs twice daily 385 g 1    Biotin 10 MG TABS tablet       Calcium 1500 MG TABS       Cholecalciferol (VITAMIN D) 1000 UNIT capsule Total 2200 units daily.      clobetasol (TEMOVATE) 0.05 % external ointment Apply twice daily every other day alternating with the mupirocin ointment to affected area on the scalp 60 g 1    clobetasol propionate (CLOBEX) 0.05 % external shampoo APPLY TO DRY SCALP EVERY OTHER DAY. LEAVE ON FOR 15 MINUTES, THEN LATHER AND RINSE. 118 mL 2    desonide (DESOWEN) 0.05 % external cream Mix half and half with ketoconazole cream and apply twice daily as needed 60 g 1    esomeprazole (NEXIUM) 20 MG packet Take 20 mg by mouth every morning (before breakfast) Take 30-60 minutes before eating.      Fluocinolone Acetonide Scalp 0.01 % OIL oil  Apply once a week to scalp for lichen planopilaris 118.28 mL 1    glucosamine-chondroitin 500-400 MG CAPS per capsule Take 1 capsule by mouth      hydrocortisone 2.5 % cream Use twice daily as needed on involved areas 30 g 4    ketoconazole (NIZORAL) 2 % external cream Mix half and half with desonide cream and apply twice a day as needed 60 g 1    levalbuterol (XOPENEX HFA) 45 MCG/ACT inhaler Inhale 2 puffs into the lungs every 4 hours as needed for shortness of breath or wheezing 15 g 4    Loratadine (CLARITIN PO) Take  by mouth.      mupirocin (BACTROBAN) 2 % external ointment Use 2 times a day every other day alternating with clobetasol ointment to affected area. 30 g 3    NEW MED Biest 1.5mg/gram cream(pg free)  Insert 1 gram vaginally twice weekly as directed 40 g 1    tretinoin (RETIN-A) 0.025 % external cream APPLY PEA-SIZED AMOUNT TO AFFECTED AREA(S) ON FACE EVERY OTHER NIGHT AS TOLERATED 45 g 11    BIESTROGEN,20-80, 2.5 MG/GM CREAM Apply 0.5 g topically 2 times daily. 60 g 11    cefdinir (OMNICEF) 300 MG capsule       COMPOUNDED NON-CONTROLLED SUBSTANCE (CMPD RX) - PHARMACY TO MIX COMPOUNDED MEDICATION Place 1 Application. vaginally twice a week Compounded with estrogen 1 g 3    ibuprofen (ADVIL,MOTRIN) 800 MG tablet Take 800 mg by mouth every 8 hours as needed       Current Facility-Administered Medications   Medication Dose Route Frequency Provider Last Rate Last Admin    hydrocortisone 2.5 % cream   Topical BID Barbara Soto MD        lidocaine 1% with EPINEPHrine 1:100,000 injection 3 mL  3 mL Intradermal Once Barbara Soto MD        triamcinolone acetonide (KENALOG-10) injection 10 mg  10 mg Intra-Lesional Once         triamcinolone acetonide (KENALOG-10) injection 10 mg  10 mg Intra-Lesional Once Barbara Soto MD        triamcinolone acetonide (KENALOG-10) injection 10 mg  10 mg Intra-Lesional Once Barbara Soto MD        triamcinolone  acetonide (KENALOG-10) injection 10 mg  10 mg Intra-Lesional Once Barbara Soto MD        triamcinolone acetonide (KENALOG-10) injection 10 mg  10 mg Intra-Lesional Once Barbara Soto MD        triamcinolone acetonide (KENALOG-10) injection 17 mg  17 mg Intra-Lesional Once         triamcinolone acetonide (KENALOG-10) injection 17 mg  17 mg Intra-Lesional Once Barbara Soto MD        triamcinolone acetonide (KENALOG-10) injection 17 mg  17 mg Intra-Lesional Once Barbara Soto MD        triamcinolone acetonide (KENALOG-10) injection 18 mg  18 mg Intra-Lesional Once Barbara Soto MD        triamcinolone acetonide (KENALOG-10) injection 20 mg  20 mg Intra-Lesional Once           Past Medical History:   Patient Active Problem List   Diagnosis    Porokeratosis    Squamous cell carcinoma    Neoplasm of uncertain behavior    Lichen planopilaris    Dermatitis    Cicatricial alopecia    Keratosis, inflamed seborrheic    History of skin cancer    Basal cell carcinoma of vertex scalp s/p mohs 6-5-15    Medication management    Actinic keratosis    Medication monitoring encounter    Dermatitis, seborrheic    Erosive pustular dermatosis    Tick bite, initial encounter    Vulvar atrophy    Factor V Leiden mutation (H)    Splenic artery aneurysm (H)    Post concussion syndrome    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      Sincerely,    Barbara Soto MD

## 2024-11-05 NOTE — NURSING NOTE
Dermatology Rooming Note    Ester Barba's goals for this visit include:   Chief Complaint   Patient presents with    Hair Loss     Feels like things have been okay. A sight improvement.      Sen Crook, EMT  Clinic Support  [facility]     [phone]    Employed by [employer]

## 2024-11-05 NOTE — PROGRESS NOTES
[facility] Dermatology Note  Encounter Date: Nov 5, 2024  Office Visit     Dermatology Problem List:  Last FSE: 10/8/24  1. 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  - Current tx: ILK10 injections, clobetasol propionate 0.05% shampoo twice weekly, fluocinolone oil every other day, Duoderm to vertex plaque  - Previous tx: plaquenil w/ significant improvement (~15 yrs; discontinued 12/2019 with concerns for retinopathy by local doc in [facility]; now followed by Dr. Radford here without signs of Plaquenil toxicity 1/2020).   - LPPAI score: 3.83 on 3/28/2023, 1 on 5/2/23, 1.33 on 11/07/2023 1.33 3/29/24, 1 on 5/10/24, .67 on 7/12/24; 0.67 (9/24/24), .67 11/5/24  2. 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 Skin infection, vertex scalp at prior site of MMS - resolved  - s/p doxycycline 100 mg BID and mupirocin ointment BID   4. History of NMSC  - BCC: right forehead:  s/p shave bx'd 7/12/24, s/p MOHS 8/26/24  - BCC - front vertex scalp, s/p Mohs with purse string and granulation, 3/30/22  - BCC - forehead, s/p MMS 6/9/21  - SCCis - R vertex scalp, s/p MMS 3/2/21, bacterial cx obtained from site 3/23/21  - BCC - right nasal ala s/p Mohs 11/27/17  - Nodular BCC - vertex scalp s/p Mohs 6/5/15  - SCC - scalp, s/p Mohs 4/18/2014  - Hx of 1-2 other NMSC on scalp (BCCs)  5. AKs - bilateral temples, cheeks  6. Inflamed Sks  - s/p cryo       ____________________________________________    Assessment & Plan:     # Lichen planopilaris, stable/improved  # Erosive pustular dermatosis, improved  Overall stable/improved on exam today in terms of her LPP. EPD also looks improved w/ less erythema and crusting compared to prior. Reports mild itching over bilateral parietal scalp and has some scalp erythema and mild perifollicular scale in these sites.   - IL-K injections performed today (see procedure note(s) below).  - Continue clobetasol  shampoo twice weekly  - Continue fluocinolone oil every other day  - Continue DuoDerm 2-3 days on 2-3 days off, Vaseline    # Inflammed seborrheic keratoses x3 (right neck, left forehead, left back)  - See cryo note below    # AK x2, right lateral brow, right malar cheek  - See cryo note below    Procedures Performed:   - Cryotherapy procedure note, location(s): right neck, left forehead, left back, right lateral brow. After verbal consent and discussion of risks and benefits including, but not limited to, dyspigmentation/scar, blister, and pain, 5 lesion(s) was(were) treated with 1-2 mm freeze border for 1-2 cycles with liquid nitrogen. Post cryotherapy instructions were provided.    - Intra-lesional triamcinolone procedure note. After verbal consent and review of risk of pain and skin thinning/atrophy, positioning and cleansing with isopropyl alcohol, 2 total mL of triamcinolone 10 mg/mL was injected into 20 lesion(s) on the scalp. The patient tolerated the procedure well and left the dermatology clinic in good condition.    Follow-up: as scheduled 11/26    Staff and Resident:  I, Bandar Pitts MD, discussed and evaluated the patient with Dr. Soto.    Patient was seen and examined with the dermatology resident. I agree with the history, review of systems, physical examination, assessments and plan. I was present for the entirety of the cryotherapy procedure and the ILK injections were done together.     Barbara Soto MD  Professor   Department of Dermatology  [facility]     ____________________________________________    CC: Hair Loss (Feels like things have been okay. A sight improvement. )    HPI:  Ms. Ester Barba is a(n) 79 year old female who presents today as a return patient for hairloss related to erosive pustular dermatosis and LPP.  Last seen in derm clinic 10/8/24 at which time she had ILK and was told to increase clobetasol shampoo to twice weekly and to continue fluocinolone oil  daily. She was also instructed to continue to apply duoderm to the top of the scalp.     Patient is otherwise feeling well, without additional skin concerns.    Labs Reviewed:  N/A    Physical Exam:  SKIN: Focused examination of face, scalp, left back was performed.  - On the right lateral neck, left forehead there are stuck on white papules  - Left back there is a ~2 cm brown waxy stuck on plaque in an area that is pruritic  - Right lateral brow gritty pink papule  - posterior frontal scalp with scaring and yellow-brown crusts  - Decreased hair density on central region of frontal, parietal and vertex scalp  - Patchy erythema and mild perifollicular scale and erythema in the bilateral parietal scalp    Medications:  Current Outpatient Medications   Medication Sig Dispense Refill    acetaminophen (TYLENOL) 500 MG tablet Take 500-1,000 mg by mouth every 6 hours as needed for mild pain      ammonium lactate (AMLACTIN) 12 % external cream Apply to legs twice daily 385 g 1    Biotin 10 MG TABS tablet       Calcium 1500 MG TABS       Cholecalciferol (VITAMIN D) 1000 UNIT capsule Total 2200 units daily.      clobetasol (TEMOVATE) 0.05 % external ointment Apply twice daily every other day alternating with the mupirocin ointment to affected area on the scalp 60 g 1    clobetasol propionate (CLOBEX) 0.05 % external shampoo APPLY TO DRY SCALP EVERY OTHER DAY. LEAVE ON FOR 15 MINUTES, THEN LATHER AND RINSE. 118 mL 2    desonide (DESOWEN) 0.05 % external cream Mix half and half with ketoconazole cream and apply twice daily as needed 60 g 1    esomeprazole (NEXIUM) 20 MG packet Take 20 mg by mouth every morning (before breakfast) Take 30-60 minutes before eating.      Fluocinolone Acetonide Scalp 0.01 % OIL oil Apply once a week to scalp for lichen planopilaris 118.28 mL 1    glucosamine-chondroitin 500-400 MG CAPS per capsule Take 1 capsule by mouth      hydrocortisone 2.5 % cream Use twice daily as needed on involved areas 30  g 4    ketoconazole (NIZORAL) 2 % external cream Mix half and half with desonide cream and apply twice a day as needed 60 g 1    levalbuterol (XOPENEX HFA) 45 MCG/ACT inhaler Inhale 2 puffs into the lungs every 4 hours as needed for shortness of breath or wheezing 15 g 4    Loratadine (CLARITIN PO) Take  by mouth.      mupirocin (BACTROBAN) 2 % external ointment Use 2 times a day every other day alternating with clobetasol ointment to affected area. 30 g 3    NEW MED Biest 1.5mg/gram cream(pg free)  Insert 1 gram vaginally twice weekly as directed 40 g 1    tretinoin (RETIN-A) 0.025 % external cream APPLY PEA-SIZED AMOUNT TO AFFECTED AREA(S) ON FACE EVERY OTHER NIGHT AS TOLERATED 45 g 11    BIESTROGEN,20-80, 2.5 MG/GM CREAM Apply 0.5 g topically 2 times daily. 60 g 11    cefdinir (OMNICEF) 300 MG capsule       COMPOUNDED NON-CONTROLLED SUBSTANCE (CMPD RX) - PHARMACY TO MIX COMPOUNDED MEDICATION Place 1 Application. vaginally twice a week Compounded with estrogen 1 g 3    ibuprofen (ADVIL,MOTRIN) 800 MG tablet Take 800 mg by mouth every 8 hours as needed       Current Facility-Administered Medications   Medication Dose Route Frequency Provider Last Rate Last Admin    hydrocortisone 2.5 % cream   Topical BID Barbara Soto MD        lidocaine 1% with EPINEPHrine 1:100,000 injection 3 mL  3 mL Intradermal Once Barbara Soto MD        triamcinolone acetonide (KENALOG-10) injection 10 mg  10 mg Intra-Lesional Once         triamcinolone acetonide (KENALOG-10) injection 10 mg  10 mg Intra-Lesional Once Barbara Soto MD        triamcinolone acetonide (KENALOG-10) injection 10 mg  10 mg Intra-Lesional Once Barbara Soto MD        triamcinolone acetonide (KENALOG-10) injection 10 mg  10 mg Intra-Lesional Once Barbara Soto MD        triamcinolone acetonide (KENALOG-10) injection 10 mg  10 mg Intra-Lesional Once Barbara Soto MD         triamcinolone acetonide (KENALOG-10) injection 17 mg  17 mg Intra-Lesional Once         triamcinolone acetonide (KENALOG-10) injection 17 mg  17 mg Intra-Lesional Once Barbara Soto MD        triamcinolone acetonide (KENALOG-10) injection 17 mg  17 mg Intra-Lesional Once Barbara Soto MD        triamcinolone acetonide (KENALOG-10) injection 18 mg  18 mg Intra-Lesional Once Barbara Soto MD        triamcinolone acetonide (KENALOG-10) injection 20 mg  20 mg Intra-Lesional Once             Past Medical History:   Patient Active Problem List   Diagnosis    Porokeratosis    Squamous cell carcinoma    Neoplasm of uncertain behavior    Lichen planopilaris    Dermatitis    Cicatricial alopecia    Keratosis, inflamed seborrheic    History of skin cancer    Basal cell carcinoma of vertex scalp s/p mohs 6-5-15    Medication management    Actinic keratosis    Medication monitoring encounter    Dermatitis, seborrheic    Erosive pustular dermatosis    Tick bite, initial encounter    Vulvar atrophy    Factor V Leiden mutation (H)    Splenic artery aneurysm (H)    Post concussion syndrome    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

## 2024-11-11 ENCOUNTER — TELEPHONE (OUTPATIENT)
Dept: DERMATOLOGY | Facility: CLINIC | Age: 79
End: 2024-11-11
Payer: MEDICARE

## 2024-11-11 NOTE — TELEPHONE ENCOUNTER
[facility] Call Center    Phone Message    May a detailed message be left on voicemail: yes     Reason for Call: Other: Patient is wanting to reschedule her 5/27 appointment. Patient is unavailable that day. There are no availabilities until July. Please call patient back to discuss. Thanks.      Action Taken: te sent    Travel Screening: Not Applicable     Date of Service:

## 2024-11-26 ENCOUNTER — OFFICE VISIT (OUTPATIENT)
Dept: OPHTHALMOLOGY | Facility: CLINIC | Age: 79
End: 2024-11-26
Attending: DERMATOLOGY
Payer: MEDICARE

## 2024-11-26 DIAGNOSIS — H02.849 SWELLING OF LOWER EYELID: Primary | ICD-10-CM

## 2024-11-26 PROCEDURE — 92285 EXTERNAL OCULAR PHOTOGRAPHY: CPT | Mod: GC | Performed by: OPHTHALMOLOGY

## 2024-11-26 PROCEDURE — 99214 OFFICE O/P EST MOD 30 MIN: CPT | Mod: GC | Performed by: OPHTHALMOLOGY

## 2024-11-26 ASSESSMENT — EXTERNAL EXAM - LEFT EYE: OS_EXAM: NORMAL

## 2024-11-26 ASSESSMENT — VISUAL ACUITY
OD_CC: 20/25
OD_PH_CC+: -2
OD_PH_CC: 20/20
OS_PH_CC+: -2
OS_PH_CC: 20/25
OD_CC+: -2
METHOD: SNELLEN - LINEAR
CORRECTION_TYPE: GLASSES
OS_CC: 20/50

## 2024-11-26 ASSESSMENT — EXTERNAL EXAM - RIGHT EYE: OD_EXAM: NORMAL

## 2024-11-26 ASSESSMENT — TONOMETRY
IOP_METHOD: ICARE
OS_IOP_MMHG: 13
OD_IOP_MMHG: 11

## 2024-11-26 ASSESSMENT — CONF VISUAL FIELD
OS_SUPERIOR_TEMPORAL_RESTRICTION: 3
OD_SUPERIOR_TEMPORAL_RESTRICTION: 3

## 2024-11-26 NOTE — PROGRESS NOTES
Oculoplastic Clinic New Patient    Patient: Ester Barba MRN# [id number]   YOB: 1945 Age: 79 year old   Date of Visit: Nov 26, 2024    CC: Droopy eyelids obstructing vision.              HPI:     Chief Complaint(s) and History of Present Illness(es)     Consult For            Treatments tried: artificial tears    Pain scale: 0/10    Comments: Ester Barba is being seen for a consult today by the request of   Dr. Perez for due to under eye lids swelling        Comments    Patient reports that she has been bothered by both lower lids swelling   over the past 6 months.     Reports she uses Retin A ointment on the face 1+ year and trys to be   careful around each eye.    Just had Mohs surgery forehead 1 month ago above right eye felt as thought   it pulled upper right upper lid up some. Not sure if upper lids are an   issue but interested in what the Doctor thinks.     Keara Merchant, COT COT 1:31 PM November 26, 2024             Ester Barba is a 79 year old female who presents for evaluation of lower eyelid swelling. She states that she has noticed swelling of her lower lids over several years. She is not on an ACE inhibitor and has no history of angioedema. She has a history of exertional dyspnea and has been recommended by her pulmonologist to undergo a stress test, but she has thus far decided not to schedule it.     She sleeps with a bit of upward recline she states because of gastric reflux. She denies paroxysmal nocturnal dyspnea. She does not feel that the swelling is worse in the mornings.     EXAM:         Assessment & Plan     Ester Barba is a 79 year old female with the following diagnoses:   1. Swelling of lower eyelid       Lower eyelid swelling - suspect herniation of orbital fat vs. less likely periorbital edema. On exam she has trace pitting lower extremity edema. She has been unable to assess if she currently has dyspnea on exertion as she has significant joint pain limiting her ability  to exert herself. Most recent echocardiogram reviewed (2023) and showed preserved ejection fraction with some mild concentric thickening. Patient declining stress test recommended by pulmonology.    Discussed that if attributable to fat herniation may be amenable to lower eyelid blepharoplasty. Patient interested in surgical correction if possible.     Regardless would recommend follow up with cardiology/pulmonology given history of bronchiectasis, exertional dyspnea, and finding of some mild lower extremity on exam today.    Plan: Both lower eyelid blepharoplasty (TCFx, SP, closed canthopexy vs orbic hitch).    ANTICOAGULATION:  Patient has Factor 5 Leiden. Not on aspirin or anticoagulation. Will be taking aspirin briefly after upcoming orthopedic surgery. No history of stroke or heart attack.                Attending Physician Attestation: Complete documentation of historical and exam elements from today's encounter can be found in the full encounter summary report (not reduplicated in this progress note). I personally obtained the chief complaint(s) and history of present illness. I confirmed and edited as necessary the review of systems, past medical/surgical history, family history, social history, and examination findings as documented by others; and I examined the patient myself. I personally reviewed the relevant tests, images, and reports as documented above. I formulated and edited as necessary the assessment and plan and discussed the findings and management plan with the patient. Temo Ann MD      Today with Ester Barba I reviewed the indications, risks, benefits, and alternatives of the proposed surgical procedure including, but not limited to, failure obtain the desired result  and need for additional surgery, bleeding, infection, loss of vision, or injury to the eye, dry eyes, and the remote possibility of permanent damage to any organ system or death with the use of anesthesia.  I provided  multiple opportunities for the questions, answered all questions to the best of my ability, and confirmed that my answers and my discussion were understood.

## 2024-11-26 NOTE — NURSING NOTE
Chief Complaints and History of Present Illnesses   Patient presents with    Consult For     Ester Barba is being seen for a consult today by the request of Dr. Perez for due to under eye lids swelling     Chief Complaint(s) and History of Present Illness(es)       Consult For              Treatments tried: artificial tears    Pain scale: 0/10    Comments: Ester Barba is being seen for a consult today by the request of Dr. Perez for due to under eye lids swelling              Comments    Patient reports that she has been bothered by both lower lids swelling over the past 6 months.     Reports she uses Retin A ointment on the face 1+ year and trys to be careful around each eye.    Just had Mohs surgery forehead 1 month ago above right eye felt as thought it pulled upper right upper lid up some. Not sure if upper lids are an issue but interested in what the Doctor thinks.     Keara Merchant, COT COT 1:31 PM November 26, 2024

## 2024-11-27 ENCOUNTER — TELEPHONE (OUTPATIENT)
Dept: OPHTHALMOLOGY | Facility: CLINIC | Age: 79
End: 2024-11-27
Payer: MEDICARE

## 2024-11-27 NOTE — TELEPHONE ENCOUNTER
Spoke with the patient offer surgery for her bilateral lower eyelid blepharoplasty. She is not interested in getting surgery scheduled as she is still deciding whether to proceed or not as it is a lot of money.  She will call me back next spring if she would like to proceed.     Francesca Cartagena  Surgery Scheduling Coordinator  Ph: [phone]

## 2025-01-14 ENCOUNTER — OFFICE VISIT (OUTPATIENT)
Dept: DERMATOLOGY | Facility: CLINIC | Age: 80
End: 2025-01-14
Payer: MEDICARE

## 2025-01-14 VITALS — SYSTOLIC BLOOD PRESSURE: 125 MMHG | OXYGEN SATURATION: 95 % | HEART RATE: 68 BPM | DIASTOLIC BLOOD PRESSURE: 73 MMHG

## 2025-01-14 DIAGNOSIS — L30.9 DERMATITIS: ICD-10-CM

## 2025-01-14 DIAGNOSIS — L66.10 LICHEN PLANOPILARIS: Primary | ICD-10-CM

## 2025-01-14 DIAGNOSIS — L98.8 EROSIVE PUSTULAR DERMATOSIS: ICD-10-CM

## 2025-01-14 NOTE — PROGRESS NOTES
[facility] Dermatology Note  Encounter Date: Jan 14, 2025  Office Visit     Dermatology Problem List:  Last FSE: 10/8/24  1. 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  - Current tx: ILK10 injections, clobetasol propionate 0.05% shampoo twice weekly, fluocinolone oil every other day, Duoderm to vertex plaque  - Previous tx: plaquenil w/ significant improvement (~15 yrs; discontinued 12/2019 with concerns for retinopathy by local doc in [facility]; now followed by Dr. Radford here without signs of Plaquenil toxicity 1/2020).   - LPPAI score: 3.83 on 3/28/2023, 1 on 5/2/23, 1.33 on 11/07/2023 1.33 3/29/24, 1 on 5/10/24, .67 on 7/12/24; 0.67 (9/24/24), .67 11/5/24, .67 1/14/25  2. 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 Skin infection, vertex scalp at prior site of MMS - resolved  - s/p doxycycline 100 mg BID and mupirocin ointment BID   4. History of NMSC  - BCC: right forehead:  s/p shave bx'd 7/12/24, s/p MOHS 8/26/24  - BCC - front vertex scalp, s/p Mohs with purse string and granulation, 3/30/22  - BCC - forehead, s/p MMS 6/9/21  - SCCis - R vertex scalp, s/p MMS 3/2/21, bacterial cx obtained from site 3/23/21  - BCC - right nasal ala s/p Mohs 11/27/17  - Nodular BCC - vertex scalp s/p Mohs 6/5/15  - SCC - scalp, s/p Mohs 4/18/2014  - Hx of 1-2 other NMSC on scalp (BCCs)  5. AKs - bilateral temples, cheeks  6. Inflamed Sks  - s/p cryo  ____________________________________________    Assessment & Plan:     # Lichen planopilaris, stable/improved  # Erosive pustular dermatosis, improved  Overall stable/improved on exam today in terms of her LPP. EPD also looks improved w/ less erythema and crusting compared to prior. Reports mild itching over bilateral parietal scalp and has some scalp erythema and mild perifollicular scale in these sites.   - IL-K injections performed today (see procedure note(s) below).  - Continue  clobetasol shampoo twice weekly  - Continue fluocinolone oil every other day  - Continue DuoDerm 2-3 days on 2-3 days off or just Vaseline to crusted areas    Procedures Performed:   - Intra-lesional triamcinolone procedure note. After verbal consent and review of risk of pain and skin thinning/atrophy, positioning and cleansing with isopropyl alcohol, 2 total mL of triamcinolone 10 mg/mL was injected into  lesion(s) on the scalp. The patient tolerated the procedure well and left the dermatol [facility] clinic in good condition.      Follow-up: 1 month(s) in-person, or earlier for new or changing lesions    Staff and Scribe:     Scribe Disclosure:   I, Kelle Mosquera, am serving as a scribe; to document services personally performed by Barbara Soto MD -based on data collection and the provider's statements to me.     Provider Disclosure:  I agree with above History, Review of Systems, Physical exam and Plan.  I have reviewed the content of the documentation and have edited it as needed. I have personally performed the services documented here and the documentation accurately represents those services and the decisions I have made.      Electronically signed by:  Barbara Soto MD  Professor   Department of Dermatology  [facility]     ____________________________________________    CC: Hair Loss (HL 6wk follow-up, AOC-all around)    HPI:  Ms. 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   - Last seen in-clinic on 11/5/24  - Shedding or thinning, or both: none  - Current tx: clobetasol shampoo twice a week; fluocinolone oil twice a week; duoderm twice a week    no Any new medications, supplements, or products? (please list below)     no Scalp pain   no Scalp burning   mild Scalp itching    no Eyebrow changes    no Eyelash changes   no Beard changes    no Other  body hair changes    no Nail changes    no Additional symptoms? (please list below)     - Overall course: Her hair loss has been stable. She is getting a new hip next Wednesday.      Patient is otherwise feeling well, in usual state of health, and has no additional skin concerns today.     Labs:  None reviewed.    Physical Exam:  Vitals: /73   Pulse 68   SpO2 95%   GEN: Well developed, well-nourished, in no acute distress, in a pleasant mood.    SKIN: Focused examination of face and scalp was performed.  - central area with previous ulceration that today is resolved, only crusting present in the area  - no diffuse erythema   - very mild perifollicular erythema  - mild perifollicular scale   - no scaling of the scalp   - negative hair pull test   - normal eyelash density  - normal eyebrow density  - no nail pitting or dystrophy   - no scalp folliculitis/pustules   - No other lesions of concern on areas examined.     Right temporal and left temporal area show veins slightly depressed        Medications:  Current Outpatient Medications   Medication Sig Dispense Refill    acetaminophen (TYLENOL) 500 MG tablet Take 500-1,000 mg by mouth every 6 hours as needed for mild pain      ammonium lactate (AMLACTIN) 12 % external cream Apply to legs twice daily 385 g 1    Biotin 10 MG TABS tablet       Calcium 1500 MG TABS       Cholecalciferol (VITAMIN D) 1000 UNIT capsule Total 2200 units daily.      clobetasol (TEMOVATE) 0.05 % external ointment Apply twice daily every other day alternating with the mupirocin ointment to affected area on the scalp 60 g 1    clobetasol propionate (CLOBEX) 0.05 % external shampoo APPLY TO DRY SCALP EVERY OTHER DAY. LEAVE ON FOR 15 MINUTES, THEN LATHER AND RINSE. 118 mL 2    desonide (DESOWEN) 0.05 % external cream Mix half and half with ketoconazole cream and apply twice daily as needed 60 g 1    esomeprazole (NEXIUM) 20 MG packet Take 20 mg by mouth every morning (before breakfast) Take  30-60 minutes before eating.      Fluocinolone Acetonide Scalp 0.01 % OIL oil Apply once a week to scalp for lichen planopilaris 118.28 mL 1    glucosamine-chondroitin 500-400 MG CAPS per capsule Take 1 capsule by mouth      hydrocortisone 2.5 % cream Use twice daily as needed on involved areas 30 g 4    ketoconazole (NIZORAL) 2 % external cream Mix half and half with desonide cream and apply twice a day as needed 60 g 1    levalbuterol (XOPENEX HFA) 45 MCG/ACT inhaler Inhale 2 puffs into the lungs every 4 hours as needed for shortness of breath or wheezing 15 g 4    Loratadine (CLARITIN PO) Take  by mouth.      mupirocin (BACTROBAN) 2 % external ointment Use 2 times a day every other day alternating with clobetasol ointment to affected area. 30 g 3    NEW MED Biest 1.5mg/gram cream(pg free)  Insert 1 gram vaginally twice weekly as directed 40 g 1    tretinoin (RETIN-A) 0.025 % external cream APPLY PEA-SIZED AMOUNT TO AFFECTED AREA(S) ON FACE EVERY OTHER NIGHT AS TOLERATED 45 g 11    BIESTROGEN,20-80, 2.5 MG/GM CREAM Apply 0.5 g topically 2 times daily. 60 g 11    cefdinir (OMNICEF) 300 MG capsule       COMPOUNDED NON-CONTROLLED SUBSTANCE (CMPD RX) - PHARMACY TO MIX COMPOUNDED MEDICATION Place 1 Application. vaginally twice a week Compounded with estrogen 1 g 3    ibuprofen (ADVIL,MOTRIN) 800 MG tablet Take 800 mg by mouth every 8 hours as needed       Current Facility-Administered Medications   Medication Dose Route Frequency Provider Last Rate Last Admin    hydrocortisone 2.5 % cream   Topical BID Barbara Soto MD        lidocaine 1% with EPINEPHrine 1:100,000 injection 3 mL  3 mL Intradermal Once Barbara Soto MD        triamcinolone acetonide (KENALOG-10) injection 10 mg  10 mg Intra-Lesional Once         triamcinolone acetonide (KENALOG-10) injection 10 mg  10 mg Intra-Lesional Once Barbara Soto MD        triamcinolone acetonide (KENALOG-10) injection 10 mg  10 mg  Intra-Lesional Once Barbara Soto MD        triamcinolone acetonide (KENALOG-10) injection 10 mg  10 mg Intra-Lesional Once Barbara Soto MD        triamcinolone acetonide (KENALOG-10) injection 10 mg  10 mg Intra-Lesional Once Barbara Soto MD        triamcinolone acetonide (KENALOG-10) injection 17 mg  17 mg Intra-Lesional Once         triamcinolone acetonide (KENALOG-10) injection 17 mg  17 mg Intra-Lesional Once Barbara Soto MD        triamcinolone acetonide (KENALOG-10) injection 17 mg  17 mg Intra-Lesional Once Barbara oSto MD        triamcinolone acetonide (KENALOG-10) injection 18 mg  18 mg Intra-Lesional Once Barbara Soto MD        triamcinolone acetonide (KENALOG-10) injection 20 mg  20 mg Intra-Lesional Once           Past Medical History:   Patient Active Problem List   Diagnosis    Porokeratosis    Squamous cell carcinoma    Neoplasm of uncertain behavior    Lichen planopilaris    Dermatitis    Cicatricial alopecia    Keratosis, inflamed seborrheic    History of skin cancer    Basal cell carcinoma of vertex scalp s/p mohs 6-5-15    Medication management    Actinic keratosis    Medication monitoring encounter    Dermatitis, seborrheic    Erosive pustular dermatosis    Tick bite, initial encounter    Vulvar atrophy    Factor V Leiden mutation    Splenic artery aneurysm    Post concussion syndrome    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

## 2025-01-14 NOTE — LETTER
1/14/2025      Ester Barba  [address] [place] [address]      Dear Colleague,    Thank you for referring your patient, Ester Barba, to the [facility]. Please see a copy of my visit note below.    [facility] Dermatology Note  Encounter Date: Jan 14, 2025  Office Visit     Dermatology Problem List:  Last FSE: 10/8/24  1. 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  - Current tx: ILK10 injections, clobetasol propionate 0.05% shampoo twice weekly, fluocinolone oil every other day, Duoderm to vertex plaque  - Previous tx: plaquenil w/ significant improvement (~15 yrs; discontinued 12/2019 with concerns for retinopathy by local doc in [facility]; now followed by Dr. Radford here without signs of Plaquenil toxicity 1/2020).   - LPPAI score: 3.83 on 3/28/2023, 1 on 5/2/23, 1.33 on 11/07/2023 1.33 3/29/24, 1 on 5/10/24, .67 on 7/12/24; 0.67 (9/24/24), .67 11/5/24, .67 1/14/25  2. 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 Skin infection, vertex scalp at prior site of MMS - resolved  - s/p doxycycline 100 mg BID and mupirocin ointment BID   4. History of NMSC  - BCC: right forehead:  s/p shave bx'd 7/12/24, s/p MOHS 8/26/24  - BCC - front vertex scalp, s/p Mohs with purse string and granulation, 3/30/22  - BCC - forehead, s/p MMS 6/9/21  - SCCis - R vertex scalp, s/p MMS 3/2/21, bacterial cx obtained from site 3/23/21  - BCC - right nasal ala s/p Mohs 11/27/17  - Nodular BCC - vertex scalp s/p Mohs 6/5/15  - SCC - scalp, s/p Mohs 4/18/2014  - Hx of 1-2 other NMSC on scalp (BCCs)  5. AKs - bilateral temples, cheeks  6. Inflamed Sks  - s/p cryo  ____________________________________________    Assessment & Plan:     # Lichen planopilaris, stable/improved  # Erosive pustular dermatosis, improved  Overall stable/improved on exam today in terms of her LPP. EPD also looks improved w/ less erythema and  crusting compared to prior. Reports mild itching over bilateral parietal scalp and has some scalp erythema and mild perifollicular scale in these sites.   - IL-K injections performed today (see procedure note(s) below).  - Continue clobetasol shampoo twice weekly  - Continue fluocinolone oil every other day  - Continue DuoDerm 2-3 days on 2-3 days off or just Vaseline to crusted areas    Procedures Performed:   - Intra-lesional triamcinolone procedure note. After verbal consent and review of risk of pain and skin thinning/atrophy, positioning and cleansing with isopropyl alcohol, 2 total mL of triamcinolone 10 mg/mL was injected into  lesion(s) on the scalp. The patient tolerated the procedure well and left the dermatol 20ogy clinic in good condition.      Follow-up: 1 month(s) in-person, or earlier for new or changing lesions    Staff and Scribe:     Scribe Disclosure:   I, Kelle Mosquera, am serving as a scribe; to document services personally performed by Barbara Soto MD -based on data collection and the provider's statements to me.     Provider Disclosure:  I agree with above History, Review of Systems, Physical exam and Plan.  I have reviewed the content of the documentation and have edited it as needed. I have personally performed the services documented here and the documentation accurately represents those services and the decisions I have made.      Electronically signed by:  Barbara Soto MD  Professor   Department of Dermatology  [facility]     ____________________________________________    CC: Hair Loss (HL 6wk follow-up, AOC-all around)    HPI:  Ms. 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   - Last seen in-clinic on 11/5/24  - Shedding or thinning, or both: none  - Current tx: clobetasol shampoo twice a week; fluocinolone oil twice a  week; duoderm twice a week    no Any new medications, supplements, or products? (please list below)     no Scalp pain   no Scalp burning   mild Scalp itching    no Eyebrow changes    no Eyelash changes   no Beard changes    no Other body hair changes    no Nail changes    no Additional symptoms? (please list below)     - Overall course: Her hair loss has been stable. She is getting a new hip next Wednesday.      Patient is otherwise feeling well, in usual state of health, and has no additional skin concerns today.     Labs:  None reviewed.    Physical Exam:  Vitals: /73   Pulse 68   SpO2 95%   GEN: Well developed, well-nourished, in no acute distress, in a pleasant mood.    SKIN: Focused examination of face and scalp was performed.  - central area with previous ulceration that today is resolved, only crusting present in the area  - no diffuse erythema   - very mild perifollicular erythema  - mild perifollicular scale   - no scaling of the scalp   - negative hair pull test   - normal eyelash density  - normal eyebrow density  - no nail pitting or dystrophy   - no scalp folliculitis/pustules   - No other lesions of concern on areas examined.     Right temporal and left temporal area show veins slightly depressed        Medications:  Current Outpatient Medications   Medication Sig Dispense Refill    acetaminophen (TYLENOL) 500 MG tablet Take 500-1,000 mg by mouth every 6 hours as needed for mild pain      ammonium lactate (AMLACTIN) 12 % external cream Apply to legs twice daily 385 g 1    Biotin 10 MG TABS tablet       Calcium 1500 MG TABS       Cholecalciferol (VITAMIN D) 1000 UNIT capsule Total 2200 units daily.      clobetasol (TEMOVATE) 0.05 % external ointment Apply twice daily every other day alternating with the mupirocin ointment to affected area on the scalp 60 g 1    clobetasol propionate (CLOBEX) 0.05 % external shampoo APPLY TO DRY SCALP EVERY OTHER DAY. LEAVE ON FOR 15 MINUTES, THEN LATHER AND  RINSE. 118 mL 2    desonide (DESOWEN) 0.05 % external cream Mix half and half with ketoconazole cream and apply twice daily as needed 60 g 1    esomeprazole (NEXIUM) 20 MG packet Take 20 mg by mouth every morning (before breakfast) Take 30-60 minutes before eating.      Fluocinolone Acetonide Scalp 0.01 % OIL oil Apply once a week to scalp for lichen planopilaris 118.28 mL 1    glucosamine-chondroitin 500-400 MG CAPS per capsule Take 1 capsule by mouth      hydrocortisone 2.5 % cream Use twice daily as needed on involved areas 30 g 4    ketoconazole (NIZORAL) 2 % external cream Mix half and half with desonide cream and apply twice a day as needed 60 g 1    levalbuterol (XOPENEX HFA) 45 MCG/ACT inhaler Inhale 2 puffs into the lungs every 4 hours as needed for shortness of breath or wheezing 15 g 4    Loratadine (CLARITIN PO) Take  by mouth.      mupirocin (BACTROBAN) 2 % external ointment Use 2 times a day every other day alternating with clobetasol ointment to affected area. 30 g 3    NEW MED Biest 1.5mg/gram cream(pg free)  Insert 1 gram vaginally twice weekly as directed 40 g 1    tretinoin (RETIN-A) 0.025 % external cream APPLY PEA-SIZED AMOUNT TO AFFECTED AREA(S) ON FACE EVERY OTHER NIGHT AS TOLERATED 45 g 11    BIESTROGEN,20-80, 2.5 MG/GM CREAM Apply 0.5 g topically 2 times daily. 60 g 11    cefdinir (OMNICEF) 300 MG capsule       COMPOUNDED NON-CONTROLLED SUBSTANCE (CMPD RX) - PHARMACY TO MIX COMPOUNDED MEDICATION Place 1 Application. vaginally twice a week Compounded with estrogen 1 g 3    ibuprofen (ADVIL,MOTRIN) 800 MG tablet Take 800 mg by mouth every 8 hours as needed       Current Facility-Administered Medications   Medication Dose Route Frequency Provider Last Rate Last Admin    hydrocortisone 2.5 % cream   Topical BID Barbara Soto MD        lidocaine 1% with EPINEPHrine 1:100,000 injection 3 mL  3 mL Intradermal Once Barbara Soto MD        triamcinolone acetonide  (KENALOG-10) injection 10 mg  10 mg Intra-Lesional Once         triamcinolone acetonide (KENALOG-10) injection 10 mg  10 mg Intra-Lesional Once Barbara Soto MD        triamcinolone acetonide (KENALOG-10) injection 10 mg  10 mg Intra-Lesional Once Barbara Soto MD        triamcinolone acetonide (KENALOG-10) injection 10 mg  10 mg Intra-Lesional Once Barbara Soto MD        triamcinolone acetonide (KENALOG-10) injection 10 mg  10 mg Intra-Lesional Once Barbara Soto MD        triamcinolone acetonide (KENALOG-10) injection 17 mg  17 mg Intra-Lesional Once         triamcinolone acetonide (KENALOG-10) injection 17 mg  17 mg Intra-Lesional Once Barbara Soto MD        triamcinolone acetonide (KENALOG-10) injection 17 mg  17 mg Intra-Lesional Once Barbara Soto MD        triamcinolone acetonide (KENALOG-10) injection 18 mg  18 mg Intra-Lesional Once Barbara Soto MD        triamcinolone acetonide (KENALOG-10) injection 20 mg  20 mg Intra-Lesional Once           Past Medical History:   Patient Active Problem List   Diagnosis    Porokeratosis    Squamous cell carcinoma    Neoplasm of uncertain behavior    Lichen planopilaris    Dermatitis    Cicatricial alopecia    Keratosis, inflamed seborrheic    History of skin cancer    Basal cell carcinoma of vertex scalp s/p mohs 6-5-15    Medication management    Actinic keratosis    Medication monitoring encounter    Dermatitis, seborrheic    Erosive pustular dermatosis    Tick bite, initial encounter    Vulvar atrophy    Factor V Leiden mutation    Splenic artery aneurysm    Post concussion syndrome    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      Sincerely,    Barbara Soto MD    Electronically signed

## 2025-01-21 ENCOUNTER — PRE VISIT (OUTPATIENT)
Dept: OPHTHALMOLOGY | Facility: CLINIC | Age: 80
End: 2025-01-21

## 2025-02-22 ENCOUNTER — HEALTH MAINTENANCE LETTER (OUTPATIENT)
Age: 80
End: 2025-02-22

## 2025-02-25 ENCOUNTER — OFFICE VISIT (OUTPATIENT)
Dept: DERMATOLOGY | Facility: CLINIC | Age: 80
End: 2025-02-25
Payer: MEDICARE

## 2025-02-25 VITALS — HEART RATE: 80 BPM | DIASTOLIC BLOOD PRESSURE: 66 MMHG | OXYGEN SATURATION: 94 % | SYSTOLIC BLOOD PRESSURE: 103 MMHG

## 2025-02-25 DIAGNOSIS — L66.10 LICHEN PLANOPILARIS: Primary | ICD-10-CM

## 2025-02-25 DIAGNOSIS — L30.9 DERMATITIS: ICD-10-CM

## 2025-02-25 DIAGNOSIS — L98.8 EROSIVE PUSTULAR DERMATOSIS: ICD-10-CM

## 2025-02-25 PROCEDURE — 3074F SYST BP LT 130 MM HG: CPT | Performed by: DERMATOLOGY

## 2025-02-25 PROCEDURE — 99213 OFFICE O/P EST LOW 20 MIN: CPT | Mod: 25 | Performed by: DERMATOLOGY

## 2025-02-25 PROCEDURE — 3078F DIAST BP <80 MM HG: CPT | Performed by: DERMATOLOGY

## 2025-02-25 PROCEDURE — 11901 INJECT SKIN LESIONS >7: CPT | Performed by: DERMATOLOGY

## 2025-02-25 NOTE — LETTER
2/25/2025      Ester Barba  [address] [place] [address]      Dear Colleague,    Thank you for referring your patient, Ester Barba, to the [facility]. Please see a copy of my visit note below.    [facility]atology Note  Encounter Date: Feb 25, 2025  Office Visit     Dermatology Problem List:  Last FSE: 10/8/24  1. 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  - Current tx: ILK10 injections, clobetasol propionate 0.05% shampoo twice weekly, fluocinolone oil every other day, Duoderm to vertex plaque  - Previous tx: plaquenil w/ significant improvement (~15 yrs; discontinued 12/2019 with concerns for retinopathy by local doc in [facility]; now followed by Dr. Radford here without signs of Plaquenil toxicity 1/2020).   - LPPAI score: 3.83 on 3/28/2023, 1 on 5/2/23, 1.33 on 11/07/2023 1.33 3/29/24, 1 on 5/10/24, .67 on 7/12/24; 0.67 (9/24/24), .67 11/5/24, .67 1/14/25; 0.67 2/25/25  2. 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 Skin infection, vertex scalp at prior site of MMS - resolved  - s/p doxycycline 100 mg BID and mupirocin ointment BID   4. History of NMSC  - BCC: right forehead:  s/p shave bx'd 7/12/24, s/p MOHS 8/26/24  - BCC - front vertex scalp, s/p Mohs with purse string and granulation, 3/30/22  - BCC - forehead, s/p MMS 6/9/21  - SCCis - R vertex scalp, s/p MMS 3/2/21, bacterial cx obtained from site 3/23/21  - BCC - right nasal ala s/p Mohs 11/27/17  - Nodular BCC - vertex scalp s/p Mohs 6/5/15  - SCC - scalp, s/p Mohs 4/18/2014  - Hx of 1-2 other NMSC on scalp (BCCs)  5. AKs - bilateral temples, cheeks  6. 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  - Continue  clobetasol shampoo twice weekly  - Continue fluocinolone oil every other day  - Continue DuoDerm 2-3 days on 2-3 days off or just Vaseline to crusted areas  - LPPAI: 0.67       Procedures Performed:   - Intra-lesional triamcinolone procedure note. After verbal consent and review of risk of pain and skin thinning/atrophy, positioning and cleansing with isopropyl alcohol, 1 total mL of triamcinolone 10 mg/mL was injected into 10 active lesion(s) on the scalp. The patient tolerated the procedure well and left the dermatology clinic in good condition.      Follow-up: 2 month(s) in-person, or earlier for new or changing lesions    Staff and Scribe:     Scribe Disclosure:   I, Kelle Mosquera, am serving as a scribe; to document services personally performed by Barbara Soto MD -based on data collection and the provider's statements to me.     Provider Disclosure:  I agree with above History, Review of Systems, Physical exam and Plan.  I have reviewed the content of the documentation and have edited it as needed. I have personally performed the services documented here and the documentation accurately represents those services and the decisions I have made.      Electronically signed by:  Barbara Soto MD  Professor   Department of Dermatology  [facility]     ____________________________________________    CC: Hair Loss ( follow up, AOC-same)    HPI:  Ms. Ester Barba is a 79 year old female who presents as a return patient for follow-up of hair loss, diagnosed as Lichen planopilaris .   - Last seen in-clinic on 1/14/25  - Shedding or thinning, or both: none  - Current tx: clobetasol shampoo twice a week; fluocinolone oil not using for now; duoderm twice a week    no Any new medications, supplements, or products? (please list below)     no Scalp pain   no Scalp burning   no Scalp itching    no Eyebrow changes    no Eyelash changes   no Beard changes    no Other body hair changes    no Nail  changes    no Additional symptoms? (please list below)     - Overall course: She had hip surgery and now she has a linear fracture, which she is not sure how the linear fracture happened.   She did have to reduce the frequency of topicals due to hip surgery.    Patient is otherwise feeling well, in usual state of health, and has no additional skin concerns today.     Labs:  None reviewed.    Physical Exam:  Vitals: /66   Pulse 80   SpO2 94%   GEN: Well developed, well-nourished, in no acute distress, in a pleasant mood.    - mild crusting at crown of scalp - see photos  - no diffuse erythema   - no perifollicular erythema  - mild perifollicular scale vertex region  - no scaling of the scalp   - negative hair pull test   - normal eyelash density  - normal eyebrow density  - no nail pitting or dystrophy   - no scalp folliculitis/pustules   - In comparison to prior photographs, 1/2025; mild crusting today  - Raised firm papule lateral and below left knee  - No other lesions of concern on areas examined.    Medications:  Current Outpatient Medications   Medication Sig Dispense Refill    acetaminophen (TYLENOL) 500 MG tablet Take 500-1,000 mg by mouth every 6 hours as needed for mild pain      ammonium lactate (AMLACTIN) 12 % external cream Apply to legs twice daily 385 g 1    Biotin 10 MG TABS tablet       Calcium 1500 MG TABS       Cholecalciferol (VITAMIN D) 1000 UNIT capsule Total 2200 units daily.      clobetasol (TEMOVATE) 0.05 % external ointment Apply twice daily every other day alternating with the mupirocin ointment to affected area on the scalp 60 g 1    clobetasol propionate (CLOBEX) 0.05 % external shampoo APPLY TO DRY SCALP EVERY OTHER DAY. LEAVE ON FOR 15 MINUTES, THEN LATHER AND RINSE. 118 mL 2    desonide (DESOWEN) 0.05 % external cream Mix half and half with ketoconazole cream and apply twice daily as needed 60 g 1    esomeprazole (NEXIUM) 20 MG packet Take 20 mg by mouth every morning (before  breakfast) Take 30-60 minutes before eating.      Fluocinolone Acetonide Scalp 0.01 % OIL oil Apply once a week to scalp for lichen planopilaris 118.28 mL 1    glucosamine-chondroitin 500-400 MG CAPS per capsule Take 1 capsule by mouth      hydrocortisone 2.5 % cream Use twice daily as needed on involved areas 30 g 4    ketoconazole (NIZORAL) 2 % external cream Mix half and half with desonide cream and apply twice a day as needed 60 g 1    levalbuterol (XOPENEX HFA) 45 MCG/ACT inhaler Inhale 2 puffs into the lungs every 4 hours as needed for shortness of breath or wheezing 15 g 4    Loratadine (CLARITIN PO) Take  by mouth.      mupirocin (BACTROBAN) 2 % external ointment Use 2 times a day every other day alternating with clobetasol ointment to affected area. 30 g 3    NEW MED Biest 1.5mg/gram cream(pg free)  Insert 1 gram vaginally twice weekly as directed 40 g 1    tretinoin (RETIN-A) 0.025 % external cream APPLY PEA-SIZED AMOUNT TO AFFECTED AREA(S) ON FACE EVERY OTHER NIGHT AS TOLERATED 45 g 11    BIESTROGEN,20-80, 2.5 MG/GM CREAM Apply 0.5 g topically 2 times daily. 60 g 11    cefdinir (OMNICEF) 300 MG capsule       COMPOUNDED NON-CONTROLLED SUBSTANCE (CMPD RX) - PHARMACY TO MIX COMPOUNDED MEDICATION Place 1 Application. vaginally twice a week Compounded with estrogen 1 g 3    ibuprofen (ADVIL,MOTRIN) 800 MG tablet Take 800 mg by mouth every 8 hours as needed       Current Facility-Administered Medications   Medication Dose Route Frequency Provider Last Rate Last Admin    hydrocortisone 2.5 % cream   Topical BID Barbara Soto MD        lidocaine 1% with EPINEPHrine 1:100,000 injection 3 mL  3 mL Intradermal Once Barbara Soto MD        triamcinolone acetonide (KENALOG-10) injection 10 mg  10 mg Intra-Lesional Once         triamcinolone acetonide (KENALOG-10) injection 10 mg  10 mg Intra-Lesional Once Barbara Soto MD        triamcinolone acetonide (KENALOG-10)  injection 10 mg  10 mg Intra-Lesional Once Barbara Soto MD        triamcinolone acetonide (KENALOG-10) injection 10 mg  10 mg Intra-Lesional Once Barbara Soto MD        triamcinolone acetonide (KENALOG-10) injection 10 mg  10 mg Intra-Lesional Once Barbara Soto MD        triamcinolone acetonide (KENALOG-10) injection 17 mg  17 mg Intra-Lesional Once         triamcinolone acetonide (KENALOG-10) injection 17 mg  17 mg Intra-Lesional Once Barbara Soto MD        triamcinolone acetonide (KENALOG-10) injection 17 mg  17 mg Intra-Lesional Once Barbara Soto MD        triamcinolone acetonide (KENALOG-10) injection 18 mg  18 mg Intra-Lesional Once Barbara Soto MD        triamcinolone acetonide (KENALOG-10) injection 20 mg  2 mL Intra-Lesional Once         triamcinolone acetonide (KENALOG-10) injection 20 mg  20 mg Intra-Lesional Once           Past Medical History:   Patient Active Problem List   Diagnosis    Porokeratosis    Squamous cell carcinoma    Neoplasm of uncertain behavior    Lichen planopilaris    Dermatitis    Cicatricial alopecia    Keratosis, inflamed seborrheic    History of skin cancer    Basal cell carcinoma of vertex scalp s/p mohs 6-5-15    Medication management    Actinic keratosis    Medication monitoring encounter    Dermatitis, seborrheic    Erosive pustular dermatosis    Tick bite, initial encounter    Vulvar atrophy    Factor V Leiden mutation    Splenic artery aneurysm    Post concussion syndrome    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       Sincerely,    Barbara Soto MD    Electronically signed

## 2025-02-25 NOTE — PROGRESS NOTES
[facility] Dermatology Note  Encounter Date: Feb 25, 2025  Office Visit     Dermatology Problem List:  Last FSE: 10/8/24  1. 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  - Current tx: ILK10 injections, clobetasol propionate 0.05% shampoo twice weekly, fluocinolone oil every other day, Duoderm to vertex plaque  - Previous tx: plaquenil w/ significant improvement (~15 yrs; discontinued 12/2019 with concerns for retinopathy by local doc in [facility]; now followed by Dr. Radford here without signs of Plaquenil toxicity 1/2020).   - LPPAI score: 3.83 on 3/28/2023, 1 on 5/2/23, 1.33 on 11/07/2023 1.33 3/29/24, 1 on 5/10/24, .67 on 7/12/24; 0.67 (9/24/24), .67 11/5/24, .67 1/14/25; 0.67 2/25/25  2. 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 Skin infection, vertex scalp at prior site of MMS - resolved  - s/p doxycycline 100 mg BID and mupirocin ointment BID   4. History of NMSC  - BCC: right forehead:  s/p shave bx'd 7/12/24, s/p MOHS 8/26/24  - BCC - front vertex scalp, s/p Mohs with purse string and granulation, 3/30/22  - BCC - forehead, s/p MMS 6/9/21  - SCCis - R vertex scalp, s/p MMS 3/2/21, bacterial cx obtained from site 3/23/21  - BCC - right nasal ala s/p Mohs 11/27/17  - Nodular BCC - vertex scalp s/p Mohs 6/5/15  - SCC - scalp, s/p Mohs 4/18/2014  - Hx of 1-2 other NMSC on scalp (BCCs)  5. AKs - bilateral temples, cheeks  6. 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  - Continue clobetasol shampoo twice weekly  - Continue fluocinolone oil every other day  - Continue DuoDerm 2-3 days on 2-3 days off or just Vaseline to crusted areas  - LPPAI: 0.67       Procedures Performed:   - Intra-lesional triamcinolone  procedure note. After verbal consent and review of risk of pain and skin thinning/atrophy, positioning and cleansing with isopropyl alcohol, 1 total mL of triamcinolone 10 mg/mL was injected into 10 active lesion(s) on the scalp. The patient tolerated the procedure well and left the dermatology clinic in good condition.      Follow-up: 2 month(s) in-person, or earlier for new or changing lesions    Staff and Scribe:     Scribe Disclosure:   I, Kelle Mosquera, am serving as a scribe; to document services personally performed by Barbara Soto MD -based on data collection and the provider's statements to me.     Provider Disclosure:  I agree with above History, Review of Systems, Physical exam and Plan.  I have reviewed the content of the documentation and have edited it as needed. I have personally performed the services documented here and the documentation accurately represents those services and the decisions I have made.      Electronically signed by:  Barbara Soto MD  Professor   Department of Dermatology  [facility]     ____________________________________________    CC: Hair Loss ( follow up, AOC-same)    HPI:  Ms. Ester Barba is a 79 year old female who presents as a return patient for follow-up of hair loss, diagnosed as Lichen planopilaris .   - Last seen in-clinic on 1/14/25  - Shedding or thinning, or both: none  - Current tx: clobetasol shampoo twice a week; fluocinolone oil not using for now; duoderm twice a week    no Any new medications, supplements, or products? (please list below)     no Scalp pain   no Scalp burning   no Scalp itching    no Eyebrow changes    no Eyelash changes   no Beard changes    no Other body hair changes    no Nail changes    no Additional symptoms? (please list below)     - Overall course: She had hip surgery and now she has a linear fracture, which she is not sure how the linear fracture happened.   She did have to reduce the frequency of  topicals due to hip surgery.    Patient is otherwise feeling well, in usual state of health, and has no additional skin concerns today.     Labs:  None reviewed.    Physical Exam:  Vitals: /66   Pulse 80   SpO2 94%   GEN: Well developed, well-nourished, in no acute distress, in a pleasant mood.    - mild crusting at crown of scalp - see photos  - no diffuse erythema   - no perifollicular erythema  - mild perifollicular scale vertex region  - no scaling of the scalp   - negative hair pull test   - normal eyelash density  - normal eyebrow density  - no nail pitting or dystrophy   - no scalp folliculitis/pustules   - In comparison to prior photographs, 1/2025; mild crusting today  - Raised firm papule lateral and below left knee  - No other lesions of concern on areas examined.    Medications:  Current Outpatient Medications   Medication Sig Dispense Refill    acetaminophen (TYLENOL) 500 MG tablet Take 500-1,000 mg by mouth every 6 hours as needed for mild pain      ammonium lactate (AMLACTIN) 12 % external cream Apply to legs twice daily 385 g 1    Biotin 10 MG TABS tablet       Calcium 1500 MG TABS       Cholecalciferol (VITAMIN D) 1000 UNIT capsule Total 2200 units daily.      clobetasol (TEMOVATE) 0.05 % external ointment Apply twice daily every other day alternating with the mupirocin ointment to affected area on the scalp 60 g 1    clobetasol propionate (CLOBEX) 0.05 % external shampoo APPLY TO DRY SCALP EVERY OTHER DAY. LEAVE ON FOR 15 MINUTES, THEN LATHER AND RINSE. 118 mL 2    desonide (DESOWEN) 0.05 % external cream Mix half and half with ketoconazole cream and apply twice daily as needed 60 g 1    esomeprazole (NEXIUM) 20 MG packet Take 20 mg by mouth every morning (before breakfast) Take 30-60 minutes before eating.      Fluocinolone Acetonide Scalp 0.01 % OIL oil Apply once a week to scalp for lichen planopilaris 118.28 mL 1    glucosamine-chondroitin 500-400 MG CAPS per capsule Take 1 capsule by  mouth      hydrocortisone 2.5 % cream Use twice daily as needed on involved areas 30 g 4    ketoconazole (NIZORAL) 2 % external cream Mix half and half with desonide cream and apply twice a day as needed 60 g 1    levalbuterol (XOPENEX HFA) 45 MCG/ACT inhaler Inhale 2 puffs into the lungs every 4 hours as needed for shortness of breath or wheezing 15 g 4    Loratadine (CLARITIN PO) Take  by mouth.      mupirocin (BACTROBAN) 2 % external ointment Use 2 times a day every other day alternating with clobetasol ointment to affected area. 30 g 3    NEW MED Biest 1.5mg/gram cream(pg free)  Insert 1 gram vaginally twice weekly as directed 40 g 1    tretinoin (RETIN-A) 0.025 % external cream APPLY PEA-SIZED AMOUNT TO AFFECTED AREA(S) ON FACE EVERY OTHER NIGHT AS TOLERATED 45 g 11    BIESTROGEN,20-80, 2.5 MG/GM CREAM Apply 0.5 g topically 2 times daily. 60 g 11    cefdinir (OMNICEF) 300 MG capsule       COMPOUNDED NON-CONTROLLED SUBSTANCE (CMPD RX) - PHARMACY TO MIX COMPOUNDED MEDICATION Place 1 Application. vaginally twice a week Compounded with estrogen 1 g 3    ibuprofen (ADVIL,MOTRIN) 800 MG tablet Take 800 mg by mouth every 8 hours as needed       Current Facility-Administered Medications   Medication Dose Route Frequency Provider Last Rate Last Admin    hydrocortisone 2.5 % cream   Topical BID Barbara Soto MD        lidocaine 1% with EPINEPHrine 1:100,000 injection 3 mL  3 mL Intradermal Once Barbara Soto MD        triamcinolone acetonide (KENALOG-10) injection 10 mg  10 mg Intra-Lesional Once         triamcinolone acetonide (KENALOG-10) injection 10 mg  10 mg Intra-Lesional Once Barbara Soto MD        triamcinolone acetonide (KENALOG-10) injection 10 mg  10 mg Intra-Lesional Once Barbara Soto MD        triamcinolone acetonide (KENALOG-10) injection 10 mg  10 mg Intra-Lesional Once Barbara Soto MD        triamcinolone acetonide (KENALOG-10)  injection 10 mg  10 mg Intra-Lesional Once Barbara Soto MD        triamcinolone acetonide (KENALOG-10) injection 17 mg  17 mg Intra-Lesional Once         triamcinolone acetonide (KENALOG-10) injection 17 mg  17 mg Intra-Lesional Once Barbara Soto MD        triamcinolone acetonide (KENALOG-10) injection 17 mg  17 mg Intra-Lesional Once Barbara Soto MD        triamcinolone acetonide (KENALOG-10) injection 18 mg  18 mg Intra-Lesional Once Barbara Soto MD        triamcinolone acetonide (KENALOG-10) injection 20 mg  2 mL Intra-Lesional Once         triamcinolone acetonide (KENALOG-10) injection 20 mg  20 mg Intra-Lesional Once           Past Medical History:   Patient Active Problem List   Diagnosis    Porokeratosis    Squamous cell carcinoma    Neoplasm of uncertain behavior    Lichen planopilaris    Dermatitis    Cicatricial alopecia    Keratosis, inflamed seborrheic    History of skin cancer    Basal cell carcinoma of vertex scalp s/p mohs 6-5-15    Medication management    Actinic keratosis    Medication monitoring encounter    Dermatitis, seborrheic    Erosive pustular dermatosis    Tick bite, initial encounter    Vulvar atrophy    Factor V Leiden mutation    Splenic artery aneurysm    Post concussion syndrome    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

## 2025-02-25 NOTE — NURSING NOTE
Drug Administration Record    Prior to injection, verified patient identity using patient's name and date of birth.  Due to injection administration, patient instructed to remain in clinic for 15 minutes  afterwards, and to report any adverse reaction to me immediately.    Drug Name: triamcinolone acetonide(kenalog)  Dose: mL  Route administered: IL  NDC #: [id number]  Amount of waste(mL):See procedure note  Reason for waste: Multi dose vial    LOT #: [id number]  SITE: See procedure note  : DARLYN Mowdoyaron  EXPIRATION DATE: 5/31/2027

## 2025-04-08 ENCOUNTER — OFFICE VISIT (OUTPATIENT)
Dept: DERMATOLOGY | Facility: CLINIC | Age: 80
End: 2025-04-08
Payer: MEDICARE

## 2025-04-08 VITALS — HEART RATE: 78 BPM | OXYGEN SATURATION: 97 % | SYSTOLIC BLOOD PRESSURE: 122 MMHG | DIASTOLIC BLOOD PRESSURE: 75 MMHG

## 2025-04-08 DIAGNOSIS — L30.9 DERMATITIS: ICD-10-CM

## 2025-04-08 DIAGNOSIS — L66.10 LICHEN PLANOPILARIS: Primary | ICD-10-CM

## 2025-04-08 DIAGNOSIS — L98.8 EROSIVE PUSTULAR DERMATOSIS: ICD-10-CM

## 2025-04-08 DIAGNOSIS — L66.9 CICATRICIAL ALOPECIA: ICD-10-CM

## 2025-04-08 PROCEDURE — 3078F DIAST BP <80 MM HG: CPT | Performed by: DERMATOLOGY

## 2025-04-08 PROCEDURE — 3074F SYST BP LT 130 MM HG: CPT | Performed by: DERMATOLOGY

## 2025-04-08 PROCEDURE — 99213 OFFICE O/P EST LOW 20 MIN: CPT | Mod: 25 | Performed by: DERMATOLOGY

## 2025-04-08 PROCEDURE — 11901 INJECT SKIN LESIONS >7: CPT | Performed by: DERMATOLOGY

## 2025-04-08 NOTE — PROGRESS NOTES
[facility] Dermatology Note  Encounter Date: Apr 8, 2025  Office Visit    Dermatology Problem List:  Last FSE: 10/8/24  1. 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  - Current tx: ILK10 injections, clobetasol propionate 0.05% shampoo twice weekly, fluocinolone oil every other day, Duoderm to vertex plaque  - Previous tx: plaquenil w/ significant improvement (~15 yrs; discontinued 12/2019 with concerns for retinopathy by local doc in [facility]; now followed by Dr. Radford here without signs of Plaquenil toxicity 1/2020).   - LPPAI score: 3.83 on 3/28/2023, 1 on 5/2/23, 1.33 on 11/07/2023 1.33 3/29/24, 1 on 5/10/24, .67 on 7/12/24; 0.67 (9/24/24), .67 11/5/24, .67 1/14/25; 0.67 2/25/25  2. 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 Skin infection, vertex scalp at prior site of MMS - resolved  - s/p doxycycline 100 mg BID and mupirocin ointment BID   4. History of NMSC  - BCC: right forehead:  s/p shave bx'd 7/12/24, s/p MOHS 8/26/24  - BCC - front vertex scalp, s/p Mohs with purse string and granulation, 3/30/22  - BCC - forehead, s/p MMS 6/9/21  - SCCis - R vertex scalp, s/p MMS 3/2/21, bacterial cx obtained from site 3/23/21  - BCC - right nasal ala s/p Mohs 11/27/17  - Nodular BCC - vertex scalp s/p Mohs 6/5/15  - SCC - scalp, s/p Mohs 4/18/2014  - Hx of 1-2 other NMSC on scalp (BCCs)  5. AKs - bilateral temples, cheeks  6. 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 After verbal consent and review of risk of pain and skin thinning/atrophy, positioning and cleansing with isopropyl alcohol, 1 total mL of triamcinolone 10 mg/mL was injected into 20 lesion(s) on the scalp. The patient tolerated the procedure well and left the dermatology clinic in good condition.    Follow-up: 2 month(s) in-person, or earlier for new or changing lesions    Staff and Scribe  I, LIZZY LEDEZMA, am serving as a scribe; to document services personally performed by Barbara Soto MD -based on data collection and the provider's statements to me.    Provider Disclosure:   The documentation recorded by the scribe accurately reflects the services I personally performed and the decisions made by me.    Barbara Soto MD  Professor   Department of Dermatology  [facility]: Phone: [phone], Fax:[phone]  [facility]: Phone: [phone], Fax: [phone]      ____________________________________________    CC: Hair Loss (Patient is here for a hair loss follow up, hair loss is losing, areas of concern top)    HPI:  Ms. Ester Barba is a 79 year old female who presents as a return patient for follow-up of hair loss, diagnosed as  Lichen planopilaris.   - Last seen in-clinic on 2/25/25  - Shedding or thinning, or both: n/a  - Current tx: clobetasol shampoo twice a week; fluocinolone oil not using for now; duoderm twice a week   - If using Rogaine, 1 cannister lasts how long: N/A  No Any new medications, supplements, or products? (please list below)     No Scalp pain   No Scalp burning   No Scalp itching    No Eyebrow changes    No Eyelash changes   No Beard changes    No Other body hair changes    No Nail changes    No  Additional symptoms? (please list below)     - Overall course: When touching the hair, she notes some itchiness.     - Stubbed her large toe and broke it, will be doing physical therapy for 4 weeks.      - pt reports scaling on the right forehead region, the area where her MMS surgery was done. Denies scrating it recently.     Patient is otherwise feeling well, in usual state of health, and has no additional skin concerns today.         Labs:  None reviewed.    Physical Exam:  Vitals: /75   Pulse 78   SpO2 97%   GEN: Well developed, well-nourished, in no acute distress, in a pleasant mood.    SKIN: Focused examination of face, scalp and hands was performed.  - mild diffuse erythema   - mild perifollicular erythema  - mild perifollicular scale   - no scaling of the scalp   - no pustules, no ulcerations  - negative hair pull test   - normal eyelash density  - normal eyebrow density  - no nail pitting or dystrophy   - no scalp folliculitis/pustules   - Erythematous patch with yellow hyperkeratosis scale on the right forehead  - No other lesions of concern on areas examined.       Medications:  Current Outpatient Medications   Medication Sig Dispense Refill    acetaminophen (TYLENOL) 500 MG tablet Take 500-1,000 mg by mouth every 6 hours as needed for mild pain      ammonium lactate (AMLACTIN) 12 % external cream Apply to legs twice daily 385 g 1    Biotin 10 MG TABS tablet       Calcium 1500 MG TABS       Cholecalciferol (VITAMIN D) 1000 UNIT capsule Total 2200 units daily.      clobetasol (TEMOVATE) 0.05 % external ointment Apply twice daily every other day alternating with the mupirocin ointment to affected area on the scalp 60 g 1    clobetasol propionate (CLOBEX) 0.05 % external shampoo APPLY TO DRY SCALP EVERY OTHER DAY. LEAVE ON FOR 15 MINUTES, THEN LATHER AND RINSE. 118 mL 2    desonide (DESOWEN) 0.05 % external cream Mix half and half with ketoconazole cream and apply twice daily as needed 60 g 1     esomeprazole (NEXIUM) 20 MG packet Take 20 mg by mouth every morning (before breakfast) Take 30-60 minutes before eating.      Fluocinolone Acetonide Scalp 0.01 % OIL oil Apply once a week to scalp for lichen planopilaris 118.28 mL 1    glucosamine-chondroitin 500-400 MG CAPS per capsule Take 1 capsule by mouth      hydrocortisone 2.5 % cream Use twice daily as needed on involved areas 30 g 4    ketoconazole (NIZORAL) 2 % external cream Mix half and half with desonide cream and apply twice a day as needed 60 g 1    levalbuterol (XOPENEX HFA) 45 MCG/ACT inhaler Inhale 2 puffs into the lungs every 4 hours as needed for shortness of breath or wheezing 15 g 4    Loratadine (CLARITIN PO) Take  by mouth.      mupirocin (BACTROBAN) 2 % external ointment Use 2 times a day every other day alternating with clobetasol ointment to affected area. 30 g 3    NEW MED Biest 1.5mg/gram cream(pg free)  Insert 1 gram vaginally twice weekly as directed 40 g 1    tretinoin (RETIN-A) 0.025 % external cream APPLY PEA-SIZED AMOUNT TO AFFECTED AREA(S) ON FACE EVERY OTHER NIGHT AS TOLERATED 45 g 11    BIESTROGEN,20-80, 2.5 MG/GM CREAM Apply 0.5 g topically 2 times daily. 60 g 11    cefdinir (OMNICEF) 300 MG capsule       COMPOUNDED NON-CONTROLLED SUBSTANCE (CMPD RX) - PHARMACY TO MIX COMPOUNDED MEDICATION Place 1 Application. vaginally twice a week Compounded with estrogen 1 g 3    ibuprofen (ADVIL,MOTRIN) 800 MG tablet Take 800 mg by mouth every 8 hours as needed       Current Facility-Administered Medications   Medication Dose Route Frequency Provider Last Rate Last Admin    hydrocortisone 2.5 % cream   Topical BID Barbara Soto MD        lidocaine 1% with EPINEPHrine 1:100,000 injection 3 mL  3 mL Intradermal Once Barbara Soto MD        triamcinolone acetonide (KENALOG-10) injection 10 mg  10 mg Intra-Lesional Once         triamcinolone acetonide (KENALOG-10) injection 10 mg  10 mg Intra-Lesional Once  Barbara Soto MD        triamcinolone acetonide (KENALOG-10) injection 10 mg  10 mg Intra-Lesional Once Barbara Soto MD        triamcinolone acetonide (KENALOG-10) injection 10 mg  10 mg Intra-Lesional Once Barbara Soto MD        triamcinolone acetonide (KENALOG-10) injection 10 mg  10 mg Intra-Lesional Once Barbara Soto MD        triamcinolone acetonide (KENALOG-10) injection 17 mg  17 mg Intra-Lesional Once         triamcinolone acetonide (KENALOG-10) injection 17 mg  17 mg Intra-Lesional Once Barbara Soto MD        triamcinolone acetonide (KENALOG-10) injection 17 mg  17 mg Intra-Lesional Once Barbara Soto MD        triamcinolone acetonide (KENALOG-10) injection 18 mg  18 mg Intra-Lesional Once Barbara Soto MD        triamcinolone acetonide (KENALOG-10) injection 20 mg  2 mL Intra-Lesional Once         triamcinolone acetonide (KENALOG-10) injection 20 mg  20 mg Intra-Lesional Once           Past Medical History:   Patient Active Problem List   Diagnosis    Porokeratosis    Squamous cell carcinoma    Neoplasm of uncertain behavior    Lichen planopilaris    Dermatitis    Cicatricial alopecia    Keratosis, inflamed seborrheic    History of skin cancer    Basal cell carcinoma of vertex scalp s/p mohs 6-5-15    Medication management    Actinic keratosis    Medication monitoring encounter    Dermatitis, seborrheic    Erosive pustular dermatosis    Tick bite, initial encounter    Vulvar atrophy    Factor V Leiden mutation    Splenic artery aneurysm    Post concussion syndrome    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

## 2025-04-08 NOTE — NURSING NOTE
Ester Barba's goals for this visit include:   Chief Complaint   Patient presents with    Hair Loss     Patient is here for a hair loss follow up, hair loss is losing, areas of concern top       She requests these members of her care team be copied on today's visit information:     PCP: Winnie Sepulveda    Referring Provider:  Referred Self, MD  No address on file    /75   Pulse 78   SpO2 97%     Do you need any medication refills at today's visit?       Amber Baird EMT

## 2025-04-08 NOTE — LETTER
4/8/2025      Ester Barba  [address] [place] [address]      Dear Colleague,    Thank you for referring your patient, Ester Barba, to the [facility]. Please see a copy of my visit note below.    [facility] Dermatology Note  Encounter Date: Apr 8, 2025  Office Visit    Dermatology Problem List:  Last FSE: 10/8/24  1. 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  - Current tx: ILK10 injections, clobetasol propionate 0.05% shampoo twice weekly, fluocinolone oil every other day, Duoderm to vertex plaque  - Previous tx: plaquenil w/ significant improvement (~15 yrs; discontinued 12/2019 with concerns for retinopathy by local doc in [facility]; now followed by Dr. Radford here without signs of Plaquenil toxicity 1/2020).   - LPPAI score: 3.83 on 3/28/2023, 1 on 5/2/23, 1.33 on 11/07/2023 1.33 3/29/24, 1 on 5/10/24, .67 on 7/12/24; 0.67 (9/24/24), .67 11/5/24, .67 1/14/25; 0.67 2/25/25  2. 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 Skin infection, vertex scalp at prior site of MMS - resolved  - s/p doxycycline 100 mg BID and mupirocin ointment BID   4. History of NMSC  - BCC: right forehead:  s/p shave bx'd 7/12/24, s/p MOHS 8/26/24  - BCC - front vertex scalp, s/p Mohs with purse string and granulation, 3/30/22  - BCC - forehead, s/p MMS 6/9/21  - SCCis - R vertex scalp, s/p MMS 3/2/21, bacterial cx obtained from site 3/23/21  - BCC - right nasal ala s/p Mohs 11/27/17  - Nodular BCC - vertex scalp s/p Mohs 6/5/15  - SCC - scalp, s/p Mohs 4/18/2014  - Hx of 1-2 other NMSC on scalp (BCCs)  5. AKs - bilateral temples, cheeks  6. 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 After verbal consent and review of risk of pain and skin thinning/atrophy, positioning and cleansing with isopropyl alcohol, 1 total mL of triamcinolone 10 mg/mL was injected into 20 lesion(s) on the scalp. The patient tolerated the procedure well and left the dermatology clinic in good condition.    Follow-up: 2 month(s) in-person, or earlier for new or changing lesions    Staff and Scribe  I, LIZZY LEDEZMA, am serving as a scribe; to document services personally performed by Barbara Soto MD -based on data collection and the provider's statements to me.    Provider Disclosure:   The documentation recorded by the scribe accurately reflects the services I personally performed and the decisions made by me.    Barbara Soto MD  Professor   Department of Dermatology  [facility]: Phone: [phone], Fax:[phone]  [facility]: Phone: [phone], Fax: [phone]      ____________________________________________    CC: Hair Loss (Patient is here for a hair loss follow up, hair loss is losing, areas of concern top)    HPI:  Ms. Ester Barba is a 79 year old female who presents as a return patient for follow-up of hair loss, diagnosed as  Lichen planopilaris.   - Last seen in-clinic on 2/25/25  - Shedding or thinning, or both: n/a  - Current tx: clobetasol shampoo twice a week; fluocinolone oil not using for now; duoderm twice a week   - If using Rogaine, 1 cannister lasts how long: N/A  No Any new  medications, supplements, or products? (please list below)     No Scalp pain   No Scalp burning   No Scalp itching    No Eyebrow changes    No Eyelash changes   No Beard changes    No Other body hair changes    No Nail changes    No Additional symptoms? (please list below)     - Overall course: When touching the hair, she notes some itchiness.     - Stubbed her large toe and broke it, will be doing physical therapy for 4 weeks.      - pt reports scaling on the right forehead region, the area where her MMS surgery was done. Denies scrating it recently.     Patient is otherwise feeling well, in usual state of health, and has no additional skin concerns today.         Labs:  None reviewed.    Physical Exam:  Vitals: /75   Pulse 78   SpO2 97%   GEN: Well developed, well-nourished, in no acute distress, in a pleasant mood.    SKIN: Focused examination of face, scalp and hands was performed.  - mild diffuse erythema   - mild perifollicular erythema  - mild perifollicular scale   - no scaling of the scalp   - no pustules, no ulcerations  - negative hair pull test   - normal eyelash density  - normal eyebrow density  - no nail pitting or dystrophy   - no scalp folliculitis/pustules   - Erythematous patch with yellow hyperkeratosis scale on the right forehead  - No other lesions of concern on areas examined.       Medications:  Current Outpatient Medications   Medication Sig Dispense Refill    acetaminophen (TYLENOL) 500 MG tablet Take 500-1,000 mg by mouth every 6 hours as needed for mild pain      ammonium lactate (AMLACTIN) 12 % external cream Apply to legs twice daily 385 g 1    Biotin 10 MG TABS tablet       Calcium 1500 MG TABS       Cholecalciferol (VITAMIN D) 1000 UNIT capsule Total 2200 units daily.      clobetasol (TEMOVATE) 0.05 % external ointment Apply twice daily every other day alternating with the mupirocin ointment to affected area on the scalp 60 g 1    clobetasol propionate (CLOBEX) 0.05 % external  shampoo APPLY TO DRY SCALP EVERY OTHER DAY. LEAVE ON FOR 15 MINUTES, THEN LATHER AND RINSE. 118 mL 2    desonide (DESOWEN) 0.05 % external cream Mix half and half with ketoconazole cream and apply twice daily as needed 60 g 1    esomeprazole (NEXIUM) 20 MG packet Take 20 mg by mouth every morning (before breakfast) Take 30-60 minutes before eating.      Fluocinolone Acetonide Scalp 0.01 % OIL oil Apply once a week to scalp for lichen planopilaris 118.28 mL 1    glucosamine-chondroitin 500-400 MG CAPS per capsule Take 1 capsule by mouth      hydrocortisone 2.5 % cream Use twice daily as needed on involved areas 30 g 4    ketoconazole (NIZORAL) 2 % external cream Mix half and half with desonide cream and apply twice a day as needed 60 g 1    levalbuterol (XOPENEX HFA) 45 MCG/ACT inhaler Inhale 2 puffs into the lungs every 4 hours as needed for shortness of breath or wheezing 15 g 4    Loratadine (CLARITIN PO) Take  by mouth.      mupirocin (BACTROBAN) 2 % external ointment Use 2 times a day every other day alternating with clobetasol ointment to affected area. 30 g 3    NEW MED Biest 1.5mg/gram cream(pg free)  Insert 1 gram vaginally twice weekly as directed 40 g 1    tretinoin (RETIN-A) 0.025 % external cream APPLY PEA-SIZED AMOUNT TO AFFECTED AREA(S) ON FACE EVERY OTHER NIGHT AS TOLERATED 45 g 11    BIESTROGEN,20-80, 2.5 MG/GM CREAM Apply 0.5 g topically 2 times daily. 60 g 11    cefdinir (OMNICEF) 300 MG capsule       COMPOUNDED NON-CONTROLLED SUBSTANCE (CMPD RX) - PHARMACY TO MIX COMPOUNDED MEDICATION Place 1 Application. vaginally twice a week Compounded with estrogen 1 g 3    ibuprofen (ADVIL,MOTRIN) 800 MG tablet Take 800 mg by mouth every 8 hours as needed       Current Facility-Administered Medications   Medication Dose Route Frequency Provider Last Rate Last Admin    hydrocortisone 2.5 % cream   Topical BID Barbara Soto MD        lidocaine 1% with EPINEPHrine 1:100,000 injection 3 mL  3 mL  Intradermal Once Barbara Soto MD        triamcinolone acetonide (KENALOG-10) injection 10 mg  10 mg Intra-Lesional Once         triamcinolone acetonide (KENALOG-10) injection 10 mg  10 mg Intra-Lesional Once Barbara Soto MD        triamcinolone acetonide (KENALOG-10) injection 10 mg  10 mg Intra-Lesional Once Barbara Soto MD        triamcinolone acetonide (KENALOG-10) injection 10 mg  10 mg Intra-Lesional Once Barbara Soto MD        triamcinolone acetonide (KENALOG-10) injection 10 mg  10 mg Intra-Lesional Once Barbara Soto MD        triamcinolone acetonide (KENALOG-10) injection 17 mg  17 mg Intra-Lesional Once         triamcinolone acetonide (KENALOG-10) injection 17 mg  17 mg Intra-Lesional Once Barbara Soto MD        triamcinolone acetonide (KENALOG-10) injection 17 mg  17 mg Intra-Lesional Once Barbara Soto MD        triamcinolone acetonide (KENALOG-10) injection 18 mg  18 mg Intra-Lesional Once Barbara Soto MD        triamcinolone acetonide (KENALOG-10) injection 20 mg  2 mL Intra-Lesional Once         triamcinolone acetonide (KENALOG-10) injection 20 mg  20 mg Intra-Lesional Once           Past Medical History:   Patient Active Problem List   Diagnosis    Porokeratosis    Squamous cell carcinoma    Neoplasm of uncertain behavior    Lichen planopilaris    Dermatitis    Cicatricial alopecia    Keratosis, inflamed seborrheic    History of skin cancer    Basal cell carcinoma of vertex scalp s/p mohs 6-5-15    Medication management    Actinic keratosis    Medication monitoring encounter    Dermatitis, seborrheic    Erosive pustular dermatosis    Tick bite, initial encounter    Vulvar atrophy    Factor V Leiden mutation    Splenic artery aneurysm    Post concussion syndrome    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      Sincerely,    Barbara Soto MD    Electronically signed

## 2025-05-08 ENCOUNTER — OFFICE VISIT (OUTPATIENT)
Dept: ALLERGY | Facility: CLINIC | Age: 80
End: 2025-05-08
Attending: DERMATOLOGY
Payer: MEDICARE

## 2025-05-08 VITALS
HEART RATE: 77 BPM | SYSTOLIC BLOOD PRESSURE: 134 MMHG | DIASTOLIC BLOOD PRESSURE: 74 MMHG | OXYGEN SATURATION: 97 % | BODY MASS INDEX: 21.79 KG/M2 | WEIGHT: 136 LBS

## 2025-05-08 DIAGNOSIS — H57.9 PRURITUS OF BOTH EYES: Primary | ICD-10-CM

## 2025-05-08 DIAGNOSIS — J30.1 SEASONAL ALLERGIC RHINITIS DUE TO POLLEN: ICD-10-CM

## 2025-05-08 DIAGNOSIS — T78.40XA ALLERGY, UNSPECIFIED, INITIAL ENCOUNTER: ICD-10-CM

## 2025-05-08 DIAGNOSIS — L30.9 DERMATITIS: ICD-10-CM

## 2025-05-08 NOTE — PROGRESS NOTES
Ester Barba was seen in the Allergy Clinic at [facility].    Ester Barba is a 79 year old female being seen today at the request of Barbara Soto MD/[facility] in consultation for swollen eyes.    She was referred for eyelid swelling by dermatology to evaluate if allergies are contributing to her symptoms.  She does have mild itching of her eyes.  She does feel like it gets worse outside.  She has had skin testing years ago.  She is wondering if dust might be contributing to the swelling.  She has seen oculoplastics already in November 2024.  Surgery was being discussed.  She would like to find out if allergies are contributing prior to proceeding with surgery.    She does use refresh eyedrops for dry eye.  Symptoms have been problematic for at least 2 years.      Past Medical History:   Diagnosis Date    Arthritis     osteoarthritis    Basal cell carcinoma     Bilateral occipital neuralgia 01/21/2019    Concussion 01/21/2019    Squamous cell carcinoma      Family History   Problem Relation Age of Onset    Skin Cancer Sister         basal cell carcinoma    Melanoma No family hx of     Glaucoma No family hx of     Macular Degeneration No family hx of      Past Surgical History:   Procedure Laterality Date    BIOPSY OF SKIN LESION      BIOPSY VAGINAL N/A 7/14/2021    Procedure: Excision of vaginal Polyp, Dilation and Currettage and Cystoscopy;  Surgeon: Sandy Quintero MD;  [facility]    CATARACT IOL, RT/LT Bilateral 2018    CYSTOSCOPY N/A 7/14/2021    Procedure: CYSTOSCOPY;  Surgeon: Sandy Quintero MD;  [facility]    DILATION AND CURETTAGE N/A 7/14/2021    Procedure: Dilation and curettage;  Surgeon: Sandy Quintero MD;  [facility]    MOHS MICROGRAPHIC PROCEDURE      NO HISTORY OF SURGERY  02/17/2014    derm    SIGMOIDECTOMY  2015    for diverticulitis       ENVIRONMENTAL HISTORY:   Pets inside the house include None.  Do you smoke cigarettes  or other recreational drugs? No There is/are 0 smokers living in the house. The house does not have a damp basement.     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      Review of Systems      Current Outpatient Medications:     acetaminophen (TYLENOL) 500 MG tablet, Take 500-1,000 mg by mouth every 6 hours as needed for mild pain, Disp: , Rfl:     ammonium lactate (AMLACTIN) 12 % external cream, Apply to legs twice daily, Disp: 385 g, Rfl: 1    Biotin 10 MG TABS tablet, , Disp: , Rfl:     Calcium 1500 MG TABS, , Disp: , Rfl:     Cholecalciferol (VITAMIN D) 1000 UNIT capsule, Total 2200 units daily., Disp: , Rfl:     clobetasol (TEMOVATE) 0.05 % external ointment, Apply twice daily every other day alternating with the mupirocin ointment to affected area on the scalp, Disp: 60 g, Rfl: 1    clobetasol propionate (CLOBEX) 0.05 % external shampoo, APPLY TO DRY SCALP EVERY OTHER DAY. 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, Disp: 118.28 mL, Rfl: 1    glucosamine-chondroitin 500-400 MG CAPS per capsule, Take 1 capsule by mouth, Disp: , Rfl:     hydrocortisone 2.5 % cream, Use twice daily as needed on involved areas, Disp: 30 g, Rfl: 4    ketoconazole (NIZORAL) 2 % external cream, Mix half and half with desonide cream and apply twice a day as needed, Disp: 60 g, Rfl: 1    levalbuterol (XOPENEX HFA) 45 MCG/ACT inhaler, Inhale 2 puffs into the lungs every 4 hours as needed for shortness of breath or wheezing, Disp: 15 g, Rfl: 4    Loratadine (CLARITIN PO), Take  by mouth., Disp: , Rfl:      mupirocin (BACTROBAN) 2 % external ointment, Use 2 times a day every other day alternating with clobetasol ointment to affected area., Disp: 30 g, Rfl: 3    NEW MED, Biest 1.5mg/gram cream(pg free)  Insert 1 gram vaginally twice weekly as directed, Disp: 40 g, Rfl: 1    tretinoin (RETIN-A) 0.025 % external cream, APPLY PEA-SIZED AMOUNT TO AFFECTED AREA(S) ON FACE EVERY OTHER NIGHT AS TOLERATED, Disp: 45 g, Rfl: 11    ibuprofen (ADVIL,MOTRIN) 800 MG tablet, Take 800 mg by mouth every 8 hours as needed, Disp: , Rfl:     Current Facility-Administered Medications:     hydrocortisone 2.5 % cream, , Topical, BID, Barbara Soto MD    lidocaine 1% with EPINEPHrine 1:100,000 injection 3 mL, 3 mL, Intradermal, Once, Barbara Soto MD    triamcinolone acetonide (KENALOG-10) injection 10 mg, 10 mg, Intra-Lesional, Once,     triamcinolone acetonide (KENALOG-10) injection 10 mg, 10 mg, Intra-Lesional, Once, Barbara Soto MD    triamcinolone acetonide (KENALOG-10) injection 10 mg, 10 mg, Intra-Lesional, Once, Barbara Soto MD    triamcinolone acetonide (KENALOG-10) injection 10 mg, 10 mg, Intra-Lesional, Once, Barbara Soto MD    triamcinolone acetonide (KENALOG-10) injection 10 mg, 10 mg, Intra-Lesional, Once, Barbara Soto MD    triamcinolone acetonide (KENALOG-10) injection 17 mg, 17 mg, Intra-Lesional, Once,     triamcinolone acetonide (KENALOG-10) injection 17 mg, 17 mg, Intra-Lesional, Once, Barbara Soto MD    triamcinolone acetonide (KENALOG-10) injection 17 mg, 17 mg, Intra-Lesional, Once, Barbara Soto MD    triamcinolone acetonide (KENALOG-10) injection 18 mg, 18 mg, Intra-Lesional, Once, Barbara Soto MD    triamcinolone acetonide (KENALOG-10) injection 20 mg, 2 mL, Intra-Lesional, Once,     triamcinolone acetonide (KENALOG-10) injection 20 mg, 20 mg, Intra-Lesional, Once,   Allergies    Allergen Reactions    Dust Mites Other (See Comments)     Sneezing, coughing. asthma  Itchy watery eyes, sneezing    Estrogens Itching     Other reaction(s): Hypersensitivity  Topical caused burning sensation of skin  Topical caused burning sensation of skin; Premarin cream only - possibly only an bi-ingredient    Imiquimod Other (See Comments)     Hair loss    Levaquin [Levofloxacin Hemihydrate] Other (See Comments)     Muscle weakness    Levofloxacin Other (See Comments)     MUSCLE WEAKNESS, ARTHRITIS LIKE SYMPTOMS.      Mold Other (See Comments)     Sneezing, asthma, weezing    Pollen Extract/Tree Extract Other (See Comments)     Sneezing, weezing    Sulfa Antibiotics Hives    Sulfamethoxazole-Trimethoprim Hives    Latex Rash     LATEX BANDAGES; tapes adhesive    Penicillin G Rash    Penicillins Rash         EXAM:   /74   Pulse 77   Wt 61.7 kg (136 lb)   SpO2 97%   BMI 21.79 kg/m      Physical Exam    Constitutional:       General: She is not in acute distress.     Appearance: Normal appearance. She is not ill-appearing.   HENT:      Head: Normocephalic and atraumatic.      Nose: Nose normal. No congestion or rhinorrhea.      Mouth/Throat:      Mouth: Mucous membranes are moist.      Pharynx: Oropharynx is clear. No posterior oropharyngeal erythema.   Eyes:      General:   She has lower eyelid swelling bilaterally.   Right eye: No discharge.         Left eye: No discharge.   Cardiovascular:      Rate and Rhythm: Normal rate and regular rhythm.      Heart sounds: Normal heart sounds.   Pulmonary:      Effort: Pulmonary effort is normal.      Breath sounds: Normal breath sounds. No wheezing or rhonchi.   Skin:     General: Skin is warm.      Findings: No erythema or rash.   Neurological:      General: No focal deficit present.      Mental Status: She is alert. Mental status is at baseline.   Psychiatric:         Mood and Affect: Mood normal.         Behavior: Behavior normal.         ASSESSMENT/PLAN:  Ester Barba is a 79 year old female seen today for evaluation of lower eyelid swelling.  She has seen oculoplastics which I was considering a referral.  Surgery is being considered.  Will order allergy testing.  She does have mild itching and erythema at times and does feel like her symptoms do worsen outside.  I do not feel like the lower eyelid swelling is likely due to being exacerbated by allergic conjunctivitis/rhinitis.      Follow-up to be determined.  She will be contacted with the lab results.  Will consider increasing treatment for allergies to see if that does improve her symptoms but ultimately would likely have to return to oculoplastics to try to treat the lower eyelid swelling.      Thank you for allowing me to participate in the care of Ester Barba.      I spent 30 minutes on the date of the encounter doing chart review, history and exam, documentation and further coordination as noted above exclusive of separately reported interpretations    Clay Muniz MD  Allergy/Immunology  [facility]

## 2025-05-08 NOTE — LETTER
5/8/2025      Ester Barba  [address] [place] [address]      Dear Colleague,    Thank you for referring your patient, Ester Barba, to the [facility] [place]. Please see a copy of my visit note below.    Ester Barba was seen in the Allergy Clinic at [facility].    Ester Barba is a 79 year old female being seen today at the request of Barbara Soto MD/[facility] in consultation for swollen eyes.    She was referred for eyelid swelling by dermatology to evaluate if allergies are contributing to her symptoms.  She does have mild itching of her eyes.  She does feel like it gets worse outside.  She has had skin testing years ago.  She is wondering if dust might be contributing to the swelling.  She has seen oculoplastics already in November 2024.  Surgery was being discussed.  She would like to find out if allergies are contributing prior to proceeding with surgery.    She does use refresh eyedrops for dry eye.  Symptoms have been problematic for at least 2 years.      Past Medical History:   Diagnosis Date     Arthritis     osteoarthritis     Basal cell carcinoma      Bilateral occipital neuralgia 01/21/2019     Concussion 01/21/2019     Squamous cell carcinoma      Family History   Problem Relation Age of Onset     Skin Cancer Sister         basal cell carcinoma     Melanoma No family hx of      Glaucoma No family hx of      Macular Degeneration No family hx of      Past Surgical History:   Procedure Laterality Date     BIOPSY OF SKIN LESION       BIOPSY VAGINAL N/A 7/14/2021    Procedure: Excision of vaginal Polyp, Dilation and Currettage and Cystoscopy;  Surgeon: Sandy Quintero MD;  [facility]     CATARACT IOL, RT/LT Bilateral 2018     CYSTOSCOPY N/A 7/14/2021    Procedure: CYSTOSCOPY;  Surgeon: Sandy Quintero MD;  [facility]     DILATION AND CURETTAGE N/A 7/14/2021    Procedure: Dilation and curettage;  Surgeon:  Sandy Quintero MD;  [facility]     MOHS MICROGRAPHIC PROCEDURE       NO HISTORY OF SURGERY  02/17/2014    derm     SIGMOIDECTOMY  2015    for diverticulitis       ENVIRONMENTAL HISTORY:   Pets inside the house include None.  Do you smoke cigarettes or other recreational drugs? No There is/are 0 smokers living in the house. The house does not have a damp basement.     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      Review of Systems      Current Outpatient Medications:      acetaminophen (TYLENOL) 500 MG tablet, Take 500-1,000 mg by mouth every 6 hours as needed for mild pain, Disp: , Rfl:      ammonium lactate (AMLACTIN) 12 % external cream, Apply to legs twice daily, Disp: 385 g, Rfl: 1     Biotin 10 MG TABS tablet, , Disp: , Rfl:      Calcium 1500 MG TABS, , Disp: , Rfl:      Cholecalciferol (VITAMIN D) 1000 UNIT capsule, Total 2200 units daily., Disp: , Rfl:      clobetasol (TEMOVATE) 0.05 % external ointment, Apply twice daily every other day alternating with the mupirocin ointment to affected area on the scalp, Disp: 60 g, Rfl: 1     clobetasol propionate (CLOBEX) 0.05 % external shampoo, APPLY TO DRY SCALP EVERY OTHER DAY. 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, Disp: 118.28 mL, Rfl: 1     glucosamine-chondroitin 500-400 MG CAPS per capsule, Take 1 capsule by mouth, Disp: , Rfl:      hydrocortisone 2.5 % cream, Use twice daily as needed on involved areas, Disp: 30 g, Rfl: 4     ketoconazole (NIZORAL) 2 % external cream, Mix half and half with desonide cream and apply  twice a day as needed, Disp: 60 g, Rfl: 1     levalbuterol (XOPENEX HFA) 45 MCG/ACT inhaler, Inhale 2 puffs into the lungs every 4 hours as needed for shortness of breath or wheezing, Disp: 15 g, Rfl: 4     Loratadine (CLARITIN PO), Take  by mouth., Disp: , Rfl:      mupirocin (BACTROBAN) 2 % external ointment, Use 2 times a day every other day alternating with clobetasol ointment to affected area., Disp: 30 g, Rfl: 3     NEW MED, Biest 1.5mg/gram cream(pg free)  Insert 1 gram vaginally twice weekly as directed, Disp: 40 g, Rfl: 1     tretinoin (RETIN-A) 0.025 % external cream, APPLY PEA-SIZED AMOUNT TO AFFECTED AREA(S) ON FACE EVERY OTHER NIGHT AS TOLERATED, Disp: 45 g, Rfl: 11     ibuprofen (ADVIL,MOTRIN) 800 MG tablet, Take 800 mg by mouth every 8 hours as needed, Disp: , Rfl:     Current Facility-Administered Medications:      hydrocortisone 2.5 % cream, , Topical, BID, Barbara Soto MD     lidocaine 1% with EPINEPHrine 1:100,000 injection 3 mL, 3 mL, Intradermal, Once, Barbara Soto MD     triamcinolone acetonide (KENALOG-10) injection 10 mg, 10 mg, Intra-Lesional, Once,      triamcinolone acetonide (KENALOG-10) injection 10 mg, 10 mg, Intra-Lesional, Once, Barbara Soto MD     triamcinolone acetonide (KENALOG-10) injection 10 mg, 10 mg, Intra-Lesional, Once, Barbara Soto MD     triamcinolone acetonide (KENALOG-10) injection 10 mg, 10 mg, Intra-Lesional, Once, Barbara Soto MD     triamcinolone acetonide (KENALOG-10) injection 10 mg, 10 mg, Intra-Lesional, Once, Barbara Soto MD     triamcinolone acetonide (KENALOG-10) injection 17 mg, 17 mg, Intra-Lesional, Once,      triamcinolone acetonide (KENALOG-10) injection 17 mg, 17 mg, Intra-Lesional, Once, Barbara Soto MD     triamcinolone acetonide (KENALOG-10) injection 17 mg, 17 mg, Intra-Lesional, Once, Barbara Soto MD     triamcinolone  acetonide (KENALOG-10) injection 18 mg, 18 mg, Intra-Lesional, Once, Barbara Soto MD     triamcinolone acetonide (KENALOG-10) injection 20 mg, 2 mL, Intra-Lesional, Once,      triamcinolone acetonide (KENALOG-10) injection 20 mg, 20 mg, Intra-Lesional, Once,   Allergies   Allergen Reactions     Dust Mites Other (See Comments)     Sneezing, coughing. asthma  Itchy watery eyes, sneezing     Estrogens Itching     Other reaction(s): Hypersensitivity  Topical caused burning sensation of skin  Topical caused burning sensation of skin; Premarin cream only - possibly only an bi-ingredient     Imiquimod Other (See Comments)     Hair loss     Levaquin [Levofloxacin Hemihydrate] Other (See Comments)     Muscle weakness     Levofloxacin Other (See Comments)     MUSCLE WEAKNESS, ARTHRITIS LIKE SYMPTOMS.       Mold Other (See Comments)     Sneezing, asthma, weezing     Pollen Extract/Tree Extract Other (See Comments)     Sneezing, weezing     Sulfa Antibiotics Hives     Sulfamethoxazole-Trimethoprim Hives     Latex Rash     LATEX BANDAGES; tapes adhesive     Penicillin G Rash     Penicillins Rash         EXAM:   /74   Pulse 77   Wt 61.7 kg (136 lb)   SpO2 97%   BMI 21.79 kg/m      Physical Exam    Constitutional:       General: She is not in acute distress.     Appearance: Normal appearance. She is not ill-appearing.   HENT:      Head: Normocephalic and atraumatic.      Nose: Nose normal. No congestion or rhinorrhea.      Mouth/Throat:      Mouth: Mucous membranes are moist.      Pharynx: Oropharynx is clear. No posterior oropharyngeal erythema.   Eyes:      General:   She has lower eyelid swelling bilaterally.   Right eye: No discharge.         Left eye: No discharge.   Cardiovascular:      Rate and Rhythm: Normal rate and regular rhythm.      Heart sounds: Normal heart sounds.   Pulmonary:      Effort: Pulmonary effort is normal.      Breath sounds: Normal breath sounds. No wheezing or rhonchi.    Skin:     General: Skin is warm.      Findings: No erythema or rash.   Neurological:      General: No focal deficit present.      Mental Status: She is alert. Mental status is at baseline.   Psychiatric:         Mood and Affect: Mood normal.         Behavior: Behavior normal.        ASSESSMENT/PLAN:  Ester Barba is a 79 year old female seen today for evaluation of lower eyelid swelling.  She has seen oculoplastics which I was considering a referral.  Surgery is being considered.  Will order allergy testing.  She does have mild itching and erythema at times and does feel like her symptoms do worsen outside.  I do not feel like the lower eyelid swelling is likely due to being exacerbated by allergic conjunctivitis/rhinitis.      Follow-up to be determined.  She will be contacted with the lab results.  Will consider increasing treatment for allergies to see if that does improve her symptoms but ultimately would likely have to return to oculoplastics to try to treat the lower eyelid swelling.      Thank you for allowing me to participate in the care of Ester Barba.      I spent 30 minutes on the date of the encounter doing chart review, history and exam, documentation and further coordination as noted above exclusive of separately reported interpretations    Clay Muniz MD  Allergy/Immunology  [facility]      Again, thank you for allowing me to participate in the care of your patient.        Sincerely,        Clay Muniz MD    Electronically signed

## 2025-05-20 ENCOUNTER — OFFICE VISIT (OUTPATIENT)
Dept: DERMATOLOGY | Facility: CLINIC | Age: 80
End: 2025-05-20
Payer: MEDICARE

## 2025-05-20 ENCOUNTER — TELEPHONE (OUTPATIENT)
Dept: DERMATOLOGY | Facility: CLINIC | Age: 80
End: 2025-05-20

## 2025-05-20 DIAGNOSIS — L66.9 CICATRICIAL ALOPECIA: ICD-10-CM

## 2025-05-20 DIAGNOSIS — L66.10 LICHEN PLANOPILARIS: Primary | ICD-10-CM

## 2025-05-20 DIAGNOSIS — L85.3 XEROSIS OF SKIN: ICD-10-CM

## 2025-05-20 DIAGNOSIS — L98.8 EROSIVE PUSTULAR DERMATOSIS: ICD-10-CM

## 2025-05-20 DIAGNOSIS — R23.8 PAPULE: ICD-10-CM

## 2025-05-20 DIAGNOSIS — L30.9 DERMATITIS: ICD-10-CM

## 2025-05-20 PROCEDURE — 11901 INJECT SKIN LESIONS >7: CPT | Performed by: DERMATOLOGY

## 2025-05-20 PROCEDURE — 99214 OFFICE O/P EST MOD 30 MIN: CPT | Mod: 25 | Performed by: DERMATOLOGY

## 2025-05-20 RX ORDER — CLOBETASOL PROPIONATE 0.05 G/100ML
SHAMPOO TOPICAL
Qty: 118 ML | Refills: 2 | Status: SHIPPED | OUTPATIENT
Start: 2025-05-20

## 2025-05-20 RX ORDER — MINOXIDIL 2.5 MG/1
TABLET ORAL
Qty: 30 TABLET | Refills: 3 | Status: SHIPPED | OUTPATIENT
Start: 2025-05-20

## 2025-05-20 RX ORDER — TRETINOIN 0.25 MG/G
CREAM TOPICAL
Qty: 45 G | Refills: 11 | Status: SHIPPED | OUTPATIENT
Start: 2025-05-20

## 2025-05-20 RX ORDER — AMMONIUM LACTATE 12 G/100G
CREAM TOPICAL
Qty: 385 G | Refills: 1 | Status: SHIPPED | OUTPATIENT
Start: 2025-05-20

## 2025-05-20 RX ORDER — FLUOCINOLONE ACETONIDE 0.11 MG/ML
OIL TOPICAL
Qty: 118.28 ML | Refills: 1 | Status: SHIPPED | OUTPATIENT
Start: 2025-05-20

## 2025-05-20 NOTE — NURSING NOTE
Ester Barba's goals for this visit include:   Chief Complaint   Patient presents with    Derm Problem     Open sore on scalp, has been treating for many years        She requests these members of her care team be copied on today's visit information:     PCP: Winnie Sepulveda    Referring Provider:  Referred Self, MD  No address on file    There were no vitals taken for this visit.    Do you need any medication refills at today's visit?       Amber Baird EMT

## 2025-05-20 NOTE — TELEPHONE ENCOUNTER
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            Appeal Information:  IF YOU WOULD LIKE TO APPEAL PLEASE SUPPLY PA TEAM WITH A LETTER OF MEDICAL NECESSITY WITH CLINICAL REASON.

## 2025-05-20 NOTE — PROGRESS NOTES
[facility] Dermatology Note  Encounter Date: May 20, 2025  Office Visit     Dermatology Problem List:  Last FSE: 10/8/24  1. 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  - Current tx: ILK10 injections, clobetasol propionate 0.05% shampoo twice weekly, fluocinolone oil every other day, Duoderm to vertex plaque; oral minoxidil  - Previous tx: plaquenil w/ significant improvement (~15 yrs; discontinued 12/2019 with concerns for retinopathy by local doc in [facility]; now followed by Dr. Radford here without signs of Plaquenil toxicity 1/2020).   - LPPAI score: 3.83 on 3/28/2023, 1 on 5/2/23, 1.33 on 11/07/2023 1.33 3/29/24, 1 on 5/10/24, .67 on 7/12/24; 0.67 (9/24/24), .67 11/5/24, .67 1/14/25; 0.67 (2/25/25); 0.33 (5/20/25)  2. 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 Skin infection, vertex scalp at prior site of MMS - resolved  - s/p doxycycline 100 mg BID and mupirocin ointment BID   4. History of NMSC  - BCC: right forehead:  s/p shave bx'd 7/12/24, s/p MOHS 8/26/24  - BCC - front vertex scalp, s/p Mohs with purse string and granulation, 3/30/22  - BCC - forehead, s/p MMS 6/9/21  - SCCis - R vertex scalp, s/p MMS 3/2/21, bacterial cx obtained from site 3/23/21  - BCC - right nasal ala s/p Mohs 11/27/17  - Nodular BCC - vertex scalp s/p Mohs 6/5/15  - SCC - scalp, s/p Mohs 4/18/2014  - Hx of 1-2 other NMSC on scalp (BCCs)  5. AKs - bilateral temples, cheeks  6. Inflamed Sks  - s/p cryo  ____________________________________________    Assessment & Plan:     # Lichen planopilaris, stable/improved  # Erosive pustular dermatosis, with some crusting today. Today, in clinic will soak the crusting area on the scalp  and let it sit for about 10 minutes and then gently debride  - discussed risk/benefit of oral minoxidil (will need to get approval from PCP, for low dose oral minoxidil 1.25 mg)  - LPPAI score today: 0.33 (5/20/25)  -  Continue clobetasol shampoo twice weekly  - Continue fluocinolone oil 1-2x week  - Continue DuoDerm 2-3 days on 2-3 days off or just Vaseline to crusted areas                  - advised patient to apply duoderm 3 days prior to next visit and will remove duoderm during the clinic.  - Plan to start low dose oral minoxidil 2.5 mg-take 1/4 tablet (0.625 mg) daily for 2 weeks, if tolerable, increase to 1/2 tablet (1.25 mg) daily  - ILK injection today      Procedures Performed:   - Intra-lesional triamcinolone procedure note. After verbal consent and review of risk of pain and skin thinning/atrophy, positioning and cleansing with isopropyl alcohol, 2 total mL of triamcinolone 10 mg/mL was injected into 20 sites on the scalp. The patient tolerated the procedure well and left the dermatology clinic in good condition.      Follow-up: 1 month(s) in-person, or earlier for new or changing lesions    Staff and Scribe:     Scribe Disclosure:   I, Kelle Mosquera, am serving as a scribe; to document services personally performed by Barbara Soto MD -based on data collection and the provider's statements to me.     Provider Disclosure:  I agree with above History, Review of Systems, Physical exam and Plan.  I have reviewed the content of the documentation and have edited it as needed. I have personally performed the services documented here and the documentation accurately represents those services and the decisions I have made.      Electronically signed by:  Barbara Soto MD  Professor   Department of Dermatology  [facility]     ____________________________________________    CC: Derm Problem (Open sore on scalp, has been treating for many years )    HPI:  Ms. Ester Barba is a 79 year old female who presents as a return patient for follow-up of hair loss, diagnosed as LPP with erosive pustular dermatosis  - Last seen in-clinic on 4/8/25  - Shedding or thinning, or both: none  - Current tx: clobetasol  shampoo twice a week; fluocinolone oil once  a week; duoderm every 2-3 days;    Antihistamine (Claritin) Any new medications, supplements, or products? (please list below)     no Scalp pain   no Scalp burning   no Scalp itching    no Eyebrow changes    no Eyelash changes   no Beard changes    no Other body hair changes    no Nail changes    no Additional symptoms? (please list below)     - Overall course: patient removed the duoderm this morning.  She states that the duoderm has been causing redness and some crusting when she removes the duoderm.   Patient has started on Claritin for allergies.    Report, that she stubbed her toe since last visit. She has some swelling and stinging pain in the foot, which she has been keeping it elevated.     Patient is otherwise feeling well, in usual state of health, and has no additional skin concerns today.     Labs:  None reviewed.    Physical Exam:  GEN: Well developed, well-nourished, in no acute distress, in a pleasant mood.    SKIN: Focused examination of face and scalp was performed.  - some dry crusting seen today  - some notable hair growth  - no diffuse erythema   - no perifollicular erythema  - very mild perifollicular scale   - no scaling of the scalp   - negative hair pull test   - normal eyelash density  - normal eyebrow density  - no nail pitting or dystrophy   - no scalp folliculitis/pustules   - No other lesions of concern on areas examined.       Medications:  Current Outpatient Medications   Medication Sig Dispense Refill    acetaminophen (TYLENOL) 500 MG tablet Take 500-1,000 mg by mouth every 6 hours as needed for mild pain      ammonium lactate (AMLACTIN) 12 % external cream Apply to legs twice daily 385 g 1    Biotin 10 MG TABS tablet       Calcium 1500 MG TABS       Cholecalciferol (VITAMIN D) 1000 UNIT capsule Total 2200 units daily.      clobetasol (TEMOVATE) 0.05 % external ointment Apply twice daily every other day alternating with the mupirocin  ointment to affected area on the scalp 60 g 1    clobetasol propionate (CLOBEX) 0.05 % external shampoo APPLY TO DRY SCALP EVERY OTHER DAY. LEAVE ON FOR 15 MINUTES, THEN LATHER AND RINSE. 118 mL 2    desonide (DESOWEN) 0.05 % external cream Mix half and half with ketoconazole cream and apply twice daily as needed 60 g 1    esomeprazole (NEXIUM) 20 MG packet Take 20 mg by mouth every morning (before breakfast) Take 30-60 minutes before eating.      Fluocinolone Acetonide Scalp 0.01 % OIL oil Apply once a week to scalp for lichen planopilaris 118.28 mL 1    glucosamine-chondroitin 500-400 MG CAPS per capsule Take 1 capsule by mouth      hydrocortisone 2.5 % cream Use twice daily as needed on involved areas 30 g 4    ketoconazole (NIZORAL) 2 % external cream Mix half and half with desonide cream and apply twice a day as needed 60 g 1    levalbuterol (XOPENEX HFA) 45 MCG/ACT inhaler Inhale 2 puffs into the lungs every 4 hours as needed for shortness of breath or wheezing 15 g 4    Loratadine (CLARITIN PO) Take  by mouth.      mupirocin (BACTROBAN) 2 % external ointment Use 2 times a day every other day alternating with clobetasol ointment to affected area. 30 g 3    NEW MED Biest 1.5mg/gram cream(pg free)  Insert 1 gram vaginally twice weekly as directed 40 g 1    tretinoin (RETIN-A) 0.025 % external cream APPLY PEA-SIZED AMOUNT TO AFFECTED AREA(S) ON FACE EVERY OTHER NIGHT AS TOLERATED 45 g 11     Current Facility-Administered Medications   Medication Dose Route Frequency Provider Last Rate Last Admin    hydrocortisone 2.5 % cream   Topical BID Barbara Soto MD        lidocaine 1% with EPINEPHrine 1:100,000 injection 3 mL  3 mL Intradermal Once Barbara Soto MD        triamcinolone acetonide (KENALOG-10) injection 10 mg  10 mg Intra-Lesional Once         triamcinolone acetonide (KENALOG-10) injection 10 mg  10 mg Intra-Lesional Once Barbara Soto MD        triamcinolone  acetonide (KENALOG-10) injection 10 mg  10 mg Intra-Lesional Once Barbara Soto MD        triamcinolone acetonide (KENALOG-10) injection 10 mg  10 mg Intra-Lesional Once Barbara Soto MD        triamcinolone acetonide (KENALOG-10) injection 10 mg  10 mg Intra-Lesional Once Barbara Soto MD        triamcinolone acetonide (KENALOG-10) injection 17 mg  17 mg Intra-Lesional Once         triamcinolone acetonide (KENALOG-10) injection 17 mg  17 mg Intra-Lesional Once Barbara Soto MD        triamcinolone acetonide (KENALOG-10) injection 17 mg  17 mg Intra-Lesional Once Barbara Soto MD        triamcinolone acetonide (KENALOG-10) injection 18 mg  18 mg Intra-Lesional Once Barbara Soto MD        triamcinolone acetonide (KENALOG-10) injection 20 mg  2 mL Intra-Lesional Once         triamcinolone acetonide (KENALOG-10) injection 20 mg  20 mg Intra-Lesional Once           Past Medical History:   Patient Active Problem List   Diagnosis    Porokeratosis    Squamous cell carcinoma    Neoplasm of uncertain behavior    Lichen planopilaris    Dermatitis    Cicatricial alopecia    Keratosis, inflamed seborrheic    History of skin cancer    Basal cell carcinoma of vertex scalp s/p mohs 6-5-15    Medication management    Actinic keratosis    Medication monitoring encounter    Dermatitis, seborrheic    Erosive pustular dermatosis    Tick bite, initial encounter    Vulvar atrophy    Factor V Leiden mutation    Splenic artery aneurysm    Post concussion syndrome    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

## 2025-05-20 NOTE — LETTER
5/20/2025      Ester Barba  [address] [place] [address]      Dear Colleague,    Thank you for referring your patient, Ester Barba, to the [facility]. Please see a copy of my visit note below.    [facility] Dermatology Note  Encounter Date: May 20, 2025  Office Visit     Dermatology Problem List:  Last FSE: 10/8/24  1. 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  - Current tx: ILK10 injections, clobetasol propionate 0.05% shampoo twice weekly, fluocinolone oil every other day, Duoderm to vertex plaque; oral minoxidil  - Previous tx: plaquenil w/ significant improvement (~15 yrs; discontinued 12/2019 with concerns for retinopathy by local doc in [facility]; now followed by Dr. Radford here without signs of Plaquenil toxicity 1/2020).   - LPPAI score: 3.83 on 3/28/2023, 1 on 5/2/23, 1.33 on 11/07/2023 1.33 3/29/24, 1 on 5/10/24, .67 on 7/12/24; 0.67 (9/24/24), .67 11/5/24, .67 1/14/25; 0.67 (2/25/25); 0.33 (5/20/25)  2. 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 Skin infection, vertex scalp at prior site of MMS - resolved  - s/p doxycycline 100 mg BID and mupirocin ointment BID   4. History of NMSC  - BCC: right forehead:  s/p shave bx'd 7/12/24, s/p MOHS 8/26/24  - BCC - front vertex scalp, s/p Mohs with purse string and granulation, 3/30/22  - BCC - forehead, s/p MMS 6/9/21  - SCCis - R vertex scalp, s/p MMS 3/2/21, bacterial cx obtained from site 3/23/21  - BCC - right nasal ala s/p Mohs 11/27/17  - Nodular BCC - vertex scalp s/p Mohs 6/5/15  - SCC - scalp, s/p Mohs 4/18/2014  - Hx of 1-2 other NMSC on scalp (BCCs)  5. AKs - bilateral temples, cheeks  6. Inflamed Sks  - s/p cryo  ____________________________________________    Assessment & Plan:     # Lichen planopilaris, stable/improved  # Erosive pustular dermatosis, with some crusting today. Today, in clinic will soak the crusting area  on the scalp  and let it sit for about 10 minutes and then gently debride  - discussed risk/benefit of oral minoxidil (will need to get approval from PCP, for low dose oral minoxidil 1.25 mg)  - LPPAI score today: 0.33 (5/20/25)  - Continue clobetasol shampoo twice weekly  - Continue fluocinolone oil 1-2x week  - Continue DuoDerm 2-3 days on 2-3 days off or just Vaseline to crusted areas                  - advised patient to apply duoderm 3 days prior to next visit and will remove duoderm during the clinic.  - Plan to start low dose oral minoxidil 2.5 mg-take 1/4 tablet (0.625 mg) daily for 2 weeks, if tolerable, increase to 1/2 tablet (1.25 mg) daily  - ILK injection today      Procedures Performed:   - Intra-lesional triamcinolone procedure note. After verbal consent and review of risk of pain and skin thinning/atrophy, positioning and cleansing with isopropyl alcohol, 2 total mL of triamcinolone 10 mg/mL was injected into 20 sites on the scalp. The patient tolerated the procedure well and left the dermatology clinic in good condition.      Follow-up: 1 month(s) in-person, or earlier for new or changing lesions    Staff and Scribe:     Scribe Disclosure:   I, Kelle Mosquera, am serving as a scribe; to document services personally performed by Barbara Soto MD -based on data collection and the provider's statements to me.     Provider Disclosure:  I agree with above History, Review of Systems, Physical exam and Plan.  I have reviewed the content of the documentation and have edited it as needed. I have personally performed the services documented here and the documentation accurately represents those services and the decisions I have made.      Electronically signed by:  Barbara Soto MD  Professor   Department of Dermatology  [facility]     ____________________________________________    CC: Derm Problem (Open sore on scalp, has been treating for many years )    HPI:  Ms. Ester GARCIA Jameson  is a 79 year old female who presents as a return patient for follow-up of hair loss, diagnosed as LPP with erosive pustular dermatosis  - Last seen in-clinic on 4/8/25  - Shedding or thinning, or both: none  - Current tx: clobetasol shampoo twice a week; fluocinolone oil once  a week; duoderm every 2-3 days;    Antihistamine (Claritin) Any new medications, supplements, or products? (please list below)     no Scalp pain   no Scalp burning   no Scalp itching    no Eyebrow changes    no Eyelash changes   no Beard changes    no Other body hair changes    no Nail changes    no Additional symptoms? (please list below)     - Overall course: patient removed the duoderm this morning.  She states that the duoderm has been causing redness and some crusting when she removes the duoderm.   Patient has started on Claritin for allergies.    Report, that she stubbed her toe since last visit. She has some swelling and stinging pain in the foot, which she has been keeping it elevated.     Patient is otherwise feeling well, in usual state of health, and has no additional skin concerns today.     Labs:  None reviewed.    Physical Exam:  GEN: Well developed, well-nourished, in no acute distress, in a pleasant mood.    SKIN: Focused examination of face and scalp was performed.  - some dry crusting seen today  - some notable hair growth  - no diffuse erythema   - no perifollicular erythema  - very mild perifollicular scale   - no scaling of the scalp   - negative hair pull test   - normal eyelash density  - normal eyebrow density  - no nail pitting or dystrophy   - no scalp folliculitis/pustules   - No other lesions of concern on areas examined.       Medications:  Current Outpatient Medications   Medication Sig Dispense Refill     acetaminophen (TYLENOL) 500 MG tablet Take 500-1,000 mg by mouth every 6 hours as needed for mild pain       ammonium lactate (AMLACTIN) 12 % external cream Apply to legs twice daily 385 g 1     Biotin 10 MG  TABS tablet        Calcium 1500 MG TABS        Cholecalciferol (VITAMIN D) 1000 UNIT capsule Total 2200 units daily.       clobetasol (TEMOVATE) 0.05 % external ointment Apply twice daily every other day alternating with the mupirocin ointment to affected area on the scalp 60 g 1     clobetasol propionate (CLOBEX) 0.05 % external shampoo APPLY TO DRY SCALP EVERY OTHER DAY. LEAVE ON FOR 15 MINUTES, THEN LATHER AND RINSE. 118 mL 2     desonide (DESOWEN) 0.05 % external cream Mix half and half with ketoconazole cream and apply twice daily as needed 60 g 1     esomeprazole (NEXIUM) 20 MG packet Take 20 mg by mouth every morning (before breakfast) Take 30-60 minutes before eating.       Fluocinolone Acetonide Scalp 0.01 % OIL oil Apply once a week to scalp for lichen planopilaris 118.28 mL 1     glucosamine-chondroitin 500-400 MG CAPS per capsule Take 1 capsule by mouth       hydrocortisone 2.5 % cream Use twice daily as needed on involved areas 30 g 4     ketoconazole (NIZORAL) 2 % external cream Mix half and half with desonide cream and apply twice a day as needed 60 g 1     levalbuterol (XOPENEX HFA) 45 MCG/ACT inhaler Inhale 2 puffs into the lungs every 4 hours as needed for shortness of breath or wheezing 15 g 4     Loratadine (CLARITIN PO) Take  by mouth.       mupirocin (BACTROBAN) 2 % external ointment Use 2 times a day every other day alternating with clobetasol ointment to affected area. 30 g 3     NEW MED Biest 1.5mg/gram cream(pg free)  Insert 1 gram vaginally twice weekly as directed 40 g 1     tretinoin (RETIN-A) 0.025 % external cream APPLY PEA-SIZED AMOUNT TO AFFECTED AREA(S) ON FACE EVERY OTHER NIGHT AS TOLERATED 45 g 11     Current Facility-Administered Medications   Medication Dose Route Frequency Provider Last Rate Last Admin     hydrocortisone 2.5 % cream   Topical BID Barbara Soto MD         lidocaine 1% with EPINEPHrine 1:100,000 injection 3 mL  3 mL Intradermal Once Charles  Barbara Merritt MD         triamcinolone acetonide (KENALOG-10) injection 10 mg  10 mg Intra-Lesional Once          triamcinolone acetonide (KENALOG-10) injection 10 mg  10 mg Intra-Lesional Once Barbara Soto MD         triamcinolone acetonide (KENALOG-10) injection 10 mg  10 mg Intra-Lesional Once Barbara Soto MD         triamcinolone acetonide (KENALOG-10) injection 10 mg  10 mg Intra-Lesional Once Barbara Soto MD         triamcinolone acetonide (KENALOG-10) injection 10 mg  10 mg Intra-Lesional Once Barbara Soto MD         triamcinolone acetonide (KENALOG-10) injection 17 mg  17 mg Intra-Lesional Once          triamcinolone acetonide (KENALOG-10) injection 17 mg  17 mg Intra-Lesional Once Barbara Soto MD         triamcinolone acetonide (KENALOG-10) injection 17 mg  17 mg Intra-Lesional Once Barbara Soto MD         triamcinolone acetonide (KENALOG-10) injection 18 mg  18 mg Intra-Lesional Once Barbara Soto MD         triamcinolone acetonide (KENALOG-10) injection 20 mg  2 mL Intra-Lesional Once          triamcinolone acetonide (KENALOG-10) injection 20 mg  20 mg Intra-Lesional Once           Past Medical History:   Patient Active Problem List   Diagnosis     Porokeratosis     Squamous cell carcinoma     Neoplasm of uncertain behavior     Lichen planopilaris     Dermatitis     Cicatricial alopecia     Keratosis, inflamed seborrheic     History of skin cancer     Basal cell carcinoma of vertex scalp s/p mohs 6-5-15     Medication management     Actinic keratosis     Medication monitoring encounter     Dermatitis, seborrheic     Erosive pustular dermatosis     Tick bite, initial encounter     Vulvar atrophy     Factor V Leiden mutation     Splenic artery aneurysm     Post concussion syndrome     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     Again, thank you for allowing me to participate in the care of your patient.        Sincerely,        Barbara Soto MD    Electronically signed

## 2025-05-21 ENCOUNTER — TELEPHONE (OUTPATIENT)
Dept: DERMATOLOGY | Facility: CLINIC | Age: 80
End: 2025-05-21
Payer: MEDICARE

## 2025-05-21 NOTE — TELEPHONE ENCOUNTER
[facility] Call Center    Phone Message    May a detailed message be left on voicemail: yes     Reason for Call: Appointment Intake    Referring Provider Name: Dr. Soto  Diagnosis and/or Symptoms: Pt states she needs hair injections every six weeks. She is already scheduled at  but they couldn't get her in for July and September. She was instructed to schedule at the [facility] for those appointments.     Please call back to discuss/schedule. Thank you.     Action Taken: Message routed to:  [facility] Derm    Travel Screening: Not Applicable     Date of Service:

## 2025-05-21 NOTE — TELEPHONE ENCOUNTER
Spoke with patient and she was scheduled appropriately. She was given the appointment details and had no questions or concerns.    Pascale Dinh LPN

## 2025-06-10 ENCOUNTER — OFFICE VISIT (OUTPATIENT)
Dept: DERMATOLOGY | Facility: CLINIC | Age: 80
End: 2025-06-10
Payer: MEDICARE

## 2025-06-10 VITALS — SYSTOLIC BLOOD PRESSURE: 137 MMHG | OXYGEN SATURATION: 96 % | HEART RATE: 68 BPM | DIASTOLIC BLOOD PRESSURE: 59 MMHG

## 2025-06-10 DIAGNOSIS — L30.9 DERMATITIS: ICD-10-CM

## 2025-06-10 DIAGNOSIS — L98.8 EROSIVE PUSTULAR DERMATOSIS: ICD-10-CM

## 2025-06-10 DIAGNOSIS — L66.10 LICHEN PLANOPILARIS: Primary | ICD-10-CM

## 2025-06-10 NOTE — LETTER
6/10/2025      Ester Barba  [address] [place] [address]      Dear Colleague,    Thank you for referring your patient, Ester Barba, to the [facility]. Please see a copy of my visit note below.    [facility] Dermatology Note  Encounter Date: Serge 10, 2025  Office Visit     Dermatology Problem List:  Last FSE: 10/8/24  1. 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  - Current tx: ILK10 injections, clobetasol propionate 0.05% shampoo twice weekly, fluocinolone oil every other day, Duoderm to vertex plaque; oral minoxidil  - Previous tx: plaquenil w/ significant improvement (~15 yrs; discontinued 12/2019 with concerns for retinopathy by local doc in [facility]; now followed by Dr. Radford here without signs of Plaquenil toxicity 1/2020).   - LPPAI score: 3.83 on 3/28/2023, 1 on 5/2/23, 1.33 on 11/07/2023 1.33 3/29/24, 1 on 5/10/24, .67 on 7/12/24; 0.67 (9/24/24), .67 11/5/24, .67 1/14/25; 0.67 (2/25/25); 0.33 (5/20/25), 0.33 (6/10/25)  2. 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 Skin infection, vertex scalp at prior site of MMS - resolved  - s/p doxycycline 100 mg BID and mupirocin ointment BID   4. History of NMSC  - BCC: right forehead:  s/p shave bx'd 7/12/24, s/p MOHS 8/26/24  - BCC - front vertex scalp, s/p Mohs with purse string and granulation, 3/30/22  - BCC - forehead, s/p MMS 6/9/21  - SCCis - R vertex scalp, s/p MMS 3/2/21, bacterial cx obtained from site 3/23/21  - BCC - right nasal ala s/p Mohs 11/27/17  - Nodular BCC - vertex scalp s/p Mohs 6/5/15  - SCC - scalp, s/p Mohs 4/18/2014  - Hx of 1-2 other NMSC on scalp (BCCs)  5. AKs - bilateral temples, cheeks  6. 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 - had been advised to  leave dressing on until we had the opportunity to remove and assess but patient took the dressing off at home; will try again with the next visit  - Continue clobetasol shampoo twice weekly  - Continue fluocinolone oil 1-2x week  - Continue DuoDerm 2-3 days on 2-3 days off or just Vaseline to crusted areas                  - advised patient to apply duoderm 3 days prior to next visit and will remove duoderm during the clinic.  - continue low dose oral minoxidil 2.5 mg-take  1/2 tablet (1.25 mg) daily  - ILK injection today     Procedures Performed:   - Intra-lesional triamcinolone procedure note. After verbal consent and review of risk of pain and skin thinning/atrophy, positioning and cleansing with isopropyl alcohol, 1 total mL of triamcinolone 10 mg/mL was injected into  10 lesion(s) on the scalp. The patient tolerated the procedure well and left the dermatology clinic in good condition.  - 0.3 ml to frontal scalp region  - 0.7 ml to other sites      Follow-up: 1 month(s) in-person, or earlier for new or changing lesions    Staff and Scribe:     Scribe Disclosure:   By signing my name below, I, Kelle Mosquera, attest that this document has been prepared under the direction and in the presence of Barbara Soto MD      Electronically signed by: Kelle Mosquera 6/10/25    Barbara Soto MD  Professor   Department of Dermatology  [facility]     ____________________________________________    CC: Hair Loss (6wk HL follow up, AOC-same)    HPI:  Ms. Ester Barba is a 79 year old female who presents as a return patient for follow-up of hair loss, diagnosed as LPP.   - Last seen in-clinic on 5/20/25  - Shedding or thinning, or both: none  - Current tx: clobetasol shampoo twice a week; fluocinolone oil once a week; duoderm 2-3 days with 3 days off; oral minoxidil 1.25 mg daily  no Any new medications, supplements, or products? (please list below)     no Scalp pain   no Scalp burning   Mild- Scalp  itching    no Eyebrow changes    no Eyelash changes   no Beard changes    no Other body hair changes    no Nail changes    no Additional symptoms? (please list below)     - Overall course: Hair loss has been stable. She started the oral minoxidil, she denies any swelling in the hands or feet.   She is getting physical therapy twice a week     Patient is otherwise feeling well, in usual state of health, and has no additional skin concerns today.     Labs:  None reviewed.    Physical Exam:  Vitals: /59 (BP Location: Left arm, Patient Position: Sitting)   Pulse 68   SpO2 96%   GEN: Well developed, well-nourished, in no acute distress, in a pleasant mood.    SKIN: Focused examination of face and scalp was performed.  - mild yellow crust on crown of scalp  - no diffuse erythema   - no perifollicular erythema  - very very mild  perifollicular scale along the hair line and vertex scalp  - no scaling of the scalp   - negative hair pull test   - normal eyelash density  - normal eyebrow density  - no nail pitting or dystrophy   - no scalp folliculitis/pustules   - In comparison to prior photographs, 4/2025;   - No other lesions of concern on areas examined.    Medications:  Current Outpatient Medications   Medication Sig Dispense Refill    acetaminophen (TYLENOL) 500 MG tablet Take 500-1,000 mg by mouth every 6 hours as needed for mild pain      ammonium lactate (AMLACTIN) 12 % external cream Apply to legs twice daily 385 g 1    Biotin 10 MG TABS tablet       Calcium 1500 MG TABS       Cholecalciferol (VITAMIN D) 1000 UNIT capsule Total 2200 units daily.      clobetasol (TEMOVATE) 0.05 % external ointment Apply twice daily every other day alternating with the mupirocin ointment to affected area on the scalp 60 g 1    clobetasol propionate (CLOBEX) 0.05 % external shampoo APPLY TO DRY SCALP EVERY OTHER DAY. LEAVE ON FOR 15 MINUTES, THEN LATHER AND RINSE. 118 mL 2    desonide (DESOWEN) 0.05 % external cream Mix half and  half with ketoconazole cream and apply twice daily as needed 60 g 1    esomeprazole (NEXIUM) 20 MG packet Take 20 mg by mouth every morning (before breakfast) Take 30-60 minutes before eating.      Fluocinolone Acetonide Scalp 0.01 % OIL oil Apply once a week to scalp for lichen planopilaris 118.28 mL 1    glucosamine-chondroitin 500-400 MG CAPS per capsule Take 1 capsule by mouth      hydrocortisone 2.5 % cream Use twice daily as needed on involved areas 30 g 4    ketoconazole (NIZORAL) 2 % external cream Mix half and half with desonide cream and apply twice a day as needed 60 g 1    levalbuterol (XOPENEX HFA) 45 MCG/ACT inhaler Inhale 2 puffs into the lungs every 4 hours as needed for shortness of breath or wheezing 15 g 4    Loratadine (CLARITIN PO) Take  by mouth.      minoxidil (LONITEN) 2.5 MG tablet Take 1/4 tablet (0.625mg) daily and after 2 weeks if tolerated can start taking 1/2 tablet (1.25mg) 30 tablet 3    mupirocin (BACTROBAN) 2 % external ointment Use 2 times a day every other day alternating with clobetasol ointment to affected area. 30 g 3    NEW MED Biest 1.5mg/gram cream(pg free)  Insert 1 gram vaginally twice weekly as directed 40 g 1    tretinoin (RETIN-A) 0.025 % external cream APPLY PEA-SIZED AMOUNT TO AFFECTED AREA(S) ON FACE EVERY OTHER NIGHT AS TOLERATED 45 g 11     Current Facility-Administered Medications   Medication Dose Route Frequency Provider Last Rate Last Admin    triamcinolone acetonide (KENALOG-10) injection 10 mg  10 mg Intra-Lesional Once         triamcinolone acetonide (KENALOG-10) injection 17 mg  17 mg Intra-Lesional Once         triamcinolone acetonide (KENALOG-10) injection 18 mg  18 mg Intra-Lesional Once Barbara Soto MD        triamcinolone acetonide (KENALOG-10) injection 20 mg  2 mL Intra-Lesional Once         triamcinolone acetonide (KENALOG-10) injection 20 mg  20 mg Intra-Lesional Once           Past Medical History:   Patient Active Problem List    Diagnosis    Porokeratosis    Squamous cell carcinoma    Neoplasm of uncertain behavior    Lichen planopilaris    Dermatitis    Cicatricial alopecia    Keratosis, inflamed seborrheic    History of skin cancer    Basal cell carcinoma of vertex scalp s/p mohs 6-5-15    Medication management    Actinic keratosis    Medication monitoring encounter    Dermatitis, seborrheic    Erosive pustular dermatosis    Tick bite, initial encounter    Vulvar atrophy    Factor V Leiden mutation    Splenic artery aneurysm    Post concussion syndrome    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      Sincerely,    Barbara Soto MD    Electronically signed

## 2025-06-10 NOTE — PROGRESS NOTES
[facility] Dermatology Note  Encounter Date: Serge 10, 2025  Office Visit     Dermatology Problem List:  Last FSE: 10/8/24  1. 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  - Current tx: ILK10 injections, clobetasol propionate 0.05% shampoo twice weekly, fluocinolone oil every other day, Duoderm to vertex plaque; oral minoxidil  - Previous tx: plaquenil w/ significant improvement (~15 yrs; discontinued 12/2019 with concerns for retinopathy by local doc in [facility]; now followed by Dr. Radford here without signs of Plaquenil toxicity 1/2020).   - LPPAI score: 3.83 on 3/28/2023, 1 on 5/2/23, 1.33 on 11/07/2023 1.33 3/29/24, 1 on 5/10/24, .67 on 7/12/24; 0.67 (9/24/24), .67 11/5/24, .67 1/14/25; 0.67 (2/25/25); 0.33 (5/20/25), 0.33 (6/10/25)  2. 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 Skin infection, vertex scalp at prior site of MMS - resolved  - s/p doxycycline 100 mg BID and mupirocin ointment BID   4. History of NMSC  - BCC: right forehead:  s/p shave bx'd 7/12/24, s/p MOHS 8/26/24  - BCC - front vertex scalp, s/p Mohs with purse string and granulation, 3/30/22  - BCC - forehead, s/p MMS 6/9/21  - SCCis - R vertex scalp, s/p MMS 3/2/21, bacterial cx obtained from site 3/23/21  - BCC - right nasal ala s/p Mohs 11/27/17  - Nodular BCC - vertex scalp s/p Mohs 6/5/15  - SCC - scalp, s/p Mohs 4/18/2014  - Hx of 1-2 other NMSC on scalp (BCCs)  5. AKs - bilateral temples, cheeks  6. 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 - had been advised to leave dressing on until we had the opportunity to remove and assess but patient took the dressing off at home; will try again with the next visit  - Continue clobetasol shampoo twice weekly  - Continue fluocinolone oil 1-2x week  -  Continue DuoDerm 2-3 days on 2-3 days off or just Vaseline to crusted areas                  - advised patient to apply duoderm 3 days prior to next visit and will remove duoderm during the clinic.  - continue low dose oral minoxidil 2.5 mg-take  1/2 tablet (1.25 mg) daily  - ILK injection today     Procedures Performed:   - Intra-lesional triamcinolone procedure note. After verbal consent and review of risk of pain and skin thinning/atrophy, positioning and cleansing with isopropyl alcohol, 1 total mL of triamcinolone 10 mg/mL was injected into  10 lesion(s) on the scalp. The patient tolerated the procedure well and left the dermatology clinic in good condition.  - 0.3 ml to frontal scalp region  - 0.7 ml to other sites      Follow-up: 1 month(s) in-person, or earlier for new or changing lesions    Staff and Scribe:     Scribe Disclosure:   By signing my name below, I, Kelle Mosquera, attest that this document has been prepared under the direction and in the presence of Barbara Soto MD      Electronically signed by: Kelle Mosquera 6/10/25    Barbara Soto MD  Professor   Department of Dermatology  [facility]     ____________________________________________    CC: Hair Loss (6wk HL follow up, AOC-same)    HPI:  Ms. Ester Barba is a 79 year old female who presents as a return patient for follow-up of hair loss, diagnosed as LPP.   - Last seen in-clinic on 5/20/25  - Shedding or thinning, or both: none  - Current tx: clobetasol shampoo twice a week; fluocinolone oil once a week; duoderm 2-3 days with 3 days off; oral minoxidil 1.25 mg daily  no Any new medications, supplements, or products? (please list below)     no Scalp pain   no Scalp burning   Mild- Scalp itching    no Eyebrow changes    no Eyelash changes   no Beard changes    no Other body hair changes    no Nail changes    no Additional symptoms? (please list below)     - Overall course: Hair loss has been stable. She started the  oral minoxidil, she denies any swelling in the hands or feet.   She is getting physical therapy twice a week     Patient is otherwise feeling well, in usual state of health, and has no additional skin concerns today.     Labs:  None reviewed.    Physical Exam:  Vitals: /59 (BP Location: Left arm, Patient Position: Sitting)   Pulse 68   SpO2 96%   GEN: Well developed, well-nourished, in no acute distress, in a pleasant mood.    SKIN: Focused examination of face and scalp was performed.  - mild yellow crust on crown of scalp  - no diffuse erythema   - no perifollicular erythema  - very very mild  perifollicular scale along the hair line and vertex scalp  - no scaling of the scalp   - negative hair pull test   - normal eyelash density  - normal eyebrow density  - no nail pitting or dystrophy   - no scalp folliculitis/pustules   - In comparison to prior photographs, 4/2025;   - No other lesions of concern on areas examined.    Medications:  Current Outpatient Medications   Medication Sig Dispense Refill    acetaminophen (TYLENOL) 500 MG tablet Take 500-1,000 mg by mouth every 6 hours as needed for mild pain      ammonium lactate (AMLACTIN) 12 % external cream Apply to legs twice daily 385 g 1    Biotin 10 MG TABS tablet       Calcium 1500 MG TABS       Cholecalciferol (VITAMIN D) 1000 UNIT capsule Total 2200 units daily.      clobetasol (TEMOVATE) 0.05 % external ointment Apply twice daily every other day alternating with the mupirocin ointment to affected area on the scalp 60 g 1    clobetasol propionate (CLOBEX) 0.05 % external shampoo APPLY TO DRY SCALP EVERY OTHER DAY. LEAVE ON FOR 15 MINUTES, THEN LATHER AND RINSE. 118 mL 2    desonide (DESOWEN) 0.05 % external cream Mix half and half with ketoconazole cream and apply twice daily as needed 60 g 1    esomeprazole (NEXIUM) 20 MG packet Take 20 mg by mouth every morning (before breakfast) Take 30-60 minutes before eating.      Fluocinolone Acetonide Scalp 0.01  % OIL oil Apply once a week to scalp for lichen planopilaris 118.28 mL 1    glucosamine-chondroitin 500-400 MG CAPS per capsule Take 1 capsule by mouth      hydrocortisone 2.5 % cream Use twice daily as needed on involved areas 30 g 4    ketoconazole (NIZORAL) 2 % external cream Mix half and half with desonide cream and apply twice a day as needed 60 g 1    levalbuterol (XOPENEX HFA) 45 MCG/ACT inhaler Inhale 2 puffs into the lungs every 4 hours as needed for shortness of breath or wheezing 15 g 4    Loratadine (CLARITIN PO) Take  by mouth.      minoxidil (LONITEN) 2.5 MG tablet Take 1/4 tablet (0.625mg) daily and after 2 weeks if tolerated can start taking 1/2 tablet (1.25mg) 30 tablet 3    mupirocin (BACTROBAN) 2 % external ointment Use 2 times a day every other day alternating with clobetasol ointment to affected area. 30 g 3    NEW MED Biest 1.5mg/gram cream(pg free)  Insert 1 gram vaginally twice weekly as directed 40 g 1    tretinoin (RETIN-A) 0.025 % external cream APPLY PEA-SIZED AMOUNT TO AFFECTED AREA(S) ON FACE EVERY OTHER NIGHT AS TOLERATED 45 g 11     Current Facility-Administered Medications   Medication Dose Route Frequency Provider Last Rate Last Admin    triamcinolone acetonide (KENALOG-10) injection 10 mg  10 mg Intra-Lesional Once         triamcinolone acetonide (KENALOG-10) injection 17 mg  17 mg Intra-Lesional Once         triamcinolone acetonide (KENALOG-10) injection 18 mg  18 mg Intra-Lesional Once Barbara Soto MD        triamcinolone acetonide (KENALOG-10) injection 20 mg  2 mL Intra-Lesional Once         triamcinolone acetonide (KENALOG-10) injection 20 mg  20 mg Intra-Lesional Once           Past Medical History:   Patient Active Problem List   Diagnosis    Porokeratosis    Squamous cell carcinoma    Neoplasm of uncertain behavior    Lichen planopilaris    Dermatitis    Cicatricial alopecia    Keratosis, inflamed seborrheic    History of skin cancer    Basal cell carcinoma  of vertex scalp s/p mohs 6-5-15    Medication management    Actinic keratosis    Medication monitoring encounter    Dermatitis, seborrheic    Erosive pustular dermatosis    Tick bite, initial encounter    Vulvar atrophy    Factor V Leiden mutation    Splenic artery aneurysm    Post concussion syndrome    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

## 2025-07-21 ENCOUNTER — OFFICE VISIT (OUTPATIENT)
Dept: DERMATOLOGY | Facility: CLINIC | Age: 80
End: 2025-07-21
Payer: MEDICARE

## 2025-07-21 VITALS — SYSTOLIC BLOOD PRESSURE: 123 MMHG | HEART RATE: 72 BPM | DIASTOLIC BLOOD PRESSURE: 66 MMHG

## 2025-07-21 DIAGNOSIS — L82.0 INFLAMED SEBORRHEIC KERATOSIS: Primary | ICD-10-CM

## 2025-07-21 DIAGNOSIS — L98.8 EROSIVE PUSTULAR DERMATOSIS: ICD-10-CM

## 2025-07-21 DIAGNOSIS — L66.10 LICHEN PLANOPILARIS: ICD-10-CM

## 2025-07-21 DIAGNOSIS — L30.9 DERMATITIS: ICD-10-CM

## 2025-07-21 PROCEDURE — 3074F SYST BP LT 130 MM HG: CPT | Performed by: DERMATOLOGY

## 2025-07-21 PROCEDURE — 11901 INJECT SKIN LESIONS >7: CPT | Mod: XS | Performed by: DERMATOLOGY

## 2025-07-21 PROCEDURE — 17110 DESTRUCTION B9 LES UP TO 14: CPT | Performed by: DERMATOLOGY

## 2025-07-21 PROCEDURE — 1126F AMNT PAIN NOTED NONE PRSNT: CPT | Performed by: DERMATOLOGY

## 2025-07-21 PROCEDURE — 99213 OFFICE O/P EST LOW 20 MIN: CPT | Mod: 25 | Performed by: DERMATOLOGY

## 2025-07-21 PROCEDURE — 3078F DIAST BP <80 MM HG: CPT | Performed by: DERMATOLOGY

## 2025-07-21 ASSESSMENT — PAIN SCALES - GENERAL: PAINLEVEL_OUTOF10: NO PAIN (0)

## 2025-07-21 NOTE — NURSING NOTE
Dermatology Rooming Note    Ester Barba's goals for this visit include:   Chief Complaint   Patient presents with    Hair Loss     Lichen plano pilaris  Erosive pustular dermatosis  Scraped the top of her scalp 3 weeks ago: using neosporin and bandaging the area       Pascale Dinh LPN

## 2025-07-21 NOTE — LETTER
7/21/2025       RE: Ester Barba  [address] [place] [facility] [place] [address]     Dear Colleague,    Thank you for referring your patient, Ester Barba, to the [facility] DERMATOLOGY CLINIC [place] at [facility]. Please see a copy of my visit note below.    Drug Administration Record    Prior to injection, verified patient identity using patient's name and date of birth.  Due to injection administration, patient instructed to remain in clinic for 15 minutes  afterwards, and to report any adverse reaction to me immediately.    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  - Current tx: ILK10 injections, clobetasol propionate 0.05% shampoo twice weekly, fluocinolone oil every other day, Duoderm to vertex plaque; oral minoxidil  - Previous tx: plaquenil w/ significant improvement (~15 yrs; discontinued 12/2019 with concerns for retinopathy by local doc in [facility]; then followed by Dr. Radford here without signs of Plaquenil toxicity 1/2020).   - LPPAI score: 3.83 on 3/28/2023, 1 on 5/2/23, 1.33 on 11/07/2023 1.33 3/29/24, 1 on 5/10/24, .67 on 7/12/24; 0.67 (9/24/24), .67 11/5/24, .67 1/14/25; 0.67 (2/25/25); 0.33 (5/20/25), 0.33 (6/10/25) 0.33 (7/21/25)  # Facial papules related to FFA   - Current tx: tretinoin 0.025% cream every other day   # Skin infection, vertex scalp at prior site of MMS - resolved  - s/p doxycycline 100 mg BID and mupirocin ointment BID   # History of NMSC  - BCC: right forehead:  s/p shave  bx'd 7/12/24, s/p MOHS 8/26/24  - BCC - front vertex scalp, s/p Mohs with purse string and granulation, 3/30/22  - BCC - forehead, s/p MMS 6/9/21  - SCCis - R vertex scalp, s/p MMS 3/2/21, bacterial cx obtained from site 3/23/21  - BCC - right nasal ala s/p Mohs 11/27/17  - Nodular BCC - vertex scalp s/p Mohs 6/5/15  - SCC - scalp, s/p Mohs 4/18/2014  - Hx of 1-2 other NMSC on scalp (BCCs)  # AKs - bilateral temples, cheeks  # Inflamed Sks  - s/p cryo - completed today (see procedure below)  ____________________________________________    Assessment & Plan:     # Lichen planopilaris, stable/improved  # Erosive pustular dermatosis, 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 The superficial layer of the skin came off. She has been using neosporin and sterile pads for the wound which been helpful.   - had been advised to discontiune neosporin and use mupiricin  - Continue clobetasol shampoo twice weekly  - Continue fluocinolone oil 1-2x week  - Continue DuoDerm 2-3 days on 2-3 days off or just Vaseline to crusted areas  - continue low dose oral minoxidil 2.5 mg-take  1/2 tablet (1.25 mg) daily  - ILK injection today      Procedures Performed:   - Intra-lesional triamcinolone procedure note. After verbal consent and review of risk of pain and skin thinning/atrophy, positioning and cleansing with isopropyl alcohol, 0.5 total mL of triamcinolone 10 mg/mL was injected into 10 lesion(s) on the scalp. The patient tolerated the procedure well and left the dermatology clinic in good condition.    - Cryotherapy procedure note, location(s): left lateral forehead (two sites), right cheek, left cheek (two sites). After verbal consent and discussion of risks and benefits including, but not limited to, dyspigmentation/scar, blister, and pain, 5 lesion(s) was(were) treated with 1-2 mm freeze border for 1-2 cycles with liquid nitrogen. Post cryotherapy  instructions were provided.    Follow-up: 6-9 months in person    Staff and Medical Student: Imelda Owen, MS3    Staff Physician:  I was present with the medical student who participated in the service and in the documentation of the note. I have verified the history and personally performed the physical exam and medical decision making. I agree with the assessment and plan of care as documented in the note.  The cryotherapy procedure was done together. ILK injections were primarily done by me.    Barbara Soto MD  Professor   Department of Dermatology  [facility]: Phone: [phone], Fax:[phone]  [facility]: Phone: [phone], Fax: [phone]        ____________________________________________    CC: Hair Loss (Lichen plano pilaris/Erosive pustular dermatosis/Scraped the top of her scalp 3 weeks ago: using neosporin and bandaging the area/)    HPI:  Ms. Ester Barba is a 79 year old female who presents as a return patient for follow-up of hair loss, diagnosed as Lichen planopilaris. She has been stable overall but she recently injured the erosive pustular dermatosis site on top of her head due to scraping on a window edge when she was on vacation on July 1st. She has been taking care of the site with neosporin and using sterile pads daily. The site is healing well. She is also concerned about papules on her left lateral forehead (close to where her BCC has been removed) and right and left cheek that has erupted in the last couple of weeks. These are symptomatic. She is requesting cryotherapy.  - Last seen 6/10/25 in-clinic  - Shedding or thinning, or both: None  - Current tx: clobetasol shampoo twice a week; fluocinolone oil once a week; duoderm 2-3 days with 3 days off; oral minoxidil 1.25 mg daily     No Any new medications, supplements, or products? (please list below)     No Scalp pain   No  Scalp burning   No Scalp itching    No Eyebrow changes    No Eyelash changes   No Beard changes    No Other body hair changes    No Nail changes    No Additional symptoms? (please list below)     - Overall course: Hair loss has been stable.   - COVID status: Unknown.    Patient is otherwise feeling well, in usual state of health, and has no additional skin concerns today.       Labs:  None reviewed.    Physical Exam:  Vitals: /66   Pulse 72   GEN: Well developed, well-nourished, in no acute distress, in a pleasant mood.    SKIN: Focused examination of scalp, face and hands was performed.  - mild yellow crust on crown of scalp with vascular lesions that is healing.  - Scaly, tan papules and plaques on lateral forehead and left and right cheek.  - no diffuse erythema   - no perifollicular erythema  - mild perifollicular scale on the upper parietal posterior scalp  - no scaling of the scalp   - normal eyelash density  - normal eyebrow density    Medications:  Current Outpatient Medications   Medication Sig Dispense Refill     acetaminophen (TYLENOL) 500 MG tablet Take 500-1,000 mg by mouth every 6 hours as needed for mild pain       ammonium lactate (AMLACTIN) 12 % external cream Apply to legs twice daily 385 g 1     Biotin 10 MG TABS tablet        Calcium 1500 MG TABS        Cholecalciferol (VITAMIN D) 1000 UNIT capsule Total 2200 units daily.       clobetasol (TEMOVATE) 0.05 % external ointment Apply twice daily every other day alternating with the mupirocin ointment to affected area on the scalp 60 g 1     clobetasol propionate (CLOBEX) 0.05 % external shampoo APPLY TO DRY SCALP EVERY OTHER DAY. LEAVE ON FOR 15 MINUTES, THEN LATHER AND RINSE. 118 mL 2     desonide (DESOWEN) 0.05 % external cream Mix half and half with ketoconazole cream and apply twice daily as needed 60 g 1     esomeprazole (NEXIUM) 20 MG packet Take 20 mg by mouth every morning (before breakfast) Take 30-60 minutes before eating.        Fluocinolone Acetonide Scalp 0.01 % OIL oil Apply once a week to scalp for lichen planopilaris 118.28 mL 1     glucosamine-chondroitin 500-400 MG CAPS per capsule Take 1 capsule by mouth       hydrocortisone 2.5 % cream Use twice daily as needed on involved areas 30 g 4     ketoconazole (NIZORAL) 2 % external cream Mix half and half with desonide cream and apply twice a day as needed 60 g 1     levalbuterol (XOPENEX HFA) 45 MCG/ACT inhaler Inhale 2 puffs into the lungs every 4 hours as needed for shortness of breath or wheezing 15 g 4     Loratadine (CLARITIN PO) Take  by mouth.       minoxidil (LONITEN) 2.5 MG tablet Take 1/4 tablet (0.625mg) daily and after 2 weeks if tolerated can start taking 1/2 tablet (1.25mg) (Patient taking differently: Take 1.25 mg by mouth daily. Take 1/4 tablet (0.625mg) daily and after 2 weeks if tolerated can start taking 1/2 tablet (1.25mg)) 30 tablet 3     mupirocin (BACTROBAN) 2 % external ointment Use 2 times a day every other day alternating with clobetasol ointment to affected area. 30 g 3     NEW MED Biest 1.5mg/gram cream(pg free)  Insert 1 gram vaginally twice weekly as directed 40 g 1     tretinoin (RETIN-A) 0.025 % external cream APPLY PEA-SIZED AMOUNT TO AFFECTED AREA(S) ON FACE EVERY OTHER NIGHT AS TOLERATED 45 g 11     Current Facility-Administered Medications   Medication Dose Route Frequency Provider Last Rate Last Admin     triamcinolone acetonide (KENALOG-10) injection 10 mg  10 mg Intra-Lesional Once          triamcinolone acetonide (KENALOG-10) injection 10 mg  10 mg Intra-Lesional Once          triamcinolone acetonide (KENALOG-10) injection 17 mg  17 mg Intra-Lesional Once          triamcinolone acetonide (KENALOG-10) injection 18 mg  18 mg Intra-Lesional Once Barbara Soto MD         triamcinolone acetonide (KENALOG-10) injection 20 mg  2 mL Intra-Lesional Once          triamcinolone acetonide (KENALOG-10) injection 20 mg  20 mg Intra-Lesional Once            Past Medical History:   Patient Active Problem List   Diagnosis     Porokeratosis     Squamous cell carcinoma     Neoplasm of uncertain behavior     Lichen planopilaris     Dermatitis     Cicatricial alopecia     Keratosis, inflamed seborrheic     History of skin cancer     Basal cell carcinoma of vertex scalp s/p mohs 6-5-15     Medication management     Actinic keratosis     Medication monitoring encounter     Dermatitis, seborrheic     Erosive pustular dermatosis     Tick bite, initial encounter     Vulvar atrophy     Factor V Leiden mutation     Splenic artery aneurysm     Post concussion syndrome     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    Again, thank you for allowing me to participate in the care of your patient.      Sincerely,    Barbara Soto MD

## 2025-07-21 NOTE — PROGRESS NOTES
Drug Administration Record    Prior to injection, verified patient identity using patient's name and date of birth.  Due to injection administration, patient instructed to remain in clinic for 15 minutes  afterwards, and to report any adverse reaction to me immediately.    Drug Name: triamcinolone acetonide(kenalog)  Dose: 0.5 mL of triamcinolone 10mg/mL, 5 mg dose  Route administered: ID  NDC #: 3578-7050-20  Amount of waste(mL):4.5 mL  Reason for waste: Multi dose vial    LOT #: [id number]  SITE: See provider notes  : [employer]  EXPIRATION DATE: Sep 2027

## 2025-07-22 NOTE — PROGRESS NOTES
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  - Current tx: ILK10 injections, clobetasol propionate 0.05% shampoo twice weekly, fluocinolone oil every other day, Duoderm to vertex plaque; oral minoxidil  - Previous tx: plaquenil w/ significant improvement (~15 yrs; discontinued 12/2019 with concerns for retinopathy by local doc in [facility]; then followed by Dr. Radford here without signs of Plaquenil toxicity 1/2020).   - LPPAI score: 3.83 on 3/28/2023, 1 on 5/2/23, 1.33 on 11/07/2023 1.33 3/29/24, 1 on 5/10/24, .67 on 7/12/24; 0.67 (9/24/24), .67 11/5/24, .67 1/14/25; 0.67 (2/25/25); 0.33 (5/20/25), 0.33 (6/10/25) 0.33 (7/21/25)  # Facial papules related to FFA   - Current tx: tretinoin 0.025% cream every other day   # Skin infection, vertex scalp at prior site of MMS - resolved  - s/p doxycycline 100 mg BID and mupirocin ointment BID   # History of NMSC  - BCC: right forehead:  s/p shave bx'd 7/12/24, s/p MOHS 8/26/24  - BCC - front vertex scalp, s/p Mohs with purse string and granulation, 3/30/22  - BCC - forehead, s/p MMS 6/9/21  - SCCis - R vertex scalp, s/p MMS 3/2/21, bacterial cx obtained from site 3/23/21  - BCC - right nasal ala s/p Mohs 11/27/17  - Nodular BCC - vertex scalp s/p Mohs 6/5/15  - SCC - scalp, s/p Mohs 4/18/2014  - Hx of 1-2 other NMSC on scalp (BCCs)  # AKs - bilateral temples, cheeks  # Inflamed Sks  - s/p cryo - completed today (see procedure below)  ____________________________________________    Assessment & Plan:     # Lichen planopilaris, stable/improved  # Erosive pustular dermatosis, 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 The superficial layer of the skin came off. She has been using neosporin and sterile pads for the wound which been helpful.   - had  been advised to discontiune neosporin and use mupiricin  - Continue clobetasol shampoo twice weekly  - Continue fluocinolone oil 1-2x week  - Continue DuoDerm 2-3 days on 2-3 days off or just Vaseline to crusted areas  - continue low dose oral minoxidil 2.5 mg-take  1/2 tablet (1.25 mg) daily  - ILK injection today      Procedures Performed:   - Intra-lesional triamcinolone procedure note. After verbal consent and review of risk of pain and skin thinning/atrophy, positioning and cleansing with isopropyl alcohol, 0.5 total mL of triamcinolone 10 mg/mL was injected into 10 lesion(s) on the scalp. The patient tolerated the procedure well and left the dermatology clinic in good condition.    - Cryotherapy procedure note, location(s): left lateral forehead (two sites), right cheek, left cheek (two sites). After verbal consent and discussion of risks and benefits including, but not limited to, dyspigmentation/scar, blister, and pain, 5 lesion(s) was(were) treated with 1-2 mm freeze border for 1-2 cycles with liquid nitrogen. Post cryotherapy instructions were provided.    Follow-up: 6-9 months in person    Staff and Medical Student: Imelda Owen MS3    Staff Physician:  I was present with the medical student who participated in the service and in the documentation of the note. I have verified the history and personally performed the physical exam and medical decision making. I agree with the assessment and plan of care as documented in the note.  The cryotherapy procedure was done together. ILK injections were primarily done by me.    Barbara Soto MD  Professor   Department of Dermatology  [facility]: Phone: [phone], Fax:[phone]  [facility]: Phone: [phone], Fax: [phone]        ____________________________________________    CC: Hair Loss (Lichen plano pilaris/Erosive pustular  dermatosis/Scraped the top of her scalp 3 weeks ago: using neosporin and bandaging the area/)    HPI:  Ms. Ester Barba is a 79 year old female who presents as a return patient for follow-up of hair loss, diagnosed as Lichen planopilaris. She has been stable overall but she recently injured the erosive pustular dermatosis site on top of her head due to scraping on a window edge when she was on vacation on July 1st. She has been taking care of the site with neosporin and using sterile pads daily. The site is healing well. She is also concerned about papules on her left lateral forehead (close to where her BCC has been removed) and right and left cheek that has erupted in the last couple of weeks. These are symptomatic. She is requesting cryotherapy.  - Last seen 6/10/25 in-clinic  - Shedding or thinning, or both: None  - Current tx: clobetasol shampoo twice a week; fluocinolone oil once a week; duoderm 2-3 days with 3 days off; oral minoxidil 1.25 mg daily     No Any new medications, supplements, or products? (please list below)     No Scalp pain   No Scalp burning   No Scalp itching    No Eyebrow changes    No Eyelash changes   No Beard changes    No Other body hair changes    No Nail changes    No Additional symptoms? (please list below)     - Overall course: Hair loss has been stable.   - COVID status: Unknown.    Patient is otherwise feeling well, in usual state of health, and has no additional skin concerns today.       Labs:  None reviewed.    Physical Exam:  Vitals: /66   Pulse 72   GEN: Well developed, well-nourished, in no acute distress, in a pleasant mood.    SKIN: Focused examination of scalp, face and hands was performed.  - mild yellow crust on crown of scalp with vascular lesions that is healing.  - Scaly, tan papules and plaques on lateral forehead and left and right cheek.  - no diffuse erythema   - no perifollicular erythema  - mild perifollicular scale on the upper parietal posterior scalp  -  no scaling of the scalp   - normal eyelash density  - normal eyebrow density    Medications:  Current Outpatient Medications   Medication Sig Dispense Refill    acetaminophen (TYLENOL) 500 MG tablet Take 500-1,000 mg by mouth every 6 hours as needed for mild pain      ammonium lactate (AMLACTIN) 12 % external cream Apply to legs twice daily 385 g 1    Biotin 10 MG TABS tablet       Calcium 1500 MG TABS       Cholecalciferol (VITAMIN D) 1000 UNIT capsule Total 2200 units daily.      clobetasol (TEMOVATE) 0.05 % external ointment Apply twice daily every other day alternating with the mupirocin ointment to affected area on the scalp 60 g 1    clobetasol propionate (CLOBEX) 0.05 % external shampoo APPLY TO DRY SCALP EVERY OTHER DAY. LEAVE ON FOR 15 MINUTES, THEN LATHER AND RINSE. 118 mL 2    desonide (DESOWEN) 0.05 % external cream Mix half and half with ketoconazole cream and apply twice daily as needed 60 g 1    esomeprazole (NEXIUM) 20 MG packet Take 20 mg by mouth every morning (before breakfast) Take 30-60 minutes before eating.      Fluocinolone Acetonide Scalp 0.01 % OIL oil Apply once a week to scalp for lichen planopilaris 118.28 mL 1    glucosamine-chondroitin 500-400 MG CAPS per capsule Take 1 capsule by mouth      hydrocortisone 2.5 % cream Use twice daily as needed on involved areas 30 g 4    ketoconazole (NIZORAL) 2 % external cream Mix half and half with desonide cream and apply twice a day as needed 60 g 1    levalbuterol (XOPENEX HFA) 45 MCG/ACT inhaler Inhale 2 puffs into the lungs every 4 hours as needed for shortness of breath or wheezing 15 g 4    Loratadine (CLARITIN PO) Take  by mouth.      minoxidil (LONITEN) 2.5 MG tablet Take 1/4 tablet (0.625mg) daily and after 2 weeks if tolerated can start taking 1/2 tablet (1.25mg) (Patient taking differently: Take 1.25 mg by mouth daily. Take 1/4 tablet (0.625mg) daily and after 2 weeks if tolerated can start taking 1/2 tablet (1.25mg)) 30 tablet 3     mupirocin (BACTROBAN) 2 % external ointment Use 2 times a day every other day alternating with clobetasol ointment to affected area. 30 g 3    NEW MED Biest 1.5mg/gram cream(pg free)  Insert 1 gram vaginally twice weekly as directed 40 g 1    tretinoin (RETIN-A) 0.025 % external cream APPLY PEA-SIZED AMOUNT TO AFFECTED AREA(S) ON FACE EVERY OTHER NIGHT AS TOLERATED 45 g 11     Current Facility-Administered Medications   Medication Dose Route Frequency Provider Last Rate Last Admin    triamcinolone acetonide (KENALOG-10) injection 10 mg  10 mg Intra-Lesional Once         triamcinolone acetonide (KENALOG-10) injection 10 mg  10 mg Intra-Lesional Once         triamcinolone acetonide (KENALOG-10) injection 17 mg  17 mg Intra-Lesional Once         triamcinolone acetonide (KENALOG-10) injection 18 mg  18 mg Intra-Lesional Once Barbara Soto MD        triamcinolone acetonide (KENALOG-10) injection 20 mg  2 mL Intra-Lesional Once         triamcinolone acetonide (KENALOG-10) injection 20 mg  20 mg Intra-Lesional Once           Past Medical History:   Patient Active Problem List   Diagnosis    Porokeratosis    Squamous cell carcinoma    Neoplasm of uncertain behavior    Lichen planopilaris    Dermatitis    Cicatricial alopecia    Keratosis, inflamed seborrheic    History of skin cancer    Basal cell carcinoma of vertex scalp s/p mohs 6-5-15    Medication management    Actinic keratosis    Medication monitoring encounter    Dermatitis, seborrheic    Erosive pustular dermatosis    Tick bite, initial encounter    Vulvar atrophy    Factor V Leiden mutation    Splenic artery aneurysm    Post concussion syndrome    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

## 2025-07-27 RX ORDER — MUPIROCIN 2 %
OINTMENT (GRAM) TOPICAL
Qty: 30 G | Refills: 3 | Status: SHIPPED | OUTPATIENT
Start: 2025-07-27

## 2025-08-19 ENCOUNTER — TELEPHONE (OUTPATIENT)
Dept: DERMATOLOGY | Facility: CLINIC | Age: 80
End: 2025-08-19
Payer: MEDICARE

## 2025-08-25 ENCOUNTER — TELEPHONE (OUTPATIENT)
Dept: PULMONOLOGY | Facility: CLINIC | Age: 80
End: 2025-08-25
Payer: MEDICARE

## (undated) DEVICE — PAD CHUX UNDERPAD 30X36" P3036C

## (undated) DEVICE — GOWN XLG DISP 9545

## (undated) DEVICE — SYR 10ML FINGER CONTROL W/O NDL 309695

## (undated) DEVICE — SOL NACL 0.9% IRRIG 1000ML BOTTLE 2F7124

## (undated) DEVICE — GLOVE PROTEXIS MICRO 6.5  2D73PM65

## (undated) DEVICE — DRSG TELFA 3X8" 1238

## (undated) DEVICE — SOL WATER IRRIG 1000ML BOTTLE 2F7114

## (undated) DEVICE — BLADE KNIFE SURG 15 371115

## (undated) DEVICE — DECANTER TRANSFER DEVICE 2008S

## (undated) DEVICE — SU VICRYL 2-0 SH 27" J317H

## (undated) DEVICE — SUCTION MANIFOLD NEPTUNE 2 SYS 4 PORT 0702-020-000

## (undated) DEVICE — LINEN TOWEL PACK X5 5464

## (undated) DEVICE — ESU PENCIL W/HOLSTER E2350H

## (undated) DEVICE — Device

## (undated) DEVICE — LINEN GOWN X4 5410

## (undated) DEVICE — GLOVE PROTEXIS BLUE W/NEU-THERA 6.5  2D73EB65

## (undated) DEVICE — STRAP KNEE/BODY 31143004

## (undated) DEVICE — ESU GROUND PAD UNIVERSAL W/O CORD

## (undated) DEVICE — NDL SPINAL 22GA 2.5" QUINCKE 405074

## (undated) RX ORDER — DEXAMETHASONE SODIUM PHOSPHATE 4 MG/ML
INJECTION, SOLUTION INTRA-ARTICULAR; INTRALESIONAL; INTRAMUSCULAR; INTRAVENOUS; SOFT TISSUE
Status: DISPENSED
Start: 2021-07-14

## (undated) RX ORDER — PROPOFOL 10 MG/ML
INJECTION, EMULSION INTRAVENOUS
Status: DISPENSED
Start: 2021-07-14

## (undated) RX ORDER — KETOROLAC TROMETHAMINE 30 MG/ML
INJECTION, SOLUTION INTRAMUSCULAR; INTRAVENOUS
Status: DISPENSED
Start: 2021-07-14

## (undated) RX ORDER — KETOROLAC TROMETHAMINE 15 MG/ML
INJECTION, SOLUTION INTRAMUSCULAR; INTRAVENOUS
Status: DISPENSED
Start: 2021-07-14

## (undated) RX ORDER — BUPIVACAINE HYDROCHLORIDE AND EPINEPHRINE 2.5; 5 MG/ML; UG/ML
INJECTION, SOLUTION INFILTRATION; PERINEURAL
Status: DISPENSED
Start: 2021-07-14

## (undated) RX ORDER — ONDANSETRON 2 MG/ML
INJECTION INTRAMUSCULAR; INTRAVENOUS
Status: DISPENSED
Start: 2021-07-14

## (undated) RX ORDER — CEFAZOLIN SODIUM 2 G/100ML
INJECTION, SOLUTION INTRAVENOUS
Status: DISPENSED
Start: 2021-07-14

## (undated) RX ORDER — LIDOCAINE HYDROCHLORIDE 10 MG/ML
INJECTION, SOLUTION EPIDURAL; INFILTRATION; INTRACAUDAL; PERINEURAL
Status: DISPENSED
Start: 2021-07-14

## (undated) RX ORDER — MUPIROCIN 20 MG/G
OINTMENT TOPICAL
Status: DISPENSED
Start: 2022-08-01

## (undated) RX ORDER — FENTANYL CITRATE 50 UG/ML
INJECTION, SOLUTION INTRAMUSCULAR; INTRAVENOUS
Status: DISPENSED
Start: 2021-07-14

## (undated) RX ORDER — ACETAMINOPHEN 325 MG/1
TABLET ORAL
Status: DISPENSED
Start: 2021-07-14